# Patient Record
Sex: FEMALE | Race: WHITE | NOT HISPANIC OR LATINO | Employment: OTHER | URBAN - METROPOLITAN AREA
[De-identification: names, ages, dates, MRNs, and addresses within clinical notes are randomized per-mention and may not be internally consistent; named-entity substitution may affect disease eponyms.]

---

## 2023-03-27 LAB — POC HEMOGLOBIN A1C: 5.1 % (ref 4.2–6.5)

## 2023-04-05 PROBLEM — M47.812 CERVICAL ARTHRITIS: Status: ACTIVE | Noted: 2023-04-05

## 2023-04-05 PROBLEM — M54.12 CERVICAL RADICULOPATHY: Status: ACTIVE | Noted: 2023-04-05

## 2023-04-05 PROBLEM — Z98.84 GASTRIC BYPASS STATUS FOR OBESITY: Status: ACTIVE | Noted: 2023-04-05

## 2023-04-05 PROBLEM — M54.81 BILATERAL OCCIPITAL NEURALGIA: Status: ACTIVE | Noted: 2023-04-05

## 2023-04-05 PROBLEM — M54.9 BACK PAIN: Status: ACTIVE | Noted: 2023-04-05

## 2023-04-05 PROBLEM — M15.9 OSTEOARTHRITIS OF MULTIPLE JOINTS: Status: ACTIVE | Noted: 2023-04-05

## 2023-04-05 PROBLEM — M18.11 ARTHRITIS OF CARPOMETACARPAL (CMC) JOINT OF RIGHT THUMB: Status: ACTIVE | Noted: 2023-04-05

## 2023-04-05 PROBLEM — M17.9 KNEE OSTEOARTHRITIS: Status: ACTIVE | Noted: 2023-04-05

## 2023-04-05 PROBLEM — G44.86 CERVICOGENIC HEADACHE: Status: ACTIVE | Noted: 2023-04-05

## 2023-04-05 PROBLEM — F51.01 PRIMARY INSOMNIA: Status: ACTIVE | Noted: 2023-04-05

## 2023-04-05 PROBLEM — M79.18 CERVICAL MYOFASCIAL PAIN SYNDROME: Status: ACTIVE | Noted: 2023-04-05

## 2023-04-05 PROBLEM — R06.02 SOB (SHORTNESS OF BREATH) ON EXERTION: Status: ACTIVE | Noted: 2023-04-05

## 2023-04-05 PROBLEM — M25.531 ARTHRALGIA OF RIGHT WRIST: Status: ACTIVE | Noted: 2023-04-05

## 2023-04-05 PROBLEM — K13.79 MOUTH SORE: Status: ACTIVE | Noted: 2023-04-05

## 2023-04-05 PROBLEM — D69.6 THROMBOCYTOPENIA (CMS-HCC): Status: ACTIVE | Noted: 2023-04-05

## 2023-04-05 PROBLEM — C50.919 BREAST CANCER (MULTI): Status: ACTIVE | Noted: 2023-04-05

## 2023-04-05 PROBLEM — M1A.9XX0 CHRONIC GOUT: Status: ACTIVE | Noted: 2023-04-05

## 2023-04-05 PROBLEM — E66.01 MORBID OBESITY (MULTI): Status: ACTIVE | Noted: 2023-04-05

## 2023-04-05 PROBLEM — M15.0 PRIMARY OSTEOARTHRITIS INVOLVING MULTIPLE JOINTS: Status: ACTIVE | Noted: 2023-04-05

## 2023-04-05 PROBLEM — R51.9 HEADACHE: Status: ACTIVE | Noted: 2023-04-05

## 2023-04-05 PROBLEM — R29.6 RECURRENT FALLS: Status: ACTIVE | Noted: 2023-04-05

## 2023-04-05 PROBLEM — R41.3 MEMORY LOSS: Status: ACTIVE | Noted: 2023-04-05

## 2023-04-05 PROBLEM — E11.9 TYPE 2 DIABETES MELLITUS WITHOUT COMPLICATION, WITHOUT LONG-TERM CURRENT USE OF INSULIN (MULTI): Status: ACTIVE | Noted: 2023-04-05

## 2023-04-05 PROBLEM — M79.641 PAIN IN RIGHT HAND: Status: ACTIVE | Noted: 2023-04-05

## 2023-04-05 PROBLEM — K21.9 GASTROESOPHAGEAL REFLUX DISEASE WITHOUT ESOPHAGITIS: Status: ACTIVE | Noted: 2023-04-05

## 2023-04-05 PROBLEM — G62.9 NEUROPATHY: Status: ACTIVE | Noted: 2023-04-05

## 2023-04-05 PROBLEM — L30.9 DERMATITIS: Status: ACTIVE | Noted: 2023-04-05

## 2023-04-05 PROBLEM — E28.39 DECREASED ESTROGEN LEVEL: Status: ACTIVE | Noted: 2023-04-05

## 2023-04-05 PROBLEM — F32.0 CURRENT MILD EPISODE OF MAJOR DEPRESSIVE DISORDER (CMS-HCC): Status: ACTIVE | Noted: 2023-04-05

## 2023-04-05 RX ORDER — COLCHICINE 0.6 MG/1
0.6 TABLET ORAL EVERY OTHER DAY
COMMUNITY
End: 2024-05-17 | Stop reason: WASHOUT

## 2023-04-05 RX ORDER — MULTIVITAMIN
1 TABLET ORAL DAILY
COMMUNITY

## 2023-04-05 RX ORDER — ALLOPURINOL 100 MG/1
100 TABLET ORAL DAILY
COMMUNITY

## 2023-04-05 RX ORDER — DULOXETIN HYDROCHLORIDE 60 MG/1
60 CAPSULE, DELAYED RELEASE ORAL DAILY
COMMUNITY
End: 2024-02-13 | Stop reason: SDUPTHER

## 2023-04-05 RX ORDER — NARATRIPTAN 2.5 MG/1
2.5 TABLET ORAL ONCE AS NEEDED
COMMUNITY
End: 2023-11-17 | Stop reason: SDUPTHER

## 2023-04-05 RX ORDER — LAMOTRIGINE 150 MG/1
150 TABLET ORAL DAILY
COMMUNITY

## 2023-04-05 RX ORDER — BENZTROPINE MESYLATE 0.5 MG/1
0.5 TABLET ORAL 3 TIMES DAILY
COMMUNITY
End: 2024-02-13 | Stop reason: ALTCHOICE

## 2023-04-05 RX ORDER — PANTOPRAZOLE SODIUM 40 MG/1
40 TABLET, DELAYED RELEASE ORAL
COMMUNITY
End: 2024-04-03 | Stop reason: ALTCHOICE

## 2023-04-05 RX ORDER — ALLOPURINOL 300 MG/1
300 TABLET ORAL DAILY
COMMUNITY
End: 2024-02-13 | Stop reason: ALTCHOICE

## 2023-04-05 RX ORDER — LATANOPROST 0.05 MG/ML
SOLUTION/ DROPS OPHTHALMIC; TOPICAL DAILY
COMMUNITY

## 2023-04-05 RX ORDER — ARIPIPRAZOLE 2 MG/1
2 TABLET ORAL DAILY
COMMUNITY
End: 2023-08-11 | Stop reason: SINTOL

## 2023-04-05 RX ORDER — PREDNISONE 10 MG/1
30 TABLET ORAL DAILY
COMMUNITY
End: 2023-11-17 | Stop reason: ALTCHOICE

## 2023-04-05 RX ORDER — SEMAGLUTIDE 1.34 MG/ML
0.5 INJECTION, SOLUTION SUBCUTANEOUS
COMMUNITY
End: 2024-02-13 | Stop reason: SDUPTHER

## 2023-04-05 ASSESSMENT — ENCOUNTER SYMPTOMS
FATIGUE: 0
FREQUENCY: 0
EYE REDNESS: 0
EYE DISCHARGE: 0
POLYPHAGIA: 0
ABDOMINAL PAIN: 0
UNEXPECTED WEIGHT CHANGE: 0
HALLUCINATIONS: 0
POLYDIPSIA: 0
EYE PAIN: 0
WOUND: 0
ADENOPATHY: 0
DIZZINESS: 0
NECK PAIN: 0
SHORTNESS OF BREATH: 0
APPETITE CHANGE: 0
HEMATURIA: 0
PALPITATIONS: 0
BLOOD IN STOOL: 0
BRUISES/BLEEDS EASILY: 0
DYSURIA: 0
VOMITING: 0
FEVER: 0
BACK PAIN: 0
CHILLS: 0
CONFUSION: 0
SORE THROAT: 0
HEADACHES: 0
TROUBLE SWALLOWING: 0
COUGH: 0

## 2023-04-05 NOTE — PROGRESS NOTES
Subjective   Patient ID: Mariann Drew is a 67 y.o. female who presents for Medicare Annual Wellness Visit Initial (Medicare annual exam./Ent Dr will see today/Oncologist- Yes every year/Last last-/Fasting No/Flu Yes Sept 2022/Tdap-yes 5 years ago/Shingrix second on 01/2023/[id number] Yes last fall/Last naratriptan 2wk ago).    Pls ask all medicare questions    new pt-does pt see anyone but gabino ambriz bashour, [place], [facility]    Name of eye drmartha  Did pt see ent dr for mouth sores? Seeing him today  Did pt see hand ortho for rt hand pain? yes  Does pt see a breast health dr or oncologist still? Onc qyr  Put names of providers in care team-not last pcp    Need dario for general surgeon at next appt-silvano    last labs 1/2023  is pt fasting? no  Tdap 2020  flu shot unavailable  Shingrix last one 1/2023  [id number] fall 2022  last [id number]/2021  last mammo 6/1/22  bone density 9/2022  Last use of naratriptan 2wks ago    Poc microalb-normal    Next wk tues-sebaceous cyst rt abdomen-gnl surgeon silvano    Family history form      No care team member to display    HPI  Last labs-3/27/23 a1c 5.1, 1/11/23 cmp-non fasting sugar 160, iron nl, ferritin, nl, folate nl, b12 nl, cbc-rbc low/hgb low; 10/2022 tsh nl, vit d nl; 2/2022 lipid hdl 44  Due for labs- lipid    No results found for: CHOL  No results found for: TRIG  No results found for: HDL  No results found for: LDL  No results found for: TSH  No components found for: A1C  No components found for: POCA1C  No results found for: ALBUR  No components found for: POCALBUR      Other concerns: none    bps at home- none    ER/urgicare visits in the last year- Sunday cyst  Hospitalizations in the last year- none    FH ovarian, endometrial, cervical, uterine ca-mat aunt-uterine        last mammo- (40-75/90) 6/1/22 at [facility]  Self breast exam yes  FH br ca-none    last colonoscopy/cologuard/FIT (45-75) 11/4/21; due 11/2026   colon  ca-pgm      last bone density-(age 65-85) or if fx after age 50) 9/2022      Exercise- water aerobics 3 times a wk  Diet-3 meals a day   Body mass index is 33.64 kg/m².    last eye dr appt- glasses, appt today  No vision issues    last dental appt- 3wks ago; filling next wk    BMs-regular  Sleep-able to fall asleep and stay asleep; no snoring or apnea; medical marijuana 1 gummy at bedtime  no cp, swelling, sob, abd pain, n/v/d/c, blood in stool or black stools  STI testing including hiv (age 15-65) and hep c screening (18-79)-declines        Immunization History   Administered Date(s) Administered    Influenza, seasonal, injectable 09/16/2021    Pfizer Purple Cap SARS-CoV-2 02/09/2021, 03/10/2021, 09/22/2021    Pneumococcal Conjugate Pcv 20 09/01/2022    Pneumococcal Polysaccharide PPSV23 01/01/2004    SARS-CoV-2, Unspecified 04/06/2022    TD (adult), 2 Lf tetanus toxoid, preservative free, adsorbed 06/05/2014    Tdap 08/07/2020    Zoster, Unspecified 03/07/2016, 10/03/2022, 01/04/2023       fractures in lifetime-rt shoulder, rt pinky finger, feet bilat  FH hip/spine/wrist/humerus fx in mom, dad or sibs-dad-wrist    FH heart attack, heart surgery-dad-mi-ablations; mom-hole in heart  FH stroke-mgm, uncle    The ASCVD Risk score (Dinh GREEN, et al., 2019) failed to calculate for the following reasons:    Cannot find a previous HDL lab    Cannot find a previous total cholesterol lab  Coronary Artery Calcium score:  This test is recommended for men 45 or older and women 55 or older without a history of heart disease and have 1 risk factor (high LDL cholesterol, low HDL cholesterol, high blood pressure, smoker (current or past), type 2 diabetes, IBD, lupus, RA, ankylosing spondylitis, psoriasis or family history of  heart disease <55yrs in dad, brother or child or <65yrs in mom, sister, or child.)       Review of Systems   Constitutional:  Negative for appetite change, chills, fatigue, fever and unexpected weight change.    HENT:  Negative for congestion, ear pain, sore throat and trouble swallowing.    Eyes:  Negative for pain, discharge and redness.   Respiratory:  Negative for cough and shortness of breath.    Cardiovascular:  Negative for chest pain and palpitations.   Gastrointestinal:  Negative for abdominal pain, blood in stool and vomiting.   Endocrine: Negative for polydipsia, polyphagia and polyuria.   Genitourinary:  Negative for dysuria, frequency, hematuria and urgency.   Musculoskeletal:  Negative for back pain and neck pain.   Skin:  Negative for rash and wound.   Allergic/Immunologic: Negative for immunocompromised state.   Neurological:  Negative for dizziness, syncope and headaches.   Hematological:  Negative for adenopathy. Does not bruise/bleed easily.   Psychiatric/Behavioral:  Negative for confusion and hallucinations.        Objective   Visit Vitals  /72   Pulse 88   Temp 36.6 °C (97.9 °F) (Temporal)      BP Readings from Last 3 Encounters:   04/06/23 123/72   12/02/22 100/60   10/17/22 103/66     Wt Readings from Last 3 Encounters:   04/06/23 88.9 kg (196 lb)   12/02/22 86.2 kg (190 lb)   10/17/22 86.6 kg (191 lb)           Physical Exam  Constitutional:       General: She is not in acute distress.     Appearance: Normal appearance. She is not ill-appearing.   HENT:      Head: Normocephalic.      Right Ear: Tympanic membrane, ear canal and external ear normal.      Left Ear: Tympanic membrane, ear canal and external ear normal.      Nose: Nose normal.      Mouth/Throat:      Mouth: Mucous membranes are moist.      Pharynx: Oropharynx is clear.   Eyes:      Extraocular Movements: Extraocular movements intact.      Conjunctiva/sclera: Conjunctivae normal.      Pupils: Pupils are equal, round, and reactive to light.   Cardiovascular:      Rate and Rhythm: Normal rate and regular rhythm.      Heart sounds: Normal heart sounds. No murmur heard.  Pulmonary:      Effort: Pulmonary effort is normal. No  respiratory distress.      Breath sounds: Normal breath sounds. No wheezing, rhonchi or rales.   Abdominal:      General: Bowel sounds are normal.      Palpations: Abdomen is soft. There is no mass.      Tenderness: There is no abdominal tenderness.   Musculoskeletal:         General: No swelling or tenderness. Normal range of motion.      Cervical back: Normal range of motion and neck supple.      Right lower leg: No edema.      Left lower leg: No edema.   Skin:     General: Skin is warm.      Findings: No rash.   Neurological:      General: No focal deficit present.      Mental Status: She is alert and oriented to person, place, and time.      Cranial Nerves: No cranial nerve deficit.      Motor: No weakness.   Psychiatric:         Mood and Affect: Mood normal.         Behavior: Behavior normal.         Assessment/Plan   Diagnoses and all orders for this visit:  Routine adult health maintenance  Type 2 diabetes mellitus without complication, without long-term current use of insulin (CMS/[facility])  -     Lipid Panel; Future  -     POCT Albumin random urine manually resulted  BMI 33.0-33.9,adult  Encounter for screening mammogram for malignant neoplasm of breast  -     BI mammo right screening tomosynthesis; Future  FH: myocardial infarction  -     CT cardiac scoring wo IV contrast; Future

## 2023-04-06 ENCOUNTER — OFFICE VISIT (OUTPATIENT)
Dept: PRIMARY CARE | Facility: CLINIC | Age: 68
End: 2023-04-06
Payer: MEDICARE

## 2023-04-06 VITALS
DIASTOLIC BLOOD PRESSURE: 72 MMHG | BODY MASS INDEX: 33.46 KG/M2 | WEIGHT: 196 LBS | TEMPERATURE: 97.9 F | SYSTOLIC BLOOD PRESSURE: 123 MMHG | HEIGHT: 64 IN | OXYGEN SATURATION: 94 % | HEART RATE: 88 BPM

## 2023-04-06 DIAGNOSIS — E11.9 TYPE 2 DIABETES MELLITUS WITHOUT COMPLICATION, WITHOUT LONG-TERM CURRENT USE OF INSULIN (MULTI): ICD-10-CM

## 2023-04-06 DIAGNOSIS — Z82.49 FH: MYOCARDIAL INFARCTION: ICD-10-CM

## 2023-04-06 DIAGNOSIS — Z12.31 ENCOUNTER FOR SCREENING MAMMOGRAM FOR MALIGNANT NEOPLASM OF BREAST: ICD-10-CM

## 2023-04-06 DIAGNOSIS — K75.81 LIVER CIRRHOSIS SECONDARY TO NASH (NONALCOHOLIC STEATOHEPATITIS) (MULTI): ICD-10-CM

## 2023-04-06 DIAGNOSIS — C50.912 MALIGNANT NEOPLASM OF LEFT FEMALE BREAST, UNSPECIFIED ESTROGEN RECEPTOR STATUS, UNSPECIFIED SITE OF BREAST (MULTI): ICD-10-CM

## 2023-04-06 DIAGNOSIS — Z13.89 SCREENING FOR MULTIPLE CONDITIONS: ICD-10-CM

## 2023-04-06 DIAGNOSIS — D69.6 THROMBOCYTOPENIA (CMS-HCC): ICD-10-CM

## 2023-04-06 DIAGNOSIS — M06.9 RHEUMATOID ARTHRITIS, INVOLVING UNSPECIFIED SITE, UNSPECIFIED WHETHER RHEUMATOID FACTOR PRESENT (MULTI): ICD-10-CM

## 2023-04-06 DIAGNOSIS — Z00.00 ROUTINE ADULT HEALTH MAINTENANCE: Primary | ICD-10-CM

## 2023-04-06 DIAGNOSIS — K74.60 LIVER CIRRHOSIS SECONDARY TO NASH (NONALCOHOLIC STEATOHEPATITIS) (MULTI): ICD-10-CM

## 2023-04-06 DIAGNOSIS — F33.0 MILD EPISODE OF RECURRENT MAJOR DEPRESSIVE DISORDER (CMS-HCC): ICD-10-CM

## 2023-04-06 PROBLEM — E66.01 MORBID OBESITY (MULTI): Status: RESOLVED | Noted: 2023-04-05 | Resolved: 2023-04-06

## 2023-04-06 LAB
POC ALBUMIN /CREATININE RATIO MANUALLY ENTERED: <30 UG/MG CREAT
POC URINE ALBUMIN: 30 MG/L
POC URINE CREATININE: 200 MG/DL

## 2023-04-06 PROCEDURE — 3351F NEG SCRN DEP SYMP BY DEPTOOL: CPT | Performed by: NURSE PRACTITIONER

## 2023-04-06 PROCEDURE — G0444 DEPRESSION SCREEN ANNUAL: HCPCS | Performed by: NURSE PRACTITIONER

## 2023-04-06 PROCEDURE — 3011F LIPID PANEL DOC REV: CPT | Performed by: NURSE PRACTITIONER

## 2023-04-06 PROCEDURE — G0439 PPPS, SUBSEQ VISIT: HCPCS | Performed by: NURSE PRACTITIONER

## 2023-04-06 PROCEDURE — 1170F FXNL STATUS ASSESSED: CPT | Performed by: NURSE PRACTITIONER

## 2023-04-06 PROCEDURE — 82044 UR ALBUMIN SEMIQUANTITATIVE: CPT | Performed by: NURSE PRACTITIONER

## 2023-04-06 PROCEDURE — 3074F SYST BP LT 130 MM HG: CPT | Performed by: NURSE PRACTITIONER

## 2023-04-06 PROCEDURE — 3078F DIAST BP <80 MM HG: CPT | Performed by: NURSE PRACTITIONER

## 2023-04-06 PROCEDURE — 3008F BODY MASS INDEX DOCD: CPT | Performed by: NURSE PRACTITIONER

## 2023-04-06 PROCEDURE — 1160F RVW MEDS BY RX/DR IN RCRD: CPT | Performed by: NURSE PRACTITIONER

## 2023-04-06 PROCEDURE — 1159F MED LIST DOCD IN RCRD: CPT | Performed by: NURSE PRACTITIONER

## 2023-04-06 ASSESSMENT — ACTIVITIES OF DAILY LIVING (ADL)
MANAGING_FINANCES: INDEPENDENT
TAKING_MEDICATION: INDEPENDENT
DOING_HOUSEWORK: INDEPENDENT
BATHING: INDEPENDENT
DRESSING: INDEPENDENT
GROCERY_SHOPPING: INDEPENDENT

## 2023-04-06 ASSESSMENT — PATIENT HEALTH QUESTIONNAIRE - PHQ9
2. FEELING DOWN, DEPRESSED OR HOPELESS: NOT AT ALL
SUM OF ALL RESPONSES TO PHQ9 QUESTIONS 1 AND 2: 0
1. LITTLE INTEREST OR PLEASURE IN DOING THINGS: NOT AT ALL

## 2023-04-06 NOTE — PATIENT INSTRUCTIONS
Cholesterol lab when fasting    Urine dip today looking at protein was normal.    Mammogram due 6/2 or after.    Set up ct cardiac score appt        Handouts given to pt:  physical handout      Need records from: [facility] and priscilla    I recommend signing up for MyChart.    Labs:    You can use the lab in our building when fasting. The hrs are: Monday-Thursday, 7 a.m. - 6 p.m., Friday, 7 a.m. - 5 p.m., Saturday 8 a.m. - 12 noon.   No appt needed, BUT YOU DO NEED THE PAPER ORDER.    Fasting is no food, drink, gum or mints other than water for 12 hrs.   Results will be back in 2-3 business days for most labs. It is always recommended for any orders (labs, xrays, ultrasounds,MRI, ct scan, procedures etc) to check with your insurance provider for expected costs or expenses to you.       You will get your results via phone from my medical assistant if you do not have MyChart.  OR  You will get your results via Nove[employer]    If a result is urgent, I will call to speak to you.    Vaccines:  Up to date    General recommendations:  Exercise-cardio 4-5d/wk 30min each day  Diet-Breakfast-toast (my favorite Herlinda Walter Delighful Multigrain or Faustino's Killer Bread Good Seed thin-sliced)/bagel/English muffin-whole wheat flour as a 1st ingredient or cereal/oatmeal/granola bar-fiber 4g or more or protein like eggs or peanut butter; optional veggies  Lunch-protein, 1/2c carb or 2 slices bread, veg 1c  Dinner-protein, fist sized carb, veg 1c  Fruit 2 a day  Dairy 2 a day-milk, soy milk, almond milk, cheese, yogurt, cottage cheese  Snacks-Protein-hard boiled egg, nuts (walnuts/almonds/pecans/pistachios 1/4c), hummus, beef/deer jerky or meat sticks; vegetable, fruit, dairy-milk(1%, skim, almond, soy)/cheese (not a lot of cheddar)/yogurt (Greek is best-my favorite Dannon Fruit on the Bottom Greek)/cottage cheese 2%; triscuits/ popcorn/wheat thins have a lot of fiber; follow serving size on bag/box/container  increase water  Limit  alcohol to 1 drink per day for women and 2 drinks per day for men (1 drink=12oz beer or 5oz wine or 1 1/2oz liquor)  Calcium: 500mg 1 twice a day if age 50 and younger and 600mg 1 twice a day if over age 50 (calcium citrate can be taken without food)  Vitamin D: 800-5000 IU/day  Limit salt to <2300mg a day if age 50 and under and <1500mg a day if over age 50/have high bp or diabetes or kidney disease  Recommend folate for childbearing age women 0.4mg per day (can be found in a multivitamin)  Recommend 18mg/dL of iron a day if age 50 and under and 8mg/dL a day if over age 50; take on an empty stomach at bedtime  Use sunscreen   Wear seatbelt  Recommend safe sex practices: using condoms everytime you have sex, discuss with a new partner about their past partners/history of STDs/drug use, avoid drinking alcohol or using drugs as this increases the chance that you will participate in high-risk sex, for oral sex help protect your mouth by having your partner use a condom (male or female), women should not douche after sex, be aware of your partner's body and your body-look for signs of a sore, blister, rash, or discharge, and have regular exams and periodic tests for STDs.  No distracted driving  No driving when under influence of substances  Wear a seatbelt  Eye dr every 1-2yrs  Dentist every 6-12 mon  Tetanus shot every 10yrs  Recommend flu vaccine in the fall  Appt in 6mon for follow up on diabetes (dario for dr schaefer-gnl surgeon cyst on abdomen), family history form and 1 year for physical      I will communicate with you via Locappy regarding messages and results. If you need help with this, you can call the support line at [phone].    IT WAS A PLEASURE TO SEE YOU TODAY. THANK YOU FOR CHOOSING US FOR YOUR HEALTHCARE NEEDS.

## 2023-04-19 ENCOUNTER — OFFICE (OUTPATIENT)
Dept: URBAN - METROPOLITAN AREA CLINIC 26 | Facility: CLINIC | Age: 68
End: 2023-04-19

## 2023-04-19 VITALS
DIASTOLIC BLOOD PRESSURE: 82 MMHG | HEIGHT: 64 IN | TEMPERATURE: 97.5 F | WEIGHT: 195 LBS | SYSTOLIC BLOOD PRESSURE: 128 MMHG

## 2023-04-19 DIAGNOSIS — K75.81 NONALCOHOLIC STEATOHEPATITIS (NASH): ICD-10-CM

## 2023-04-19 DIAGNOSIS — K22.70 BARRETT'S ESOPHAGUS WITHOUT DYSPLASIA: ICD-10-CM

## 2023-04-19 PROCEDURE — 99214 OFFICE O/P EST MOD 30 MIN: CPT | Performed by: INTERNAL MEDICINE

## 2023-04-19 RX ORDER — PANTOPRAZOLE SODIUM 20 MG/1
TABLET, DELAYED RELEASE ORAL
Qty: 90 | Refills: 0 | Status: COMPLETED
End: 2024-07-25

## 2023-04-19 RX ORDER — PANTOPRAZOLE 40 MG/1
TABLET, DELAYED RELEASE ORAL
Status: COMPLETED
End: 2023-04-19

## 2023-05-01 ENCOUNTER — TELEPHONE (OUTPATIENT)
Dept: PRIMARY CARE | Facility: CLINIC | Age: 68
End: 2023-05-01
Payer: MEDICARE

## 2023-05-16 LAB
COBALAMIN (VITAMIN B12) (PG/ML) IN SER/PLAS: 636 PG/ML (ref 211–911)
THYROTROPIN (MIU/L) IN SER/PLAS BY DETECTION LIMIT <= 0.05 MIU/L: 1.82 MIU/L (ref 0.44–3.98)

## 2023-05-18 LAB
NON-UH HIE CALCULATED LDL CHOLESTEROL: 73 MG/DL (ref 60–130)
NON-UH HIE CHOLESTEROL: 142 MG/DL (ref 100–200)
NON-UH HIE HDL CHOLESTEROL: 54 MG/DL (ref 40–60)
NON-UH HIE TOTAL CHOL/HDL CHOL RATIO: 2.6
NON-UH HIE TRIGLYCERIDES: 77 MG/DL (ref 30–150)

## 2023-08-11 ENCOUNTER — OFFICE VISIT (OUTPATIENT)
Dept: PRIMARY CARE | Facility: CLINIC | Age: 68
End: 2023-08-11
Payer: MEDICARE

## 2023-08-11 VITALS
HEIGHT: 64 IN | SYSTOLIC BLOOD PRESSURE: 110 MMHG | WEIGHT: 187 LBS | DIASTOLIC BLOOD PRESSURE: 70 MMHG | BODY MASS INDEX: 31.92 KG/M2

## 2023-08-11 DIAGNOSIS — E11.9 TYPE 2 DIABETES MELLITUS WITHOUT COMPLICATION, WITHOUT LONG-TERM CURRENT USE OF INSULIN (MULTI): Primary | ICD-10-CM

## 2023-08-11 DIAGNOSIS — L30.4 INTERTRIGO: ICD-10-CM

## 2023-08-11 DIAGNOSIS — G24.01 TARDIVE DYSKINESIA: ICD-10-CM

## 2023-08-11 DIAGNOSIS — C50.912 MALIGNANT NEOPLASM OF LEFT FEMALE BREAST, UNSPECIFIED ESTROGEN RECEPTOR STATUS, UNSPECIFIED SITE OF BREAST (MULTI): ICD-10-CM

## 2023-08-11 DIAGNOSIS — Z12.31 BREAST CANCER SCREENING BY MAMMOGRAM: ICD-10-CM

## 2023-08-11 PROBLEM — G20.A1 PARKINSON'S DISEASE (MULTI): Status: RESOLVED | Noted: 2023-08-11 | Resolved: 2023-08-11

## 2023-08-11 PROBLEM — G20.A1 PARKINSON'S DISEASE (MULTI): Status: ACTIVE | Noted: 2023-08-11

## 2023-08-11 LAB — HBA1C MFR BLD: 4.6 % (ref 4.2–6.5)

## 2023-08-11 PROCEDURE — 1160F RVW MEDS BY RX/DR IN RCRD: CPT | Performed by: STUDENT IN AN ORGANIZED HEALTH CARE EDUCATION/TRAINING PROGRAM

## 2023-08-11 PROCEDURE — 3078F DIAST BP <80 MM HG: CPT | Performed by: STUDENT IN AN ORGANIZED HEALTH CARE EDUCATION/TRAINING PROGRAM

## 2023-08-11 PROCEDURE — 3008F BODY MASS INDEX DOCD: CPT | Performed by: STUDENT IN AN ORGANIZED HEALTH CARE EDUCATION/TRAINING PROGRAM

## 2023-08-11 PROCEDURE — 1036F TOBACCO NON-USER: CPT | Performed by: STUDENT IN AN ORGANIZED HEALTH CARE EDUCATION/TRAINING PROGRAM

## 2023-08-11 PROCEDURE — 83036 HEMOGLOBIN GLYCOSYLATED A1C: CPT | Mod: CLIA WAIVED TEST | Performed by: STUDENT IN AN ORGANIZED HEALTH CARE EDUCATION/TRAINING PROGRAM

## 2023-08-11 PROCEDURE — 3044F HG A1C LEVEL LT 7.0%: CPT | Performed by: STUDENT IN AN ORGANIZED HEALTH CARE EDUCATION/TRAINING PROGRAM

## 2023-08-11 PROCEDURE — 99214 OFFICE O/P EST MOD 30 MIN: CPT | Performed by: STUDENT IN AN ORGANIZED HEALTH CARE EDUCATION/TRAINING PROGRAM

## 2023-08-11 PROCEDURE — 1125F AMNT PAIN NOTED PAIN PRSNT: CPT | Performed by: STUDENT IN AN ORGANIZED HEALTH CARE EDUCATION/TRAINING PROGRAM

## 2023-08-11 PROCEDURE — 1159F MED LIST DOCD IN RCRD: CPT | Performed by: STUDENT IN AN ORGANIZED HEALTH CARE EDUCATION/TRAINING PROGRAM

## 2023-08-11 PROCEDURE — 3074F SYST BP LT 130 MM HG: CPT | Performed by: STUDENT IN AN ORGANIZED HEALTH CARE EDUCATION/TRAINING PROGRAM

## 2023-08-11 RX ORDER — NYSTATIN 100000 [USP'U]/G
POWDER TOPICAL 2 TIMES DAILY
Qty: 30 G | Refills: 2 | Status: SHIPPED | OUTPATIENT
Start: 2023-08-11 | End: 2024-04-16

## 2023-08-11 RX ORDER — FLUCONAZOLE 150 MG/1
150 TABLET ORAL ONCE
Qty: 1 TABLET | Refills: 0 | Status: SHIPPED | OUTPATIENT
Start: 2023-08-11 | End: 2023-08-11

## 2023-08-11 ASSESSMENT — PATIENT HEALTH QUESTIONNAIRE - PHQ9
2. FEELING DOWN, DEPRESSED OR HOPELESS: NOT AT ALL
SUM OF ALL RESPONSES TO PHQ9 QUESTIONS 1 AND 2: 1
1. LITTLE INTEREST OR PLEASURE IN DOING THINGS: SEVERAL DAYS
10. IF YOU CHECKED OFF ANY PROBLEMS, HOW DIFFICULT HAVE THESE PROBLEMS MADE IT FOR YOU TO DO YOUR WORK, TAKE CARE OF THINGS AT HOME, OR GET ALONG WITH OTHER PEOPLE: SOMEWHAT DIFFICULT

## 2023-08-11 ASSESSMENT — ENCOUNTER SYMPTOMS
CONSTITUTIONAL NEGATIVE: 1
BRUISES/BLEEDS EASILY: 1
SLEEP DISTURBANCE: 0
RESPIRATORY NEGATIVE: 1
GASTROINTESTINAL NEGATIVE: 1
ARTHRALGIAS: 1
CARDIOVASCULAR NEGATIVE: 1

## 2023-08-11 NOTE — PROGRESS NOTES
Subjective   Patient ID: Mariann Drew is a 67 y.o. female who presents for Establish Care (New patient est care - former Dr. Franklin patient/A1C).  Last A1c 5.1. Tolerating ozempic well. 0.5mg dose.     Broke her left shoulder in June tripping on front step. Doing PT. PT has been helping. Tylenol     Left breast mastectomy and chemo in 2000. Due for mammogram.     Low platelets for last year. Has liver cirrhosis. Sees a hematologist.     Taking Ingrezza for tardive dyskinesia. Follows with neurology.     Moods stable, no longer on abilify. Doing well on lamictal.     Normal bowels.     Lost weight with bariatric surgery and has maintained this well.     Has glaucoma, gets regular eye exams.     Says overall she has been doing well.               Review of Systems   Constitutional: Negative.    HENT: Negative.     Respiratory: Negative.     Cardiovascular: Negative.    Gastrointestinal: Negative.    Musculoskeletal:  Positive for arthralgias.   Hematological:  Bruises/bleeds easily.   Psychiatric/Behavioral:  Negative for sleep disturbance.    All other systems reviewed and are negative.      Objective   Physical Exam  Vitals reviewed.   Constitutional:       Appearance: Normal appearance.   HENT:      Head: Normocephalic.      Mouth/Throat:      Mouth: Mucous membranes are moist.   Eyes:      Pupils: Pupils are equal, round, and reactive to light.   Cardiovascular:      Rate and Rhythm: Normal rate and regular rhythm.   Pulmonary:      Effort: Pulmonary effort is normal. No respiratory distress.      Breath sounds: Normal breath sounds. No wheezing or rhonchi.   Musculoskeletal:         General: Normal range of motion.      Right lower leg: No edema.      Left lower leg: No edema.   Skin:     General: Skin is warm and dry.   Neurological:      Mental Status: She is alert.      Comments: tremor   Psychiatric:         Mood and Affect: Mood normal.         Behavior: Behavior normal.           Current Outpatient  Medications:     allopurinol (Zyloprim) 100 mg tablet, Take 1 tablet (100 mg) by mouth once daily. With 300mg tab to equal 400mg a day, Disp: , Rfl:     DULoxetine (Cymbalta) 60 mg DR capsule, Take 1 capsule (60 mg) by mouth once daily., Disp: , Rfl:     lamoTRIgine (LaMICtal) 25 mg tablet, Take 6 tablets (150 mg) by mouth once daily., Disp: , Rfl:     latanoprost (Xelpros) 0.005 % drops, emulsion, Administer into affected eye(s)., Disp: , Rfl:     naratriptan (Amerge) 2.5 mg tablet, Take 1 tablet (2.5 mg) by mouth 1 time if needed for migraine. May repeat once in 4hrs if headache recurs, Disp: , Rfl:     semaglutide (Ozempic) 0.25 mg or 0.5 mg(2 mg/1.5 mL) pen injector, Inject 0.5 mg under the skin 1 (one) time per week., Disp: , Rfl:     valbenazine tosylate (Ingrezza) 40 mg capsule, 1 capsule (40 mg)., Disp: , Rfl:     allopurinol (Zyloprim) 300 mg tablet, Take 1 tablet (300 mg) by mouth once daily. With 100mg tab to equal 400mg a day, Disp: , Rfl:     benztropine (Cogentin) 0.5 mg tablet, Take 1 tablet (0.5 mg) by mouth in the morning and 1 tablet (0.5 mg) in the evening and 1 tablet (0.5 mg) before bedtime., Disp: , Rfl:     colchicine 0.6 mg tablet, Take 1 tablet (0.6 mg) by mouth every other day., Disp: , Rfl:     ferrous sulfate (FEOSOL ORAL), Take 1 tablet by mouth once daily in the evening. 200 (65 Fe) MG, Disp: , Rfl:     fluconazole (Diflucan) 150 mg tablet, Take 1 tablet (150 mg) by mouth 1 time for 1 dose., Disp: 1 tablet, Rfl: 0    multivitamin tablet, Take 1 tablet by mouth once daily., Disp: , Rfl:     nystatin (Mycostatin) 100,000 unit/gram powder, Apply topically 2 times a day., Disp: 30 g, Rfl: 2    pantoprazole (ProtoNix) 40 mg EC tablet, Take 1 tablet (40 mg) by mouth once daily in the morning. Take before meals. Do not crush, chew, or split., Disp: , Rfl:     predniSONE (Deltasone) 10 mg tablet, Take 3 tablets (30 mg) by mouth once daily., Disp: , Rfl:     Assessment/Plan   Diagnoses and all  orders for this visit:  Type 2 diabetes mellitus without complication, without long-term current use of insulin (CMS/HCC)  -     POCT Glycosylated Hemoglobin (HGB A1C) docked device  Malignant neoplasm of left female breast, unspecified estrogen receptor status, unspecified site of breast (CMS/HCC)  -     BI mammo bilateral screening tomosynthesis; Future  Breast cancer screening by mammogram  -     BI mammo bilateral screening tomosynthesis; Future  Tardive dyskinesia  Comments:  taking ingrezza, following with neuro  [facility]  -     nystatin (Mycostatin) 100,000 unit/gram powder; Apply topically 2 times a day.  -     fluconazole (Diflucan) 150 mg tablet; Take 1 tablet (150 mg) by mouth 1 time for 1 dose.  Other orders  -     POCT GLYCOSYLATED HEMOGLOBIN (HGB A1C)    The patient received Provided instructions on dietary changes  Provided instructions on exercise because they have an above normal BMI.      Follow up in 3 months    Olivia Self, DO

## 2023-08-17 PROBLEM — G43.019 INTRACTABLE MIGRAINE WITHOUT AURA AND WITHOUT STATUS MIGRAINOSUS: Status: ACTIVE | Noted: 2018-02-08

## 2023-08-17 PROBLEM — K25.3 ACUTE ULCER OF STOMACH: Status: ACTIVE | Noted: 2023-08-17

## 2023-08-17 PROBLEM — M54.2 CHRONIC NECK PAIN: Status: ACTIVE | Noted: 2018-02-08

## 2023-08-17 PROBLEM — R26.89 BALANCE PROBLEMS: Status: ACTIVE | Noted: 2018-09-15

## 2023-08-17 PROBLEM — M47.22 CERVICAL SPONDYLOSIS WITH RADICULOPATHY: Status: ACTIVE | Noted: 2018-02-08

## 2023-08-17 PROBLEM — D68.9 BLOOD CLOTTING DISORDER (MULTI): Status: ACTIVE | Noted: 2023-08-17

## 2023-08-17 PROBLEM — M25.512 CHRONIC LEFT SHOULDER PAIN: Status: ACTIVE | Noted: 2018-01-03

## 2023-08-17 PROBLEM — N95.2 ATROPHIC VAGINITIS: Status: ACTIVE | Noted: 2023-08-17

## 2023-08-17 PROBLEM — K86.2 PANCREATIC CYST (HHS-HCC): Status: ACTIVE | Noted: 2023-08-17

## 2023-08-17 PROBLEM — G89.29 CHRONIC NECK PAIN: Status: ACTIVE | Noted: 2018-02-08

## 2023-08-17 PROBLEM — N81.6 RECTOCELE: Status: ACTIVE | Noted: 2023-08-17

## 2023-08-17 PROBLEM — H54.7 VISION LOSS: Status: ACTIVE | Noted: 2023-08-17

## 2023-08-17 PROBLEM — I10 ESSENTIAL HYPERTENSION: Status: ACTIVE | Noted: 2021-02-23

## 2023-08-17 PROBLEM — C80.1 CANCER (MULTI): Status: ACTIVE | Noted: 2023-08-17

## 2023-08-17 PROBLEM — K12.0 ORAL APHTHOUS ULCER: Status: ACTIVE | Noted: 2023-03-20

## 2023-08-17 PROBLEM — Z86.39 HX OF OBESITY: Status: ACTIVE | Noted: 2023-08-17

## 2023-08-17 PROBLEM — K92.2 GI BLEED: Status: ACTIVE | Noted: 2023-08-17

## 2023-08-17 PROBLEM — M75.112 INCOMPLETE TEAR OF LEFT ROTATOR CUFF: Status: ACTIVE | Noted: 2018-02-08

## 2023-08-17 PROBLEM — N81.10 FEMALE CYSTOCELE: Status: ACTIVE | Noted: 2023-08-17

## 2023-08-17 PROBLEM — D61.818 PANCYTOPENIA (MULTI): Status: ACTIVE | Noted: 2023-08-17

## 2023-08-17 PROBLEM — E66.01 OBESITY, CLASS III, BMI 40-49.9 (MORBID OBESITY) (MULTI): Status: ACTIVE | Noted: 2018-07-16

## 2023-08-17 PROBLEM — B19.20 HEPATITIS C: Status: ACTIVE | Noted: 2023-08-17

## 2023-08-17 PROBLEM — R26.9 GAIT ABNORMALITY: Status: ACTIVE | Noted: 2019-11-01

## 2023-08-17 PROBLEM — G47.33 OSA (OBSTRUCTIVE SLEEP APNEA): Status: ACTIVE | Noted: 2018-06-29

## 2023-08-17 PROBLEM — M79.18 MYOFASCIAL PAIN SYNDROME, CERVICAL: Status: ACTIVE | Noted: 2023-08-17

## 2023-08-17 PROBLEM — G25.81 RLS (RESTLESS LEGS SYNDROME): Status: ACTIVE | Noted: 2023-08-17

## 2023-08-17 PROBLEM — K76.0 FATTY LIVER: Status: ACTIVE | Noted: 2023-08-17

## 2023-08-17 PROBLEM — K12.1 STOMATITIS, VIRAL: Status: ACTIVE | Noted: 2023-08-17

## 2023-08-17 PROBLEM — D64.9 ANEMIA: Status: ACTIVE | Noted: 2023-08-17

## 2023-08-17 PROBLEM — Z96.659 HISTORY OF TOTAL KNEE REPLACEMENT: Status: ACTIVE | Noted: 2018-07-20

## 2023-08-17 PROBLEM — D72.819 LEUKOPENIA: Status: ACTIVE | Noted: 2023-07-12

## 2023-08-17 PROBLEM — K46.9 ENTEROCELE: Status: ACTIVE | Noted: 2023-08-17

## 2023-08-17 PROBLEM — M19.90 ARTHRITIS: Status: ACTIVE | Noted: 2023-08-17

## 2023-08-17 PROBLEM — B97.89 STOMATITIS, VIRAL: Status: ACTIVE | Noted: 2023-08-17

## 2023-08-17 PROBLEM — N81.10 VAGINAL WALL PROLAPSE: Status: ACTIVE | Noted: 2023-08-17

## 2023-08-17 PROBLEM — F32.5 DEPRESSION, MAJOR, IN REMISSION (CMS-HCC): Status: ACTIVE | Noted: 2023-08-17

## 2023-08-17 PROBLEM — G24.01 TARDIVE DYSKINESIA: Status: ACTIVE | Noted: 2023-08-17

## 2023-08-17 PROBLEM — Z86.69 HISTORY OF RETINAL TEAR: Status: ACTIVE | Noted: 2023-08-17

## 2023-08-17 PROBLEM — N39.0 RECURRENT UTI: Status: ACTIVE | Noted: 2023-08-17

## 2023-08-17 PROBLEM — H26.9 BILATERAL CATARACTS: Status: ACTIVE | Noted: 2023-08-17

## 2023-08-17 PROBLEM — M25.561 ACUTE PAIN OF RIGHT KNEE: Status: ACTIVE | Noted: 2018-06-01

## 2023-08-17 PROBLEM — H53.9 VISION CHANGES: Status: ACTIVE | Noted: 2023-08-17

## 2023-08-17 PROBLEM — G89.29 CHRONIC LEFT SHOULDER PAIN: Status: ACTIVE | Noted: 2018-01-03

## 2023-08-17 PROBLEM — R79.89 ABNORMAL LFTS (LIVER FUNCTION TESTS): Status: ACTIVE | Noted: 2023-08-17

## 2023-08-17 PROBLEM — F41.9 ANXIETY: Status: ACTIVE | Noted: 2023-08-17

## 2023-08-17 PROBLEM — K82.9 GALLBLADDER DISEASE: Status: ACTIVE | Noted: 2023-08-17

## 2023-08-17 PROBLEM — K63.5 COLON POLYPS: Status: ACTIVE | Noted: 2023-08-17

## 2023-08-17 PROBLEM — M75.102 ROTATOR CUFF SYNDROME OF LEFT SHOULDER: Status: ACTIVE | Noted: 2018-01-19

## 2023-08-17 RX ORDER — CYCLOBENZAPRINE HCL 10 MG
10 TABLET ORAL 3 TIMES DAILY PRN
COMMUNITY
End: 2024-02-13 | Stop reason: ALTCHOICE

## 2023-08-17 RX ORDER — PANTOPRAZOLE SODIUM 20 MG/1
20 TABLET, DELAYED RELEASE ORAL DAILY
COMMUNITY
End: 2024-03-22 | Stop reason: SDUPTHER

## 2023-08-17 RX ORDER — MOMETASONE FUROATE 1 MG/G
CREAM TOPICAL DAILY PRN
COMMUNITY
End: 2024-02-13 | Stop reason: SDUPTHER

## 2023-08-17 RX ORDER — DEXAMETHASONE 0.5 MG/5ML
ELIXIR ORAL
COMMUNITY

## 2023-08-17 RX ORDER — DULOXETIN HYDROCHLORIDE 60 MG/1
60 CAPSULE, DELAYED RELEASE ORAL 2 TIMES DAILY
COMMUNITY

## 2023-08-17 RX ORDER — LAMOTRIGINE 25 MG/1
TABLET ORAL
COMMUNITY
End: 2024-02-13 | Stop reason: ALTCHOICE

## 2023-08-22 VITALS
RESPIRATION RATE: 14 BRPM | RESPIRATION RATE: 17 BRPM | SYSTOLIC BLOOD PRESSURE: 148 MMHG | RESPIRATION RATE: 8 BRPM | DIASTOLIC BLOOD PRESSURE: 72 MMHG | RESPIRATION RATE: 13 BRPM | HEART RATE: 64 BPM | HEART RATE: 74 BPM | SYSTOLIC BLOOD PRESSURE: 144 MMHG | DIASTOLIC BLOOD PRESSURE: 70 MMHG | OXYGEN SATURATION: 96 % | RESPIRATION RATE: 13 BRPM | OXYGEN SATURATION: 98 % | SYSTOLIC BLOOD PRESSURE: 125 MMHG | SYSTOLIC BLOOD PRESSURE: 144 MMHG | HEART RATE: 93 BPM | RESPIRATION RATE: 16 BRPM | DIASTOLIC BLOOD PRESSURE: 61 MMHG | RESPIRATION RATE: 8 BRPM | DIASTOLIC BLOOD PRESSURE: 90 MMHG | SYSTOLIC BLOOD PRESSURE: 125 MMHG | OXYGEN SATURATION: 96 % | RESPIRATION RATE: 17 BRPM | RESPIRATION RATE: 16 BRPM | DIASTOLIC BLOOD PRESSURE: 71 MMHG | RESPIRATION RATE: 8 BRPM | RESPIRATION RATE: 12 BRPM | SYSTOLIC BLOOD PRESSURE: 122 MMHG | RESPIRATION RATE: 12 BRPM | WEIGHT: 185 LBS | SYSTOLIC BLOOD PRESSURE: 144 MMHG | OXYGEN SATURATION: 98 % | DIASTOLIC BLOOD PRESSURE: 90 MMHG | RESPIRATION RATE: 15 BRPM | DIASTOLIC BLOOD PRESSURE: 61 MMHG | HEART RATE: 67 BPM | HEART RATE: 64 BPM | DIASTOLIC BLOOD PRESSURE: 61 MMHG | RESPIRATION RATE: 4 BRPM | RESPIRATION RATE: 4 BRPM | WEIGHT: 185 LBS | DIASTOLIC BLOOD PRESSURE: 79 MMHG | HEART RATE: 74 BPM | SYSTOLIC BLOOD PRESSURE: 108 MMHG | RESPIRATION RATE: 12 BRPM | HEIGHT: 64 IN | HEART RATE: 67 BPM | OXYGEN SATURATION: 96 % | HEIGHT: 64 IN | SYSTOLIC BLOOD PRESSURE: 118 MMHG | RESPIRATION RATE: 14 BRPM | OXYGEN SATURATION: 97 % | SYSTOLIC BLOOD PRESSURE: 108 MMHG | SYSTOLIC BLOOD PRESSURE: 114 MMHG | RESPIRATION RATE: 15 BRPM | DIASTOLIC BLOOD PRESSURE: 71 MMHG | DIASTOLIC BLOOD PRESSURE: 92 MMHG | HEIGHT: 64 IN | RESPIRATION RATE: 16 BRPM | SYSTOLIC BLOOD PRESSURE: 148 MMHG | SYSTOLIC BLOOD PRESSURE: 118 MMHG | SYSTOLIC BLOOD PRESSURE: 148 MMHG | SYSTOLIC BLOOD PRESSURE: 125 MMHG | HEART RATE: 93 BPM | OXYGEN SATURATION: 94 % | HEART RATE: 68 BPM | SYSTOLIC BLOOD PRESSURE: 122 MMHG | RESPIRATION RATE: 4 BRPM | DIASTOLIC BLOOD PRESSURE: 70 MMHG | HEART RATE: 68 BPM | DIASTOLIC BLOOD PRESSURE: 92 MMHG | TEMPERATURE: 98 F | HEART RATE: 68 BPM | DIASTOLIC BLOOD PRESSURE: 79 MMHG | RESPIRATION RATE: 17 BRPM | HEART RATE: 64 BPM | SYSTOLIC BLOOD PRESSURE: 114 MMHG | HEART RATE: 74 BPM | DIASTOLIC BLOOD PRESSURE: 70 MMHG | SYSTOLIC BLOOD PRESSURE: 118 MMHG | HEART RATE: 93 BPM | OXYGEN SATURATION: 97 % | DIASTOLIC BLOOD PRESSURE: 72 MMHG | OXYGEN SATURATION: 97 % | OXYGEN SATURATION: 98 % | DIASTOLIC BLOOD PRESSURE: 90 MMHG | RESPIRATION RATE: 14 BRPM | RESPIRATION RATE: 15 BRPM | OXYGEN SATURATION: 94 % | WEIGHT: 185 LBS | OXYGEN SATURATION: 94 % | SYSTOLIC BLOOD PRESSURE: 114 MMHG | SYSTOLIC BLOOD PRESSURE: 122 MMHG | RESPIRATION RATE: 13 BRPM | DIASTOLIC BLOOD PRESSURE: 79 MMHG | TEMPERATURE: 98 F | DIASTOLIC BLOOD PRESSURE: 92 MMHG | SYSTOLIC BLOOD PRESSURE: 108 MMHG | DIASTOLIC BLOOD PRESSURE: 71 MMHG | TEMPERATURE: 98 F | DIASTOLIC BLOOD PRESSURE: 72 MMHG | HEART RATE: 67 BPM

## 2023-08-25 ENCOUNTER — AMBULATORY SURGICAL CENTER (OUTPATIENT)
Dept: URBAN - METROPOLITAN AREA SURGERY 12 | Facility: SURGERY | Age: 68
End: 2023-08-25
Payer: COMMERCIAL

## 2023-08-25 ENCOUNTER — AMBULATORY SURGICAL CENTER (OUTPATIENT)
Dept: URBAN - METROPOLITAN AREA SURGERY 12 | Facility: SURGERY | Age: 68
End: 2023-08-25

## 2023-08-25 ENCOUNTER — OFFICE (OUTPATIENT)
Dept: URBAN - METROPOLITAN AREA PATHOLOGY 2 | Facility: PATHOLOGY | Age: 68
End: 2023-08-25

## 2023-08-25 DIAGNOSIS — K44.9 DIAPHRAGMATIC HERNIA WITHOUT OBSTRUCTION OR GANGRENE: ICD-10-CM

## 2023-08-25 DIAGNOSIS — K75.81 NONALCOHOLIC STEATOHEPATITIS (NASH): ICD-10-CM

## 2023-08-25 DIAGNOSIS — K31.89 OTHER DISEASES OF STOMACH AND DUODENUM: ICD-10-CM

## 2023-08-25 DIAGNOSIS — K21.00 GASTRO-ESOPHAGEAL REFLUX DISEASE WITH ESOPHAGITIS, WITHOUT B: ICD-10-CM

## 2023-08-25 PROCEDURE — 88342 IMHCHEM/IMCYTCHM 1ST ANTB: CPT | Performed by: PATHOLOGY

## 2023-08-25 PROCEDURE — 88313 SPECIAL STAINS GROUP 2: CPT | Performed by: PATHOLOGY

## 2023-08-25 PROCEDURE — 43239 EGD BIOPSY SINGLE/MULTIPLE: CPT | Performed by: INTERNAL MEDICINE

## 2023-08-25 PROCEDURE — 88305 TISSUE EXAM BY PATHOLOGIST: CPT | Performed by: PATHOLOGY

## 2023-09-12 PROBLEM — G20.C PARKINSONISM (MULTI): Status: ACTIVE | Noted: 2023-09-12

## 2023-10-04 ENCOUNTER — OFFICE (OUTPATIENT)
Dept: URBAN - METROPOLITAN AREA CLINIC 26 | Facility: CLINIC | Age: 68
End: 2023-10-04
Payer: COMMERCIAL

## 2023-10-04 VITALS
HEIGHT: 64 IN | HEART RATE: 70 BPM | TEMPERATURE: 98 F | DIASTOLIC BLOOD PRESSURE: 72 MMHG | WEIGHT: 181 LBS | SYSTOLIC BLOOD PRESSURE: 114 MMHG

## 2023-10-04 DIAGNOSIS — K75.81 NONALCOHOLIC STEATOHEPATITIS (NASH): ICD-10-CM

## 2023-10-04 DIAGNOSIS — K21.9 GASTRO-ESOPHAGEAL REFLUX DISEASE WITHOUT ESOPHAGITIS: ICD-10-CM

## 2023-10-04 DIAGNOSIS — K22.70 BARRETT'S ESOPHAGUS WITHOUT DYSPLASIA: ICD-10-CM

## 2023-10-04 PROCEDURE — 99214 OFFICE O/P EST MOD 30 MIN: CPT | Performed by: INTERNAL MEDICINE

## 2023-10-04 RX ORDER — FAMOTIDINE 40 MG/1
40 TABLET, FILM COATED ORAL
Qty: 90 | Refills: 3 | Status: COMPLETED
Start: 2023-10-04 | End: 2023-11-15

## 2023-10-16 ENCOUNTER — APPOINTMENT (OUTPATIENT)
Dept: RHEUMATOLOGY | Facility: CLINIC | Age: 68
End: 2023-10-16
Payer: MEDICARE

## 2023-11-03 ENCOUNTER — OFFICE VISIT (OUTPATIENT)
Dept: NEUROLOGY | Facility: CLINIC | Age: 68
End: 2023-11-03
Payer: MEDICARE

## 2023-11-03 VITALS
WEIGHT: 175.2 LBS | HEIGHT: 64 IN | BODY MASS INDEX: 29.91 KG/M2 | DIASTOLIC BLOOD PRESSURE: 72 MMHG | HEART RATE: 90 BPM | SYSTOLIC BLOOD PRESSURE: 108 MMHG | RESPIRATION RATE: 20 BRPM | TEMPERATURE: 97 F

## 2023-11-03 DIAGNOSIS — R41.3 MEMORY IMPAIRMENT: ICD-10-CM

## 2023-11-03 DIAGNOSIS — R26.9 GAIT ABNORMALITY: Primary | ICD-10-CM

## 2023-11-03 DIAGNOSIS — G25.81 RLS (RESTLESS LEGS SYNDROME): ICD-10-CM

## 2023-11-03 DIAGNOSIS — G24.01 TARDIVE DYSKINESIA: ICD-10-CM

## 2023-11-03 PROCEDURE — 3044F HG A1C LEVEL LT 7.0%: CPT | Performed by: PSYCHIATRY & NEUROLOGY

## 2023-11-03 PROCEDURE — 1036F TOBACCO NON-USER: CPT | Performed by: PSYCHIATRY & NEUROLOGY

## 2023-11-03 PROCEDURE — 99214 OFFICE O/P EST MOD 30 MIN: CPT | Performed by: PSYCHIATRY & NEUROLOGY

## 2023-11-03 PROCEDURE — 1160F RVW MEDS BY RX/DR IN RCRD: CPT | Performed by: PSYCHIATRY & NEUROLOGY

## 2023-11-03 PROCEDURE — 1125F AMNT PAIN NOTED PAIN PRSNT: CPT | Performed by: PSYCHIATRY & NEUROLOGY

## 2023-11-03 PROCEDURE — 1159F MED LIST DOCD IN RCRD: CPT | Performed by: PSYCHIATRY & NEUROLOGY

## 2023-11-03 PROCEDURE — 3008F BODY MASS INDEX DOCD: CPT | Performed by: PSYCHIATRY & NEUROLOGY

## 2023-11-03 PROCEDURE — 3074F SYST BP LT 130 MM HG: CPT | Performed by: PSYCHIATRY & NEUROLOGY

## 2023-11-03 PROCEDURE — 3078F DIAST BP <80 MM HG: CPT | Performed by: PSYCHIATRY & NEUROLOGY

## 2023-11-03 ASSESSMENT — PATIENT HEALTH QUESTIONNAIRE - PHQ9
2. FEELING DOWN, DEPRESSED OR HOPELESS: NOT AT ALL
1. LITTLE INTEREST OR PLEASURE IN DOING THINGS: NOT AT ALL
SUM OF ALL RESPONSES TO PHQ9 QUESTIONS 1 AND 2: 0

## 2023-11-03 ASSESSMENT — LIFESTYLE VARIABLES: HOW OFTEN DO YOU HAVE A DRINK CONTAINING ALCOHOL: MONTHLY OR LESS

## 2023-11-03 ASSESSMENT — PAIN SCALES - GENERAL: PAINLEVEL: 2

## 2023-11-03 NOTE — PATIENT INSTRUCTIONS
"It was a pleasure seeing you today.     I want you to get neuropsychology testing.   Ingrezza can cause balance issues.  We can try you off Ingrezza 1-2 weeks off and let me know how your walking/balance and if the movements are worse (face/hands/legs etc).     Phone call/virtual visit in 3-4 weeks then a regular follow up in 4 months.     For any urgent issues or needing to speak to a medical assistant please call [phone], option 6 during our office hours Monday-Friday 8am-4pm, and leave a voicemail with your concern.  My office will try to reach back you as soon as possible within 24 (business) hours.  If you have an emergency please call 911 or visit a local urgent care or nearest emergency room.      Please understand that [employer] is a useful communication tool for simple \"normal\" results or a refill request but I would not recommend using this tool for emergent or urgent issues or for conversations with me.  I am happy to ask my staff to rearrange a follow up visit or a virtual visit sooner than requested if appropriate for your care.     "

## 2023-11-03 NOTE — PROGRESS NOTES
Subjective     Mariann Drew is a 67 y.o. year old female here for follow-up for tardive dyskinesia and memory issues.     HPI  Patient was advised to start aspirin 81 mg daily for stroke prevention.  I ordered MRI brain in July. MR Jody was reviewed, mild chronic small vessel disease seen in white matter but otherwise normal.    She is on Ingrezza 40 mg, AIMS score from 15-6.  She is no longer taking Abilify.  She feels her memory is impaired.  Word finding difficulties, has hard time with conversation.  Sleeping all the time.  She states she sleeps 7 hours in the daytime.  For 3 days she forgot all her medications, was off Ingrezza for 3 days and felt her movements were 100% gone.  Tongue is better on the medication.   ER July 7 for sudden left eye vision changes, two weeks prior had a fall. She had issues seeing out of her peripheral aspect of her L eye. Dilated ophthalmologic exam was done and concern was intracranial etiology and was sent to the ER. CT head/ CTA head and neck was done 7/7 and was normal .       She takes Lamictal for Bipolar disorder.   For RLS she was taking Ropinirole.     She follows with hematology/oncology for low platelets.    hx of DM - on ozempic.   Was on Abilify in 2022 when the movements began.      was on Trintellix in the past.     Review of Systems    Patient Active Problem List   Diagnosis    Bilateral occipital neuralgia    Breast cancer (CMS/HCC)    Cervical arthritis    Cervical radiculopathy    Chronic gout    Current mild episode of major depressive disorder (CMS/HCC)    Type 2 diabetes mellitus without complication, without long-term current use of insulin (CMS/HCC)    Gastroesophageal reflux disease without esophagitis    Primary insomnia    Primary osteoarthritis involving multiple joints    Memory loss    Cervical myofascial pain syndrome    Neuropathy    Thrombocytopenia (CMS/HCC)    Headache    Arthralgia of right wrist    Arthritis of carpometacarpal (CMC) joint of  right thumb    Back pain    Cervicogenic headache    Decreased estrogen level    Dermatitis    Gastric bypass status for obesity    Knee osteoarthritis    Mouth sore    Pain in right hand    Recurrent falls    Shortness of breath on exertion    Liver cirrhosis secondary to HAZEL (nonalcoholic steatohepatitis) (CMS/HCC)    Routine adult health maintenance    BMI 33.0-33.9,adult    Encounter for screening mammogram for malignant neoplasm of breast    FH: myocardial infarction    Screening for multiple conditions    Rheumatoid arthritis, involving unspecified site, unspecified whether rheumatoid factor present (CMS/HCC)    Abnormal LFTs (liver function tests)    Acute pain of right knee    Acute ulcer of stomach    Anemia    Leukopenia    Pancytopenia (CMS/HCC)    Anxiety    Balance problems    Bilateral cataracts    Blood clotting disorder (CMS/HCC)    Cancer (CMS/HCC)    Colon polyps    Cramp of limb    Depression, major, in remission (CMS/HCC)    Diabetes mellitus (CMS/HCC)    Dizziness    Enterocele    Essential hypertension    Fatty liver    Gait abnormality    Gallbladder disease    GI bleed    Hepatitis C    History of retinal tear    History of total knee replacement    Hx of obesity    Incomplete tear of left rotator cuff    Intractable migraine without aura and without status migrainosus    Leukoplakia of oral mucosa, including tongue    Lymph node enlargement    Mixed hearing loss    Oral aphthous ulcer    ANKUSH (obstructive sleep apnea)    Pancreatic cyst    Personal history of colonic polyps    Recurrent UTI    RLS (restless legs syndrome)    Rotator cuff syndrome of left shoulder    Sebaceous cyst    Skin fibrosis    Stomatitis, viral    Tardive dyskinesia    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, not stated as uncontrolled(250.41) (CMS/HCC)    Atrophic vaginitis    Female cystocele    Vaginal wall prolapse    Vision changes    Vision loss    Wound dehiscence    Obesity, Class  III, BMI 40-49.9 (morbid obesity) (CMS/HCC)    Cervical spondylosis with radiculopathy    Chronic left shoulder pain    Chronic neck pain    Myofascial pain syndrome, cervical    Arthritis    Allergic rhinitis due to allergen    Benign neoplasm of connective and other soft tissue of unspecified upper limb, including shoulder    Rectocele    Hyperlipidemia    Parkinsonism     Past Medical History:   Diagnosis Date    Cervicalgia 02/13/2017    Neck pain, chronic    Dysphagia, unspecified 02/23/2021    Dysphagia    Encounter for immunization 12/02/2022    Encounter for immunization    Epidermal cyst 02/04/2014    Epidermal inclusion cyst    Fatty (change of) liver, not elsewhere classified 02/01/2014    Fatty liver    Liver disease, unspecified     Liver disease, chronic    Localized swelling, mass and lump, unspecified 02/23/2021    Subcutaneous nodules    Long term (current) use of opiate analgesic 02/20/2018    Opiate analgesic use agreement exists    Long term (current) use of opiate analgesic 02/20/2018    Long term current use of opiate analgesic    Nonalcoholic steatohepatitis (HAZEL)     Steatohepatitis, nonalcoholic    Other instability, right knee 05/17/2018    Instability of right knee joint    Other specified abnormal findings of blood chemistry     Elevated LFTs    Other specified symptoms and signs involving the circulatory and respiratory systems 12/01/2015    Globus pharyngeus    Pain in right hand 03/27/2017    Pain of right hand    Pain in right knee 06/21/2018    Acute pain of right knee    Pain in throat 12/01/2015    Throat pain in adult    Pain in unspecified knee 06/28/2017    Knee pain    Pain in unspecified shoulder 03/23/2017    Shoulder pain    Personal history of diseases of the skin and subcutaneous tissue 06/20/2018    History of acne    Personal history of diseases of the skin and subcutaneous tissue 06/02/2014    History of dermatitis    Personal history of malignant neoplasm of breast      Personal history of malignant neoplasm of breast    Personal history of other diseases of the digestive system 10/13/2021    History of gastroesophageal reflux (GERD)    Personal history of other diseases of the musculoskeletal system and connective tissue     Personal history of juvenile rheumatoid arthritis    Personal history of other diseases of the nervous system and sense organs 10/13/2021    History of sleep apnea    Personal history of other diseases of the respiratory system 01/14/2014    Personal history of asthma    Personal history of other endocrine, nutritional and metabolic disease     History of hyperlipidemia    Personal history of other infectious and parasitic diseases 11/24/2014    History of herpes zoster    Personal history of other specified conditions     History of dysuria    Personal history of peptic ulcer disease 01/14/2014    History of peptic ulcer    Personal history of pneumonia (recurrent) 01/14/2014    History of pneumonia    Personal history of traumatic brain injury 02/23/2021    History of concussion    Polyp of stomach and duodenum 01/14/2014    Gastric polyps    Polyp of stomach and duodenum 01/14/2014    Polyp of duodenum    Repeated falls 11/09/2015    Frequent falls    Secondary and unspecified malignant neoplasm of lymph node, unspecified (CMS/HCC)     Metastasis to lymph nodes    Urinary tract infection, site not specified 08/01/2019    Acute UTI    Urinary tract infection, site not specified 04/19/2020    Acute UTI     Past Surgical History:   Procedure Laterality Date    CARPAL TUNNEL RELEASE  01/13/2014    Neuroplasty Decompression Median Nerve At Carpal Tunnel    CHOLECYSTECTOMY  01/13/2014    Cholecystectomy    COLONOSCOPY  01/13/2014    Colonoscopy (Fiberoptic)    CT HEAD ANGIO W AND WO IV CONTRAST  7/7/2023    CT HEAD ANGIO W AND WO IV CONTRAST 7/7/2023 [facility] CT    CT NECK ANGIO W AND WO IV CONTRAST  7/7/2023    CT NECK ANGIO W AND WO IV CONTRAST 7/7/2023 PAR CT     HYSTERECTOMY  10/13/2021    Hysterectomy    LIVER BIOPSY      OTHER SURGICAL HISTORY  2013    Breast Surgery Reconstruction    OTHER SURGICAL HISTORY  2013    Modified Radical Mastectomy Left Breast    OTHER SURGICAL HISTORY  2013    Laparosc Gastric Restrictive Proc By Adjustable Gastric Band    OTHER SURGICAL HISTORY  2014    Breast Surgery Modified Radical Mastectomy    OTHER SURGICAL HISTORY  10/13/2021    Esophageal Dilation    OTHER SURGICAL HISTORY  2014    Excision Of Lesion Face Benign 2.1 To 3cm    TOTAL KNEE ARTHROPLASTY  2014    Knee Replacement     Social History     Tobacco Use    Smoking status: Former     Types: Cigarettes     Quit date:      Years since quittin.8    Smokeless tobacco: Never    Tobacco comments:     Only smoked for 2mon; 2 cigs a day   Substance Use Topics    Alcohol use: Yes     Comment: once a month 1 glass of wine     family history includes Arthritis in her father and mother; Asthma in an other family member; Coronary artery disease in her father and mother; Dementia in her maternal grandfather and maternal grandmother; Diabetes in an other family member; Glaucoma in her father; Goiter in an other family member; Heart failure in her father; Hypertension in an other family member; Prostate cancer in her brother; Psoriasis in an other family member; Stroke in her maternal grandfather, maternal grandmother, and paternal grandmother; Ulcerative colitis in an other family member.    Current Outpatient Medications:     allopurinol (Zyloprim) 100 mg tablet, Take 1 tablet (100 mg) by mouth once daily. With 300mg tab to equal 400mg a day, Disp: , Rfl:     allopurinol (Zyloprim) 300 mg tablet, Take 1 tablet (300 mg) by mouth once daily. With 100mg tab to equal 400mg a day, Disp: , Rfl:     benztropine (Cogentin) 0.5 mg tablet, Take 1 tablet (0.5 mg) by mouth in the morning and 1 tablet (0.5 mg) in the evening and 1 tablet (0.5 mg) before  bedtime., Disp: , Rfl:     colchicine 0.6 mg tablet, Take 1 tablet (0.6 mg) by mouth every other day., Disp: , Rfl:     cyclobenzaprine (Flexeril) 10 mg tablet, Take 1 tablet (10 mg) by mouth 3 times a day as needed (pain)., Disp: , Rfl:     dexAMETHasone 0.5 mg/5 mL oral liquid, Use as directed, Disp: , Rfl:     DULoxetine (Cymbalta) 60 mg DR capsule, Take 1 capsule (60 mg) by mouth once daily., Disp: , Rfl:     DULoxetine (Cymbalta) 60 mg DR capsule, Take 1 capsule (60 mg) by mouth 2 times a day. Do not crush or chew., Disp: , Rfl:     ferrous sulfate (FEOSOL ORAL), Take 1 tablet by mouth once daily in the evening. 200 (65 Fe) MG, Disp: , Rfl:     lamoTRIgine (LaMICtal) 25 mg tablet, Take 6 tablets (150 mg) by mouth once daily., Disp: , Rfl:     lamoTRIgine (LaMICtal) 25 mg tablet, Take 1 table daily for the first 2 weeks, thereafter take 2 tablets daily, Disp: , Rfl:     latanoprost (Xelpros) 0.005 % drops, emulsion, Administer into affected eye(s)., Disp: , Rfl:     mometasone (Elocon) 0.1 % cream, , Disp: , Rfl:     multivitamin tablet, Take 1 tablet by mouth once daily., Disp: , Rfl:     naratriptan (Amerge) 2.5 mg tablet, Take 1 tablet (2.5 mg) by mouth 1 time if needed for migraine. May repeat once in 4hrs if headache recurs, Disp: , Rfl:     nystatin (Mycostatin) 100,000 unit/gram powder, Apply topically 2 times a day., Disp: 30 g, Rfl: 2    pantoprazole (ProtoNix) 20 mg EC tablet, Take 1 tablet (20 mg) by mouth once daily. Do not crush, chew, or split., Disp: , Rfl:     pantoprazole (ProtoNix) 40 mg EC tablet, Take 1 tablet (40 mg) by mouth once daily in the morning. Take before meals. Do not crush, chew, or split., Disp: , Rfl:     predniSONE (Deltasone) 10 mg tablet, Take 3 tablets (30 mg) by mouth once daily., Disp: , Rfl:     semaglutide (Ozempic) 0.25 mg or 0.5 mg(2 mg/1.5 mL) pen injector, Inject 0.5 mg under the skin 1 (one) time per week., Disp: , Rfl:     valbenazine tosylate (Ingrezza) 40 mg  capsule, 1 capsule (40 mg)., Disp: , Rfl:   Allergies   Allergen Reactions    Adhesive Tape-Silicones Unknown    Avelox [Moxifloxacin] Unknown    Bactrim [Sulfamethoxazole-Trimethoprim] Unknown    Cefepime Hives    Cephalexin Unknown    Codeine Unknown    Desyrel [Trazodone] Unknown    Erythromycin Unknown    Hydroxychloroquine Unknown     pruritis    Ibuprofen Unknown    Nsaids (Non-Steroidal Anti-Inflammatory Drug) Unknown    Other Unknown     Other: placquenil itching, Vomiting, hives  Other: desryl and sympathomimetic agents     Penicillins Unknown    Percocet [Oxycodone-Acetaminophen] Unknown    Percodan [Oxycodone-Aspirin] Unknown    Phenergan [Promethazine] Unknown    Topiramate Unknown    Toradol [Ketorolac] Unknown    Tramadol Unknown    Trintellix [Vortioxetine] Unknown    [id number] Antimigraine Agents Unknown     racing heart    Adhesive Rash    Macrolide Antibiotics Hives, Rash and Unknown     Replaced free text allergy     There were no vitals taken for this visit.  Neurological Exam/Physical Exam:    AIMS score is 2 ( even improved more than last visit)- scanned into chart   Constitutional: General appearance: no acute distress   Auscultation of Heart: Regular rate and rhythm, no murmurs, normal S1 and S2.   Carotid Arteries: Intact without any bruits   Peripheral Vascular Exam: Pulses +2 and equal in all extremities. No swelling, varicosities, edema or tenderness to palpations   Mental status: The patient was in no distress, alert, interactive and cooperative. Affect is appropriate.   Orientation: oriented to person,~oriented to place~and~oriented to time.   Memory: recent memory intact~and~remote memory intact.   Attention: normal attention span~and~normal concentrating ability.   Language: normal comprehension~and~no difficulty naming common objects.   Fund of knowledge: Patient displays adequate knowledge of current events,~adequate fund of knowledge regarding past history~and~adequate fund of  knowledge regarding vocabulary.   Eyes: The ophthalmoscopic examination was normal. The fundi are visualized with normal disc margins and without hemorrhages   Cranial nerve II: Visual fields full to confrontation.~LHH+ ( new)1 .   Cranial nerves III, IV, and VI: Pupils round, equally reactive to light; no ptosis. EOMs intact. No nystagmus.   Cranial Nerve V: Facial sensation intact bilaterally.   Cranial nerve VII: Normal and symmetric facial strength.   Cranial nerve VIII: Hearing is intact bilaterally to finger rub / whisper.   Cranial nerves IX and X: Palate elevates symmetrically.   Cranial nerve XI: Shoulder shrug and neck rotation strength are intact.   Cranial nerve XII: Tongue midline with normal strength.   Motor: Motor exam was normal.~Muscle bulk was normal in both upper and lower extremities.~Muscle tone was normal in both upper and lower extremities.~Muscle strength was 5/5 throughout. No dyskinesia movements. Mouth movements are gone.   Deep Tendon Reflexes: left biceps 2+ ,~right biceps 2+,~left triceps 2+,~right triceps 2+,~left brachioradialis 2+,~right brachioradialis 2+,~left patella 2+,~right patella 2+,~left ankle jerk 2+,~right ankle jerk 2+   Plantar Reflex: Toes downgoing to plantar stimulation on the left.~Toes downgoing to plantar stimulation on the right.   Sensory Exam: Normal to light touch.   Coordination: There is no limb dystaxia and rapid alternating movements are intact.   Gait:  cautious.  Unsteady.          Labs:  CBC:   Lab Results   Component Value Date    WBC 4.8 07/07/2023    HGB 11.3 (L) 07/07/2023    HCT 36.1 07/07/2023    PLT 94 (L) 07/07/2023     BMP:   Lab Results   Component Value Date     07/07/2023    K 4.3 07/07/2023     07/07/2023    CO2 26 07/07/2023    BUN 21 07/07/2023    CREATININE 1.30 (H) 07/07/2023    CALCIUM 8.7 07/07/2023     LFT:   Lab Results   Component Value Date    ALKPHOS 138 (H) 07/07/2023    BILITOT 0.7 07/07/2023    PROT 6.6 07/07/2023     ALBUMIN 3.5 07/07/2023    ALT 20 07/07/2023    AST 37 07/07/2023       Imaging:  Imaging Results: MR brain wo IV contrast 07/20/2023    Narrative  Interpreted By:  DARWIN RÍOS MD, PHD and YAMILET ROGERS MD  MRN: [id number]  Patient Name: JOSÉ LUIS DEVINE    STUDY:  MRI BRAIN WO;  7/20/2023 8:39 pm    INDICATION:  Vision loss, concern for stroke.    COMPARISON:  CT head and CTA head dated 07/07/2023    ACCESSION NUMBER(S):  [id number]    ORDERING CLINICIAN:  MARGARITA SHELTON    TECHNIQUE:  Axial T2, FLAIR, DWI, gradient echo T2 and axial, sagittal, and  coronal T1 weighted images of brain were acquired.  A  transplant/receive coil was utilized due to the patient's implanted  neurostimulator, limiting image quality.    FINDINGS:  CSF Spaces: The ventricles, sulci and basal cisterns are within  normal limits. No abnormal extra-axial collection.    Parenchyma: There is no restricted diffusion to suggest acute  infarction. Nonspecific T2 hyperintense signal in the white matter,  likely secondary to chronic small vessel ischemic disease. No  evidence of intracranial hemorrhage. There is no mass effect or  midline shift.    Paranasal Sinuses and Mastoids: Scattered paranasal sinus mucosal  thickening. The mastoid air cells are clear.    Bilateral lenses have been surgically replaced.    Impression  No acute intracranial pathology.      Assessment/Plan   Problem List Items Addressed This Visit             ICD-10-CM    Gait abnormality - Primary R26.9    RLS (restless legs syndrome) G25.81    Tardive dyskinesia G24.01    Obtain Neuropsych testing for memory.  Mixed movements, some of her movements are TD and and some functional neurologic symptoms on exam and with her history.  AIMS score was 15 , then 6 last visit on Ingrezza 40mg and now today is 2.  Dramatic improvement.   Ingrezza can cause balance issues.  We can try heroff Ingrezza 1-2 weeks off and observe how the walking/balance are off Ingrezza and if the  dyskinetic movements are worse (face/hands/legs etc).   Alternative if the movements are bad we can start Austedo.   No signs of parkinsonism.

## 2023-11-17 ENCOUNTER — OFFICE VISIT (OUTPATIENT)
Dept: PRIMARY CARE | Facility: CLINIC | Age: 68
End: 2023-11-17
Payer: MEDICARE

## 2023-11-17 VITALS
BODY MASS INDEX: 29.37 KG/M2 | DIASTOLIC BLOOD PRESSURE: 84 MMHG | HEART RATE: 85 BPM | OXYGEN SATURATION: 100 % | SYSTOLIC BLOOD PRESSURE: 126 MMHG | HEIGHT: 64 IN | WEIGHT: 172 LBS

## 2023-11-17 DIAGNOSIS — K76.6 PORTAL HYPERTENSION (MULTI): ICD-10-CM

## 2023-11-17 DIAGNOSIS — E10.29: Primary | ICD-10-CM

## 2023-11-17 DIAGNOSIS — G24.01 TARDIVE DYSKINESIA: ICD-10-CM

## 2023-11-17 DIAGNOSIS — G43.909 EPISODIC MIGRAINE: ICD-10-CM

## 2023-11-17 DIAGNOSIS — K12.0 CANKER SORES ORAL: ICD-10-CM

## 2023-11-17 LAB — HBA1C MFR BLD: 4.6 % (ref 4.2–6.5)

## 2023-11-17 PROCEDURE — 1125F AMNT PAIN NOTED PAIN PRSNT: CPT | Performed by: STUDENT IN AN ORGANIZED HEALTH CARE EDUCATION/TRAINING PROGRAM

## 2023-11-17 PROCEDURE — 99214 OFFICE O/P EST MOD 30 MIN: CPT | Performed by: STUDENT IN AN ORGANIZED HEALTH CARE EDUCATION/TRAINING PROGRAM

## 2023-11-17 PROCEDURE — 1160F RVW MEDS BY RX/DR IN RCRD: CPT | Performed by: STUDENT IN AN ORGANIZED HEALTH CARE EDUCATION/TRAINING PROGRAM

## 2023-11-17 PROCEDURE — 3044F HG A1C LEVEL LT 7.0%: CPT | Performed by: STUDENT IN AN ORGANIZED HEALTH CARE EDUCATION/TRAINING PROGRAM

## 2023-11-17 PROCEDURE — 3079F DIAST BP 80-89 MM HG: CPT | Performed by: STUDENT IN AN ORGANIZED HEALTH CARE EDUCATION/TRAINING PROGRAM

## 2023-11-17 PROCEDURE — 3008F BODY MASS INDEX DOCD: CPT | Performed by: STUDENT IN AN ORGANIZED HEALTH CARE EDUCATION/TRAINING PROGRAM

## 2023-11-17 PROCEDURE — 1159F MED LIST DOCD IN RCRD: CPT | Performed by: STUDENT IN AN ORGANIZED HEALTH CARE EDUCATION/TRAINING PROGRAM

## 2023-11-17 PROCEDURE — 83036 HEMOGLOBIN GLYCOSYLATED A1C: CPT | Mod: CLIA WAIVED TEST | Performed by: STUDENT IN AN ORGANIZED HEALTH CARE EDUCATION/TRAINING PROGRAM

## 2023-11-17 PROCEDURE — 3074F SYST BP LT 130 MM HG: CPT | Performed by: STUDENT IN AN ORGANIZED HEALTH CARE EDUCATION/TRAINING PROGRAM

## 2023-11-17 PROCEDURE — 1036F TOBACCO NON-USER: CPT | Performed by: STUDENT IN AN ORGANIZED HEALTH CARE EDUCATION/TRAINING PROGRAM

## 2023-11-17 RX ORDER — NARATRIPTAN 2.5 MG/1
2.5 TABLET ORAL ONCE AS NEEDED
Qty: 9 TABLET | Refills: 1 | Status: SHIPPED
Start: 2023-11-17 | End: 2024-05-13 | Stop reason: SDUPTHER

## 2023-11-17 RX ORDER — CHLORHEXIDINE GLUCONATE ORAL RINSE 1.2 MG/ML
15 SOLUTION DENTAL AS NEEDED
Qty: 120 ML | Refills: 0 | Status: SHIPPED | OUTPATIENT
Start: 2023-11-17 | End: 2023-11-26 | Stop reason: SDUPTHER

## 2023-11-17 ASSESSMENT — ENCOUNTER SYMPTOMS
RESPIRATORY NEGATIVE: 1
FEVER: 0
MUSCULOSKELETAL NEGATIVE: 1
CARDIOVASCULAR NEGATIVE: 1
GASTROINTESTINAL NEGATIVE: 1
NEUROLOGICAL NEGATIVE: 1
SLEEP DISTURBANCE: 0

## 2023-11-17 ASSESSMENT — PATIENT HEALTH QUESTIONNAIRE - PHQ9
1. LITTLE INTEREST OR PLEASURE IN DOING THINGS: NOT AT ALL
SUM OF ALL RESPONSES TO PHQ9 QUESTIONS 1 AND 2: 3
2. FEELING DOWN, DEPRESSED OR HOPELESS: NEARLY EVERY DAY

## 2023-11-17 NOTE — PROGRESS NOTES
Subjective   Patient ID: Mariann Drew is a 68 y.o. female who presents for Follow-up (6 month check up/A1c/Mouth sores since last thursday).  Chronic type I DM. Last A1c 4.6%. Blood sugars have been stable     Several canker sores in her mouth and over her tongue. Started 9 days ago. Very painful. Started developing more last weekend.     Went to urgent center. Finished valtrex course. Taking anbesol, benadryl, and mouthwash.     Says her depression is a bit worse. Thinks it may be time to change her duloxetine. Talks to a psychiatrist monthly for this. Will bring it up with him in 2 weeks.     Has tardive dyskinesia. Sees neurology. They are uptitrating her meds. Arms are better but tongue is worse.     Has been watching her portions and taking ozempic. Has been off ozempic for 1.5 years. Down close to 100 pounds.    Up to date on bloodwork.     Having neuropsych testing soon.     Has her mammogram scheduled for January.     Eye exam yesterday. Has glaucoma.         Review of Systems   Constitutional:  Negative for fever.   HENT:  Positive for mouth sores.    Respiratory: Negative.     Cardiovascular: Negative.    Gastrointestinal: Negative.    Musculoskeletal: Negative.    Neurological: Negative.    Psychiatric/Behavioral:  Negative for sleep disturbance.    All other systems reviewed and are negative.      Objective   Physical Exam  Vitals reviewed.   HENT:      Head: Normocephalic.      Mouth/Throat:      Mouth: Mucous membranes are moist.      Comments: Multiple ulcerated lesions and sores in mouth and on tongue  Eyes:      Pupils: Pupils are equal, round, and reactive to light.   Cardiovascular:      Rate and Rhythm: Normal rate and regular rhythm.   Pulmonary:      Effort: Pulmonary effort is normal.      Breath sounds: Normal breath sounds.   Musculoskeletal:         General: Normal range of motion.   Skin:     General: Skin is warm and dry.   Neurological:      General: No focal deficit present.       Mental Status: Mental status is at baseline.   Psychiatric:         Mood and Affect: Mood normal.         Behavior: Behavior normal.         Body mass index is 29.52 kg/m².      Current Outpatient Medications:     allopurinol (Zyloprim) 100 mg tablet, Take 1 tablet (100 mg) by mouth once daily. With 300mg tab to equal 400mg a day, Disp: , Rfl:     allopurinol (Zyloprim) 300 mg tablet, Take 1 tablet (300 mg) by mouth once daily. With 100mg tab to equal 400mg a day, Disp: , Rfl:     benztropine (Cogentin) 0.5 mg tablet, Take 1 tablet (0.5 mg) by mouth 3 times a day., Disp: , Rfl:     colchicine 0.6 mg tablet, Take 1 tablet (0.6 mg) by mouth every other day., Disp: , Rfl:     cyclobenzaprine (Flexeril) 10 mg tablet, Take 1 tablet (10 mg) by mouth 3 times a day as needed (pain)., Disp: , Rfl:     dexAMETHasone 0.5 mg/5 mL oral liquid, Use as directed, Disp: , Rfl:     DULoxetine (Cymbalta) 60 mg DR capsule, Take 1 capsule (60 mg) by mouth once daily., Disp: , Rfl:     DULoxetine (Cymbalta) 60 mg DR capsule, Take 1 capsule (60 mg) by mouth 2 times a day. Do not crush or chew., Disp: , Rfl:     ferrous sulfate (FEOSOL ORAL), Take 1 tablet by mouth once daily in the evening. 200 (65 Fe) MG, Disp: , Rfl:     lamoTRIgine (LaMICtal) 25 mg tablet, Take 6 tablets (150 mg) by mouth once daily., Disp: , Rfl:     lamoTRIgine (LaMICtal) 25 mg tablet, Take 1 table daily for the first 2 weeks, thereafter take 2 tablets daily, Disp: , Rfl:     latanoprost (Xelpros) 0.005 % drops, emulsion, Administer into affected eye(s) once daily., Disp: , Rfl:     mometasone (Elocon) 0.1 % cream, once daily as needed., Disp: , Rfl:     multivitamin tablet, Take 1 tablet by mouth once daily., Disp: , Rfl:     nystatin (Mycostatin) 100,000 unit/gram powder, Apply topically 2 times a day., Disp: 30 g, Rfl: 2    pantoprazole (ProtoNix) 20 mg EC tablet, Take 1 tablet (20 mg) by mouth once daily. Do not crush, chew, or split., Disp: , Rfl:      pantoprazole (ProtoNix) 40 mg EC tablet, Take 1 tablet (40 mg) by mouth once daily in the morning. Take before meals. Do not crush, chew, or split., Disp: , Rfl:     semaglutide (Ozempic) 0.25 mg or 0.5 mg(2 mg/1.5 mL) pen injector, Inject 0.5 mg under the skin 1 (one) time per week., Disp: , Rfl:     valbenazine tosylate (INGREZZA ORAL), Take 40 mg by mouth 3 times a day., Disp: , Rfl:     chlorhexidine (Peridex) 0.12 % solution, Use 15 mL in the mouth or throat if needed for wound care for up to 14 days., Disp: 120 mL, Rfl: 0    naratriptan (Amerge) 2.5 mg tablet, Take 1 tablet (2.5 mg) by mouth 1 time if needed for migraine. 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 May repeat once in 4hrs if headache recurs  Canker sores oral  Comments:  continue meds from urgent care  previously had side effects from liquid lidocaine  add chlorhexadine  Orders:  -     chlorhexidine (Peridex) 0.12 % solution; Use 15 mL in the mouth or throat if needed for wound care for up to 14 days.  Portal hypertension (CMS/[facility])  Comments:  asymptomatic  Tardive dyskinesia  Comments:  follows with neurology  Other orders  -     POCT GLYCOSYLATED HEMOGLOBIN (HGB A1C)    Follow up in 3 months    Olivia Self DO

## 2023-11-21 DIAGNOSIS — G24.01 TARDIVE DYSKINESIA: Primary | ICD-10-CM

## 2023-11-23 ENCOUNTER — PATIENT MESSAGE (OUTPATIENT)
Dept: PRIMARY CARE | Facility: CLINIC | Age: 68
End: 2023-11-23
Payer: MEDICARE

## 2023-11-23 DIAGNOSIS — K12.0 CANKER SORES ORAL: ICD-10-CM

## 2023-11-26 RX ORDER — CHLORHEXIDINE GLUCONATE ORAL RINSE 1.2 MG/ML
15 SOLUTION DENTAL AS NEEDED
Qty: 120 ML | Refills: 0 | Status: SHIPPED | OUTPATIENT
Start: 2023-11-26 | End: 2023-12-10

## 2023-12-08 ENCOUNTER — APPOINTMENT (OUTPATIENT)
Dept: NEUROLOGY | Facility: CLINIC | Age: 68
End: 2023-12-08
Payer: MEDICARE

## 2023-12-13 ENCOUNTER — APPOINTMENT (OUTPATIENT)
Dept: NEUROLOGY | Facility: CLINIC | Age: 68
End: 2023-12-13
Payer: MEDICARE

## 2023-12-14 ENCOUNTER — APPOINTMENT (OUTPATIENT)
Dept: PSYCHOLOGY | Facility: CLINIC | Age: 68
End: 2023-12-14
Payer: MEDICARE

## 2023-12-15 ENCOUNTER — TELEMEDICINE (OUTPATIENT)
Dept: NEUROLOGY | Facility: CLINIC | Age: 68
End: 2023-12-15
Payer: MEDICARE

## 2023-12-15 DIAGNOSIS — G24.01 TARDIVE DYSKINESIA: Primary | ICD-10-CM

## 2023-12-15 PROCEDURE — 99212 OFFICE O/P EST SF 10 MIN: CPT | Performed by: PSYCHIATRY & NEUROLOGY

## 2023-12-15 RX ORDER — TRAZODONE HYDROCHLORIDE 100 MG/1
100 TABLET ORAL NIGHTLY
COMMUNITY
End: 2024-05-17 | Stop reason: WASHOUT

## 2023-12-15 NOTE — PROGRESS NOTES
Subjective     Mariann Drew is a 68 y.o. year old female here for TD follow up.     HPI  Last visit was Nov 3 and was having balance issues on Ingrezza 40mg daily-  she came off for two weeks and her imbalance issues were the same off Ingrezza so she is now back on Ingrezza 40mg daily.  MRI brain July was normal.   Depression was worse - but denies SI/HI.  Started trazodone for sleep. Helps her fall asleep.   Review of Systems    Patient Active Problem List   Diagnosis    Bilateral occipital neuralgia    Breast cancer (CMS/HCC)    Cervical arthritis    Cervical radiculopathy    Chronic gout    Current mild episode of major depressive disorder (CMS/HCC)    Type 2 diabetes mellitus without complication, without long-term current use of insulin (CMS/HCC)    Gastroesophageal reflux disease without esophagitis    Primary insomnia    Primary osteoarthritis involving multiple joints    Memory loss    Cervical myofascial pain syndrome    Neuropathy    Thrombocytopenia (CMS/HCC)    Headache    Arthralgia of right wrist    Arthritis of carpometacarpal (CMC) joint of right thumb    Back pain    Cervicogenic headache    Decreased estrogen level    Dermatitis    Gastric bypass status for obesity    Knee osteoarthritis    Mouth sore    Pain in right hand    Recurrent falls    Shortness of breath on exertion    Liver cirrhosis secondary to HAZEL (nonalcoholic steatohepatitis) (CMS/HCC)    Routine adult health maintenance    BMI 33.0-33.9,adult    Encounter for screening mammogram for malignant neoplasm of breast    FH: myocardial infarction    Screening for multiple conditions    Rheumatoid arthritis, involving unspecified site, unspecified whether rheumatoid factor present (CMS/HCC)    Abnormal LFTs (liver function tests)    Acute pain of right knee    Acute ulcer of stomach    Anemia    Leukopenia    Pancytopenia (CMS/HCC)    Anxiety    Balance problems    Bilateral cataracts    Blood clotting disorder (CMS/HCC)    Cancer  (CMS/HCC)    Colon polyps    Cramp of limb    Depression, major, in remission (CMS/HCC)    Diabetes mellitus (CMS/HCC)    Dizziness    Enterocele    Essential hypertension    Fatty liver    Gait abnormality    Gallbladder disease    GI bleed    Hepatitis C    History of retinal tear    History of total knee replacement    Hx of obesity    Incomplete tear of left rotator cuff    Intractable migraine without aura and without status migrainosus    Leukoplakia of oral mucosa, including tongue    Lymph node enlargement    Mixed hearing loss    Oral aphthous ulcer    ANKUSH (obstructive sleep apnea)    Pancreatic cyst    Personal history of colonic polyps    Recurrent UTI    RLS (restless legs syndrome)    Rotator cuff syndrome of left shoulder    Sebaceous cyst    Skin fibrosis    Stomatitis, viral    Tardive dyskinesia    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, not stated as uncontrolled(250.41) (CMS/HCC)    Atrophic vaginitis    Female cystocele    Vaginal wall prolapse    Vision changes    Vision loss    Wound dehiscence    Obesity, Class III, BMI 40-49.9 (morbid obesity) (CMS/HCC)    Cervical spondylosis with radiculopathy    Chronic left shoulder pain    Chronic neck pain    Myofascial pain syndrome, cervical    Arthritis    Allergic rhinitis due to allergen    Benign neoplasm of connective and other soft tissue of unspecified upper limb, including shoulder    Rectocele    Hyperlipidemia    Parkinsonism    Portal hypertension (CMS/HCC)     Past Medical History:   Diagnosis Date    Cervicalgia 02/13/2017    Neck pain, chronic    Dysphagia, unspecified 02/23/2021    Dysphagia    Encounter for immunization 12/02/2022    Encounter for immunization    Epidermal cyst 02/04/2014    Epidermal inclusion cyst    Fatty (change of) liver, not elsewhere classified 02/01/2014    Fatty liver    Liver disease, unspecified     Liver disease, chronic    Localized swelling, mass and lump, unspecified  02/23/2021    Subcutaneous nodules    Long term (current) use of opiate analgesic 02/20/2018    Opiate analgesic use agreement exists    Long term (current) use of opiate analgesic 02/20/2018    Long term current use of opiate analgesic    Nonalcoholic steatohepatitis (HAZEL)     Steatohepatitis, nonalcoholic    Other instability, right knee 05/17/2018    Instability of right knee joint    Other specified abnormal findings of blood chemistry     Elevated LFTs    Other specified symptoms and signs involving the circulatory and respiratory systems 12/01/2015    Globus pharyngeus    Pain in right hand 03/27/2017    Pain of right hand    Pain in right knee 06/21/2018    Acute pain of right knee    Pain in throat 12/01/2015    Throat pain in adult    Pain in unspecified knee 06/28/2017    Knee pain    Pain in unspecified shoulder 03/23/2017    Shoulder pain    Personal history of diseases of the skin and subcutaneous tissue 06/20/2018    History of acne    Personal history of diseases of the skin and subcutaneous tissue 06/02/2014    History of dermatitis    Personal history of malignant neoplasm of breast     Personal history of malignant neoplasm of breast    Personal history of other diseases of the digestive system 10/13/2021    History of gastroesophageal reflux (GERD)    Personal history of other diseases of the musculoskeletal system and connective tissue     Personal history of juvenile rheumatoid arthritis    Personal history of other diseases of the nervous system and sense organs 10/13/2021    History of sleep apnea    Personal history of other diseases of the respiratory system 01/14/2014    Personal history of asthma    Personal history of other endocrine, nutritional and metabolic disease     History of hyperlipidemia    Personal history of other infectious and parasitic diseases 11/24/2014    History of herpes zoster    Personal history of other specified conditions     History of dysuria    Personal  history of peptic ulcer disease 2014    History of peptic ulcer    Personal history of pneumonia (recurrent) 2014    History of pneumonia    Personal history of traumatic brain injury 2021    History of concussion    Polyp of stomach and duodenum 2014    Gastric polyps    Polyp of stomach and duodenum 2014    Polyp of duodenum    Repeated falls 2015    Frequent falls    Secondary and unspecified malignant neoplasm of lymph node, unspecified (CMS/HCC)     Metastasis to lymph nodes    Urinary tract infection, site not specified 2019    Acute UTI    Urinary tract infection, site not specified 2020    Acute UTI     Past Surgical History:   Procedure Laterality Date    CARPAL TUNNEL RELEASE  2014    Neuroplasty Decompression Median Nerve At Carpal Tunnel    CHOLECYSTECTOMY  2014    Cholecystectomy    COLONOSCOPY  2014    Colonoscopy (Fiberoptic)    CT ANGIO NECK  2023    CT NECK ANGIO W AND WO IV CONTRAST 2023 PAR CT    CT HEAD ANGIO W AND WO IV CONTRAST  2023    CT HEAD ANGIO W AND WO IV CONTRAST 2023 PAR CT    HYSTERECTOMY  10/13/2021    Hysterectomy    LIVER BIOPSY      OTHER SURGICAL HISTORY  2013    Breast Surgery Reconstruction    OTHER SURGICAL HISTORY  2013    Modified Radical Mastectomy Left Breast    OTHER SURGICAL HISTORY  2013    Laparosc Gastric Restrictive Proc By Adjustable Gastric Band    OTHER SURGICAL HISTORY  2014    Breast Surgery Modified Radical Mastectomy    OTHER SURGICAL HISTORY  10/13/2021    Esophageal Dilation    OTHER SURGICAL HISTORY  2014    Excision Of Lesion Face Benign 2.1 To 3cm    TOTAL KNEE ARTHROPLASTY  2014    Knee Replacement     Social History     Tobacco Use    Smoking status: Former     Types: Cigarettes     Quit date: 1973     Years since quittin.9    Smokeless tobacco: Never    Tobacco comments:     Only smoked for 2mon; 2 cigs a day   Substance Use Topics     Alcohol use: Yes     Comment: once a month 1 glass of wine     family history includes Arthritis in her father and mother; Asthma in an other family member; Coronary artery disease in her father and mother; Dementia in her maternal grandfather and maternal grandmother; Diabetes in an other family member; Glaucoma in her father; Goiter in an other family member; Heart failure in her father; Hypertension in an other family member; Prostate cancer in her brother; Psoriasis in an other family member; Stroke in her maternal grandfather, maternal grandmother, and paternal grandmother; Ulcerative colitis in an other family member.    Current Outpatient Medications:     allopurinol (Zyloprim) 100 mg tablet, Take 1 tablet (100 mg) by mouth once daily. With 300mg tab to equal 400mg a day, Disp: , Rfl:     allopurinol (Zyloprim) 300 mg tablet, Take 1 tablet (300 mg) by mouth once daily. With 100mg tab to equal 400mg a day, Disp: , Rfl:     DULoxetine (Cymbalta) 60 mg DR capsule, Take 1 capsule (60 mg) by mouth 2 times a day. Do not crush or chew., Disp: , Rfl:     lamoTRIgine (LaMICtal) 150 mg tablet, Take 1 tablet (150 mg) by mouth once daily., Disp: , Rfl:     latanoprost (Xelpros) 0.005 % drops, emulsion, Administer into affected eye(s) once daily., Disp: , Rfl:     mometasone (Elocon) 0.1 % cream, once daily as needed., Disp: , Rfl:     multivitamin tablet, Take 1 tablet by mouth once daily., Disp: , Rfl:     naratriptan (Amerge) 2.5 mg tablet, Take 1 tablet (2.5 mg) by mouth 1 time if needed for migraine. May repeat once in 4hrs if headache recurs, Disp: 9 tablet, Rfl: 1    nystatin (Mycostatin) 100,000 unit/gram powder, Apply topically 2 times a day., Disp: 30 g, Rfl: 2    pantoprazole (ProtoNix) 20 mg EC tablet, Take 1 tablet (20 mg) by mouth once daily. Do not crush, chew, or split., Disp: , Rfl:     semaglutide (Ozempic) 0.25 mg or 0.5 mg(2 mg/1.5 mL) pen injector, Inject 0.5 mg under the skin 1 (one) time per  week., Disp: , Rfl:     traZODone (Desyrel) 50 mg tablet, Take 1 tablet (50 mg) by mouth once daily at bedtime., Disp: , Rfl:     valbenazine tosylate (Ingrezza) 40 mg capsule, Take 1 capsule (40 mg) by mouth once daily., Disp: 30 capsule, Rfl: 11    benztropine (Cogentin) 0.5 mg tablet, Take 1 tablet (0.5 mg) by mouth 3 times a day., Disp: , Rfl:     colchicine 0.6 mg tablet, Take 1 tablet (0.6 mg) by mouth every other day., Disp: , Rfl:     cyclobenzaprine (Flexeril) 10 mg tablet, Take 1 tablet (10 mg) by mouth 3 times a day as needed (pain)., Disp: , Rfl:     dexAMETHasone 0.5 mg/5 mL oral liquid, Use as directed, Disp: , Rfl:     DULoxetine (Cymbalta) 60 mg DR capsule, Take 1 capsule (60 mg) by mouth once daily., Disp: , Rfl:     ferrous sulfate (FEOSOL ORAL), Take 1 tablet by mouth once daily in the evening. 200 (65 Fe) MG, Disp: , Rfl:     lamoTRIgine (LaMICtal) 25 mg tablet, Take 1 table daily for the first 2 weeks, thereafter take 2 tablets daily, Disp: , Rfl:     pantoprazole (ProtoNix) 40 mg EC tablet, Take 1 tablet (40 mg) by mouth once daily in the morning. Take before meals. Do not crush, chew, or split., Disp: , Rfl:   Allergies   Allergen Reactions    Adhesive Tape-Silicones Unknown    Avelox [Moxifloxacin] Unknown    Bactrim [Sulfamethoxazole-Trimethoprim] Unknown    Cefepime Hives    Cephalexin Unknown    Codeine Unknown    Desyrel [Trazodone] Unknown    Erythromycin Unknown    Hydroxychloroquine Unknown     pruritis    Ibuprofen Unknown    Nsaids (Non-Steroidal Anti-Inflammatory Drug) Unknown    Other Unknown     Other: placquenil itching, Vomiting, hives  Other: desryl and sympathomimetic agents     Penicillins Unknown    Percocet [Oxycodone-Acetaminophen] Unknown    Percodan [Oxycodone-Aspirin] Unknown    Phenergan [Promethazine] Unknown    Topiramate Unknown    Toradol [Ketorolac] Unknown    Tramadol Unknown    Trintellix [Vortioxetine] Unknown    [id number] Antimigraine Agents Unknown      racing heart    Adhesive Rash    Macrolide Antibiotics Hives, Rash and Unknown     Replaced free text allergy       Neurological Exam/Physical Exam:  alert, oriented. face symmetric. speech is clear, fluent.  appropriate, conversational.   moves all extremities above gravity.   no tremor . Rarely has some tongue movement toward the left side.        Labs:  CBC:   Lab Results   Component Value Date    WBC 4.8 07/07/2023    HGB 11.3 (L) 07/07/2023    HCT 36.1 07/07/2023    PLT 94 (L) 07/07/2023     BMP:   Lab Results   Component Value Date     07/07/2023    K 4.3 07/07/2023     07/07/2023    CO2 26 07/07/2023    BUN 21 07/07/2023    CREATININE 1.30 (H) 07/07/2023    CALCIUM 8.7 07/07/2023     LFT:   Lab Results   Component Value Date    ALKPHOS 138 (H) 07/07/2023    BILITOT 0.7 07/07/2023    PROT 6.6 07/07/2023    ALBUMIN 3.5 07/07/2023    ALT 20 07/07/2023    AST 37 07/07/2023       Assessment/Plan   Problem List Items Addressed This Visit             ICD-10-CM    Tardive dyskinesia - Primary G24.01    Has mild TD still with tongue, legs but Ingrezza 40mg daily is helping her a lot.  Her chronic balance issues are not related to ingrezza. Swaying is bothering her, will try to increase Ingrezza 60mg daily.    Time spent 18 minutes

## 2023-12-15 NOTE — PATIENT INSTRUCTIONS
"It was a pleasure seeing you today.     Increase Ingrezza 60mg daily.    Please make a follow up appointment in Feb.     For any urgent issues or needing to speak to a medical assistant please call [phone], option 6 during our office hours Monday-Friday 8am-4pm, and leave a voicemail with your concern.  My office will try to reach back you as soon as possible within 24 (business) hours.  If you have an emergency please call 911 or visit a local urgent care or nearest emergency room.      Please understand that [employer]UonGo is a useful communication tool for simple \"normal\" results or a refill request but I would not recommend using this tool for emergent or urgent issues or for conversations with me.  I am happy to ask my staff to rearrange a follow up visit or a virtual visit sooner than requested if appropriate for your care.    "

## 2023-12-19 ENCOUNTER — TELEPHONE (OUTPATIENT)
Dept: NEUROLOGY | Facility: CLINIC | Age: 68
End: 2023-12-19
Payer: MEDICARE

## 2023-12-19 DIAGNOSIS — G24.01 TARDIVE DYSKINESIA: ICD-10-CM

## 2023-12-19 RX ORDER — VALBENAZINE 60 MG/1
60 CAPSULE ORAL DAILY
Qty: 90 CAPSULE | Refills: 3 | Status: SHIPPED | OUTPATIENT
Start: 2023-12-19 | End: 2024-04-03 | Stop reason: WASHOUT

## 2023-12-19 NOTE — TELEPHONE ENCOUNTER
[facility] -stated patient called them asking why 40mg Ingrezza was sent out she thought she was supposed to be getting 60mg - please advise

## 2023-12-22 ENCOUNTER — TELEPHONE (OUTPATIENT)
Dept: PRIMARY CARE | Facility: CLINIC | Age: 68
End: 2023-12-22
Payer: MEDICARE

## 2023-12-22 NOTE — TELEPHONE ENCOUNTER
Assignments/Recommendations: 1-2 follow-up sessions with SW for further education/evaluation. Complete entries in \"Why We Eat\", \"Reality Journal\" and \"Identifying and Handling Cravings\" to discuss next session. Attend [facility] support group 5-18-21. Follow-up with: referrals/present providers/all scheduled appointments at [facility]. Handouts provided  In office. Pt notified and scheduled.

## 2023-12-26 ENCOUNTER — OFFICE VISIT (OUTPATIENT)
Dept: RHEUMATOLOGY | Facility: CLINIC | Age: 68
End: 2023-12-26
Payer: MEDICARE

## 2023-12-26 VITALS
BODY MASS INDEX: 29.87 KG/M2 | HEART RATE: 105 BPM | SYSTOLIC BLOOD PRESSURE: 102 MMHG | WEIGHT: 174 LBS | DIASTOLIC BLOOD PRESSURE: 58 MMHG

## 2023-12-26 DIAGNOSIS — M10.9 GOUT, UNSPECIFIED CAUSE, UNSPECIFIED CHRONICITY, UNSPECIFIED SITE: ICD-10-CM

## 2023-12-26 PROCEDURE — 99214 OFFICE O/P EST MOD 30 MIN: CPT | Performed by: INTERNAL MEDICINE

## 2023-12-26 PROCEDURE — 1125F AMNT PAIN NOTED PAIN PRSNT: CPT | Performed by: INTERNAL MEDICINE

## 2023-12-26 PROCEDURE — 3044F HG A1C LEVEL LT 7.0%: CPT | Performed by: INTERNAL MEDICINE

## 2023-12-26 PROCEDURE — 3008F BODY MASS INDEX DOCD: CPT | Performed by: INTERNAL MEDICINE

## 2023-12-26 PROCEDURE — 1160F RVW MEDS BY RX/DR IN RCRD: CPT | Performed by: INTERNAL MEDICINE

## 2023-12-26 PROCEDURE — 1036F TOBACCO NON-USER: CPT | Performed by: INTERNAL MEDICINE

## 2023-12-26 PROCEDURE — 1159F MED LIST DOCD IN RCRD: CPT | Performed by: INTERNAL MEDICINE

## 2023-12-26 PROCEDURE — 3074F SYST BP LT 130 MM HG: CPT | Performed by: INTERNAL MEDICINE

## 2023-12-26 PROCEDURE — 3078F DIAST BP <80 MM HG: CPT | Performed by: INTERNAL MEDICINE

## 2023-12-26 PROCEDURE — 20600 DRAIN/INJ JOINT/BURSA W/O US: CPT | Performed by: INTERNAL MEDICINE

## 2023-12-26 PROCEDURE — 96372 THER/PROPH/DIAG INJ SC/IM: CPT | Performed by: INTERNAL MEDICINE

## 2023-12-26 RX ORDER — METHYLPREDNISOLONE ACETATE 80 MG/ML
80 INJECTION, SUSPENSION INTRA-ARTICULAR; INTRALESIONAL; INTRAMUSCULAR; SOFT TISSUE ONCE
Status: COMPLETED | OUTPATIENT
Start: 2023-12-26 | End: 2023-12-26

## 2023-12-26 RX ORDER — TRIAMCINOLONE ACETONIDE 40 MG/ML
20 INJECTION, SUSPENSION INTRA-ARTICULAR; INTRAMUSCULAR
Status: COMPLETED | OUTPATIENT
Start: 2023-12-26 | End: 2023-12-26

## 2023-12-26 RX ADMIN — TRIAMCINOLONE ACETONIDE 20 MG: 40 INJECTION, SUSPENSION INTRA-ARTICULAR; INTRAMUSCULAR at 11:10

## 2023-12-26 RX ADMIN — METHYLPREDNISOLONE ACETATE 80 MG: 80 INJECTION, SUSPENSION INTRA-ARTICULAR; INTRALESIONAL; INTRAMUSCULAR; SOFT TISSUE at 12:29

## 2023-12-26 NOTE — PROGRESS NOTES
Chief Complaint     Follow up of gout and CMC arthritis       Recall  64yo F with PMH chronic gout, and R TKA. Has family history of RA but RF and anti-CCP antibody negative.   Bialteral CMC injections in 2/2022. Received right CMC injection in 4/23       History of Present Illness  At today's visit, the patient reports that she started with right mid foot pain yesterday. Sh thinks she is having an attack of gout.   She started colchicine 0.6 mg/day yesterday, Has redness and swelling in the foot.     Also, has recurrence of pain in both CMC joints.     Review of Systems    Constitutional: C/O fatigue.   Skin: No rash  Head: No headache, oral ulcers or hair loss  Neck: No difficulty swallowing or choking  Eyes: No dry eyes  Mouth: Has dry mouth but no oral ulcers  Pulmonary: No wheezing, pleurisy or SOB  Cardiovascular: No chest pain or palpitations  Gastrointestinal: No abdominal pain, nausea or blood in stool  Endocrine: Raynaud's -  Musculoskeletal: As H/P        Active Problems  Problems    · Arthralgia of right wrist (719.43) (M25.531)   · Arthritis of carpometacarpal (CMC) joint of right thumb (716.94) (M18.11)   · Back pain (724.5) (M54.9)   · Bilateral occipital neuralgia (723.8) (M54.81)   · BMI 40.0-44.9, adult (V85.41) (Z68.41)   · Breast cancer (174.9) (C50.919)   · Cervical arthritis (721.0) (M47.812)   · Cervical radiculopathy (723.4) (M54.12)   · Cervicogenic headache (784.0) (G44.86)   · Chronic gout (274.02) (M1A.9XX0)   · Decreased estrogen level (256.39) (E28.39)   · Depression, major, in remission (296.25) (F32.5)   · Depression, major, in remission   · Dermatitis (692.9) (L30.9)   · Diabetes mellitus (250.00) (E11.9)   · Diabetes mellitus   · Encounter for mammogram to establish baseline mammogram (V76.12) (Z12.31)   · Gastric bypass status for obesity (V45.86) (Z98.84)   · GERD (gastroesophageal reflux disease) (530.81) (K21.9)   · Headache (784.0) (R51.9)   · Insomnia, idiopathic  (307.42) (F51.01)   · Knee osteoarthritis (715.36) (M17.9)   · Memory loss (780.93) (R41.3)   · Mouth sore (528.9) (K13.79)   · Myofascial pain syndrome, cervical (729.1) (M79.18)   · Neuropathy (355.9) (G62.9)   · Pain of right hand (729.5) (M79.641)   · Primary osteoarthritis involving multiple joints (715.98) (M15.9)   · Recurrent falls (V15.88) (R29.6)   · Shortness of breath on exertion (786.05) (R06.02)   · Stomatitis, viral (528.00,079.99) (K12.1,B97.89)   · Thrombocytopenia (287.5) (D69.6)   · Thrombocytopenia     Past Medical History  Problems    · History of Acute pain of right knee (719.46) (M25.561)   · Resolved Date: 21 Aug 2018   · History of Acute UTI (599.0) (N39.0)   · Resolved Date: 14 Aug 2019   · History of Acute UTI (599.0) (N39.0)   · Resolved Date: 01 Jun 2020   · History of Dysphagia (787.20) (R13.10)   · Resolved Date: 27 Jan 2016   · History of Elevated LFTs (790.6) (R79.89)   · Resolved Date: 01 Apr 2005   · History of Encounter for immunization (V03.89) (Z23)   · Resolved Date: 02 Dec 2022   · History of Epidermal inclusion cyst (706.2) (L72.0)   · History of Fatty liver (571.8) (K76.0)   · Was appreciated on MRI on 4/2005; with elevated LFT's.   HAZEL   · History of Frequent falls (V15.88) (R29.6)   · Resolved Date: 01 Aug 2016   · History of Gastric polyps (211.1) (K31.7)   · History of Globus pharyngeus (784.99) (R09.89)   · Resolved Date: 01 Aug 2016   · History of acne (V13.3) (Z87.2)   · Resolved Date: 19 Feb 2019   · History of concussion (V15.52) (Z87.820)   · Resolved Date: 20 Aug 2017   · History of dermatitis (V13.3) (Z87.2)   · Resolved Date: 01 Aug 2016   · History of dysuria (V13.00) (Z87.898)   · Resolved Date: 14 Aug 2019   · History of gastroesophageal reflux (GERD) (V12.79) (Z87.19)   · History of herpes zoster (V12.09) (Z86.19)   · Resolved Date: 27 Jan 2016   · History of hyperlipidemia (V12.29) (Z86.39)   · History of peptic ulcer (V12.71) (Z87.11)   · History of  pneumonia (V12.61) (Z87.01)   · History of sleep apnea (V13.89) (Z86.69)   · History of Instability of right knee joint (718.86) (M25.361)   · Resolved Date: 21 Aug 2018   · History of Knee pain (719.46) (M25.569)   · Resolved Date: 20 Aug 2017   · History of Liver disease, chronic (571.9) (K76.9)   · History of Long term current use of opiate analgesic (V58.69) (Z79.891)   · Resolved Date: 21 Feb 2018   · 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)   · History of Neck pain, chronic (723.1,338.29) (M54.2,G89.29)   · Resolved Date: 20 Aug 2017   · History of Opiate analgesic use agreement exists (V58.69) (Z79.891)   · Resolved Date: 21 Feb 2018   · History of Pain of right hand (729.5) (M79.641)   · Resolved Date: 20 Aug 2017   · Personal history of asthma (V12.69) (Z87.09)   · Personal history of juvenile rheumatoid arthritis (V13.4) (Z87.39)   · Personal history of malignant neoplasm of breast (V10.3) (Z85.3)   · History of Polyp of duodenum (537.89) (K31.7)   · History of Shoulder pain (719.41) (M25.519)   · Resolved Date: 20 Aug 2017   · History of Steatohepatitis, nonalcoholic (571.8) (K75.81)   · History of Subcutaneous nodules (782.2) (R22.9)   · Resolved Date: 07 Jan 2015   · History of Throat pain in adult (784.1) (R07.0)   · Resolved Date: 01 Aug 2016     Surgical History  Problems    · History of Breast Surgery Modified Radical Mastectomy   · History of Breast Surgery Reconstruction   · History of Cholecystectomy   · Resolved Date: 01 May 2000   · History of Colonoscopy (Fiberoptic)   · Resolved Date: 01 Mar 2004   · History of Esophageal Dilation   · History of Excision Of Lesion Face Benign 2.1 To 3cm   · History of Hysterectomy   · Resolved Date: 01 Nov 2003   · History of Knee Replacement   · History of Laparosc Gastric Restrictive Proc By Adjustable Gastric Band   · History of Modified Radical Mastectomy Left Breast   · History of Neuroplasty Decompression Median Nerve At Carpal Tunnel   ·  Resolved Date:    · Bilaterally in      Family History  Mother    · Family history of arthritis (V17.7) (Z82.61)   · Family history of coronary artery disease (V17.3) (Z82.49)  Father    · Family history of Congestive Heart Failure   · Family history of arthritis (V17.7) (Z82.61)   · Family history of coronary artery disease (V17.3) (Z82.49)   · Family history of glaucoma (V19.11) (Z83.511)  Brother    · Family history of Prostate Cancer (V16.42)  Family History    · Family history of Family Health Status Of Mother -    · Family history of asthma (V17.5) (Z82.5)   · Family history of colitis (V18.59) (Z83.79)   · Family history of diabetes mellitus (V18.0) (Z83.3)   · Family history of goiter (V18.19) (Z83.49)   · Family history of hypertension (V17.49) (Z82.49)   · Family history of psoriasis (V19.4) (Z84.0)     Social History  Problems    · Caffeine Use   · soda - 2 glasses a day   · Former smoker (V15.82) (Z87.891)   · quit in . Smoked for 2 months. Smoked 2 cigarettes a day   · Former tobacco use (V15.82) (Z87.891)   ·    · Occupation:   · finance/ collections   · Social alcohol use     Allergies  Medication    · Adhesive Tape TAPE   Recorded By: Fannie Pratt; 2020 10:55:05 AM   · Avelox TABS   Recorded By: Mackenzie Harvey; 2013 3:10:35 PM   · Bactrim   Recorded By: Michelle Siddiqui; 3/17/2015 1:20:15 PM   · Cephalexin CAPS   Recorded By: Mackenzie Harvey; 2013 3:10:35 PM   · Codeine Derivatives   Recorded By: Mackenzie Harvey; 2013 3:10:35 PM   · Desyrel   Recorded By: Patrizia Mora; 2014 1:53:47 PM   · Erythromycin Derivatives   Recorded By: Patrizia Mora; 2014 1:53:47 PM   · Motrin TABS   Recorded By: Patrizia Mora; 2014 1:53:47 PM   · NSAIDs   Recorded By: Fannie Pratt; 2020 10:55:05 AM   · Penicillins   Recorded By: Mackenzie Harvey; 2013 3:10:35 PM   · Percocet TABS   Recorded By: Fannie Pratt; 2020 10:55:05 AM    · Percodan   Recorded By: Fannie Pratt; 6/1/2020 10:55:05 AM   · Phenergan TABS   Recorded By: Patrizia Mora; 1/13/2014 1:53:47 PM   · topiramate   Recorded By: Olya Kenyon; 8/1/2019 9:57:20 AM   · Toradol   Recorded By: Patrizia Mora; 1/13/2014 1:53:47 PM   · Tramadol   Recorded By: Fannie Pratt; 8/22/2018 11:17:50 AM   · Trintellix TABS   Recorded By: Daisy Blakely; 2/20/2019 11:05:56 AM     Current Meds  Current Outpatient Medications   Medication Sig Dispense Refill    allopurinol (Zyloprim) 100 mg tablet Take 1 tablet (100 mg) by mouth once daily. With 300mg tab to equal 400mg a day      allopurinol (Zyloprim) 300 mg tablet Take 1 tablet (300 mg) by mouth once daily. With 100mg tab to equal 400mg a day      benztropine (Cogentin) 0.5 mg tablet Take 1 tablet (0.5 mg) by mouth 3 times a day.      colchicine 0.6 mg tablet Take 1 tablet (0.6 mg) by mouth every other day.      cyclobenzaprine (Flexeril) 10 mg tablet Take 1 tablet (10 mg) by mouth 3 times a day as needed (pain).      dexAMETHasone 0.5 mg/5 mL oral liquid Use as directed      DULoxetine (Cymbalta) 60 mg DR capsule Take 1 capsule (60 mg) by mouth once daily.      DULoxetine (Cymbalta) 60 mg DR capsule Take 1 capsule (60 mg) by mouth 2 times a day. Do not crush or chew.      ferrous sulfate (FEOSOL ORAL) Take 1 tablet by mouth once daily in the evening. 200 (65 Fe) MG      lamoTRIgine (LaMICtal) 150 mg tablet Take 1 tablet (150 mg) by mouth once daily.      lamoTRIgine (LaMICtal) 25 mg tablet Take 1 table daily for the first 2 weeks, thereafter take 2 tablets daily      latanoprost (Xelpros) 0.005 % drops, emulsion Administer into affected eye(s) once daily.      mometasone (Elocon) 0.1 % cream once daily as needed.      multivitamin tablet Take 1 tablet by mouth once daily.      naratriptan (Amerge) 2.5 mg tablet Take 1 tablet (2.5 mg) by mouth 1 time if needed for migraine. May repeat once in 4hrs if headache recurs 9 tablet 1     nystatin (Mycostatin) 100,000 unit/gram powder Apply topically 2 times a day. 30 g 2    pantoprazole (ProtoNix) 20 mg EC tablet Take 1 tablet (20 mg) by mouth once daily. Do not crush, chew, or split.      pantoprazole (ProtoNix) 40 mg EC tablet Take 1 tablet (40 mg) by mouth once daily in the morning. Take before meals. Do not crush, chew, or split.      semaglutide (Ozempic) 0.25 mg or 0.5 mg(2 mg/1.5 mL) pen injector Inject 0.5 mg under the skin 1 (one) time per week.      traZODone (Desyrel) 50 mg tablet Take 1 tablet (50 mg) by mouth once daily at bedtime.      valbenazine (Ingrezza) 60 mg capsule Take 60 mg by mouth once daily. 90 capsule 3     No current facility-administered medications for this visit.        Vitals  Blood pressure 102/58, pulse 105, weight 78.9 kg (174 lb).       Physical Exam  PE:  General - NAD, sitting up in chair, well-groomed, pleasant, AAOx3  Head: Normocephalic, atraumatic  Eyes - PERRLA, EOMI. No conjunctiva injection.   Mouth/ENT - Moist oral and nasal mucosa.   Cardiovascular - Normal S1, S2. Regular rate and rhythm. No murmurs or rubs.  Lungs - Symmetric chest expansion. Clear to auscultation bilaterally.   Skin - No rashes or ulcers. Skin warm and dry. No erythema on bilateral cheeks.  Extremities - No edema, cyanosis ,or clubbing  Neurological - Alert and oriented x 3, grossly intact. No focal deficit.  Musculoskeletal -  EXAMJOINTDETAILED,  Feet: Right mid foot tenderness    Assessment/Plan:  67 yo F with PMH chronic gout, with flare in right mid foot. Will use Depomedrol 80 mg IM.   CMC arthritis: Bilateral cortisone injection.    Reviewed and approved by ERIC MINOR on 12/26/23 at 11:09 AM.     Small Joint Injection/Arthrocentesis: bilateral thumb CMC on 12/26/2023 11:10 AM  Indications: pain  Details: 25 G needle, medial approach  Medications (Right): 20 mg triamcinolone acetonide 40 mg/mL  Procedure, treatment alternatives, risks and benefits explained, specific risks  discussed. Consent was given by the patient. Patient was prepped and draped in the usual sterile fashion.

## 2024-01-04 ENCOUNTER — APPOINTMENT (OUTPATIENT)
Dept: PRIMARY CARE | Facility: CLINIC | Age: 69
End: 2024-01-04
Payer: MEDICARE

## 2024-01-10 ENCOUNTER — APPOINTMENT (OUTPATIENT)
Dept: PSYCHOLOGY | Facility: CLINIC | Age: 69
End: 2024-01-10
Payer: MEDICARE

## 2024-02-12 ENCOUNTER — OFFICE VISIT (OUTPATIENT)
Dept: PSYCHOLOGY | Facility: CLINIC | Age: 69
End: 2024-02-12
Payer: MEDICARE

## 2024-02-12 DIAGNOSIS — F31.32 BIPOLAR AFFECTIVE DISORDER, CURRENTLY DEPRESSED, MODERATE (MULTI): ICD-10-CM

## 2024-02-12 DIAGNOSIS — R41.89 SUBJECTIVE MEMORY COMPLAINTS: Primary | ICD-10-CM

## 2024-02-12 PROCEDURE — 3008F BODY MASS INDEX DOCD: CPT

## 2024-02-12 PROCEDURE — 1160F RVW MEDS BY RX/DR IN RCRD: CPT

## 2024-02-12 PROCEDURE — 1036F TOBACCO NON-USER: CPT

## 2024-02-12 PROCEDURE — 99211NT NEUROPYSCH TESTING PENDING FINAL BILLING

## 2024-02-12 PROCEDURE — 1159F MED LIST DOCD IN RCRD: CPT

## 2024-02-12 PROCEDURE — 1125F AMNT PAIN NOTED PAIN PRSNT: CPT

## 2024-02-12 NOTE — PROGRESS NOTES
NEUROPSYCHOLOGICAL EXAMINATION REPORT    Name: Mariann Drew   MRN: [id number]   Age: 68 y.o.   Handedness: Right   Ethnicity: White  Education: 13    Referring Provider: Olivia Herrera MD   Date of Service: 2/12/2024     Reason For Referral  Ms. Mariann Drew is a 68 y.o. female who was referred for a neuropsychological evaluation due to complaints of memory loss. The purpose of the evaluation was reviewed. In the context of COVID-19, she was informed of the relative risk of virus exposure involved in visiting a medical facility and the measures being taken at [facility] to reduce the risk of spreading the virus. All questions were answered. The patient verbalized understanding and written informed consent was obtained.    Diagnosis  1. Subjective memory complaints    2. Bipolar affective disorder, currently depressed, moderate (CMS/HCC)      Summary and Impressions   Ms. Drew is a 68 y.o. female who was referred for a neuropsychological evaluation due to complaints of memory loss. Relevant medical history includes TD, low platelets, type I DM, episodic migraines, breast cancer, insomnia, sleep apnea, depression, and bipolar disorder. The patient demonstrated a relatively preserved cognitive profile (low average range or higher) on the majority of cognitive tasks assessing attention, working memory, visual perception/construction, language, memory, and executive functioning. There were isolated weaknesses on tasks assessing fine motor dexterity and novel problem solving, although there was no pattern with others tasks of motor speed and executive functioning being within normal limits. The patient endorsed mild depression and moderate anxiety on symptom screeners. Overall, there were very subtle cognitive inefficiencies that do not rise to a level of a neurocognitive disorder at this time. Rather, report of cognitive complaints is likely related to affective distress, poor sleep, possible medication  side effects (particularly medications with high anticholingeric properties), and possible vascular inefficiencies.     Recommendations   Ms. Drew should be assured the current cognitive findings were largely normal compared to same-aged peers. There were no concerns for an emerging neurodegenerative disorder.   The patient should continue to follow up with Dr. Herrera as clinically indicated for TD.    Ms. Drew is taking some medications that are known to have negative cognitive side effects in isolation and combination; thus review/discussion with a prescribing provider regarding this medication regimen is encouraged.  The patient reported being established with a psychiatric nurse for management of affective distress, and she reported having SI (no ideation/intent) several months ago that should continue to be monitored. She may be interested in reestablishing with a provider for psychotherapy. Evidence-based psychotherapies for bipolar disorder/depression include Cognitive Behavioral Therapy (CBT). She reported visual hallucinations of ghost that appear to be hypnagogic in nature, but these should continue to be monitored by her mental healthcare provider(s).  Community mental health providers can be identified on http://mha.[place].gov/.   It may be helpful to establish behavioral strategies to optimize sleep: (1) establishing a consistent sleep schedule, (2) avoiding daytime naps, (3) avoiding alcohol/caffeine/sugary foods before bedtime, (4) exercising during the day, (5) turning off electronics before bedtime, (6) creating a healthy sleep environment, (7) using the bedroom for sleep, (8) and using relaxation exercises before bedtime.   Physical, mental, and social stimulation are known to be protective factors for brain health. This includes engaging in regular exercise, staying socially active, and engaging in cognitively stimulating activities. She should also manage other co-morbid medical conditions  that may be impacting cognition as part of brain health (e.g., mood, sleep, and vascular risk factors).   Ms. Drew can undergo an updated neuropsychological evaluation if there are significant change in neurological health and/or notable/unexpected change in functional status. The current evaluation can serve as a baseline for any future re-evaluations.    To be billed after 2/19 feedback; 49025 = 1; 23354 = 1; 22251 = 1; 44299 = 1; 36903 = 3    History of Presenting Illness   [facility] indicate [facility] Drew is established with Dr. Olivia Herrera in the Department of Neurology due to tardive dyskinesia (TD) and complaints of memory loss. The patient is having mixed movements in the tongue and legs that Dr. Herrera believes are the function of both TD and functional neurological symptoms. She is prescribed Ingrezza which is helpful. There were no signs of parkinsonism on the neurological examination. The patient is also reportedly having memory and word finding problems. TSH and vitamin B12 (5/16/2023) were reportedly within reference range. Brain MRI (07/21/2023) was read to show nonspecific T2 hyperintense signals in the white matter. Neurocognitive testing was ordered to assist with differential diagnosis and treatment planning.     Cognitive Complaints and Functional Status   Ms. Drew reported cognitive complaints started gradually approximately two years ago with no known precipitating factor, and cognition is reported getting worse. She endorsed problems with memory that benefits from cues (e.g., repeating questions, forgetting conversations, getting lost in familiar locations, forgetting names, and forgetting what she read), language (e.g., word finding problems), and visual perception (e.g., bumping into walls). She denied problems with attention, speed of processing, and executive functioning. The patient denied behavioral changes (e.g., disinhibition, apathy, perseverative/stereotyped behavior,  empathy/sympathy, and dietary changes).    Ms. Drew denied cognitive problems related to managing activities of daily living (ADLs; bathing, grooming, and dressing). She denied problems managing instrumental activities of daily living (IADLs; managing medications, finances, medical appointments, meal preporation, and transportation). However, she described a recent incident where she got lost going to the grocery store in a familiar locations, and she will sometimes forget the name of familiar towns and streets. She denied having car accidents or near misses.      Medical History and Status  Developmental: There were no reported birth complications or problems meeting developmental milestones.  Current Medical: In addition to the above history, medical history includes low platelets (followed by hematology/oncology), type I DM, episodic migraines, breast cancer (2000; full mastectomy with chemotherapy), insomnia, and sleep apnea. There is no known history of traumatic brain injuries, cerebrovascular incidences, seizures, or other neurological disorders.  Family Medical History: The patient reported her maternal grandfather and maternal grandmother had dementia with unknown age of onset. There is also a stroke history for her maternal grandfather, maternal grandmother, and paternal grandmother.   Motor and Sensory Complaints: The patient reported having 2-3 falls per week due to problems with balance that is thought to be secondary to medication side effects (Ingrezza). She indicated having TD that started approximately one year ago which is followed by neurology. She indicated having glaucoma and history of cataracts (surgically removed). She denied problems with auditory, gustatory, and olfactory functions. She also reported having urinary accidents 2x per week for unknown reason, and she indicated having a spinal cord stimulator placed for the accidents.   Pain: The patient reported having pain secondary to  arthritis in the legs, but she denied current pain.  Sleep: She reportedly sleeps 4-5 hours nightly with problems initiating and maintaining sleep. She is prescribed trazodone secondary to insomnia, and she reported being CPAP complaint for ANKUSH. She denied REM sleep behavior.    Substance Use History: The patient denied current or past abuse of alcohol or illicit drugs. She has a history of using medical cannabis for sleep that she stopped taking 3 months ago due to limited finances. She is a lifetime nonsmoker, and she consumes 2 glasses of pepsi daily.     Medications  Current Outpatient Medications   Medication Instructions    allopurinol (ZYLOPRIM) 100 mg, oral, Daily, With 300mg tab to equal 400mg a day    allopurinol (ZYLOPRIM) 300 mg, oral, Daily, With 100mg tab to equal 400mg a day    benztropine (COGENTIN) 0.5 mg, oral, 3 times daily    colchicine 0.6 mg, oral, Every other day    cyclobenzaprine (FLEXERIL) 10 mg, oral, 3 times daily PRN    dexAMETHasone 0.5 mg/5 mL oral liquid Use as directed     DULoxetine (CYMBALTA) 60 mg, oral, Daily    DULoxetine (CYMBALTA) 60 mg, oral, 2 times daily, Do not crush or chew.    ferrous sulfate (FEOSOL ORAL) 1 tablet, oral, Every evening, 200 (65 Fe) MG    Ingrezza 60 mg, oral, Daily    lamoTRIgine (LaMICtal) 25 mg tablet Take 1 table daily for the first 2 weeks, thereafter take 2 tablets daily     lamoTRIgine (LAMICTAL) 150 mg, oral, Daily    latanoprost (Xelpros) 0.005 % drops, emulsion ophthalmic (eye), Daily    mometasone (Elocon) 0.1 % cream Daily PRN    multivitamin tablet 1 tablet, oral, Daily    naratriptan (AMERGE) 2.5 mg, oral, Once as needed, May repeat once in 4hrs if headache recurs    nystatin (Mycostatin) 100,000 unit/gram powder Topical, 2 times daily    Ozempic 0.5 mg, subcutaneous, Weekly    pantoprazole (PROTONIX) 40 mg, oral, Daily before breakfast, Do not crush, chew, or split.     pantoprazole (PROTONIX) 20 mg, oral, Daily, Do not crush, chew, or  "split.    traZODone (DESYREL) 50 mg, oral, Nightly      Psychiatric History and Status   Psychiatric history: Ms. Drew was diagnosed with Bipolar disorder and Depression approximately one year ago, and she is followed by a psychiatric nurse (Shubham Briones). She is prescribed Lamictal and Cymbalta for mood. She is not engaging in psychotherapy, but she has previously engaged in psychotherapy that she felt was the result of poor patient client fit.    Current Mood: Ms. Drew reported her mood was \"pretty good,\" but she reported stress related to her memory. She endorsed symptoms of depression, such as sadness, problems concentrating, decreased motivation, and decreased appetite (might be due to diet). She endorsed symptoms of anxiety, where she will often worry about her children. She reported having some visual hallucinations of ghosts that occur 1-2 time per month that appear to be hypnagogic in nature.  Suicidal/Homicidal Ideations: Ms. Drew denied current ideation, intent, or plan. She reported a history of suicidal ideations several months ago (denied plan or ideation). She promptly informed her mental health care provider who modified medications. She denied ever being hospitalized for her mood or having a history of previous attempts.     Psychosocial History   Academic: Ms. Drew competed 1 year of college, and she described herself as a B student.  She reported no history of attention difficulties, learning problems, special services, or repeated/skipped grades in school.  Employment: Ms. Drew reported working at [employer] before retiring 5 years ago. She is now volunteering at  operating a switchboard, where she will forget tasks or transfer people to the wrong number.  Social: Ms. Drew resides with her spouse of approximately 50 years, and she has two adult children. For pleasure, she enjoys playing cards and making cards for events.   Legal: Ms. Drew denied " current legal concerns.     MENTAL STATUS/BEHAVIORAL OBSERVATIONS  Orientation: She was oriented to person, place, time, and situation.   Motor: She  ambulated independently. TD was observed.   Affect-Mood: Mood appeared slightly depressed with a predominant flat affect, although she brightened at appropriate times.   Language: Expressive and receptive speech were adequate for interview and testing purposes.    Clinical Interview: She arrived on time and unaccompanied to the appointment. She was appropriately groomed and casually attired. Ms. Drew appeared to be a relatively forthcoming historian, and her report was consistent with the medical record.    Cognitive testing: Vision and hearing were adequate for testing purposes. The patient was attentive and cooperative throughout testing. She persisted calmly and no behavioral dysregulation was observed. Performance on stand alone and embedded measures of task engagement were well within normal limits. Thus, the results are considered an accurate representation of current cognitive status.     Procedures/Tests  In addition to the clinical interview, the following tests were administered by a trained psychometrist: stand alone and embedded measures of task engagement; Test of Premorbid Functioning (TOPF); Wechsler Adult Intelligence Scale, 4th Edition (WAIS-IV); The Repeatable Battery for the Assessment of Neuropsychological Status Update (RBANS); Trail Making Test; Stroop Color and Word Test (Stroop); Modified Wisconsin Card Sorting Test (M-WCST); Tyler Naming Test, 2nd Edition (BNT-II); Verbal Fluency (FAS and Animal Naming); Grooved Pegboard; Idemotor Praxis; Extended Complex Figure Test (ECFT), Copy Trial; Wechsler Memory Scale, 4th Edition (WMS-IV); Callahan Verbal Learning Test, Revised (HVLT-R); Brief Visuospatial Memory Test, Revised (BVMT-R); Clock; Geriatric Depression Scale (GDS); and General Anxiety Disorder-7 (SNOW-7). This neuropsychological evaluation  employed test score adjustment based on available and relevant demographic characteristics. The unified labeling system established in 2020 was used to characterize performance on testing.      Test Results  Premorbid Abilities: Premorbid functioning was estimated to fall in the average range based on her performance on measures resistant to neurocognitive degeneration (TOPF and WAIS-IV Matrix Reasoning), educational history, and occupational attainment.    Attention and Working Memory:  Verbal attention and working memory fell in the average range (WAIS-IV Digit Span).    Processing Speed: Psychomotor processing speed was in the high average range (WAIS-IV Coding). Speeded word reading and color naming (Stroop) fell in the low average range. Performance on a task of visual motor scanning and sequencing was in the average range (TMT-A; 0 errors).   Visual Perception and Construction: There was average range performance on a visual perception task (RBANS Line Orientation). The ability to copy a complex figure was in the average range (ECFT Figure Copy), where she demonstrated an organized approach to construction.     Language: Confrontation naming (BNT) was in the average range. Phonemic fluency was in the low average range (FAS), while semantic fluency was in the average range (Animals). There was no significant difference in performance between fluency tasks.   Psychomotion: Fine motor dexterity fell in the exceptionally low to below average range bilaterally, and there was no significant discrepancy in performance between trials (Grooved Pegboard).   Executive Functioning: Clock drawing was within normal limits (Clocks). There was  average range performance on a task of visual motor scanning and cognitive set shifting (TMT B; 0 errors) which was consistent with her TMT A trial performance. There was average range response inhibition when accounting for speeded color/word naming (Stroop).  On a task assessing  novel problem solving (M-WCST), she had exceptionally low performance with her completing only 1 out of 6 categories. She had an elevated number of both perseverative and non perseverative errors.   Memory: Semantically learning a clustered word list (HVLT-R) fell in the low average range (3, 6, and 8 words). There was average range retention (100%), although she had below average recognition memory (10 hits and 3 false positives) that was likely impacted by her second guessing herself. Contextual verbal memory of stories of stories fell in the low average to average range (WMS-IV Logical Memory) across all trials. Visual learning of an array of figures fell in the average range (BVMT-R). There was average retention of figures learned (>100%), and there was average recognition of target and non-target figures (6 hits and 0 false positive).   Mood: She endorsed mild symptoms of depression (GDS) and moderate symptoms of anxiety (SNOW-7).

## 2024-02-13 ENCOUNTER — APPOINTMENT (OUTPATIENT)
Dept: PRIMARY CARE | Facility: CLINIC | Age: 69
End: 2024-02-13
Payer: MEDICARE

## 2024-02-13 ENCOUNTER — OFFICE VISIT (OUTPATIENT)
Dept: PRIMARY CARE | Facility: CLINIC | Age: 69
End: 2024-02-13
Payer: MEDICARE

## 2024-02-13 VITALS
BODY MASS INDEX: 28.68 KG/M2 | WEIGHT: 168 LBS | SYSTOLIC BLOOD PRESSURE: 110 MMHG | DIASTOLIC BLOOD PRESSURE: 70 MMHG | HEIGHT: 64 IN | OXYGEN SATURATION: 99 % | HEART RATE: 96 BPM

## 2024-02-13 DIAGNOSIS — R21 RASH: ICD-10-CM

## 2024-02-13 DIAGNOSIS — E11.9 TYPE 2 DIABETES MELLITUS WITHOUT COMPLICATION, WITHOUT LONG-TERM CURRENT USE OF INSULIN (MULTI): ICD-10-CM

## 2024-02-13 DIAGNOSIS — E11.9 TYPE 2 DIABETES MELLITUS WITHOUT COMPLICATION, WITHOUT LONG-TERM CURRENT USE OF INSULIN (MULTI): Primary | ICD-10-CM

## 2024-02-13 DIAGNOSIS — R10.32 LEFT GROIN PAIN: ICD-10-CM

## 2024-02-13 DIAGNOSIS — R05.9 COUGH, UNSPECIFIED TYPE: ICD-10-CM

## 2024-02-13 PROCEDURE — 1125F AMNT PAIN NOTED PAIN PRSNT: CPT | Performed by: STUDENT IN AN ORGANIZED HEALTH CARE EDUCATION/TRAINING PROGRAM

## 2024-02-13 PROCEDURE — 1159F MED LIST DOCD IN RCRD: CPT | Performed by: STUDENT IN AN ORGANIZED HEALTH CARE EDUCATION/TRAINING PROGRAM

## 2024-02-13 PROCEDURE — 1160F RVW MEDS BY RX/DR IN RCRD: CPT | Performed by: STUDENT IN AN ORGANIZED HEALTH CARE EDUCATION/TRAINING PROGRAM

## 2024-02-13 PROCEDURE — 99213 OFFICE O/P EST LOW 20 MIN: CPT | Performed by: STUDENT IN AN ORGANIZED HEALTH CARE EDUCATION/TRAINING PROGRAM

## 2024-02-13 PROCEDURE — 3078F DIAST BP <80 MM HG: CPT | Performed by: STUDENT IN AN ORGANIZED HEALTH CARE EDUCATION/TRAINING PROGRAM

## 2024-02-13 PROCEDURE — 1036F TOBACCO NON-USER: CPT | Performed by: STUDENT IN AN ORGANIZED HEALTH CARE EDUCATION/TRAINING PROGRAM

## 2024-02-13 PROCEDURE — 3008F BODY MASS INDEX DOCD: CPT | Performed by: STUDENT IN AN ORGANIZED HEALTH CARE EDUCATION/TRAINING PROGRAM

## 2024-02-13 PROCEDURE — 3074F SYST BP LT 130 MM HG: CPT | Performed by: STUDENT IN AN ORGANIZED HEALTH CARE EDUCATION/TRAINING PROGRAM

## 2024-02-13 RX ORDER — MOMETASONE FUROATE 1 MG/G
CREAM TOPICAL DAILY PRN
Qty: 15 G | Refills: 1 | Status: SHIPPED | OUTPATIENT
Start: 2024-02-13 | End: 2024-03-13 | Stop reason: SDUPTHER

## 2024-02-13 RX ORDER — SEMAGLUTIDE 0.68 MG/ML
0.5 INJECTION, SOLUTION SUBCUTANEOUS
Qty: 3 ML | Refills: 0 | COMMUNITY
Start: 2024-02-13 | End: 2024-04-03 | Stop reason: WASHOUT

## 2024-02-13 RX ORDER — SEMAGLUTIDE 1.34 MG/ML
0.5 INJECTION, SOLUTION SUBCUTANEOUS
Qty: 1 EACH | Refills: 3 | Status: SHIPPED | OUTPATIENT
Start: 2024-02-13 | End: 2024-03-13 | Stop reason: SDUPTHER

## 2024-02-13 RX ORDER — FAMOTIDINE 40 MG/1
TABLET, FILM COATED ORAL
COMMUNITY
Start: 2023-10-04

## 2024-02-13 ASSESSMENT — PATIENT HEALTH QUESTIONNAIRE - PHQ9
SUM OF ALL RESPONSES TO PHQ9 QUESTIONS 1 AND 2: 0
1. LITTLE INTEREST OR PLEASURE IN DOING THINGS: NOT AT ALL
2. FEELING DOWN, DEPRESSED OR HOPELESS: NOT AT ALL

## 2024-02-13 NOTE — PROGRESS NOTES
Subjective   Patient ID: Mariann Drew is a 68 y.o. female who presents for Follow-up (3 month follow up - [facility]/Pain in left groin for a week - has a pulling and burning sensation when she stands up from sitting or laying down).  Left groin bothering her for one week. Pulling pain. No bumps, sores, or yeast infection. Only when she gets out of bed or a chair then goes away. No pain currently or with rotation.     Cough, during the day and in the night. Last 1 month.     Blood sugar well-controlled.  To send for A1c.  Doing well with weight management.  Blood pressure well-controlled.    No other concerns today.        Review of Systems   Constitutional:  Negative for activity change and fatigue.   HENT: Negative.     Respiratory:  Negative for chest tightness and shortness of breath.    Cardiovascular:  Negative for chest pain.   Gastrointestinal: Negative.    Musculoskeletal:  Positive for arthralgias and myalgias.   Neurological:  Negative for dizziness and headaches.   Psychiatric/Behavioral:  Negative for dysphoric mood and sleep disturbance.    All other systems reviewed and are negative.      Objective   Physical Exam  Vitals reviewed.   Constitutional:       General: She is not in acute distress.     Appearance: Normal appearance.   HENT:      Head: Normocephalic.      Mouth/Throat:      Mouth: Mucous membranes are moist.   Eyes:      Pupils: Pupils are equal, round, and reactive to light.   Cardiovascular:      Rate and Rhythm: Normal rate and regular rhythm.   Pulmonary:      Effort: Pulmonary effort is normal. No respiratory distress.   Abdominal:      Palpations: Abdomen is soft.      Tenderness: There is no abdominal tenderness.   Musculoskeletal:         General: Normal range of motion.      Comments: Normal internal and external rotation of left leg   Skin:     General: Skin is warm and dry.   Neurological:      General: No focal deficit present.      Mental Status:  She is alert. Mental status is at baseline.   Psychiatric:         Mood and Affect: Mood normal.         Behavior: Behavior normal.         Body mass index is 28.84 kg/m².      Current Outpatient Medications:     allopurinol (Zyloprim) 100 mg tablet, Take 1 tablet (100 mg) by mouth once daily. With 300mg tab to equal 400mg a day, Disp: , Rfl:     colchicine 0.6 mg tablet, Take 1 tablet (0.6 mg) by mouth every other day., Disp: , Rfl:     DULoxetine (Cymbalta) 60 mg DR capsule, Take 1 capsule (60 mg) by mouth 2 times a day. Do not crush or chew., Disp: , Rfl:     famotidine (Pepcid) 40 mg tablet, , Disp: , Rfl:     ferrous sulfate (FEOSOL ORAL), Take 1 tablet by mouth once daily in the evening. 200 (65 Fe) MG, Disp: , Rfl:     lamoTRIgine (LaMICtal) 150 mg tablet, Take 1 tablet (150 mg) by mouth once daily., Disp: , Rfl:     latanoprost (Xelpros) 0.005 % drops, emulsion, Administer into affected eye(s) once daily., Disp: , Rfl:     multivitamin tablet, Take 1 tablet by mouth once daily., Disp: , Rfl:     naratriptan (Amerge) 2.5 mg tablet, Take 1 tablet (2.5 mg) by mouth 1 time if needed for migraine. May repeat once in 4hrs if headache recurs, Disp: 9 tablet, Rfl: 1    pantoprazole (ProtoNix) 40 mg EC tablet, Take 1 tablet (40 mg) by mouth once daily in the morning. Take before meals. Do not crush, chew, or split., Disp: , Rfl:     traZODone (Desyrel) 50 mg tablet, Take 1 tablet (50 mg) by mouth once daily at bedtime., Disp: , Rfl:     valbenazine (Ingrezza) 60 mg capsule, Take 60 mg by mouth once daily., Disp: 90 capsule, Rfl: 3    dexAMETHasone 0.5 mg/5 mL oral liquid, Use as directed, Disp: , Rfl:     mometasone (Elocon) 0.1 % cream, Apply topically once daily as needed (Apply topically once daily as needed)., Disp: 15 g, Rfl: 1    nystatin (Mycostatin) 100,000 unit/gram powder, Apply topically 2 times a day. (Patient not taking: Reported on 2/12/2024), Disp: 30 g, Rfl: 2    pantoprazole (ProtoNix) 20 mg EC  tablet, Take 1 tablet (20 mg) by mouth once daily. Do not crush, chew, or split., Disp: , Rfl:     semaglutide (Ozempic) 0.25 mg or 0.5 mg (2 mg/3 mL) pen injector, Inject 0.5 mg under the skin every 7 days. Lot: [id number]; Exp: 08/31/25, Disp: 3 mL, Rfl: 0    semaglutide (Ozempic) 0.25 mg or 0.5 mg(2 mg/1.5 mL) pen injector, Inject 0.5 mg under the skin 1 (one) time per week., Disp: 1 each, Rfl: 3      Assessment/Plan   Diagnoses and all orders for this visit:  Type 2 diabetes mellitus without complication, without long-term current use of insulin (CMS/[facility])  -     semaglutide (Ozempic) 0.25 mg or 0.5 mg(2 mg/1.5 mL) pen injector; Inject 0.5 mg under the skin 1 (one) time per week.  -     Hemoglobin A1c; Future  Rash  -     mometasone (Elocon) 0.1 % cream; Apply topically once daily as needed (Apply topically once daily as needed).  Left groin pain  Comments:  suspect pulled muscle  recommend heat  normal log roll test  Cough, unspecified type  Comments:  recommend mucinex and antihistamine  follow up if not resolving       Follow up in 3 months    Olivia Self DO 02/15/24 1:31 PM

## 2024-02-15 ASSESSMENT — ENCOUNTER SYMPTOMS
ARTHRALGIAS: 1
GASTROINTESTINAL NEGATIVE: 1
DIZZINESS: 0
FATIGUE: 0
MYALGIAS: 1
SLEEP DISTURBANCE: 0
DYSPHORIC MOOD: 0
HEADACHES: 0
CHEST TIGHTNESS: 0
ACTIVITY CHANGE: 0
SHORTNESS OF BREATH: 0

## 2024-02-19 ENCOUNTER — OFFICE VISIT (OUTPATIENT)
Dept: PSYCHOLOGY | Facility: CLINIC | Age: 69
End: 2024-02-19
Payer: MEDICARE

## 2024-02-19 DIAGNOSIS — R41.89 SUBJECTIVE MEMORY COMPLAINTS: Primary | ICD-10-CM

## 2024-02-19 DIAGNOSIS — F31.32 BIPOLAR AFFECTIVE DISORDER, CURRENTLY DEPRESSED, MODERATE (MULTI): ICD-10-CM

## 2024-02-19 PROCEDURE — 3008F BODY MASS INDEX DOCD: CPT

## 2024-02-19 PROCEDURE — 1036F TOBACCO NON-USER: CPT

## 2024-02-19 PROCEDURE — 96133 NRPSYC TST EVAL PHYS/QHP EA: CPT | Mod: AH

## 2024-02-19 PROCEDURE — 96132 NRPSYC TST EVAL PHYS/QHP 1ST: CPT | Mod: AH

## 2024-02-19 PROCEDURE — 96139 PSYCL/NRPSYC TST TECH EA: CPT

## 2024-02-19 PROCEDURE — 96133 NRPSYC TST EVAL PHYS/QHP EA: CPT

## 2024-02-19 PROCEDURE — 1159F MED LIST DOCD IN RCRD: CPT

## 2024-02-19 PROCEDURE — 96132 NRPSYC TST EVAL PHYS/QHP 1ST: CPT

## 2024-02-19 PROCEDURE — 1160F RVW MEDS BY RX/DR IN RCRD: CPT

## 2024-02-19 PROCEDURE — 96138 PSYCL/NRPSYC TECH 1ST: CPT

## 2024-02-19 PROCEDURE — 96116 NUBHVL XM PHYS/QHP 1ST HR: CPT | Mod: AH

## 2024-02-19 PROCEDURE — 1125F AMNT PAIN NOTED PAIN PRSNT: CPT

## 2024-02-19 PROCEDURE — 96116 NUBHVL XM PHYS/QHP 1ST HR: CPT

## 2024-02-19 NOTE — PROGRESS NOTES
NEUROPSYCHOLOGICAL FEEDBACK NOTE  Name: Mariann Drew  : 1955  MRN: [id number]  Referring Provider: Olivia Herrera MD    Date of Service: 24    Reason for Referral: Ms. Mariann Drew is a 68 y.o. female who was referred for a neuropsychological evaluation due to complaints of memory loss. The patient returned today for feedback regarding cognitive testing that took place on 2024.    Diagnosis:   1. Subjective memory complaints    2. Bipolar affective disorder, currently depressed, moderate (CMS/HCC)       Session Detail:     Current findings were discussed in detail including generally normal cognitive findings, as well as mild depression and moderate anxiety. Recommendations were reviewed in full. The patient showed adequate understanding of all findings and recommendations. All questions were answered and the patient reported having access to the full report.     18718 = 0    2024: 79597 = 1; 35274 = 1; 94853 = 1; 10060 = 1; 69804 = 3

## 2024-02-20 ENCOUNTER — APPOINTMENT (OUTPATIENT)
Dept: PRIMARY CARE | Facility: CLINIC | Age: 69
End: 2024-02-20
Payer: MEDICARE

## 2024-02-20 ENCOUNTER — OFFICE VISIT (OUTPATIENT)
Dept: NEUROLOGY | Facility: CLINIC | Age: 69
End: 2024-02-20
Payer: MEDICARE

## 2024-02-20 VITALS
WEIGHT: 173.4 LBS | TEMPERATURE: 97.5 F | RESPIRATION RATE: 18 BRPM | HEIGHT: 64 IN | SYSTOLIC BLOOD PRESSURE: 125 MMHG | BODY MASS INDEX: 29.6 KG/M2 | HEART RATE: 97 BPM | DIASTOLIC BLOOD PRESSURE: 78 MMHG

## 2024-02-20 DIAGNOSIS — G24.01 TARDIVE DYSKINESIA: Primary | ICD-10-CM

## 2024-02-20 PROCEDURE — 3074F SYST BP LT 130 MM HG: CPT | Performed by: PSYCHIATRY & NEUROLOGY

## 2024-02-20 PROCEDURE — 1125F AMNT PAIN NOTED PAIN PRSNT: CPT | Performed by: PSYCHIATRY & NEUROLOGY

## 2024-02-20 PROCEDURE — 99214 OFFICE O/P EST MOD 30 MIN: CPT | Performed by: PSYCHIATRY & NEUROLOGY

## 2024-02-20 PROCEDURE — 1160F RVW MEDS BY RX/DR IN RCRD: CPT | Performed by: PSYCHIATRY & NEUROLOGY

## 2024-02-20 PROCEDURE — 1159F MED LIST DOCD IN RCRD: CPT | Performed by: PSYCHIATRY & NEUROLOGY

## 2024-02-20 PROCEDURE — 3008F BODY MASS INDEX DOCD: CPT | Performed by: PSYCHIATRY & NEUROLOGY

## 2024-02-20 PROCEDURE — 1036F TOBACCO NON-USER: CPT | Performed by: PSYCHIATRY & NEUROLOGY

## 2024-02-20 PROCEDURE — 3078F DIAST BP <80 MM HG: CPT | Performed by: PSYCHIATRY & NEUROLOGY

## 2024-02-20 NOTE — PROGRESS NOTES
Subjective     Mariann Drew is a 68 y.o. year old female here for TD/ tremor follow up.     HPI  Last visit was Nov 3 and was having balance issues on Ingrezza 40mg daily-  she came off for two weeks and her imbalance issues were the same off Ingrezza so she is now back on Ingrezza 40mg daily.  She feels the back of the tongue shakes like a tremor.  Her mouth and chin and hand shakes. She has clicking noise with the tremor as well. Jaw T=tremors seem new, since 1 month ago.   Dyskinesia is gone.   MRI brain July was normal.   Depression was worse - but denies SI/HI.  Started trazodone for sleep. Helps her fall asleep. On cymbalta.     Review of Systems    Patient Active Problem List   Diagnosis    Bilateral occipital neuralgia    Breast cancer (CMS/HCC)    Cervical arthritis    Cervical radiculopathy    Chronic gout    Current mild episode of major depressive disorder (CMS/HCC)    Type 2 diabetes mellitus without complication, without long-term current use of insulin (CMS/HCC)    Gastroesophageal reflux disease without esophagitis    Primary insomnia    Primary osteoarthritis involving multiple joints    Memory loss    Cervical myofascial pain syndrome    Neuropathy    Thrombocytopenia (CMS/HCC)    Headache    Arthralgia of right wrist    Arthritis of carpometacarpal (CMC) joint of right thumb    Back pain    Cervicogenic headache    Decreased estrogen level    Dermatitis    Gastric bypass status for obesity    Knee osteoarthritis    Mouth sore    Pain in right hand    Recurrent falls    Shortness of breath on exertion    Liver cirrhosis secondary to HAZEL (nonalcoholic steatohepatitis) (CMS/HCC)    Routine adult health maintenance    BMI 33.0-33.9,adult    Encounter for screening mammogram for malignant neoplasm of breast    FH: myocardial infarction    Screening for multiple conditions    Rheumatoid arthritis, involving unspecified site, unspecified whether rheumatoid factor present (CMS/HCC)    Abnormal LFTs  (liver function tests)    Acute pain of right knee    Acute ulcer of stomach    Anemia    Leukopenia    Pancytopenia (CMS/HCC)    Anxiety    Balance problems    Bilateral cataracts    Blood clotting disorder (CMS/HCC)    Cancer (CMS/HCC)    Colon polyps    Cramp of limb    Depression, major, in remission (CMS/HCC)    Diabetes mellitus (CMS/HCC)    Dizziness    Enterocele    Essential hypertension    Fatty liver    Gait abnormality    Gallbladder disease    GI bleed    Hepatitis C    History of retinal tear    History of total knee replacement    Hx of obesity    Incomplete tear of left rotator cuff    Intractable migraine without aura and without status migrainosus    Leukoplakia of oral mucosa, including tongue    Lymph node enlargement    Mixed hearing loss    Oral aphthous ulcer    ANKUSH (obstructive sleep apnea)    Pancreatic cyst    Personal history of colonic polyps    Recurrent UTI    RLS (restless legs syndrome)    Rotator cuff syndrome of left shoulder    Sebaceous cyst    Skin fibrosis    Stomatitis, viral    Tardive dyskinesia    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, not stated as uncontrolled(250.41) (CMS/HCC)    Atrophic vaginitis    Female cystocele    Vaginal wall prolapse    Vision changes    Vision loss    Wound dehiscence    Obesity, Class III, BMI 40-49.9 (morbid obesity) (CMS/HCC)    Cervical spondylosis with radiculopathy    Chronic left shoulder pain    Chronic neck pain    Myofascial pain syndrome, cervical    Arthritis    Allergic rhinitis due to allergen    Benign neoplasm of connective and other soft tissue of unspecified upper limb, including shoulder    Rectocele    Hyperlipidemia    Parkinsonism    Portal hypertension (CMS/HCC)     Past Medical History:   Diagnosis Date    Cervicalgia 02/13/2017    Neck pain, chronic    Dysphagia, unspecified 02/23/2021    Dysphagia    Encounter for immunization 12/02/2022    Encounter for immunization    Epidermal cyst  02/04/2014    Epidermal inclusion cyst    Fatty (change of) liver, not elsewhere classified 02/01/2014    Fatty liver    Liver disease, unspecified     Liver disease, chronic    Localized swelling, mass and lump, unspecified 02/23/2021    Subcutaneous nodules    Long term (current) use of opiate analgesic 02/20/2018    Opiate analgesic use agreement exists    Long term (current) use of opiate analgesic 02/20/2018    Long term current use of opiate analgesic    Nonalcoholic steatohepatitis (HAZEL)     Steatohepatitis, nonalcoholic    Other instability, right knee 05/17/2018    Instability of right knee joint    Other specified abnormal findings of blood chemistry     Elevated LFTs    Other specified symptoms and signs involving the circulatory and respiratory systems 12/01/2015    Globus pharyngeus    Pain in right hand 03/27/2017    Pain of right hand    Pain in right knee 06/21/2018    Acute pain of right knee    Pain in throat 12/01/2015    Throat pain in adult    Pain in unspecified knee 06/28/2017    Knee pain    Pain in unspecified shoulder 03/23/2017    Shoulder pain    Personal history of diseases of the skin and subcutaneous tissue 06/20/2018    History of acne    Personal history of diseases of the skin and subcutaneous tissue 06/02/2014    History of dermatitis    Personal history of malignant neoplasm of breast     Personal history of malignant neoplasm of breast    Personal history of other diseases of the digestive system 10/13/2021    History of gastroesophageal reflux (GERD)    Personal history of other diseases of the musculoskeletal system and connective tissue     Personal history of juvenile rheumatoid arthritis    Personal history of other diseases of the nervous system and sense organs 10/13/2021    History of sleep apnea    Personal history of other diseases of the respiratory system 01/14/2014    Personal history of asthma    Personal history of other endocrine, nutritional and metabolic  disease     History of hyperlipidemia    Personal history of other infectious and parasitic diseases 11/24/2014    History of herpes zoster    Personal history of other specified conditions     History of dysuria    Personal history of peptic ulcer disease 01/14/2014    History of peptic ulcer    Personal history of pneumonia (recurrent) 01/14/2014    History of pneumonia    Personal history of traumatic brain injury 02/23/2021    History of concussion    Polyp of stomach and duodenum 01/14/2014    Gastric polyps    Polyp of stomach and duodenum 01/14/2014    Polyp of duodenum    Repeated falls 11/09/2015    Frequent falls    Secondary and unspecified malignant neoplasm of lymph node, unspecified (CMS/HCC)     Metastasis to lymph nodes    Urinary tract infection, site not specified 08/01/2019    Acute UTI    Urinary tract infection, site not specified 04/19/2020    Acute UTI     Past Surgical History:   Procedure Laterality Date    CARPAL TUNNEL RELEASE  01/13/2014    Neuroplasty Decompression Median Nerve At Carpal Tunnel    CHOLECYSTECTOMY  01/13/2014    Cholecystectomy    COLONOSCOPY  01/13/2014    Colonoscopy (Fiberoptic)    CT ANGIO NECK  7/7/2023    CT NECK ANGIO W AND WO IV CONTRAST 7/7/2023 PAR CT    CT HEAD ANGIO W AND WO IV CONTRAST  7/7/2023    CT HEAD ANGIO W AND WO IV CONTRAST 7/7/2023 PAR CT    HYSTERECTOMY  10/13/2021    Hysterectomy    LIVER BIOPSY      OTHER SURGICAL HISTORY  12/17/2013    Breast Surgery Reconstruction    OTHER SURGICAL HISTORY  12/17/2013    Modified Radical Mastectomy Left Breast    OTHER SURGICAL HISTORY  12/17/2013    Laparosc Gastric Restrictive Proc By Adjustable Gastric Band    OTHER SURGICAL HISTORY  01/13/2014    Breast Surgery Modified Radical Mastectomy    OTHER SURGICAL HISTORY  10/13/2021    Esophageal Dilation    OTHER SURGICAL HISTORY  02/25/2014    Excision Of Lesion Face Benign 2.1 To 3cm    TOTAL KNEE ARTHROPLASTY  04/22/2014    Knee Replacement     Social History      Tobacco Use    Smoking status: Former     Types: Cigarettes     Quit date:      Years since quittin.1    Smokeless tobacco: Never    Tobacco comments:     Only smoked for 2mon; 2 cigs a day   Substance Use Topics    Alcohol use: Yes     Comment: once a month 1 glass of wine     family history includes Arthritis in her father and mother; Asthma in an other family member; Coronary artery disease in her father and mother; Dementia in her maternal grandfather and maternal grandmother; Diabetes in an other family member; Glaucoma in her father; Goiter in an other family member; Heart failure in her father; Hypertension in an other family member; Prostate cancer in her brother; Psoriasis in an other family member; Stroke in her maternal grandfather, maternal grandmother, and paternal grandmother; Ulcerative colitis in an other family member.    Current Outpatient Medications:     allopurinol (Zyloprim) 100 mg tablet, Take 1 tablet (100 mg) by mouth once daily. With 300mg tab to equal 400mg a day, Disp: , Rfl:     colchicine 0.6 mg tablet, Take 1 tablet (0.6 mg) by mouth every other day., Disp: , Rfl:     dexAMETHasone 0.5 mg/5 mL oral liquid, Use as directed, Disp: , Rfl:     DULoxetine (Cymbalta) 60 mg DR capsule, Take 1 capsule (60 mg) by mouth 2 times a day. Do not crush or chew., Disp: , Rfl:     famotidine (Pepcid) 40 mg tablet, , Disp: , Rfl:     ferrous sulfate (FEOSOL ORAL), Take 1 tablet by mouth once daily in the evening. 200 (65 Fe) MG, Disp: , Rfl:     lamoTRIgine (LaMICtal) 150 mg tablet, Take 1 tablet (150 mg) by mouth once daily., Disp: , Rfl:     latanoprost (Xelpros) 0.005 % drops, emulsion, Administer into affected eye(s) once daily., Disp: , Rfl:     mometasone (Elocon) 0.1 % cream, Apply topically once daily as needed (Apply topically once daily as needed)., Disp: 15 g, Rfl: 1    multivitamin tablet, Take 1 tablet by mouth once daily., Disp: , Rfl:     naratriptan (Amerge) 2.5 mg tablet,  Take 1 tablet (2.5 mg) by mouth 1 time if needed for migraine. May repeat once in 4hrs if headache recurs, Disp: 9 tablet, Rfl: 1    nystatin (Mycostatin) 100,000 unit/gram powder, Apply topically 2 times a day. (Patient not taking: Reported on 2/12/2024), Disp: 30 g, Rfl: 2    pantoprazole (ProtoNix) 20 mg EC tablet, Take 1 tablet (20 mg) by mouth once daily. Do not crush, chew, or split., Disp: , Rfl:     pantoprazole (ProtoNix) 40 mg EC tablet, Take 1 tablet (40 mg) by mouth once daily in the morning. Take before meals. Do not crush, chew, or split., Disp: , Rfl:     semaglutide (Ozempic) 0.25 mg or 0.5 mg (2 mg/3 mL) pen injector, Inject 0.5 mg under the skin every 7 days. Lot: [id number]; Exp: 08/31/25, Disp: 3 mL, Rfl: 0    semaglutide (Ozempic) 0.25 mg or 0.5 mg(2 mg/1.5 mL) pen injector, Inject 0.5 mg under the skin 1 (one) time per week., Disp: 1 each, Rfl: 3    traZODone (Desyrel) 50 mg tablet, Take 1 tablet (50 mg) by mouth once daily at bedtime., Disp: , Rfl:     valbenazine (Ingrezza) 60 mg capsule, Take 60 mg by mouth once daily., Disp: 90 capsule, Rfl: 3  Allergies   Allergen Reactions    Adhesive Tape-Silicones Unknown    Avelox [Moxifloxacin] Unknown    Bactrim [Sulfamethoxazole-Trimethoprim] Unknown    Cefepime Hives    Cephalexin Unknown    Codeine Unknown    Desyrel [Trazodone] Unknown    Erythromycin Unknown    Hydroxychloroquine Unknown     pruritis    Ibuprofen Unknown    Nsaids (Non-Steroidal Anti-Inflammatory Drug) Unknown    Other Unknown     Other: placquenil itching, Vomiting, hives  Other: desryl and sympathomimetic agents     Penicillins Unknown    Percocet [Oxycodone-Acetaminophen] Unknown    Percodan [Oxycodone-Aspirin] Unknown    Phenergan [Promethazine] Unknown    Topiramate Unknown    Toradol [Ketorolac] Unknown    Tramadol Unknown    Trintellix [Vortioxetine] Unknown    [id number] Antimigraine Agents Unknown     racing heart    Adhesive Rash    Macrolide Antibiotics Hives, Rash  and Unknown     Replaced free text allergy     Exam:  AIMS score 0   Constitutional: General appearance: no acute distress   Auscultation of Heart: Regular rate and rhythm, no murmurs, normal S1 and S2.   Carotid Arteries: Intact without any bruits   Peripheral Vascular Exam: Pulses +2 and equal in all extremities. No swelling, varicosities, edema or tenderness to palpations   Mental status: The patient was in no distress, alert, interactive and cooperative. Affect is appropriate.   Orientation: oriented to person,~oriented to place~and~oriented to time.   Memory: recent memory intact~and~remote memory intact.   Attention: normal attention span~and~normal concentrating ability.   Language: normal comprehension~and~no difficulty naming common objects.   Fund of knowledge: Patient displays adequate knowledge of current events,~adequate fund of knowledge regarding past history~and~adequate fund of knowledge regarding vocabulary.   Eyes: The ophthalmoscopic examination was normal. The fundi are visualized with normal disc margins and without hemorrhages   Cranial nerve II: Visual fields full to confrontation.~LHH+ ( new)1 .   Cranial nerves III, IV, and VI: Pupils round, equally reactive to light; no ptosis. EOMs intact. No nystagmus.   Cranial Nerve V: Facial sensation intact bilaterally.   Cranial nerve VII: Normal and symmetric facial strength.   Cranial nerve VIII: Hearing is intact bilaterally to finger rub / whisper.   Cranial nerves IX and X: Palate elevates symmetrically.   Cranial nerve XI: Shoulder shrug and neck rotation strength are intact.   Cranial nerve XII: Tongue midline with normal strength.   Motor: Motor exam was normal.~Muscle bulk was normal in both upper and lower extremities.~Muscle tone was normal in both upper and lower extremities.~Muscle strength was 5/5 throughout. No dyskinesia movements. Tremor in tongue and jaw present, at rest.  Deep Tendon Reflexes: left biceps 2+ ,~right biceps  2+,~left triceps 2+,~right triceps 2+,~left brachioradialis 2+,~right brachioradialis 2+,~left patella 2+,~right patella 2+,~left ankle jerk 2+,~right ankle jerk 2+   Plantar Reflex: Toes downgoing to plantar stimulation on the left.~Toes downgoing to plantar stimulation on the right.   Sensory Exam: Normal to light touch.   Coordination: There is no limb dystaxia and rapid alternating movements are intact.   Gait:  cautious.  Unsteady.            Labs:  CBC:   Lab Results   Component Value Date    WBC 4.8 07/07/2023    HGB 11.3 (L) 07/07/2023    HCT 36.1 07/07/2023    PLT 94 (L) 07/07/2023     BMP:   Lab Results   Component Value Date     07/07/2023    K 4.3 07/07/2023     07/07/2023    CO2 26 07/07/2023    BUN 21 07/07/2023    CREATININE 1.30 (H) 07/07/2023    CALCIUM 8.7 07/07/2023     LFT:   Lab Results   Component Value Date    ALKPHOS 138 (H) 07/07/2023    BILITOT 0.7 07/07/2023    PROT 6.6 07/07/2023    ALBUMIN 3.5 07/07/2023    ALT 20 07/07/2023    AST 37 07/07/2023       Assessment/Plan   Problem List Items Addressed This Visit    None    Two week trial OFF ingrezza. ? Ingrezza causing a tremor.     If dyskinesia returns you should restart ingrezza 40mg daily.  If tremor is still bothering you we may try add propranolol. (Next step)  Re examine her ? Parkinsonism. Consider trial sinemet if propranolol is not effective.

## 2024-02-20 NOTE — PROGRESS NOTES
NEUROPSYCHOLOGICAL DATA SUMMARY    Name: Mariann Drew   : 1955   MRN: [id number]     Date of Service: 2024     Age: 68 y.o.   Race: White  Education: 13  Preferred Hand: RH  Examiner: Autumn Hinds PsyD  Technician: Emilee Jones MA    Descriptors:     T-Score Standard Score Z-Score Scaled Score %ile Rank   Exceptionally High > 70 > 130 > 2.0 > 16 > 98   Above Average 64-69 120-129 1.4-1.9 15 91-97   High Average 57-63 110-119 0.7-1.3 12-14 75-90   Average 43-56  0.6 to -0.6 8-11 25-74   Low Average 37-42 80-89 -1.3 to -0.7 6-7 9-24   Below Average 30-36 70-79 -2.0 to -1.4 4-5 2-8   Exceptionally Low < 30 < 70 < -2.0 < 4 < 2       Premorbid   Raw Std Sc %ile   TOPF  30 90 25          WAIS IV  Raw Std Sc %ile   Work Mem    -   DS Total   23 95 37   DS Forward  8 90 25   DS Backward 6 90 25   DS Sequencing  9 105 63   Percept    -   Matrix  12 95 37   Pros Speed    -   Coding  66 110 75          RBANS:   Raw Std Sc %ile   Line Orientation  15 91.75 29          Stroop  Raw Std Sc %ile   Word   81 80.5 10   Color  62 88 21   Color-Word  35 101.5 54   Interference  0 100 50          TMT:   Raw Std Sc %ile   Part A: Errors = 0 31 101.5 54   Part B: Errors= 0 80 98.5 46          Grooved Pegs Raw Std Sc %ile   Dom: Drops=0 136 68.5 2   Non Dom: Drops=0 163 70 2          Naming  Raw Std Sc %ile   [place] Naming  54 95.5 38          COWAT  Raw Std Sc %ile   Phonemic  32 86.5 18   Semantic   17 94 34          WMS-IV        WMS Subtest Scores Raw Std Sc %ile   LM I   24 100 50   LM II   15 90 25   LM Rec  A= 7 B= 13 10-16   LM Contrast - 85 16          ECFT  Raw Std Sc %ile   Copy   29 90 25          HVLT-R: Form= 1 Raw Std Sc %ile   Trial 1   3 76 5   Trial 2   6 82 12   Trial 3   8 655 100   Total Recall  17 80.5 10   Delayed Recall 8 97 42   % Retained   100 109 73   Total Hits   10 - -   Total False Positives  3 - -   Discrimination Index  7 71.5 3          BVMT-R: Form= 1 Raw Std Sc %ile   Trial 1   4  92.5 31   Trial 2   8 103 58   Trial 3   9 103 58   Total Recall  21 100 50   Learning   5 110.5 76   Delayed Recall  10 113.5 82   % Retained   111 - >16   Total Hits   6 - >16   Total False Positives  0 - >16   Discrimination Index  6 - >16          Clock   WNL            M-WCST  Raw Std Sc %ile   # Cat  1 53.5 0   # Persev Errors 16 68.5 2   # Total Errors 38 56.5 0   % Persev Errors 42 86.5 18          Mood Measures  Raw T Score  %   GDS  16 - -   SNOW-7  11 - -              Test Normative References:  Test Manual  JOSE MARIA Arizmendi, JOSE MARIA Arizmendi, & Psychological [employer] Inc[employer] (2004). Revised comprehensive norms for an expanded [place] battery: Demographically adjusted neuropsychological norms for  and  adults, professional manual. [facility]: Psychological Assessment Resources  JOBY Lynn., MIESHA Reyna., MIESHA Block., JULIÁN Cameron, MELO Spann, & Gay, KARLA O. (1983). Development and validation of a geriatric depression screening scale: a preliminary report. Journal of Psychiatric Research, 17(1), 37-49.  BATOOL Smiley (1978). Stroop Color and Word Test: A manual for clinical and experimental uses. [place]: [employer].

## 2024-02-20 NOTE — PATIENT INSTRUCTIONS
"It was a pleasure seeing you today.     Two week trial OFF ingrezza.  If dyskinesia returns you should restart ingrezza 40mg daily.  If tremor is still bothering you we may try add propranolol. (Next step)    Please make a phone call visit in 2-3 weeks and regular visit in 3-4 months.      For any urgent issues or needing to speak to a medical assistant please call [phone], option 6 during our office hours Monday-Friday 8am-4pm, and leave a voicemail with your concern.  My office will try to reach back you as soon as possible within 24 (business) hours.  If you have an emergency please call 911 or visit a local urgent care or nearest emergency room.      Please understand that [employer] is a useful communication tool for simple \"normal\" results or a refill request but I would not recommend using this tool for emergent or urgent issues or for conversations with me.  I am happy to ask my staff to rearrange a follow up visit or a virtual visit sooner than requested if appropriate for your care.    "

## 2024-03-13 DIAGNOSIS — R21 RASH: ICD-10-CM

## 2024-03-13 DIAGNOSIS — E11.9 TYPE 2 DIABETES MELLITUS WITHOUT COMPLICATION, WITHOUT LONG-TERM CURRENT USE OF INSULIN (MULTI): ICD-10-CM

## 2024-03-13 RX ORDER — SEMAGLUTIDE 1.34 MG/ML
0.5 INJECTION, SOLUTION SUBCUTANEOUS
Qty: 1 EACH | Refills: 3 | Status: SHIPPED | OUTPATIENT
Start: 2024-03-13

## 2024-03-13 RX ORDER — MOMETASONE FUROATE 1 MG/G
CREAM TOPICAL DAILY PRN
Qty: 15 G | Refills: 1 | Status: SHIPPED | OUTPATIENT
Start: 2024-03-13

## 2024-03-14 ENCOUNTER — APPOINTMENT (OUTPATIENT)
Dept: DERMATOLOGY | Facility: CLINIC | Age: 69
End: 2024-03-14
Payer: MEDICARE

## 2024-03-18 ENCOUNTER — OFFICE (OUTPATIENT)
Dept: URBAN - METROPOLITAN AREA CLINIC 26 | Facility: CLINIC | Age: 69
End: 2024-03-18
Payer: COMMERCIAL

## 2024-03-18 VITALS
HEIGHT: 64 IN | SYSTOLIC BLOOD PRESSURE: 103 MMHG | HEART RATE: 91 BPM | WEIGHT: 160 LBS | DIASTOLIC BLOOD PRESSURE: 67 MMHG

## 2024-03-18 DIAGNOSIS — K75.81 NONALCOHOLIC STEATOHEPATITIS (NASH): ICD-10-CM

## 2024-03-18 DIAGNOSIS — J02.9 ACUTE PHARYNGITIS, UNSPECIFIED: ICD-10-CM

## 2024-03-18 DIAGNOSIS — R05.3 CHRONIC COUGH: ICD-10-CM

## 2024-03-18 DIAGNOSIS — R13.10 DYSPHAGIA, UNSPECIFIED: ICD-10-CM

## 2024-03-18 DIAGNOSIS — K86.2 CYST OF PANCREAS: ICD-10-CM

## 2024-03-18 PROCEDURE — 99214 OFFICE O/P EST MOD 30 MIN: CPT | Performed by: INTERNAL MEDICINE

## 2024-03-19 VITALS
SYSTOLIC BLOOD PRESSURE: 114 MMHG | DIASTOLIC BLOOD PRESSURE: 87 MMHG | DIASTOLIC BLOOD PRESSURE: 73 MMHG | OXYGEN SATURATION: 97 % | DIASTOLIC BLOOD PRESSURE: 73 MMHG | RESPIRATION RATE: 12 BRPM | DIASTOLIC BLOOD PRESSURE: 87 MMHG | OXYGEN SATURATION: 100 % | HEART RATE: 80 BPM | RESPIRATION RATE: 14 BRPM | WEIGHT: 160 LBS | HEART RATE: 84 BPM | HEART RATE: 75 BPM | HEIGHT: 64 IN | RESPIRATION RATE: 14 BRPM | RESPIRATION RATE: 17 BRPM | SYSTOLIC BLOOD PRESSURE: 114 MMHG | DIASTOLIC BLOOD PRESSURE: 79 MMHG | HEART RATE: 95 BPM | HEART RATE: 70 BPM | HEART RATE: 75 BPM | RESPIRATION RATE: 13 BRPM | SYSTOLIC BLOOD PRESSURE: 124 MMHG | HEART RATE: 78 BPM | HEART RATE: 84 BPM | SYSTOLIC BLOOD PRESSURE: 122 MMHG | HEART RATE: 80 BPM | HEART RATE: 82 BPM | HEIGHT: 64 IN | WEIGHT: 160 LBS | SYSTOLIC BLOOD PRESSURE: 132 MMHG | RESPIRATION RATE: 10 BRPM | DIASTOLIC BLOOD PRESSURE: 87 MMHG | HEIGHT: 64 IN | OXYGEN SATURATION: 98 % | HEART RATE: 82 BPM | HEART RATE: 82 BPM | DIASTOLIC BLOOD PRESSURE: 77 MMHG | SYSTOLIC BLOOD PRESSURE: 145 MMHG | RESPIRATION RATE: 13 BRPM | DIASTOLIC BLOOD PRESSURE: 72 MMHG | SYSTOLIC BLOOD PRESSURE: 132 MMHG | RESPIRATION RATE: 13 BRPM | RESPIRATION RATE: 17 BRPM | RESPIRATION RATE: 12 BRPM | TEMPERATURE: 97.7 F | HEART RATE: 95 BPM | SYSTOLIC BLOOD PRESSURE: 124 MMHG | HEART RATE: 79 BPM | DIASTOLIC BLOOD PRESSURE: 73 MMHG | DIASTOLIC BLOOD PRESSURE: 71 MMHG | DIASTOLIC BLOOD PRESSURE: 92 MMHG | HEART RATE: 70 BPM | SYSTOLIC BLOOD PRESSURE: 145 MMHG | DIASTOLIC BLOOD PRESSURE: 60 MMHG | RESPIRATION RATE: 10 BRPM | TEMPERATURE: 97.7 F | SYSTOLIC BLOOD PRESSURE: 124 MMHG | OXYGEN SATURATION: 99 % | TEMPERATURE: 97.7 F | OXYGEN SATURATION: 100 % | SYSTOLIC BLOOD PRESSURE: 122 MMHG | DIASTOLIC BLOOD PRESSURE: 72 MMHG | OXYGEN SATURATION: 97 % | WEIGHT: 160 LBS | SYSTOLIC BLOOD PRESSURE: 132 MMHG | HEART RATE: 78 BPM | DIASTOLIC BLOOD PRESSURE: 92 MMHG | DIASTOLIC BLOOD PRESSURE: 60 MMHG | OXYGEN SATURATION: 98 % | SYSTOLIC BLOOD PRESSURE: 120 MMHG | SYSTOLIC BLOOD PRESSURE: 122 MMHG | DIASTOLIC BLOOD PRESSURE: 77 MMHG | OXYGEN SATURATION: 98 % | RESPIRATION RATE: 14 BRPM | HEART RATE: 79 BPM | DIASTOLIC BLOOD PRESSURE: 72 MMHG | SYSTOLIC BLOOD PRESSURE: 114 MMHG | DIASTOLIC BLOOD PRESSURE: 85 MMHG | DIASTOLIC BLOOD PRESSURE: 71 MMHG | OXYGEN SATURATION: 99 % | RESPIRATION RATE: 10 BRPM | RESPIRATION RATE: 12 BRPM | DIASTOLIC BLOOD PRESSURE: 79 MMHG | HEART RATE: 84 BPM | HEART RATE: 79 BPM | HEART RATE: 80 BPM | OXYGEN SATURATION: 100 % | HEART RATE: 75 BPM | DIASTOLIC BLOOD PRESSURE: 92 MMHG | RESPIRATION RATE: 17 BRPM | DIASTOLIC BLOOD PRESSURE: 85 MMHG | HEART RATE: 78 BPM | SYSTOLIC BLOOD PRESSURE: 120 MMHG | HEART RATE: 70 BPM | DIASTOLIC BLOOD PRESSURE: 77 MMHG | OXYGEN SATURATION: 97 % | DIASTOLIC BLOOD PRESSURE: 60 MMHG | HEART RATE: 95 BPM | OXYGEN SATURATION: 99 % | DIASTOLIC BLOOD PRESSURE: 71 MMHG | SYSTOLIC BLOOD PRESSURE: 120 MMHG | SYSTOLIC BLOOD PRESSURE: 145 MMHG | DIASTOLIC BLOOD PRESSURE: 79 MMHG | DIASTOLIC BLOOD PRESSURE: 85 MMHG

## 2024-03-22 ENCOUNTER — TELEMEDICINE (OUTPATIENT)
Dept: NEUROLOGY | Facility: CLINIC | Age: 69
End: 2024-03-22
Payer: MEDICARE

## 2024-03-22 ENCOUNTER — SPECIALTY PHARMACY (OUTPATIENT)
Dept: PHARMACY | Facility: CLINIC | Age: 69
End: 2024-03-22

## 2024-03-22 DIAGNOSIS — G21.19 OTHER DRUG-INDUCED SECONDARY PARKINSONISM (MULTI): Primary | ICD-10-CM

## 2024-03-22 DIAGNOSIS — G24.01 TARDIVE DYSKINESIA: ICD-10-CM

## 2024-03-22 PROCEDURE — 1160F RVW MEDS BY RX/DR IN RCRD: CPT | Performed by: PSYCHIATRY & NEUROLOGY

## 2024-03-22 PROCEDURE — 1159F MED LIST DOCD IN RCRD: CPT | Performed by: PSYCHIATRY & NEUROLOGY

## 2024-03-22 PROCEDURE — 1036F TOBACCO NON-USER: CPT | Performed by: PSYCHIATRY & NEUROLOGY

## 2024-03-22 PROCEDURE — 99213 OFFICE O/P EST LOW 20 MIN: CPT | Performed by: PSYCHIATRY & NEUROLOGY

## 2024-03-22 PROCEDURE — 3008F BODY MASS INDEX DOCD: CPT | Performed by: PSYCHIATRY & NEUROLOGY

## 2024-03-22 RX ORDER — DEUTETRABENAZINE 6-12-24 MG
6 KIT ORAL DAILY
Qty: 42 EACH | Refills: 3 | Status: SHIPPED | OUTPATIENT
Start: 2024-03-22 | End: 2024-04-29 | Stop reason: WASHOUT

## 2024-03-22 NOTE — PATIENT INSTRUCTIONS
"It was a pleasure seeing you today.     Please make a follow up appointment in 2 months.     For any urgent issues or needing to speak to a medical assistant please call [phone], option 6 during our office hours Monday-Friday 8am-4pm, and leave a voicemail with your concern.  My office will try to reach back you as soon as possible within 24 (business) hours.  If you have an emergency please call 911 or visit a local urgent care or nearest emergency room.      Please understand that [employer] is a useful communication tool for simple \"normal\" results or a refill request but I would not recommend using this tool for emergent or urgent issues or for conversations with me.  I am happy to ask my staff to rearrange a follow up visit or a virtual visit sooner than requested if appropriate for your care.    "

## 2024-03-22 NOTE — PROGRESS NOTES
Subjective     Mariann Drew is a 68 y.o. year old female here for TD/ tremor follow up.     HPI  Ingrezza 40mg caused imbalance / leg shaking issues but helped the facial movement.   For one month she has been off this - her balance is better.  She feels her leg shaking is a lot better.  Tongue is moving a lot now, which is how this started.   MRI brain July was normal.   Depression is great, sleep is better.      Review of Systems    Patient Active Problem List   Diagnosis    Bilateral occipital neuralgia    Breast cancer (CMS/HCC)    Cervical arthritis    Cervical radiculopathy    Chronic gout    Current mild episode of major depressive disorder (CMS/HCC)    Type 2 diabetes mellitus without complication, without long-term current use of insulin (CMS/HCC)    Gastroesophageal reflux disease without esophagitis    Primary insomnia    Primary osteoarthritis involving multiple joints    Memory loss    Cervical myofascial pain syndrome    Neuropathy    Thrombocytopenia (CMS/HCC)    Headache    Arthralgia of right wrist    Arthritis of carpometacarpal (CMC) joint of right thumb    Back pain    Cervicogenic headache    Decreased estrogen level    Dermatitis    Gastric bypass status for obesity    Knee osteoarthritis    Mouth sore    Pain in right hand    Recurrent falls    Shortness of breath on exertion    Liver cirrhosis secondary to HAZEL (nonalcoholic steatohepatitis) (CMS/HCC)    Routine adult health maintenance    BMI 33.0-33.9,adult    Encounter for screening mammogram for malignant neoplasm of breast    FH: myocardial infarction    Screening for multiple conditions    Rheumatoid arthritis, involving unspecified site, unspecified whether rheumatoid factor present (CMS/HCC)    Abnormal LFTs (liver function tests)    Acute pain of right knee    Acute ulcer of stomach    Anemia    Leukopenia    Pancytopenia (CMS/HCC)    Anxiety    Balance problems    Bilateral cataracts    Blood clotting disorder (CMS/HCC)    Cancer  (CMS/HCC)    Colon polyps    Cramp of limb    Depression, major, in remission (CMS/HCC)    Diabetes mellitus (CMS/HCC)    Dizziness    Enterocele    Essential hypertension    Fatty liver    Gait abnormality    Gallbladder disease    GI bleed    Hepatitis C    History of retinal tear    History of total knee replacement    Hx of obesity    Incomplete tear of left rotator cuff    Intractable migraine without aura and without status migrainosus    Leukoplakia of oral mucosa, including tongue    Lymph node enlargement    Mixed hearing loss    Oral aphthous ulcer    ANKUSH (obstructive sleep apnea)    Pancreatic cyst    Personal history of colonic polyps    Recurrent UTI    RLS (restless legs syndrome)    Rotator cuff syndrome of left shoulder    Sebaceous cyst    Skin fibrosis    Stomatitis, viral    Tardive dyskinesia    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, not stated as uncontrolled(250.41) (CMS/HCC)    Atrophic vaginitis    Female cystocele    Vaginal wall prolapse    Vision changes    Vision loss    Wound dehiscence    Obesity, Class III, BMI 40-49.9 (morbid obesity) (CMS/HCC)    Cervical spondylosis with radiculopathy    Chronic left shoulder pain    Chronic neck pain    Myofascial pain syndrome, cervical    Arthritis    Allergic rhinitis due to allergen    Benign neoplasm of connective and other soft tissue of unspecified upper limb, including shoulder    Rectocele    Hyperlipidemia    Parkinsonism    Portal hypertension (CMS/HCC)     Past Medical History:   Diagnosis Date    Cervicalgia 02/13/2017    Neck pain, chronic    Dysphagia, unspecified 02/23/2021    Dysphagia    Encounter for immunization 12/02/2022    Encounter for immunization    Epidermal cyst 02/04/2014    Epidermal inclusion cyst    Fatty (change of) liver, not elsewhere classified 02/01/2014    Fatty liver    Liver disease, unspecified     Liver disease, chronic    Localized swelling, mass and lump, unspecified  02/23/2021    Subcutaneous nodules    Long term (current) use of opiate analgesic 02/20/2018    Opiate analgesic use agreement exists    Long term (current) use of opiate analgesic 02/20/2018    Long term current use of opiate analgesic    Nonalcoholic steatohepatitis (HAZEL)     Steatohepatitis, nonalcoholic    Other instability, right knee 05/17/2018    Instability of right knee joint    Other specified abnormal findings of blood chemistry     Elevated LFTs    Other specified symptoms and signs involving the circulatory and respiratory systems 12/01/2015    Globus pharyngeus    Pain in right hand 03/27/2017    Pain of right hand    Pain in right knee 06/21/2018    Acute pain of right knee    Pain in throat 12/01/2015    Throat pain in adult    Pain in unspecified knee 06/28/2017    Knee pain    Pain in unspecified shoulder 03/23/2017    Shoulder pain    Personal history of diseases of the skin and subcutaneous tissue 06/20/2018    History of acne    Personal history of diseases of the skin and subcutaneous tissue 06/02/2014    History of dermatitis    Personal history of malignant neoplasm of breast     Personal history of malignant neoplasm of breast    Personal history of other diseases of the digestive system 10/13/2021    History of gastroesophageal reflux (GERD)    Personal history of other diseases of the musculoskeletal system and connective tissue     Personal history of juvenile rheumatoid arthritis    Personal history of other diseases of the nervous system and sense organs 10/13/2021    History of sleep apnea    Personal history of other diseases of the respiratory system 01/14/2014    Personal history of asthma    Personal history of other endocrine, nutritional and metabolic disease     History of hyperlipidemia    Personal history of other infectious and parasitic diseases 11/24/2014    History of herpes zoster    Personal history of other specified conditions     History of dysuria    Personal  history of peptic ulcer disease 2014    History of peptic ulcer    Personal history of pneumonia (recurrent) 2014    History of pneumonia    Personal history of traumatic brain injury 2021    History of concussion    Polyp of stomach and duodenum 2014    Gastric polyps    Polyp of stomach and duodenum 2014    Polyp of duodenum    Repeated falls 2015    Frequent falls    Secondary and unspecified malignant neoplasm of lymph node, unspecified (CMS/HCC)     Metastasis to lymph nodes    Urinary tract infection, site not specified 2019    Acute UTI    Urinary tract infection, site not specified 2020    Acute UTI     Past Surgical History:   Procedure Laterality Date    CARPAL TUNNEL RELEASE  2014    Neuroplasty Decompression Median Nerve At Carpal Tunnel    CHOLECYSTECTOMY  2014    Cholecystectomy    COLONOSCOPY  2014    Colonoscopy (Fiberoptic)    CT ANGIO NECK  2023    CT NECK ANGIO W AND WO IV CONTRAST 2023 PAR CT    CT HEAD ANGIO W AND WO IV CONTRAST  2023    CT HEAD ANGIO W AND WO IV CONTRAST 2023 PAR CT    HYSTERECTOMY  10/13/2021    Hysterectomy    LIVER BIOPSY      OTHER SURGICAL HISTORY  2013    Breast Surgery Reconstruction    OTHER SURGICAL HISTORY  2013    Modified Radical Mastectomy Left Breast    OTHER SURGICAL HISTORY  2013    Laparosc Gastric Restrictive Proc By Adjustable Gastric Band    OTHER SURGICAL HISTORY  2014    Breast Surgery Modified Radical Mastectomy    OTHER SURGICAL HISTORY  10/13/2021    Esophageal Dilation    OTHER SURGICAL HISTORY  2014    Excision Of Lesion Face Benign 2.1 To 3cm    TOTAL KNEE ARTHROPLASTY  2014    Knee Replacement     Social History     Tobacco Use    Smoking status: Former     Types: Cigarettes     Quit date: 1973     Years since quittin.2    Smokeless tobacco: Never    Tobacco comments:     Only smoked for 2mon; 2 cigs a day   Substance Use Topics     Alcohol use: Yes     Comment: once a month 1 glass of wine     family history includes Arthritis in her father and mother; Asthma in an other family member; Coronary artery disease in her father and mother; Dementia in her maternal grandfather and maternal grandmother; Diabetes in an other family member; Glaucoma in her father; Goiter in an other family member; Heart failure in her father; Hypertension in an other family member; Prostate cancer in her brother; Psoriasis in an other family member; Stroke in her maternal grandfather, maternal grandmother, and paternal grandmother; Ulcerative colitis in an other family member.    Current Outpatient Medications:     allopurinol (Zyloprim) 100 mg tablet, Take 1 tablet (100 mg) by mouth once daily., Disp: , Rfl:     colchicine 0.6 mg tablet, Take 1 tablet (0.6 mg) by mouth every other day., Disp: , Rfl:     dexAMETHasone 0.5 mg/5 mL oral liquid, Use as directed, Disp: , Rfl:     DULoxetine (Cymbalta) 60 mg DR capsule, Take 1 capsule (60 mg) by mouth 2 times a day. Do not crush or chew., Disp: , Rfl:     famotidine (Pepcid) 40 mg tablet, , Disp: , Rfl:     ferrous sulfate (FEOSOL ORAL), Take 1 tablet by mouth once daily in the evening. 200 (65 Fe) MG, Disp: , Rfl:     lamoTRIgine (LaMICtal) 150 mg tablet, Take 1 tablet (150 mg) by mouth once daily., Disp: , Rfl:     latanoprost (Xelpros) 0.005 % drops, emulsion, Administer into affected eye(s) once daily., Disp: , Rfl:     mometasone (Elocon) 0.1 % cream, Apply topically once daily as needed (Apply topically once daily as needed)., Disp: 15 g, Rfl: 1    multivitamin tablet, Take 1 tablet by mouth once daily., Disp: , Rfl:     naratriptan (Amerge) 2.5 mg tablet, Take 1 tablet (2.5 mg) by mouth 1 time if needed for migraine. May repeat once in 4hrs if headache recurs, Disp: 9 tablet, Rfl: 1    nystatin (Mycostatin) 100,000 unit/gram powder, Apply topically 2 times a day., Disp: 30 g, Rfl: 2    pantoprazole (ProtoNix) 40  mg EC tablet, Take 1 tablet (40 mg) by mouth once daily in the morning. Take before meals. Do not crush, chew, or split., Disp: , Rfl:     semaglutide (Ozempic) 0.25 mg or 0.5 mg (2 mg/3 mL) pen injector, Inject 0.5 mg under the skin every 7 days. Lot: [id number]; Exp: 08/31/25, Disp: 3 mL, Rfl: 0    semaglutide (Ozempic) 0.25 mg or 0.5 mg(2 mg/1.5 mL) pen injector, Inject 0.5 mg under the skin 1 (one) time per week., Disp: 1 each, Rfl: 3    traZODone (Desyrel) 100 mg tablet, Take 1 tablet (100 mg) by mouth once daily at bedtime., Disp: , Rfl:     deutetrabenazine (Austedo XR Titration Kt,Wk1-4,) 6 mg (14)-12 mg (14)-24 mg (14) tablet, Ext Rel 24hr dose pack, Take 6 mg by mouth once daily. Week 1 12mg daily , week 2 18mg daily , week 3 24mg daily , week 4 and continue is 30mg daily, Disp: 30 each, Rfl: 3    valbenazine (Ingrezza) 60 mg capsule, Take 60 mg by mouth once daily. (Patient not taking: Reported on 3/22/2024), Disp: 90 capsule, Rfl: 3  Allergies   Allergen Reactions    Adhesive Tape-Silicones Unknown    Avelox [Moxifloxacin] Unknown    Bactrim [Sulfamethoxazole-Trimethoprim] Unknown    Cefepime Hives    Cephalexin Unknown    Codeine Unknown    Desyrel [Trazodone] Unknown    Erythromycin Unknown    Hydroxychloroquine Unknown     pruritis    Ibuprofen Unknown    Nsaids (Non-Steroidal Anti-Inflammatory Drug) Unknown    Other Unknown     Other: placquenil itching, Vomiting, hives  Other: desryl and sympathomimetic agents     Penicillins Unknown    Percocet [Oxycodone-Acetaminophen] Unknown    Percodan [Oxycodone-Aspirin] Unknown    Phenergan [Promethazine] Unknown    Topiramate Unknown    Toradol [Ketorolac] Unknown    Tramadol Unknown    Trintellix [Vortioxetine] Unknown    [id number] Antimigraine Agents Unknown     racing heart    Adhesive Rash    Macrolide Antibiotics Hives, Rash and Unknown     Replaced free text allergy     Exam:  AIMS score 3 ( facial movements)   alert, oriented. face symmetric. speech  is clear, fluent.  appropriate, conversational.   moves all extremities above gravity.   Facial movements when talking, tongue writhing and jaw dyskinesia. No hand tremors.  Facial movements worse when talking , better at rest.       Labs:  CBC:   Lab Results   Component Value Date    WBC 4.8 07/07/2023    HGB 11.3 (L) 07/07/2023    HCT 36.1 07/07/2023    PLT 94 (L) 07/07/2023     BMP:   Lab Results   Component Value Date     07/07/2023    K 4.3 07/07/2023     07/07/2023    CO2 26 07/07/2023    BUN 21 07/07/2023    CREATININE 1.30 (H) 07/07/2023    CALCIUM 8.7 07/07/2023     LFT:   Lab Results   Component Value Date    ALKPHOS 138 (H) 07/07/2023    BILITOT 0.7 07/07/2023    PROT 6.6 07/07/2023    ALBUMIN 3.5 07/07/2023    ALT 20 07/07/2023    AST 37 07/07/2023       Assessment/Plan   Problem List Items Addressed This Visit             ICD-10-CM    Tardive dyskinesia G24.01    Relevant Medications    deutetrabenazine (Austedo XR Titration Kt,Wk1-4,) 6 mg (14)-12 mg (14)-24 mg (14) tablet, Ext Rel 24hr dose pack    Parkinsonism - Primary G20.C     TD facial movements - start Austedo titration up to 30mg XR daily. She failed ingrezza with adverse effects.      Total time with patient 16 min

## 2024-03-26 ENCOUNTER — AMBULATORY SURGICAL CENTER (OUTPATIENT)
Dept: URBAN - METROPOLITAN AREA SURGERY 12 | Facility: SURGERY | Age: 69
End: 2024-03-26
Payer: COMMERCIAL

## 2024-03-26 ENCOUNTER — AMBULATORY SURGICAL CENTER (OUTPATIENT)
Dept: URBAN - METROPOLITAN AREA SURGERY 12 | Facility: SURGERY | Age: 69
End: 2024-03-26

## 2024-03-26 ENCOUNTER — SPECIALTY PHARMACY (OUTPATIENT)
Dept: PHARMACY | Facility: CLINIC | Age: 69
End: 2024-03-26

## 2024-03-26 ENCOUNTER — OFFICE (OUTPATIENT)
Dept: URBAN - METROPOLITAN AREA PATHOLOGY 2 | Facility: PATHOLOGY | Age: 69
End: 2024-03-26
Payer: COMMERCIAL

## 2024-03-26 DIAGNOSIS — K31.89 OTHER DISEASES OF STOMACH AND DUODENUM: ICD-10-CM

## 2024-03-26 DIAGNOSIS — R13.10 DYSPHAGIA, UNSPECIFIED: ICD-10-CM

## 2024-03-26 DIAGNOSIS — K22.2 ESOPHAGEAL OBSTRUCTION: ICD-10-CM

## 2024-03-26 DIAGNOSIS — K21.00 GASTRO-ESOPHAGEAL REFLUX DISEASE WITH ESOPHAGITIS, WITHOUT B: ICD-10-CM

## 2024-03-26 DIAGNOSIS — J02.9 ACUTE PHARYNGITIS, UNSPECIFIED: ICD-10-CM

## 2024-03-26 DIAGNOSIS — K22.89 OTHER SPECIFIED DISEASE OF ESOPHAGUS: ICD-10-CM

## 2024-03-26 DIAGNOSIS — R05.3 CHRONIC COUGH: ICD-10-CM

## 2024-03-26 PROBLEM — Z48.815 ENCOUNTER FOR SURGICAL AFTERCARE FOLLOWING SURGERY ON THE DI: Status: ACTIVE | Noted: 2024-03-26

## 2024-03-26 PROCEDURE — 43450 DILATE ESOPHAGUS 1/MULT PASS: CPT | Performed by: INTERNAL MEDICINE

## 2024-03-26 PROCEDURE — 88305 TISSUE EXAM BY PATHOLOGIST: CPT | Performed by: PATHOLOGY

## 2024-03-26 PROCEDURE — 88313 SPECIAL STAINS GROUP 2: CPT | Performed by: PATHOLOGY

## 2024-03-26 PROCEDURE — 43239 EGD BIOPSY SINGLE/MULTIPLE: CPT | Performed by: INTERNAL MEDICINE

## 2024-03-26 RX ORDER — SUCRALFATE 1 G/1
1 TABLET ORAL
Qty: 90 | Refills: 1 | Status: ACTIVE
Start: 2024-03-26

## 2024-03-29 PROCEDURE — RXMED WILLOW AMBULATORY MEDICATION CHARGE

## 2024-04-03 ENCOUNTER — OFFICE VISIT (OUTPATIENT)
Dept: PRIMARY CARE | Facility: CLINIC | Age: 69
End: 2024-04-03
Payer: MEDICARE

## 2024-04-03 VITALS
DIASTOLIC BLOOD PRESSURE: 70 MMHG | WEIGHT: 162 LBS | OXYGEN SATURATION: 98 % | HEIGHT: 64 IN | HEART RATE: 101 BPM | BODY MASS INDEX: 27.66 KG/M2 | SYSTOLIC BLOOD PRESSURE: 102 MMHG

## 2024-04-03 DIAGNOSIS — L30.4 INTERTRIGO: ICD-10-CM

## 2024-04-03 DIAGNOSIS — K12.0 APHTHOUS ULCER: Primary | ICD-10-CM

## 2024-04-03 DIAGNOSIS — M06.9 RHEUMATOID ARTHRITIS, INVOLVING UNSPECIFIED SITE, UNSPECIFIED WHETHER RHEUMATOID FACTOR PRESENT (MULTI): ICD-10-CM

## 2024-04-03 DIAGNOSIS — F31.89 OTHER BIPOLAR DISORDER (MULTI): ICD-10-CM

## 2024-04-03 DIAGNOSIS — K76.6 PORTAL HYPERTENSION (MULTI): ICD-10-CM

## 2024-04-03 DIAGNOSIS — E11.9 TYPE 2 DIABETES MELLITUS WITHOUT COMPLICATION, WITHOUT LONG-TERM CURRENT USE OF INSULIN (MULTI): ICD-10-CM

## 2024-04-03 PROBLEM — C77.9: Status: RESOLVED | Noted: 2024-04-03 | Resolved: 2024-04-03

## 2024-04-03 PROBLEM — D68.9 BLOOD CLOTTING DISORDER (MULTI): Status: RESOLVED | Noted: 2023-08-17 | Resolved: 2024-04-03

## 2024-04-03 PROBLEM — D61.818 PANCYTOPENIA (MULTI): Status: RESOLVED | Noted: 2023-08-17 | Resolved: 2024-04-03

## 2024-04-03 PROBLEM — E66.01 OBESITY, CLASS III, BMI 40-49.9 (MORBID OBESITY) (MULTI): Status: RESOLVED | Noted: 2018-07-16 | Resolved: 2024-04-03

## 2024-04-03 PROBLEM — C80.1 CANCER (MULTI): Status: RESOLVED | Noted: 2023-08-17 | Resolved: 2024-04-03

## 2024-04-03 PROBLEM — D69.6 THROMBOCYTOPENIA (CMS-HCC): Status: RESOLVED | Noted: 2023-04-05 | Resolved: 2024-04-03

## 2024-04-03 PROBLEM — C77.9: Status: ACTIVE | Noted: 2024-04-03

## 2024-04-03 PROCEDURE — 1036F TOBACCO NON-USER: CPT | Performed by: STUDENT IN AN ORGANIZED HEALTH CARE EDUCATION/TRAINING PROGRAM

## 2024-04-03 PROCEDURE — 3078F DIAST BP <80 MM HG: CPT | Performed by: STUDENT IN AN ORGANIZED HEALTH CARE EDUCATION/TRAINING PROGRAM

## 2024-04-03 PROCEDURE — 3008F BODY MASS INDEX DOCD: CPT | Performed by: STUDENT IN AN ORGANIZED HEALTH CARE EDUCATION/TRAINING PROGRAM

## 2024-04-03 PROCEDURE — 1159F MED LIST DOCD IN RCRD: CPT | Performed by: STUDENT IN AN ORGANIZED HEALTH CARE EDUCATION/TRAINING PROGRAM

## 2024-04-03 PROCEDURE — 3074F SYST BP LT 130 MM HG: CPT | Performed by: STUDENT IN AN ORGANIZED HEALTH CARE EDUCATION/TRAINING PROGRAM

## 2024-04-03 PROCEDURE — 1160F RVW MEDS BY RX/DR IN RCRD: CPT | Performed by: STUDENT IN AN ORGANIZED HEALTH CARE EDUCATION/TRAINING PROGRAM

## 2024-04-03 PROCEDURE — 99214 OFFICE O/P EST MOD 30 MIN: CPT | Performed by: STUDENT IN AN ORGANIZED HEALTH CARE EDUCATION/TRAINING PROGRAM

## 2024-04-03 RX ORDER — SUCRALFATE 1 G/1
TABLET ORAL
COMMUNITY
Start: 2024-03-26

## 2024-04-03 RX ORDER — LIDOCAINE HYDROCHLORIDE 20 MG/ML
SOLUTION OROPHARYNGEAL
COMMUNITY
Start: 2020-12-01

## 2024-04-03 RX ORDER — DEXAMETHASONE 0.5 MG/5ML
0.5 ELIXIR ORAL 4 TIMES DAILY
Qty: 237 ML | Refills: 1 | Status: SHIPPED | OUTPATIENT
Start: 2024-04-03 | End: 2024-04-13

## 2024-04-03 ASSESSMENT — ENCOUNTER SYMPTOMS
GASTROINTESTINAL NEGATIVE: 1
RESPIRATORY NEGATIVE: 1
CONSTITUTIONAL NEGATIVE: 1
PSYCHIATRIC NEGATIVE: 1
CARDIOVASCULAR NEGATIVE: 1
NEUROLOGICAL NEGATIVE: 1

## 2024-04-03 ASSESSMENT — PATIENT HEALTH QUESTIONNAIRE - PHQ9
2. FEELING DOWN, DEPRESSED OR HOPELESS: NOT AT ALL
SUM OF ALL RESPONSES TO PHQ9 QUESTIONS 1 AND 2: 0
1. LITTLE INTEREST OR PLEASURE IN DOING THINGS: NOT AT ALL
2. FEELING DOWN, DEPRESSED OR HOPELESS: NOT AT ALL
1. LITTLE INTEREST OR PLEASURE IN DOING THINGS: NOT AT ALL
SUM OF ALL RESPONSES TO PHQ9 QUESTIONS 1 AND 2: 0

## 2024-04-03 NOTE — PROGRESS NOTES
Subjective   Patient ID: Mariann Drew is a 68 y.o. female who presents for Mouth Lesions (Canker sores in mouth and going down throat that are painful /Uses oral mouthwash that helps for the bacteria and numbing and tylenol).  5 weeks ago canker sores started appearing again. Was on a cruise. Cannot think of any triggering events. Has been using lidocaine mouthwash which helps the pain for about 30 mins but then it recurs. They are located on her tongue.     The first time this happened was November 2023. Went to urgent care and was given valtrex, mouthwash, and Anbesol.     Has tardive dyskinesia, bites her tongue a lot. Is going to be starting a new medication with neurology soon.     Recurrent yeast infections in groin where skin folds overlap. Using mometasone cream and nystatin powder. They clear up and then recur. No genital or vaginal infections or sores.     Doing well working on weight loss.     No other concerns today.             Review of Systems   Constitutional: Negative.    HENT:  Positive for mouth sores.    Respiratory: Negative.     Cardiovascular: Negative.    Gastrointestinal: Negative.    Skin:  Positive for rash.   Neurological: Negative.    Psychiatric/Behavioral: Negative.     All other systems reviewed and are negative.      Objective   Physical Exam  Vitals reviewed.   Constitutional:       Appearance: Normal appearance.   HENT:      Head: Normocephalic.      Mouth/Throat:      Comments: Mulitple clean based ulcerations on tip and sides of tongue  Eyes:      Pupils: Pupils are equal, round, and reactive to light.   Pulmonary:      Effort: Pulmonary effort is normal. No respiratory distress.   Skin:     Comments: Right groin with erythema and scaling rash   Neurological:      General: No focal deficit present.      Mental Status: She is alert. Mental status is at baseline.   Psychiatric:         Mood and Affect: Mood normal.         Behavior: Behavior normal.         Body mass index is  27.81 kg/m².      Current Outpatient Medications:     allopurinol (Zyloprim) 100 mg tablet, Take 1 tablet (100 mg) by mouth once daily., Disp: , Rfl:     colchicine 0.6 mg tablet, Take 1 tablet (0.6 mg) by mouth every other day., Disp: , Rfl:     deutetrabenazine (Austedo XR Titration Kt,Wk1-4,) 6 mg (14)-12 mg (14)-24 mg (14) tablet, Ext Rel 24hr dose pack, Week 1: Take 12 mg by mouth once daily. Week 2: Take 18 mg by mouth once daily. Week 3: take 24 mg by mouth once daily. Week 4 and on: take 30 mg by mouth once daily, Disp: 42 each, Rfl: 3    dexAMETHasone 0.5 mg/5 mL oral liquid, Use as directed, Disp: , Rfl:     DULoxetine (Cymbalta) 60 mg DR capsule, Take 1 capsule (60 mg) by mouth 2 times a day. Do not crush or chew., Disp: , Rfl:     famotidine (Pepcid) 40 mg tablet, , Disp: , Rfl:     ferrous sulfate (FEOSOL ORAL), Take 1 tablet by mouth once daily in the evening. 200 (65 Fe) MG, Disp: , Rfl:     lamoTRIgine (LaMICtal) 150 mg tablet, Take 1 tablet (150 mg) by mouth once daily., Disp: , Rfl:     latanoprost (Xelpros) 0.005 % drops, emulsion, Administer into affected eye(s) once daily., Disp: , Rfl:     lidocaine (Lidocaine Viscous) 2 % solution, Take by mouth., Disp: , Rfl:     mometasone (Elocon) 0.1 % cream, Apply topically once daily as needed (Apply topically once daily as needed)., Disp: 15 g, Rfl: 1    multivitamin tablet, Take 1 tablet by mouth once daily., Disp: , Rfl:     naratriptan (Amerge) 2.5 mg tablet, Take 1 tablet (2.5 mg) by mouth 1 time if needed for migraine. May repeat once in 4hrs if headache recurs, Disp: 9 tablet, Rfl: 1    nystatin (Mycostatin) 100,000 unit/gram powder, Apply topically 2 times a day., Disp: 30 g, Rfl: 2    semaglutide (Ozempic) 0.25 mg or 0.5 mg(2 mg/1.5 mL) pen injector, Inject 0.5 mg under the skin 1 (one) time per week., Disp: 1 each, Rfl: 3    sucralfate (Carafate) 1 gram tablet, , Disp: , Rfl:     traZODone (Desyrel) 100 mg tablet, Take 1 tablet (100 mg) by  mouth once daily at bedtime., Disp: , Rfl:     dexAMETHasone 0.5 mg/5 mL oral liquid, Take 5 mL (0.5 mg) by mouth 4 times a day for 10 days., Disp: 237 mL, Rfl: 1      Assessment/Plan   Diagnoses and all orders for this visit:  Aphthous ulcer  -     dexAMETHasone 0.5 mg/5 mL oral liquid; Take 5 mL (0.5 mg) by mouth 4 times a day for 10 days.  Type 2 diabetes mellitus without complication, without long-term current use of insulin (CMS/[facility])  Intertrigo  Comments:  nystatin powder as needed    Follow up as needed       Olivia Self,  04/03/24 2:42 PM

## 2024-04-04 ENCOUNTER — PHARMACY VISIT (OUTPATIENT)
Dept: PHARMACY | Facility: CLINIC | Age: 69
End: 2024-04-04
Payer: COMMERCIAL

## 2024-04-05 ENCOUNTER — SPECIALTY PHARMACY (OUTPATIENT)
Dept: PHARMACY | Facility: CLINIC | Age: 69
End: 2024-04-05

## 2024-04-05 NOTE — PROGRESS NOTES
[facility] Clinical Note    Mariann Drew is a 68 y.o. female, who is on the specialty pharmacy service of: [facility].  Mariann Drew is taking: Austedo.     Mariann was contacted on 4/5/2024.    Refer to the encounter summary report for documentation details about patient counseling and education.      Impression/Plan  Is patient high risk? (potential patients:  pregnancy, geriatric, pediatric) Yes; geriatric    Is laboratory follow-up needed? No  Is a clinical intervention needed? No   Next assessment date? 7/3/24   Additional comments: N/A    Medication Adherence  The importance of adherence was discussed with the patient and they were advised to take the medication as prescribed by their provider. Mariann was encouraged to call her physician's office if they have a question regarding a missed dose.     QOL/Patient Satisfaction  Rate your quality of life on scale of 1-10: -- (N/A)  Rate your satisfaction with  Specialty Pharmacy on scale of 1-10: 10 - Completely satisfied      Patient advised to contact the pharmacy if there are any changes to her medication list, including prescriptions, OTC medications, herbal products, or supplements. Patient was advised of [facility]’s dispensing process, refill timeline, contact information ([phone]), and patient management follow up. Patient confirmed understanding of education conducted during assessment. All patient questions and concerns were addressed to the best of my ability. Patient was encouraged to contact the specialty pharmacy with any questions or concerns.    Confirmed follow-up outreaches are properly scheduled. Reviewed goals of therapy in the program targets.    MARILUZ HOLLINS, MauD

## 2024-04-16 DIAGNOSIS — L30.4 INTERTRIGO: ICD-10-CM

## 2024-04-16 RX ORDER — NYSTATIN 100000 [USP'U]/G
POWDER TOPICAL 2 TIMES DAILY
Qty: 30 G | Refills: 2 | Status: SHIPPED | OUTPATIENT
Start: 2024-04-16

## 2024-04-24 ENCOUNTER — SPECIALTY PHARMACY (OUTPATIENT)
Dept: PHARMACY | Facility: CLINIC | Age: 69
End: 2024-04-24

## 2024-04-26 ENCOUNTER — SPECIALTY PHARMACY (OUTPATIENT)
Dept: PHARMACY | Facility: CLINIC | Age: 69
End: 2024-04-26

## 2024-04-29 DIAGNOSIS — G24.01 TARDIVE DYSKINESIA: Primary | ICD-10-CM

## 2024-04-29 RX ORDER — DEUTETRABENAZINE 6 MG/1
6 TABLET, FILM COATED, EXTENDED RELEASE ORAL DAILY
Qty: 90 TABLET | Refills: 3 | Status: SHIPPED | OUTPATIENT
Start: 2024-04-29 | End: 2024-04-30 | Stop reason: SDUPTHER

## 2024-04-29 RX ORDER — DEUTETRABENAZINE 24 MG/1
24 TABLET, FILM COATED, EXTENDED RELEASE ORAL DAILY
Qty: 90 TABLET | Refills: 3 | Status: SHIPPED | OUTPATIENT
Start: 2024-04-29 | End: 2024-04-30 | Stop reason: SDUPTHER

## 2024-04-30 DIAGNOSIS — G24.01 TARDIVE DYSKINESIA: ICD-10-CM

## 2024-04-30 PROCEDURE — RXMED WILLOW AMBULATORY MEDICATION CHARGE

## 2024-04-30 RX ORDER — DEUTETRABENAZINE 6 MG/1
6 TABLET, FILM COATED, EXTENDED RELEASE ORAL DAILY
Qty: 90 TABLET | Refills: 3 | Status: SHIPPED | OUTPATIENT
Start: 2024-04-30

## 2024-04-30 RX ORDER — DEUTETRABENAZINE 24 MG/1
24 TABLET, FILM COATED, EXTENDED RELEASE ORAL DAILY
Qty: 90 TABLET | Refills: 3 | Status: SHIPPED | OUTPATIENT
Start: 2024-04-30

## 2024-05-01 ENCOUNTER — PHARMACY VISIT (OUTPATIENT)
Dept: PHARMACY | Facility: CLINIC | Age: 69
End: 2024-05-01
Payer: COMMERCIAL

## 2024-05-13 ENCOUNTER — OFFICE VISIT (OUTPATIENT)
Dept: PRIMARY CARE | Facility: CLINIC | Age: 69
End: 2024-05-13
Payer: MEDICARE

## 2024-05-13 VITALS
HEART RATE: 90 BPM | DIASTOLIC BLOOD PRESSURE: 70 MMHG | BODY MASS INDEX: 28.17 KG/M2 | OXYGEN SATURATION: 96 % | WEIGHT: 165 LBS | HEIGHT: 64 IN | SYSTOLIC BLOOD PRESSURE: 110 MMHG

## 2024-05-13 DIAGNOSIS — L30.4 INTERTRIGO: ICD-10-CM

## 2024-05-13 DIAGNOSIS — L98.7 EXCESS SKIN: ICD-10-CM

## 2024-05-13 DIAGNOSIS — Z00.00 MEDICARE ANNUAL WELLNESS VISIT, SUBSEQUENT: Primary | ICD-10-CM

## 2024-05-13 DIAGNOSIS — E11.9 TYPE 2 DIABETES MELLITUS WITHOUT COMPLICATION, WITHOUT LONG-TERM CURRENT USE OF INSULIN (MULTI): ICD-10-CM

## 2024-05-13 DIAGNOSIS — G43.909 EPISODIC MIGRAINE: ICD-10-CM

## 2024-05-13 DIAGNOSIS — C50.912 MALIGNANT NEOPLASM OF LEFT FEMALE BREAST, UNSPECIFIED ESTROGEN RECEPTOR STATUS, UNSPECIFIED SITE OF BREAST (MULTI): ICD-10-CM

## 2024-05-13 DIAGNOSIS — G20.C PARKINSONISM, UNSPECIFIED PARKINSONISM TYPE (MULTI): Chronic | ICD-10-CM

## 2024-05-13 LAB — HBA1C MFR BLD: 4.4 % (ref 4.2–6.5)

## 2024-05-13 PROCEDURE — 1160F RVW MEDS BY RX/DR IN RCRD: CPT | Performed by: STUDENT IN AN ORGANIZED HEALTH CARE EDUCATION/TRAINING PROGRAM

## 2024-05-13 PROCEDURE — 1124F ACP DISCUSS-NO DSCNMKR DOCD: CPT | Performed by: STUDENT IN AN ORGANIZED HEALTH CARE EDUCATION/TRAINING PROGRAM

## 2024-05-13 PROCEDURE — 1170F FXNL STATUS ASSESSED: CPT | Performed by: STUDENT IN AN ORGANIZED HEALTH CARE EDUCATION/TRAINING PROGRAM

## 2024-05-13 PROCEDURE — 1036F TOBACCO NON-USER: CPT | Performed by: STUDENT IN AN ORGANIZED HEALTH CARE EDUCATION/TRAINING PROGRAM

## 2024-05-13 PROCEDURE — 3074F SYST BP LT 130 MM HG: CPT | Performed by: STUDENT IN AN ORGANIZED HEALTH CARE EDUCATION/TRAINING PROGRAM

## 2024-05-13 PROCEDURE — G0439 PPPS, SUBSEQ VISIT: HCPCS | Performed by: STUDENT IN AN ORGANIZED HEALTH CARE EDUCATION/TRAINING PROGRAM

## 2024-05-13 PROCEDURE — 3078F DIAST BP <80 MM HG: CPT | Performed by: STUDENT IN AN ORGANIZED HEALTH CARE EDUCATION/TRAINING PROGRAM

## 2024-05-13 PROCEDURE — 99214 OFFICE O/P EST MOD 30 MIN: CPT | Performed by: STUDENT IN AN ORGANIZED HEALTH CARE EDUCATION/TRAINING PROGRAM

## 2024-05-13 PROCEDURE — 3044F HG A1C LEVEL LT 7.0%: CPT | Performed by: STUDENT IN AN ORGANIZED HEALTH CARE EDUCATION/TRAINING PROGRAM

## 2024-05-13 PROCEDURE — 1159F MED LIST DOCD IN RCRD: CPT | Performed by: STUDENT IN AN ORGANIZED HEALTH CARE EDUCATION/TRAINING PROGRAM

## 2024-05-13 PROCEDURE — 83036 HEMOGLOBIN GLYCOSYLATED A1C: CPT | Mod: CLIA WAIVED TEST | Performed by: STUDENT IN AN ORGANIZED HEALTH CARE EDUCATION/TRAINING PROGRAM

## 2024-05-13 RX ORDER — NARATRIPTAN 2.5 MG/1
2.5 TABLET ORAL ONCE AS NEEDED
Qty: 9 TABLET | Refills: 1 | Status: SHIPPED | OUTPATIENT
Start: 2024-05-13

## 2024-05-13 ASSESSMENT — ENCOUNTER SYMPTOMS
MUSCULOSKELETAL NEGATIVE: 1
CONSTITUTIONAL NEGATIVE: 1
CARDIOVASCULAR NEGATIVE: 1
PSYCHIATRIC NEGATIVE: 1
NEUROLOGICAL NEGATIVE: 1
RESPIRATORY NEGATIVE: 1
GASTROINTESTINAL NEGATIVE: 1

## 2024-05-13 ASSESSMENT — PATIENT HEALTH QUESTIONNAIRE - PHQ9
1. LITTLE INTEREST OR PLEASURE IN DOING THINGS: NOT AT ALL
SUM OF ALL RESPONSES TO PHQ9 QUESTIONS 1 AND 2: 0
2. FEELING DOWN, DEPRESSED OR HOPELESS: NOT AT ALL

## 2024-05-13 ASSESSMENT — ACTIVITIES OF DAILY LIVING (ADL)
TAKING_MEDICATION: INDEPENDENT
GROCERY_SHOPPING: INDEPENDENT
MANAGING_FINANCES: INDEPENDENT
BATHING: INDEPENDENT
DOING_HOUSEWORK: INDEPENDENT
DRESSING: INDEPENDENT

## 2024-05-13 NOTE — PROGRESS NOTES
Subjective   Patient ID: Mariann Drew is a 68 y.o. female who presents for Medicare Annual Wellness Visit Subsequent (Medicare Annual Wellness visit /A1C Check).  Last A1c 4.6%. Doing very well on ozempic. No side effects. Keeping her blood sugar levels consistent.     Interested in tummy tuck. Would like plastic surgery. Gets yeast infections in skin folds of her pannus. Using nystatin powder. Getting these about 1x per week.     Feeling well. Lost another 8 pounds.     Colonoscopy 2021. UTD on mammogram.     No other concerns today.         Review of Systems   Constitutional: Negative.    HENT: Negative.     Respiratory: Negative.     Cardiovascular: Negative.    Gastrointestinal: Negative.    Musculoskeletal: Negative.    Skin:  Positive for rash.   Neurological: Negative.    Psychiatric/Behavioral: Negative.     All other systems reviewed and are negative.      Objective   Physical Exam  Vitals reviewed.   Constitutional:       Appearance: Normal appearance.   HENT:      Head: Normocephalic.      Mouth/Throat:      Mouth: Mucous membranes are moist.   Eyes:      Pupils: Pupils are equal, round, and reactive to light.   Cardiovascular:      Rate and Rhythm: Normal rate and regular rhythm.   Abdominal:      Palpations: Abdomen is soft.      Tenderness: There is no abdominal tenderness.   Musculoskeletal:         General: Normal range of motion.   Neurological:      General: No focal deficit present.      Mental Status: She is alert. Mental status is at baseline.   Psychiatric:         Mood and Affect: Mood normal.         Behavior: Behavior normal.         Body mass index is 28.32 kg/m².      Current Outpatient Medications:     allopurinol (Zyloprim) 100 mg tablet, Take 1 tablet (100 mg) by mouth once daily., Disp: , Rfl:     colchicine 0.6 mg tablet, Take 1 tablet (0.6 mg) by mouth every other day., Disp: , Rfl:     deutetrabenazine (Austedo XR) 24 mg tablet extended release 24 hr, Take 1 tablet (24 mg) by  mouth once daily. Take 1-6mg tablet and 1-24mg tablet for a total dose of 30mg daily., Disp: 90 tablet, Rfl: 3    deutetrabenazine (Austedo XR) 6 mg tablet extended release 24 hr, Take 1 tablet (6 mg) by mouth once daily. Take 1-6mg tablet and 1-24mg tablet for a total dose of 30mg daily., Disp: 90 tablet, Rfl: 3    dexAMETHasone 0.5 mg/5 mL oral liquid, Use as directed, Disp: , Rfl:     DULoxetine (Cymbalta) 60 mg DR capsule, Take 1 capsule (60 mg) by mouth 2 times a day. Do not crush or chew., Disp: , Rfl:     famotidine (Pepcid) 40 mg tablet, , Disp: , Rfl:     ferrous sulfate (FEOSOL ORAL), Take 1 tablet by mouth once daily in the evening. 200 (65 Fe) MG, Disp: , Rfl:     lamoTRIgine (LaMICtal) 150 mg tablet, Take 1 tablet (150 mg) by mouth once daily., Disp: , Rfl:     latanoprost (Xelpros) 0.005 % drops, emulsion, Administer into affected eye(s) once daily., Disp: , Rfl:     lidocaine (Lidocaine Viscous) 2 % solution, Take by mouth., Disp: , Rfl:     mometasone (Elocon) 0.1 % cream, Apply topically once daily as needed (Apply topically once daily as needed)., Disp: 15 g, Rfl: 1    multivitamin tablet, Take 1 tablet by mouth once daily., Disp: , Rfl:     Nyamyc 100,000 unit/gram powder, APPLY TO AFFECTED AREA TWICE A DAY, Disp: 30 g, Rfl: 2    semaglutide (Ozempic) 0.25 mg or 0.5 mg(2 mg/1.5 mL) pen injector, Inject 0.5 mg under the skin 1 (one) time per week., Disp: 1 each, Rfl: 3    sucralfate (Carafate) 1 gram tablet, , Disp: , Rfl:     traZODone (Desyrel) 100 mg tablet, Take 1 tablet (100 mg) by mouth once daily at bedtime., Disp: , Rfl:     dexAMETHasone 0.5 mg/5 mL oral liquid, Take 5 mL (0.5 mg) by mouth 4 times a day for 10 days., Disp: 237 mL, Rfl: 1    naratriptan (Amerge) 2.5 mg tablet, Take 1 tablet (2.5 mg) by mouth 1 time if needed for migraine. May repeat once in 4hrs if headache recurs, Disp: 9 tablet, Rfl: 1      Assessment/Plan   Diagnoses and all orders for this visit:  Medicare annual  wellness visit, subsequent  Comments:  UTD on colon cancer screening  UTD on mammogram  Type 2 diabetes mellitus without complication, without long-term current use of insulin (Multi)  -     POCT Glycosylated Hemoglobin (HGB A1C) docked device  Episodic migraine  -     naratriptan (Amerge) 2.5 mg tablet; Take 1 tablet (2.5 mg) by mouth 1 time if needed for migraine. May repeat once in 4hrs if headache recurs  Parkinsonism, unspecified Parkinsonism type (Multi)  Malignant neoplasm of left female breast, unspecified estrogen receptor status, unspecified site of breast (Multi)  Excess skin  Comments:  significant weight loss  now getting yeast infections in her skin folds  Orders:  -     Referral to Plastic Surgery; Future  Intertrigo  -     Referral to Plastic Surgery; Future  Other orders  -     POCT GLYCOSYLATED HEMOGLOBIN (HGB A1C)       Follow up in 3 months    Olivia Self DO 05/13/24 5:03 PM

## 2024-05-16 ENCOUNTER — TELEPHONE (OUTPATIENT)
Dept: PRIMARY CARE | Facility: CLINIC | Age: 69
End: 2024-05-16

## 2024-05-16 ENCOUNTER — OFFICE VISIT (OUTPATIENT)
Dept: PLASTIC SURGERY | Facility: CLINIC | Age: 69
End: 2024-05-16
Payer: MEDICARE

## 2024-05-16 VITALS
HEART RATE: 106 BPM | SYSTOLIC BLOOD PRESSURE: 137 MMHG | HEIGHT: 64 IN | DIASTOLIC BLOOD PRESSURE: 86 MMHG | BODY MASS INDEX: 28.17 KG/M2 | WEIGHT: 165 LBS

## 2024-05-16 DIAGNOSIS — L30.4 INTERTRIGO: ICD-10-CM

## 2024-05-16 DIAGNOSIS — L98.7 EXCESS SKIN: ICD-10-CM

## 2024-05-16 PROCEDURE — 1159F MED LIST DOCD IN RCRD: CPT | Performed by: SURGERY

## 2024-05-16 PROCEDURE — 3044F HG A1C LEVEL LT 7.0%: CPT | Performed by: SURGERY

## 2024-05-16 PROCEDURE — 1160F RVW MEDS BY RX/DR IN RCRD: CPT | Performed by: SURGERY

## 2024-05-16 PROCEDURE — 3075F SYST BP GE 130 - 139MM HG: CPT | Performed by: SURGERY

## 2024-05-16 PROCEDURE — 3079F DIAST BP 80-89 MM HG: CPT | Performed by: SURGERY

## 2024-05-16 PROCEDURE — 99205 OFFICE O/P NEW HI 60 MIN: CPT | Performed by: SURGERY

## 2024-05-16 NOTE — TELEPHONE ENCOUNTER
Pt saw plastic surgeon today and will need general oral antibiotic before she has surgery. She wanted you to be in the loop on this.     Rasheed Vazquez MA

## 2024-05-16 NOTE — LETTER
May 16, 2024     Olivia Self DO  [address] [facility] [address]    Patient: Mariann Drew   YOB: 1955   Date of Visit: 5/16/2024       Dear Dr. Olivia Self DO:    Thank you for referring Mariann Drew to me for evaluation. Below are my notes for this consultation.  If you have questions, please do not hesitate to call me. I look forward to following your patient along with you.       Sincerely,     Anibal Tiwari MD      CC: No Recipients  ______________________________________________________________________________________                    Department of Plastic and Reconstructive Surgery            New Patient Visit    Date: 5/16/2024        Subjective  Mariann Drew is a 68 y.o. female who was referred by Olivia Self DO  for excess skin causing intertrigo Jenis rash and excoriation of the infraumbilical/pannus area.  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  Patient presents status post significant weight loss now having intertrigo and yeast in the Intertriginous skin folds.  Mariann has lost weight through diet and exercise, her maximum weight was 306 pounds  Goal is 165, currently, has been there for some months.  Her goal weight is 130.  Past medical history former type II diabetic, her most recent hemoglobin A1c was 4.4%, she states that with weight loss she no longer is diabetic.  She has a chronic history of low blood counts, she has tardive dyskinesia.  Past surgical history is notable for bladder stimulator to treat chronic continence, right knee replacement, hysterectomy, gallbladder removal, left mastectomy for cancer in 2000 tissue expander secondary surgery with implant, right matching breast reduction.        She complains today of intertriginous rash, excoriation, infections due to overhanging skin over her pubic and genitalia area.    All other systems have been reviewed with the patient and  have been found to be negative with exception to the chief complaint as mentioned in the history of present illness.    ROS: As noted in history of present illness  - CONSTITUTIONAL: Denies weight loss, fever and chills.  - HEENT: Denies changes in vision and hearing.  - RESPIRATORY: Denies SOB and cough, difficulty breathing  - CV: Denies palpitations and CP  - GI: Denies abdominal pain, nausea, vomiting and diarrhea.  - : Denies dysuria and urinary frequency.  - MSK: Denies myalgia and joint pain.  - SKIN: Denies rash and pruritus.  - NEUROLOGICAL: Denies headache and syncope.  - PSYCHIATRIC: Denies recent changes in mood. Denies anxiety and depression.    I reviewed with the patient the remainder of the his personal, medical, surgical and social history, found not to be pertinent to chief complaint. I specifically reviewed the family history with patient, found not to be pertinent to chief complaint.    Objective   Vitals:    05/16/24 0942   BP: 137/86   Pulse: 106       Gen: interactive and pleasant  Head: NCAT  Eyes: EOMI, PERRLA  Mouth: MMM  Throat: trachea midline  Cor: RRR  Pulm: nonlabored breathing  Abd: s/nt/nd  Neuro: AAOx3  Ext: extremities perfused    Body mass index is 28.32 kg/m².    Focused exam of the:   Abdomen shows a grade 3 abdominal panniculus, overhanging of skin genitalia  Significant skin laxity in horizontal and vertical dimension  2 rolls, 1 at the umbilical area 1 at the suprapubic area  Significant mons ptosis    Notable ptosis of the bilateral upper arms, significant excess skin and adiposity    Imaging: Patient had mammogram performed in 2/2024 showing no signs of malignancy.    Assessment/Plan      Mariann is a 68 y.o. female who presents for abdominal panniculus secondary to massive weight loss.    The patient has a hanging apron of skin and fat below the waist.  This extra skin is causing deep sores which have to be treated with oral medicine and affects the patient's ability to  complete activities of daily living such as grooming and dressing.  Abdominal panniculectomy would improve and restore these things.    Mariann has a chronic intertrigo and rashing that has been recalcitrant to medical and nonsurgical therapy for over 3 months including antifungals, steroid creams, and antibiotics.  Weight has been stable for the last 6 months.  She practices good hygiene.       Plan:   Recommend a [id number] abdominoplasty with addition of mons plasty  Furthermore she has significant ptosis and excess skin and adiposity of the bilateral arms  She also has ptosis and dissatisfaction with obstruction of the breast    I recommend first performing torso contouring and reconstruction, followed by breast revision, at that time she would like to consider addition of brachioplasty.    Although very has been prescribed topical creams, she has not been prescribed oral antibiotic for the intermittent rashing and sores which she experiences.    For medical management, we will perform follow-up in 30days      I spent 60 minutes with this patient. Greater than 50% of this time was spent in the counselling and/or coordination of care of this patient.  This note was created using voice recognition software and was not corrected for typographical or grammatical errors.    Signature: Anibal Tiwari MD   Date: 5/16/2024

## 2024-05-16 NOTE — PROGRESS NOTES
Department of Plastic and Reconstructive Surgery            New Patient Visit    Date: 5/16/2024        Subjective   Mariann Drew is a 68 y.o. female who was referred by Olivia Self DO  for excess skin causing intertrigo Jenis rash and excoriation of the infraumbilical/pannus area.  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  Patient presents status post significant weight loss now having intertrigo and yeast in the Intertriginous skin folds.  Mariann has lost weight through diet and exercise, her maximum weight was 306 pounds  Goal is 165, currently, has been there for some months.  Her goal weight is 130.  Past medical history former type II diabetic, her most recent hemoglobin A1c was 4.4%, she states that with weight loss she no longer is diabetic.  She has a chronic history of low blood counts, she has tardive dyskinesia.  Past surgical history is notable for bladder stimulator to treat chronic continence, right knee replacement, hysterectomy, gallbladder removal, left mastectomy for cancer in 2000 tissue expander secondary surgery with implant, right matching breast reduction.        She complains today of intertriginous rash, excoriation, infections due to overhanging skin over her pubic and genitalia area.    All other systems have been reviewed with the patient and have been found to be negative with exception to the chief complaint as mentioned in the history of present illness.    ROS: As noted in history of present illness  - CONSTITUTIONAL: Denies weight loss, fever and chills.  - HEENT: Denies changes in vision and hearing.  - RESPIRATORY: Denies SOB and cough, difficulty breathing  - CV: Denies palpitations and CP  - GI: Denies abdominal pain, nausea, vomiting and diarrhea.  - : Denies dysuria and urinary frequency.  - MSK: Denies myalgia and joint pain.  - SKIN: Denies rash and pruritus.  - NEUROLOGICAL:  Denies headache and syncope.  - PSYCHIATRIC: Denies recent changes in mood. Denies anxiety and depression.    I reviewed with the patient the remainder of the his personal, medical, surgical and social history, found not to be pertinent to chief complaint. I specifically reviewed the family history with patient, found not to be pertinent to chief complaint.    Objective    Vitals:    05/16/24 0942   BP: 137/86   Pulse: 106       Gen: interactive and pleasant  Head: NCAT  Eyes: EOMI, PERRLA  Mouth: MMM  Throat: trachea midline  Cor: RRR  Pulm: nonlabored breathing  Abd: s/nt/nd  Neuro: AAOx3  Ext: extremities perfused    Body mass index is 28.32 kg/m².    Focused exam of the:   Abdomen shows a grade 3 abdominal panniculus, overhanging of skin genitalia  Significant skin laxity in horizontal and vertical dimension  2 rolls, 1 at the umbilical area 1 at the suprapubic area  Significant mons ptosis    Notable ptosis of the bilateral upper arms, significant excess skin and adiposity    Imaging: Patient had mammogram performed in 2/2024 showing no signs of malignancy.    Assessment/Plan       Mariann is a 68 y.o. female who presents for abdominal panniculus secondary to massive weight loss.    The patient has a hanging apron of skin and fat below the waist.  This extra skin is causing deep sores which have to be treated with oral medicine and affects the patient's ability to complete activities of daily living such as grooming and dressing.  Abdominal panniculectomy would improve and restore these things.    Mariann has a chronic intertrigo and rashing that has been recalcitrant to medical and nonsurgical therapy for over 3 months including antifungals, steroid creams, and antibiotics.  Weight has been stable for the last 6 months.  She practices good hygiene.       Plan:   Recommend a [id number] abdominoplasty with addition of mons plasty  Furthermore she has significant ptosis and excess skin and adiposity of the bilateral  arms  She also has ptosis and dissatisfaction with obstruction of the breast    I recommend first performing torso contouring and reconstruction, followed by breast revision, at that time she would like to consider addition of brachioplasty.    Although very has been prescribed topical creams, she has not been prescribed oral antibiotic for the intermittent rashing and sores which she experiences.    For medical management, we will perform follow-up in 30days      I spent 60 minutes with this patient. Greater than 50% of this time was spent in the counselling and/or coordination of care of this patient.  This note was created using voice recognition software and was not corrected for typographical or grammatical errors.    Signature: Anibal Tiwari MD   Date: 5/16/2024

## 2024-05-17 DIAGNOSIS — B37.2 YEAST INFECTION OF THE SKIN: Primary | ICD-10-CM

## 2024-05-17 RX ORDER — FLUCONAZOLE 200 MG/1
200 TABLET ORAL DAILY
Qty: 3 TABLET | Refills: 0 | Status: SHIPPED | OUTPATIENT
Start: 2024-05-17 | End: 2024-05-20

## 2024-05-24 ENCOUNTER — SPECIALTY PHARMACY (OUTPATIENT)
Dept: PHARMACY | Facility: CLINIC | Age: 69
End: 2024-05-24

## 2024-05-24 PROCEDURE — RXMED WILLOW AMBULATORY MEDICATION CHARGE

## 2024-05-28 ENCOUNTER — SPECIALTY PHARMACY (OUTPATIENT)
Dept: PHARMACY | Facility: CLINIC | Age: 69
End: 2024-05-28

## 2024-06-01 ENCOUNTER — PHARMACY VISIT (OUTPATIENT)
Dept: PHARMACY | Facility: CLINIC | Age: 69
End: 2024-06-01
Payer: COMMERCIAL

## 2024-06-17 ENCOUNTER — PREP FOR PROCEDURE (OUTPATIENT)
Dept: INTERNAL MEDICINE | Facility: HOSPITAL | Age: 69
End: 2024-06-17
Payer: MEDICARE

## 2024-06-17 DIAGNOSIS — E88.1 LIPODYSTROPHY: Primary | ICD-10-CM

## 2024-06-17 DIAGNOSIS — L98.7 EXCESSIVE AND REDUNDANT SKIN AND SUBCUTANEOUS TISSUE: ICD-10-CM

## 2024-06-17 DIAGNOSIS — E65 LOCALIZED ADIPOSITY: ICD-10-CM

## 2024-06-24 ENCOUNTER — SPECIALTY PHARMACY (OUTPATIENT)
Dept: PHARMACY | Facility: CLINIC | Age: 69
End: 2024-06-24

## 2024-06-24 PROCEDURE — RXMED WILLOW AMBULATORY MEDICATION CHARGE

## 2024-06-25 ENCOUNTER — PHARMACY VISIT (OUTPATIENT)
Dept: PHARMACY | Facility: CLINIC | Age: 69
End: 2024-06-25
Payer: COMMERCIAL

## 2024-06-27 DIAGNOSIS — E11.9 TYPE 2 DIABETES MELLITUS WITHOUT COMPLICATION, WITHOUT LONG-TERM CURRENT USE OF INSULIN (MULTI): ICD-10-CM

## 2024-06-27 RX ORDER — SEMAGLUTIDE 0.68 MG/ML
INJECTION, SOLUTION SUBCUTANEOUS
Qty: 3 ML | Refills: 3 | Status: SHIPPED | OUTPATIENT
Start: 2024-06-27

## 2024-07-02 ENCOUNTER — APPOINTMENT (OUTPATIENT)
Dept: RHEUMATOLOGY | Facility: CLINIC | Age: 69
End: 2024-07-02
Payer: MEDICARE

## 2024-07-02 VITALS
BODY MASS INDEX: 29.19 KG/M2 | SYSTOLIC BLOOD PRESSURE: 135 MMHG | HEIGHT: 64 IN | DIASTOLIC BLOOD PRESSURE: 88 MMHG | HEART RATE: 90 BPM | WEIGHT: 171 LBS

## 2024-07-02 DIAGNOSIS — M18.0 PRIMARY OSTEOARTHRITIS OF BOTH FIRST CARPOMETACARPAL JOINTS: Primary | ICD-10-CM

## 2024-07-02 PROCEDURE — 99213 OFFICE O/P EST LOW 20 MIN: CPT | Performed by: INTERNAL MEDICINE

## 2024-07-02 PROCEDURE — 3079F DIAST BP 80-89 MM HG: CPT | Performed by: INTERNAL MEDICINE

## 2024-07-02 PROCEDURE — 1125F AMNT PAIN NOTED PAIN PRSNT: CPT | Performed by: INTERNAL MEDICINE

## 2024-07-02 PROCEDURE — 1036F TOBACCO NON-USER: CPT | Performed by: INTERNAL MEDICINE

## 2024-07-02 PROCEDURE — 3044F HG A1C LEVEL LT 7.0%: CPT | Performed by: INTERNAL MEDICINE

## 2024-07-02 PROCEDURE — 3075F SYST BP GE 130 - 139MM HG: CPT | Performed by: INTERNAL MEDICINE

## 2024-07-02 PROCEDURE — 20600 DRAIN/INJ JOINT/BURSA W/O US: CPT | Performed by: INTERNAL MEDICINE

## 2024-07-02 PROCEDURE — 1159F MED LIST DOCD IN RCRD: CPT | Performed by: INTERNAL MEDICINE

## 2024-07-02 RX ORDER — TRIAMCINOLONE ACETONIDE 40 MG/ML
40 INJECTION, SUSPENSION INTRA-ARTICULAR; INTRAMUSCULAR
Status: COMPLETED | OUTPATIENT
Start: 2024-07-02 | End: 2024-07-02

## 2024-07-02 RX ORDER — LIDOCAINE HYDROCHLORIDE 10 MG/ML
1 INJECTION INFILTRATION; PERINEURAL
Status: COMPLETED | OUTPATIENT
Start: 2024-07-02 | End: 2024-07-02

## 2024-07-02 ASSESSMENT — PAIN SCALES - GENERAL: PAINLEVEL: 8

## 2024-07-02 NOTE — PROGRESS NOTES
Recall  64yo F with PMH chronic gout, and R TKA. Has family history of RA but RF and anti-CCP antibody negative.   Bialteral CMC injections in 2/2022. Received right CMC injection in 4/23, 12/24    Chief Complaint : Follow up of gout and CMC arthritis       History of Present Illness : At today's visit, the patient reports recurrence of pain in both CMC joints. She received cortisone injection in 12/2024 and pain recurred about a month ago.   Has no other joint pain. Has no more gout attacks.       Review of Systems  Constitutional: C/O fatigue.   Skin: No rash  Head: No headache, oral ulcers or hair loss  Neck: No difficulty swallowing or choking  Eyes: No dry eyes  Mouth: Has dry mouth but no oral ulcers  Pulmonary: No wheezing, pleurisy or SOB  Cardiovascular: No chest pain or palpitations  Gastrointestinal: No abdominal pain, nausea or blood in stool  Endocrine: Raynaud's -  Musculoskeletal: As H/P        Active Problems  Problems    · Arthralgia of right wrist (719.43) (M25.531)   · Arthritis of carpometacarpal (CMC) joint of right thumb (716.94) (M18.11)   · Back pain (724.5) (M54.9)   · Bilateral occipital neuralgia (723.8) (M54.81)   · BMI 40.0-44.9, adult (V85.41) (Z68.41)   · Breast cancer (174.9) (C50.919)   · Cervical arthritis (721.0) (M47.812)   · Cervical radiculopathy (723.4) (M54.12)   · Cervicogenic headache (784.0) (G44.86)   · Chronic gout (274.02) (M1A.9XX0)   · Decreased estrogen level (256.39) (E28.39)   · Depression, major, in remission (296.25) (F32.5)   · Depression, major, in remission   · Dermatitis (692.9) (L30.9)   · Diabetes mellitus (250.00) (E11.9)   · Diabetes mellitus   · Encounter for mammogram to establish baseline mammogram (V76.12) (Z12.31)   · Gastric bypass status for obesity (V45.86) (Z98.84)   · GERD (gastroesophageal reflux disease) (530.81) (K21.9)   · Headache (784.0) (R51.9)   · Insomnia, idiopathic (307.42) (F51.01)   · Knee osteoarthritis (715.36) (M17.9)   · Memory  loss (780.93) (R41.3)   · Mouth sore (528.9) (K13.79)   · Myofascial pain syndrome, cervical (729.1) (M79.18)   · Neuropathy (355.9) (G62.9)   · Pain of right hand (729.5) (M79.641)   · Primary osteoarthritis involving multiple joints (715.98) (M15.9)   · Recurrent falls (V15.88) (R29.6)   · Shortness of breath on exertion (786.05) (R06.02)   · Stomatitis, viral (528.00,079.99) (K12.1,B97.89)   · Thrombocytopenia (287.5) (D69.6)   · Thrombocytopenia     Past Medical History  Problems    · History of Acute pain of right knee (719.46) (M25.561)   · Resolved Date: 21 Aug 2018   · History of Acute UTI (599.0) (N39.0)   · Resolved Date: 14 Aug 2019   · History of Acute UTI (599.0) (N39.0)   · Resolved Date: 01 Jun 2020   · History of Dysphagia (787.20) (R13.10)   · Resolved Date: 27 Jan 2016   · History of Elevated LFTs (790.6) (R79.89)   · Resolved Date: 01 Apr 2005   · History of Encounter for immunization (V03.89) (Z23)   · Resolved Date: 02 Dec 2022   · History of Epidermal inclusion cyst (706.2) (L72.0)   · History of Fatty liver (571.8) (K76.0)   · Was appreciated on MRI on 4/2005; with elevated LFT's.   HAZEL   · History of Frequent falls (V15.88) (R29.6)   · Resolved Date: 01 Aug 2016   · History of Gastric polyps (211.1) (K31.7)   · History of Globus pharyngeus (784.99) (R09.89)   · Resolved Date: 01 Aug 2016   · History of acne (V13.3) (Z87.2)   · Resolved Date: 19 Feb 2019   · History of concussion (V15.52) (Z87.820)   · Resolved Date: 20 Aug 2017   · History of dermatitis (V13.3) (Z87.2)   · Resolved Date: 01 Aug 2016   · History of dysuria (V13.00) (Z87.898)   · Resolved Date: 14 Aug 2019   · History of gastroesophageal reflux (GERD) (V12.79) (Z87.19)   · History of herpes zoster (V12.09) (Z86.19)   · Resolved Date: 27 Jan 2016   · History of hyperlipidemia (V12.29) (Z86.39)   · History of peptic ulcer (V12.71) (Z87.11)   · History of pneumonia (V12.61) (Z87.01)   · History of sleep apnea (V13.89) (Z86.69)    · History of Instability of right knee joint (718.86) (M25.361)   · Resolved Date: 21 Aug 2018   · History of Knee pain (719.46) (M25.569)   · Resolved Date: 20 Aug 2017   · History of Liver disease, chronic (571.9) (K76.9)   · History of Long term current use of opiate analgesic (V58.69) (Z79.891)   · Resolved Date: 21 Feb 2018   · 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)   · History of Neck pain, chronic (723.1,338.29) (M54.2,G89.29)   · Resolved Date: 20 Aug 2017   · History of Opiate analgesic use agreement exists (V58.69) (Z79.891)   · Resolved Date: 21 Feb 2018   · History of Pain of right hand (729.5) (M79.641)   · Resolved Date: 20 Aug 2017   · Personal history of asthma (V12.69) (Z87.09)   · Personal history of juvenile rheumatoid arthritis (V13.4) (Z87.39)   · Personal history of malignant neoplasm of breast (V10.3) (Z85.3)   · History of Polyp of duodenum (537.89) (K31.7)   · History of Shoulder pain (719.41) (M25.519)   · Resolved Date: 20 Aug 2017   · History of Steatohepatitis, nonalcoholic (571.8) (K75.81)   · History of Subcutaneous nodules (782.2) (R22.9)   · Resolved Date: 07 Jan 2015   · History of Throat pain in adult (784.1) (R07.0)   · Resolved Date: 01 Aug 2016     Surgical History  Problems    · History of Breast Surgery Modified Radical Mastectomy   · History of Breast Surgery Reconstruction   · History of Cholecystectomy   · Resolved Date: 01 May 2000   · History of Colonoscopy (Fiberoptic)   · Resolved Date: 01 Mar 2004   · History of Esophageal Dilation   · History of Excision Of Lesion Face Benign 2.1 To 3cm   · History of Hysterectomy   · Resolved Date: 01 Nov 2003   · History of Knee Replacement   · History of Laparosc Gastric Restrictive Proc By Adjustable Gastric Band   · History of Modified Radical Mastectomy Left Breast   · History of Neuroplasty Decompression Median Nerve At Carpal Tunnel   · Resolved Date: 2001   · Bilaterally in 2001     Family History  Mother    ·  Family history of arthritis (V17.7) (Z82.61)   · Family history of coronary artery disease (V17.3) (Z82.49)  Father    · Family history of Congestive Heart Failure   · Family history of arthritis (V17.7) (Z82.61)   · Family history of coronary artery disease (V17.3) (Z82.49)   · Family history of glaucoma (V19.11) (Z83.511)  Brother    · Family history of Prostate Cancer (V16.42)  Family History    · Family history of Family Health Status Of Mother -    · Family history of asthma (V17.5) (Z82.5)   · Family history of colitis (V18.59) (Z83.79)   · Family history of diabetes mellitus (V18.0) (Z83.3)   · Family history of goiter (V18.19) (Z83.49)   · Family history of hypertension (V17.49) (Z82.49)   · Family history of psoriasis (V19.4) (Z84.0)     Social History  Problems    · Caffeine Use   · soda - 2 glasses a day   · Former smoker (V15.82) (Z87.891)   · quit in . Smoked for 2 months. Smoked 2 cigarettes a day   · Former tobacco use (V15.82) (Z87.891)   ·    · Occupation:   · finance/ collections   · Social alcohol use     Allergies  Medication    · Adhesive Tape TAPE   Recorded By: Fannie Pratt; 2020 10:55:05 AM   · Avelox TABS   Recorded By: Mackenzie Harvey; 2013 3:10:35 PM   · Bactrim   Recorded By: Michelle Siddiqui; 3/17/2015 1:20:15 PM   · Cephalexin CAPS   Recorded By: Mackenzie Harvey; 2013 3:10:35 PM   · Codeine Derivatives   Recorded By: Mackenzie Harvey; 2013 3:10:35 PM   · Desyrel   Recorded By: Patrizia Mora; 2014 1:53:47 PM   · Erythromycin Derivatives   Recorded By: Patrizia Mora; 2014 1:53:47 PM   · Motrin TABS   Recorded By: Patrizia Mora; 2014 1:53:47 PM   · NSAIDs   Recorded By: Fannie Pratt; 2020 10:55:05 AM   · Penicillins   Recorded By: Mackenzie Harvey; 2013 3:10:35 PM   · Percocet TABS   Recorded By: Fannie Pratt; 2020 10:55:05 AM   · Percodan   Recorded By: Fannie Pratt; 2020 10:55:05 AM   ·  Phenergan TABS   Recorded By: Patrizia Mora; 1/13/2014 1:53:47 PM   · topiramate   Recorded By: Olya Kenyon; 8/1/2019 9:57:20 AM   · Toradol   Recorded By: Patrizia Mora; 1/13/2014 1:53:47 PM   · Tramadol   Recorded By: Fannie Pratt; 8/22/2018 11:17:50 AM   · Trintellix TABS   Recorded By: Daisy Blakely; 2/20/2019 11:05:56 AM     Current Meds  Current Outpatient Medications   Medication Sig Dispense Refill    allopurinol (Zyloprim) 100 mg tablet Take 1 tablet (100 mg) by mouth once daily.      deutetrabenazine (Austedo XR) 24 mg tablet extended release 24 hr Take 1 tablet (24 mg) by mouth once daily. Take 1-6mg tablet and 1-24mg tablet for a total dose of 30mg daily. 90 tablet 3    deutetrabenazine (Austedo XR) 6 mg tablet extended release 24 hr Take 1 tablet (6 mg) by mouth once daily. Take 1-6mg tablet and 1-24mg tablet for a total dose of 30mg daily. 90 tablet 3    dexAMETHasone 0.5 mg/5 mL oral liquid Use as directed      DULoxetine (Cymbalta) 60 mg DR capsule Take 1 capsule (60 mg) by mouth 2 times a day. Do not crush or chew.      famotidine (Pepcid) 40 mg tablet       ferrous sulfate (FEOSOL ORAL) Take 1 tablet by mouth once daily in the evening. 200 (65 Fe) MG      lamoTRIgine (LaMICtal) 150 mg tablet Take 1 tablet (150 mg) by mouth once daily.      latanoprost (Xelpros) 0.005 % drops, emulsion Administer into affected eye(s) once daily.      mometasone (Elocon) 0.1 % cream Apply topically once daily as needed (Apply topically once daily as needed). 15 g 1    multivitamin tablet Take 1 tablet by mouth once daily.      naratriptan (Amerge) 2.5 mg tablet Take 1 tablet (2.5 mg) by mouth 1 time if needed for migraine. May repeat once in 4hrs if headache recurs 9 tablet 1    Nyamyc 100,000 unit/gram powder APPLY TO AFFECTED AREA TWICE A DAY 30 g 2    semaglutide (Ozempic) 0.25 mg or 0.5 mg (2 mg/3 mL) pen injector INJECT 0.5 MG UNDER THE SKIN 1 (ONE) TIME PER WEEK. 3 mL 3    sucralfate  "(Carafate) 1 gram tablet       dexAMETHasone 0.5 mg/5 mL oral liquid Take 5 mL (0.5 mg) by mouth 4 times a day for 10 days. 237 mL 1    lidocaine (Lidocaine Viscous) 2 % solution Take by mouth.       No current facility-administered medications for this visit.        Vitals  Blood pressure 135/88, pulse 90, height 1.626 m (5' 4\"), weight 77.6 kg (171 lb).       Physical Exam  General - NAD, sitting up in chair, well-groomed, pleasant, AAOx3  Head: Normocephalic, atraumatic  Eyes - PERRLA, EOMI. No conjunctiva injection.   Mouth/ENT - Moist oral and nasal mucosa.   Cardiovascular - Normal S1, S2. Regular rate and rhythm. No murmurs or rubs.  Lungs - Symmetric chest expansion. Clear to auscultation bilaterally.   Skin - Bruise on her forehead  Extremities - No edema, cyanosis ,or clubbing  Musculoskeletal -  EXAMJOINTDETAILED,  Bilateral CMC tenderness    Assessment/Plan:  67 yo F with PMH chronic gout, and CMC arthritis: Bilateral cortisone injection.  Small Joint Injection/Arthrocentesis: bilateral thumb CMC on 7/2/2024 10:43 AM  Indications: pain  Details: 25 G needle, lateral approach  Medications (Right): 40 mg triamcinolone acetonide 40 mg/mL; 1 mL lidocaine 10 mg/mL (1 %)  Outcome: tolerated well, no immediate complications  Procedure, treatment alternatives, risks and benefits explained, specific risks discussed. Consent was given by the patient. Patient was prepped and draped in the usual sterile fashion.                                "

## 2024-07-11 ENCOUNTER — APPOINTMENT (OUTPATIENT)
Dept: PLASTIC SURGERY | Facility: CLINIC | Age: 69
End: 2024-07-11
Payer: MEDICARE

## 2024-07-11 ENCOUNTER — LAB (OUTPATIENT)
Dept: LAB | Facility: LAB | Age: 69
End: 2024-07-11
Payer: MEDICARE

## 2024-07-11 VITALS
WEIGHT: 171 LBS | HEIGHT: 64 IN | DIASTOLIC BLOOD PRESSURE: 84 MMHG | BODY MASS INDEX: 29.19 KG/M2 | SYSTOLIC BLOOD PRESSURE: 131 MMHG | HEART RATE: 82 BPM

## 2024-07-11 DIAGNOSIS — Z01.818 PREOP TESTING: ICD-10-CM

## 2024-07-11 DIAGNOSIS — E11.9 TYPE 2 DIABETES MELLITUS WITHOUT COMPLICATION, WITHOUT LONG-TERM CURRENT USE OF INSULIN (MULTI): ICD-10-CM

## 2024-07-11 DIAGNOSIS — E88.1 LIPODYSTROPHY: Primary | ICD-10-CM

## 2024-07-11 LAB
ANION GAP SERPL CALC-SCNC: 10 MMOL/L (ref 10–20)
BUN SERPL-MCNC: 16 MG/DL (ref 6–23)
CALCIUM SERPL-MCNC: 8.8 MG/DL (ref 8.6–10.3)
CHLORIDE SERPL-SCNC: 103 MMOL/L (ref 98–107)
CO2 SERPL-SCNC: 32 MMOL/L (ref 21–32)
CREAT SERPL-MCNC: 1.31 MG/DL (ref 0.5–1.05)
EGFRCR SERPLBLD CKD-EPI 2021: 44 ML/MIN/1.73M*2
ERYTHROCYTE [DISTWIDTH] IN BLOOD BY AUTOMATED COUNT: 13.1 % (ref 11.5–14.5)
EST. AVERAGE GLUCOSE BLD GHB EST-MCNC: 82 MG/DL
GLUCOSE SERPL-MCNC: 77 MG/DL (ref 74–99)
HBA1C MFR BLD: 4.5 %
HCT VFR BLD AUTO: 38.5 % (ref 36–46)
HGB BLD-MCNC: 12.2 G/DL (ref 12–16)
MCH RBC QN AUTO: 30.2 PG (ref 26–34)
MCHC RBC AUTO-ENTMCNC: 31.7 G/DL (ref 32–36)
MCV RBC AUTO: 95 FL (ref 80–100)
NRBC BLD-RTO: 0 /100 WBCS (ref 0–0)
PLATELET # BLD AUTO: 85 X10*3/UL (ref 150–450)
POTASSIUM SERPL-SCNC: 3.8 MMOL/L (ref 3.5–5.3)
RBC # BLD AUTO: 4.04 X10*6/UL (ref 4–5.2)
SODIUM SERPL-SCNC: 141 MMOL/L (ref 136–145)
WBC # BLD AUTO: 4.2 X10*3/UL (ref 4.4–11.3)

## 2024-07-11 PROCEDURE — 3079F DIAST BP 80-89 MM HG: CPT | Performed by: SURGERY

## 2024-07-11 PROCEDURE — 3075F SYST BP GE 130 - 139MM HG: CPT | Performed by: SURGERY

## 2024-07-11 PROCEDURE — 85027 COMPLETE CBC AUTOMATED: CPT

## 2024-07-11 PROCEDURE — 36415 COLL VENOUS BLD VENIPUNCTURE: CPT

## 2024-07-11 PROCEDURE — 83036 HEMOGLOBIN GLYCOSYLATED A1C: CPT

## 2024-07-11 PROCEDURE — 3044F HG A1C LEVEL LT 7.0%: CPT | Performed by: SURGERY

## 2024-07-11 PROCEDURE — 1159F MED LIST DOCD IN RCRD: CPT | Performed by: SURGERY

## 2024-07-11 PROCEDURE — 99213 OFFICE O/P EST LOW 20 MIN: CPT | Performed by: SURGERY

## 2024-07-11 PROCEDURE — 80048 BASIC METABOLIC PNL TOTAL CA: CPT

## 2024-07-11 RX ORDER — CHLORHEXIDINE GLUCONATE 40 MG/ML
SOLUTION TOPICAL ONCE
Qty: 236 ML | Refills: 0 | Status: SHIPPED | OUTPATIENT
Start: 2024-07-11 | End: 2024-07-11

## 2024-07-11 NOTE — H&P (VIEW-ONLY)
Department of Plastic and Reconstructive Surgery     [facility]    Date: 7/11/2024        Subjective   Mariann Drew is a 68 y.o. female who was previously referred by Olivia Self DO  for excess skin causing intertrigo Jenis rash and excoriation of the infraumbilical/pannus area.  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    Patient presented previously status post significant weight loss now having intertrigo and yeast in the Intertriginous skin folds.  Mariann has lost weight through diet and exercise, her maximum weight was 306 pounds  Goal is 165, currently, has been there for some months.  Her goal weight is 130.  Past medical history former type II diabetic, her most recent hemoglobin A1c was 4.4%, she states that with weight loss she no longer is diabetic.  She has a chronic history of low blood counts, she has tardive dyskinesia.  Past surgical history is notable for bladder stimulator to treat chronic continence, right knee replacement, hysterectomy, gallbladder removal, left mastectomy for cancer in 2000 tissue expander secondary surgery with implant, right matching breast reduction.      She complains today of intertriginous rash, excoriation, infections due to overhanging skin over her pubic and genitalia area.    She presents today ahead of planned [id number]-vrd abdominoplasty with cosmetic mons plasty.    I reviewed with the patient the remainder of the his personal, medical, surgical and social history, found not to be pertinent to chief complaint. I specifically reviewed the family history with patient, found not to be pertinent to chief complaint.    Objective    Vitals:    07/11/24 0825   BP: 131/84   Pulse: 82         Gen: interactive and pleasant  Head: NCAT  Eyes: EOMI, PERRLA  Mouth: MMM  Throat: trachea midline  Cor: RRR  Pulm: nonlabored breathing  Abd: s/nt/nd  Neuro: AAOx3  Ext: extremities perfused    Body  mass index is 29.35 kg/m².    Focused exam of the:   Abdomen shows a grade 3 abdominal panniculus, overhanging of skin genitalia  Significant skin laxity in horizontal and vertical dimension  2 rolls, 1 at the umbilical area 1 at the suprapubic area  Significant mons ptosis    Notable ptosis of the bilateral upper arms, significant excess skin and adiposity    Imaging: Patient had mammogram performed in 2/2024 showing no signs of malignancy.    Assessment/Plan       Mariann is a 68 y.o. female who presents for abdominal panniculus secondary to massive weight loss.    The patient has a hanging apron of skin and fat below the waist.  This extra skin is causing deep sores which have to be treated with oral medicine and affects the patient's ability to complete activities of daily living such as grooming and dressing.  Abdominal panniculectomy would improve and restore these things.    Mariann has a chronic intertrigo and rashing that has been recalcitrant to medical and nonsurgical therapy for over 3 months including antifungals, steroid creams, and antibiotics.  Weight has been stable for the last 6 months.  She practices good hygiene.       Plan:   zuoga-pj-iow abdominoplasty with addition of mons plasty    I discussed with the patient that this is reconstructive surgery with a cosmetic addition, and there are no guarantees of results.  Sometimes, patients are dissatisfied and sometimes patients require revision surgery.  If there is a need to return to surgery to correct any problems or complications, we indicated that in some instances, the return to the operating room may not covered by insurance.  I indicated that I do not charge the patient for return to the operating room, but there WILL BE charges for operating room/facility fees and anesthesia fees, over which we have no control.            I spent 60 minutes with this patient. Greater than 50% of this time was spent in the counselling and/or coordination of care  of this patient.  This note was created using voice recognition software and was not corrected for typographical or grammatical errors.    Signature: Anibal Tiwari MD   Date: 7/11/2024

## 2024-07-11 NOTE — PROGRESS NOTES
Department of Plastic and Reconstructive Surgery     [facility]    Date: 7/11/2024        Subjective   Mariann Drew is a 68 y.o. female who was previously referred by Olivia Self DO  for excess skin causing intertrigo Jenis rash and excoriation of the infraumbilical/pannus area.  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    Patient presented previously status post significant weight loss now having intertrigo and yeast in the Intertriginous skin folds.  Mariann has lost weight through diet and exercise, her maximum weight was 306 pounds  Goal is 165, currently, has been there for some months.  Her goal weight is 130.  Past medical history former type II diabetic, her most recent hemoglobin A1c was 4.4%, she states that with weight loss she no longer is diabetic.  She has a chronic history of low blood counts, she has tardive dyskinesia.  Past surgical history is notable for bladder stimulator to treat chronic continence, right knee replacement, hysterectomy, gallbladder removal, left mastectomy for cancer in 2000 tissue expander secondary surgery with implant, right matching breast reduction.      She complains today of intertriginous rash, excoriation, infections due to overhanging skin over her pubic and genitalia area.    She presents today ahead of planned [id number]-gj-pbn abdominoplasty with cosmetic mons plasty.    I reviewed with the patient the remainder of the his personal, medical, surgical and social history, found not to be pertinent to chief complaint. I specifically reviewed the family history with patient, found not to be pertinent to chief complaint.    Objective    Vitals:    07/11/24 0825   BP: 131/84   Pulse: 82         Gen: interactive and pleasant  Head: NCAT  Eyes: EOMI, PERRLA  Mouth: MMM  Throat: trachea midline  Cor: RRR  Pulm: nonlabored breathing  Abd: s/nt/nd  Neuro: AAOx3  Ext: extremities perfused    Body  mass index is 29.35 kg/m².    Focused exam of the:   Abdomen shows a grade 3 abdominal panniculus, overhanging of skin genitalia  Significant skin laxity in horizontal and vertical dimension  2 rolls, 1 at the umbilical area 1 at the suprapubic area  Significant mons ptosis    Notable ptosis of the bilateral upper arms, significant excess skin and adiposity    Imaging: Patient had mammogram performed in 2/2024 showing no signs of malignancy.    Assessment/Plan       Mariann is a 68 y.o. female who presents for abdominal panniculus secondary to massive weight loss.    The patient has a hanging apron of skin and fat below the waist.  This extra skin is causing deep sores which have to be treated with oral medicine and affects the patient's ability to complete activities of daily living such as grooming and dressing.  Abdominal panniculectomy would improve and restore these things.    Mariann has a chronic intertrigo and rashing that has been recalcitrant to medical and nonsurgical therapy for over 3 months including antifungals, steroid creams, and antibiotics.  Weight has been stable for the last 6 months.  She practices good hygiene.       Plan:   [id number]-qy-lny abdominoplasty with addition of mons plasty    I discussed with the patient that this is reconstructive surgery with a cosmetic addition, and there are no guarantees of results.  Sometimes, patients are dissatisfied and sometimes patients require revision surgery.  If there is a need to return to surgery to correct any problems or complications, we indicated that in some instances, the return to the operating room may not covered by insurance.  I indicated that I do not charge the patient for return to the operating room, but there WILL BE charges for operating room/facility fees and anesthesia fees, over which we have no control.            I spent 60 minutes with this patient. Greater than 50% of this time was spent in the counselling and/or coordination of care  of this patient.  This note was created using voice recognition software and was not corrected for typographical or grammatical errors.    Signature: Anibal Tiwari MD   Date: 7/11/2024

## 2024-07-12 ENCOUNTER — SPECIALTY PHARMACY (OUTPATIENT)
Dept: PHARMACY | Facility: CLINIC | Age: 69
End: 2024-07-12

## 2024-07-12 NOTE — PROGRESS NOTES
[facility] Clinical Note    Mariann Drew is a 68 y.o. female, who is on the specialty pharmacy service for management of:  Movement Disorders Non-Core.    Mariann Drew is taking: Austedo.      Mariann was contacted on 7/12/2024 at 1:26 PM for a virtual pharmacy visit with encounter number [id number] from:   [facility]  [address] [address]  Dept: [phone]  Dept Fax: [phone]    Mariann was offered a Telephone visit.  Mariann consented to a telephone visit, which was performed.    The most recent encounter visit with the referring prescriber Olivia Herrera on 3/22/24 was reviewed.  Pharmacy will continue to collaborate in the care of this patient with the referring prescriber Olivia Herrera.    General Assessment      Impression/Plan  IMPRESSION/PLAN:  Is patient high risk (potential patients:  pregnancy, geriatric, pediatric)?  no   Is laboratory follow-up needed? no  Is a clinical intervention needed? no  Next reassessment date? Approximately 3 months  Additional comments:     Refer to the encounter summary report for documentation details about patient counseling and education.      Medication Adherence    The importance of adherence was discussed with the patient and they were advised to take the medication as prescribed by their provider. Patient was encouraged to call their physician's office if they have a question regarding a missed dose.     QOL/Patient Satisfaction  Rate your quality of life on scale of 1-10: 9  Rate your satisfaction with  Specialty Pharmacy on scale of 1-10: 9      Patient was advised to contact the pharmacy if there are any changes to their medication list, including prescriptions, OTC medications, herbal products, or supplements. Patient was advised of [facility]'s dispensing process, refill timeline, contact information ([phone]), and patient management follow up.  Patient confirmed understanding of education conducted during assessment. All patient questions and concerns were addressed to the best of my ability. Patient was encouraged to contact the specialty pharmacy with any questions or concerns.    Confirmed follow-up outreaches are properly scheduled and reviewed goals of therapy with the patient.        Vladimir Wren, PharmD

## 2024-07-13 DIAGNOSIS — M1A.09X0 IDIOPATHIC CHRONIC GOUT, MULTIPLE SITES, WITHOUT TOPHUS (TOPHI): ICD-10-CM

## 2024-07-15 RX ORDER — ALLOPURINOL 300 MG/1
TABLET ORAL
Qty: 90 TABLET | Refills: 3 | Status: SHIPPED | OUTPATIENT
Start: 2024-07-15

## 2024-07-18 ENCOUNTER — SPECIALTY PHARMACY (OUTPATIENT)
Dept: PHARMACY | Facility: CLINIC | Age: 69
End: 2024-07-18

## 2024-07-18 PROCEDURE — RXMED WILLOW AMBULATORY MEDICATION CHARGE

## 2024-07-22 ENCOUNTER — PHARMACY VISIT (OUTPATIENT)
Dept: PHARMACY | Facility: CLINIC | Age: 69
End: 2024-07-22
Payer: COMMERCIAL

## 2024-07-25 ENCOUNTER — OFFICE (OUTPATIENT)
Dept: URBAN - METROPOLITAN AREA CLINIC 26 | Facility: CLINIC | Age: 69
End: 2024-07-25
Payer: COMMERCIAL

## 2024-07-25 VITALS
WEIGHT: 167 LBS | SYSTOLIC BLOOD PRESSURE: 128 MMHG | HEIGHT: 64 IN | HEART RATE: 89 BPM | DIASTOLIC BLOOD PRESSURE: 74 MMHG | TEMPERATURE: 97.9 F

## 2024-07-25 DIAGNOSIS — R13.10 DYSPHAGIA, UNSPECIFIED: ICD-10-CM

## 2024-07-25 DIAGNOSIS — K86.2 CYST OF PANCREAS: ICD-10-CM

## 2024-07-25 DIAGNOSIS — K21.9 GASTRO-ESOPHAGEAL REFLUX DISEASE WITHOUT ESOPHAGITIS: ICD-10-CM

## 2024-07-25 DIAGNOSIS — K22.70 BARRETT'S ESOPHAGUS WITHOUT DYSPLASIA: ICD-10-CM

## 2024-07-25 PROCEDURE — 99214 OFFICE O/P EST MOD 30 MIN: CPT | Performed by: NURSE PRACTITIONER

## 2024-07-25 RX ORDER — FAMOTIDINE 20 MG/1
20 TABLET, FILM COATED ORAL
Qty: 90 | Refills: 0 | Status: ACTIVE
Start: 2024-07-25

## 2024-07-29 ENCOUNTER — APPOINTMENT (OUTPATIENT)
Dept: PLASTIC SURGERY | Facility: CLINIC | Age: 69
End: 2024-07-29
Payer: MEDICARE

## 2024-07-30 ENCOUNTER — HOSPITAL ENCOUNTER (OUTPATIENT)
Facility: CLINIC | Age: 69
Setting detail: OUTPATIENT SURGERY
Discharge: HOME | End: 2024-07-30
Attending: SURGERY | Admitting: SURGERY
Payer: MEDICARE

## 2024-07-30 ENCOUNTER — ANESTHESIA (OUTPATIENT)
Dept: OPERATING ROOM | Facility: CLINIC | Age: 69
End: 2024-07-30
Payer: MEDICARE

## 2024-07-30 ENCOUNTER — ANESTHESIA EVENT (OUTPATIENT)
Dept: OPERATING ROOM | Facility: CLINIC | Age: 69
End: 2024-07-30
Payer: MEDICARE

## 2024-07-30 VITALS
HEART RATE: 76 BPM | RESPIRATION RATE: 16 BRPM | TEMPERATURE: 97.9 F | OXYGEN SATURATION: 96 % | SYSTOLIC BLOOD PRESSURE: 97 MMHG | DIASTOLIC BLOOD PRESSURE: 58 MMHG | HEIGHT: 64 IN | WEIGHT: 170.19 LBS | BODY MASS INDEX: 29.06 KG/M2

## 2024-07-30 DIAGNOSIS — E88.1 LIPODYSTROPHY: ICD-10-CM

## 2024-07-30 DIAGNOSIS — L98.7 EXCESSIVE AND REDUNDANT SKIN AND SUBCUTANEOUS TISSUE: ICD-10-CM

## 2024-07-30 DIAGNOSIS — E65 LOCALIZED ADIPOSITY: ICD-10-CM

## 2024-07-30 DIAGNOSIS — Z41.1 ENCOUNTER FOR COSMETIC SURGERY: Primary | ICD-10-CM

## 2024-07-30 DIAGNOSIS — G89.18 POST-OP PAIN: ICD-10-CM

## 2024-07-30 PROCEDURE — 3700000001 HC GENERAL ANESTHESIA TIME - INITIAL BASE CHARGE: Performed by: SURGERY

## 2024-07-30 PROCEDURE — P9045 ALBUMIN (HUMAN), 5%, 250 ML: HCPCS | Mod: JZ,JG | Performed by: ANESTHESIOLOGY

## 2024-07-30 PROCEDURE — 2500000005 HC RX 250 GENERAL PHARMACY W/O HCPCS: Performed by: ANESTHESIOLOGIST ASSISTANT

## 2024-07-30 PROCEDURE — 15839 EXC EXCESSIVE SKN OTHER AREA: CPT | Performed by: SURGERY

## 2024-07-30 PROCEDURE — 15830 EXC EXCESSIVE SKIN ABDOMEN: CPT | Performed by: PHYSICIAN ASSISTANT

## 2024-07-30 PROCEDURE — A15830 PR EXCISE EXCESS SKIN TISSUE,ABDOMEN: Performed by: ANESTHESIOLOGIST ASSISTANT

## 2024-07-30 PROCEDURE — 3600000003 HC OR TIME - INITIAL BASE CHARGE - PROCEDURE LEVEL THREE: Performed by: SURGERY

## 2024-07-30 PROCEDURE — 15830 EXC EXCESSIVE SKIN ABDOMEN: CPT | Performed by: SURGERY

## 2024-07-30 PROCEDURE — 15847 EXC SKIN ABD ADD-ON: CPT | Performed by: PHYSICIAN ASSISTANT

## 2024-07-30 PROCEDURE — C9290 INJ, BUPIVACAINE LIPOSOME: HCPCS | Performed by: SURGERY

## 2024-07-30 PROCEDURE — 3700000002 HC GENERAL ANESTHESIA TIME - EACH INCREMENTAL 1 MINUTE: Mod: CSM | Performed by: SURGERY

## 2024-07-30 PROCEDURE — 7100000001 HC RECOVERY ROOM TIME - INITIAL BASE CHARGE: Performed by: SURGERY

## 2024-07-30 PROCEDURE — A15830 PR EXCISE EXCESS SKIN TISSUE,ABDOMEN: Performed by: ANESTHESIOLOGY

## 2024-07-30 PROCEDURE — 3600000008 HC OR TIME - EACH INCREMENTAL 1 MINUTE - PROCEDURE LEVEL THREE: Mod: CSM | Performed by: SURGERY

## 2024-07-30 PROCEDURE — 7100000002 HC RECOVERY ROOM TIME - EACH INCREMENTAL 1 MINUTE: Performed by: SURGERY

## 2024-07-30 PROCEDURE — 2720000007 HC OR 272 NO HCPCS: Performed by: SURGERY

## 2024-07-30 PROCEDURE — 2500000004 HC RX 250 GENERAL PHARMACY W/ HCPCS (ALT 636 FOR OP/ED): Performed by: SURGERY

## 2024-07-30 PROCEDURE — 2500000001 HC RX 250 WO HCPCS SELF ADMINISTERED DRUGS (ALT 637 FOR MEDICARE OP): Performed by: ANESTHESIOLOGY

## 2024-07-30 PROCEDURE — 2500000004 HC RX 250 GENERAL PHARMACY W/ HCPCS (ALT 636 FOR OP/ED): Performed by: ANESTHESIOLOGIST ASSISTANT

## 2024-07-30 PROCEDURE — 2500000001 HC RX 250 WO HCPCS SELF ADMINISTERED DRUGS (ALT 637 FOR MEDICARE OP): Performed by: SURGERY

## 2024-07-30 PROCEDURE — 7100000009 HC PHASE TWO TIME - INITIAL BASE CHARGE: Performed by: SURGERY

## 2024-07-30 PROCEDURE — 2500000005 HC RX 250 GENERAL PHARMACY W/O HCPCS: Performed by: SURGERY

## 2024-07-30 PROCEDURE — 15847 EXC SKIN ABD ADD-ON: CPT | Performed by: SURGERY

## 2024-07-30 PROCEDURE — 7100000010 HC PHASE TWO TIME - EACH INCREMENTAL 1 MINUTE: Performed by: SURGERY

## 2024-07-30 PROCEDURE — 2500000004 HC RX 250 GENERAL PHARMACY W/ HCPCS (ALT 636 FOR OP/ED): Mod: JZ,JG | Performed by: ANESTHESIOLOGY

## 2024-07-30 RX ORDER — CYCLOBENZAPRINE HCL 5 MG
5 TABLET ORAL 3 TIMES DAILY PRN
Qty: 30 TABLET | Refills: 0 | Status: SHIPPED | OUTPATIENT
Start: 2024-07-30

## 2024-07-30 RX ORDER — BUPIVACAINE HYDROCHLORIDE 5 MG/ML
INJECTION, SOLUTION PERINEURAL AS NEEDED
Status: DISCONTINUED | OUTPATIENT
Start: 2024-07-30 | End: 2024-07-30 | Stop reason: HOSPADM

## 2024-07-30 RX ORDER — HYDROMORPHONE HYDROCHLORIDE 1 MG/ML
0.5 INJECTION, SOLUTION INTRAMUSCULAR; INTRAVENOUS; SUBCUTANEOUS EVERY 5 MIN PRN
Status: DISCONTINUED | OUTPATIENT
Start: 2024-07-30 | End: 2024-07-30 | Stop reason: HOSPADM

## 2024-07-30 RX ORDER — HYDROMORPHONE HYDROCHLORIDE 0.2 MG/ML
0.2 INJECTION INTRAMUSCULAR; INTRAVENOUS; SUBCUTANEOUS EVERY 5 MIN PRN
Status: DISCONTINUED | OUTPATIENT
Start: 2024-07-30 | End: 2024-07-30 | Stop reason: HOSPADM

## 2024-07-30 RX ORDER — OXYCODONE HYDROCHLORIDE 5 MG/1
10 TABLET ORAL EVERY 4 HOURS PRN
Status: DISCONTINUED | OUTPATIENT
Start: 2024-07-30 | End: 2024-07-30 | Stop reason: HOSPADM

## 2024-07-30 RX ORDER — FENTANYL CITRATE 50 UG/ML
INJECTION, SOLUTION INTRAMUSCULAR; INTRAVENOUS AS NEEDED
Status: DISCONTINUED | OUTPATIENT
Start: 2024-07-30 | End: 2024-07-30

## 2024-07-30 RX ORDER — ALBUMIN HUMAN 50 G/1000ML
12.5 SOLUTION INTRAVENOUS ONCE
Status: COMPLETED | OUTPATIENT
Start: 2024-07-30 | End: 2024-07-30

## 2024-07-30 RX ORDER — SODIUM CHLORIDE, SODIUM LACTATE, POTASSIUM CHLORIDE, CALCIUM CHLORIDE 600; 310; 30; 20 MG/100ML; MG/100ML; MG/100ML; MG/100ML
INJECTION, SOLUTION INTRAVENOUS CONTINUOUS PRN
Status: DISCONTINUED | OUTPATIENT
Start: 2024-07-30 | End: 2024-07-30

## 2024-07-30 RX ORDER — PHENYLEPHRINE HCL IN 0.9% NACL 0.4MG/10ML
SYRINGE (ML) INTRAVENOUS AS NEEDED
Status: DISCONTINUED | OUTPATIENT
Start: 2024-07-30 | End: 2024-07-30

## 2024-07-30 RX ORDER — TRANEXAMIC ACID 100 MG/ML
INJECTION, SOLUTION INTRAVENOUS AS NEEDED
Status: DISCONTINUED | OUTPATIENT
Start: 2024-07-30 | End: 2024-07-30

## 2024-07-30 RX ORDER — METHOCARBAMOL 100 MG/ML
INJECTION, SOLUTION INTRAMUSCULAR; INTRAVENOUS AS NEEDED
Status: DISCONTINUED | OUTPATIENT
Start: 2024-07-30 | End: 2024-07-30

## 2024-07-30 RX ORDER — SODIUM CHLORIDE 0.9 G/100ML
IRRIGANT IRRIGATION AS NEEDED
Status: DISCONTINUED | OUTPATIENT
Start: 2024-07-30 | End: 2024-07-30 | Stop reason: HOSPADM

## 2024-07-30 RX ORDER — PROPOFOL 10 MG/ML
INJECTION, EMULSION INTRAVENOUS AS NEEDED
Status: DISCONTINUED | OUTPATIENT
Start: 2024-07-30 | End: 2024-07-30

## 2024-07-30 RX ORDER — ONDANSETRON HYDROCHLORIDE 2 MG/ML
INJECTION, SOLUTION INTRAVENOUS AS NEEDED
Status: DISCONTINUED | OUTPATIENT
Start: 2024-07-30 | End: 2024-07-30

## 2024-07-30 RX ORDER — ONDANSETRON HYDROCHLORIDE 2 MG/ML
4 INJECTION, SOLUTION INTRAVENOUS ONCE AS NEEDED
Status: DISCONTINUED | OUTPATIENT
Start: 2024-07-30 | End: 2024-07-30 | Stop reason: HOSPADM

## 2024-07-30 RX ORDER — OXYCODONE HYDROCHLORIDE 5 MG/1
5 TABLET ORAL EVERY 6 HOURS PRN
Qty: 15 TABLET | Refills: 0 | Status: SHIPPED | OUTPATIENT
Start: 2024-07-30

## 2024-07-30 RX ORDER — LIDOCAINE IN NACL,ISO-OSMOT/PF 30 MG/3 ML
0.1 SYRINGE (ML) INJECTION ONCE
Status: DISCONTINUED | OUTPATIENT
Start: 2024-07-30 | End: 2024-07-30 | Stop reason: HOSPADM

## 2024-07-30 RX ORDER — OXYCODONE HYDROCHLORIDE 5 MG/1
5 TABLET ORAL ONCE
Status: COMPLETED | OUTPATIENT
Start: 2024-07-30 | End: 2024-07-30

## 2024-07-30 RX ORDER — MIDAZOLAM HYDROCHLORIDE 1 MG/ML
INJECTION, SOLUTION INTRAMUSCULAR; INTRAVENOUS AS NEEDED
Status: DISCONTINUED | OUTPATIENT
Start: 2024-07-30 | End: 2024-07-30

## 2024-07-30 RX ORDER — SODIUM CHLORIDE, SODIUM LACTATE, POTASSIUM CHLORIDE, CALCIUM CHLORIDE 600; 310; 30; 20 MG/100ML; MG/100ML; MG/100ML; MG/100ML
75 INJECTION, SOLUTION INTRAVENOUS CONTINUOUS
Status: DISCONTINUED | OUTPATIENT
Start: 2024-07-30 | End: 2024-07-30 | Stop reason: HOSPADM

## 2024-07-30 RX ORDER — LIDOCAINE HYDROCHLORIDE 10 MG/ML
INJECTION INFILTRATION; PERINEURAL AS NEEDED
Status: DISCONTINUED | OUTPATIENT
Start: 2024-07-30 | End: 2024-07-30

## 2024-07-30 RX ORDER — CLINDAMYCIN PHOSPHATE 150 MG/ML
INJECTION, SOLUTION INTRAVENOUS AS NEEDED
Status: DISCONTINUED | OUTPATIENT
Start: 2024-07-30 | End: 2024-07-30

## 2024-07-30 ASSESSMENT — PAIN - FUNCTIONAL ASSESSMENT
PAIN_FUNCTIONAL_ASSESSMENT: 0-10

## 2024-07-30 ASSESSMENT — COLUMBIA-SUICIDE SEVERITY RATING SCALE - C-SSRS
6. HAVE YOU EVER DONE ANYTHING, STARTED TO DO ANYTHING, OR PREPARED TO DO ANYTHING TO END YOUR LIFE?: NO
2. HAVE YOU ACTUALLY HAD ANY THOUGHTS OF KILLING YOURSELF?: NO
1. IN THE PAST MONTH, HAVE YOU WISHED YOU WERE DEAD OR WISHED YOU COULD GO TO SLEEP AND NOT WAKE UP?: NO

## 2024-07-30 ASSESSMENT — PAIN SCALES - GENERAL
PAINLEVEL_OUTOF10: 8
PAINLEVEL_OUTOF10: 8
PAINLEVEL_OUTOF10: 3
PAINLEVEL_OUTOF10: 0 - NO PAIN
PAINLEVEL_OUTOF10: 8
PAINLEVEL_OUTOF10: 0 - NO PAIN
PAINLEVEL_OUTOF10: 8
PAINLEVEL_OUTOF10: 6
PAINLEVEL_OUTOF10: 8
PAINLEVEL_OUTOF10: 8
PAINLEVEL_OUTOF10: 0 - NO PAIN
PAINLEVEL_OUTOF10: 8

## 2024-07-30 ASSESSMENT — PAIN DESCRIPTION - ORIENTATION: ORIENTATION: RIGHT;LEFT

## 2024-07-30 ASSESSMENT — PAIN DESCRIPTION - LOCATION: LOCATION: PELVIS

## 2024-07-30 NOTE — DISCHARGE INSTRUCTIONS
Plastic Surgery Abdominoplasty                Post Operative Instructions    Office Phone: [phone] or contact central scheduling  After-Hours Patient line: [phone]  (Use this number for medical questions/concerns only after 4pm or on the weekends)      Activity:   -you must maintain beach chair position. This includes sitting and sleeping with your back and knees elevated so that your trunk is flexed. When walking, you should walk with a slight forward lean so that your trunk is flexed.    -no lifting greater than 10lbs for 2 weeks after surgery  -ok for walking and ambulating    Wound/Dressing Care:   -You will have an abdominal binder, wear this daily. Ok to remove to shower and then replace.   -You will have surgical drains. Strip, empty, and record output 2-3 times daily. Bring record log of drain output with you to your follow up appointment in 1 week   -your incisions will have purple surgical glue, this is ok to get wet. You may shower beginning the day after surgery. We recommend warm/hot showers 1-2 times daily. Ok for soap and water.     Pain Control:   -you may use oxycodone every 4-6 hours as needed for pain. You may use the Cyclobenzaprine every 8 hours as needed for pain and muscle spasm.       Nitrobid ointment:      -Upon showering removed yellow gauze. Shower with warm/hot water and pat dry. Apply a layer of the nitrobid ointment to the belly button and surrounding skin and cover with gauze. Use gloves or Qtip when applying nitrobid ointment. Lightheadedness and headache may occur with use. If symptoms are not tolerable please contact Dr. Tiwari office at [phone]. If you require medical assistance after 4pm or on the weekends please call the after hour patient line at [phone]             Raman-Crawford Drain    Why is this procedure done?  In some cases, fluid gathers in the wound area after a  surgery. This fluid may raise the chance of infection. It also slows down the healing process. When this might happen, the doctor may put in a drain to bring the fluid outside the body.  A Raman-Crawford, or BÁRBARA drain, may be used to help get rid of the extra fluid from around the cut site. A BÁRBARA drain is made up of two parts. The first part is a thin rubber tube. The other part is a soft bulb with a removable stopper. The bulb acts as suction and a container to collect the fluid from the body.    What will the results be?  This drain gets rid of extra fluid from your cut site. This will help it to get better faster.  What happens before the procedure?  Talk to the doctor about all the drugs you are taking. Be sure to include all prescription and over-the-counter (OTC) drugs, and herbal supplements. Tell the doctor about any drug allergy. Bring a list of drugs you take with you.  Any bleeding problems. Be sure to tell your doctor if you are taking any drugs that may cause bleeding. Some of these are warfarin, rivaroxaban, apixaban, ticagrelor, clopidogrel, ketorolac, ibuprofen, naproxen, or aspirin. Certain vitamins and herbs, such as garlic and fish oil, may also add to the risk for bleeding. You may need to stop these drugs as well. Talk to your doctor about them.  Ask your doctor if you may eat or drink anything before the procedure.  You will not be allowed to drive right away after the procedure. Ask a family member or a friend to drive you home.  What happens during the procedure?  During surgery, your doctor will put one end of the rubber tube into the area where there is extra fluid. The tube will be held in place by a stitch in your skin.  The other end of the rubber tube is hooked to the bulb. The doctor will then remove the stopper. The doctor squeezes the bulb to get rid of the air. The stopper is put back on the bulb and this makes suction inside the drain. The excess fluid will be pulled out of your  body.  It may only take a few minutes to put in the drain.  You will not be awake if the drain is put in during surgery.  What happens after the procedure?  You may be sore where the drain was put in. Your doctor will give you drugs for the pain.  You may need to stay in the hospital until you are well enough to go home. Your doctor will watch to see how much fluid is in the bulb each day.  The nurses will empty the drain when it gets about half full or at certain times during the day and measure the amount of fluid emptied.  What care is needed at home?  Ask your doctor what you need to do when you go home. Make sure you ask questions if you do not understand what the doctor says. This way you will know what you need to do.  You will learn how to:  Wash your hands every time before and after you empty the drain or change your bandage.  Care for the skin around the site where the tube goes into your body.  Empty the drain. Remove the stopper at the end. Pour out the drainage. Your doctor may want you to measure how much drainage is in the bulb. Then, squeeze the bulb to get rid of air and put the stopper back in place.  Care for the tube if there is a clot in it. The doctor may tell you to squeeze the tube over the clot. You can also squeeze the tube from your skin to the bulb and then let it go. This should open the tube. Ask your doctor to show you how to do this before you go home.  Measure the amount of fluid in the drain ball after you empty it each day and write it down on a chart.  Look for signs of infection. Your doctor will want to know if you have a fever of 100.4°F (38°C) or higher, chills, if you get very red and sore around the drain, or if the fluid in the drain turns cloudy or smells. Your doctor will want to know if the skin around the drain has any fluid or pus coming out of it.  Sleep on the side opposite to the BÁRBARA drain. This prevents any kinking of the tubing or pulling out the suction  bulb.  Avoid bumping the drain.  Your doctor may want you to hold the drain up with a safety pin while you are moving around. Ask your doctor to show you how to do this.  Talk to your doctor about when it is safe to take a bath or shower. Ask your doctor about your activity level while you have your drain in place.  What follow-up care is needed?  Your doctor may ask you to make visits to the office to check on your progress. Be sure to keep these visits.  Your doctor will tell you when the drain may be removed.  What problems could happen?  Fluid leaking from the cut site  Bleeding  Infection  Clot in the tube  Tube and drain falls out  Cut area opens up  Drain stops working  When do I need to call the doctor?  Signs of infection at the drain site. These include swelling, redness, warmth around the wound, too much pain when touched, yellowish or greenish or bloody discharge, foul smell coming from the cut site.  Drain becomes loose, comes apart, abruptly stops draining, or falls out  Last Reviewed Date  2019-09-24  Consumer Information Use and Disclaimer  This generalized information is a limited summary of diagnosis, treatment, and/or medication information. It is not meant to be comprehensive and should be used as a tool to help the user understand and/or assess potential diagnostic and treatment options. It does NOT include all information about conditions, treatments, medications, side effects, or risks that may apply to a specific patient. It is not intended to be medical advice or a substitute for the medical advice, diagnosis, or treatment of a health care provider based on the health care provider's examination and assessment of a patient’s specific and unique circumstances. Patients must speak with a health care provider for complete information about their health, medical questions, and treatment options, including any risks or benefits regarding use of medications. This information does not endorse any  treatments or medications as safe, effective, or approved for treating a specific patient. [employer] and its affiliates disclaim any warranty or liability relating to this information or the use thereof. The use of this information is governed by the Terms of Use, available at https://www.[employer].com/en/know/clinical-effectiveness-terms  Copyright © 2023 [employer], [employer] and its affiliates and/or licensors. All rights reserved.

## 2024-07-30 NOTE — ANESTHESIA PROCEDURE NOTES
Airway  Date/Time: 7/30/2024 7:39 AM  Urgency: elective    Airway not difficult    Staffing  Performed: BARBRA   Authorized by: Fabio Mark MD    Performed by: MILVIA Walker  Patient location during procedure: OR    Indications and Patient Condition  Spontaneous ventilation: present  Sedation level: deep  Preoxygenated: yes  Mask difficulty assessment: 0 - not attempted  Planned trial extubation    Final Airway Details  Final airway type: supraglottic airway      Successful airway: Size 4     Number of attempts at approach: 1  Number of other approaches attempted: 0    Additional Comments  Lips/teeth in pre-anesthetic condition. IGEL LMA

## 2024-07-30 NOTE — OP NOTE
Plastic Surgery Operative Note       Date: 2024  OR Location: [facility] OR    Name: Mariann Drew, : 1955, Age: 68 y.o., MRN: [id number], Sex: female    Diagnosis  Pre-op Diagnosis      * Lipodystrophy [E88.1]     * Excessive and redundant skin and subcutaneous tissue [L98.7]     * Localized adiposity [E65] Post-op Diagnosis     * Lipodystrophy [E88.1]     * Excessive and redundant skin and subcutaneous tissue [L98.7]     * Localized adiposity [E65]     Procedures    1.  Ykibd-rs-wua abdominoplasty (02523, 29042)-due to significant scarring, and large panniculus and habitus increased procedural time and service was incurred, therefore a 22 modifier was used for 42866  2.  Cosmetic monsplasty (37428)  3.  Bilateral transverse abdominis plane nerve blocks for postoperative analgesia not intraoperative pain control    Surgeons      * Anibal Tiwari - Primary    Resident/Fellow/Other Assistant:  Surgeons and Role:     * Jaycee Delvalle PA-C - JULIANA First Assist  Given the inherent complexity of this surgery, a second surgeon or a surgically skilled physician assistant was necessary for the successful completion of this entire operative procedure. Jaycee Delvalle served as the surgical assistant and was present for the entire operative procedure and participated in all aspects of patient care. This included transporting the patient to the operating room and entering room with the patient, assisting with preoperative positioning, first assisting throughout the entire surgical procedure by functioning as a second assistant surgeon, assisting with surgical closure, and finally accompanying the patient to recovery.      Procedure Summary  Anesthesia: General  ASA: III  Anesthesia Staff: Anesthesiologist: Fabio Mark MD  C-AA: MILVIA Walker  Estimated Blood Loss: 100 cc   Intra-op Medications:   Administrations occurring from 0730 to 1145 on 24:   Medication Name Total Dose   sodium  chloride 0.9 % irrigation solution 250 mL   BUPivacaine HCl (Marcaine) 0.5 % (5 mg/mL) injection 20 mL   bupivacaine liposome (Exparel) 1.3 % (13.3 mg/mL) injection 20 mL              Anesthesia Record               Intraprocedure I/O Totals          Intake    Tranexamic Acid 0.00 mL    The total shown is the total volume documented since Anesthesia Start was filed.    Total Intake 0 mL       Output    Est. Blood Loss 150 mL    Total Output 150 mL       Net    Net Volume -150 mL          Specimen: No specimens collected     Staff:   Circulator: Myrna  Circulator: Gisel Knutson Person: Afshan         Drains and/or Catheters:   Closed/Suction Drain 1 Inferior;Medial;Right Abdomen 15 Fr. (Active)       Closed/Suction Drain 2 Medial;Left Abdomen 15 Fr. (Active)         Findings: Significant scarring in the abdominal wall which required increased procedural time and service  Specimen weight: = 5858 g  4 cm rectus diastases    Indications: Mariann Drew is an 68 y.o. female who is having surgery for Lipodystrophy [E88.1]  Excessive and redundant skin and subcutaneous tissue [L98.7]  Localized adiposity [E65].   Mariann Drew is a 68 y.o. female who was previously referred by Olivia Self DO  for excess skin causing intertrigo Jenis rash and excoriation of the infraumbilical/pannus area.  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     Patient presented previously status post significant weight loss now having intertrigo and yeast in the Intertriginous skin folds.  Mariann has lost weight through diet and exercise, her maximum weight was 306 pounds  Goal is 165, currently, has been there for some months.  Her goal weight is 130.  Past medical history former type II diabetic, her most recent hemoglobin A1c was 4.4%, she states that with weight loss she no longer is diabetic.  She has a chronic history of low blood counts, she has tardive dyskinesia.   Past surgical history is notable for bladder stimulator to treat chronic continence, right knee replacement, hysterectomy, gallbladder removal, left mastectomy for cancer in 2000 tissue expander secondary surgery with implant, right matching breast reduction.       She complains today of intertriginous rash, excoriation, infections due to overhanging skin over her pubic and genitalia area.     She presents today ahead of planned tzvfj-gh-ezn abdominoplasty with cosmetic mons plasty.      I discussed with the patient that this is reconstructive surgery with a cosmetic addition, and there are no guarantees of results.  Sometimes, patients are dissatisfied and sometimes patients require revision surgery.  If there is a need to return to surgery to correct any problems or complications, we indicated that in some instances, the return to the operating room may not covered by insurance.  I indicated that I do not charge the patient for return to the operating room, but there WILL BE charges for operating room/facility fees and anesthesia fees, over which we have no control.     INFORMED CONSENT  Patient was told the risks, benefits, INDICATIONS, contraindications, and alternatives to the procedure. Risks include but not limited to pain, infection, bleeding, hematoma, seroma, injury to neurovascular and tendinous structures, asymmetry of scar, cosmetic dissatisfaction, excess skin and subcutaneous tissue in the abdominal area, vertical displacement of vaginal introitus, and need for subsequent surgeries.      The patient demonstrated understanding of these risks and agreed to proceed with surgery.  Advance directives discussed.  Team approach explained.       The patient consented and wished to proceed with the procedure and for medical photography if needed.     PROCEDURAL PAUSE  Prior to the beginning of the procedure, the patient's correct identity, side, site, and procedure to be performed were verified.  The patient  was given intravenous antibiotics prior to skin incision.     PROCEDURAL NOTE  Mariann was brought to the operative theater at which point she was transferred to the operating room table.  All bony prominences were well padded, and sequential compression devices were placed to each lower extremity. Patient was then secured with safety straps.  The patient was then placed under IV sedation, and our anesthesia colleagues administered general endotracheal anesthesia.    In the preoperative anesthetic area ashley was marked with bimanual palpation, the inferior incision was marked 7 cm cephalad from the vaginal introitus with the skin on stretch, this was extended laterally to the axillary line and bimanual palpation estimation was used to determine the amount of vertical and horizontal skin excess to be excised.    We began by using a 10 blade scalpel to make the inferior incision transversely.  We dissected with unipolar cautery identify the superficial inferior epigastric vessels bilaterally and clipped these with automatic Leukoplast.  We then dissected down at the level of the ASIS to the abdominal wall we began to dissect from there laterally to the margin of the incision, and then medially, we encountered significant scarring as we dissected cephalad to the level of the umbilicus, this slowed down the procedure and required increased procedural time and service therefore 22 modifier was used.  We then dissected free the umbilicus and split the inferior panniculus to perform a circumferential dissection around the umbilicus.  We then continued our dissection cephalad to the level of the xiphoid.    We then performed multiple transverse abdominis plane blocks for postoperative analgesia not intraoperative pain control using Exparel solution.  We did this bilaterally.  Patient was then placed in reflex position the amount of tissue to be excised was again checked and rechecked and marks were made.  10 blade  scalpel was then used to make the inferior and vertical incisions.  And the skin was excised using unipolar cautery.  We remove the specimen en bloc, it weighed 5858 g.  We then performed plication of the rectus abdominis muscle, there was a 4 cm rectus diastases identified.  This was performed with buried figure-of-eight 0 PDS suture interrupted fashion.  Followed by running 2-0 STRATAFIX suture followed by another layer of running 2-0 STRATAFIX suture to plicate.  We then performed a quilting suture to close the vertical incision over the plication.  We then placed two 15 [place] BÁRBARA drains exiting at the lateral incision margins.  After this was completed we began closure of the horizontal incision with interrupted 0 PDS suture in a tripoint suture tacking the Omid's layer to the fascia we did this along the entirety of the incision line.  This was followed by running 2-0 STRATAFIX suture and camper's layer which was turned back and run in the deep dermis.  We then ran a 3-0 STRATAFIX suture in the deep dermis which was turned back around the subcuticular layer.  Cephalad of the umbilicus a 2 oh STRATAFIX was run and camper's fascia and turned back and run in the deep dermis.  This was followed by running 3-0 STRATAFIX in the subcuticular layer.  Inferior to the umbilicus 2-0 PDS interrupted was used in the camper's and Omid's fascia followed by 3-0 Monocryl deep dermal loosely spaced interrupted suture in the skin.    The umbilicus was excised with an 11 blade scalpel and sutured in place with a running 3-0 strata fix in the subdermal layer followed by running 5-0 simple suture in the skin.  Exparel was injected into all incision lines after this.  Exofin glue was then placed over all the incisions Nitro-Bid paste was placed in the umbilicus and at the T point.  Drains were secured with 0 silk suture and injected with 120 cc of Irrisept solution which was allowed to dwell while we placed the dressings.  Final  dressings consisted of Telfa over the incisions and glue, ABD, abdominal binder.     The patient tolerated the procedure well and was awakened from anesthesia without any difficulties, she was transferred to the PACU in stable condition, all needle counts were correct.     POST OP PLAN:  Mariann will remain an outpatient, she is instructed to remain in the beachchair and [place] position while sitting down and walking respectively.  She is instructed to shower daily, she should keep the binder on at all times except while showering.  Will instruct her to empty and record drain outputs and perform stripping of the drain twice daily.      Anibal Tiwari  Phone Number: [phone]

## 2024-07-30 NOTE — ANESTHESIA POSTPROCEDURE EVALUATION
Patient: Mariann Drew    Procedure Summary       Date: 07/30/24 Room / Location: [facility] OR 04 / [facility] [place] [facility]    Anesthesia Start: 0728 Anesthesia Stop: 1101    Procedures:       Abdominoplasty, mnvze-yn-clz abdominoplasty      Repair Abdominal Wall      Monsplasty Cosmetic Diagnosis:       Lipodystrophy      Excessive and redundant skin and subcutaneous tissue      Localized adiposity      Encounter for cosmetic surgery      (Lipodystrophy [E88.1])      (Excessive and redundant skin and subcutaneous tissue [L98.7])      (Localized adiposity [E65])    Surgeons: Anibal Tiwari MD Responsible Provider: Fabio Mark MD    Anesthesia Type: general ASA Status: 3            Anesthesia Type: general    Vitals Value Taken Time   BP 94/56 07/30/24 1128   Temp 36.5 °C (97.7 °F) 07/30/24 1101   Pulse 79 07/30/24 1128   Resp 16 07/30/24 1128   SpO2 100 % 07/30/24 1128       Anesthesia Post Evaluation    Patient location during evaluation: PACU  Patient participation: complete - patient participated  Level of consciousness: awake and alert  Pain management: satisfactory to patient  Airway patency: patent  Cardiovascular status: acceptable  Respiratory status: acceptable  Hydration status: acceptable  Postoperative Nausea and Vomiting: none        There were no known notable events for this encounter.

## 2024-07-30 NOTE — ANESTHESIA PREPROCEDURE EVALUATION
Patient: Mariann Drew    Procedure Information       Date/Time: 07/30/24 2330    Procedures:       Abdominoplasty, nwtjx-ug-mtq abdominoplasty - yreox-nu-cjp abdominoplasty, monsplasty      Repair Abdominal Wall      Monsplasty Cosmetic    [place] [facility] [place] [facility] [place] [facility] [place] [facility] [place]    Surgeons: Anibal Tiwari MD        Well compensated chronic disease. Had large weight loss (150 pounds) over past couple years here for abdominoplasty. Has tolerated ancef in past despite noted cephalosporin allergy.    Relevant Problems   Cardiac   (+) Essential hypertension   (+) Hyperlipidemia      Pulmonary   (+) ANKUSH (obstructive sleep apnea)   (+) Shortness of breath on exertion      Neuro   (+) Anxiety   (+) Bilateral occipital neuralgia   (+) Cervical radiculopathy   (+) Current mild episode of major depressive disorder (CMS-HCC)   (+) Depression, major, in remission (CMS-HCC)   (+) Other bipolar disorder (Multi)      GI   (+) Acute ulcer of stomach   (+) GI bleed   (+) Gastroesophageal reflux disease without esophagitis      /Renal   (+) Recurrent UTI      Liver   (+) Fatty liver   (+) Hepatitis C   (+) Liver cirrhosis secondary to HAZEL (nonalcoholic steatohepatitis) (Multi)      Endocrine   (+) Localized adiposity   (+) Type 2 diabetes mellitus without complication, without long-term current use of insulin (Multi) (Not on medications  )      Hematology   (+) Anemia      Musculoskeletal   (+) Cervical myofascial pain syndrome   (+) Cervical spondylosis with radiculopathy   (+) Chronic neck pain   (+) Knee osteoarthritis   (+) Myofascial pain syndrome, cervical   (+) Primary osteoarthritis involving multiple joints   (+) Rheumatoid arthritis, involving unspecified site, unspecified whether rheumatoid factor present (Multi)      HEENT   (+) Mixed hearing loss   (+) Vision loss      ID   (+) Hepatitis C   (+) Recurrent UTI      GYN   (+) Breast cancer (Multi)       Clinical information reviewed:   Tobacco   Allergies  Meds   Med Hx  Surg Hx  OB Status  Fam Hx  Soc   Hx        NPO Detail:  NPO/Void Status  Date of Last Liquid: 07/29/24  Time of Last Liquid: 2300  Date of Last Solid: 07/29/24  Time of Last Solid: 2300  Last Intake Type: Clear fluids; Food  Time of Last Void: 0600         Physical Exam    Airway  Mallampati: II  TM distance: >3 FB  Neck ROM: full     Cardiovascular   Rhythm: regular     Dental - normal exam     Pulmonary   Breath sounds clear to auscultation     Abdominal            Anesthesia Plan    History of general anesthesia?: yes  History of complications of general anesthesia?: no    ASA 3     general     Education provided regarding risk of obstructive sleep apnea.  intravenous induction   Anesthetic plan and risks discussed with patient.

## 2024-07-31 ASSESSMENT — PAIN SCALES - GENERAL: PAINLEVEL_OUTOF10: 9

## 2024-08-08 ENCOUNTER — APPOINTMENT (OUTPATIENT)
Dept: PLASTIC SURGERY | Facility: CLINIC | Age: 69
End: 2024-08-08
Payer: MEDICARE

## 2024-08-08 ENCOUNTER — HOSPITAL ENCOUNTER (OUTPATIENT)
Facility: HOSPITAL | Age: 69
Setting detail: SURGERY ADMIT
End: 2024-08-08
Attending: SURGERY | Admitting: SURGERY
Payer: MEDICARE

## 2024-08-08 ENCOUNTER — HOSPITAL ENCOUNTER (INPATIENT)
Facility: HOSPITAL | Age: 69
End: 2024-08-08
Attending: EMERGENCY MEDICINE | Admitting: STUDENT IN AN ORGANIZED HEALTH CARE EDUCATION/TRAINING PROGRAM
Payer: MEDICARE

## 2024-08-08 ENCOUNTER — APPOINTMENT (OUTPATIENT)
Dept: RADIOLOGY | Facility: HOSPITAL | Age: 69
DRG: 856 | End: 2024-08-08
Payer: MEDICARE

## 2024-08-08 ENCOUNTER — HOSPITAL ENCOUNTER (EMERGENCY)
Facility: HOSPITAL | Age: 69
Discharge: HOME | End: 2024-08-08
Payer: MEDICARE

## 2024-08-08 VITALS
HEIGHT: 64 IN | HEART RATE: 112 BPM | BODY MASS INDEX: 29.02 KG/M2 | DIASTOLIC BLOOD PRESSURE: 66 MMHG | WEIGHT: 170 LBS | SYSTOLIC BLOOD PRESSURE: 98 MMHG

## 2024-08-08 DIAGNOSIS — T81.49XA SURGICAL SITE INFECTION: ICD-10-CM

## 2024-08-08 DIAGNOSIS — T81.30XA DISRUPTION OF WOUND, UNSPECIFIED, INITIAL ENCOUNTER: ICD-10-CM

## 2024-08-08 DIAGNOSIS — L03.311 CELLULITIS, ABDOMINAL WALL: ICD-10-CM

## 2024-08-08 DIAGNOSIS — Z86.19 HISTORY OF SURGICAL SITE INFECTION: ICD-10-CM

## 2024-08-08 DIAGNOSIS — T81.89XA DRAINING POSTOPERATIVE WOUND, INITIAL ENCOUNTER: ICD-10-CM

## 2024-08-08 DIAGNOSIS — T81.49XA POSTOPERATIVE WOUND CELLULITIS: Primary | ICD-10-CM

## 2024-08-08 DIAGNOSIS — T81.31XS DISRUPTION OF EXTERNAL SURGICAL WOUND, SEQUELA: Primary | ICD-10-CM

## 2024-08-08 LAB
ALBUMIN SERPL BCP-MCNC: 2.7 G/DL (ref 3.4–5)
ALP SERPL-CCNC: 147 U/L (ref 33–136)
ALT SERPL W P-5'-P-CCNC: 22 U/L (ref 7–45)
ANION GAP SERPL CALC-SCNC: 13 MMOL/L (ref 10–20)
AST SERPL W P-5'-P-CCNC: 32 U/L (ref 9–39)
BASOPHILS # BLD AUTO: 0.06 X10*3/UL (ref 0–0.1)
BASOPHILS NFR BLD AUTO: 0.4 %
BILIRUB SERPL-MCNC: 0.6 MG/DL (ref 0–1.2)
BUN SERPL-MCNC: 32 MG/DL (ref 6–23)
CALCIUM SERPL-MCNC: 8.4 MG/DL (ref 8.6–10.3)
CHLORIDE SERPL-SCNC: 96 MMOL/L (ref 98–107)
CO2 SERPL-SCNC: 27 MMOL/L (ref 21–32)
CREAT SERPL-MCNC: 1.48 MG/DL (ref 0.5–1.05)
CRP SERPL-MCNC: 20.46 MG/DL
EGFRCR SERPLBLD CKD-EPI 2021: 38 ML/MIN/1.73M*2
EOSINOPHIL # BLD AUTO: 0.03 X10*3/UL (ref 0–0.7)
EOSINOPHIL NFR BLD AUTO: 0.2 %
ERYTHROCYTE [DISTWIDTH] IN BLOOD BY AUTOMATED COUNT: 13.1 % (ref 11.5–14.5)
ERYTHROCYTE [SEDIMENTATION RATE] IN BLOOD BY WESTERGREN METHOD: 86 MM/H (ref 0–30)
GLUCOSE SERPL-MCNC: 136 MG/DL (ref 74–99)
HCT VFR BLD AUTO: 28.9 % (ref 36–46)
HGB BLD-MCNC: 9.7 G/DL (ref 12–16)
IMM GRANULOCYTES # BLD AUTO: 0.29 X10*3/UL (ref 0–0.7)
IMM GRANULOCYTES NFR BLD AUTO: 1.8 % (ref 0–0.9)
LACTATE SERPL-SCNC: 2.4 MMOL/L (ref 0.4–2)
LACTATE SERPL-SCNC: 2.7 MMOL/L (ref 0.4–2)
LYMPHOCYTES # BLD AUTO: 0.71 X10*3/UL (ref 1.2–4.8)
LYMPHOCYTES NFR BLD AUTO: 4.5 %
MCH RBC QN AUTO: 30.8 PG (ref 26–34)
MCHC RBC AUTO-ENTMCNC: 33.6 G/DL (ref 32–36)
MCV RBC AUTO: 92 FL (ref 80–100)
MONOCYTES # BLD AUTO: 0.87 X10*3/UL (ref 0.1–1)
MONOCYTES NFR BLD AUTO: 5.5 %
NEUTROPHILS # BLD AUTO: 13.79 X10*3/UL (ref 1.2–7.7)
NEUTROPHILS NFR BLD AUTO: 87.6 %
NRBC BLD-RTO: 0 /100 WBCS (ref 0–0)
PLATELET # BLD AUTO: 152 X10*3/UL (ref 150–450)
POTASSIUM SERPL-SCNC: 3.5 MMOL/L (ref 3.5–5.3)
PROT SERPL-MCNC: 6.1 G/DL (ref 6.4–8.2)
RBC # BLD AUTO: 3.15 X10*6/UL (ref 4–5.2)
SODIUM SERPL-SCNC: 132 MMOL/L (ref 136–145)
WBC # BLD AUTO: 15.8 X10*3/UL (ref 4.4–11.3)

## 2024-08-08 PROCEDURE — 99223 1ST HOSP IP/OBS HIGH 75: CPT | Performed by: NURSE PRACTITIONER

## 2024-08-08 PROCEDURE — 99285 EMERGENCY DEPT VISIT HI MDM: CPT

## 2024-08-08 PROCEDURE — 86140 C-REACTIVE PROTEIN: CPT | Performed by: PHYSICIAN ASSISTANT

## 2024-08-08 PROCEDURE — 2550000001 HC RX 255 CONTRASTS: Performed by: EMERGENCY MEDICINE

## 2024-08-08 PROCEDURE — 3008F BODY MASS INDEX DOCD: CPT | Performed by: PHYSICIAN ASSISTANT

## 2024-08-08 PROCEDURE — 80053 COMPREHEN METABOLIC PANEL: CPT | Performed by: PHYSICIAN ASSISTANT

## 2024-08-08 PROCEDURE — 87040 BLOOD CULTURE FOR BACTERIA: CPT | Mod: PARLAB | Performed by: PHYSICIAN ASSISTANT

## 2024-08-08 PROCEDURE — 3078F DIAST BP <80 MM HG: CPT | Performed by: PHYSICIAN ASSISTANT

## 2024-08-08 PROCEDURE — 2500000001 HC RX 250 WO HCPCS SELF ADMINISTERED DRUGS (ALT 637 FOR MEDICARE OP): Performed by: NURSE PRACTITIONER

## 2024-08-08 PROCEDURE — 74177 CT ABD & PELVIS W/CONTRAST: CPT

## 2024-08-08 PROCEDURE — 2500000004 HC RX 250 GENERAL PHARMACY W/ HCPCS (ALT 636 FOR OP/ED): Performed by: NURSE PRACTITIONER

## 2024-08-08 PROCEDURE — 36415 COLL VENOUS BLD VENIPUNCTURE: CPT | Performed by: PHYSICIAN ASSISTANT

## 2024-08-08 PROCEDURE — 1100000001 HC PRIVATE ROOM DAILY

## 2024-08-08 PROCEDURE — 1159F MED LIST DOCD IN RCRD: CPT | Performed by: PHYSICIAN ASSISTANT

## 2024-08-08 PROCEDURE — 83605 ASSAY OF LACTIC ACID: CPT | Performed by: PHYSICIAN ASSISTANT

## 2024-08-08 PROCEDURE — 74177 CT ABD & PELVIS W/CONTRAST: CPT | Performed by: RADIOLOGY

## 2024-08-08 PROCEDURE — 85025 COMPLETE CBC W/AUTO DIFF WBC: CPT | Performed by: PHYSICIAN ASSISTANT

## 2024-08-08 PROCEDURE — 2500000004 HC RX 250 GENERAL PHARMACY W/ HCPCS (ALT 636 FOR OP/ED): Performed by: EMERGENCY MEDICINE

## 2024-08-08 PROCEDURE — 4500999001 HC ED NO CHARGE

## 2024-08-08 PROCEDURE — 3044F HG A1C LEVEL LT 7.0%: CPT | Performed by: PHYSICIAN ASSISTANT

## 2024-08-08 PROCEDURE — 85652 RBC SED RATE AUTOMATED: CPT | Performed by: PHYSICIAN ASSISTANT

## 2024-08-08 PROCEDURE — 99024 POSTOP FOLLOW-UP VISIT: CPT | Performed by: PHYSICIAN ASSISTANT

## 2024-08-08 PROCEDURE — 2500000004 HC RX 250 GENERAL PHARMACY W/ HCPCS (ALT 636 FOR OP/ED): Performed by: PHYSICIAN ASSISTANT

## 2024-08-08 PROCEDURE — 3074F SYST BP LT 130 MM HG: CPT | Performed by: PHYSICIAN ASSISTANT

## 2024-08-08 RX ORDER — TRAZODONE HYDROCHLORIDE 100 MG/1
150 TABLET ORAL NIGHTLY
Status: ON HOLD | COMMUNITY
Start: 2024-08-06

## 2024-08-08 RX ORDER — VANCOMYCIN HYDROCHLORIDE 1 G/200ML
1000 INJECTION, SOLUTION INTRAVENOUS ONCE
Status: COMPLETED | OUTPATIENT
Start: 2024-08-08 | End: 2024-08-08

## 2024-08-08 RX ORDER — ONDANSETRON HYDROCHLORIDE 2 MG/ML
4 INJECTION, SOLUTION INTRAVENOUS ONCE
Status: COMPLETED | OUTPATIENT
Start: 2024-08-08 | End: 2024-08-08

## 2024-08-08 RX ORDER — FAMOTIDINE 20 MG/1
20 TABLET, FILM COATED ORAL EVERY MORNING
Status: ON HOLD | COMMUNITY
Start: 2024-07-25

## 2024-08-08 RX ORDER — VANCOMYCIN HYDROCHLORIDE 1 G/20ML
INJECTION, POWDER, LYOPHILIZED, FOR SOLUTION INTRAVENOUS DAILY PRN
Status: DISCONTINUED | OUTPATIENT
Start: 2024-08-08 | End: 2024-08-08

## 2024-08-08 RX ORDER — VANCOMYCIN HYDROCHLORIDE 1 G/20ML
INJECTION, POWDER, LYOPHILIZED, FOR SOLUTION INTRAVENOUS DAILY PRN
Status: DISPENSED | OUTPATIENT
Start: 2024-08-08

## 2024-08-08 RX ORDER — LOTILANER OPHTHALMIC SOLUTION 2.5 MG/ML
1 SOLUTION/ DROPS OPHTHALMIC NIGHTLY
Status: ON HOLD | COMMUNITY
Start: 2024-06-12

## 2024-08-08 RX ORDER — DULOXETIN HYDROCHLORIDE 30 MG/1
60 CAPSULE, DELAYED RELEASE ORAL 2 TIMES DAILY
Status: DISPENSED | OUTPATIENT
Start: 2024-08-08

## 2024-08-08 RX ORDER — SUCRALFATE 1 G/1
1 TABLET ORAL NIGHTLY
Status: DISPENSED | OUTPATIENT
Start: 2024-08-08

## 2024-08-08 RX ORDER — ACETAMINOPHEN 650 MG/1
650 SUPPOSITORY RECTAL EVERY 4 HOURS PRN
Status: ACTIVE | OUTPATIENT
Start: 2024-08-08

## 2024-08-08 RX ORDER — LAMOTRIGINE 100 MG/1
200 TABLET ORAL EVERY MORNING
Status: DISPENSED | OUTPATIENT
Start: 2024-08-09

## 2024-08-08 RX ORDER — MEROPENEM 1 G/1
1 INJECTION, POWDER, FOR SOLUTION INTRAVENOUS ONCE
Status: COMPLETED | OUTPATIENT
Start: 2024-08-08 | End: 2024-08-08

## 2024-08-08 RX ORDER — MEROPENEM 500 MG/1
500 INJECTION, POWDER, FOR SOLUTION INTRAVENOUS EVERY 12 HOURS
Status: DISCONTINUED | OUTPATIENT
Start: 2024-08-09 | End: 2024-08-09

## 2024-08-08 RX ORDER — ACETAMINOPHEN 160 MG/5ML
650 SOLUTION ORAL EVERY 4 HOURS PRN
Status: ACTIVE | OUTPATIENT
Start: 2024-08-08

## 2024-08-08 RX ORDER — NYSTATIN 100000 [USP'U]/G
1 POWDER TOPICAL 2 TIMES DAILY
Status: DISPENSED | OUTPATIENT
Start: 2024-08-08

## 2024-08-08 RX ORDER — MORPHINE SULFATE 4 MG/ML
4 INJECTION, SOLUTION INTRAMUSCULAR; INTRAVENOUS ONCE
Status: COMPLETED | OUTPATIENT
Start: 2024-08-08 | End: 2024-08-08

## 2024-08-08 RX ORDER — POLYETHYLENE GLYCOL 3350 17 G/17G
17 POWDER, FOR SOLUTION ORAL DAILY
Status: DISPENSED | OUTPATIENT
Start: 2024-08-08

## 2024-08-08 RX ORDER — TRAZODONE HYDROCHLORIDE 50 MG/1
150 TABLET ORAL NIGHTLY
Status: DISPENSED | OUTPATIENT
Start: 2024-08-08

## 2024-08-08 RX ORDER — SODIUM CHLORIDE 9 MG/ML
100 INJECTION, SOLUTION INTRAVENOUS CONTINUOUS
Status: ACTIVE | OUTPATIENT
Start: 2024-08-08

## 2024-08-08 RX ORDER — BISACODYL 5 MG
5 TABLET, DELAYED RELEASE (ENTERIC COATED) ORAL ONCE
Status: ACTIVE | OUTPATIENT
Start: 2024-08-08

## 2024-08-08 RX ORDER — FERROUS SULFATE 325(65) MG
65 TABLET ORAL DAILY
Status: DISPENSED | OUTPATIENT
Start: 2024-08-08

## 2024-08-08 RX ORDER — LAMOTRIGINE 200 MG/1
1 TABLET ORAL EVERY MORNING
Status: ON HOLD | COMMUNITY
Start: 2024-07-27

## 2024-08-08 RX ORDER — ACETAMINOPHEN 325 MG/1
650 TABLET ORAL EVERY 4 HOURS PRN
Status: DISPENSED | OUTPATIENT
Start: 2024-08-08

## 2024-08-08 RX ORDER — HEPARIN SODIUM 5000 [USP'U]/ML
5000 INJECTION, SOLUTION INTRAVENOUS; SUBCUTANEOUS EVERY 8 HOURS
Status: DISPENSED | OUTPATIENT
Start: 2024-08-08

## 2024-08-08 RX ORDER — CYCLOBENZAPRINE HCL 10 MG
5 TABLET ORAL 3 TIMES DAILY PRN
Status: DISPENSED | OUTPATIENT
Start: 2024-08-08

## 2024-08-08 RX ORDER — L. ACIDOPHILUS/L.BULGARICUS 1MM CELL
1 TABLET ORAL 2 TIMES DAILY
Status: DISPENSED | OUTPATIENT
Start: 2024-08-08

## 2024-08-08 SDOH — SOCIAL STABILITY: SOCIAL INSECURITY: ARE THERE ANY APPARENT SIGNS OF INJURIES/BEHAVIORS THAT COULD BE RELATED TO ABUSE/NEGLECT?: NO

## 2024-08-08 SDOH — SOCIAL STABILITY: SOCIAL INSECURITY: ARE YOU OR HAVE YOU BEEN THREATENED OR ABUSED PHYSICALLY, EMOTIONALLY, OR SEXUALLY BY ANYONE?: NO

## 2024-08-08 SDOH — SOCIAL STABILITY: SOCIAL INSECURITY: DOES ANYONE TRY TO KEEP YOU FROM HAVING/CONTACTING OTHER FRIENDS OR DOING THINGS OUTSIDE YOUR HOME?: NO

## 2024-08-08 SDOH — SOCIAL STABILITY: SOCIAL INSECURITY: ABUSE: ADULT

## 2024-08-08 SDOH — SOCIAL STABILITY: SOCIAL INSECURITY: HAS ANYONE EVER THREATENED TO HURT YOUR FAMILY OR YOUR PETS?: NO

## 2024-08-08 SDOH — SOCIAL STABILITY: SOCIAL INSECURITY: HAVE YOU HAD ANY THOUGHTS OF HARMING ANYONE ELSE?: NO

## 2024-08-08 SDOH — SOCIAL STABILITY: SOCIAL INSECURITY: DO YOU FEEL ANYONE HAS EXPLOITED OR TAKEN ADVANTAGE OF YOU FINANCIALLY OR OF YOUR PERSONAL PROPERTY?: NO

## 2024-08-08 SDOH — SOCIAL STABILITY: SOCIAL INSECURITY: DO YOU FEEL UNSAFE GOING BACK TO THE PLACE WHERE YOU ARE LIVING?: NO

## 2024-08-08 SDOH — SOCIAL STABILITY: SOCIAL INSECURITY: WERE YOU ABLE TO COMPLETE ALL THE BEHAVIORAL HEALTH SCREENINGS?: YES

## 2024-08-08 SDOH — SOCIAL STABILITY: SOCIAL INSECURITY: HAVE YOU HAD THOUGHTS OF HARMING ANYONE ELSE?: NO

## 2024-08-08 ASSESSMENT — ACTIVITIES OF DAILY LIVING (ADL)
HEARING - LEFT EAR: FUNCTIONAL
JUDGMENT_ADEQUATE_SAFELY_COMPLETE_DAILY_ACTIVITIES: YES
TOILETING: INDEPENDENT
BATHING: INDEPENDENT
GROOMING: INDEPENDENT
FEEDING YOURSELF: INDEPENDENT
FEEDING YOURSELF: INDEPENDENT
ASSISTIVE_DEVICE: EYEGLASSES
WALKS IN HOME: INDEPENDENT
LACK_OF_TRANSPORTATION: NO
DRESSING YOURSELF: INDEPENDENT
ASSISTIVE_DEVICE: EYEGLASSES
BATHING: INDEPENDENT
HEARING - LEFT EAR: FUNCTIONAL
PATIENT'S MEMORY ADEQUATE TO SAFELY COMPLETE DAILY ACTIVITIES?: YES
GROOMING: INDEPENDENT
WALKS IN HOME: NEEDS ASSISTANCE
ADEQUATE_TO_COMPLETE_ADL: YES
DRESSING YOURSELF: INDEPENDENT
ADEQUATE_TO_COMPLETE_ADL: YES
HEARING - RIGHT EAR: FUNCTIONAL
JUDGMENT_ADEQUATE_SAFELY_COMPLETE_DAILY_ACTIVITIES: YES
PATIENT'S MEMORY ADEQUATE TO SAFELY COMPLETE DAILY ACTIVITIES?: YES
HEARING - RIGHT EAR: FUNCTIONAL

## 2024-08-08 ASSESSMENT — COGNITIVE AND FUNCTIONAL STATUS - GENERAL
STANDING UP FROM CHAIR USING ARMS: A LITTLE
MOVING FROM LYING ON BACK TO SITTING ON SIDE OF FLAT BED WITH BEDRAILS: A LITTLE
WALKING IN HOSPITAL ROOM: A LITTLE
PERSONAL GROOMING: A LITTLE
TURNING FROM BACK TO SIDE WHILE IN FLAT BAD: A LITTLE
TOILETING: A LITTLE
MOBILITY SCORE: 18
MOVING TO AND FROM BED TO CHAIR: A LITTLE
HELP NEEDED FOR BATHING: A LITTLE
EATING MEALS: A LITTLE
CLIMB 3 TO 5 STEPS WITH RAILING: A LITTLE
DAILY ACTIVITIY SCORE: 20
PATIENT BASELINE BEDBOUND: NO

## 2024-08-08 ASSESSMENT — LIFESTYLE VARIABLES
HOW OFTEN DO YOU HAVE 6 OR MORE DRINKS ON ONE OCCASION: NEVER
PRESCIPTION_ABUSE_PAST_12_MONTHS: NO
HOW OFTEN DO YOU HAVE A DRINK CONTAINING ALCOHOL: NEVER
SUBSTANCE_ABUSE_PAST_12_MONTHS: NO
AUDIT-C TOTAL SCORE: 0
SKIP TO QUESTIONS 9-10: 1
HOW MANY STANDARD DRINKS CONTAINING ALCOHOL DO YOU HAVE ON A TYPICAL DAY: PATIENT DOES NOT DRINK
AUDIT-C TOTAL SCORE: 0

## 2024-08-08 ASSESSMENT — PAIN SCALES - GENERAL
PAINLEVEL_OUTOF10: 7
PAINLEVEL_OUTOF10: 0 - NO PAIN
PAINLEVEL_OUTOF10: 2

## 2024-08-08 ASSESSMENT — COLUMBIA-SUICIDE SEVERITY RATING SCALE - C-SSRS
6. HAVE YOU EVER DONE ANYTHING, STARTED TO DO ANYTHING, OR PREPARED TO DO ANYTHING TO END YOUR LIFE?: NO
1. IN THE PAST MONTH, HAVE YOU WISHED YOU WERE DEAD OR WISHED YOU COULD GO TO SLEEP AND NOT WAKE UP?: NO
2. HAVE YOU ACTUALLY HAD ANY THOUGHTS OF KILLING YOURSELF?: NO

## 2024-08-08 ASSESSMENT — PATIENT HEALTH QUESTIONNAIRE - PHQ9
1. LITTLE INTEREST OR PLEASURE IN DOING THINGS: SEVERAL DAYS
2. FEELING DOWN, DEPRESSED OR HOPELESS: SEVERAL DAYS
SUM OF ALL RESPONSES TO PHQ9 QUESTIONS 1 & 2: 2

## 2024-08-08 ASSESSMENT — PAIN DESCRIPTION - LOCATION: LOCATION: ABDOMEN

## 2024-08-08 NOTE — PROGRESS NOTES
Pharmacy Medication History Review    Mariann Drew is a 68 y.o. female admitted for No Principal Problem: There is no principal problem currently on the Problem List. Please update the Problem List and refresh.. Pharmacy reviewed the patient's ijnnp-cj-nmzxclthf medications and allergies for accuracy.    The list below reflectives the updated PTA list. Please review each medication in order reconciliation for additional clarification and justification.  Prior to Admission medications    Medication Sig Start Date End Date Taking? Authorizing Provider   cyclobenzaprine (Flexeril) 5 mg tablet Take 1 tablet (5 mg) by mouth 3 times a day as needed for muscle spasms. 7/30/24  Yes Jaycee Fox PA-C   deutetrabenazine (Austedo XR) 24 mg tablet extended release 24 hr Take 1 tablet (24 mg) by mouth once daily. Take 1-6mg tablet and 1-24mg tablet for a total dose of 30mg daily. 4/30/24  Yes Olivia Herrera MD   deutetrabenazine (Austedo XR) 6 mg tablet extended release 24 hr Take 1 tablet (6 mg) by mouth once daily. Take 1-6mg tablet and 1-24mg tablet for a total dose of 30mg daily. 4/30/24  Yes Olivia Herrera MD   DULoxetine (Cymbalta) 60 mg DR capsule Take 1 capsule (60 mg) by mouth 2 times a day. Do not crush or chew.   Yes Historical Provider, MD   famotidine (Pepcid) 20 mg tablet Take 1 tablet (20 mg) by mouth once daily in the morning. 7/25/24  Yes Historical Provider, MD   ferrous sulfate (FEOSOL ORAL) Take 1 tablet by mouth once daily in the evening. 200 (65 Fe) MG   Yes Historical Provider, MD   lamoTRIgine (LaMICtal) 200 mg tablet Take 1 tablet (200 mg) by mouth once daily in the morning. 7/27/24  Yes Historical Provider, MD   multivitamin tablet Take 1 tablet by mouth once daily.   Yes Historical Provider, MD   oxyCODONE (Roxicodone) 5 mg immediate release tablet Take 1 tablet (5 mg) by mouth every 6 hours if needed for severe pain (7 - 10). 7/30/24  Yes Jaycee Fox PA-C   sucralfate (Carafate) 1  gram tablet Take 1 tablet (1 g) by mouth once daily at bedtime. 3/26/24  Yes Historical Provider, MD   traZODone (Desyrel) 100 mg tablet Take 1.5 tablets (150 mg) by mouth once daily at bedtime. 8/6/24  Yes Historical Provider, MD   Xdemvy 0.25 % drops Administer 1 drop into both eyes once daily at bedtime. 6/12/24  Yes Historical Provider, MD   allopurinol (Zyloprim) 100 mg tablet Take 1 tablet (100 mg) by mouth once daily.   no Historical Provider, MD   allopurinol (Zyloprim) 300 mg tablet TAKE ONE TABLET ( 300 MG ) IN ADDITION TO ONE TABLET ( 100 MG ) DAILY ( TOTAL  MG DAILY )  Patient not taking: Reported on 8/8/2024 7/15/24  no Olivia Self DO   dexAMETHasone 0.5 mg/5 mL oral liquid Use as directed   no Historical Provider, MD   dexAMETHasone 0.5 mg/5 mL oral liquid Take 5 mL (0.5 mg) by mouth 4 times a day for 10 days. 4/3/24 4/13/24 no Olivia Self DO   famotidine (Pepcid) 40 mg tablet  10/4/23  no Historical Provider, MD   latanoprost (Xelpros) 0.005 % drops, emulsion Administer into affected eye(s) once daily.   no Historical Provider, MD   lidocaine (Lidocaine Viscous) 2 % solution Take by mouth. 12/1/20  no Historical Provider, MD   mometasone (Elocon) 0.1 % cream Apply topically once daily as needed (Apply topically once daily as needed). 3/13/24  no Olivai Self DO   naratriptan (Amerge) 2.5 mg tablet Take 1 tablet (2.5 mg) by mouth 1 time if needed for migraine. May repeat once in 4hrs if headache recurs 5/13/24  yes Olivia Self DO   Nyamyc 100,000 unit/gram powder APPLY TO AFFECTED AREA TWICE A DAY  Patient taking differently: Apply 1 Application topically 2 times a day. to affected area 4/16/24  yes Olivia Self DO   semaglutide (Ozempic) 0.25 mg or 0.5 mg (2 mg/3 mL) pen injector INJECT 0.5 MG UNDER THE SKIN 1 (ONE) TIME PER WEEK.  Patient taking differently: Inject 0.5 mg under the skin 1 (one) time per week. Every Tuesdays 6/27/24  Hold for surgery Olivia Self DO    lamoTRIgine (LaMICtal) 150 mg tablet Take 1 tablet (150 mg) by mouth once daily.  8/8/24 Dose change Historical Provider, MD        The list below reflectives the updated allergy list. Please review each documented allergy for additional clarification and justification.  Allergies  Reviewed by Alexsander Pagan, EMT on 8/8/2024        Severity Reactions Comments    Penicillins High Anaphylaxis, Shortness of breath, Swelling Hives, and causes her throat to close.    Cefepime Medium Hives     Adhesive Tape-silicones Not Specified Unknown     Avelox [moxifloxacin] Not Specified Unknown     Bactrim [sulfamethoxazole-trimethoprim] Not Specified Unknown     Cephalexin Not Specified Unknown     Codeine Not Specified Unknown     Desyrel [trazodone] Not Specified Unknown     Erythromycin Not Specified Unknown     Hydroxychloroquine Not Specified Unknown pruritis    Ibuprofen Not Specified Unknown     Nsaids (non-steroidal Anti-inflammatory Drug) Not Specified Unknown     Other Not Specified Unknown Other: placquenil itching, Vomiting, hives Other: desryl and sympathomimetic agents     Percocet [oxycodone-acetaminophen] Not Specified Unknown     Percodan [oxycodone-aspirin] Not Specified Unknown     Phenergan [promethazine] Not Specified Unknown     Toradol [ketorolac] Not Specified Unknown     Tramadol Not Specified Unknown     Trintellix [vortioxetine] Not Specified Unknown     Fvvixkgn-9-hd8 Antimigraine Agents Not Specified Unknown racing heart    Adhesive Low Rash     Macrolide Antibiotics Low Hives, Rash             Below are additional concerns with the patient's PTA list.      Nena Mims

## 2024-08-08 NOTE — Clinical Note
The site was marked. Prepped: right chest and right neck. Prepped with: ChloraPrep. The patient was draped.

## 2024-08-08 NOTE — CONSULTS
Vancomycin Dosing by Pharmacy- EMERGENCY DEPARTMENT    Mariann Drew is a 68 y.o. year old female who Pharmacy has been consulted to give a ONE TIME ONLY vancomycin dose in the Emergency Department for cellulitis, skin and soft tissue.     Visit Vitals  /68   Pulse (!) 111   Temp 36.8 °C (98.2 °F)   Resp 18      Lab Results   Component Value Date    CREATININE 1.31 (H) 07/11/2024    CREATININE 1.30 (H) 07/07/2023    CREATININE 1.12 (H) 10/17/2022    CREATININE 1.46 (H) 10/13/2021    CREATININE 1.34 (H) 07/15/2021      Wt Readings from Last 1 Encounters:   08/08/24 69.4 kg (153 lb)        Assessment/Plan     Patient will be given a one time dose of 1000 mg  Pharmacy will sign off at this time. If vancomycin is to be continued, please re-consult Pharmacy.       Robbi Rosas, PharmD

## 2024-08-08 NOTE — PROGRESS NOTES
Department of Plastic and Reconstructive Surgery            Post Operative Visit    Date: 08/08/24  Date of Surgery: 7/30/24    Subjective   Mariann Drew is a 68 y.o. female who presents for POV. They are s/p rnlee-ut-ord abdominoplasty on 7/30/24 with Dr. Tiwari.       She presents for 1 week POV. She endorses redness around the surgical incisions with drainage. She endorses there is now odor from the drainage. Surgical drains with less than 30cc output. She endorses that she is allergic the adhesive and likely reacting to the surgical glue. She denied fevers and chills.       Objective   Vital Signs:   Vitals:    08/08/24 1007   BP: 98/66   Pulse: (!) 112       Gen: interactive and pleasant  Head: NCAT  Eyes: EOMI, PERRLA  Mouth: MMM  Throat: trachea midline  Cor: RRR  Pulm: nonlabored breathing  Abd: s/nt/nd  Neuro: AAOx3  Ext: extremities perfused    Focused exam of the: abdomen    Bcfhl-tk-npr abdominoplasty surgical incisions with surrounding erythema. There is mild dehiscence at the t-point. There is purulent drainage in the BÁRBARA drains x2. Q-tip was probed into suture like where purulent odorous drainage was expressed.      Assessment/Plan     Mariann Drew is a 68 y.o. female who presents for POV. They are s/p aovtr-iu-eyn abdominoplasty on 7/30/24 with Dr. Tiwari.       She presents for 1 week POV. She was seen by myself and Dr Tiwari today. There is concerns for infection of the surgical incisions with likely deeper abscess formation. Surgical incision was probed with Q-tip and purulent drainage was expressed from the incision line . BÁRBARA drains x2 with purulent drainage in the drain. There is odor coming from the wound. She was instructed to present to the ED today for medical admission to the hospital for IV antibiotic therapy. Dr Tiwari discussed with her possible need for OR incision and drainage of abscess with washout and wound vac placement.     I spent 30 minutes with this  patient. Greater than 50% of this time was spent in the counselling and/or coordination of care of this patient.  This note was created using voice recognition software and was not corrected for typographical or grammatical errors.    Signature: Jaycee Delvalle PA-C

## 2024-08-08 NOTE — ED PROVIDER NOTES
Limitations to History: None   External Records Reviewed  Independent Historians: none  Social determinants affecting care: None    HPI  Mariann Drew is a 68 y.o. female who presents emergency department for assessment of postop wound infection.  She reports on 7/30/2024 she had an abdominoplasty at [facility].  She reports that she went for follow-up today and was told that she needs to be admitted for IV antibiotics and surgery tomorrow.  She reports that her surgeon goes to [facility] as well as [facility].  She reports that [place] is closer to her house so she came here.  She reports that she has been having chills and just feels very fatigued.  She denies any fevers.  Denies any nausea or vomiting.  She reports that the surgical wound has been draining.    [facility]  Past Medical History:   Diagnosis Date    Anxiety     Arthritis     Cervicalgia 02/13/2017    Neck pain, chronic    Depression     Dysphagia, unspecified 02/23/2021    Dysphagia    Encounter for immunization 12/02/2022    Encounter for immunization    Epidermal cyst 02/04/2014    Epidermal inclusion cyst    Fatty (change of) liver, not elsewhere classified 02/01/2014    Fatty liver    GERD (gastroesophageal reflux disease)     Glaucoma     Liver disease, unspecified     Liver disease, chronic    Localized swelling, mass and lump, unspecified 02/23/2021    Subcutaneous nodules    Long term (current) use of opiate analgesic 02/20/2018    Opiate analgesic use agreement exists    Long term (current) use of opiate analgesic 02/20/2018    Long term current use of opiate analgesic    Nonalcoholic steatohepatitis (HAZEL)     Steatohepatitis, nonalcoholic    Other instability, right knee 05/17/2018    Instability of right knee joint    Other specified abnormal findings of blood chemistry     Elevated LFTs    Other specified symptoms and signs involving the circulatory and respiratory systems 12/01/2015    Globus pharyngeus    Pain in right hand 03/27/2017     Pain of right hand    Pain in right knee 06/21/2018    Acute pain of right knee    Pain in throat 12/01/2015    Throat pain in adult    Pain in unspecified knee 06/28/2017    Knee pain    Pain in unspecified shoulder 03/23/2017    Shoulder pain    Personal history of diseases of the skin and subcutaneous tissue 06/20/2018    History of acne    Personal history of diseases of the skin and subcutaneous tissue 06/02/2014    History of dermatitis    Personal history of malignant neoplasm of breast     Personal history of malignant neoplasm of breast    Personal history of other diseases of the digestive system 10/13/2021    History of gastroesophageal reflux (GERD)    Personal history of other diseases of the musculoskeletal system and connective tissue     Personal history of juvenile rheumatoid arthritis    Personal history of other diseases of the nervous system and sense organs 10/13/2021    History of sleep apnea    Personal history of other diseases of the respiratory system 01/14/2014    Personal history of asthma    Personal history of other endocrine, nutritional and metabolic disease     History of hyperlipidemia    Personal history of other infectious and parasitic diseases 11/24/2014    History of herpes zoster    Personal history of other specified conditions     History of dysuria    Personal history of peptic ulcer disease 01/14/2014    History of peptic ulcer    Personal history of pneumonia (recurrent) 01/14/2014    History of pneumonia    Personal history of traumatic brain injury 02/23/2021    History of concussion    Polyp of stomach and duodenum 01/14/2014    Gastric polyps    Polyp of stomach and duodenum 01/14/2014    Polyp of duodenum    Repeated falls 11/09/2015    Frequent falls    Secondary and unspecified malignant neoplasm of lymph node, unspecified (Multi)     Metastasis to lymph nodes    Sleep apnea     Tardive dyskinesia     Type 2 diabetes mellitus (Multi)     Urinary tract infection,  site not specified 08/01/2019    Acute UTI    Urinary tract infection, site not specified 04/19/2020    Acute UTI    reviewed by myself.    Meds  Current Outpatient Medications   Medication Instructions    allopurinol (Zyloprim) 300 mg tablet TAKE ONE TABLET ( 300 MG ) IN ADDITION TO ONE TABLET ( 100 MG ) DAILY ( TOTAL  MG DAILY )    allopurinol (ZYLOPRIM) 100 mg, oral, Daily    cyclobenzaprine (FLEXERIL) 5 mg, oral, 3 times daily PRN    deutetrabenazine (Austedo XR) 24 mg tablet extended release 24 hr Take 1 tablet (24 mg) by mouth once daily. Take 1-6mg tablet and 1-24mg tablet for a total dose of 30mg daily.    deutetrabenazine (Austedo XR) 6 mg tablet extended release 24 hr Take 1 tablet (6 mg) by mouth once daily. Take 1-6mg tablet and 1-24mg tablet for a total dose of 30mg daily.    dexAMETHasone 0.5 mg/5 mL oral liquid Use as directed     dexAMETHasone 0.5 mg, oral, 4 times daily    DULoxetine (CYMBALTA) 60 mg, oral, 2 times daily, Do not crush or chew.    famotidine (Pepcid) 40 mg tablet     famotidine (PEPCID) 20 mg, oral, Every morning    ferrous sulfate (FEOSOL ORAL) 1 tablet, oral, Every evening, 200 (65 Fe) MG    lamoTRIgine (LaMICtal) 200 mg tablet 1 tablet, oral, Every morning    latanoprost (Xelpros) 0.005 % drops, emulsion ophthalmic (eye), Daily    lidocaine (Lidocaine Viscous) 2 % solution oral    mometasone (Elocon) 0.1 % cream Topical, Daily PRN    multivitamin tablet 1 tablet, oral, Daily    naratriptan (AMERGE) 2.5 mg, oral, Once as needed, May repeat once in 4hrs if headache recurs    Nyamyc 100,000 unit/gram powder Topical, 2 times daily, to affected area    oxyCODONE (ROXICODONE) 5 mg, oral, Every 6 hours PRN    semaglutide (Ozempic) 0.25 mg or 0.5 mg (2 mg/3 mL) pen injector INJECT 0.5 MG UNDER THE SKIN 1 (ONE) TIME PER WEEK.    sucralfate (Carafate) 1 gram tablet Take 1 tablet (1 g) by mouth once daily at bedtime.    traZODone (DESYREL) 150 mg, oral, Nightly    Xdemvy 0.25 % drops 1  "drop, Both Eyes, Nightly       Allergies  Allergies   Allergen Reactions    Penicillins Anaphylaxis, Shortness of breath and Swelling     Hives, and causes her throat to close.    Cefepime Hives    Adhesive Tape-Silicones Unknown    Avelox [Moxifloxacin] Unknown    Bactrim [Sulfamethoxazole-Trimethoprim] Unknown    Cephalexin Unknown    Codeine Unknown    Desyrel [Trazodone] Unknown    Erythromycin Unknown    Hydroxychloroquine Unknown     pruritis    Ibuprofen Unknown    Nsaids (Non-Steroidal Anti-Inflammatory Drug) Unknown    Other Unknown     Other: placquenil itching, Vomiting, hives  Other: desryl and sympathomimetic agents     Percocet [Oxycodone-Acetaminophen] Unknown    Percodan [Oxycodone-Aspirin] Unknown    Phenergan [Promethazine] Unknown    Toradol [Ketorolac] Unknown    Tramadol Unknown    Trintellix [Vortioxetine] Unknown    [id number] Antimigraine Agents Unknown     racing heart    Adhesive Rash    Macrolide Antibiotics Hives and Rash    reviewed by myself.    SHx  Social History     Tobacco Use    Smoking status: Former     Current packs/day: 0.00     Types: Cigarettes     Quit date:      Years since quittin.6    Smokeless tobacco: Never    Tobacco comments:     Only smoked for 2mon; 2 cigs a day   Vaping Use    Vaping status: Never Used   Substance Use Topics    Alcohol use: Not Currently     Comment: once a month 1 glass of wine    Drug use: Yes     Types: Marijuana     Comment: 1 gummie bear per night    reviewed by myself.      ------------------------------------------------------------------------------------------------------------------------------------------    /68   Pulse (!) 111   Temp 36.8 °C (98.2 °F)   Resp 18   Ht 1.626 m (5' 4\")   Wt 69.4 kg (153 lb)   SpO2 98%   BMI 26.26 kg/m²     Physical Exam  Vitals and nursing note reviewed.   Constitutional:       General: She is not in acute distress.     Appearance: Normal appearance. She is normal weight. She is " not toxic-appearing.   HENT:      Head: Normocephalic.      Nose: Nose normal.      Mouth/Throat:      Mouth: Mucous membranes are moist.      Pharynx: Oropharynx is clear.   Eyes:      Extraocular Movements: Extraocular movements intact.      Conjunctiva/sclera: Conjunctivae normal.   Cardiovascular:      Rate and Rhythm: Regular rhythm. Tachycardia present.   Pulmonary:      Effort: Pulmonary effort is normal.      Breath sounds: Normal breath sounds.   Abdominal:      General: Abdomen is flat.      Palpations: Abdomen is soft.      Tenderness: There is abdominal tenderness.      Comments: Surgical wound with erythema and purulent malodorous drainage.  No palpable abscess however the skin is indurated.  No crepitus.   Musculoskeletal:         General: Normal range of motion.      Cervical back: Neck supple.   Skin:     General: Skin is warm and dry.   Neurological:      Mental Status: She is alert and oriented to person, place, and time.   Psychiatric:         Attention and Perception: Attention normal.         Mood and Affect: Mood normal.          ------------------------------------------------------------------------------------------------------------------------------------------  Labs  Labs Reviewed   CBC WITH AUTO DIFFERENTIAL - Abnormal       Result Value    WBC 15.8 (*)     nRBC 0.0      RBC 3.15 (*)     Hemoglobin 9.7 (*)     Hematocrit 28.9 (*)     MCV 92      MCH 30.8      MCHC 33.6      RDW 13.1      Platelets 152      Neutrophils % 87.6      Immature Granulocytes %, Automated 1.8 (*)     Lymphocytes % 4.5      Monocytes % 5.5      Eosinophils % 0.2      Basophils % 0.4      Neutrophils Absolute 13.79 (*)     Immature Granulocytes Absolute, Automated 0.29      Lymphocytes Absolute 0.71 (*)     Monocytes Absolute 0.87      Eosinophils Absolute 0.03      Basophils Absolute 0.06     COMPREHENSIVE METABOLIC PANEL - Abnormal    Glucose 136 (*)     Sodium 132 (*)     Potassium 3.5      Chloride 96 (*)      Bicarbonate 27      Anion Gap 13      Urea Nitrogen 32 (*)     Creatinine 1.48 (*)     eGFR 38 (*)     Calcium 8.4 (*)     Albumin 2.7 (*)     Alkaline Phosphatase 147 (*)     Total Protein 6.1 (*)     AST 32      Bilirubin, Total 0.6      ALT 22     LACTATE - Abnormal    Lactate 2.7 (*)     Narrative:     Venipuncture immediately after or during the administration of Metamizole may lead to falsely low results. Testing should be performed immediately  prior to Metamizole dosing.   SEDIMENTATION RATE, AUTOMATED - Abnormal    Sedimentation Rate 86 (*)    C-REACTIVE PROTEIN - Abnormal    C-Reactive Protein 20.46 (*)    BLOOD CULTURE   BLOOD CULTURE   LACTATE        Imaging  CT abdomen pelvis w IV contrast    (Results Pending)        ED Course  Diagnoses as of 08/08/24 1647   Postoperative wound cellulitis        Medical Decision Making: She did not appear ill or toxic.  Vital signs reviewed.  She is tachycardic.  Otherwise hemodynamically stable.  I did touch base with her surgeon, Dr. Tiwari who is okay with her being admitted to [place] for IV antibiotics.  If her wounds do not improve, he will plan to operate on Monday.    The patient has multiple allergies to antibiotics it was decided to cover with IV vancomycin and IV meropenem.    Differential diagnoses considered: Cellulitis, intra-abdominal abscess, postop wound dehiscence, others    Medications given:  IV vancomycin, IV meropenem, IV morphine, IV Zofran    I reviewed the labs from today.  Leukocytosis at 15.8.  Hemoglobin 9.7 hematocrit of 28.9.  Platelets are normal.  BUN 32 with a creatinine of 1.48.  CRP elevated at 20.46.  Sed rate elevated at 86.  Lactic elevated at 2.7.  Patient ordered a liter of IV fluids.  CT pending.  Case discussed with ED attending, Dr. Zuniga who will follow the CT reading.  I consulted her PCP.  I spoke with Dr. Venegas who will admit.    Diagnosis: Postop wound cellulitis   Plan: admit     Jorge Moody PA-C  08/08/24 [id number]

## 2024-08-08 NOTE — H&P
History Of Present Illness  Mariann Drew is a 68 y.o. female with a PMH of Hyperlipidemia former type II diabetic, resolved with weight loss status post gastric bypass, anemia, GERD, ANKUSH, breast cancer with left mastectomy, gout, and tardive dyskinesia who presented today with infected surgical site.  Patient reports on July 30 she underwent an abdominoplasty and since then, she thought her pain was disproportionate to the procedure, rating it a 5/10.  She continued that she developed subjective fevers, and purulent drainage from her BÁRBARA drains two to three days after her surgery.  She notes she followed up today and was instructed that her incision was infected and to come to the emergency room for evaluation.She notes she followed up today she adds that she has not had a bowel movement in about a week and her stomach feels distended.    In the ED lab work was performed and revealed glucose 136, sodium 132, chloride 96, bun 32, creatinine 1.48 (16 and 1.31 on July 11, 2024), alk phos 147 (previously 138), lactate 2.7, white blood cell count 15.8, anemia with H&H 9.7 and 28.9 (12.2 and 38.5 on July 11, 2024), immature granulocytes 1.8, neutrophils 13.79, lymphocytes 0.71, blood and wound cultures were sent. CT abdomen/pelvis results below.  ER JULIANA spoke with surgeon who instructed her to admit the patient over the weekend with IV antibiotics with tentative plan for surgery on Monday morning if needed.  She arrived with a heart rate of 111, vitals were otherwise stable.  She was given IV fluids, Merrem, morphine, Zofran, and Vanco and a repeat lactate is pending. She was admitted for further evaluation and treatment under the care of Dr. Venegas.    CTAP:    1. scattered subcutaneous air/gas in the deep subcutaneous tissues  along the abdomen. Surgical drains are in good position anterior to  the abdominal musculature. Findings are limited to the deep  subcutaneous tissues. No free intraperitoneal air or fluid is  seen.  No abscess or collections are noted in the region of the  abdominoplasty and drains. There are multiple subcutaneous gas/air  pockets however no fluid collections or abscess is seen.      2. Hepatic cirrhosis. No focal hepatic lesions.      3. Diverticulosis of the colon without evidence for acute  diverticulitis.    Past medical history: As above    Past surgical history: Gastric bypass, abdominoplasty, bladder stimulator, right knee replacement, hysterectomy, cholecystectomy, mastectomy, tissue expander surgery, breast reduction, esophageal dilation, carpal tunnel repair    Family history: Coronary artery disease, CHF, prostate cancer    Social history: Former smoker, denies alcohol or illicit drug use    A 10 point review of systems has been performed and is negative besides what is stated in HPI.    Allergies  Penicillins, Cefepime, Adhesive tape-silicones, Avelox [moxifloxacin], Bactrim [sulfamethoxazole-trimethoprim], Cephalexin, Codeine, Desyrel [trazodone], Erythromycin, Hydroxychloroquine, Ibuprofen, Nsaids (non-steroidal anti-inflammatory drug), Other, Percocet [oxycodone-acetaminophen], Percodan [oxycodone-aspirin], Phenergan [promethazine], Toradol [ketorolac], Tramadol, Trintellix [vortioxetine], [id number] antimigraine agents, Adhesive, and Macrolide antibiotics    Review of Systems     Physical Exam  Constitutional:       Appearance: Normal appearance.   HENT:      Head: Normocephalic and atraumatic.      Nose: Nose normal.      Mouth/Throat:      Mouth: Mucous membranes are moist.   Eyes:      Conjunctiva/sclera: Conjunctivae normal.   Cardiovascular:      Rate and Rhythm: Normal rate and regular rhythm.      Heart sounds: Normal heart sounds.   Pulmonary:      Effort: Pulmonary effort is normal.      Breath sounds: Normal breath sounds.   Abdominal:      General: Abdomen is flat. Bowel sounds are normal. There is distension.      Tenderness: There is no abdominal tenderness.  "  Musculoskeletal:         General: Normal range of motion.      Cervical back: Neck supple.   Skin:     Comments: Abdomen with binder in place, transverse incision with  erythematous dehiscence in the middle, packed with purulent soaked Kerlix. The rest of the incision is well approximated.  BL flank BÁRBARA drain sites with edema and erythema draining moderate amount of purulent drainage   Neurological:      Mental Status: She is alert. Mental status is at baseline.   Psychiatric:         Mood and Affect: Mood normal.        Last Recorded Vitals  Blood pressure 116/68, pulse 91, temperature 36.8 °C (98.2 °F), resp. rate 16, height 1.626 m (5' 4\"), weight 69.4 kg (153 lb), SpO2 100%.    Relevant Results    No current facility-administered medications on file prior to encounter.     Current Outpatient Medications on File Prior to Encounter   Medication Sig Dispense Refill   • cyclobenzaprine (Flexeril) 5 mg tablet Take 1 tablet (5 mg) by mouth 3 times a day as needed for muscle spasms. 30 tablet 0   • deutetrabenazine (Austedo XR) 24 mg tablet extended release 24 hr Take 1 tablet (24 mg) by mouth once daily. Take 1-6mg tablet and 1-24mg tablet for a total dose of 30mg daily. 90 tablet 3   • deutetrabenazine (Austedo XR) 6 mg tablet extended release 24 hr Take 1 tablet (6 mg) by mouth once daily. Take 1-6mg tablet and 1-24mg tablet for a total dose of 30mg daily. 90 tablet 3   • DULoxetine (Cymbalta) 60 mg DR capsule Take 1 capsule (60 mg) by mouth 2 times a day. Do not crush or chew.     • famotidine (Pepcid) 20 mg tablet Take 1 tablet (20 mg) by mouth once daily in the morning.     • ferrous sulfate (FEOSOL ORAL) Take 1 tablet by mouth once daily in the evening. 200 (65 Fe) MG     • lamoTRIgine (LaMICtal) 200 mg tablet Take 1 tablet (200 mg) by mouth once daily in the morning.     • multivitamin tablet Take 1 tablet by mouth once daily.     • oxyCODONE (Roxicodone) 5 mg immediate release tablet Take 1 tablet (5 mg) by " mouth every 6 hours if needed for severe pain (7 - 10). 15 tablet 0   • sucralfate (Carafate) 1 gram tablet Take 1 tablet (1 g) by mouth once daily at bedtime.     • traZODone (Desyrel) 100 mg tablet Take 1.5 tablets (150 mg) by mouth once daily at bedtime.     • Xdemvy 0.25 % drops Administer 1 drop into both eyes once daily at bedtime.     • allopurinol (Zyloprim) 100 mg tablet Take 1 tablet (100 mg) by mouth once daily.     • allopurinol (Zyloprim) 300 mg tablet TAKE ONE TABLET ( 300 MG ) IN ADDITION TO ONE TABLET ( 100 MG ) DAILY ( TOTAL  MG DAILY ) (Patient not taking: Reported on 8/8/2024) 90 tablet 3   • dexAMETHasone 0.5 mg/5 mL oral liquid Use as directed     • dexAMETHasone 0.5 mg/5 mL oral liquid Take 5 mL (0.5 mg) by mouth 4 times a day for 10 days. 237 mL 1   • famotidine (Pepcid) 40 mg tablet      • latanoprost (Xelpros) 0.005 % drops, emulsion Administer into affected eye(s) once daily.     • lidocaine (Lidocaine Viscous) 2 % solution Take by mouth.     • mometasone (Elocon) 0.1 % cream Apply topically once daily as needed (Apply topically once daily as needed). 15 g 1   • naratriptan (Amerge) 2.5 mg tablet Take 1 tablet (2.5 mg) by mouth 1 time if needed for migraine. May repeat once in 4hrs if headache recurs 9 tablet 1   • Nyamyc 100,000 unit/gram powder APPLY TO AFFECTED AREA TWICE A DAY (Patient taking differently: Apply 1 Application topically 2 times a day. to affected area) 30 g 2   • semaglutide (Ozempic) 0.25 mg or 0.5 mg (2 mg/3 mL) pen injector INJECT 0.5 MG UNDER THE SKIN 1 (ONE) TIME PER WEEK. (Patient taking differently: Inject 0.5 mg under the skin 1 (one) time per week. Every Tuesdays) 3 mL 3   • [DISCONTINUED] lamoTRIgine (LaMICtal) 150 mg tablet Take 1 tablet (150 mg) by mouth once daily.          Results for orders placed or performed during the hospital encounter of 08/08/24 (from the past 24 hour(s))   CBC and Auto Differential   Result Value Ref Range    WBC 15.8 (H) 4.4 -  11.3 x10*3/uL    nRBC 0.0 0.0 - 0.0 /100 WBCs    RBC 3.15 (L) 4.00 - 5.20 x10*6/uL    Hemoglobin 9.7 (L) 12.0 - 16.0 g/dL    Hematocrit 28.9 (L) 36.0 - 46.0 %    MCV 92 80 - 100 fL    MCH 30.8 26.0 - 34.0 pg    MCHC 33.6 32.0 - 36.0 g/dL    RDW 13.1 11.5 - 14.5 %    Platelets 152 150 - 450 x10*3/uL    Neutrophils % 87.6 40.0 - 80.0 %    Immature Granulocytes %, Automated 1.8 (H) 0.0 - 0.9 %    Lymphocytes % 4.5 13.0 - 44.0 %    Monocytes % 5.5 2.0 - 10.0 %    Eosinophils % 0.2 0.0 - 6.0 %    Basophils % 0.4 0.0 - 2.0 %    Neutrophils Absolute 13.79 (H) 1.20 - 7.70 x10*3/uL    Immature Granulocytes Absolute, Automated 0.29 0.00 - 0.70 x10*3/uL    Lymphocytes Absolute 0.71 (L) 1.20 - 4.80 x10*3/uL    Monocytes Absolute 0.87 0.10 - 1.00 x10*3/uL    Eosinophils Absolute 0.03 0.00 - 0.70 x10*3/uL    Basophils Absolute 0.06 0.00 - 0.10 x10*3/uL   Comprehensive metabolic panel   Result Value Ref Range    Glucose 136 (H) 74 - 99 mg/dL    Sodium 132 (L) 136 - 145 mmol/L    Potassium 3.5 3.5 - 5.3 mmol/L    Chloride 96 (L) 98 - 107 mmol/L    Bicarbonate 27 21 - 32 mmol/L    Anion Gap 13 10 - 20 mmol/L    Urea Nitrogen 32 (H) 6 - 23 mg/dL    Creatinine 1.48 (H) 0.50 - 1.05 mg/dL    eGFR 38 (L) >60 mL/min/1.73m*2    Calcium 8.4 (L) 8.6 - 10.3 mg/dL    Albumin 2.7 (L) 3.4 - 5.0 g/dL    Alkaline Phosphatase 147 (H) 33 - 136 U/L    Total Protein 6.1 (L) 6.4 - 8.2 g/dL    AST 32 9 - 39 U/L    Bilirubin, Total 0.6 0.0 - 1.2 mg/dL    ALT 22 7 - 45 U/L   Lactate   Result Value Ref Range    Lactate 2.7 (H) 0.4 - 2.0 mmol/L   Sedimentation rate, automated   Result Value Ref Range    Sedimentation Rate 86 (H) 0 - 30 mm/h   C-reactive protein   Result Value Ref Range    C-Reactive Protein 20.46 (H) <1.00 mg/dL        CT abdomen pelvis w IV contrast    Result Date: 8/8/2024  Interpreted By:  Thomas Zacarias, STUDY: CT ABDOMEN PELVIS W IV CONTRAST; 8/8/2024 4:43 pm   INDICATION: Signs/Symptoms:Recent abdominoplasty, purulent drainage,  wound infection;   COMPARISON: None   ACCESSION NUMBER(S): [id number]   ORDERING CLINICIAN: YAW BALDWIN   TECHNIQUE: Contiguous axial images of the abdomen/pelvis were performed with IV contrast. 75 ml of Omnipaque 350 was utilized. Coronal and sagittal reformatted images were also obtained. All CT examinations are performed with 1 or more of the following dose reduction techniques: Automated exposure control, adjustment of mA and/or kv according to patient's size, or use of iterative reconstruction techniques.     FINDINGS: The liver is nodular in contour consistent with cirrhosis. No focal hepatic lesions. The common bile duct, pancreas, and adrenal glands are unremarkable. 2 cm hypodense lesion in the spleen most likely a small cyst. Spleen is otherwise normal in size and unremarkable. Prior cholecystectomy with surgical clips in the gallbladder fossa.   The kidneys enhance symmetrically. No urolithiasis is seen. No hydroureteronephrosis is seen. There are a few hypodense lesions in the kidneys bilaterally which are too small to characterize on the right. The largest in the left kidney is compatible with a cyst in the lower pole measuring 3.7 x 3.6 cm.   The visualized aorta is unremarkable.   The small bowel is not dilated. The appendix is not identified however no evidence for acute inflammatory change. Mild diverticulosis of the colon. No evidence for acute diverticulitis.   The bladder is minimally distended and otherwise unremarkable.   The visualized osseous structures are intact.   Limited images of the lower thorax show a partially visualized left mastectomy and breast implant.   There is scattered subcutaneous air/gas in the deep subcutaneous tissues along the abdomen. Surgical drains are noted in place anterior to the abdominal musculature. Findings are limited to the deep subcutaneous tissues. No free intraperitoneal air or fluid is seen. No abscess or collections are noted in the region of the  abdominoplasty and drains. There are multiple gas pockets however no fluid collections or abscess is seen.       1. scattered subcutaneous air/gas in the deep subcutaneous tissues along the abdomen. Surgical drains are in good position anterior to the abdominal musculature. Findings are limited to the deep subcutaneous tissues. No free intraperitoneal air or fluid is seen. No abscess or collections are noted in the region of the abdominoplasty and drains. There are multiple subcutaneous gas/air pockets however no fluid collections or abscess is seen.   2. Hepatic cirrhosis. No focal hepatic lesions.   3. Diverticulosis of the colon without evidence for acute diverticulitis.   Signed by: Thomas Zacarias 8/8/2024 5:03 PM Dictation workstation:   [id number]      Assessment/Plan   Principal Problem:    Postoperative wound cellulitis  Wound dehiscence  RUSSELL  Hyponatremia  Hypochloremia    Admit to surgical floor  Appreciate plastics recs  Continue merrem and vanco  CBC, CMP in am  Wound care, see orders  Drain care  Wound and blood cultures pending  Normal saline at 100 cc an hour    Chronic conditions: Gout, tardive dyskinesia, GERD, anemia, status post gastric bypass, hyperlipidemia    Continue home medications as listed above     DVT prophylaxis    SCDs  Heparin    I spent 60 minutes in the professional and overall care of this patient.      Patrizia Hudson, APRN-CNP

## 2024-08-09 LAB
ALBUMIN SERPL BCP-MCNC: 2.5 G/DL (ref 3.4–5)
ALP SERPL-CCNC: 136 U/L (ref 33–136)
ALT SERPL W P-5'-P-CCNC: 18 U/L (ref 7–45)
ANION GAP SERPL CALC-SCNC: 12 MMOL/L (ref 10–20)
AST SERPL W P-5'-P-CCNC: 25 U/L (ref 9–39)
BILIRUB SERPL-MCNC: 0.7 MG/DL (ref 0–1.2)
BUN SERPL-MCNC: 28 MG/DL (ref 6–23)
CALCIUM SERPL-MCNC: 7.8 MG/DL (ref 8.6–10.3)
CHLORIDE SERPL-SCNC: 100 MMOL/L (ref 98–107)
CO2 SERPL-SCNC: 26 MMOL/L (ref 21–32)
CREAT SERPL-MCNC: 1.33 MG/DL (ref 0.5–1.05)
EGFRCR SERPLBLD CKD-EPI 2021: 43 ML/MIN/1.73M*2
ERYTHROCYTE [DISTWIDTH] IN BLOOD BY AUTOMATED COUNT: 13 % (ref 11.5–14.5)
GLUCOSE SERPL-MCNC: 136 MG/DL (ref 74–99)
HCT VFR BLD AUTO: 25.8 % (ref 36–46)
HGB BLD-MCNC: 8.6 G/DL (ref 12–16)
MCH RBC QN AUTO: 30.6 PG (ref 26–34)
MCHC RBC AUTO-ENTMCNC: 33.3 G/DL (ref 32–36)
MCV RBC AUTO: 92 FL (ref 80–100)
NRBC BLD-RTO: 0 /100 WBCS (ref 0–0)
PLATELET # BLD AUTO: 128 X10*3/UL (ref 150–450)
POTASSIUM SERPL-SCNC: 3.6 MMOL/L (ref 3.5–5.3)
PROT SERPL-MCNC: 5.1 G/DL (ref 6.4–8.2)
RBC # BLD AUTO: 2.81 X10*6/UL (ref 4–5.2)
SODIUM SERPL-SCNC: 134 MMOL/L (ref 136–145)
VANCOMYCIN SERPL-MCNC: 7.1 UG/ML (ref 5–20)
WBC # BLD AUTO: 9.5 X10*3/UL (ref 4.4–11.3)

## 2024-08-09 PROCEDURE — 2500000001 HC RX 250 WO HCPCS SELF ADMINISTERED DRUGS (ALT 637 FOR MEDICARE OP): Performed by: NURSE PRACTITIONER

## 2024-08-09 PROCEDURE — 84075 ASSAY ALKALINE PHOSPHATASE: CPT | Performed by: NURSE PRACTITIONER

## 2024-08-09 PROCEDURE — 99223 1ST HOSP IP/OBS HIGH 75: CPT | Performed by: STUDENT IN AN ORGANIZED HEALTH CARE EDUCATION/TRAINING PROGRAM

## 2024-08-09 PROCEDURE — 36415 COLL VENOUS BLD VENIPUNCTURE: CPT | Performed by: NURSE PRACTITIONER

## 2024-08-09 PROCEDURE — 1100000001 HC PRIVATE ROOM DAILY

## 2024-08-09 PROCEDURE — 80202 ASSAY OF VANCOMYCIN: CPT | Performed by: NURSE PRACTITIONER

## 2024-08-09 PROCEDURE — 2500000004 HC RX 250 GENERAL PHARMACY W/ HCPCS (ALT 636 FOR OP/ED): Performed by: NURSE PRACTITIONER

## 2024-08-09 PROCEDURE — 85027 COMPLETE CBC AUTOMATED: CPT | Performed by: NURSE PRACTITIONER

## 2024-08-09 PROCEDURE — 2500000001 HC RX 250 WO HCPCS SELF ADMINISTERED DRUGS (ALT 637 FOR MEDICARE OP): Performed by: STUDENT IN AN ORGANIZED HEALTH CARE EDUCATION/TRAINING PROGRAM

## 2024-08-09 RX ORDER — MEROPENEM 1 G/1
1 INJECTION, POWDER, FOR SOLUTION INTRAVENOUS EVERY 12 HOURS
Status: DISPENSED | OUTPATIENT
Start: 2024-08-10

## 2024-08-09 RX ORDER — VANCOMYCIN HYDROCHLORIDE 1 G/200ML
1000 INJECTION, SOLUTION INTRAVENOUS ONCE
Status: COMPLETED | OUTPATIENT
Start: 2024-08-09 | End: 2024-08-09

## 2024-08-09 RX ORDER — HYDROCODONE BITARTRATE AND ACETAMINOPHEN 5; 325 MG/1; MG/1
1 TABLET ORAL EVERY 6 HOURS PRN
Status: DISPENSED | OUTPATIENT
Start: 2024-08-09

## 2024-08-09 ASSESSMENT — PAIN DESCRIPTION - ORIENTATION: ORIENTATION: RIGHT;LEFT;MID;LOWER

## 2024-08-09 ASSESSMENT — COGNITIVE AND FUNCTIONAL STATUS - GENERAL
TURNING FROM BACK TO SIDE WHILE IN FLAT BAD: A LITTLE
MOVING TO AND FROM BED TO CHAIR: A LITTLE
STANDING UP FROM CHAIR USING ARMS: A LITTLE
MOBILITY SCORE: 18
STANDING UP FROM CHAIR USING ARMS: A LITTLE
DAILY ACTIVITIY SCORE: 20
HELP NEEDED FOR BATHING: A LITTLE
PERSONAL GROOMING: A LITTLE
MOBILITY SCORE: 18
WALKING IN HOSPITAL ROOM: A LITTLE
EATING MEALS: A LITTLE
MOVING FROM LYING ON BACK TO SITTING ON SIDE OF FLAT BED WITH BEDRAILS: A LITTLE
CLIMB 3 TO 5 STEPS WITH RAILING: A LITTLE
DAILY ACTIVITIY SCORE: 20
EATING MEALS: A LITTLE
CLIMB 3 TO 5 STEPS WITH RAILING: A LITTLE
PERSONAL GROOMING: A LITTLE
TOILETING: A LITTLE
TOILETING: A LITTLE
HELP NEEDED FOR BATHING: A LITTLE
MOVING FROM LYING ON BACK TO SITTING ON SIDE OF FLAT BED WITH BEDRAILS: A LITTLE
WALKING IN HOSPITAL ROOM: A LITTLE
TURNING FROM BACK TO SIDE WHILE IN FLAT BAD: A LITTLE
MOVING TO AND FROM BED TO CHAIR: A LITTLE

## 2024-08-09 ASSESSMENT — PAIN SCALES - GENERAL
PAINLEVEL_OUTOF10: 10 - WORST POSSIBLE PAIN
PAINLEVEL_OUTOF10: 8
PAINLEVEL_OUTOF10: 7
PAINLEVEL_OUTOF10: 3

## 2024-08-09 ASSESSMENT — PAIN SCALES - WONG BAKER: WONGBAKER_NUMERICALRESPONSE: HURTS EVEN MORE

## 2024-08-09 ASSESSMENT — PAIN DESCRIPTION - LOCATION
LOCATION: ABDOMEN
LOCATION: ABDOMEN

## 2024-08-09 ASSESSMENT — PAIN - FUNCTIONAL ASSESSMENT: PAIN_FUNCTIONAL_ASSESSMENT: 0-10

## 2024-08-09 NOTE — CARE PLAN
Problem: Pain - Adult  Goal: Verbalizes/displays adequate comfort level or baseline comfort level  Outcome: Progressing     Problem: Safety - Adult  Goal: Free from fall injury  Outcome: Progressing     Problem: Discharge Planning  Goal: Discharge to home or other facility with appropriate resources  Outcome: Progressing     Problem: Chronic Conditions and Co-morbidities  Goal: Patient's chronic conditions and co-morbidity symptoms are monitored and maintained or improved  Outcome: Progressing     Problem: Skin  Goal: Decreased wound size/increased tissue granulation at next dressing change  Outcome: Progressing  Goal: Participates in plan/prevention/treatment measures  Outcome: Progressing  Goal: Prevent/manage excess moisture  Outcome: Progressing  Goal: Prevent/minimize sheer/friction injuries  Outcome: Progressing  Goal: Promote/optimize nutrition  Outcome: Progressing  Goal: Promote skin healing  Outcome: Progressing     Problem: Fall/Injury  Goal: Not fall by end of shift  Outcome: Progressing  Goal: Be free from injury by end of the shift  Outcome: Progressing  Goal: Verbalize understanding of personal risk factors for fall in the hospital  Outcome: Progressing  Goal: Verbalize understanding of risk factor reduction measures to prevent injury from fall in the home  Outcome: Progressing  Goal: Use assistive devices by end of the shift  Outcome: Progressing  Goal: Pace activities to prevent fatigue by end of the shift  Outcome: Progressing     Problem: Diabetes  Goal: Achieve decreasing blood glucose levels by end of shift  Outcome: Progressing  Goal: Increase stability of blood glucose readings by end of shift  Outcome: Progressing  Goal: Decrease in ketones present in urine by end of shift  Outcome: Progressing  Goal: Maintain electrolyte levels within acceptable range throughout shift  Outcome: Progressing  Goal: Maintain glucose levels >70mg/dl to <250mg/dl throughout shift  Outcome: Progressing  Goal: No  changes in neurological exam by end of shift  Outcome: Progressing  Goal: Learn about and adhere to nutrition recommendations by end of shift  Outcome: Progressing  Goal: Vital signs within normal range for age by end of shift  Outcome: Progressing  Goal: Increase self care and/or family involovement by end of shift  Outcome: Progressing  Goal: Receive DSME education by end of shift  Outcome: Progressing     Problem: Pain  Goal: Takes deep breaths with improved pain control throughout the shift  Outcome: Progressing  Goal: Turns in bed with improved pain control throughout the shift  Outcome: Progressing  Goal: Walks with improved pain control throughout the shift  Outcome: Progressing  Goal: Performs ADL's with improved pain control throughout shift  Outcome: Progressing  Goal: Participates in PT with improved pain control throughout the shift  Outcome: Progressing  Goal: Free from opioid side effects throughout the shift  Outcome: Progressing  Goal: Free from acute confusion related to pain meds throughout the shift  Outcome: Progressing

## 2024-08-09 NOTE — PROGRESS NOTES
Vancomycin Dosing by Pharmacy- FOLLOW UP    Mariann Drew is a 68 y.o. year old female who Pharmacy has been consulted for vancomycin dosing for cellulitis, skin and soft tissue. Based on the patient's indication and renal status this patient is being dosed based on a goal trough/random level of 10-15.     Renal function is currently stable. RUSSELL    Current vancomycin dose: 1,000 mg given x1 dose on 24 @ 1924.    Most recent random level: 7.1 mcg/mL on 24 @ 1825.    Visit Vitals  /78 (BP Location: Right arm, Patient Position: Lying)   Pulse 98   Temp 36.3 °C (97.3 °F) (Temporal)   Resp 18        Lab Results   Component Value Date    CREATININE 1.33 (H) 2024    CREATININE 1.48 (H) 2024    CREATININE 1.31 (H) 2024    CREATININE 1.30 (H) 2023    CREATININE 1.12 (H) 10/17/2022    CREATININE 1.46 (H) 10/13/2021    CREATININE 1.34 (H) 07/15/2021        Patient weight is as follows:   Vitals:    24 1204   Weight: 72.2 kg (159 lb 2.8 oz)     Temp (24hrs), Av.7 °C (98 °F), Min:36.2 °C (97.2 °F), Max:37.5 °C (99.5 °F)      Assessment/Plan    Below goal random trough level. Orders placed for vancomycin 1 gm iv x1 dose to be given on 24 @ 2100.  The next level will be obtained on 8/10/24 at 2000. May be obtained sooner if clinically indicated. Pharmacist will determine further dosing based on that level.  Will continue to monitor renal function daily while on vancomycin and order serum creatinine at least every 48 hours if not already ordered.  Follow for continued vancomycin needs, clinical response, and signs/symptoms of toxicity.       Michael Varma, PharmD

## 2024-08-09 NOTE — CONSULTS
"Nutrition Initial Assessment:   Nutrition Assessment    Reason for Assessment: Admission nursing screening (MST=4 for weight loss)    Patient is a 68 y.o. female presenting with post op wound cellulitis    Pmhx: HLD, GERD, ANKUSH, DM-resolved after gastric bypass, breast cancer s/p L mastectomy, gout, hepatic cirrhosis, diverticulosis    Nutrition History:  Energy Intake: Fair 50-75 %  Food and Nutrient History: Pt laying in bed at time of visit today.  POD#10 [id number] abdominoplasty, now with post op wound infection. Pt reports bariatric surgery a couple years ago at [facility] and has intentionally lost weight. Pt reports she weighed 303lbs at her highest.  PTA pt was doing fine with her PO intakes, always focusing on protein.  Pt declined Ensure HP, but was agreeable to Roderick twice daily to support wound healing.  Vitamin/Herbal Supplement Use: FeSO4, MVI  Food Allergies/Intolerances:  None  GI Symptoms: None  Oral Problems: None       Anthropometrics:  Height: 162.6 cm (5' 4.02\")   Weight: 72.2 kg (159 lb 2.8 oz)   BMI (Calculated): 27.31  IBW/kg (Dietitian Calculated): 54.5 kg  Percent of IBW: 132 %       Weight History:     Weight Change %:  Weight History / % Weight Change: 7/30 77.2kg (down 6% in 10 days--likely from surgery, fluid shifts). 7/2 77.6kg, 5/16 74.8kg, 4/3 73.5kg, 2/20 78.7kg, 12/26 78.9kg, 11/17 78kg, 8/11 84.8kg    Nutrition Focused Physical Exam Findings:    Subcutaneous Fat Loss:   Orbital Fat Pads: Well nourished (slightly bulging fat pads)  Buccal Fat Pads: Well nourished (full, rounded cheeks)  Triceps: Well nourished (ample fat tissue)  Ribs: Defer  Muscle Wasting:  Temporalis: Well nourished (well-defined muscle)  Pectoralis (Clavicular Region): Well nourished (clavicle not visible)  Deltoid/Trapezius: Well nourished (rounded appearance at arm, shoulder, neck)  Interosseous: Defer  Trapezius/Infraspinatus/Supraspinatus (Scapular Region): Defer  Quadriceps: Defer  Gastrocnemius: " Defer  Edema:     Physical Findings:  Skin: Positive (abdomen wound/incisions x2)    Nutrition Significant Labs:  CBC Trend:   Results from last 7 days   Lab Units 08/09/24  0637 08/08/24  1509   WBC AUTO x10*3/uL 9.5 15.8*   RBC AUTO x10*6/uL 2.81* 3.15*   HEMOGLOBIN g/dL 8.6* 9.7*   HEMATOCRIT % 25.8* 28.9*   MCV fL 92 92   PLATELETS AUTO x10*3/uL 128* 152    , BMP Trend:   Results from last 7 days   Lab Units 08/09/24  0637 08/08/24  1509   GLUCOSE mg/dL 136* 136*   CALCIUM mg/dL 7.8* 8.4*   SODIUM mmol/L 134* 132*   POTASSIUM mmol/L 3.6 3.5   CO2 mmol/L 26 27   CHLORIDE mmol/L 100 96*   BUN mg/dL 28* 32*   CREATININE mg/dL 1.33* 1.48*    , A1C:  Lab Results   Component Value Date    HGBA1C 4.5 07/11/2024   , BG POCT trend:        Nutrition Specific Medications:  Reviewed     I/O:   Last BM Date: 08/07/24; Stool Appearance: Unable to assess (08/09/24 0900)    Dietary Orders (From admission, onward)       Start     Ordered    08/09/24 1054  Oral nutritional supplements  Until discontinued        Comments: orange   Question Answer Comment   Deliver with Dinner    Deliver with Lunch    Select supplement: Roderick        08/09/24 1053    08/08/24 2210  May Participate in Room Service  Once        Question:  .  Answer:  Yes    08/08/24 2211    08/08/24 1752  Adult diet Cardiac; 70 gm fat; 2 - 3 grams Sodium  Diet effective now        Question Answer Comment   Diet type Cardiac    Fat restriction: 70 gm fat    Sodium restriction: 2 - 3 grams Sodium        08/08/24 1751                     Estimated Needs:      Method for Estimating Needs: 1470-1635kcals (27-30kcals/kg iBW)     Method for Estimating Needs: 65-82g (1.2-1.5g/kg IBW)     Method for Estimating Needs: 1 mL/kcal or as per MD        Nutrition Diagnosis   Malnutrition Diagnosis  Patient has Malnutrition Diagnosis: No    Nutrition Diagnosis  Patient has Nutrition Diagnosis: Yes  Diagnosis Status (1): New  Nutrition Diagnosis 1: Increased nutrient needs  Related to  (1): physiological causes increasing nutrient needs  As Evidenced by (1): wound healing needs       Nutrition Interventions/Recommendations         Nutrition Prescription:  Individualized Nutrition Prescription Provided for : Will change diet to Regular and provide Roderick BID        Nutrition Interventions:   Interventions: Meals and snacks, Medical food supplement  Goal: consume >50->75% of meals  Medical Food Supplement: Commercial beverage  Goal: consume 100% Roderick BID (for an additional 90 kcals, 2.5 gm protein, supplement glutamine and arginine)    Collaboration and Referral of Nutrition Care:  (spoke with patient)    Nutrition Education:   Pt declined need for education       Nutrition Monitoring and Evaluation   Food/Nutrient Related History Monitoring  Monitoring and Evaluation Plan: Energy intake, Fluid intake, Amount of food  Energy Intake: Estimated energy intake  Criteria: Meal/ONS intake to meet >75% of estimated needs  Fluid Intake: Estimated fluid intake  Criteria: fluid intake to meet >75% of estimated need  Amount of Food: Estimated amout of food, Medical food intake  Criteria: Pt to consume >75% of meals/ONS    Body Composition/Growth/Weight History  Monitoring and Evaluation Plan: Weight  Weight: Measured weight  Criteria: reweigh at least every 5 days    Biochemical Data, Medical Tests and Procedures  Monitoring and Evaluation Plan: Electrolyte/renal panel  Criteria: labs WNL    Nutrition Focused Physical Findings  Monitoring and Evaluation Plan: Skin  Criteria: promote healing through adequate nutrition         Time Spent (min): 45 minutes

## 2024-08-09 NOTE — H&P
History Of Present Illness  Mariann Drew is a 68 y.o. female with a PMH of Hyperlipidemia former type II diabetic, resolved with weight loss status post gastric bypass, anemia, GERD, ANKUSH, breast cancer with left mastectomy, gout, and tardive dyskinesia who presented today with infected surgical site.  Patient reports on July 30 she underwent an abdominoplasty and since then, she thought her pain was disproportionate to the procedure, rating it a 5/10.  She continued that she developed subjective fevers, and purulent drainage from her BÁRBARA drains two to three days after her surgery.  She notes she followed up today and was instructed that her incision was infected and to come to the emergency room for evaluation.She notes she followed up today she adds that she has not had a bowel movement in about a week and her stomach feels distended.    In the ED lab work was performed and revealed glucose 136, sodium 132, chloride 96, bun 32, creatinine 1.48 (16 and 1.31 on July 11, 2024), alk phos 147 (previously 138), lactate 2.7, white blood cell count 15.8, anemia with H&H 9.7 and 28.9 (12.2 and 38.5 on July 11, 2024), immature granulocytes 1.8, neutrophils 13.79, lymphocytes 0.71, blood and wound cultures were sent. CT abdomen/pelvis results below.  ER JULIANA spoke with surgeon who instructed her to admit the patient over the weekend with IV antibiotics with tentative plan for surgery on Monday morning if needed.  She arrived with a heart rate of 111, vitals were otherwise stable.  She was given IV fluids, Merrem, morphine, Zofran, and Vanco and a repeat lactate is pending. She was admitted for further evaluation and treatment under the care of Dr. Venegas.    CTAP:    1. scattered subcutaneous air/gas in the deep subcutaneous tissues  along the abdomen. Surgical drains are in good position anterior to  the abdominal musculature. Findings are limited to the deep  subcutaneous tissues. No free intraperitoneal air or fluid is  "seen.  No abscess or collections are noted in the region of the  abdominoplasty and drains. There are multiple subcutaneous gas/air  pockets however no fluid collections or abscess is seen.      2. Hepatic cirrhosis. No focal hepatic lesions.      3. Diverticulosis of the colon without evidence for acute  diverticulitis.    Past medical history: As above    Past surgical history: Gastric bypass, abdominoplasty, bladder stimulator, right knee replacement, hysterectomy, cholecystectomy, mastectomy, tissue expander surgery, breast reduction, esophageal dilation, carpal tunnel repair    Family history: Coronary artery disease, CHF, prostate cancer    Social history: Former smoker, denies alcohol or illicit drug use    A 10 point review of systems has been performed and is negative besides what is stated in HPI.    Allergies  Penicillins, Cefepime, Adhesive tape-silicones, Avelox [moxifloxacin], Bactrim [sulfamethoxazole-trimethoprim], Cephalexin, Codeine, Desyrel [trazodone], Erythromycin, Hydroxychloroquine, Ibuprofen, Nsaids (non-steroidal anti-inflammatory drug), Other, Percocet [oxycodone-acetaminophen], Percodan [oxycodone-aspirin], Phenergan [promethazine], Toradol [ketorolac], Tramadol, Trintellix [vortioxetine], [id number] antimigraine agents, Adhesive, and Macrolide antibiotics    Review of Systems     Physical Exam     Last Recorded Vitals  Blood pressure 103/58, pulse 91, temperature 36.2 °C (97.2 °F), resp. rate 18, height 1.626 m (5' 4\"), weight 72.2 kg (159 lb 2.8 oz), SpO2 97%.    Relevant Results    No current facility-administered medications on file prior to encounter.     Current Outpatient Medications on File Prior to Encounter   Medication Sig Dispense Refill    cyclobenzaprine (Flexeril) 5 mg tablet Take 1 tablet (5 mg) by mouth 3 times a day as needed for muscle spasms. 30 tablet 0    deutetrabenazine (Austedo XR) 24 mg tablet extended release 24 hr Take 1 tablet (24 mg) by mouth once daily. " Take 1-6mg tablet and 1-24mg tablet for a total dose of 30mg daily. 90 tablet 3    deutetrabenazine (Austedo XR) 6 mg tablet extended release 24 hr Take 1 tablet (6 mg) by mouth once daily. Take 1-6mg tablet and 1-24mg tablet for a total dose of 30mg daily. 90 tablet 3    DULoxetine (Cymbalta) 60 mg DR capsule Take 1 capsule (60 mg) by mouth 2 times a day. Do not crush or chew.      famotidine (Pepcid) 20 mg tablet Take 1 tablet (20 mg) by mouth once daily in the morning.      ferrous sulfate (FEOSOL ORAL) Take 1 tablet by mouth once daily in the evening. 200 (65 Fe) MG      lamoTRIgine (LaMICtal) 200 mg tablet Take 1 tablet (200 mg) by mouth once daily in the morning.      multivitamin tablet Take 1 tablet by mouth once daily.      oxyCODONE (Roxicodone) 5 mg immediate release tablet Take 1 tablet (5 mg) by mouth every 6 hours if needed for severe pain (7 - 10). 15 tablet 0    sucralfate (Carafate) 1 gram tablet Take 1 tablet (1 g) by mouth once daily at bedtime.      traZODone (Desyrel) 100 mg tablet Take 1.5 tablets (150 mg) by mouth once daily at bedtime.      Xdemvy 0.25 % drops Administer 1 drop into both eyes once daily at bedtime.      allopurinol (Zyloprim) 100 mg tablet Take 1 tablet (100 mg) by mouth once daily.      allopurinol (Zyloprim) 300 mg tablet TAKE ONE TABLET ( 300 MG ) IN ADDITION TO ONE TABLET ( 100 MG ) DAILY ( TOTAL  MG DAILY ) (Patient not taking: Reported on 8/8/2024) 90 tablet 3    dexAMETHasone 0.5 mg/5 mL oral liquid Use as directed      dexAMETHasone 0.5 mg/5 mL oral liquid Take 5 mL (0.5 mg) by mouth 4 times a day for 10 days. 237 mL 1    famotidine (Pepcid) 40 mg tablet       latanoprost (Xelpros) 0.005 % drops, emulsion Administer into affected eye(s) once daily.      lidocaine (Lidocaine Viscous) 2 % solution Take by mouth.      mometasone (Elocon) 0.1 % cream Apply topically once daily as needed (Apply topically once daily as needed). 15 g 1    naratriptan (Amerge) 2.5 mg  tablet Take 1 tablet (2.5 mg) by mouth 1 time if needed for migraine. May repeat once in 4hrs if headache recurs 9 tablet 1    Nyamyc 100,000 unit/gram powder APPLY TO AFFECTED AREA TWICE A DAY (Patient taking differently: Apply 1 Application topically 2 times a day. to affected area) 30 g 2    semaglutide (Ozempic) 0.25 mg or 0.5 mg (2 mg/3 mL) pen injector INJECT 0.5 MG UNDER THE SKIN 1 (ONE) TIME PER WEEK. (Patient taking differently: Inject 0.5 mg under the skin 1 (one) time per week. Every Tuesdays) 3 mL 3    [DISCONTINUED] lamoTRIgine (LaMICtal) 150 mg tablet Take 1 tablet (150 mg) by mouth once daily.          Results for orders placed or performed during the hospital encounter of 08/08/24 (from the past 24 hour(s))   CBC and Auto Differential   Result Value Ref Range    WBC 15.8 (H) 4.4 - 11.3 x10*3/uL    nRBC 0.0 0.0 - 0.0 /100 WBCs    RBC 3.15 (L) 4.00 - 5.20 x10*6/uL    Hemoglobin 9.7 (L) 12.0 - 16.0 g/dL    Hematocrit 28.9 (L) 36.0 - 46.0 %    MCV 92 80 - 100 fL    MCH 30.8 26.0 - 34.0 pg    MCHC 33.6 32.0 - 36.0 g/dL    RDW 13.1 11.5 - 14.5 %    Platelets 152 150 - 450 x10*3/uL    Neutrophils % 87.6 40.0 - 80.0 %    Immature Granulocytes %, Automated 1.8 (H) 0.0 - 0.9 %    Lymphocytes % 4.5 13.0 - 44.0 %    Monocytes % 5.5 2.0 - 10.0 %    Eosinophils % 0.2 0.0 - 6.0 %    Basophils % 0.4 0.0 - 2.0 %    Neutrophils Absolute 13.79 (H) 1.20 - 7.70 x10*3/uL    Immature Granulocytes Absolute, Automated 0.29 0.00 - 0.70 x10*3/uL    Lymphocytes Absolute 0.71 (L) 1.20 - 4.80 x10*3/uL    Monocytes Absolute 0.87 0.10 - 1.00 x10*3/uL    Eosinophils Absolute 0.03 0.00 - 0.70 x10*3/uL    Basophils Absolute 0.06 0.00 - 0.10 x10*3/uL   Comprehensive metabolic panel   Result Value Ref Range    Glucose 136 (H) 74 - 99 mg/dL    Sodium 132 (L) 136 - 145 mmol/L    Potassium 3.5 3.5 - 5.3 mmol/L    Chloride 96 (L) 98 - 107 mmol/L    Bicarbonate 27 21 - 32 mmol/L    Anion Gap 13 10 - 20 mmol/L    Urea Nitrogen 32 (H) 6 - 23  mg/dL    Creatinine 1.48 (H) 0.50 - 1.05 mg/dL    eGFR 38 (L) >60 mL/min/1.73m*2    Calcium 8.4 (L) 8.6 - 10.3 mg/dL    Albumin 2.7 (L) 3.4 - 5.0 g/dL    Alkaline Phosphatase 147 (H) 33 - 136 U/L    Total Protein 6.1 (L) 6.4 - 8.2 g/dL    AST 32 9 - 39 U/L    Bilirubin, Total 0.6 0.0 - 1.2 mg/dL    ALT 22 7 - 45 U/L   Lactate   Result Value Ref Range    Lactate 2.7 (H) 0.4 - 2.0 mmol/L   Sedimentation rate, automated   Result Value Ref Range    Sedimentation Rate 86 (H) 0 - 30 mm/h   C-reactive protein   Result Value Ref Range    C-Reactive Protein 20.46 (H) <1.00 mg/dL   Blood Culture    Specimen: Peripheral Venipuncture; Blood culture   Result Value Ref Range    Blood Culture Loaded on Instrument - Culture in progress    Blood Culture    Specimen: Peripheral Venipuncture; Blood culture   Result Value Ref Range    Blood Culture Loaded on Instrument - Culture in progress    Lactate   Result Value Ref Range    Lactate 2.4 (H) 0.4 - 2.0 mmol/L   CBC   Result Value Ref Range    WBC 9.5 4.4 - 11.3 x10*3/uL    nRBC 0.0 0.0 - 0.0 /100 WBCs    RBC 2.81 (L) 4.00 - 5.20 x10*6/uL    Hemoglobin 8.6 (L) 12.0 - 16.0 g/dL    Hematocrit 25.8 (L) 36.0 - 46.0 %    MCV 92 80 - 100 fL    MCH 30.6 26.0 - 34.0 pg    MCHC 33.3 32.0 - 36.0 g/dL    RDW 13.0 11.5 - 14.5 %    Platelets 128 (L) 150 - 450 x10*3/uL   Comprehensive metabolic panel   Result Value Ref Range    Glucose 136 (H) 74 - 99 mg/dL    Sodium 134 (L) 136 - 145 mmol/L    Potassium 3.6 3.5 - 5.3 mmol/L    Chloride 100 98 - 107 mmol/L    Bicarbonate 26 21 - 32 mmol/L    Anion Gap 12 10 - 20 mmol/L    Urea Nitrogen 28 (H) 6 - 23 mg/dL    Creatinine 1.33 (H) 0.50 - 1.05 mg/dL    eGFR 43 (L) >60 mL/min/1.73m*2    Calcium 7.8 (L) 8.6 - 10.3 mg/dL    Albumin 2.5 (L) 3.4 - 5.0 g/dL    Alkaline Phosphatase 136 33 - 136 U/L    Total Protein 5.1 (L) 6.4 - 8.2 g/dL    AST 25 9 - 39 U/L    Bilirubin, Total 0.7 0.0 - 1.2 mg/dL    ALT 18 7 - 45 U/L        CT abdomen pelvis w IV  contrast    Result Date: 8/8/2024  Interpreted By:  Thomas Zacarias, STUDY: CT ABDOMEN PELVIS W IV CONTRAST; 8/8/2024 4:43 pm   INDICATION: Signs/Symptoms:Recent abdominoplasty, purulent drainage, wound infection;   COMPARISON: None   ACCESSION NUMBER(S): [id number]   ORDERING CLINICIAN: YAW BALDWIN   TECHNIQUE: Contiguous axial images of the abdomen/pelvis were performed with IV contrast. 75 ml of Omnipaque 350 was utilized. Coronal and sagittal reformatted images were also obtained. All CT examinations are performed with 1 or more of the following dose reduction techniques: Automated exposure control, adjustment of mA and/or kv according to patient's size, or use of iterative reconstruction techniques.     FINDINGS: The liver is nodular in contour consistent with cirrhosis. No focal hepatic lesions. The common bile duct, pancreas, and adrenal glands are unremarkable. 2 cm hypodense lesion in the spleen most likely a small cyst. Spleen is otherwise normal in size and unremarkable. Prior cholecystectomy with surgical clips in the gallbladder fossa.   The kidneys enhance symmetrically. No urolithiasis is seen. No hydroureteronephrosis is seen. There are a few hypodense lesions in the kidneys bilaterally which are too small to characterize on the right. The largest in the left kidney is compatible with a cyst in the lower pole measuring 3.7 x 3.6 cm.   The visualized aorta is unremarkable.   The small bowel is not dilated. The appendix is not identified however no evidence for acute inflammatory change. Mild diverticulosis of the colon. No evidence for acute diverticulitis.   The bladder is minimally distended and otherwise unremarkable.   The visualized osseous structures are intact.   Limited images of the lower thorax show a partially visualized left mastectomy and breast implant.   There is scattered subcutaneous air/gas in the deep subcutaneous tissues along the abdomen. Surgical drains are noted in place  anterior to the abdominal musculature. Findings are limited to the deep subcutaneous tissues. No free intraperitoneal air or fluid is seen. No abscess or collections are noted in the region of the abdominoplasty and drains. There are multiple gas pockets however no fluid collections or abscess is seen.       1. scattered subcutaneous air/gas in the deep subcutaneous tissues along the abdomen. Surgical drains are in good position anterior to the abdominal musculature. Findings are limited to the deep subcutaneous tissues. No free intraperitoneal air or fluid is seen. No abscess or collections are noted in the region of the abdominoplasty and drains. There are multiple subcutaneous gas/air pockets however no fluid collections or abscess is seen.   2. Hepatic cirrhosis. No focal hepatic lesions.   3. Diverticulosis of the colon without evidence for acute diverticulitis.   Signed by: Thomas Zacarias 8/8/2024 5:03 PM Dictation workstation:   [id number]      Assessment/Plan   Principal Problem:    Postoperative wound cellulitis  Wound dehiscence  RUSSELL  Hyponatremia  Hypochloremia    Admit to surgical floor  Appreciate plastics recs  Continue merrem and vanco  CBC, CMP in am  Wound care, see orders  Drain care  Wound and blood cultures pending  Normal saline at 100 cc an hour    Chronic conditions: Gout, tardive dyskinesia, GERD, anemia, status post gastric bypass, hyperlipidemia    Continue home medications as listed above     DVT prophylaxis    SCDs  Heparin    8/9: some drainage noted, contineu abx, monitor labs, plastics consulted    I spent 60 minutes in the professional and overall care of this patient.      Jarocho Venegas, DO

## 2024-08-09 NOTE — CARE PLAN
The patient's goals for the shift include  comfort and safety    The clinical goals for the shift include Remain hemodynamically stable    Over the shift, the patient is making progress toward the following goals.

## 2024-08-09 NOTE — PROGRESS NOTES
Medication Adjustment    The following medication(s) was/were adjusted for Mariann Drew per protocol/policy due to altered renal function (improved).    Medication(s) adjusted:     Meropenem 500 mg iv q12h increased to meropenem 1 gm iv q12h    Estimated Creatinine Clearance: 38.9 mL/min (A) (by C-G formula based on SCr of 1.33 mg/dL (H)).      Michael Varma, PharmD

## 2024-08-09 NOTE — CONSULTS
Vancomycin Dosing by Pharmacy- INITIAL    Mariann Drew is a 68 y.o. year old female who Pharmacy has been consulted for vancomycin dosing for cellulitis, skin and soft tissue. Based on the patient's indication and renal status this patient will be dosed based on a goal trough/random level of 15-20.     Renal function is currently declining.    Visit Vitals  /66 (BP Location: Right arm)   Pulse 88   Temp 36.8 °C (98.2 °F)   Resp 18        Lab Results   Component Value Date    CREATININE 1.48 (H) 2024    CREATININE 1.31 (H) 2024    CREATININE 1.30 (H) 2023    CREATININE 1.12 (H) 10/17/2022    CREATININE 1.46 (H) 10/13/2021    CREATININE 1.34 (H) 07/15/2021        Patient weight is as follows:   Vitals:    24 1314   Weight: 69.4 kg (153 lb)       Cultures:  No results found for the encounter in last 14 days.        No intake/output data recorded.  I/O during current shift:  I/O this shift:  In: 1100 [IV Piggyback:1100]  Out: -     Temp (24hrs), Av.8 °C (98.2 °F), Min:36.8 °C (98.2 °F), Max:36.8 °C (98.2 °F)         Assessment/Plan     Patient has already been given a loading dose of 1000 mg.  Will initiate vancomycin maintenance, a one time dose of 1000 mg.    Follow-up level will be ordered on  at 1800 unless clinically indicated sooner.  Will continue to monitor renal function daily while on vancomycin and order serum creatinine at least every 48 hours if not already ordered.  Follow for continued vancomycin needs, clinical response, and signs/symptoms of toxicity.       Nehal Velez [facility]

## 2024-08-10 LAB
BACTERIA SPEC CULT: ABNORMAL
BACTERIA SPEC CULT: ABNORMAL
GRAM STN SPEC: ABNORMAL
GRAM STN SPEC: ABNORMAL
HOLD SPECIMEN: NORMAL
HOLD SPECIMEN: NORMAL
VANCOMYCIN TROUGH SERPL-MCNC: 9.3 UG/ML (ref 5–20)

## 2024-08-10 PROCEDURE — 36415 COLL VENOUS BLD VENIPUNCTURE: CPT | Performed by: STUDENT IN AN ORGANIZED HEALTH CARE EDUCATION/TRAINING PROGRAM

## 2024-08-10 PROCEDURE — 2500000001 HC RX 250 WO HCPCS SELF ADMINISTERED DRUGS (ALT 637 FOR MEDICARE OP): Performed by: STUDENT IN AN ORGANIZED HEALTH CARE EDUCATION/TRAINING PROGRAM

## 2024-08-10 PROCEDURE — 2500000004 HC RX 250 GENERAL PHARMACY W/ HCPCS (ALT 636 FOR OP/ED): Performed by: NURSE PRACTITIONER

## 2024-08-10 PROCEDURE — 1100000001 HC PRIVATE ROOM DAILY

## 2024-08-10 PROCEDURE — 99222 1ST HOSP IP/OBS MODERATE 55: CPT | Performed by: INTERNAL MEDICINE

## 2024-08-10 PROCEDURE — 80202 ASSAY OF VANCOMYCIN: CPT | Performed by: NURSE PRACTITIONER

## 2024-08-10 PROCEDURE — 2500000001 HC RX 250 WO HCPCS SELF ADMINISTERED DRUGS (ALT 637 FOR MEDICARE OP): Performed by: NURSE PRACTITIONER

## 2024-08-10 PROCEDURE — 2500000004 HC RX 250 GENERAL PHARMACY W/ HCPCS (ALT 636 FOR OP/ED): Performed by: STUDENT IN AN ORGANIZED HEALTH CARE EDUCATION/TRAINING PROGRAM

## 2024-08-10 RX ORDER — VANCOMYCIN HYDROCHLORIDE 1 G/200ML
1000 INJECTION, SOLUTION INTRAVENOUS ONCE
Status: COMPLETED | OUTPATIENT
Start: 2024-08-10 | End: 2024-08-10

## 2024-08-10 ASSESSMENT — PAIN SCALES - GENERAL
PAINLEVEL_OUTOF10: 4
PAINLEVEL_OUTOF10: 6
PAINLEVEL_OUTOF10: 0 - NO PAIN
PAINLEVEL_OUTOF10: 7
PAINLEVEL_OUTOF10: 9

## 2024-08-10 ASSESSMENT — PAIN SCALES - PAIN ASSESSMENT IN ADVANCED DEMENTIA (PAINAD): TOTALSCORE: MEDICATION (SEE MAR)

## 2024-08-10 ASSESSMENT — PAIN DESCRIPTION - DESCRIPTORS
DESCRIPTORS: ACHING

## 2024-08-10 ASSESSMENT — PAIN - FUNCTIONAL ASSESSMENT
PAIN_FUNCTIONAL_ASSESSMENT: 0-10

## 2024-08-10 ASSESSMENT — PAIN DESCRIPTION - ORIENTATION
ORIENTATION: OTHER (COMMENT)
ORIENTATION: OTHER (COMMENT)

## 2024-08-11 VITALS
OXYGEN SATURATION: 96 % | DIASTOLIC BLOOD PRESSURE: 70 MMHG | SYSTOLIC BLOOD PRESSURE: 115 MMHG | BODY MASS INDEX: 27.17 KG/M2 | RESPIRATION RATE: 18 BRPM | HEIGHT: 64 IN | TEMPERATURE: 99 F | WEIGHT: 159.17 LBS | HEART RATE: 99 BPM

## 2024-08-11 LAB
ANION GAP SERPL CALC-SCNC: 9 MMOL/L (ref 10–20)
BACTERIA BLD CULT: NORMAL
BACTERIA BLD CULT: NORMAL
BUN SERPL-MCNC: 20 MG/DL (ref 6–23)
CALCIUM SERPL-MCNC: 7.8 MG/DL (ref 8.6–10.3)
CHLORIDE SERPL-SCNC: 109 MMOL/L (ref 98–107)
CO2 SERPL-SCNC: 26 MMOL/L (ref 21–32)
CREAT SERPL-MCNC: 1.08 MG/DL (ref 0.5–1.05)
EGFRCR SERPLBLD CKD-EPI 2021: 56 ML/MIN/1.73M*2
ERYTHROCYTE [DISTWIDTH] IN BLOOD BY AUTOMATED COUNT: 13.2 % (ref 11.5–14.5)
GLUCOSE SERPL-MCNC: 102 MG/DL (ref 74–99)
HCT VFR BLD AUTO: 25.3 % (ref 36–46)
HGB BLD-MCNC: 8.3 G/DL (ref 12–16)
HOLD SPECIMEN: NORMAL
MCH RBC QN AUTO: 30.3 PG (ref 26–34)
MCHC RBC AUTO-ENTMCNC: 32.8 G/DL (ref 32–36)
MCV RBC AUTO: 92 FL (ref 80–100)
NRBC BLD-RTO: 0 /100 WBCS (ref 0–0)
PLATELET # BLD AUTO: 120 X10*3/UL (ref 150–450)
POTASSIUM SERPL-SCNC: 4 MMOL/L (ref 3.5–5.3)
RBC # BLD AUTO: 2.74 X10*6/UL (ref 4–5.2)
SODIUM SERPL-SCNC: 140 MMOL/L (ref 136–145)
VANCOMYCIN TROUGH SERPL-MCNC: 11.3 UG/ML (ref 5–20)
WBC # BLD AUTO: 5.9 X10*3/UL (ref 4.4–11.3)

## 2024-08-11 PROCEDURE — 99232 SBSQ HOSP IP/OBS MODERATE 35: CPT | Performed by: INTERNAL MEDICINE

## 2024-08-11 PROCEDURE — 2500000001 HC RX 250 WO HCPCS SELF ADMINISTERED DRUGS (ALT 637 FOR MEDICARE OP): Performed by: STUDENT IN AN ORGANIZED HEALTH CARE EDUCATION/TRAINING PROGRAM

## 2024-08-11 PROCEDURE — 80048 BASIC METABOLIC PNL TOTAL CA: CPT | Performed by: INTERNAL MEDICINE

## 2024-08-11 PROCEDURE — 1100000001 HC PRIVATE ROOM DAILY

## 2024-08-11 PROCEDURE — 36415 COLL VENOUS BLD VENIPUNCTURE: CPT | Performed by: INTERNAL MEDICINE

## 2024-08-11 PROCEDURE — 2500000001 HC RX 250 WO HCPCS SELF ADMINISTERED DRUGS (ALT 637 FOR MEDICARE OP): Performed by: NURSE PRACTITIONER

## 2024-08-11 PROCEDURE — 85027 COMPLETE CBC AUTOMATED: CPT | Performed by: INTERNAL MEDICINE

## 2024-08-11 PROCEDURE — 80202 ASSAY OF VANCOMYCIN: CPT | Performed by: STUDENT IN AN ORGANIZED HEALTH CARE EDUCATION/TRAINING PROGRAM

## 2024-08-11 PROCEDURE — 2500000004 HC RX 250 GENERAL PHARMACY W/ HCPCS (ALT 636 FOR OP/ED): Performed by: NURSE PRACTITIONER

## 2024-08-11 RX ORDER — VANCOMYCIN HYDROCHLORIDE 1 G/200ML
1000 INJECTION, SOLUTION INTRAVENOUS ONCE
Status: COMPLETED | OUTPATIENT
Start: 2024-08-11 | End: 2024-08-11

## 2024-08-11 ASSESSMENT — PAIN SCALES - GENERAL
PAINLEVEL_OUTOF10: 8
PAINLEVEL_OUTOF10: 0 - NO PAIN
PAINLEVEL_OUTOF10: 0 - NO PAIN

## 2024-08-11 ASSESSMENT — PAIN - FUNCTIONAL ASSESSMENT
PAIN_FUNCTIONAL_ASSESSMENT: 0-10
PAIN_FUNCTIONAL_ASSESSMENT: 0-10

## 2024-08-11 NOTE — PROGRESS NOTES
Vancomycin Dosing by Pharmacy- FOLLOW UP    Mariann Drew is a 68 y.o. year old female who Pharmacy has been consulted for vancomycin dosing for cellulitis, skin and soft tissue. Based on the patient's indication and renal status this patient is being dosed based on a goal trough/random level of 10-15.     Renal function is currently stable.    Current vancomycin dose: 1000 mg given every 24 hours    Most recent trough level: 9.3 mcg/mL    Visit Vitals  /65   Pulse 103   Temp 36.9 °C (98.4 °F)   Resp 18        Lab Results   Component Value Date    CREATININE 1.33 (H) 2024    CREATININE 1.48 (H) 2024    CREATININE 1.31 (H) 2024    CREATININE 1.30 (H) 2023    CREATININE 1.12 (H) 10/17/2022    CREATININE 1.46 (H) 10/13/2021    CREATININE 1.34 (H) 07/15/2021        Patient weight is as follows:   Vitals:    24 1204   Weight: 72.2 kg (159 lb 2.8 oz)       Cultures:  No results found for the encounter in last 14 days.       I/O last 3 completed shifts:  In: 4986.7 (69.1 mL/kg) [P.O.:250; I.V.:4336.7 (60.1 mL/kg); IV Piggyback:400]  Out: 30 (0.4 mL/kg) [Drains:30]  Weight: 72.2 kg   I/O during current shift:  No intake/output data recorded.    Temp (24hrs), Av.4 °C (97.5 °F), Min:36 °C (96.8 °F), Max:36.9 °C (98.4 °F)      Assessment/Plan    Within goal AUC range. Continue current vancomycin regimen.  The next level will be obtained on 24 at 2000. May be obtained sooner if clinically indicated.   Will continue to monitor renal function daily while on vancomycin and order serum creatinine at least every 48 hours if not already ordered.  Follow for continued vancomycin needs, clinical response, and signs/symptoms of toxicity.       Mat Gonzales, PharmD

## 2024-08-11 NOTE — CARE PLAN
The patient's goals for the shift include      The clinical goals for the shift include   Problem: Pain - Adult  Goal: Verbalizes/displays adequate comfort level or baseline comfort level  Outcome: Progressing     Problem: Safety - Adult  Goal: Free from fall injury  Outcome: Progressing     Problem: Discharge Planning  Goal: Discharge to home or other facility with appropriate resources  Outcome: Progressing     Problem: Chronic Conditions and Co-morbidities  Goal: Patient's chronic conditions and co-morbidity symptoms are monitored and maintained or improved  Outcome: Progressing     Problem: Skin  Goal: Decreased wound size/increased tissue granulation at next dressing change  Outcome: Progressing  Goal: Participates in plan/prevention/treatment measures  Outcome: Progressing  Goal: Prevent/manage excess moisture  Outcome: Progressing  Goal: Prevent/minimize sheer/friction injuries  Outcome: Progressing  Goal: Promote/optimize nutrition  Outcome: Progressing  Goal: Promote skin healing  Outcome: Progressing     Problem: Fall/Injury  Goal: Not fall by end of shift  Outcome: Progressing  Goal: Be free from injury by end of the shift  Outcome: Progressing  Goal: Verbalize understanding of personal risk factors for fall in the hospital  Outcome: Progressing  Goal: Verbalize understanding of risk factor reduction measures to prevent injury from fall in the home  Outcome: Progressing  Goal: Use assistive devices by end of the shift  Outcome: Progressing  Goal: Pace activities to prevent fatigue by end of the shift  Outcome: Progressing     Problem: Diabetes  Goal: Achieve decreasing blood glucose levels by end of shift  Outcome: Progressing  Goal: Increase stability of blood glucose readings by end of shift  Outcome: Progressing  Goal: Decrease in ketones present in urine by end of shift  Outcome: Progressing  Goal: Maintain electrolyte levels within acceptable range throughout shift  Outcome: Progressing  Goal:  Maintain glucose levels >70mg/dl to <250mg/dl throughout shift  Outcome: Progressing  Goal: No changes in neurological exam by end of shift  Outcome: Progressing  Goal: Learn about and adhere to nutrition recommendations by end of shift  Outcome: Progressing  Goal: Vital signs within normal range for age by end of shift  Outcome: Progressing  Goal: Increase self care and/or family involovement by end of shift  Outcome: Progressing  Goal: Receive DSME education by end of shift  Outcome: Progressing     Problem: Pain  Goal: Takes deep breaths with improved pain control throughout the shift  Outcome: Progressing  Goal: Turns in bed with improved pain control throughout the shift  Outcome: Progressing  Goal: Walks with improved pain control throughout the shift  Outcome: Progressing  Goal: Performs ADL's with improved pain control throughout shift  Outcome: Progressing  Goal: Participates in PT with improved pain control throughout the shift  Outcome: Progressing  Goal: Free from opioid side effects throughout the shift  Outcome: Progressing  Goal: Free from acute confusion related to pain meds throughout the shift  Outcome: Progressing

## 2024-08-11 NOTE — PROGRESS NOTES
"Mariann rDew is a 68 y.o. female on day 2 of admission presenting with Postoperative wound cellulitis.    Subjective   No events overnight.  Patient seen and examined at bedside with  present.  She complains of drainage from abdominal wound.  No fevers or chills.       Objective     Physical Exam  Constitutional:       Appearance: Normal appearance. Tardive dyskinesia   HENT:      Head: Normocephalic and atraumatic.      Nose: Nose normal.      Mouth/Throat:      Mouth: Mucous membranes are moist.   Eyes:      Conjunctiva/sclera: Conjunctivae normal.   Cardiovascular:      Rate and Rhythm: Normal rate and regular rhythm.      Heart sounds: Normal heart sounds.   Pulmonary:      Effort: Pulmonary effort is normal.      Breath sounds: Normal breath sounds.   Abdominal:      General: Abdomen is flat. Bowel sounds are normal. There is distension.      Tenderness: There is no abdominal tenderness.   Musculoskeletal:         General: Normal range of motion.      Cervical back: Neck supple.   Skin:     Comments: Abdomen with binder in place, transverse incision with  erythematous dehiscence in the middle, packed with purulent soaked Kerlix. The rest of the incision is well approximated.  BL flank BÁRBARA drain sites with edema and erythema draining moderate amount of purulent drainage   Neurological:      Mental Status: She is alert. Mental status is at baseline.   Psychiatric:         Mood and Affect: Mood normal.     Last Recorded Vitals  Blood pressure 124/65, pulse 103, temperature 36.9 °C (98.4 °F), temperature source Temporal, resp. rate 16, height 1.626 m (5' 4.02\"), weight 72.2 kg (159 lb 2.8 oz), SpO2 97%.  Intake/Output last 3 Shifts:  I/O last 3 completed shifts:  In: 4986.7 (69.1 mL/kg) [P.O.:250; I.V.:4336.7 (60.1 mL/kg); IV Piggyback:400]  Out: 30 (0.4 mL/kg) [Drains:30]  Weight: 72.2 kg     Relevant Results                             Assessment/Plan   Principal Problem:    Postoperative wound " cellulitis    Postoperative wound cellulitis  Wound dehiscence  RUSSELL  Hyponatremia  Hypochloremia     Admit to surgical floor  Appreciate plastics recs  Continue merrem and vanco  CBC, CMP in am  Wound care, see orders  Drain care  Wound and blood cultures pending  Normal saline at 100 cc an hour     Chronic conditions: Gout, tardive dyskinesia, GERD, anemia, status post gastric bypass, hyperlipidemia     Continue home medications as listed above     DVT prophylaxis     SCDs  Heparin     8/9: some drainage noted, contineu abx, monitor labs, plastics consulted     8/10: Wound culture growing group B strep.  Await final cultures.  Continue empiric antibiotics.  Await thoracic surgery input.         I spent 55 minutes in the professional and overall care of this patient.      Vladimir Long, DO

## 2024-08-12 ENCOUNTER — ANESTHESIA (OUTPATIENT)
Dept: OPERATING ROOM | Facility: HOSPITAL | Age: 69
End: 2024-08-12
Payer: MEDICARE

## 2024-08-12 ENCOUNTER — PREP FOR PROCEDURE (OUTPATIENT)
Dept: OCCUPATIONAL MEDICINE | Facility: HOSPITAL | Age: 69
End: 2024-08-12
Payer: MEDICARE

## 2024-08-12 ENCOUNTER — ANESTHESIA EVENT (OUTPATIENT)
Dept: OPERATING ROOM | Facility: HOSPITAL | Age: 69
End: 2024-08-12
Payer: MEDICARE

## 2024-08-12 DIAGNOSIS — L03.311 CELLULITIS, ABDOMINAL WALL: Primary | ICD-10-CM

## 2024-08-12 DIAGNOSIS — T81.30XA DISRUPTION OF WOUND, UNSPECIFIED, INITIAL ENCOUNTER: ICD-10-CM

## 2024-08-12 LAB
ANION GAP SERPL CALC-SCNC: 10 MMOL/L (ref 10–20)
BACTERIA BLD CULT: NORMAL
BACTERIA BLD CULT: NORMAL
BUN SERPL-MCNC: 16 MG/DL (ref 6–23)
CALCIUM SERPL-MCNC: 7.8 MG/DL (ref 8.6–10.3)
CHLORIDE SERPL-SCNC: 111 MMOL/L (ref 98–107)
CO2 SERPL-SCNC: 23 MMOL/L (ref 21–32)
CREAT SERPL-MCNC: 0.99 MG/DL (ref 0.5–1.05)
EGFRCR SERPLBLD CKD-EPI 2021: 62 ML/MIN/1.73M*2
ERYTHROCYTE [DISTWIDTH] IN BLOOD BY AUTOMATED COUNT: 13.3 % (ref 11.5–14.5)
GLUCOSE SERPL-MCNC: 111 MG/DL (ref 74–99)
HCT VFR BLD AUTO: 25.1 % (ref 36–46)
HGB BLD-MCNC: 8.3 G/DL (ref 12–16)
MCH RBC QN AUTO: 30.7 PG (ref 26–34)
MCHC RBC AUTO-ENTMCNC: 33.1 G/DL (ref 32–36)
MCV RBC AUTO: 93 FL (ref 80–100)
NRBC BLD-RTO: 0 /100 WBCS (ref 0–0)
PLATELET # BLD AUTO: 140 X10*3/UL (ref 150–450)
POTASSIUM SERPL-SCNC: 4 MMOL/L (ref 3.5–5.3)
RBC # BLD AUTO: 2.7 X10*6/UL (ref 4–5.2)
SODIUM SERPL-SCNC: 140 MMOL/L (ref 136–145)
VANCOMYCIN SERPL-MCNC: 12.6 UG/ML (ref 5–20)
WBC # BLD AUTO: 7.3 X10*3/UL (ref 4.4–11.3)

## 2024-08-12 PROCEDURE — 2500000001 HC RX 250 WO HCPCS SELF ADMINISTERED DRUGS (ALT 637 FOR MEDICARE OP): Performed by: SURGERY

## 2024-08-12 PROCEDURE — 2500000004 HC RX 250 GENERAL PHARMACY W/ HCPCS (ALT 636 FOR OP/ED): Performed by: NURSE PRACTITIONER

## 2024-08-12 PROCEDURE — 80202 ASSAY OF VANCOMYCIN: CPT | Performed by: NURSE PRACTITIONER

## 2024-08-12 PROCEDURE — 2500000001 HC RX 250 WO HCPCS SELF ADMINISTERED DRUGS (ALT 637 FOR MEDICARE OP)

## 2024-08-12 PROCEDURE — 99232 SBSQ HOSP IP/OBS MODERATE 35: CPT | Performed by: STUDENT IN AN ORGANIZED HEALTH CARE EDUCATION/TRAINING PROGRAM

## 2024-08-12 PROCEDURE — 97606 NEG PRS WND THER DME>50 SQCM: CPT | Performed by: SURGERY

## 2024-08-12 PROCEDURE — 2500000004 HC RX 250 GENERAL PHARMACY W/ HCPCS (ALT 636 FOR OP/ED)

## 2024-08-12 PROCEDURE — 7100000001 HC RECOVERY ROOM TIME - INITIAL BASE CHARGE: Performed by: SURGERY

## 2024-08-12 PROCEDURE — 36415 COLL VENOUS BLD VENIPUNCTURE: CPT | Performed by: NURSE PRACTITIONER

## 2024-08-12 PROCEDURE — A11043 PR DEBRIDEMENT, SKIN, SUB-Q TISSUE,MUSCLE,=<20 SQ CM: Performed by: ANESTHESIOLOGY

## 2024-08-12 PROCEDURE — 3600000008 HC OR TIME - EACH INCREMENTAL 1 MINUTE - PROCEDURE LEVEL THREE: Performed by: SURGERY

## 2024-08-12 PROCEDURE — 11046 DBRDMT MUSC&/FSCA EA ADDL: CPT | Performed by: SURGERY

## 2024-08-12 PROCEDURE — 0KBK0ZZ EXCISION OF RIGHT ABDOMEN MUSCLE, OPEN APPROACH: ICD-10-PCS | Performed by: SURGERY

## 2024-08-12 PROCEDURE — 36415 COLL VENOUS BLD VENIPUNCTURE: CPT | Performed by: INTERNAL MEDICINE

## 2024-08-12 PROCEDURE — 1100000001 HC PRIVATE ROOM DAILY

## 2024-08-12 PROCEDURE — A11043 PR DEBRIDEMENT, SKIN, SUB-Q TISSUE,MUSCLE,=<20 SQ CM: Performed by: NURSE ANESTHETIST, CERTIFIED REGISTERED

## 2024-08-12 PROCEDURE — 85027 COMPLETE CBC AUTOMATED: CPT | Performed by: INTERNAL MEDICINE

## 2024-08-12 PROCEDURE — 2500000001 HC RX 250 WO HCPCS SELF ADMINISTERED DRUGS (ALT 637 FOR MEDICARE OP): Performed by: NURSE PRACTITIONER

## 2024-08-12 PROCEDURE — 2500000005 HC RX 250 GENERAL PHARMACY W/O HCPCS: Performed by: NURSE ANESTHETIST, CERTIFIED REGISTERED

## 2024-08-12 PROCEDURE — 7100000002 HC RECOVERY ROOM TIME - EACH INCREMENTAL 1 MINUTE: Performed by: SURGERY

## 2024-08-12 PROCEDURE — 87205 SMEAR GRAM STAIN: CPT | Mod: PARLAB | Performed by: SURGERY

## 2024-08-12 PROCEDURE — 3600000003 HC OR TIME - INITIAL BASE CHARGE - PROCEDURE LEVEL THREE: Performed by: SURGERY

## 2024-08-12 PROCEDURE — 3700000002 HC GENERAL ANESTHESIA TIME - EACH INCREMENTAL 1 MINUTE: Performed by: SURGERY

## 2024-08-12 PROCEDURE — 0KBL0ZZ EXCISION OF LEFT ABDOMEN MUSCLE, OPEN APPROACH: ICD-10-PCS | Performed by: SURGERY

## 2024-08-12 PROCEDURE — 99140 ANES COMP EMERGENCY COND: CPT | Performed by: ANESTHESIOLOGY

## 2024-08-12 PROCEDURE — 80048 BASIC METABOLIC PNL TOTAL CA: CPT | Performed by: INTERNAL MEDICINE

## 2024-08-12 PROCEDURE — 2500000004 HC RX 250 GENERAL PHARMACY W/ HCPCS (ALT 636 FOR OP/ED): Performed by: ANESTHESIOLOGY

## 2024-08-12 PROCEDURE — 2500000005 HC RX 250 GENERAL PHARMACY W/O HCPCS: Performed by: SURGERY

## 2024-08-12 PROCEDURE — 2500000004 HC RX 250 GENERAL PHARMACY W/ HCPCS (ALT 636 FOR OP/ED): Performed by: NURSE ANESTHETIST, CERTIFIED REGISTERED

## 2024-08-12 PROCEDURE — 3700000001 HC GENERAL ANESTHESIA TIME - INITIAL BASE CHARGE: Performed by: SURGERY

## 2024-08-12 PROCEDURE — 2720000007 HC OR 272 NO HCPCS: Performed by: SURGERY

## 2024-08-12 PROCEDURE — 11043 DBRDMT MUSC&/FSCA 1ST 20/<: CPT | Performed by: SURGERY

## 2024-08-12 RX ORDER — CLINDAMYCIN PHOSPHATE 900 MG/50ML
900 INJECTION, SOLUTION INTRAVENOUS ONCE
Status: DISCONTINUED | OUTPATIENT
Start: 2024-08-12 | End: 2024-08-12 | Stop reason: HOSPADM

## 2024-08-12 RX ORDER — LIDOCAINE HYDROCHLORIDE 20 MG/ML
INJECTION, SOLUTION INFILTRATION; PERINEURAL AS NEEDED
Status: DISCONTINUED | OUTPATIENT
Start: 2024-08-12 | End: 2024-08-12

## 2024-08-12 RX ORDER — ONDANSETRON HYDROCHLORIDE 2 MG/ML
INJECTION, SOLUTION INTRAVENOUS AS NEEDED
Status: DISCONTINUED | OUTPATIENT
Start: 2024-08-12 | End: 2024-08-12

## 2024-08-12 RX ORDER — ALBUTEROL SULFATE 0.83 MG/ML
2.5 SOLUTION RESPIRATORY (INHALATION) ONCE AS NEEDED
Status: DISCONTINUED | OUTPATIENT
Start: 2024-08-12 | End: 2024-08-12 | Stop reason: HOSPADM

## 2024-08-12 RX ORDER — MEPERIDINE HYDROCHLORIDE 25 MG/ML
12.5 INJECTION INTRAMUSCULAR; INTRAVENOUS; SUBCUTANEOUS EVERY 10 MIN PRN
Status: DISCONTINUED | OUTPATIENT
Start: 2024-08-12 | End: 2024-08-12 | Stop reason: HOSPADM

## 2024-08-12 RX ORDER — FENTANYL CITRATE 50 UG/ML
25 INJECTION, SOLUTION INTRAMUSCULAR; INTRAVENOUS EVERY 5 MIN PRN
Status: DISCONTINUED | OUTPATIENT
Start: 2024-08-12 | End: 2024-08-12 | Stop reason: HOSPADM

## 2024-08-12 RX ORDER — PHENYLEPHRINE HCL IN 0.9% NACL 1 MG/10 ML
SYRINGE (ML) INTRAVENOUS AS NEEDED
Status: DISCONTINUED | OUTPATIENT
Start: 2024-08-12 | End: 2024-08-12

## 2024-08-12 RX ORDER — SODIUM CHLORIDE, SODIUM LACTATE, POTASSIUM CHLORIDE, CALCIUM CHLORIDE 600; 310; 30; 20 MG/100ML; MG/100ML; MG/100ML; MG/100ML
100 INJECTION, SOLUTION INTRAVENOUS CONTINUOUS
Status: DISCONTINUED | OUTPATIENT
Start: 2024-08-12 | End: 2024-08-12 | Stop reason: HOSPADM

## 2024-08-12 RX ORDER — FENTANYL CITRATE 50 UG/ML
INJECTION, SOLUTION INTRAMUSCULAR; INTRAVENOUS AS NEEDED
Status: DISCONTINUED | OUTPATIENT
Start: 2024-08-12 | End: 2024-08-12

## 2024-08-12 RX ORDER — CLINDAMYCIN PHOSPHATE 150 MG/ML
INJECTION, SOLUTION INTRAVENOUS AS NEEDED
Status: DISCONTINUED | OUTPATIENT
Start: 2024-08-12 | End: 2024-08-12

## 2024-08-12 RX ORDER — DEXAMETHASONE SODIUM PHOSPHATE 10 MG/ML
6 INJECTION INTRAMUSCULAR; INTRAVENOUS ONCE
Status: DISCONTINUED | OUTPATIENT
Start: 2024-08-12 | End: 2024-08-12 | Stop reason: HOSPADM

## 2024-08-12 RX ORDER — PROPOFOL 10 MG/ML
INJECTION, EMULSION INTRAVENOUS AS NEEDED
Status: DISCONTINUED | OUTPATIENT
Start: 2024-08-12 | End: 2024-08-12

## 2024-08-12 RX ORDER — ONDANSETRON HYDROCHLORIDE 2 MG/ML
4 INJECTION, SOLUTION INTRAVENOUS ONCE AS NEEDED
Status: COMPLETED | OUTPATIENT
Start: 2024-08-12 | End: 2024-08-12

## 2024-08-12 RX ORDER — SODIUM CHLORIDE 0.9 G/100ML
IRRIGANT IRRIGATION AS NEEDED
Status: DISCONTINUED | OUTPATIENT
Start: 2024-08-12 | End: 2024-08-12 | Stop reason: HOSPADM

## 2024-08-12 RX ORDER — MIDAZOLAM HYDROCHLORIDE 1 MG/ML
INJECTION, SOLUTION INTRAMUSCULAR; INTRAVENOUS AS NEEDED
Status: DISCONTINUED | OUTPATIENT
Start: 2024-08-12 | End: 2024-08-12

## 2024-08-12 RX ORDER — MEPERIDINE HYDROCHLORIDE 25 MG/ML
INJECTION INTRAMUSCULAR; INTRAVENOUS; SUBCUTANEOUS
Status: COMPLETED
Start: 2024-08-12 | End: 2024-08-12

## 2024-08-12 RX ORDER — VANCOMYCIN HYDROCHLORIDE 1 G/200ML
1000 INJECTION, SOLUTION INTRAVENOUS EVERY 24 HOURS
Status: DISCONTINUED | OUTPATIENT
Start: 2024-08-12 | End: 2024-08-14

## 2024-08-12 SDOH — HEALTH STABILITY: MENTAL HEALTH: CURRENT SMOKER: 0

## 2024-08-12 ASSESSMENT — PAIN DESCRIPTION - LOCATION: LOCATION: ABDOMEN

## 2024-08-12 ASSESSMENT — COGNITIVE AND FUNCTIONAL STATUS - GENERAL
MOVING TO AND FROM BED TO CHAIR: A LITTLE
WALKING IN HOSPITAL ROOM: A LITTLE
TURNING FROM BACK TO SIDE WHILE IN FLAT BAD: A LITTLE
DAILY ACTIVITIY SCORE: 22
CLIMB 3 TO 5 STEPS WITH RAILING: A LITTLE
MOVING FROM LYING ON BACK TO SITTING ON SIDE OF FLAT BED WITH BEDRAILS: A LITTLE
STANDING UP FROM CHAIR USING ARMS: A LITTLE
TOILETING: A LITTLE
MOBILITY SCORE: 18
HELP NEEDED FOR BATHING: A LITTLE

## 2024-08-12 ASSESSMENT — PAIN SCALES - GENERAL
PAINLEVEL_OUTOF10: 5 - MODERATE PAIN
PAINLEVEL_OUTOF10: 2
PAINLEVEL_OUTOF10: 4
PAIN_LEVEL: 0
PAINLEVEL_OUTOF10: 2
PAINLEVEL_OUTOF10: 8
PAINLEVEL_OUTOF10: 2
PAINLEVEL_OUTOF10: 5 - MODERATE PAIN
PAINLEVEL_OUTOF10: 2
PAINLEVEL_OUTOF10: 4

## 2024-08-12 ASSESSMENT — PAIN - FUNCTIONAL ASSESSMENT
PAIN_FUNCTIONAL_ASSESSMENT: 0-10

## 2024-08-12 ASSESSMENT — PAIN SCALES - PAIN ASSESSMENT IN ADVANCED DEMENTIA (PAINAD): TOTALSCORE: REPOSITIONED

## 2024-08-12 ASSESSMENT — PAIN DESCRIPTION - DESCRIPTORS
DESCRIPTORS: BURNING
DESCRIPTORS: ACHING

## 2024-08-12 NOTE — CARE PLAN
The patient's goals for the shift include  safety and comfort    The clinical goals for the shift include Pt will remain safe and comfortable during the shift

## 2024-08-12 NOTE — PROGRESS NOTES
"Mariann Drew is a 68 y.o. female on day 4 of admission presenting with Postoperative wound cellulitis.    Subjective   Doing well, completing breakfast as I entered.  No acute complaints       Objective     Physical Exam  GEN: interactive and pleasant  HEAD: NCAT  Eyes: EOMI, PERRLA  Mouth: MMM  Throat: trachea midline  Cor: RRR  Pulm: nonlabored breathing  Neuro: AAOx3  Lymph: non-edematous, no LN-opathy  EXT: extremities perfused  Pscyh: affect, mood appropriate    Focused exam of abdomen shows T point opening with surrounding fibrinous slough, there is hyperemia T point.  There is notable nonviable tissue at the edges of the wound    Last Recorded Vitals  Blood pressure 123/67, pulse 97, temperature 36.6 °C (97.9 °F), temperature source Temporal, resp. rate 16, height 1.626 m (5' 4.02\"), weight 72.2 kg (159 lb 2.8 oz), SpO2 97%.  Intake/Output last 3 Shifts:  I/O last 3 completed shifts:  In: 1186.7 (16.4 mL/kg) [I.V.:786.7 (10.9 mL/kg); IV Piggyback:400]  Out: 70 (1 mL/kg) [Drains:70]  Weight: 72.2 kg     Relevant Results  Culture showed positive for group B strep           Assessment/Plan   Principal Problem:    Postoperative wound cellulitis    Plan for washout today anticipate postoperative VAC  Would recommend infectious disease consult         I spent 30 minutes in the professional and overall care of this patient.      Anibal Tiwari MD      "

## 2024-08-12 NOTE — ANESTHESIA PREPROCEDURE EVALUATION
Patient: Mariann Drew    Procedure Information       Date/Time: 08/12/24 1600    Procedure: DEBRIDEMENT OF MUSCLE/FASCIA OF TORSO    Location: [facility] / [facility]    Surgeons: Anibal Tiwari MD            Relevant Problems   Cardiac   (+) Essential hypertension   (+) Hyperlipidemia      Pulmonary   (+) ANKUSH (obstructive sleep apnea)   (+) Shortness of breath on exertion      Neuro   (+) Anxiety   (+) Bilateral occipital neuralgia   (+) Cervical radiculopathy   (+) Current mild episode of major depressive disorder (CMS-HCC)   (+) Depression, major, in remission (CMS-HCC)   (+) Other bipolar disorder (Multi)      GI   (+) Acute ulcer of stomach   (+) GI bleed   (+) Gastroesophageal reflux disease without esophagitis      /Renal   (+) Recurrent UTI      Liver   (+) Fatty liver   (+) Hepatitis C   (+) Liver cirrhosis secondary to HAZEL (nonalcoholic steatohepatitis) (Multi)      Endocrine   (+) Localized adiposity   (+) Type 2 diabetes mellitus without complication, without long-term current use of insulin (Multi)      Hematology   (+) Anemia      Musculoskeletal   (+) Cervical myofascial pain syndrome   (+) Cervical spondylosis with radiculopathy   (+) Chronic neck pain   (+) Knee osteoarthritis   (+) Myofascial pain syndrome, cervical   (+) Primary osteoarthritis involving multiple joints   (+) Rheumatoid arthritis, involving unspecified site, unspecified whether rheumatoid factor present (Multi)      HEENT   (+) Mixed hearing loss   (+) Vision loss      ID   (+) Hepatitis C   (+) Recurrent UTI      GYN   (+) Breast cancer (Multi)       Clinical information reviewed:    Allergies  Meds               NPO Detail:  NPO/Void Status  Carbohydrate Drink Given Prior to Surgery? : N  Date of Last Liquid: 08/12/24  Time of Last Liquid: 1320  Date of Last Solid: 08/12/24  Time of Last Solid: 0800  Last Intake Type: Light meal         Physical Exam    Airway  Mallampati: II  TM distance: >3 FB  Neck ROM: full      Cardiovascular - normal exam     Dental - normal exam     Pulmonary - normal exam     Abdominal - normal exam           Anesthesia Plan    History of general anesthesia?: yes  History of complications of general anesthesia?: no    ASA 3 - emergent     general     The patient is not a current smoker.    intravenous induction   Postoperative administration of opioids is intended.  Trial extubation is planned.  Anesthetic plan and risks discussed with patient.  Use of blood products discussed with patient who consented to blood products.    Plan discussed with CRNA and CAA.

## 2024-08-12 NOTE — ANESTHESIA PROCEDURE NOTES
Airway  Date/Time: 8/12/2024 4:19 PM  Urgency: elective    Airway not difficult    Staffing  Performed: CRNA   Authorized by: Zayra Shepherd MD    Performed by: ARISTIDES Hahn-DEEJAY  Patient location during procedure: OR    Indications and Patient Condition  Indications for airway management: anesthesia  Spontaneous Ventilation: absent  Sedation level: deep  Preoxygenated: yes  Patient position: sniffing  MILS maintained throughout  Mask difficulty assessment: 1 - vent by mask  Planned trial extubation    Final Airway Details  Final airway type: supraglottic airway      Successful airway: Supraglottic airway: IGel.  Size 4     Number of attempts at approach: 1  Ventilation between attempts: none  Number of other approaches attempted: 0

## 2024-08-12 NOTE — PROGRESS NOTES
"Mariann Drew is a 68 y.o. female on day 4 of admission presenting with Postoperative wound cellulitis.    Subjective   No events overnight.  Patient seen and examined at bedside with  present.  No fevers or chills.       Objective     Physical Exam  Constitutional:       Appearance: Normal appearance. Tardive dyskinesia   HENT:      Head: Normocephalic and atraumatic.      Nose: Nose normal.      Mouth/Throat:      Mouth: Mucous membranes are moist.   Eyes:      Conjunctiva/sclera: Conjunctivae normal.   Cardiovascular:      Rate and Rhythm: Normal rate and regular rhythm.      Heart sounds: Normal heart sounds.   Pulmonary:      Effort: Pulmonary effort is normal.      Breath sounds: Normal breath sounds.   Abdominal:      General: Abdomen is flat. Bowel sounds are normal. There is distension.      Tenderness: There is no abdominal tenderness.   Musculoskeletal:         General: Normal range of motion.      Cervical back: Neck supple.   Skin:     Comments: Abdomen with binder in place, transverse incision with  erythematous dehiscence in the middle, packed with purulent soaked Kerlix. The rest of the incision is well approximated.  BL flank BÁRBARA drain sites with edema and erythema draining moderate amount of purulent drainage   Neurological:      Mental Status: She is alert. Mental status is at baseline.   Psychiatric:         Mood and Affect: Mood normal.     Last Recorded Vitals  Blood pressure 123/67, pulse 97, temperature 36.6 °C (97.9 °F), temperature source Temporal, resp. rate 16, height 1.626 m (5' 4.02\"), weight 72.2 kg (159 lb 2.8 oz), SpO2 97%.  Intake/Output last 3 Shifts:  I/O last 3 completed shifts:  In: 2258.3 (31.3 mL/kg) [I.V.:2058.3 (28.5 mL/kg); IV Piggyback:200]  Out: 60 (0.8 mL/kg) [Drains:60]  Weight: 72.2 kg     Relevant Results                             Assessment/Plan   Principal Problem:    Postoperative wound cellulitis    Postoperative wound cellulitis  Wound " dehiscence  RUSSELL  Hyponatremia  Hypochloremia     Admit to surgical floor  Appreciate plastics recs  Continue merrem and vanco  CBC, CMP in am  Wound care, see orders  Drain care  Wound and blood cultures pending  Normal saline at 100 cc an hour     Chronic conditions: Gout, tardive dyskinesia, GERD, anemia, status post gastric bypass, hyperlipidemia     Continue home medications as listed above     DVT prophylaxis     SCDs  Heparin     8/9: some drainage noted, contineu abx, monitor labs, plastics consulted     8/10: Wound culture growing group B strep.  Await final cultures.  Continue empiric antibiotics.  Await thoracic surgery input.    8/11: Wound culture final: Growing group B strep.  Continue empiric antibiotics.  Await surgery input.  Creatinine improved from 1.3 down to 1.0.         I spent 55 minutes in the professional and overall care of this patient.      Vladimir Long, DO

## 2024-08-12 NOTE — OP NOTE
Plastic Surgery Operative Note       Date: 2024 - 2024  OR Location: [facility]    Name: Mariann Drew, : 1955, Age: 68 y.o., MRN: [id number], Sex: female    Diagnosis  Pre-op Diagnosis      * Cellulitis, abdominal wall [L03.311]     * Disruption of wound, unspecified, initial encounter [T81.30XA] Post-op Diagnosis     * Cellulitis, abdominal wall [L03.311]     * Disruption of wound, unspecified, initial encounter [T81.30XA]     Procedures  1.  Excisional debridement of skin, subcutaneous tissue, fascia 75 x 20 cm    Surgeons      * Anibal Tiwari - Primary    Resident/Fellow/Other Assistant:  Surgeons and Role:  * No surgeons found with a matching role *    Procedure Summary  Anesthesia: General  ASA: III  Anesthesia Staff: Anesthesiologist: Zayra Shepherd MD  CRNA: ARISTIDES Hahn-CRNA  C-AA: MILVIA Oneill  Estimated Blood Loss: 200 mL  Intra-op Medications:   Administrations occurring from 1600 to 1718 on 24:   Medication Name Total Dose   sodium chloride 0.9 % irrigation solution 4,000 mL   meropenem (Merrem) 1 g in sodium chloride 0.9%  mL Cannot be calculated              Anesthesia Record               Intraprocedure I/O Totals       None           Specimen:   ID Type Source Tests Collected by Time   A : ABDOMINAL WOUND CULTURE Swab ABSCESS TISSUE/WOUND CULTURE/SMEAR Anibal Tiwari MD 2024 1636        Staff:   Circulator: Viktoria Knutson Person: Alma  Circulator: Margoth  Circulator: Gris         Drains and/or Catheters:   Closed/Suction Drain 1 Inferior;Medial;Right Abdomen 15 Fr. (Active)   Site Description Healing 24 1509   Dressing Status Clean;Dry 24 1509   Drainage Appearance Serosanguineous 24 1509   Status Clamped 24 1509   Output (mL) 10 mL 24 0900           Findings: Nonviable tissue abdomen, pockets of purulence    Indications: Mariann Drew is an 68 y.o. female who is having surgery for Cellulitis, abdominal wall  [L03.311]  Disruption of wound, unspecified, initial encounter [T81.30XA].  Patient is status post [id number] panniculectomy/abdominoplasty with removal approximately 13 pounds of skin and subcutaneous tissue.  She developed a postoperative infection which was drained at the clinic, she was sent for IV antibiotics and has failed to improve.  She presents today for planned debridement and irrigating wound VAC.      INFORMED CONSENT  Patient was told the risks, benefits, INDICATIONS, contraindications, and alternatives to the procedure. Risks include but not limited to pain, infection, bleeding, hematoma, seroma, injury to neurovascular and tendinous structures, need for prolonged wound care and prolonged VAC therapy, and need for subsequent surgeries.      The patient demonstrated understanding of these risks and agreed to proceed with surgery.  Advance directives discussed.  Team approach explained.       The patient consented and wished to proceed with the procedure and for medical photography if needed.     PROCEDURAL PAUSE  Prior to the beginning of the procedure, the patient's correct identity, side, site, and procedure to be performed were verified.  The patient was given intravenous antibiotics prior to skin incision.     PROCEDURAL NOTE  Mariann was brought to the operative theater at which point she was transferred to the operating room table.  All bony prominences were well padded, and sequential compression devices were placed to each lower extremity. Patient was then secured with safety straps.  The patient was then placed under IV sedation, and our anesthesia colleagues administered general endotracheal anesthesia.    We prepped and draped the abdomen in standard sterile fashion.  We used a 10 blade scalpel to perform reopening of the incision, we immediately noted pockets of pus laterally which were sampled for culture.  We then performed excisional debridement of skin, subcutaneous tissue and fascia  starting with a 10 blade scalpel for a total of 78 x 30 cm.  We then use Misonix ultrasonic debridement throughout the entire to the open wound.  We then irrigated with pulse lavage and 3 L of normal saline.  We then placed an irrigating wound VAC and set this with quarter percent Dakin's, instilled volume 50 mL, time 3 minutes for instill and then suction 125 mmHg for 2 hours.     The patient tolerated the procedure well and was awakened from anesthesia without any difficulties, she was transferred to the PACU in stable condition, all needle counts were correct.     POST OP PLAN:  Mariann will remain inpatient, we will plan to continue irrigating wound VAC for at least 1 week, she may require return to operating room for VAC change versus VAC change on the floor and transition to home health care.         Anibal Tiwari  Phone Number: [phone]

## 2024-08-12 NOTE — PROGRESS NOTES
"Mariann Drew is a 68 y.o. female on day 4 of admission presenting with Postoperative wound cellulitis.    Subjective   No events overnight. Doing well, plans for I&D later today       Objective     Physical Exam  Constitutional:       Appearance: Normal appearance. Tardive dyskinesia   HENT:      Head: Normocephalic and atraumatic.      Nose: Nose normal.      Mouth/Throat:      Mouth: Mucous membranes are moist.   Eyes:      Conjunctiva/sclera: Conjunctivae normal.   Cardiovascular:      Rate and Rhythm: Normal rate and regular rhythm.      Heart sounds: Normal heart sounds.   Pulmonary:      Effort: Pulmonary effort is normal.      Breath sounds: Normal breath sounds.   Abdominal:      General: Abdomen is flat. Bowel sounds are normal. There is distension.      Tenderness: There is no abdominal tenderness.   Musculoskeletal:         General: Normal range of motion.      Cervical back: Neck supple.   Skin:     Comments: Abdomen with binder in place, transverse incision with  erythematous dehiscence in the middle, packed with purulent soaked Kerlix. The rest of the incision is well approximated.  BL flank BÁRBARA drain sites with edema and erythema draining moderate amount of purulent drainage   Neurological:      Mental Status: She is alert. Mental status is at baseline.   Psychiatric:         Mood and Affect: Mood normal.     Last Recorded Vitals  Blood pressure 115/74, pulse 102, temperature 36.4 °C (97.5 °F), temperature source Temporal, resp. rate 18, height 1.626 m (5' 4.02\"), weight 72.2 kg (159 lb 2.8 oz), SpO2 97%.  Intake/Output last 3 Shifts:  I/O last 3 completed shifts:  In: 1186.7 (16.4 mL/kg) [I.V.:786.7 (10.9 mL/kg); IV Piggyback:400]  Out: 70 (1 mL/kg) [Drains:70]  Weight: 72.2 kg     Relevant Results                             Assessment/Plan   Principal Problem:    Postoperative wound cellulitis  Active Problems:    Cellulitis, abdominal wall    Disruption of wound, unspecified, initial " encounter    Postoperative wound cellulitis  Wound dehiscence  RUSSELL  Hyponatremia  Hypochloremia     Admit to surgical floor  Appreciate plastics recs  Continue merrem and vanco  CBC, CMP in am  Wound care, see orders  Drain care  Wound and blood cultures pending  Normal saline at 100 cc an hour     Chronic conditions: Gout, tardive dyskinesia, GERD, anemia, status post gastric bypass, hyperlipidemia     Continue home medications as listed above     DVT prophylaxis     SCDs  Heparin     8/9: some drainage noted, contineu abx, monitor labs, plastics consulted     8/10: Wound culture growing group B strep.  Await final cultures.  Continue empiric antibiotics.  Await thoracic surgery input.    8/11: Wound culture final: Growing group B strep.  Continue empiric antibiotics.  Await surgery input.  Creatinine improved from 1.3 down to 1.0.    8/12: I&D later today doing well. Spoke with plastics       I spent 55 minutes in the professional and overall care of this patient.      Jarocho Venegas, DO

## 2024-08-12 NOTE — PROGRESS NOTES
Vancomycin Dosing by Pharmacy- FOLLOW UP    Mariann Drew is a 68 y.o. year old female who Pharmacy has been consulted for vancomycin dosing for cellulitis, skin and soft tissue. Based on the patient's indication and renal status this patient is being dosed based on a goal trough/random level of 10-15.     Renal function is currently stable.    Current vancomycin dose: 1000 mg x 1 dose given on 8/10/24 at 2205.    Most recent trough level: 11.3 mcg/mL on 24 at .    Visit Vitals  /70   Pulse 99   Temp 37.2 °C (99 °F)   Resp 18        Lab Results   Component Value Date    CREATININE 1.08 (H) 2024    CREATININE 1.33 (H) 2024    CREATININE 1.48 (H) 2024    CREATININE 1.31 (H) 2024        Patient weight is as follows:   Vitals:    24 1204   Weight: 72.2 kg (159 lb 2.8 oz)       Cultures:  No results found for the encounter in last 14 days.       I/O last 3 completed shifts:  In: 2258.3 (31.3 mL/kg) [I.V.:2058.3 (28.5 mL/kg); IV Piggyback:200]  Out: 60 (0.8 mL/kg) [Drains:60]  Weight: 72.2 kg   I/O during current shift:  I/O this shift:  In: -   Out: 10 [Drains:10]    Temp (24hrs), Av.8 °C (98.3 °F), Min:36.1 °C (97 °F), Max:37.2 °C (99 °F)      Assessment/Plan    Within goal random/trough level. Continue current vancomycin regimen.  The next level will be obtained on 24 at 2100. May be obtained sooner if clinically indicated.   Will continue to monitor renal function daily while on vancomycin and order serum creatinine at least every 48 hours if not already ordered.  Follow for continued vancomycin needs, clinical response, and signs/symptoms of toxicity.       Mireya Ingram, PharmD

## 2024-08-12 NOTE — DOCUMENTATION CLARIFICATION NOTE
"    PATIENT:               JOSÉ LUIS DEVINE  ACCT #:                  [id number]  MRN:                       [id number]  :                       1955  ADMIT DATE:       2024 1:57 PM  DISCH DATE:  RESPONDING PROVIDER #:        [id number]          PROVIDER RESPONSE TEXT:    Sepsis with renal organ dysfunction of RUSSELL    CDI QUERY TEXT:    Clarification    Instruction:  Based on your assessment of the patient and the clinical information, please provide the requested documentation by clicking on the appropriate radio button and enter any additional information if prompted.    Question: Is there a diagnosis indicative of a patient meeting SIRS criteria and with organ dysfunction in the setting of post op wound infection    When answering this query, please exercise your independent professional judgment. The fact that a question is being asked, does not imply that any particular answer is desired or expected.    The patient's clinical indicators include:  Clinical Information:  Per ED Provider: \"68-year-old who presents with a postop wound infection.\"    Clinical Indicators: Per H&P :  \"Postoperative wound cellulitis Wound dehiscence RUSSELL\"    VS first day of admit: -112 , Temp 36.8-37.5, RR 16-18, //72 O2 % RA    Labs on admission: :  WBC 15.8  Lactate 2.7-2.4  CRP 20.46    BUN/Cr (range -)  32/1.48, 28/1.33, 20/1.08, 16/0.99    Wound culture : (2+) Few Streptococcus agalactiae (Group B Streptococcus) Abnormal    Medication per MAR: Vancomycin, Merrem, IVFs Normal Saline, IVF bolus LR  x 1liter    Per Plastic surgery progress note : \"Plan for washout today anticipate postoperative VAC  Would recommend infectious disease consult\"    Treatment: Labs, IV antibiotics, monitoring, ID consult    Risk Factors: Infection, RUSSELL, wound dehiscence, positive cultures  Options provided:  -- Sepsis with renal organ dysfunction of RUSSELL  -- Sepsis with multi-system organ dysfunction of RUSSELL and " lactic acidosis  -- Patient treated for postop wound infection without Sepsis  -- Other - I will add my own diagnosis  -- Refer to Clinical Documentation Reviewer    Query created by: Candis Duran on 8/12/2024 10:05 AM      Electronically signed by:  EDMOND DYE DO 8/12/2024 10:25 AM

## 2024-08-12 NOTE — CARE PLAN
The patient's goals for the shift include      The clinical goals for the shift include   Problem: Pain - Adult  Goal: Verbalizes/displays adequate comfort level or baseline comfort level  8/12/2024 0026 by Violeta Chavez RN  Outcome: Progressing  8/12/2024 0026 by Violeta Chavez RN  Outcome: Progressing     Problem: Safety - Adult  Goal: Free from fall injury  8/12/2024 0026 by Violeta Chavez RN  Outcome: Progressing  8/12/2024 0026 by Violeta Chavez RN  Outcome: Progressing     Problem: Discharge Planning  Goal: Discharge to home or other facility with appropriate resources  8/12/2024 0026 by Violeta Chavez RN  Outcome: Progressing  8/12/2024 0026 by Violeta Chavez RN  Outcome: Progressing     Problem: Chronic Conditions and Co-morbidities  Goal: Patient's chronic conditions and co-morbidity symptoms are monitored and maintained or improved  8/12/2024 0026 by Violeta Chavez RN  Outcome: Progressing  8/12/2024 0026 by Violeta Chavez RN  Outcome: Progressing     Problem: Skin  Goal: Decreased wound size/increased tissue granulation at next dressing change  8/12/2024 0026 by Violeta Chavez RN  Outcome: Progressing  8/12/2024 0026 by Violeta Chavez RN  Outcome: Progressing  Goal: Participates in plan/prevention/treatment measures  8/12/2024 0026 by Violeta Chavez RN  Outcome: Progressing  8/12/2024 0026 by Violeta Chavez RN  Outcome: Progressing  Goal: Prevent/manage excess moisture  8/12/2024 0026 by Violeta Chavez RN  Outcome: Progressing  8/12/2024 0026 by Violeta Chavez RN  Outcome: Progressing  Goal: Prevent/minimize sheer/friction injuries  8/12/2024 0026 by Violeta Chavez RN  Outcome: Progressing  8/12/2024 0026 by Violeta Chavez RN  Outcome: Progressing  Goal: Promote/optimize nutrition  8/12/2024 0026 by Violeta Chavez RN  Outcome: Progressing  8/12/2024 0026 by Violeta Chavez RN  Outcome: Progressing  Goal: Promote skin healing  8/12/2024 0026 by Violeta Chavez RN  Outcome:  Progressing  8/12/2024 0026 by Violeta Chavez RN  Outcome: Progressing     Problem: Fall/Injury  Goal: Not fall by end of shift  8/12/2024 0026 by Violeta Chavez RN  Outcome: Progressing  8/12/2024 0026 by Violeta Chavez RN  Outcome: Progressing  Goal: Be free from injury by end of the shift  8/12/2024 0026 by Violeta Chavez RN  Outcome: Progressing  8/12/2024 0026 by Violeta Chavez RN  Outcome: Progressing  Goal: Verbalize understanding of personal risk factors for fall in the hospital  8/12/2024 0026 by Violeta Chavez RN  Outcome: Progressing  8/12/2024 0026 by Violeta Chavez RN  Outcome: Progressing  Goal: Verbalize understanding of risk factor reduction measures to prevent injury from fall in the home  8/12/2024 0026 by Violeta Chavez RN  Outcome: Progressing  8/12/2024 0026 by Violeta Chavez RN  Outcome: Progressing  Goal: Use assistive devices by end of the shift  8/12/2024 0026 by Violeta Chavez RN  Outcome: Progressing  8/12/2024 0026 by Violeta Chavez RN  Outcome: Progressing  Goal: Pace activities to prevent fatigue by end of the shift  8/12/2024 0026 by Violeta Chavez RN  Outcome: Progressing  8/12/2024 0026 by Violeta Chavez RN  Outcome: Progressing     Problem: Diabetes  Goal: Achieve decreasing blood glucose levels by end of shift  8/12/2024 0026 by Violeta Chavez RN  Outcome: Progressing  8/12/2024 0026 by Violeta Chavez RN  Outcome: Progressing  Goal: Increase stability of blood glucose readings by end of shift  8/12/2024 0026 by Violeta Chavez RN  Outcome: Progressing  8/12/2024 0026 by Violeta Chavez RN  Outcome: Progressing  Goal: Decrease in ketones present in urine by end of shift  8/12/2024 0026 by Violeta Chavez RN  Outcome: Progressing  8/12/2024 0026 by Violeta Chavez RN  Outcome: Progressing  Goal: Maintain electrolyte levels within acceptable range throughout shift  8/12/2024 0026 by Violeta Chavez RN  Outcome: Progressing  8/12/2024 0026 by Violeta Chavez RN  Outcome:  Progressing  Goal: Maintain glucose levels >70mg/dl to <250mg/dl throughout shift  8/12/2024 0026 by Violeta Chavez RN  Outcome: Progressing  8/12/2024 0026 by Violeta Chavez RN  Outcome: Progressing  Goal: No changes in neurological exam by end of shift  8/12/2024 0026 by Violeta Chavez RN  Outcome: Progressing  8/12/2024 0026 by Violeta Chavez RN  Outcome: Progressing  Goal: Learn about and adhere to nutrition recommendations by end of shift  8/12/2024 0026 by Violeta Chavez RN  Outcome: Progressing  8/12/2024 0026 by Violeta Chavez RN  Outcome: Progressing  Goal: Vital signs within normal range for age by end of shift  8/12/2024 0026 by Violeta Chavez RN  Outcome: Progressing  8/12/2024 0026 by Violeta Chavez RN  Outcome: Progressing  Goal: Increase self care and/or family involovement by end of shift  8/12/2024 0026 by Violeta Chavez RN  Outcome: Progressing  8/12/2024 0026 by Violeta Chavez RN  Outcome: Progressing  Goal: Receive DSME education by end of shift  8/12/2024 0026 by Violeta Chavez RN  Outcome: Progressing  8/12/2024 0026 by Violeta Chavez RN  Outcome: Progressing     Problem: Pain  Goal: Takes deep breaths with improved pain control throughout the shift  8/12/2024 0026 by Violeta Chavez RN  Outcome: Progressing  8/12/2024 0026 by Violeta Chavez RN  Outcome: Progressing  Goal: Turns in bed with improved pain control throughout the shift  8/12/2024 0026 by Violeta Chavez RN  Outcome: Progressing  8/12/2024 0026 by Violeta Chavez RN  Outcome: Progressing  Goal: Walks with improved pain control throughout the shift  8/12/2024 0026 by Violeta Chavez RN  Outcome: Progressing  8/12/2024 0026 by Violeta Chavez RN  Outcome: Progressing  Goal: Performs ADL's with improved pain control throughout shift  8/12/2024 0026 by Violeta Chavez RN  Outcome: Progressing  8/12/2024 0026 by Mercy Kwamboka, RN  Outcome: Progressing  Goal: Participates in PT with improved pain control throughout the  shift  8/12/2024 0026 by Violeta Chavez RN  Outcome: Progressing  8/12/2024 0026 by Violeta Chavez RN  Outcome: Progressing  Goal: Free from opioid side effects throughout the shift  8/12/2024 0026 by Violeta Chavez RN  Outcome: Progressing  8/12/2024 0026 by Violeta Chavez RN  Outcome: Progressing  Goal: Free from acute confusion related to pain meds throughout the shift  8/12/2024 0026 by Violeta Chavez RN  Outcome: Progressing  8/12/2024 0026 by Violeta Chavez RN  Outcome: Progressing

## 2024-08-12 NOTE — ANESTHESIA POSTPROCEDURE EVALUATION
Patient: Mariann Drew    Procedure Summary       Date: 08/12/24 Room / Location: [facility] 05 / [facility]    Anesthesia Start: 1612 Anesthesia Stop: 1750    Procedure: DEBRIDEMENT OF MUSCLE/FASCIA OF TORSO/WOUND VAC APPLICATION Diagnosis:       Cellulitis, abdominal wall      Disruption of wound, unspecified, initial encounter      (Cellulitis, abdominal wall [L03.311])      (Disruption of wound, unspecified, initial encounter [T81.30XA])    Surgeons: Anibal Tiwari MD Responsible Provider: Zayra Shepherd MD    Anesthesia Type: general ASA Status: 3 - Emergent            Anesthesia Type: general    Vitals Value Taken Time   /67 08/12/24 1750   Temp 36.5 08/12/24 1750   Pulse 104 08/12/24 1750   Resp 18 08/12/24 1750   SpO2 100 08/12/24 1750       Anesthesia Post Evaluation    Patient location during evaluation: PACU  Patient participation: complete - patient participated  Level of consciousness: awake and alert  Pain score: 0  Pain management: adequate  Airway patency: patent  Cardiovascular status: acceptable  Respiratory status: acceptable  Hydration status: acceptable  Postoperative Nausea and Vomiting: none        No notable events documented.

## 2024-08-13 ENCOUNTER — APPOINTMENT (OUTPATIENT)
Dept: PRIMARY CARE | Facility: CLINIC | Age: 69
End: 2024-08-13
Payer: MEDICARE

## 2024-08-13 ENCOUNTER — APPOINTMENT (OUTPATIENT)
Dept: RADIOLOGY | Facility: HOSPITAL | Age: 69
DRG: 856 | End: 2024-08-13
Payer: MEDICARE

## 2024-08-13 LAB
ANION GAP SERPL CALC-SCNC: 17 MMOL/L (ref 10–20)
BUN SERPL-MCNC: 18 MG/DL (ref 6–23)
CALCIUM SERPL-MCNC: 7.5 MG/DL (ref 8.6–10.3)
CHLORIDE SERPL-SCNC: 113 MMOL/L (ref 98–107)
CO2 SERPL-SCNC: 15 MMOL/L (ref 21–32)
CREAT SERPL-MCNC: 1.29 MG/DL (ref 0.5–1.05)
EGFRCR SERPLBLD CKD-EPI 2021: 45 ML/MIN/1.73M*2
ERYTHROCYTE [DISTWIDTH] IN BLOOD BY AUTOMATED COUNT: 13.9 % (ref 11.5–14.5)
GLUCOSE SERPL-MCNC: 100 MG/DL (ref 74–99)
HCT VFR BLD AUTO: 28 % (ref 36–46)
HGB BLD-MCNC: 8.4 G/DL (ref 12–16)
LACTATE SERPL-SCNC: 2.2 MMOL/L (ref 0.4–2)
MCH RBC QN AUTO: 30.7 PG (ref 26–34)
MCHC RBC AUTO-ENTMCNC: 30 G/DL (ref 32–36)
MCV RBC AUTO: 102 FL (ref 80–100)
NRBC BLD-RTO: 0.2 /100 WBCS (ref 0–0)
PLATELET # BLD AUTO: 267 X10*3/UL (ref 150–450)
POTASSIUM SERPL-SCNC: 4.5 MMOL/L (ref 3.5–5.3)
RBC # BLD AUTO: 2.74 X10*6/UL (ref 4–5.2)
SODIUM SERPL-SCNC: 140 MMOL/L (ref 136–145)
WBC # BLD AUTO: 18 X10*3/UL (ref 4.4–11.3)

## 2024-08-13 PROCEDURE — 82374 ASSAY BLOOD CARBON DIOXIDE: CPT | Performed by: INTERNAL MEDICINE

## 2024-08-13 PROCEDURE — 36415 COLL VENOUS BLD VENIPUNCTURE: CPT | Performed by: INTERNAL MEDICINE

## 2024-08-13 PROCEDURE — 2500000001 HC RX 250 WO HCPCS SELF ADMINISTERED DRUGS (ALT 637 FOR MEDICARE OP)

## 2024-08-13 PROCEDURE — 2500000004 HC RX 250 GENERAL PHARMACY W/ HCPCS (ALT 636 FOR OP/ED): Performed by: STUDENT IN AN ORGANIZED HEALTH CARE EDUCATION/TRAINING PROGRAM

## 2024-08-13 PROCEDURE — 99232 SBSQ HOSP IP/OBS MODERATE 35: CPT | Performed by: STUDENT IN AN ORGANIZED HEALTH CARE EDUCATION/TRAINING PROGRAM

## 2024-08-13 PROCEDURE — 2500000005 HC RX 250 GENERAL PHARMACY W/O HCPCS

## 2024-08-13 PROCEDURE — 83605 ASSAY OF LACTIC ACID: CPT | Performed by: STUDENT IN AN ORGANIZED HEALTH CARE EDUCATION/TRAINING PROGRAM

## 2024-08-13 PROCEDURE — C1751 CATH, INF, PER/CENT/MIDLINE: HCPCS

## 2024-08-13 PROCEDURE — 36410 VNPNXR 3YR/> PHY/QHP DX/THER: CPT

## 2024-08-13 PROCEDURE — 85027 COMPLETE CBC AUTOMATED: CPT | Performed by: INTERNAL MEDICINE

## 2024-08-13 PROCEDURE — 94660 CPAP INITIATION&MGMT: CPT

## 2024-08-13 PROCEDURE — 1100000001 HC PRIVATE ROOM DAILY

## 2024-08-13 PROCEDURE — 2500000004 HC RX 250 GENERAL PHARMACY W/ HCPCS (ALT 636 FOR OP/ED)

## 2024-08-13 PROCEDURE — 2780000003 HC OR 278 NO HCPCS

## 2024-08-13 RX ORDER — LIDOCAINE HYDROCHLORIDE 10 MG/ML
5 INJECTION INFILTRATION; PERINEURAL ONCE
Status: DISCONTINUED | OUTPATIENT
Start: 2024-08-13 | End: 2024-08-23 | Stop reason: HOSPADM

## 2024-08-13 RX ORDER — LIDOCAINE HYDROCHLORIDE 10 MG/ML
5 INJECTION INFILTRATION; PERINEURAL ONCE
Status: COMPLETED | OUTPATIENT
Start: 2024-08-13 | End: 2024-08-13

## 2024-08-13 ASSESSMENT — COGNITIVE AND FUNCTIONAL STATUS - GENERAL
TOILETING: A LITTLE
DRESSING REGULAR LOWER BODY CLOTHING: A LITTLE
DRESSING REGULAR UPPER BODY CLOTHING: A LITTLE
MOBILITY SCORE: 18
MOVING TO AND FROM BED TO CHAIR: A LITTLE
HELP NEEDED FOR BATHING: A LITTLE
MOVING FROM LYING ON BACK TO SITTING ON SIDE OF FLAT BED WITH BEDRAILS: A LITTLE
WALKING IN HOSPITAL ROOM: A LITTLE
DAILY ACTIVITIY SCORE: 20
STANDING UP FROM CHAIR USING ARMS: A LITTLE
TURNING FROM BACK TO SIDE WHILE IN FLAT BAD: A LITTLE
CLIMB 3 TO 5 STEPS WITH RAILING: A LITTLE

## 2024-08-13 ASSESSMENT — PAIN SCALES - GENERAL
PAINLEVEL_OUTOF10: 5 - MODERATE PAIN
PAINLEVEL_OUTOF10: 8
PAINLEVEL_OUTOF10: 7
PAINLEVEL_OUTOF10: 0 - NO PAIN
PAINLEVEL_OUTOF10: 2

## 2024-08-13 ASSESSMENT — PAIN - FUNCTIONAL ASSESSMENT
PAIN_FUNCTIONAL_ASSESSMENT: 0-10

## 2024-08-13 ASSESSMENT — PAIN DESCRIPTION - LOCATION: LOCATION: ABDOMEN

## 2024-08-13 NOTE — PROGRESS NOTES
Vancomycin Dosing by Pharmacy- FOLLOW UP    Mariann Drew is a 68 y.o. year old female who Pharmacy has been consulted for vancomycin dosing for cellulitis, skin and soft tissue. Based on the patient's indication and renal status this patient is being dosed based on a goal trough/random level of 10-15.     Renal function is currently improving.  Will switch to AUC dosing.    Current vancomycin dose: 1000 mg given every once .  Most recent trough level: 12.6 mcg/mL    Visit Vitals  /74 (BP Location: Left arm, Patient Position: Lying)   Pulse 102   Temp 36.4 °C (97.5 °F) (Temporal)   Resp 20        Lab Results   Component Value Date    CREATININE 0.99 2024    CREATININE 1.08 (H) 2024    CREATININE 1.33 (H) 2024    CREATININE 1.48 (H) 2024        Patient weight is as follows:   Vitals:    24 1204   Weight: 72.2 kg (159 lb 2.8 oz)       Cultures:  No results found for the encounter in last 14 days.       I/O last 3 completed shifts:  In: 200 (2.8 mL/kg) [IV Piggyback:200]  Out: 50 (0.7 mL/kg) [Drains:50]  Weight: 72.2 kg   I/O during current shift:  I/O this shift:  In: 200 [I.V.:200]  Out: 475 [Drains:475]    Temp (24hrs), Av.4 °C (97.6 °F), Min:36.2 °C (97.2 °F), Max:36.6 °C (97.9 °F)      Assessment/Plan    Within goal random/trough level.  Order place for 1000mg q 24 hrs.    This dosing regimen is predicted by [employer] to result in the following pharmacokinetic parameters:  Regimen: 1000 mg IV every 24 hours.  Start time: 22:11 on 2024  Exposure target: AUC24 (range)400-600 mg/L.hr   AUC24,ss: 410 mg/L.hr  Probability of AUC24 > 400: 60 %  Ctrough,ss: 12.1 mg/L  Probability of Ctrough,ss > 20: 0 %  Probability of nephrotoxicity (Lodise SHRUTHI ): 7 %      The next level will be obtained on  at 0500. May be obtained sooner if clinically indicated.   Will continue to monitor renal function daily while on vancomycin and order serum creatinine at least every 48  hours if not already ordered.  Follow for continued vancomycin needs, clinical response, and signs/symptoms of toxicity.       Dominique Mishra, [facility]            3

## 2024-08-13 NOTE — PROGRESS NOTES
"Mariann Drew is a 68 y.o. female on day 5 of admission presenting with Postoperative wound cellulitis.    Subjective   No events overnight. Doing well, I&D yesterday, doing well, woudn vac in palce       Objective     Physical Exam  Constitutional:       Appearance: Normal appearance. Tardive dyskinesia   HENT:      Head: Normocephalic and atraumatic.      Nose: Nose normal.      Mouth/Throat:      Mouth: Mucous membranes are moist.   Eyes:      Conjunctiva/sclera: Conjunctivae normal.   Cardiovascular:      Rate and Rhythm: Normal rate and regular rhythm.      Heart sounds: Normal heart sounds.   Pulmonary:      Effort: Pulmonary effort is normal.      Breath sounds: Normal breath sounds.   Abdominal:      General: Abdomen is flat. Bowel sounds are normal. There is distension.      Tenderness: There is no abdominal tenderness.   Musculoskeletal:         General: Normal range of motion.      Cervical back: Neck supple.   Skin:     Comments: Abdomen with binder in place, transverse incision with  erythematous dehiscence in the middle, packed with purulent soaked Kerlix. The rest of the incision is well approximated.  BL flank BÁRBARA drain sites with edema and erythema draining moderate amount of purulent drainage   Neurological:      Mental Status: She is alert. Mental status is at baseline.   Psychiatric:         Mood and Affect: Mood normal.     Last Recorded Vitals  Blood pressure 100/58, pulse 109, temperature 37.2 °C (99 °F), temperature source Temporal, resp. rate 18, height 1.626 m (5' 4.02\"), weight 72.2 kg (159 lb 2.8 oz), SpO2 95%.  Intake/Output last 3 Shifts:  I/O last 3 completed shifts:  In: 3285 (45.5 mL/kg) [I.V.:2085 (28.9 mL/kg); IV Piggyback:1200]  Out: 495 (6.9 mL/kg) [Drains:495]  Weight: 72.2 kg     Relevant Results                             Assessment/Plan   Principal Problem:    Postoperative wound cellulitis  Active Problems:    Cellulitis, abdominal wall    Disruption of wound, unspecified, " initial encounter    Postoperative wound cellulitis  Wound dehiscence  RUSSELL  Hyponatremia  Hypochloremia     Admit to surgical floor  Appreciate plastics recs  Continue merrem and vanco  CBC, CMP in am  Wound care, see orders  Drain care  Wound and blood cultures pending  Normal saline at 100 cc an hour     Chronic conditions: Gout, tardive dyskinesia, GERD, anemia, status post gastric bypass, hyperlipidemia     Continue home medications as listed above     DVT prophylaxis     SCDs  Heparin     8/9: some drainage noted, contineu abx, monitor labs, plastics consulted     8/10: Wound culture growing group B strep.  Await final cultures.  Continue empiric antibiotics.  Await thoracic surgery input.    8/11: Wound culture final: Growing group B strep.  Continue empiric antibiotics.  Await surgery input.  Creatinine improved from 1.3 down to 1.0.    8/12: I&D later today doing well. Spoke with plastics    8/13: doing well, wound vac in place consult ID to help manage abx and wound care       I spent 55 minutes in the professional and overall care of this patient.      Jarocho Venegas, DO

## 2024-08-13 NOTE — CARE PLAN
The patient's goals for the shift include      The clinical goals for the shift include pain msnsgement and comfort    Problem: Pain - Adult  Goal: Verbalizes/displays adequate comfort level or baseline comfort level  Outcome: Progressing     Problem: Safety - Adult  Goal: Free from fall injury  Outcome: Progressing     Problem: Discharge Planning  Goal: Discharge to home or other facility with appropriate resources  Outcome: Progressing     Problem: Chronic Conditions and Co-morbidities  Goal: Patient's chronic conditions and co-morbidity symptoms are monitored and maintained or improved  Outcome: Progressing     Problem: Skin  Goal: Decreased wound size/increased tissue granulation at next dressing change  Outcome: Progressing  Flowsheets (Taken 8/12/2024 2307)  Decreased wound size/increased tissue granulation at next dressing change:   Promote sleep for wound healing   Utilize specialty bed per algorithm  Goal: Participates in plan/prevention/treatment measures  Outcome: Progressing  Flowsheets (Taken 8/12/2024 2307)  Participates in plan/prevention/treatment measures: Elevate heels  Goal: Prevent/manage excess moisture  Outcome: Progressing  Flowsheets (Taken 8/12/2024 2307)  Prevent/manage excess moisture:   Follow provider orders for dressing changes   Moisturize dry skin   Monitor for/manage infection if present  Goal: Prevent/minimize sheer/friction injuries  Outcome: Progressing  Flowsheets (Taken 8/12/2024 2307)  Prevent/minimize sheer/friction injuries:   HOB 30 degrees or less   Use pull sheet  Goal: Promote/optimize nutrition  Outcome: Progressing  Flowsheets (Taken 8/12/2024 2307)  Promote/optimize nutrition:   Consume > 50% meals/supplements   Monitor/record intake including meals   Offer water/supplements/favorite foods  Goal: Promote skin healing  Outcome: Progressing  Flowsheets (Taken 8/12/2024 2307)  Promote skin healing: Assess skin/pad under line(s)/device(s)     Problem: Fall/Injury  Goal:  Not fall by end of shift  Outcome: Progressing     Problem: Fall/Injury  Goal: Not fall by end of shift  Outcome: Progressing  Goal: Be free from injury by end of the shift  Outcome: Progressing  Goal: Verbalize understanding of personal risk factors for fall in the hospital  Outcome: Progressing  Goal: Verbalize understanding of risk factor reduction measures to prevent injury from fall in the home  Outcome: Progressing  Goal: Use assistive devices by end of the shift  Outcome: Progressing  Goal: Pace activities to prevent fatigue by end of the shift  Outcome: Progressing     Problem: Diabetes  Goal: Achieve decreasing blood glucose levels by end of shift  Outcome: Progressing  Goal: Increase stability of blood glucose readings by end of shift  Outcome: Progressing  Goal: Decrease in ketones present in urine by end of shift  Outcome: Progressing  Goal: Maintain electrolyte levels within acceptable range throughout shift  Outcome: Progressing  Goal: Maintain glucose levels >70mg/dl to <250mg/dl throughout shift  Outcome: Progressing  Goal: No changes in neurological exam by end of shift  Outcome: Progressing  Goal: Learn about and adhere to nutrition recommendations by end of shift  Outcome: Progressing  Goal: Vital signs within normal range for age by end of shift  Outcome: Progressing  Goal: Increase self care and/or family involovement by end of shift  Outcome: Progressing  Goal: Receive DSME education by end of shift  Outcome: Progressing     Problem: Pain  Goal: Takes deep breaths with improved pain control throughout the shift  Outcome: Progressing  Goal: Turns in bed with improved pain control throughout the shift  Outcome: Progressing  Goal: Walks with improved pain control throughout the shift  Outcome: Progressing  Goal: Performs ADL's with improved pain control throughout shift  Outcome: Progressing  Goal: Participates in PT with improved pain control throughout the shift  Outcome: Progressing  Goal: Free  from opioid side effects throughout the shift  Outcome: Progressing  Goal: Free from acute confusion related to pain meds throughout the shift  Outcome: Progressing

## 2024-08-13 NOTE — CONSULTS
Consults    Reason For Consult  Wound VAC, postop wound/infection    History Of Present Illness  Mariann Drew is a 68 y.o. female with a history of hyperlipidemia, type 2 diabetes which has resolved after weight loss, gastric bypass, anemia, GERD, ANKUSH, breast cancer with left mastectomy, gout who presented to [facility] for an infected surgical site.  Patient underwent an abdominoplasty on July 30, 2024, her pain has been increasing since the procedure and she has developed fever/purulent drainage.  At follow-up appointment her incision looked very infected so she was advised to come to the emergency department.  Patient underwent debridement of infected tissue on 8/12.  Since her procedure she has developed a leukocytosis of 18.     Past Medical History  As mentioned above    Surgical History  She has a past surgical history that includes Other surgical history (12/17/2013); Other surgical history (12/17/2013); Other surgical history (12/17/2013); Total knee arthroplasty (04/22/2014); Other surgical history (01/13/2014); Carpal tunnel release (01/13/2014); Colonoscopy (01/13/2014); Cholecystectomy (01/13/2014); Other surgical history (10/13/2021); Other surgical history; Hysterectomy (10/13/2021); Liver biopsy; CT angio head w and wo IV contrast (07/07/2023); CT angio neck (07/07/2023); and Cataract extraction.     Social History  She reports that she quit smoking about 51 years ago. Her smoking use included cigarettes. She has never used smokeless tobacco. She reports that she does not currently use alcohol. She reports current drug use. Drug: Marijuana.    Family History  Family History   Problem Relation Name Age of Onset    Arthritis Mother      Coronary artery disease Mother      Coronary artery disease Father      Arthritis Father      Heart failure Father      Glaucoma Father      Prostate cancer Brother      Stroke Maternal Grandmother      Dementia Maternal Grandmother      Stroke Maternal  Grandfather      Dementia Maternal Grandfather      Stroke Paternal Grandmother      Hypertension Other      Diabetes Other      Asthma Other      Ulcerative colitis Other      Goiter Other      Psoriasis Other          Allergies  Penicillins, Cefepime, Adhesive tape-silicones, Avelox [moxifloxacin], Bactrim [sulfamethoxazole-trimethoprim], Cephalexin, Codeine, Desyrel [trazodone], Erythromycin, Hydroxychloroquine, Ibuprofen, Nsaids (non-steroidal anti-inflammatory drug), Other, Percocet [oxycodone-acetaminophen], Percodan [oxycodone-aspirin], Phenergan [promethazine], Toradol [ketorolac], Tramadol, Trintellix [vortioxetine], [id number] antimigraine agents, Adhesive, and Macrolide antibiotics    Review of Systems  A review of systems was performed and was negative unless mentioned above     Physical Exam  Constitutional: NAD  ENMT: mucous membranes moist, anicteric sclera  Head/Neck: normocephalic, atraumatic; supple, trachea midline  Respiratory/Thorax: patent airways, CTAB; no wheezes, rales, or rhonchi  Cardiovascular: RRR, no murmur  Gastrointestinal: Abdominal binder in place, after removing the abdominal binder a transverse incision with a wound VAC was noted, there was some mild erythema noted around some of her incision sites  Extremities: palpable peripheral pulses, no edema or cyanosis  Neurological: AO x3, no focal deficits  Psychological: appropriate mood and behavior  Skin: warm and dry     Last Recorded Vitals  /64 (BP Location: Right arm, Patient Position: Lying)   Pulse 106   Temp 37.3 °C (99.1 °F) (Temporal)   Resp 18   Wt 72.2 kg (159 lb 2.8 oz)   SpO2 95%     Relevant Results       Assessment/Plan   Mariann Drew is a 68 y.o. female with a history of hyperlipidemia, type 2 diabetes which has resolved after weight loss, gastric bypass, anemia, GERD, ANKUSH, breast cancer with left mastectomy, gout who presented to [facility] for an infected surgical site.  Patient  underwent an abdominoplasty on July 30, 2024, her pain has been increasing since the procedure and she has developed fever/purulent drainage.     #Post abdominoplasty wound infection  #Wound dehiscence  #Leukocytosis  -Patient's WBC count was 7.3 yesterday, 18 today, we will continue to monitor  -Tissue culture on 8/8 grew group B strep  -Blood cultures obtained on 8/8 showed no growth to date at 4 days  -Tissue culture was obtained during debridement on 8/12, results pending  -Patient is currently on day 6 of vancomycin and meropenem, continue current antibiotics    Other issues:  RUSSELL  Hyponatremia  Hypochloremia  Hyperlipidemia  GERD  ANKUSH    I reviewed and interpreted all lab test imaging studies and documentations from other healthcare providers. I am monitoring for antibiotic side effects and toxicity      This is a preliminary note written by the resident. Please wait for attending addendum for finalization of note and recommendations.     Antonio Edwards DO, PGY-2  Internal Medicine

## 2024-08-14 ENCOUNTER — APPOINTMENT (OUTPATIENT)
Dept: RADIOLOGY | Facility: HOSPITAL | Age: 69
DRG: 856 | End: 2024-08-14
Payer: MEDICARE

## 2024-08-14 LAB
ANION GAP SERPL CALC-SCNC: 11 MMOL/L (ref 10–20)
BUN SERPL-MCNC: 20 MG/DL (ref 6–23)
CALCIUM SERPL-MCNC: 8 MG/DL (ref 8.6–10.3)
CHLORIDE SERPL-SCNC: 109 MMOL/L (ref 98–107)
CO2 SERPL-SCNC: 20 MMOL/L (ref 21–32)
CREAT SERPL-MCNC: 1.51 MG/DL (ref 0.5–1.05)
EGFRCR SERPLBLD CKD-EPI 2021: 37 ML/MIN/1.73M*2
ERYTHROCYTE [DISTWIDTH] IN BLOOD BY AUTOMATED COUNT: 13.9 % (ref 11.5–14.5)
GLUCOSE SERPL-MCNC: 124 MG/DL (ref 74–99)
HCT VFR BLD AUTO: 24.1 % (ref 36–46)
HGB BLD-MCNC: 7.8 G/DL (ref 12–16)
HOLD SPECIMEN: NORMAL
HOLD SPECIMEN: NORMAL
MCH RBC QN AUTO: 30.8 PG (ref 26–34)
MCHC RBC AUTO-ENTMCNC: 32.4 G/DL (ref 32–36)
MCV RBC AUTO: 95 FL (ref 80–100)
NRBC BLD-RTO: 0 /100 WBCS (ref 0–0)
PLATELET # BLD AUTO: 191 X10*3/UL (ref 150–450)
POTASSIUM SERPL-SCNC: 4.2 MMOL/L (ref 3.5–5.3)
RBC # BLD AUTO: 2.53 X10*6/UL (ref 4–5.2)
SODIUM SERPL-SCNC: 136 MMOL/L (ref 136–145)
WBC # BLD AUTO: 15.6 X10*3/UL (ref 4.4–11.3)

## 2024-08-14 PROCEDURE — 80048 BASIC METABOLIC PNL TOTAL CA: CPT | Performed by: STUDENT IN AN ORGANIZED HEALTH CARE EDUCATION/TRAINING PROGRAM

## 2024-08-14 PROCEDURE — 2780000003 HC OR 278 NO HCPCS

## 2024-08-14 PROCEDURE — 1100000001 HC PRIVATE ROOM DAILY

## 2024-08-14 PROCEDURE — 85027 COMPLETE CBC AUTOMATED: CPT

## 2024-08-14 PROCEDURE — 2500000004 HC RX 250 GENERAL PHARMACY W/ HCPCS (ALT 636 FOR OP/ED): Performed by: NURSE PRACTITIONER

## 2024-08-14 PROCEDURE — 94660 CPAP INITIATION&MGMT: CPT

## 2024-08-14 PROCEDURE — 36410 VNPNXR 3YR/> PHY/QHP DX/THER: CPT

## 2024-08-14 PROCEDURE — 2500000001 HC RX 250 WO HCPCS SELF ADMINISTERED DRUGS (ALT 637 FOR MEDICARE OP)

## 2024-08-14 PROCEDURE — C1751 CATH, INF, PER/CENT/MIDLINE: HCPCS

## 2024-08-14 PROCEDURE — 99232 SBSQ HOSP IP/OBS MODERATE 35: CPT | Performed by: STUDENT IN AN ORGANIZED HEALTH CARE EDUCATION/TRAINING PROGRAM

## 2024-08-14 PROCEDURE — 2500000004 HC RX 250 GENERAL PHARMACY W/ HCPCS (ALT 636 FOR OP/ED)

## 2024-08-14 RX ORDER — LIDOCAINE HYDROCHLORIDE 10 MG/ML
5 INJECTION INFILTRATION; PERINEURAL ONCE
Status: DISCONTINUED | OUTPATIENT
Start: 2024-08-14 | End: 2024-08-23 | Stop reason: HOSPADM

## 2024-08-14 ASSESSMENT — PAIN SCALES - GENERAL
PAINLEVEL_OUTOF10: 0 - NO PAIN
PAINLEVEL_OUTOF10: 8
PAINLEVEL_OUTOF10: 0 - NO PAIN
PAINLEVEL_OUTOF10: 0 - NO PAIN

## 2024-08-14 ASSESSMENT — PAIN - FUNCTIONAL ASSESSMENT
PAIN_FUNCTIONAL_ASSESSMENT: 0-10

## 2024-08-14 NOTE — PROGRESS NOTES
"Nutrition Follow Up Assessment:     Nutrition History:  Energy Intake: Poor < 50 %  Food and Nutrient History: Pt was sleeping at time of attempted visit today. Pt is POD#2 debridement of muscle/fascia of torso with wound vac application.  Only two meal intakes recorded on flow sheet (25%, 33%)  Will continue with 60gm carb controlled diet with Roderick twice daily.  Pt refused other ONS.  Last BM 8/11; Na+ 140  Vitamin/Herbal Supplement Use: FeSO4, MVI  Food Allergies/Intolerances:  None  GI Symptoms: None  Oral Problems: None       Anthropometrics:  Height: 162.6 cm (5' 4.02\")   Weight: 72.2 kg (159 lb 2.8 oz)   BMI (Calculated): 27.31  IBW/kg (Dietitian Calculated): 54.5 kg  Percent of IBW: 132 %       Weight History:     Weight Change %:   No new weight to assess     Nutrition Focused Physical Exam Findings:  defer: completed 8/9/24  Subcutaneous Fat Loss:      Muscle Wasting:     Edema:  Edema Location: NP BUE edema  Physical Findings:  Skin: Positive (wound vac to abdomen)    Nutrition Significant Labs:  BMP Trend:   Results from last 7 days   Lab Units 08/13/24  0632 08/12/24  0618 08/11/24  0621 08/09/24  0637   GLUCOSE mg/dL 100* 111* 102* 136*   CALCIUM mg/dL 7.5* 7.8* 7.8* 7.8*   SODIUM mmol/L 140 140 140 134*   POTASSIUM mmol/L 4.5 4.0 4.0 3.6   CO2 mmol/L 15* 23 26 26   CHLORIDE mmol/L 113* 111* 109* 100   BUN mg/dL 18 16 20 28*   CREATININE mg/dL 1.29* 0.99 1.08* 1.33*    , BG POCT trend:        Nutrition Specific Medications:  Reviewed     I/O:   Last BM Date: 08/11/24; Stool Appearance: Unable to assess (08/09/24 2200)    Dietary Orders (From admission, onward)       Start     Ordered    08/14/24 0949  Adult diet Regular  Diet effective now        Question:  Diet type  Answer:  Regular    08/14/24 0949    08/09/24 1054  Oral nutritional supplements  Until discontinued        Comments: orange   Question Answer Comment   Deliver with Dinner    Deliver with Lunch    Select supplement: Roderick        08/09/24 " 1053    08/08/24 2210  May Participate in Room Service  Once        Question:  .  Answer:  Yes    08/08/24 2211                     Estimated Needs:      Method for Estimating Needs: 1470-1635kcals (27-30kcals/kg iBW)     Method for Estimating Needs: 65-82g (1.2-1.5g/kg IBW)     Method for Estimating Needs: 1 mL/kcal or as per MD        Nutrition Diagnosis   Malnutrition Diagnosis  Patient has Malnutrition Diagnosis: No    Nutrition Diagnosis  Patient has Nutrition Diagnosis: Yes  Diagnosis Status (1): Ongoing  Nutrition Diagnosis 1: Increased nutrient needs  Related to (1): physiological causes increasing nutrient needs  As Evidenced by (1): wound healing needs  Additional Nutrition Diagnosis: Diagnosis 2  Diagnosis Status (2): New  Nutrition Diagnosis 2: Inadequate oral intake  Related to (2): acute illness, recent surgery  As Evidenced by (2): 25-33% of meals       Nutrition Interventions/Recommendations         Nutrition Prescription:  Individualized Nutrition Prescription Provided for : Will change diet to Regular and provide Roderick BID        Nutrition Interventions:   Interventions: Meals and snacks, Medical food supplement  Meals and Snacks: General healthful diet  Goal: consume >50->75% of meals--not met  Medical Food Supplement: Commercial beverage  Goal: consume 100% Roderick BID (for an additional 90 kcals, 2.5 gm protein, supplement glutamine and arginine)--met/ongoing         Nutrition Education:   N/A       Nutrition Monitoring and Evaluation   Food/Nutrient Related History Monitoring  Monitoring and Evaluation Plan: Energy intake, Fluid intake, Amount of food  Energy Intake: Estimated energy intake  Criteria: Meal/ONS intake to meet >75% of estimated needs  Fluid Intake: Estimated fluid intake  Criteria: fluid intake to meet >75% of estimated need  Amount of Food: Estimated amout of food, Medical food intake  Criteria: Pt to consume >75% of meals/ONS    Body Composition/Growth/Weight History  Monitoring  and Evaluation Plan: Weight  Weight: Measured weight  Criteria: reweigh at least every 5 days    Biochemical Data, Medical Tests and Procedures  Monitoring and Evaluation Plan: Electrolyte/renal panel  Criteria: labs WNL    Nutrition Focused Physical Findings  Monitoring and Evaluation Plan: Skin  Criteria: promote healing through adequate nutrition         Time Spent (min): 30 minutes

## 2024-08-14 NOTE — PROGRESS NOTES
Mariann Drew is a 68 y.o. female on day 6 of admission presenting with Postoperative wound cellulitis.      Subjective   Patient seen and examined at bedside this morning, no acute overnight events reported    Objective   Physical Exam  Constitutional: NAD  ENMT: mucous membranes moist, anicteric sclera  Head/Neck: normocephalic, atraumatic; supple, trachea midline  Respiratory/Thorax: patent airways, CTAB; no wheezes, rales, or rhonchi  Cardiovascular: RRR, no murmur  Gastrointestinal: Abdominal binder in place, after removing the abdominal binder a transverse incision with a wound VAC was noted, there was some mild erythema noted around some of her incision sites  Extremities: palpable peripheral pulses, no edema or cyanosis  Neurological: AO x3, no focal deficits  Psychological: appropriate mood and behavior  Skin: warm and dry    Assessment/Plan   Mariann Drew is a 68 y.o. female with a history of hyperlipidemia, type 2 diabetes which has resolved after weight loss, gastric bypass, anemia, GERD, ANKUSH, breast cancer with left mastectomy, gout who presented to [facility] for an infected surgical site.  Patient underwent an abdominoplasty on July 30, 2024, her pain has been increasing since the procedure and she has developed fever/purulent drainage.      #Post abdominoplasty wound infection  #Wound dehiscence  #Leukocytosis  -WBC downtrending, 15.6 today, will monitor CBC  -Tissue culture on 8/8 grew group B strep  -Blood cultures obtained on 8/8 showed no growth to date at 4 days  -Tissue culture was obtained during debridement on 8/12, does not show any organisms  -Patient is currently on day 6 of vancomycin and meropenem, we will discontinue these antibiotics and switch her to ertapenem for total of 10 to 14 days.   -Patient's midline not working, we will order new midline     Other issues:  RUSSELL  Hyponatremia  Hypochloremia  Hyperlipidemia  GERD  ANKUSH     I reviewed and interpreted all lab test  imaging studies and documentations from other healthcare providers. I am monitoring for antibiotic side effects and toxicity      This is a preliminary note written by the resident. Please wait for attending addendum for finalization of note and recommendations.     Antonio Edwards DO, PGY-2  Internal Medicine

## 2024-08-14 NOTE — PROGRESS NOTES
"Mariann Drew is a 68 y.o. female on day 6 of admission presenting with Postoperative wound cellulitis.    Subjective   No events overnight. Doing well, midline placed, some weeping fluis held, improving    Objective     Physical Exam  Constitutional:       Appearance: Normal appearance. Tardive dyskinesia   HENT:      Head: Normocephalic and atraumatic.      Nose: Nose normal.      Mouth/Throat:      Mouth: Mucous membranes are moist.   Eyes:      Conjunctiva/sclera: Conjunctivae normal.   Cardiovascular:      Rate and Rhythm: Normal rate and regular rhythm.      Heart sounds: Normal heart sounds.   Pulmonary:      Effort: Pulmonary effort is normal.      Breath sounds: Normal breath sounds.   Abdominal:      General: Abdomen is flat. Bowel sounds are normal. There is distension.      Tenderness: There is no abdominal tenderness.   Musculoskeletal:         General: Normal range of motion.      Cervical back: Neck supple.   Skin:     Comments: Abdomen with binder in place, transverse incision with  erythematous dehiscence in the middle, packed with purulent soaked Kerlix. The rest of the incision is well approximated.  BL flank BÁRBARA drain sites with edema and erythema draining moderate amount of purulent drainage   Neurological:      Mental Status: She is alert. Mental status is at baseline.   Psychiatric:         Mood and Affect: Mood normal.     Last Recorded Vitals  Blood pressure 133/86, pulse 98, temperature 36.1 °C (97 °F), resp. rate 18, height 1.626 m (5' 4.02\"), weight 72.2 kg (159 lb 2.8 oz), SpO2 96%.  Intake/Output last 3 Shifts:  I/O last 3 completed shifts:  In: 3585 (49.7 mL/kg) [I.V.:2085 (28.9 mL/kg); IV Piggyback:1500]  Out: 1004 (13.9 mL/kg) [Urine:4 (0 mL/kg/hr); Drains:1000]  Weight: 72.2 kg     Relevant Results                             Assessment/Plan   Principal Problem:    Postoperative wound cellulitis  Active Problems:    Cellulitis, abdominal wall    Disruption of wound, unspecified, " T/c to patient to update that he has been scheduled a BREANNE with Dr. Barrow on 6/27/22 @ 11:20 AM. No answer and LVM. initial encounter    Postoperative wound cellulitis  Wound dehiscence  RUSSELL  Hyponatremia  Hypochloremia     Admit to surgical floor  Appreciate plastics recs  Continue merrem and vanco  CBC, CMP in am  Wound care, see orders  Drain care  Wound and blood cultures pending  Normal saline at 100 cc an hour     Chronic conditions: Gout, tardive dyskinesia, GERD, anemia, status post gastric bypass, hyperlipidemia     Continue home medications as listed above     DVT prophylaxis     SCDs  Heparin     8/9: some drainage noted, contineu abx, monitor labs, plastics consulted     8/10: Wound culture growing group B strep.  Await final cultures.  Continue empiric antibiotics.  Await thoracic surgery input.    8/11: Wound culture final: Growing group B strep.  Continue empiric antibiotics.  Await surgery input.  Creatinine improved from 1.3 down to 1.0.    8/12: I&D later today doing well. Spoke with plastics    8/13: doing well, wound vac in place consult ID to help manage abx and wound care    8/14: poor vasculature, midline in placed, will stop fluid and give abx only to see if its improves       I spent 55 minutes in the professional and overall care of this patient.      Jarocho Venegas, DO

## 2024-08-14 NOTE — PROGRESS NOTES
Vancomycin Dosing by Pharmacy- Cessation of Therapy    Consult to pharmacy for vancomycin dosing has been discontinued by the prescriber, pharmacy will sign off at this time.    Please call pharmacy if there are further questions or re-enter a consult if vancomycin is resumed.     Afshan Pruitt, PharmD

## 2024-08-14 NOTE — PROGRESS NOTES
Vancomycin Dosing by Pharmacy- FOLLOW UP    Mariann Drew is a 68 y.o. year old female who Pharmacy has been consulted for vancomycin dosing for cellulitis, skin and soft tissue. Based on the patient's indication and renal status this patient is being dosed based on a goal AUC of 400-600.     Renal function is currently fluctuating.    Current vancomycin dose: 1000 mg given every 24 hours    Visit Vitals  /86   Pulse 98   Temp 36.1 °C (97 °F)   Resp 18        Lab Results   Component Value Date    CREATININE 1.29 (H) 08/13/2024    CREATININE 0.99 08/12/2024    CREATININE 1.08 (H) 08/11/2024    CREATININE 1.33 (H) 08/09/2024        No results found for the encounter in last 14 days.      I/O last 3 completed shifts:  In: 3585 (49.7 mL/kg) [I.V.:2085 (28.9 mL/kg); IV Piggyback:1500]  Out: 1004 (13.9 mL/kg) [Urine:4 (0 mL/kg/hr); Drains:1000]  Weight: 72.2 kg       Assessment/Plan    Day #7 of vancomycin therapy.   Vancomycin dose was held last night due to loss of IV access and has been re-timed to give this morning at 10:00.   Re-ordered vancomycin level originally ordered for this morning to be drawn on 8/16 with AM labs.   Will continue to monitor renal function daily while on vancomycin and order serum creatinine at least every 48 hours if not already ordered.  Will follow for continued vancomycin needs, clinical response, and signs/symptoms of toxicity.     Please call with any questions.  Afshan Pruitt, PharmD, BCCCP  [phone]

## 2024-08-14 NOTE — CARE PLAN
The patient's goals for the shift include  safety    The clinical goals for the shift include Remain HD stable and comfortable     yes

## 2024-08-14 NOTE — CARE PLAN
Problem: Pain - Adult  Goal: Verbalizes/displays adequate comfort level or baseline comfort level  Outcome: Progressing     Problem: Safety - Adult  Goal: Free from fall injury  Outcome: Progressing     Problem: Discharge Planning  Goal: Discharge to home or other facility with appropriate resources  Outcome: Progressing     Problem: Chronic Conditions and Co-morbidities  Goal: Patient's chronic conditions and co-morbidity symptoms are monitored and maintained or improved  Outcome: Progressing     Problem: Skin  Goal: Decreased wound size/increased tissue granulation at next dressing change  Outcome: Progressing  Goal: Participates in plan/prevention/treatment measures  Outcome: Progressing  Goal: Prevent/manage excess moisture  Outcome: Progressing  Goal: Prevent/minimize sheer/friction injuries  Outcome: Progressing  Goal: Promote/optimize nutrition  Outcome: Progressing  Goal: Promote skin healing  Outcome: Progressing     Problem: Fall/Injury  Goal: Not fall by end of shift  Outcome: Progressing  Goal: Be free from injury by end of the shift  Outcome: Progressing  Goal: Verbalize understanding of personal risk factors for fall in the hospital  Outcome: Progressing  Goal: Verbalize understanding of risk factor reduction measures to prevent injury from fall in the home  Outcome: Progressing  Goal: Use assistive devices by end of the shift  Outcome: Progressing  Goal: Pace activities to prevent fatigue by end of the shift  Outcome: Progressing     Problem: Diabetes  Goal: Achieve decreasing blood glucose levels by end of shift  Outcome: Progressing  Goal: Increase stability of blood glucose readings by end of shift  Outcome: Progressing  Goal: Decrease in ketones present in urine by end of shift  Outcome: Progressing  Goal: Maintain electrolyte levels within acceptable range throughout shift  Outcome: Progressing  Goal: Maintain glucose levels >70mg/dl to <250mg/dl throughout shift  Outcome: Progressing  Goal: No  changes in neurological exam by end of shift  Outcome: Progressing  Goal: Learn about and adhere to nutrition recommendations by end of shift  Outcome: Progressing  Goal: Vital signs within normal range for age by end of shift  Outcome: Progressing  Goal: Increase self care and/or family involovement by end of shift  Outcome: Progressing  Goal: Receive DSME education by end of shift  Outcome: Progressing     Problem: Pain  Goal: Takes deep breaths with improved pain control throughout the shift  Outcome: Progressing  Goal: Turns in bed with improved pain control throughout the shift  Outcome: Progressing  Goal: Walks with improved pain control throughout the shift  Outcome: Progressing  Goal: Performs ADL's with improved pain control throughout shift  Outcome: Progressing  Goal: Participates in PT with improved pain control throughout the shift  Outcome: Progressing  Goal: Free from opioid side effects throughout the shift  Outcome: Progressing  Goal: Free from acute confusion related to pain meds throughout the shift  Outcome: Progressing   The patient's goals for the shift include      The clinical goals for the shift include Remain HD stable and comfortable

## 2024-08-14 NOTE — CARE PLAN
Problem: Pain - Adult  Goal: Verbalizes/displays adequate comfort level or baseline comfort level  8/14/2024 1123 by Isi Wood RN  Outcome: Progressing  8/14/2024 1123 by Isi Wood RN  Outcome: Progressing     Problem: Safety - Adult  Goal: Free from fall injury  8/14/2024 1123 by Isi Wood RN  Outcome: Progressing  8/14/2024 1123 by Isi Wood RN  Outcome: Progressing     Problem: Discharge Planning  Goal: Discharge to home or other facility with appropriate resources  8/14/2024 1123 by Isi Wood RN  Outcome: Progressing  8/14/2024 1123 by Isi Wood RN  Outcome: Progressing     Problem: Chronic Conditions and Co-morbidities  Goal: Patient's chronic conditions and co-morbidity symptoms are monitored and maintained or improved  8/14/2024 1123 by Isi Wood RN  Outcome: Progressing  8/14/2024 1123 by Isi Wood RN  Outcome: Progressing     Problem: Skin  Goal: Decreased wound size/increased tissue granulation at next dressing change  8/14/2024 1123 by Isi Wood RN  Outcome: Progressing  Flowsheets (Taken 8/14/2024 1123)  Decreased wound size/increased tissue granulation at next dressing change: Promote sleep for wound healing  8/14/2024 1123 by Isi Wood RN  Outcome: Progressing  Flowsheets (Taken 8/14/2024 1123)  Decreased wound size/increased tissue granulation at next dressing change: Promote sleep for wound healing  Goal: Participates in plan/prevention/treatment measures  8/14/2024 1123 by Isi Wood RN  Outcome: Progressing  Flowsheets (Taken 8/14/2024 1123)  Participates in plan/prevention/treatment measures: Elevate heels  8/14/2024 1123 by Isi Wood RN  Outcome: Progressing  Flowsheets (Taken 8/14/2024 1123)  Participates in plan/prevention/treatment measures: Elevate heels  Goal: Prevent/manage excess moisture  8/14/2024 1123 by Isi Wood RN  Outcome: Progressing  Flowsheets (Taken 8/14/2024  1123)  Prevent/manage excess moisture: Follow provider orders for dressing changes  8/14/2024 1123 by Isi Wood RN  Outcome: Progressing  Flowsheets (Taken 8/14/2024 1123)  Prevent/manage excess moisture: Follow provider orders for dressing changes  Goal: Prevent/minimize sheer/friction injuries  8/14/2024 1123 by Isi Wood RN  Outcome: Progressing  Flowsheets (Taken 8/14/2024 1123)  Prevent/minimize sheer/friction injuries: HOB 30 degrees or less  8/14/2024 1123 by Isi Wood RN  Outcome: Progressing  Flowsheets (Taken 8/14/2024 1123)  Prevent/minimize sheer/friction injuries: HOB 30 degrees or less  Goal: Promote/optimize nutrition  8/14/2024 1123 by Isi Wood RN  Outcome: Progressing  Flowsheets (Taken 8/14/2024 1123)  Promote/optimize nutrition: Monitor/record intake including meals  8/14/2024 1123 by Isi Wood RN  Outcome: Progressing  Flowsheets (Taken 8/14/2024 1123)  Promote/optimize nutrition: Monitor/record intake including meals  Goal: Promote skin healing  8/14/2024 1123 by Isi Wood RN  Outcome: Progressing  Flowsheets (Taken 8/14/2024 1123)  Promote skin healing: Assess skin/pad under line(s)/device(s)  8/14/2024 1123 by Isi Wood RN  Outcome: Progressing  Flowsheets (Taken 8/14/2024 1123)  Promote skin healing: Assess skin/pad under line(s)/device(s)     Problem: Fall/Injury  Goal: Not fall by end of shift  8/14/2024 1123 by Isi Wood RN  Outcome: Progressing  8/14/2024 1123 by Isi Wood RN  Outcome: Progressing  Goal: Be free from injury by end of the shift  8/14/2024 1123 by Isi Wood RN  Outcome: Progressing  8/14/2024 1123 by Isi Wood RN  Outcome: Progressing  Goal: Verbalize understanding of personal risk factors for fall in the hospital  8/14/2024 1123 by Isi Wood RN  Outcome: Progressing  8/14/2024 1123 by Isi Wood, RN  Outcome: Progressing  Goal: Verbalize understanding of risk factor  reduction measures to prevent injury from fall in the home  8/14/2024 1123 by Isi Wood RN  Outcome: Progressing  8/14/2024 1123 by Isi Wood RN  Outcome: Progressing  Goal: Use assistive devices by end of the shift  8/14/2024 1123 by Isi Wood RN  Outcome: Progressing  8/14/2024 1123 by Isi Wood RN  Outcome: Progressing  Goal: Pace activities to prevent fatigue by end of the shift  8/14/2024 1123 by Isi Wood RN  Outcome: Progressing  8/14/2024 1123 by Isi Wood RN  Outcome: Progressing     Problem: Diabetes  Goal: Achieve decreasing blood glucose levels by end of shift  8/14/2024 1123 by Isi Wood RN  Outcome: Progressing  8/14/2024 1123 by Isi Wood RN  Outcome: Progressing  Goal: Increase stability of blood glucose readings by end of shift  8/14/2024 1123 by Isi Wood RN  Outcome: Progressing  8/14/2024 1123 by Isi Wood RN  Outcome: Progressing  Goal: Decrease in ketones present in urine by end of shift  8/14/2024 1123 by Isi Wood RN  Outcome: Progressing  8/14/2024 1123 by Isi Wood RN  Outcome: Progressing  Goal: Maintain electrolyte levels within acceptable range throughout shift  8/14/2024 1123 by Isi Wood RN  Outcome: Progressing  8/14/2024 1123 by Isi Wood RN  Outcome: Progressing  Goal: Maintain glucose levels >70mg/dl to <250mg/dl throughout shift  8/14/2024 1123 by Isi Wood RN  Outcome: Progressing  8/14/2024 1123 by Isi Wood RN  Outcome: Progressing  Goal: No changes in neurological exam by end of shift  8/14/2024 1123 by Isi Wood RN  Outcome: Progressing  8/14/2024 1123 by Isi Wood RN  Outcome: Progressing  Goal: Learn about and adhere to nutrition recommendations by end of shift  8/14/2024 1123 by Isi Wood RN  Outcome: Progressing  8/14/2024 1123 by Isi Wood, RN  Outcome: Progressing  Goal: Vital signs within normal range for age by  end of shift  8/14/2024 1123 by Iis Wood RN  Outcome: Progressing  8/14/2024 1123 by Isi Wood RN  Outcome: Progressing  Goal: Increase self care and/or family involovement by end of shift  8/14/2024 1123 by Isi Wood RN  Outcome: Progressing  8/14/2024 1123 by Isi Wood RN  Outcome: Progressing  Goal: Receive DSME education by end of shift  8/14/2024 1123 by Isi Wood RN  Outcome: Progressing  8/14/2024 1123 by Isi Wood RN  Outcome: Progressing     Problem: Pain  Goal: Takes deep breaths with improved pain control throughout the shift  8/14/2024 1123 by Isi Wood RN  Outcome: Progressing  8/14/2024 1123 by Isi Wood RN  Outcome: Progressing  Goal: Turns in bed with improved pain control throughout the shift  8/14/2024 1123 by Isi Wood RN  Outcome: Progressing  8/14/2024 1123 by Isi Wood RN  Outcome: Progressing  Goal: Walks with improved pain control throughout the shift  8/14/2024 1123 by Isi Wood RN  Outcome: Progressing  8/14/2024 1123 by Isi Wood RN  Outcome: Progressing  Goal: Performs ADL's with improved pain control throughout shift  8/14/2024 1123 by Isi Wood RN  Outcome: Progressing  8/14/2024 1123 by Isi Wood RN  Outcome: Progressing  Goal: Participates in PT with improved pain control throughout the shift  8/14/2024 1123 by Isi Wood RN  Outcome: Progressing  8/14/2024 1123 by Isi Wood RN  Outcome: Progressing  Goal: Free from opioid side effects throughout the shift  8/14/2024 1123 by Isi Wood RN  Outcome: Progressing  8/14/2024 1123 by Isi Wood RN  Outcome: Progressing  Goal: Free from acute confusion related to pain meds throughout the shift  8/14/2024 1123 by Isi Wood, RN  Outcome: Progressing  8/14/2024 1123 by Isi Wood, RN  Outcome: Progressing   The patient's goals for the shift include      The clinical goals for the shift  include Remain HD stable and comfortable

## 2024-08-15 ENCOUNTER — PREP FOR PROCEDURE (OUTPATIENT)
Dept: OCCUPATIONAL MEDICINE | Facility: HOSPITAL | Age: 69
End: 2024-08-15

## 2024-08-15 DIAGNOSIS — T81.49XA SURGICAL SITE INFECTION: Primary | ICD-10-CM

## 2024-08-15 LAB
ANION GAP SERPL CALC-SCNC: 11 MMOL/L (ref 10–20)
BACTERIA SPEC CULT: NORMAL
BUN SERPL-MCNC: 21 MG/DL (ref 6–23)
CALCIUM SERPL-MCNC: 7.7 MG/DL (ref 8.6–10.3)
CHLORIDE SERPL-SCNC: 112 MMOL/L (ref 98–107)
CO2 SERPL-SCNC: 17 MMOL/L (ref 21–32)
CREAT SERPL-MCNC: 1.44 MG/DL (ref 0.5–1.05)
EGFRCR SERPLBLD CKD-EPI 2021: 39 ML/MIN/1.73M*2
ERYTHROCYTE [DISTWIDTH] IN BLOOD BY AUTOMATED COUNT: 14.5 % (ref 11.5–14.5)
GLUCOSE SERPL-MCNC: 107 MG/DL (ref 74–99)
GRAM STN SPEC: NORMAL
GRAM STN SPEC: NORMAL
HCT VFR BLD AUTO: 26 % (ref 36–46)
HGB BLD-MCNC: 7.7 G/DL (ref 12–16)
MCH RBC QN AUTO: 30.8 PG (ref 26–34)
MCHC RBC AUTO-ENTMCNC: 29.6 G/DL (ref 32–36)
MCV RBC AUTO: 104 FL (ref 80–100)
NRBC BLD-RTO: 0 /100 WBCS (ref 0–0)
PLATELET # BLD AUTO: 150 X10*3/UL (ref 150–450)
POTASSIUM SERPL-SCNC: 4.5 MMOL/L (ref 3.5–5.3)
RBC # BLD AUTO: 2.5 X10*6/UL (ref 4–5.2)
SODIUM SERPL-SCNC: 135 MMOL/L (ref 136–145)
WBC # BLD AUTO: 13.2 X10*3/UL (ref 4.4–11.3)

## 2024-08-15 PROCEDURE — 2500000001 HC RX 250 WO HCPCS SELF ADMINISTERED DRUGS (ALT 637 FOR MEDICARE OP): Performed by: STUDENT IN AN ORGANIZED HEALTH CARE EDUCATION/TRAINING PROGRAM

## 2024-08-15 PROCEDURE — 2500000001 HC RX 250 WO HCPCS SELF ADMINISTERED DRUGS (ALT 637 FOR MEDICARE OP)

## 2024-08-15 PROCEDURE — 85027 COMPLETE CBC AUTOMATED: CPT | Performed by: STUDENT IN AN ORGANIZED HEALTH CARE EDUCATION/TRAINING PROGRAM

## 2024-08-15 PROCEDURE — 94660 CPAP INITIATION&MGMT: CPT

## 2024-08-15 PROCEDURE — 99232 SBSQ HOSP IP/OBS MODERATE 35: CPT | Performed by: STUDENT IN AN ORGANIZED HEALTH CARE EDUCATION/TRAINING PROGRAM

## 2024-08-15 PROCEDURE — 2500000004 HC RX 250 GENERAL PHARMACY W/ HCPCS (ALT 636 FOR OP/ED)

## 2024-08-15 PROCEDURE — 02HV33Z INSERTION OF INFUSION DEVICE INTO SUPERIOR VENA CAVA, PERCUTANEOUS APPROACH: ICD-10-PCS | Performed by: RADIOLOGY

## 2024-08-15 PROCEDURE — 1100000001 HC PRIVATE ROOM DAILY

## 2024-08-15 PROCEDURE — 80048 BASIC METABOLIC PNL TOTAL CA: CPT | Performed by: STUDENT IN AN ORGANIZED HEALTH CARE EDUCATION/TRAINING PROGRAM

## 2024-08-15 RX ORDER — HYDROXYZINE HYDROCHLORIDE 25 MG/1
25 TABLET, FILM COATED ORAL EVERY 8 HOURS PRN
Status: DISCONTINUED | OUTPATIENT
Start: 2024-08-15 | End: 2024-08-23 | Stop reason: HOSPADM

## 2024-08-15 RX ORDER — SODIUM CHLORIDE, SODIUM LACTATE, POTASSIUM CHLORIDE, CALCIUM CHLORIDE 600; 310; 30; 20 MG/100ML; MG/100ML; MG/100ML; MG/100ML
20 INJECTION, SOLUTION INTRAVENOUS CONTINUOUS
Status: CANCELLED | OUTPATIENT
Start: 2024-08-21

## 2024-08-15 ASSESSMENT — COGNITIVE AND FUNCTIONAL STATUS - GENERAL
HELP NEEDED FOR BATHING: A LITTLE
DAILY ACTIVITIY SCORE: 20
DRESSING REGULAR LOWER BODY CLOTHING: A LITTLE
DRESSING REGULAR UPPER BODY CLOTHING: A LITTLE
STANDING UP FROM CHAIR USING ARMS: A LITTLE
MOBILITY SCORE: 18
MOVING FROM LYING ON BACK TO SITTING ON SIDE OF FLAT BED WITH BEDRAILS: A LITTLE
TOILETING: A LITTLE
TURNING FROM BACK TO SIDE WHILE IN FLAT BAD: A LITTLE
MOVING TO AND FROM BED TO CHAIR: A LITTLE
CLIMB 3 TO 5 STEPS WITH RAILING: A LITTLE
WALKING IN HOSPITAL ROOM: A LITTLE

## 2024-08-15 ASSESSMENT — PAIN SCALES - GENERAL
PAINLEVEL_OUTOF10: 0 - NO PAIN
PAINLEVEL_OUTOF10: 0 - NO PAIN

## 2024-08-15 NOTE — PROGRESS NOTES
"Mariann Drew is a 68 y.o. female on day 7 of admission presenting with Postoperative wound cellulitis.    Subjective   Midline replaced, failed again will need tunneled picc    Objective     Physical Exam  Constitutional:       Appearance: Normal appearance. Tardive dyskinesia   HENT:      Head: Normocephalic and atraumatic.      Nose: Nose normal.      Mouth/Throat:      Mouth: Mucous membranes are moist.   Eyes:      Conjunctiva/sclera: Conjunctivae normal.   Cardiovascular:      Rate and Rhythm: Normal rate and regular rhythm.      Heart sounds: Normal heart sounds.   Pulmonary:      Effort: Pulmonary effort is normal.      Breath sounds: Normal breath sounds.   Abdominal:      General: Abdomen is flat. Bowel sounds are normal. There is distension.      Tenderness: There is no abdominal tenderness.   Musculoskeletal:         General: Normal range of motion.      Cervical back: Neck supple.   Skin:     Comments: Abdomen with binder in place, transverse incision with  erythematous dehiscence in the middle, packed with purulent soaked Kerlix. The rest of the incision is well approximated.  BL flank BÁRBARA drain sites with edema and erythema draining moderate amount of purulent drainage   Neurological:      Mental Status: She is alert. Mental status is at baseline.   Psychiatric:         Mood and Affect: Mood normal.     Last Recorded Vitals  Blood pressure 131/73, pulse 99, temperature 36.4 °C (97.5 °F), resp. rate 18, height 1.626 m (5' 4.02\"), weight 72.2 kg (159 lb 2.8 oz), SpO2 95%.  Intake/Output last 3 Shifts:  I/O last 3 completed shifts:  In: - (0 mL/kg)   Out: 929 (12.9 mL/kg) [Urine:4 (0 mL/kg/hr); Drains:925]  Weight: 72.2 kg     Relevant Results                             Assessment/Plan   Principal Problem:    Postoperative wound cellulitis  Active Problems:    Cellulitis, abdominal wall    Disruption of wound, unspecified, initial encounter    Postoperative wound cellulitis  Wound " dehiscence  RUSSELL  Hyponatremia  Hypochloremia     Admit to surgical floor  Appreciate plastics recs  Continue merrem and vanco  CBC, CMP in am  Wound care, see orders  Drain care  Wound and blood cultures pending  Normal saline at 100 cc an hour     Chronic conditions: Gout, tardive dyskinesia, GERD, anemia, status post gastric bypass, hyperlipidemia     Continue home medications as listed above     DVT prophylaxis     SCDs  Heparin     8/9: some drainage noted, contineu abx, monitor labs, plastics consulted     8/10: Wound culture growing group B strep.  Await final cultures.  Continue empiric antibiotics.  Await thoracic surgery input.    8/11: Wound culture final: Growing group B strep.  Continue empiric antibiotics.  Await surgery input.  Creatinine improved from 1.3 down to 1.0.    8/12: I&D later today doing well. Spoke with plastics    8/13: doing well, wound vac in place consult ID to help manage abx and wound care    8/14: poor vasculature, midline in placed, will stop fluid and give abx only to see if its improves    8/15: faileld midlien, will need 14 days invanz, will order tunnelled picck ecf placement       I spent 55 minutes in the professional and overall care of this patient.      Jarocho Venegas, DO

## 2024-08-15 NOTE — CARE PLAN
Problem: Pain - Adult  Goal: Verbalizes/displays adequate comfort level or baseline comfort level  Outcome: Progressing     Problem: Safety - Adult  Goal: Free from fall injury  Outcome: Progressing     Problem: Discharge Planning  Goal: Discharge to home or other facility with appropriate resources  Outcome: Progressing     Problem: Chronic Conditions and Co-morbidities  Goal: Patient's chronic conditions and co-morbidity symptoms are monitored and maintained or improved  Outcome: Progressing     Problem: Skin  Goal: Decreased wound size/increased tissue granulation at next dressing change  Outcome: Progressing  Goal: Participates in plan/prevention/treatment measures  Outcome: Progressing  Goal: Prevent/manage excess moisture  Outcome: Progressing  Goal: Prevent/minimize sheer/friction injuries  Outcome: Progressing  Goal: Promote/optimize nutrition  Outcome: Progressing  Goal: Promote skin healing  Outcome: Progressing     Problem: Fall/Injury  Goal: Not fall by end of shift  Outcome: Progressing  Goal: Be free from injury by end of the shift  Outcome: Progressing  Goal: Verbalize understanding of personal risk factors for fall in the hospital  Outcome: Progressing  Goal: Verbalize understanding of risk factor reduction measures to prevent injury from fall in the home  Outcome: Progressing  Goal: Use assistive devices by end of the shift  Outcome: Progressing  Goal: Pace activities to prevent fatigue by end of the shift  Outcome: Progressing     Problem: Diabetes  Goal: Achieve decreasing blood glucose levels by end of shift  Outcome: Progressing  Goal: Increase stability of blood glucose readings by end of shift  Outcome: Progressing  Goal: Decrease in ketones present in urine by end of shift  Outcome: Progressing  Goal: Maintain electrolyte levels within acceptable range throughout shift  Outcome: Progressing  Goal: Maintain glucose levels >70mg/dl to <250mg/dl throughout shift  Outcome: Progressing  Goal: No  changes in neurological exam by end of shift  Outcome: Progressing  Goal: Learn about and adhere to nutrition recommendations by end of shift  Outcome: Progressing  Goal: Vital signs within normal range for age by end of shift  Outcome: Progressing  Goal: Increase self care and/or family involovement by end of shift  Outcome: Progressing  Goal: Receive DSME education by end of shift  Outcome: Progressing     Problem: Pain  Goal: Takes deep breaths with improved pain control throughout the shift  Outcome: Progressing  Goal: Turns in bed with improved pain control throughout the shift  Outcome: Progressing  Goal: Walks with improved pain control throughout the shift  Outcome: Progressing  Goal: Performs ADL's with improved pain control throughout shift  Outcome: Progressing  Goal: Participates in PT with improved pain control throughout the shift  Outcome: Progressing  Goal: Free from opioid side effects throughout the shift  Outcome: Progressing  Goal: Free from acute confusion related to pain meds throughout the shift  Outcome: Progressing   The patient's goals for the shift include      The clinical goals for the shift include Remain HD stable

## 2024-08-15 NOTE — PROGRESS NOTES
"Mariann Drew is a 68 y.o. female on day 7 of admission presenting with Postoperative wound cellulitis.    Subjective   Doing fair.  Patient notices fluid on the Right arm.         Objective     Physical Exam    Focused exam:  RUE with significant pitting edema, there is serous oozing from the skin, apparently eminating from the dorsal palm.  Notable ecchymosis of the RUE at PICC placement site with significant edema.    Last Recorded Vitals  Blood pressure 124/77, pulse 96, temperature 37 °C (98.6 °F), resp. rate 18, height 1.626 m (5' 4.02\"), weight 72.2 kg (159 lb 2.8 oz), SpO2 97%.  Intake/Output last 3 Shifts:  I/O last 3 completed shifts:  In: - (0 mL/kg)   Out: 929 (12.9 mL/kg) [Urine:4 (0 mL/kg/hr); Drains:925]  Weight: 72.2 kg     Relevant Results                              Assessment/Plan   POD 3 s/p I&D of abdominal incision for cellulitis with placement of irrigating wound VAC.  Assessment & Plan  Postoperative wound cellulitis    Cellulitis, abdominal wall    Disruption of wound, unspecified, initial encounter      Will continue irrigating wound vac -- plan for at least 7 days of irrigating VAC,   Recommend Changing the Dakines solution to Normal Saline only   Would consider conservative diuresis    Will consider RTOR next Wednesday for repeat debridement, possible closure.           I spent 20 minutes in the professional and overall care of this patient.      Anibal Tiwari MD      "

## 2024-08-15 NOTE — NURSING NOTE
Prior to start of shift, patient wound vac was not functional. Around 2200, writer was able to restore proper suction an function to wound vac and approx 150 mL of dark red fluids were expelled from wound.

## 2024-08-15 NOTE — CARE PLAN
The patient's goals for the shift include  safety and comfort    The clinical goals for the shift include Remain HDS

## 2024-08-16 ENCOUNTER — APPOINTMENT (OUTPATIENT)
Dept: RADIOLOGY | Facility: HOSPITAL | Age: 69
DRG: 856 | End: 2024-08-16
Payer: MEDICARE

## 2024-08-16 PROBLEM — T81.49XA SURGICAL SITE INFECTION: Status: ACTIVE | Noted: 2024-08-15

## 2024-08-16 LAB
ANION GAP SERPL CALC-SCNC: 10 MMOL/L (ref 10–20)
BUN SERPL-MCNC: 21 MG/DL (ref 6–23)
CALCIUM SERPL-MCNC: 8.1 MG/DL (ref 8.6–10.3)
CHLORIDE SERPL-SCNC: 109 MMOL/L (ref 98–107)
CO2 SERPL-SCNC: 22 MMOL/L (ref 21–32)
CREAT SERPL-MCNC: 1.38 MG/DL (ref 0.5–1.05)
EGFRCR SERPLBLD CKD-EPI 2021: 42 ML/MIN/1.73M*2
ERYTHROCYTE [DISTWIDTH] IN BLOOD BY AUTOMATED COUNT: 14.7 % (ref 11.5–14.5)
GLUCOSE SERPL-MCNC: 102 MG/DL (ref 74–99)
HCT VFR BLD AUTO: 23.5 % (ref 36–46)
HGB BLD-MCNC: 7.3 G/DL (ref 12–16)
HOLD SPECIMEN: NORMAL
MCH RBC QN AUTO: 30.2 PG (ref 26–34)
MCHC RBC AUTO-ENTMCNC: 31.1 G/DL (ref 32–36)
MCV RBC AUTO: 97 FL (ref 80–100)
NRBC BLD-RTO: 0 /100 WBCS (ref 0–0)
PLATELET # BLD AUTO: 175 X10*3/UL (ref 150–450)
POTASSIUM SERPL-SCNC: 4.4 MMOL/L (ref 3.5–5.3)
RBC # BLD AUTO: 2.42 X10*6/UL (ref 4–5.2)
SODIUM SERPL-SCNC: 137 MMOL/L (ref 136–145)
WBC # BLD AUTO: 11.7 X10*3/UL (ref 4.4–11.3)

## 2024-08-16 PROCEDURE — 94660 CPAP INITIATION&MGMT: CPT

## 2024-08-16 PROCEDURE — 36415 COLL VENOUS BLD VENIPUNCTURE: CPT | Performed by: STUDENT IN AN ORGANIZED HEALTH CARE EDUCATION/TRAINING PROGRAM

## 2024-08-16 PROCEDURE — 85027 COMPLETE CBC AUTOMATED: CPT | Performed by: STUDENT IN AN ORGANIZED HEALTH CARE EDUCATION/TRAINING PROGRAM

## 2024-08-16 PROCEDURE — 2780000003 HC OR 278 NO HCPCS

## 2024-08-16 PROCEDURE — 36010 PLACE CATHETER IN VEIN: CPT

## 2024-08-16 PROCEDURE — 2500000001 HC RX 250 WO HCPCS SELF ADMINISTERED DRUGS (ALT 637 FOR MEDICARE OP)

## 2024-08-16 PROCEDURE — 2500000004 HC RX 250 GENERAL PHARMACY W/ HCPCS (ALT 636 FOR OP/ED)

## 2024-08-16 PROCEDURE — 77001 FLUOROGUIDE FOR VEIN DEVICE: CPT

## 2024-08-16 PROCEDURE — 76937 US GUIDE VASCULAR ACCESS: CPT

## 2024-08-16 PROCEDURE — 1100000001 HC PRIVATE ROOM DAILY

## 2024-08-16 PROCEDURE — 80048 BASIC METABOLIC PNL TOTAL CA: CPT | Performed by: STUDENT IN AN ORGANIZED HEALTH CARE EDUCATION/TRAINING PROGRAM

## 2024-08-16 PROCEDURE — 97161 PT EVAL LOW COMPLEX 20 MIN: CPT | Mod: GP

## 2024-08-16 PROCEDURE — 99232 SBSQ HOSP IP/OBS MODERATE 35: CPT | Performed by: STUDENT IN AN ORGANIZED HEALTH CARE EDUCATION/TRAINING PROGRAM

## 2024-08-16 PROCEDURE — 2500000005 HC RX 250 GENERAL PHARMACY W/O HCPCS: Performed by: RADIOLOGY

## 2024-08-16 PROCEDURE — 2500000004 HC RX 250 GENERAL PHARMACY W/ HCPCS (ALT 636 FOR OP/ED): Performed by: NURSE PRACTITIONER

## 2024-08-16 PROCEDURE — 36558 INSERT TUNNELED CV CATH: CPT

## 2024-08-16 PROCEDURE — 97165 OT EVAL LOW COMPLEX 30 MIN: CPT | Mod: GO

## 2024-08-16 PROCEDURE — C1751 CATH, INF, PER/CENT/MIDLINE: HCPCS

## 2024-08-16 RX ORDER — ERTAPENEM 1 G/1
1 INJECTION, POWDER, LYOPHILIZED, FOR SOLUTION INTRAMUSCULAR; INTRAVENOUS DAILY
Qty: 13 G | Refills: 0 | Status: ON HOLD | OUTPATIENT
Start: 2024-08-16 | End: 2024-08-29

## 2024-08-16 RX ORDER — LIDOCAINE HYDROCHLORIDE AND EPINEPHRINE 10; 10 MG/ML; UG/ML
INJECTION, SOLUTION INFILTRATION; PERINEURAL
Status: COMPLETED | OUTPATIENT
Start: 2024-08-16 | End: 2024-08-16

## 2024-08-16 RX ORDER — SODIUM CHLORIDE 0.9 G/100ML
1000 IRRIGANT IRRIGATION EVERY 8 HOURS
Status: DISCONTINUED | OUTPATIENT
Start: 2024-08-16 | End: 2024-08-21

## 2024-08-16 ASSESSMENT — COGNITIVE AND FUNCTIONAL STATUS - GENERAL
TOILETING: A LITTLE
MOBILITY SCORE: 15
CLIMB 3 TO 5 STEPS WITH RAILING: TOTAL
TURNING FROM BACK TO SIDE WHILE IN FLAT BAD: A LOT
MOVING TO AND FROM BED TO CHAIR: A LITTLE
DRESSING REGULAR LOWER BODY CLOTHING: A LOT
HELP NEEDED FOR BATHING: A LOT
TURNING FROM BACK TO SIDE WHILE IN FLAT BAD: A LOT
WALKING IN HOSPITAL ROOM: A LITTLE
CLIMB 3 TO 5 STEPS WITH RAILING: TOTAL
DRESSING REGULAR UPPER BODY CLOTHING: A LITTLE
STANDING UP FROM CHAIR USING ARMS: A LITTLE
MOVING FROM LYING ON BACK TO SITTING ON SIDE OF FLAT BED WITH BEDRAILS: A LITTLE
HELP NEEDED FOR BATHING: A LITTLE
STANDING UP FROM CHAIR USING ARMS: A LITTLE
WALKING IN HOSPITAL ROOM: A LITTLE
TOILETING: A LITTLE
WALKING IN HOSPITAL ROOM: A LITTLE
MOBILITY SCORE: 18
MOVING TO AND FROM BED TO CHAIR: A LITTLE
TURNING FROM BACK TO SIDE WHILE IN FLAT BAD: A LITTLE
STANDING UP FROM CHAIR USING ARMS: A LITTLE
CLIMB 3 TO 5 STEPS WITH RAILING: A LITTLE
DAILY ACTIVITIY SCORE: 18
MOVING FROM LYING ON BACK TO SITTING ON SIDE OF FLAT BED WITH BEDRAILS: A LITTLE
DAILY ACTIVITIY SCORE: 18
HELP NEEDED FOR BATHING: A LOT
DRESSING REGULAR UPPER BODY CLOTHING: A LITTLE
DRESSING REGULAR LOWER BODY CLOTHING: A LOT
TOILETING: A LITTLE
MOBILITY SCORE: 15
DAILY ACTIVITIY SCORE: 20
DRESSING REGULAR LOWER BODY CLOTHING: A LITTLE
DRESSING REGULAR UPPER BODY CLOTHING: A LITTLE
MOVING TO AND FROM BED TO CHAIR: A LITTLE
MOVING FROM LYING ON BACK TO SITTING ON SIDE OF FLAT BED WITH BEDRAILS: A LITTLE

## 2024-08-16 ASSESSMENT — ACTIVITIES OF DAILY LIVING (ADL): BATHING_ASSISTANCE: MAXIMAL

## 2024-08-16 ASSESSMENT — PAIN - FUNCTIONAL ASSESSMENT
PAIN_FUNCTIONAL_ASSESSMENT: 0-10
PAIN_FUNCTIONAL_ASSESSMENT: 0-10

## 2024-08-16 ASSESSMENT — PAIN SCALES - GENERAL
PAINLEVEL_OUTOF10: 0 - NO PAIN
PAINLEVEL_OUTOF10: 5 - MODERATE PAIN
PAINLEVEL_OUTOF10: 0 - NO PAIN
PAINLEVEL_OUTOF10: 0 - NO PAIN
PAINLEVEL_OUTOF10: 7
PAINLEVEL_OUTOF10: 7
PAINLEVEL_OUTOF10: 0 - NO PAIN
PAINLEVEL_OUTOF10: 4
PAINLEVEL_OUTOF10: 4

## 2024-08-16 ASSESSMENT — PAIN DESCRIPTION - ORIENTATION
ORIENTATION: RIGHT;LEFT;LOWER
ORIENTATION: RIGHT;LEFT;LOWER

## 2024-08-16 ASSESSMENT — PAIN DESCRIPTION - LOCATION
LOCATION: ABDOMEN
LOCATION: ABDOMEN

## 2024-08-16 ASSESSMENT — PAIN SCALES - PAIN ASSESSMENT IN ADVANCED DEMENTIA (PAINAD): TOTALSCORE: MEDICATION (SEE MAR)

## 2024-08-16 NOTE — PROCEDURES
Interventional Radiology Brief Postprocedure Note    Attending: Rocio Blackman MD    Assistant:   Staff Role   Lynn Sam MT Radiology Technologist   Mary Ambrocio RN Other - Providing Care   Mat Adan MT Radiology Technologist   Rocio Blackman MD Radiologist       Diagnosis:   1. Cellulitis, abdominal wall  Tissue/Wound Culture/Smear    Tissue/Wound Culture/Smear      2. Postoperative wound cellulitis        3. Disruption of wound, unspecified, initial encounter  Tissue/Wound Culture/Smear    Tissue/Wound Culture/Smear          Description of procedure: IR CVC tunneled    Timeout:  Yes    Procedure Area: Procedure Area     Anesthesia:   Conscious Sedation    Complications: None    Estimated Blood Loss: none    Medications (Filter: Administrations occurring from 1017 to 1017 on 08/16/24) As of 08/16/24 1017      None          No specimens collected      See detailed result report with images in PACS.    The patient tolerated the procedure well without incident or complication and is in stable condition.

## 2024-08-16 NOTE — PROGRESS NOTES
"Mariann Drew is a 68 y.o. female on day 8 of admission presenting with Postoperative wound cellulitis.    Subjective   Midline replaced, failed again will need tunneled picc today, pt/ot ordered    Objective     Physical Exam  Constitutional:       Appearance: Normal appearance. Tardive dyskinesia   HENT:      Head: Normocephalic and atraumatic.      Nose: Nose normal.      Mouth/Throat:      Mouth: Mucous membranes are moist.   Eyes:      Conjunctiva/sclera: Conjunctivae normal.   Cardiovascular:      Rate and Rhythm: Normal rate and regular rhythm.      Heart sounds: Normal heart sounds.   Pulmonary:      Effort: Pulmonary effort is normal.      Breath sounds: Normal breath sounds.   Abdominal:      General: Abdomen is flat. Bowel sounds are normal. There is distension.      Tenderness: There is no abdominal tenderness.   Musculoskeletal:         General: Normal range of motion.      Cervical back: Neck supple.   Skin:     Comments: Abdomen with binder in place, transverse incision with  erythematous dehiscence in the middle, packed with purulent soaked Kerlix. The rest of the incision is well approximated.  BL flank BÁRBARA drain sites with edema and erythema draining moderate amount of purulent drainage   Neurological:      Mental Status: She is alert. Mental status is at baseline.   Psychiatric:         Mood and Affect: Mood normal.     Last Recorded Vitals  Blood pressure 123/59, pulse 93, temperature 35.9 °C (96.6 °F), temperature source Temporal, resp. rate 16, height 1.626 m (5' 4.02\"), weight 72.2 kg (159 lb 2.8 oz), SpO2 97%.  Intake/Output last 3 Shifts:  I/O last 3 completed shifts:  In: - (0 mL/kg)   Out: 975 (13.5 mL/kg) [Drains:975]  Weight: 72.2 kg     Relevant Results                             Assessment/Plan   Principal Problem:    Postoperative wound cellulitis  Active Problems:    Cellulitis, abdominal wall    Disruption of wound, unspecified, initial encounter    Surgical site " infection    Postoperative wound cellulitis  Wound dehiscence  RUSSELL  Hyponatremia  Hypochloremia     Admit to surgical floor  Appreciate plastics recs  Continue merrem and vanco  CBC, CMP in am  Wound care, see orders  Drain care  Wound and blood cultures pending  Normal saline at 100 cc an hour     Chronic conditions: Gout, tardive dyskinesia, GERD, anemia, status post gastric bypass, hyperlipidemia     Continue home medications as listed above     DVT prophylaxis     SCDs  Heparin     8/9: some drainage noted, contineu abx, monitor labs, plastics consulted     8/10: Wound culture growing group B strep.  Await final cultures.  Continue empiric antibiotics.  Await thoracic surgery input.    8/11: Wound culture final: Growing group B strep.  Continue empiric antibiotics.  Await surgery input.  Creatinine improved from 1.3 down to 1.0.    8/12: I&D later today doing well. Spoke with plastics    8/13: doing well, wound vac in place consult ID to help manage abx and wound care    8/14: poor vasculature, midline in placed, will stop fluid and give abx only to see if its improves    8/15: faileld midlien, will need 14 days invanz, will order tunnelled picck ecf placement    8/16: 1/13 cheryl mohan, PT/OT wound care, will need ecf       I spent 55 minutes in the professional and overall care of this patient.      Jarocho Venegas, DO

## 2024-08-16 NOTE — PROGRESS NOTES
Occupational Therapy    Evaluation    Patient Name: Mariann Drew  MRN: [id number]  Today's Date: 8/16/2024  Time Calculation  Start Time: 1505  Stop Time: 1529  Time Calculation (min): 24 min  719/719-A    Assessment  IP OT Assessment  OT Assessment: impaired lb adls and functional mobility for adl performance. pt would benefit from skilled ot for lb dressing with adaptive equiptment.  Prognosis: Excellent  Evaluation/Treatment Tolerance: Patient tolerated treatment well  End of Session Communication: Bedside nurse  End of Session Patient Position: Bed, 2 rail up, Alarm on    Plan:  Treatment Interventions: Patient/family training, Endurance training, UE strengthening/ROM, Functional transfer training, ADL retraining, Compensatory technique education  OT Frequency: 3 times per week  OT Discharge Recommendations: High intensity level of continued care  OT - OK to Discharge: Yes    Subjective     Current Problem:  1. Cellulitis, abdominal wall  Tissue/Wound Culture/Smear    Tissue/Wound Culture/Smear      2. Postoperative wound cellulitis        3. Disruption of wound, unspecified, initial encounter  Tissue/Wound Culture/Smear    Tissue/Wound Culture/Smear      4. Surgical site infection  ertapenem (INVanz) 1 gram injection    Basic Metabolic Panel    CBC      5. History of surgical site infection  CBC          General:  General  Reason for Referral: OT to eval and treat for adl and safety assessment  Referred By: Dr. Jarocho Venegas  Past Medical History Relevant to Rehab: Pt with post-op wound cellulitis of abdominal wall. 8/12 s/p excisional debidement of skin and wound vac placed.  Caregiver Feedback:  present during eval .  Co-Treatment: PT  Co-Treatment Reason: pt safety and line management  Prior to Session Communication: Bedside nurse  Patient Position Received: Bed, 2 rail up  General Comment: pt recently repostioned and wound vac adjustement due to leaking per nursing. pt agreeable and pleasant willing to  work with therapy    Precautions:  Precautions Comment: abdominal wound vac, clavical port placement    Pain:   Pt reports 4/10 for abdominal pain. No numbness or tingling in hands or feet.     Objective     Cognition:  Overall Cognitive Status: Within Functional Limits  Orientation Level: Oriented X4  Processing Speed:  (somewhat delayed and confused with details- sleepy)     Home Living:  Type of Home:  (pt lives with spouse in 2 story home with bed and full bath on 2nd floor.  Pt has chair lift to 2 floor.  2 steps with handrail into the home.)  Home Adaptive Equipment:  (pt wones wheeled walker and str cane.)  Bathroom Shower/Tub:  (pt has walk in shower with bench and 2 grab bars. higher toilet seat which is positoned near sink level for assist.)     Prior Function:  Level of [place]: Independent with ADLs and functional transfers, Independent with homemaking with ambulation  Prior Function Comments: spouse does iadls including driving and laundry in basement    IADL History:    drives     ADL:  Eating Assistance: Independent  Grooming Assistance: Stand by  Bathing Assistance: Maximal  UE Dressing Assistance: Minimal  LE Dressing Assistance: Maximal  Toileting Assistance with Device: Minimal  Functional Assistance: Moderate  Functional Deficit:  (pt required use of dme of wheeled walker and assist to mobilize in room x 20 feet with initially complaint of dizziness up to edge of bed.)    Activity Tolerance:  Endurance: Endurance does not limit participation in activity    Bed Mobility/Transfers:   Bed Mobility  Bed Mobility: Yes  Bed Mobility 1  Bed Mobility 1:  (supine to sit with mod x 1)  Transfers  Transfer:  (sit to stand from bed surface with min assist)    Ambulation/Gait Training:  Functional Mobility  Functional Mobility Performed:  (cga x2  with wheeled walker for short distance in room., 2 person used for iv line and wound vac.)    Sitting Balance:  Static Sitting Balance  Static  Sitting-Level of Assistance:  (good sit balance at edge of bed.)    Standing Balance:       Vision: Vision - Basic Assessment  Current Vision: No visual deficits   and Vision - Complex Assessment  Ocular Range of Motion: Within Functional Limits    Sensation:  Light Touch: No apparent deficits    Strength:  Strength Comments: bue wfl for 4/5    Perception:   Pt wears glasses    Coordination:  Movements are Fluid and Coordinated: Yes  Finger to Nose: Intact     Hand Function:  Hand Function  Gross Grasp: Functional  Coordination: Functional    Extremities: RUE   RUE : Within Functional Limits and LUE   LUE: Within Functional Limits    Outcome Measures: [facility] Daily Activity  Putting on and taking off regular lower body clothing: A lot  Bathing (including washing, rinsing, drying): A lot  Putting on and taking off regular upper body clothing: A little  Toileting, which includes using toilet, bedpan or urinal: A little  Taking care of personal grooming such as brushing teeth: None  Eating Meals: None  Daily Activity - Total Score: 18     EDUCATION:     Education Documentation  Handouts, taught by Anita Silverman OT at 8/16/2024  3:53 PM.  Learner: Patient, Family  Readiness: Acceptance  Method: Explanation  Response: Verbalizes Understanding  Comment: log roll technique    Body Mechanics, taught by Anita Silverman OT at 8/16/2024  3:53 PM.  Learner: Patient, Family  Readiness: Acceptance  Method: Explanation  Response: Verbalizes Understanding  Comment: log roll technique    Precautions, taught by Anita Silverman OT at 8/16/2024  3:53 PM.  Learner: Patient, Family  Readiness: Acceptance  Method: Explanation  Response: Verbalizes Understanding  Comment: log roll technique    Home Exercise Program, taught by Anita Silverman OT at 8/16/2024  3:53 PM.  Learner: Patient, Family  Readiness: Acceptance  Method: Explanation  Response: Verbalizes Understanding  Comment: log roll technique    ADL Training, taught by  Anita Silverman, OT at 8/16/2024  3:53 PM.  Learner: Patient, Family  Readiness: Acceptance  Method: Explanation  Response: Verbalizes Understanding  Comment: log roll technique    Education Comments  No comments found.        Goals:   Encounter Problems       Encounter Problems (Active)       Bathing       STG - Patient will bathe body with cga after set up for sponge bathing.  (Progressing)       Start:  08/16/24    Expected End:  08/30/24               Dressing Upper Extremities       STG - Patient will dress upper body independently after set up  (Progressing)       Start:  08/16/24    Expected End:  08/30/24               Dressings Lower Extremities       STG - Patient will complete lower body dressing with use of adaptive equipmtent as needed with sba.  (Progressing)       Start:  08/16/24    Expected End:  08/30/24               Functional Mobility       sTG - Patient will ambulate household distance for adl retrieval with lrad with supervision.  (Progressing)       Start:  08/16/24    Expected End:  08/30/24               Grooming       STG - Patient will gather items for grooming standing sink level with supervision (Progressing)       Start:  08/16/24    Expected End:  08/30/24               OT Transfers       STG - Patient will perform toilet transfer with sba. (Progressing)       Start:  08/16/24    Expected End:  08/30/24               Toileting       STG - Patient will complete toileting tasks with sba  (Progressing)       Start:  08/16/24    Expected End:  08/30/24

## 2024-08-16 NOTE — CARE PLAN
The patient's goals for the shift include      The clinical goals for the shift include pt will remain safe and comftorable throughout the remainder of the shift      Problem: Pain - Adult  Goal: Verbalizes/displays adequate comfort level or baseline comfort level  Outcome: Progressing     Problem: Safety - Adult  Goal: Free from fall injury  Outcome: Progressing     Problem: Discharge Planning  Goal: Discharge to home or other facility with appropriate resources  Outcome: Progressing     Problem: Chronic Conditions and Co-morbidities  Goal: Patient's chronic conditions and co-morbidity symptoms are monitored and maintained or improved  Outcome: Progressing     Problem: Skin  Goal: Decreased wound size/increased tissue granulation at next dressing change  Outcome: Progressing  Flowsheets (Taken 8/16/2024 1125)  Decreased wound size/increased tissue granulation at next dressing change: Promote sleep for wound healing  Goal: Participates in plan/prevention/treatment measures  Outcome: Progressing  Flowsheets (Taken 8/16/2024 1125)  Participates in plan/prevention/treatment measures: Elevate heels  Goal: Prevent/manage excess moisture  Outcome: Progressing  Flowsheets (Taken 8/16/2024 1125)  Prevent/manage excess moisture:   Moisturize dry skin   Follow provider orders for dressing changes  Goal: Prevent/minimize sheer/friction injuries  Outcome: Progressing  Flowsheets (Taken 8/16/2024 1125)  Prevent/minimize sheer/friction injuries: Increase activity/out of bed for meals  Goal: Promote/optimize nutrition  Outcome: Progressing  Flowsheets (Taken 8/16/2024 1125)  Promote/optimize nutrition: Monitor/record intake including meals  Goal: Promote skin healing  Outcome: Progressing  Flowsheets (Taken 8/16/2024 1125)  Promote skin healing: Assess skin/pad under line(s)/device(s)     Problem: Fall/Injury  Goal: Not fall by end of shift  Outcome: Progressing  Goal: Be free from injury by end of the shift  Outcome:  Progressing  Goal: Verbalize understanding of personal risk factors for fall in the hospital  Outcome: Progressing  Goal: Verbalize understanding of risk factor reduction measures to prevent injury from fall in the home  Outcome: Progressing  Goal: Use assistive devices by end of the shift  Outcome: Progressing  Goal: Pace activities to prevent fatigue by end of the shift  Outcome: Progressing     Problem: Diabetes  Goal: Achieve decreasing blood glucose levels by end of shift  Outcome: Progressing  Goal: Increase stability of blood glucose readings by end of shift  Outcome: Progressing  Goal: Decrease in ketones present in urine by end of shift  Outcome: Progressing  Goal: Maintain electrolyte levels within acceptable range throughout shift  Outcome: Progressing  Goal: Maintain glucose levels >70mg/dl to <250mg/dl throughout shift  Outcome: Progressing  Goal: No changes in neurological exam by end of shift  Outcome: Progressing  Goal: Learn about and adhere to nutrition recommendations by end of shift  Outcome: Progressing  Goal: Vital signs within normal range for age by end of shift  Outcome: Progressing  Goal: Increase self care and/or family involovement by end of shift  Outcome: Progressing  Goal: Receive DSME education by end of shift  Outcome: Progressing     Problem: Pain  Goal: Takes deep breaths with improved pain control throughout the shift  Outcome: Progressing  Goal: Turns in bed with improved pain control throughout the shift  Outcome: Progressing  Goal: Walks with improved pain control throughout the shift  Outcome: Progressing  Goal: Performs ADL's with improved pain control throughout shift  Outcome: Progressing  Goal: Participates in PT with improved pain control throughout the shift  Outcome: Progressing  Goal: Free from opioid side effects throughout the shift  Outcome: Progressing  Goal: Free from acute confusion related to pain meds throughout the shift  Outcome: Progressing

## 2024-08-16 NOTE — PROGRESS NOTES
Infectious disease progress note  Subjective   Abdominoplasty with wound infection (group B strep) he    Antibiotics  Day 7 of antibiotics out of 14 days  Invanz day 1    Objective   Range of Vitals (last 24 hours)  Heart Rate:  []   Temp:  [35.9 °C (96.6 °F)-37 °C (98.6 °F)]   Resp:  [15-23]   BP: ()/(52-79)   SpO2:  [97 %-99 %]   Daily Weight  08/09/24 : 72.2 kg (159 lb 2.8 oz)    Body mass index is 27.31 kg/m².      Physical Exam  Patient resting comfortably in bed  by bedside.  There is some confusion about whether the patient will be staying till Wednesday or going to the facility  HEENT PERRLA  Chest entry bilaterally  CVS S1-S2 regular  Abdomen soft VAC in place      Relevant Results  Labs  Lab Results   Component Value Date    WBC 11.7 (H) 08/16/2024    HGB 7.3 (L) 08/16/2024    HCT 23.5 (L) 08/16/2024    MCV 97 08/16/2024     08/16/2024     Lab Results   Component Value Date    GLUCOSE 102 (H) 08/16/2024    CALCIUM 8.1 (L) 08/16/2024     08/16/2024    K 4.4 08/16/2024    CO2 22 08/16/2024     (H) 08/16/2024    BUN 21 08/16/2024    CREATININE 1.38 (H) 08/16/2024   ESR: --  Lab Results   Component Value Date    SEDRATE 86 (H) 08/08/2024     Lab Results   Component Value Date    CRP 20.46 (H) 08/08/2024     Lab Results   Component Value Date    ALT 18 08/09/2024    AST 25 08/09/2024    ALKPHOS 136 08/09/2024    BILITOT 0.7 08/09/2024       Microbiology  8-8-2024 wound culture group B strep  8-8-2024 blood culture no growth at 4 days    Imaging  8-8-2024 CT abdomen pelvis scattered subcutaneous air/gas in the deep subcutaneous tissue along the abdomen.  Surgical drains were in good.  Position  Assessment/Plan   1.  Continue Invanz for 13 more days  2.  I will place a prescription on the chart just in case patient goes to the [facility] and does not stay through Wednesday    Other issues  RUSSELL  Hyperlipidemia  GERD  I reviewed and interpreted all lab test imaging studies and  documentations from other healthcare providers  I am monitoring for antibiotic side effects and toxicity     Antonia Ying MD

## 2024-08-16 NOTE — PROGRESS NOTES
Physical Therapy    Physical Therapy Evaluation    Patient Name: Mariann Drew  MRN: [id number]  Today's Date: 8/16/2024   Time Calculation  Start Time: 1506  Stop Time: 1529  Time Calculation (min): 23 min  719/719-A    Assessment/Plan   PT Assessment  PT Assessment Results: Decreased strength, Decreased endurance, Decreased mobility, Impaired balance, Decreased safety awareness, Pain  Rehab Prognosis: Good  Evaluation/Treatment Tolerance: Patient limited by fatigue, Patient limited by pain  Strengths: Ability to acquire knowledge, Capable of completing ADLs semi/independent, Housing layout, Support of Caregivers  End of Session Communication: Bedside nurse  Assessment Comment: Pt admitted 8/8 with post op vomiting s/p fluer-de-lis abdominoplasty on 7/30 and wound dehiscence. Pt is now s/p excisional debridement of skin and wound vac placed on 8/12. Pt with R port placed on 8/16 due to issues with PICC line. Pt presents with decreased strength and endurance and is unsafe to return home independently. Recommend High intensity therapy to return to [facility] and maximize recovery.  End of Session Patient Position: Bed, 2 rail up  IP OR SWING BED PT PLAN  Inpatient or Swing Bed: Inpatient  PT Plan  Treatment/Interventions: Bed mobility, Transfer training, Gait training, Balance training, Strengthening, Endurance training, Therapeutic exercise, Therapeutic activity, Home exercise program, Positioning  PT Plan: Ongoing PT  PT Frequency: 4 times per week  PT Discharge Recommendations: High intensity level of continued care  PT - OK to Discharge: Yes (once medically cleared)    Subjective     Current Problem:  1. Cellulitis, abdominal wall  Tissue/Wound Culture/Smear    Tissue/Wound Culture/Smear      2. Postoperative wound cellulitis        3. Disruption of wound, unspecified, initial encounter  Tissue/Wound Culture/Smear    Tissue/Wound Culture/Smear      4. Surgical site infection  ertapenem (INVanz) 1 gram injection    Basic  Metabolic Panel    CBC      5. History of surgical site infection  CBC        Patient Active Problem List   Diagnosis    Bilateral occipital neuralgia    Breast cancer (Multi)    Cervical arthritis    Cervical radiculopathy    Chronic gout    Current mild episode of major depressive disorder (CMS-HCC)    Type 2 diabetes mellitus without complication, without long-term current use of insulin (Multi)    Gastroesophageal reflux disease without esophagitis    Primary insomnia    Primary osteoarthritis involving multiple joints    Memory loss    Cervical myofascial pain syndrome    Neuropathy    Headache    Arthralgia of right wrist    Arthritis of carpometacarpal (CMC) joint of right thumb    Back pain    Cervicogenic headache    Decreased estrogen level    Dermatitis    Gastric bypass status for obesity    Knee osteoarthritis    Mouth sore    Pain in right hand    Recurrent falls    Shortness of breath on exertion    Liver cirrhosis secondary to HAZEL (nonalcoholic steatohepatitis) (Multi)    Routine adult health maintenance    BMI 33.0-33.9,adult    Encounter for screening mammogram for malignant neoplasm of breast    FH: myocardial infarction    Screening for multiple conditions    Rheumatoid arthritis, involving unspecified site, unspecified whether rheumatoid factor present (Multi)    Abnormal LFTs (liver function tests)    Acute pain of right knee    Acute ulcer of stomach    Anemia    Leukopenia    Anxiety    Balance problems    Bilateral cataracts    Colon polyps    Cramp of limb    Depression, major, in remission (CMS-HCC)    Diabetes mellitus (Multi)    Dizziness    Enterocele    Essential hypertension    Fatty liver    Gait abnormality    Gallbladder disease    GI bleed    Hepatitis C    History of retinal tear    History of total knee replacement    Hx of obesity    Incomplete tear of left rotator cuff    Intractable migraine without aura and without status migrainosus    Leukoplakia of oral mucosa, including  tongue    Lymph node enlargement    Mixed hearing loss    Oral aphthous ulcer    ANKUSH (obstructive sleep apnea)    Pancreatic cyst (HHS-HCC)    Personal history of colonic polyps    Recurrent UTI    RLS (restless legs syndrome)    Rotator cuff syndrome of left shoulder    Sebaceous cyst    Skin fibrosis    Stomatitis, viral    Tardive dyskinesia    Atrophic vaginitis    Female cystocele    Vaginal wall prolapse    Vision changes    Vision loss    Wound dehiscence    Cervical spondylosis with radiculopathy    Chronic left shoulder pain    Chronic neck pain    Myofascial pain syndrome, cervical    Arthritis    Allergic rhinitis due to allergen    Benign neoplasm of connective and other soft tissue of unspecified upper limb, including shoulder    Rectocele    Hyperlipidemia    Parkinsonism (Multi)    Portal hypertension (Multi)    Other bipolar disorder (Multi)    Lipodystrophy    Excessive and redundant skin and subcutaneous tissue    Localized adiposity    Draining postoperative wound    Postoperative wound cellulitis    Cellulitis, abdominal wall    Disruption of wound, unspecified, initial encounter    Surgical site infection       General Visit Information:  General  Reason for Referral: PT eval and treat; post op  Referred By: Jarocho Venegas DO  Past Medical History Relevant to Rehab: anemia, GERD, HLD, DM, weight loss s/p gastric bypass, ANKUSH, breast cancer s/p L mastectomy  Family/Caregiver Present: Yes ()  Co-Treatment: OT  Co-Treatment Reason: to maximize patient safety and participation  Prior to Session Communication: Bedside nurse  Patient Position Received: Bed, 2 rail up  General Comment: Pt pleasant and motivated for therapy    Home Living:  Home Living  Home Living Comments: Pt lives at home with her  in a two level home with 2 FRANSICO and a chair lift to second level. Pt has a walk in shower and a tub shower with a shower chair. Owns cane and FWW and was using no device at baseline. Drives and is  retired.    Prior Level of Function:  Prior Function Per Pt/Caregiver Report  Level of [place]: Independent with ADLs and functional transfers, Independent with homemaking with ambulation    Precautions:  Precautions  Medical Precautions: Fall precautions  Precautions Comment: abdominal incision with wound vac     Objective     Pain:  Pain Assessment  Pain Assessment: 0-10  0-10 (Numeric) Pain Score: 4  Pain Type: Acute pain  Pain Location: Abdomen    Cognition:  Cognition  Overall Cognitive Status: Within Functional Limits  Orientation Level: Oriented X4    General Assessments:  General Observation  General Observation: wound vac intact; LUE swollen with seepage from forearm   Activity Tolerance  Endurance: Decreased tolerance for upright activites  Sensation  Light Touch: No apparent deficits  Strength  Strength Comments: BLE WFL at least 3/5     Functional Assessments:     Bed Mobility  Bed Mobility:  (completed supine to sit with ModA x1 and to return to supine with education on logroll technique and use of bedrails for assist. Reports dizziness with change in position that improved with static sitting.)  Transfers  Transfer:  (completed sit <> stand from EOB to FWW with Fortino and cues for correct hand placement.)  Ambulation/Gait Training  Ambulation/Gait Training Performed:  (Pt completed ~20'x1 within room with use of FWW and CGA for safety. pt is limited by fatigue.)        Extremity/Trunk Assessments:        RLE   RLE : Within Functional Limits  LLE   LLE : Within Functional Limits    Outcome Measures:     [facility] Basic Mobility  Turning from your back to your side while in a flat bed without using bedrails: A little  Moving from lying on your back to sitting on the side of a flat bed without using bedrails: A lot  Moving to and from bed to chair (including a wheelchair): A little  Standing up from a chair using your arms (e.g. wheelchair or bedside chair): A little  To walk in hospital room: A  little  Climbing 3-5 steps with railing: Total  Basic Mobility - Total Score: 15     Goals:  Encounter Problems       Encounter Problems (Active)       PT Problem       PT Goal 1 STG - Pt will transition supine <> sitting with supervision  (Progressing)       Start:  08/16/24    Expected End:  08/30/24            PT Goal 2 STG - Pt will transfer STS with LRAD and supervision  (Progressing)       Start:  08/16/24    Expected End:  08/30/24            PT Goal 3 STG - Pt will amb 100' using LRAD with supervision  (Progressing)       Start:  08/16/24    Expected End:  08/30/24            PT Goal 4 STG -  Pt will navigate 2 stairs using 1 HR with SBA  (Progressing)       Start:  08/16/24    Expected End:  08/30/24               Pain - Adult            Education Documentation  Precautions, taught by Jackie Dixon, PT at 8/16/2024  3:51 PM.  Learner: Family, Patient  Readiness: Acceptance  Method: Explanation  Response: Verbalizes Understanding, Needs Reinforcement    Mobility Training, taught by Jackie Dixon PT at 8/16/2024  3:51 PM.  Learner: Family, Patient  Readiness: Acceptance  Method: Explanation  Response: Verbalizes Understanding, Needs Reinforcement    Education Comments  No comments found.

## 2024-08-17 LAB
ANION GAP SERPL CALC-SCNC: 12 MMOL/L (ref 10–20)
BUN SERPL-MCNC: 20 MG/DL (ref 6–23)
CALCIUM SERPL-MCNC: 7.9 MG/DL (ref 8.6–10.3)
CHLORIDE SERPL-SCNC: 110 MMOL/L (ref 98–107)
CO2 SERPL-SCNC: 20 MMOL/L (ref 21–32)
CREAT SERPL-MCNC: 1.28 MG/DL (ref 0.5–1.05)
EGFRCR SERPLBLD CKD-EPI 2021: 45 ML/MIN/1.73M*2
ERYTHROCYTE [DISTWIDTH] IN BLOOD BY AUTOMATED COUNT: 15.3 % (ref 11.5–14.5)
GLUCOSE SERPL-MCNC: 124 MG/DL (ref 74–99)
HCT VFR BLD AUTO: 24.4 % (ref 36–46)
HGB BLD-MCNC: 7.9 G/DL (ref 12–16)
MCH RBC QN AUTO: 30.7 PG (ref 26–34)
MCHC RBC AUTO-ENTMCNC: 32.4 G/DL (ref 32–36)
MCV RBC AUTO: 95 FL (ref 80–100)
NRBC BLD-RTO: 0 /100 WBCS (ref 0–0)
PLATELET # BLD AUTO: 255 X10*3/UL (ref 150–450)
POTASSIUM SERPL-SCNC: 4.2 MMOL/L (ref 3.5–5.3)
RBC # BLD AUTO: 2.57 X10*6/UL (ref 4–5.2)
SODIUM SERPL-SCNC: 138 MMOL/L (ref 136–145)
WBC # BLD AUTO: 12.5 X10*3/UL (ref 4.4–11.3)

## 2024-08-17 PROCEDURE — 2500000001 HC RX 250 WO HCPCS SELF ADMINISTERED DRUGS (ALT 637 FOR MEDICARE OP)

## 2024-08-17 PROCEDURE — 80051 ELECTROLYTE PANEL: CPT | Performed by: STUDENT IN AN ORGANIZED HEALTH CARE EDUCATION/TRAINING PROGRAM

## 2024-08-17 PROCEDURE — 2500000005 HC RX 250 GENERAL PHARMACY W/O HCPCS: Performed by: NURSE PRACTITIONER

## 2024-08-17 PROCEDURE — 2500000004 HC RX 250 GENERAL PHARMACY W/ HCPCS (ALT 636 FOR OP/ED)

## 2024-08-17 PROCEDURE — 99232 SBSQ HOSP IP/OBS MODERATE 35: CPT | Performed by: STUDENT IN AN ORGANIZED HEALTH CARE EDUCATION/TRAINING PROGRAM

## 2024-08-17 PROCEDURE — 2500000004 HC RX 250 GENERAL PHARMACY W/ HCPCS (ALT 636 FOR OP/ED): Performed by: NURSE PRACTITIONER

## 2024-08-17 PROCEDURE — 85027 COMPLETE CBC AUTOMATED: CPT | Performed by: STUDENT IN AN ORGANIZED HEALTH CARE EDUCATION/TRAINING PROGRAM

## 2024-08-17 PROCEDURE — 1100000001 HC PRIVATE ROOM DAILY

## 2024-08-17 ASSESSMENT — COGNITIVE AND FUNCTIONAL STATUS - GENERAL
TOILETING: A LITTLE
WALKING IN HOSPITAL ROOM: A LITTLE
DRESSING REGULAR LOWER BODY CLOTHING: A LOT
DAILY ACTIVITIY SCORE: 18
DRESSING REGULAR UPPER BODY CLOTHING: A LITTLE
CLIMB 3 TO 5 STEPS WITH RAILING: TOTAL
MOVING FROM LYING ON BACK TO SITTING ON SIDE OF FLAT BED WITH BEDRAILS: A LITTLE
MOBILITY SCORE: 15
TOILETING: A LITTLE
STANDING UP FROM CHAIR USING ARMS: A LITTLE
MOVING TO AND FROM BED TO CHAIR: A LITTLE
DRESSING REGULAR UPPER BODY CLOTHING: A LITTLE
HELP NEEDED FOR BATHING: A LOT
DRESSING REGULAR LOWER BODY CLOTHING: A LOT
MOBILITY SCORE: 16
WALKING IN HOSPITAL ROOM: A LITTLE
STANDING UP FROM CHAIR USING ARMS: A LITTLE
TURNING FROM BACK TO SIDE WHILE IN FLAT BAD: A LITTLE
MOVING FROM LYING ON BACK TO SITTING ON SIDE OF FLAT BED WITH BEDRAILS: A LITTLE
CLIMB 3 TO 5 STEPS WITH RAILING: TOTAL
HELP NEEDED FOR BATHING: A LOT
MOVING TO AND FROM BED TO CHAIR: A LITTLE
TURNING FROM BACK TO SIDE WHILE IN FLAT BAD: A LOT

## 2024-08-17 ASSESSMENT — PAIN SCALES - WONG BAKER: WONGBAKER_NUMERICALRESPONSE: NO HURT

## 2024-08-17 ASSESSMENT — PAIN DESCRIPTION - LOCATION: LOCATION: ABDOMEN

## 2024-08-17 ASSESSMENT — PAIN SCALES - GENERAL
PAINLEVEL_OUTOF10: 0 - NO PAIN
PAINLEVEL_OUTOF10: 7

## 2024-08-17 ASSESSMENT — PAIN - FUNCTIONAL ASSESSMENT: PAIN_FUNCTIONAL_ASSESSMENT: 0-10

## 2024-08-17 NOTE — CARE PLAN
The patient's goals for the shift include      The clinical goals for the shift include safety and comfort    Problem: Pain - Adult  Goal: Verbalizes/displays adequate comfort level or baseline comfort level  Outcome: Progressing     Problem: Safety - Adult  Goal: Free from fall injury  Outcome: Progressing     Problem: Discharge Planning  Goal: Discharge to home or other facility with appropriate resources  Outcome: Progressing     Problem: Chronic Conditions and Co-morbidities  Goal: Patient's chronic conditions and co-morbidity symptoms are monitored and maintained or improved  Outcome: Progressing     Problem: Skin  Goal: Decreased wound size/increased tissue granulation at next dressing change  Outcome: Progressing  Goal: Participates in plan/prevention/treatment measures  Outcome: Progressing  Goal: Prevent/manage excess moisture  Outcome: Progressing  Goal: Prevent/minimize sheer/friction injuries  Outcome: Progressing  Goal: Promote/optimize nutrition  Outcome: Progressing  Goal: Promote skin healing  Outcome: Progressing

## 2024-08-17 NOTE — CARE PLAN
Problem: Pain - Adult  Goal: Verbalizes/displays adequate comfort level or baseline comfort level  Outcome: Progressing     Problem: Skin  Goal: Promote skin healing  Outcome: Progressing     Problem: Fall/Injury  Goal: Be free from injury by end of the shift  Outcome: Progressing   The patient's goals for the shift include      The clinical goals for the shift include pt will remain safe and comftorable throughout the remainder of the shift

## 2024-08-17 NOTE — PROGRESS NOTES
"Mariann Drew is a 68 y.o. female on day 9 of admission presenting with Postoperative wound cellulitis.    Subjective   Doing well no issues    Objective     Physical Exam  Constitutional:       Appearance: Normal appearance. Tardive dyskinesia   HENT:      Head: Normocephalic and atraumatic.      Nose: Nose normal.      Mouth/Throat:      Mouth: Mucous membranes are moist.   Eyes:      Conjunctiva/sclera: Conjunctivae normal.   Cardiovascular:      Rate and Rhythm: Normal rate and regular rhythm.      Heart sounds: Normal heart sounds.   Pulmonary:      Effort: Pulmonary effort is normal.      Breath sounds: Normal breath sounds.   Abdominal:      General: Abdomen is flat. Bowel sounds are normal. There is distension.      Tenderness: There is no abdominal tenderness.   Musculoskeletal:         General: Normal range of motion.      Cervical back: Neck supple.   Skin:     Comments: Abdomen with binder in place, transverse incision with  erythematous dehiscence in the middle, packed with purulent soaked Kerlix. The rest of the incision is well approximated.  BL flank BÁRBARA drain sites with edema and erythema draining moderate amount of purulent drainage   Neurological:      Mental Status: She is alert. Mental status is at baseline.   Psychiatric:         Mood and Affect: Mood normal.     Last Recorded Vitals  Blood pressure 126/73, pulse 95, temperature (!) 30.4 °C (86.7 °F), resp. rate 16, height 1.626 m (5' 4.02\"), weight 72.2 kg (159 lb 2.8 oz), SpO2 96%.  Intake/Output last 3 Shifts:  I/O last 3 completed shifts:  In: 1764 (24.4 mL/kg) [P.O.:714; I.V.:1000 (13.9 mL/kg); IV Piggyback:50]  Out: 675 (9.3 mL/kg) [Urine:200 (0.1 mL/kg/hr); Drains:475]  Weight: 72.2 kg     Relevant Results                             Assessment/Plan   Principal Problem:    Postoperative wound cellulitis  Active Problems:    Cellulitis, abdominal wall    Disruption of wound, unspecified, initial encounter    Surgical site " infection    Postoperative wound cellulitis  Wound dehiscence  RUSSELL  Hyponatremia  Hypochloremia     Admit to surgical floor  Appreciate plastics recs  Continue merrem and vanco  CBC, CMP in am  Wound care, see orders  Drain care  Wound and blood cultures pending  Normal saline at 100 cc an hour     Chronic conditions: Gout, tardive dyskinesia, GERD, anemia, status post gastric bypass, hyperlipidemia     Continue home medications as listed above     DVT prophylaxis     SCDs  Heparin     8/9: some drainage noted, contineu abx, monitor labs, plastics consulted     8/10: Wound culture growing group B strep.  Await final cultures.  Continue empiric antibiotics.  Await thoracic surgery input.    8/11: Wound culture final: Growing group B strep.  Continue empiric antibiotics.  Await surgery input.  Creatinine improved from 1.3 down to 1.0.    8/12: I&D later today doing well. Spoke with plastics    8/13: doing well, wound vac in place consult ID to help manage abx and wound care    8/14: poor vasculature, midline in placed, will stop fluid and give abx only to see if its improves    8/15: faileld midlien, will need 14 days invanz, will order tunnelled picck ecf placement    8/16: 1/13 fro [facility], PT/OT wound care, will need ecf    8/17: day 2 invanz, need 14 day todal       I spent 55 minutes in the professional and overall care of this patient.      Jarocho Venegas, DO

## 2024-08-17 NOTE — PROGRESS NOTES
"I am I am not of Mariann Drew is a 68 y.o. female on day 9 of admission presenting with Postoperative wound cellulitis.    Subjective   Doing better than yesterday.  Edema significantly improved from previous.  Status post PICC placement.    In better spirits         Objective     Physical Exam    Focused exam:  Abdomen VAC is in place.  Has been changed to normal saline    Last Recorded Vitals  Blood pressure 126/73, pulse 99, temperature 36.4 °C (97.5 °F), temperature source Temporal, resp. rate 18, height 1.626 m (5' 4.02\"), weight 72.2 kg (159 lb 2.8 oz), SpO2 97%.  Intake/Output last 3 Shifts:  I/O last 3 completed shifts:  In: 1764 (24.4 mL/kg) [P.O.:714; I.V.:1000 (13.9 mL/kg); IV Piggyback:50]  Out: 675 (9.3 mL/kg) [Urine:200 (0.1 mL/kg/hr); Drains:475]  Weight: 72.2 kg     Relevant Results                              Assessment/Plan   POD 3 s/p I&D of abdominal incision for cellulitis with placement of irrigating wound VAC.  Assessment & Plan  Postoperative wound cellulitis    Cellulitis, abdominal wall    Disruption of wound, unspecified, initial encounter    Surgical site infection      Will continue irrigating wound vac --     Plan for RTOR next Wednesday for repeat debridement, possible closure.           I spent 20 minutes in the professional and overall care of this patient.      Anibal Tiwari MD      "

## 2024-08-18 VITALS
OXYGEN SATURATION: 96 % | RESPIRATION RATE: 18 BRPM | WEIGHT: 159.17 LBS | DIASTOLIC BLOOD PRESSURE: 64 MMHG | BODY MASS INDEX: 27.17 KG/M2 | SYSTOLIC BLOOD PRESSURE: 122 MMHG | TEMPERATURE: 96.6 F | HEART RATE: 98 BPM | HEIGHT: 64 IN

## 2024-08-18 PROCEDURE — 2500000005 HC RX 250 GENERAL PHARMACY W/O HCPCS: Performed by: NURSE PRACTITIONER

## 2024-08-18 PROCEDURE — 2500000001 HC RX 250 WO HCPCS SELF ADMINISTERED DRUGS (ALT 637 FOR MEDICARE OP)

## 2024-08-18 PROCEDURE — 2500000004 HC RX 250 GENERAL PHARMACY W/ HCPCS (ALT 636 FOR OP/ED): Performed by: NURSE PRACTITIONER

## 2024-08-18 PROCEDURE — 2500000004 HC RX 250 GENERAL PHARMACY W/ HCPCS (ALT 636 FOR OP/ED)

## 2024-08-18 PROCEDURE — 99232 SBSQ HOSP IP/OBS MODERATE 35: CPT | Performed by: INTERNAL MEDICINE

## 2024-08-18 PROCEDURE — 1100000001 HC PRIVATE ROOM DAILY

## 2024-08-18 ASSESSMENT — COGNITIVE AND FUNCTIONAL STATUS - GENERAL
DRESSING REGULAR LOWER BODY CLOTHING: A LOT
CLIMB 3 TO 5 STEPS WITH RAILING: A LOT
MOBILITY SCORE: 17
DRESSING REGULAR UPPER BODY CLOTHING: A LITTLE
HELP NEEDED FOR BATHING: A LOT
STANDING UP FROM CHAIR USING ARMS: A LITTLE
TURNING FROM BACK TO SIDE WHILE IN FLAT BAD: A LITTLE
DAILY ACTIVITIY SCORE: 18
DAILY ACTIVITIY SCORE: 18
TOILETING: A LITTLE
MOBILITY SCORE: 17
CLIMB 3 TO 5 STEPS WITH RAILING: A LOT
WALKING IN HOSPITAL ROOM: A LITTLE
MOVING TO AND FROM BED TO CHAIR: A LITTLE
DRESSING REGULAR UPPER BODY CLOTHING: A LITTLE
MOVING FROM LYING ON BACK TO SITTING ON SIDE OF FLAT BED WITH BEDRAILS: A LITTLE
WALKING IN HOSPITAL ROOM: A LITTLE
TURNING FROM BACK TO SIDE WHILE IN FLAT BAD: A LITTLE
MOVING FROM LYING ON BACK TO SITTING ON SIDE OF FLAT BED WITH BEDRAILS: A LITTLE
HELP NEEDED FOR BATHING: A LOT
MOVING TO AND FROM BED TO CHAIR: A LITTLE
STANDING UP FROM CHAIR USING ARMS: A LITTLE
DRESSING REGULAR LOWER BODY CLOTHING: A LOT
TOILETING: A LITTLE

## 2024-08-18 ASSESSMENT — PAIN SCALES - GENERAL
PAINLEVEL_OUTOF10: 0 - NO PAIN
PAINLEVEL_OUTOF10: 2
PAINLEVEL_OUTOF10: 6
PAINLEVEL_OUTOF10: 7
PAINLEVEL_OUTOF10: 3
PAINLEVEL_OUTOF10: 0 - NO PAIN
PAINLEVEL_OUTOF10: 2

## 2024-08-18 ASSESSMENT — PAIN - FUNCTIONAL ASSESSMENT
PAIN_FUNCTIONAL_ASSESSMENT: 0-10

## 2024-08-18 ASSESSMENT — PAIN DESCRIPTION - LOCATION: LOCATION: ABDOMEN

## 2024-08-18 NOTE — CARE PLAN
Problem: Pain - Adult  Goal: Verbalizes/displays adequate comfort level or baseline comfort level  Outcome: Progressing     Problem: Safety - Adult  Goal: Free from fall injury  Outcome: Progressing   The patient's goals for the shift include      The clinical goals for the shift include safety and comfort

## 2024-08-19 PROCEDURE — 99232 SBSQ HOSP IP/OBS MODERATE 35: CPT | Performed by: STUDENT IN AN ORGANIZED HEALTH CARE EDUCATION/TRAINING PROGRAM

## 2024-08-19 PROCEDURE — 2500000004 HC RX 250 GENERAL PHARMACY W/ HCPCS (ALT 636 FOR OP/ED): Performed by: NURSE PRACTITIONER

## 2024-08-19 PROCEDURE — 2500000001 HC RX 250 WO HCPCS SELF ADMINISTERED DRUGS (ALT 637 FOR MEDICARE OP)

## 2024-08-19 PROCEDURE — 97530 THERAPEUTIC ACTIVITIES: CPT | Mod: GO

## 2024-08-19 PROCEDURE — 2500000004 HC RX 250 GENERAL PHARMACY W/ HCPCS (ALT 636 FOR OP/ED)

## 2024-08-19 PROCEDURE — 1100000001 HC PRIVATE ROOM DAILY

## 2024-08-19 PROCEDURE — 97116 GAIT TRAINING THERAPY: CPT | Mod: CQ,GP

## 2024-08-19 PROCEDURE — 97535 SELF CARE MNGMENT TRAINING: CPT | Mod: CQ,GP

## 2024-08-19 ASSESSMENT — COGNITIVE AND FUNCTIONAL STATUS - GENERAL
STANDING UP FROM CHAIR USING ARMS: A LITTLE
TURNING FROM BACK TO SIDE WHILE IN FLAT BAD: A LITTLE
MOVING FROM LYING ON BACK TO SITTING ON SIDE OF FLAT BED WITH BEDRAILS: A LITTLE
TOILETING: A LITTLE
PERSONAL GROOMING: A LITTLE
DRESSING REGULAR UPPER BODY CLOTHING: A LITTLE
DRESSING REGULAR LOWER BODY CLOTHING: A LOT
MOBILITY SCORE: 17
WALKING IN HOSPITAL ROOM: A LITTLE
CLIMB 3 TO 5 STEPS WITH RAILING: A LOT
HELP NEEDED FOR BATHING: A LOT
MOVING TO AND FROM BED TO CHAIR: A LITTLE
DAILY ACTIVITIY SCORE: 17

## 2024-08-19 ASSESSMENT — PAIN SCALES - GENERAL
PAINLEVEL_OUTOF10: 10 - WORST POSSIBLE PAIN
PAINLEVEL_OUTOF10: 5 - MODERATE PAIN
PAINLEVEL_OUTOF10: 4

## 2024-08-19 ASSESSMENT — PAIN - FUNCTIONAL ASSESSMENT: PAIN_FUNCTIONAL_ASSESSMENT: 0-10

## 2024-08-19 ASSESSMENT — PAIN SCALES - PAIN ASSESSMENT IN ADVANCED DEMENTIA (PAINAD): TOTALSCORE: MEDICATION (SEE MAR)

## 2024-08-19 NOTE — PROGRESS NOTES
Infectious disease progress note  Subjective   Abdominoplasty with wound infection (group B strep) he    Antibiotics  Invanz day 4/14    Objective   Range of Vitals (last 24 hours)  Heart Rate:  []   Temp:  [35.9 °C (96.6 °F)-36.4 °C (97.5 °F)]   Resp:  [18]   BP: (122-138)/()   SpO2:  [96 %-98 %]   Daily Weight  08/09/24 : 72.2 kg (159 lb 2.8 oz)    Body mass index is 27.31 kg/m².      Physical Exam  NAD  Regular rate and rhythm  CTAB  Wound VAC noted over patient's abdomen, draining appropriately  No edema or cyanosis is noted over bilateral lower extremities  Appropriate blood      Relevant Results  Labs  Lab Results   Component Value Date    WBC 12.5 (H) 08/17/2024    HGB 7.9 (L) 08/17/2024    HCT 24.4 (L) 08/17/2024    MCV 95 08/17/2024     08/17/2024     Lab Results   Component Value Date    GLUCOSE 124 (H) 08/17/2024    CALCIUM 7.9 (L) 08/17/2024     08/17/2024    K 4.2 08/17/2024    CO2 20 (L) 08/17/2024     (H) 08/17/2024    BUN 20 08/17/2024    CREATININE 1.28 (H) 08/17/2024   ESR: --  Lab Results   Component Value Date    SEDRATE 86 (H) 08/08/2024     Lab Results   Component Value Date    CRP 20.46 (H) 08/08/2024     Lab Results   Component Value Date    ALT 18 08/09/2024    AST 25 08/09/2024    ALKPHOS 136 08/09/2024    BILITOT 0.7 08/09/2024       Microbiology  8-8-2024 wound culture group B strep  8-8-2024 blood culture no growth at 4 days    Imaging  8-8-2024 CT abdomen pelvis scattered subcutaneous air/gas in the deep subcutaneous tissue along the abdomen.  Surgical drains were in good.  Position  Assessment/Plan   1.  Invanz day 4 out of 14, surgery planning for repeat debridement on Wednesday    Other issues  RUSSELL  Hyperlipidemia  GERD  I reviewed and interpreted all lab test imaging studies and documentations from other healthcare providers  I am monitoring for antibiotic side effects and toxicity     This is a preliminary note written by the resident. Please wait for  attending addendum for finalization of note and recommendations.    Antonio Edwards DO, PGY-2  Internal Medicine

## 2024-08-19 NOTE — PROGRESS NOTES
"Mariann Drew is a 68 y.o. female on day 11 of admission presenting with Postoperative wound cellulitis.    Subjective   No events overnight. Plans for closure on Wednesday continue abx      Objective     Physical Exam  Constitutional:       Appearance: Normal appearance. Tardive dyskinesia   HENT:      Head: Normocephalic and atraumatic.      Nose: Nose normal.      Mouth/Throat:      Mouth: Mucous membranes are moist.   Eyes:      Conjunctiva/sclera: Conjunctivae normal.   Cardiovascular:      Rate and Rhythm: Normal rate and regular rhythm.      Heart sounds: Normal heart sounds.   Pulmonary:      Effort: Pulmonary effort is normal.      Breath sounds: Normal breath sounds.   Abdominal:      General: Abdomen is flat. Bowel sounds are normal. There is distension.      Tenderness: There is no abdominal tenderness.   Musculoskeletal:         General: Normal range of motion.      Cervical back: Neck supple.   Skin:     Comments: Abdomen with binder in place, transverse incision with  erythematous dehiscence in the middle, packed with purulent soaked Kerlix. The rest of the incision is well approximated.  BL flank BÁRBARA drain sites with edema and erythema draining moderate amount of purulent drainage   Neurological:      Mental Status: She is alert. Mental status is at baseline.   Psychiatric:         Mood and Affect: Mood normal.     Last Recorded Vitals  Blood pressure (!) 138/116, pulse 104, temperature 36.3 °C (97.3 °F), temperature source Temporal, resp. rate 18, height 1.626 m (5' 4.02\"), weight 72.2 kg (159 lb 2.8 oz), SpO2 98%.  Intake/Output last 3 Shifts:  I/O last 3 completed shifts:  In: 2516.7 (34.9 mL/kg) [I.V.:2516.7 (34.9 mL/kg)]  Out: 1350 (18.7 mL/kg) [Drains:1350]  Weight: 72.2 kg     Relevant Results                             Assessment/Plan   Principal Problem:    Postoperative wound cellulitis  Active Problems:    Cellulitis, abdominal wall    Disruption of wound, unspecified, initial encounter   "  Surgical site infection    Postoperative wound cellulitis  Wound dehiscence  RUSSELL  Hyponatremia  Hypochloremia     Admit to surgical floor  Appreciate plastics recs  Continue merrem and vanco  CBC, CMP in am  Wound care, see orders  Drain care  Wound and blood cultures pending  Normal saline at 100 cc an hour     Chronic conditions: Gout, tardive dyskinesia, GERD, anemia, status post gastric bypass, hyperlipidemia     Continue home medications as listed above     DVT prophylaxis     SCDs  Heparin     8/9: some drainage noted, contineu abx, monitor labs, plastics consulted     8/10: Wound culture growing group B strep.  Await final cultures.  Continue empiric antibiotics.  Await thoracic surgery input.    8/11: Wound culture final: Growing group B strep.  Continue empiric antibiotics.  Await surgery input.  Creatinine improved from 1.3 down to 1.0.    8/12: I&D later today doing well. Spoke with plastics    8/13: doing well, wound vac in place consult ID to help manage abx and wound care    8/14: poor vasculature, midline in placed, will stop fluid and give abx only to see if its improves    8/15: faileld midlien, will need 14 days invanz, will order tunnelled picck ecf placement    8/16: 1/13 fro invanz, PT/OT wound care, will need ecf    8/17: day 2 invanz, need 14 day todal    8/18: Continue current antibiotics.  Anticipate return to the OR on Wednesday for additional debridement and closure with plastic surgery.    8/19: dc fludis, closure on Wednesday continue abx       I spent 55 minutes in the professional and overall care of this patient.      Jarocho Venegas,

## 2024-08-19 NOTE — PROGRESS NOTES
Occupational Therapy    OT Treatment    Patient Name: Mariann Drew  MRN: [id number]  Today's Date: 8/19/2024  Time Calculation  Start Time: 1103  Stop Time: 1130  Time Calculation (min): 27 min        Plan:  Treatment Interventions: Patient/family training, Endurance training, UE strengthening/ROM, Functional transfer training, ADL retraining, Compensatory technique education  OT Frequency: 3 times per week  OT Discharge Recommendations: High intensity level of continued care  OT - OK to Discharge: Yes  Treatment Interventions: Patient/family training, Endurance training, UE strengthening/ROM, Functional transfer training, ADL retraining, Compensatory technique education    Subjective   Previous Visit Info:     General:  General  Prior to Session Communication: Bedside nurse  Patient Position Received: Up in chair  General Comment: pt agreeable to therapy and family present at start of treatment session.  Precautions:  Precautions Comment: wound vac and iv.  Vital Signs:     Pain:  Pain Assessment  Pain Assessment: 0-10  0-10 (Numeric) Pain Score: 5 - Moderate pain (abdominal pain)    Objective    Cognition:  Cognition  Processing Speed:  (slower for cognitive task table top activity.  delayed processing speed)  Coordination:     Activities of Daily Living:      Functional Standing Tolerance:  Activity: pt tolerated 3 trials of standing up to table surface for cognitive table task to address, attention to task, detail, and command following,  pt reproted noticing feeling foggy brained and required mod cues to catch errors in task.  Bed Mobility/Transfers:      Transfers  Transfer:  (sit to stand up to walker with cga)    Functional Mobility:     Sitting Balance:  Dynamic Sitting Balance  Dynamic Sitting-Level of Assistance:  (lt dyn standing tolerance ranged from 1.5 minutes to 3.5)    Outcome Measures:[facility] Daily Activity  Putting on and taking off regular lower body clothing: A lot  Bathing (including washing,  rinsing, drying): A lot  Putting on and taking off regular upper body clothing: A little  Toileting, which includes using toilet, bedpan or urinal: A little  Taking care of personal grooming such as brushing teeth: A little  Eating Meals: None  Daily Activity - Total Score: 17        Education Documentation  Handouts, taught by Anita Silverman OT at 8/19/2024 12:29 PM.  Learner: Patient  Readiness: Acceptance  Method: Explanation  Response: Verbalizes Understanding, Needs Reinforcement    Body Mechanics, taught by Anita Silverman OT at 8/19/2024 12:29 PM.  Learner: Patient  Readiness: Acceptance  Method: Explanation  Response: Verbalizes Understanding, Needs Reinforcement    Precautions, taught by Anita Silverman OT at 8/19/2024 12:29 PM.  Learner: Patient  Readiness: Acceptance  Method: Explanation  Response: Verbalizes Understanding, Needs Reinforcement    Home Exercise Program, taught by Anita Silverman OT at 8/19/2024 12:29 PM.  Learner: Patient  Readiness: Acceptance  Method: Explanation  Response: Verbalizes Understanding, Needs Reinforcement    ADL Training, taught by Anita Silverman OT at 8/19/2024 12:29 PM.  Learner: Patient  Readiness: Acceptance  Method: Explanation  Response: Verbalizes Understanding, Needs Reinforcement    Education Comments  No comments found.        OP EDUCATION:       Goals:  Encounter Problems       Encounter Problems (Active)       Bathing       STG - Patient will bathe body with cga after set up for sponge bathing.  (Progressing)       Start:  08/16/24    Expected End:  08/30/24               Dressing Upper Extremities       STG - Patient will dress upper body independently after set up  (Progressing)       Start:  08/16/24    Expected End:  08/30/24               Dressings Lower Extremities       STG - Patient will complete lower body dressing with use of adaptive equipmtent as needed with sba.  (Progressing)       Start:  08/16/24    Expected End:  08/30/24                Functional Mobility       sTG - Patient will ambulate household distance for adl retrieval with lrad with supervision.  (Progressing)       Start:  08/16/24    Expected End:  08/30/24               Grooming       STG - Patient will gather items for grooming standing sink level with supervision (Progressing)       Start:  08/16/24    Expected End:  08/30/24               OT Transfers       STG - Patient will perform toilet transfer with sba. (Progressing)       Start:  08/16/24    Expected End:  08/30/24               Toileting       STG - Patient will complete toileting tasks with sba  (Progressing)       Start:  08/16/24    Expected End:  08/30/24

## 2024-08-19 NOTE — CARE PLAN
Problem: Pain - Adult  Goal: Verbalizes/displays adequate comfort level or baseline comfort level  Outcome: Progressing     Problem: Safety - Adult  Goal: Free from fall injury  Outcome: Progressing   The patient's goals for the shift include      The clinical goals for the shift include Pt will remain safe and comfortable during the shift

## 2024-08-19 NOTE — PROGRESS NOTES
"Nutrition Follow Up Assessment:     Nutrition History:  Energy Intake: Poor < 50 %, Fair 50-75 %  Food and Nutrient History: Pt sitting in chair in room.  Pt reports doing okay with meals, not a big eater anyway.  Does not like the orange Roderick, but willing to try fruit punch Roderick. Pt to return to OR 8/21 for additional debridement of abdomen wound and closure.  Will continue with Regular diet as ordered.  Last Bm 8/18  Vitamin/Herbal Supplement Use: FeSO4, MVI  Food Allergies/Intolerances:  None  GI Symptoms: None  Oral Problems: None       Anthropometrics:  Height: 162.6 cm (5' 4.02\")   Weight: 72.2 kg (159 lb 2.8 oz)   BMI (Calculated): 27.31  IBW/kg (Dietitian Calculated): 54.5 kg  Percent of IBW: 132 %       Weight History:     Weight Change %:   No new weight to assess     Nutrition Focused Physical Exam Findings:  defer: completed on 8/9/24  Subcutaneous Fat Loss:      Muscle Wasting:     Edema:  Edema: +3 moderate  Edema Location: 3+ BLE; NP BUE  Physical Findings:  Skin: Positive (abdomen with wound vac)    Nutrition Significant Labs:  BMP Trend:   Results from last 7 days   Lab Units 08/17/24  0944 08/16/24  0710 08/15/24  0657 08/14/24  1130   GLUCOSE mg/dL 124* 102* 107* 124*   CALCIUM mg/dL 7.9* 8.1* 7.7* 8.0*   SODIUM mmol/L 138 137 135* 136   POTASSIUM mmol/L 4.2 4.4 4.5 4.2   CO2 mmol/L 20* 22 17* 20*   CHLORIDE mmol/L 110* 109* 112* 109*   BUN mg/dL 20 21 21 20   CREATININE mg/dL 1.28* 1.38* 1.44* 1.51*    , BG POCT trend:        Nutrition Specific Medications:  Reviewed     I/O:   Last BM Date: 08/18/24; Stool Appearance: Unable to assess (08/16/24 0900)    Dietary Orders (From admission, onward)       Start     Ordered    08/15/24 1852  Adult diet Regular  Diet effective now        Question:  Diet type  Answer:  Regular    08/15/24 1852    08/09/24 1054  Oral nutritional supplements  Until discontinued        Comments: orange   Question Answer Comment   Deliver with Dinner    Deliver with Lunch  "   Select supplement: Roderick        08/09/24 1053    08/08/24 2210  May Participate in Room Service  Once        Question:  .  Answer:  Yes    08/08/24 2211                     Estimated Needs:      Method for Estimating Needs: 1470-1635kcals (27-30kcals/kg iBW)     Method for Estimating Needs: 65-82g (1.2-1.5g/kg IBW)     Method for Estimating Needs: 1 mL/kcal or as per MD        Nutrition Diagnosis   Malnutrition Diagnosis  Patient has Malnutrition Diagnosis: No    Nutrition Diagnosis  Patient has Nutrition Diagnosis: Yes  Diagnosis Status (1): Ongoing  Nutrition Diagnosis 1: Increased nutrient needs  Related to (1): physiological causes increasing nutrient needs  As Evidenced by (1): wound healing needs  Additional Nutrition Diagnosis: Diagnosis 2  Diagnosis Status (2): Ongoing  Nutrition Diagnosis 2: Inadequate oral intake  Related to (2): acute illness, recent surgery  As Evidenced by (2): 25-33% of meals  Additional Assessment Information (2): PO intakes are variable, however not a big eater since bariatric surgery       Nutrition Interventions/Recommendations         Nutrition Prescription:  Individualized Nutrition Prescription Provided for : Regular diet as ordered with Roderick twice daily        Nutrition Interventions:   Interventions: Meals and snacks, Medical food supplement  Meals and Snacks: General healthful diet  Goal: consume >50->75% of meals--partially met/ongoing  Medical Food Supplement: Commercial beverage  Goal: consume >75% of Roderick BID (for an additional 90 kcals, 2.5 gm protein, supplement glutamine and arginine)    Collaboration and Referral of Nutrition Care:  (spoke with pt)    Nutrition Education:   N/A       Nutrition Monitoring and Evaluation   Food/Nutrient Related History Monitoring  Monitoring and Evaluation Plan: Energy intake, Fluid intake, Amount of food  Energy Intake: Estimated energy intake  Criteria: Meal/ONS intake to meet >75% of estimated needs  Fluid Intake: Estimated fluid  intake  Criteria: fluid intake to meet >75% of estimated need  Amount of Food: Estimated amout of food, Medical food intake  Criteria: Pt to consume >75% of meals/ONS    Body Composition/Growth/Weight History  Weight: Measured weight  Criteria: reweigh at least every 5 days    Biochemical Data, Medical Tests and Procedures  Monitoring and Evaluation Plan: Electrolyte/renal panel  Criteria: labs WNL    Nutrition Focused Physical Findings  Monitoring and Evaluation Plan: Skin  Criteria: promote healing through adequate nutrition         Time Spent (min): 30 minutes

## 2024-08-19 NOTE — PROGRESS NOTES
Physical Therapy    Physical Therapy Treatment    Patient Name: Mariann Drew  MRN: [id number]  Today's Date: 8/19/2024  Time Calculation  Start Time: 1001  Stop Time: 1031  Time Calculation (min): 30 min    Assessment/Plan         PT Plan  Treatment/Interventions: Bed mobility, Transfer training, Gait training, Balance training, Strengthening, Endurance training, Therapeutic exercise, Therapeutic activity, Home exercise program, Positioning  PT Plan: Ongoing PT  PT Frequency: 4 times per week  PT Discharge Recommendations: High intensity level of continued care  PT - OK to Discharge: Yes (once medically cleared)      General Visit Information:   PT  Visit  PT Received On: 08/19/24       Subjective             Objective                             Treatments:  Therapeutic Exercise  Therapeutic Exercise Performed:  (ble arom exs x 15 reps)    Bed Mobility  Bed Mobility:  (supine to sit min a x 1)    Ambulation/Gait Training  Ambulation/Gait Training Performed:  (ambulated 20 feet using fixed wheeled walker cga   wound vac/IV)  Transfers  Transfer:  (sit to stand cga)    Outcome Measures:  [facility] Basic Mobility  Turning from your back to your side while in a flat bed without using bedrails: A little  Moving from lying on your back to sitting on the side of a flat bed without using bedrails: A little  Moving to and from bed to chair (including a wheelchair): A little  Standing up from a chair using your arms (e.g. wheelchair or bedside chair): A little  To walk in hospital room: A little  Climbing 3-5 steps with railing: A lot  Basic Mobility - Total Score: 17    Education Documentation  Mobility Training, taught by Grace Diaz, PTA at 8/19/2024 12:11 PM.  Learner: Patient  Readiness: Acceptance  Method: Demonstration  Response: Demonstrated Understanding    Education Comments  No comments found.               Encounter Problems       Encounter Problems (Active)       PT Problem       PT Goal 1 STG - Pt will  transition supine <> sitting with supervision  (Progressing)       Start:  08/16/24    Expected End:  08/30/24            PT Goal 2 STG - Pt will transfer STS with LRAD and supervision  (Progressing)       Start:  08/16/24    Expected End:  08/30/24            PT Goal 3 STG - Pt will amb 100' using LRAD with supervision  (Progressing)       Start:  08/16/24    Expected End:  08/30/24            PT Goal 4 STG -  Pt will navigate 2 stairs using 1 HR with SBA  (Progressing)       Start:  08/16/24    Expected End:  08/30/24               Pain - Adult

## 2024-08-19 NOTE — PROGRESS NOTES
"Mariann Drew is a 68 y.o. female on day 10 of admission presenting with Postoperative wound cellulitis.    Subjective   No events overnight.  Patient seen and examined at bedside with  present.  She has no complaints.  No questions from either.      Objective     Physical Exam  Constitutional:       Appearance: Normal appearance. Tardive dyskinesia   HENT:      Head: Normocephalic and atraumatic.      Nose: Nose normal.      Mouth/Throat:      Mouth: Mucous membranes are moist.   Eyes:      Conjunctiva/sclera: Conjunctivae normal.   Cardiovascular:      Rate and Rhythm: Normal rate and regular rhythm.      Heart sounds: Normal heart sounds.   Pulmonary:      Effort: Pulmonary effort is normal.      Breath sounds: Normal breath sounds.   Abdominal:      General: Abdomen is flat. Bowel sounds are normal. There is distension.      Tenderness: There is no abdominal tenderness.   Musculoskeletal:         General: Normal range of motion.      Cervical back: Neck supple.   Skin:     Comments: Abdomen with binder in place, transverse incision with  erythematous dehiscence in the middle, packed with purulent soaked Kerlix. The rest of the incision is well approximated.  BL flank BÁRBARA drain sites with edema and erythema draining moderate amount of purulent drainage   Neurological:      Mental Status: She is alert. Mental status is at baseline.   Psychiatric:         Mood and Affect: Mood normal.     Last Recorded Vitals  Blood pressure 122/64, pulse 98, temperature 35.9 °C (96.6 °F), temperature source Temporal, resp. rate 18, height 1.626 m (5' 4.02\"), weight 72.2 kg (159 lb 2.8 oz), SpO2 96%.  Intake/Output last 3 Shifts:  I/O last 3 completed shifts:  In: 2516.7 (34.9 mL/kg) [I.V.:2516.7 (34.9 mL/kg)]  Out: 900 (12.5 mL/kg) [Drains:900]  Weight: 72.2 kg     Relevant Results                             Assessment/Plan   Principal Problem:    Postoperative wound cellulitis  Active Problems:    Cellulitis, abdominal " wall    Disruption of wound, unspecified, initial encounter    Surgical site infection    Postoperative wound cellulitis  Wound dehiscence  RUSSELL  Hyponatremia  Hypochloremia     Admit to surgical floor  Appreciate plastics recs  Continue merrem and vanco  CBC, CMP in am  Wound care, see orders  Drain care  Wound and blood cultures pending  Normal saline at 100 cc an hour     Chronic conditions: Gout, tardive dyskinesia, GERD, anemia, status post gastric bypass, hyperlipidemia     Continue home medications as listed above     DVT prophylaxis     SCDs  Heparin     8/9: some drainage noted, contineu abx, monitor labs, plastics consulted     8/10: Wound culture growing group B strep.  Await final cultures.  Continue empiric antibiotics.  Await thoracic surgery input.    8/11: Wound culture final: Growing group B strep.  Continue empiric antibiotics.  Await surgery input.  Creatinine improved from 1.3 down to 1.0.    8/12: I&D later today doing well. Spoke with plastics    8/13: doing well, wound vac in place consult ID to help manage abx and wound care    8/14: poor vasculature, midline in placed, will stop fluid and give abx only to see if its improves    8/15: faileld midlien, will need 14 days invanz, will order tunnelled picck ecf placement    8/16: 1/13 fro invanz, PT/OT wound care, will need ecf    8/17: day 2 invanz, need 14 day todal    8/18: Continue current antibiotics.  Anticipate return to the OR on Wednesday for additional debridement and closure with plastic surgery.         I spent 55 minutes in the professional and overall care of this patient.      Vladimir Long,

## 2024-08-20 PROCEDURE — 97116 GAIT TRAINING THERAPY: CPT | Mod: GP,CQ

## 2024-08-20 PROCEDURE — 2500000001 HC RX 250 WO HCPCS SELF ADMINISTERED DRUGS (ALT 637 FOR MEDICARE OP)

## 2024-08-20 PROCEDURE — 2500000004 HC RX 250 GENERAL PHARMACY W/ HCPCS (ALT 636 FOR OP/ED): Performed by: NURSE PRACTITIONER

## 2024-08-20 PROCEDURE — 2500000001 HC RX 250 WO HCPCS SELF ADMINISTERED DRUGS (ALT 637 FOR MEDICARE OP): Performed by: STUDENT IN AN ORGANIZED HEALTH CARE EDUCATION/TRAINING PROGRAM

## 2024-08-20 PROCEDURE — 1100000001 HC PRIVATE ROOM DAILY

## 2024-08-20 PROCEDURE — 99232 SBSQ HOSP IP/OBS MODERATE 35: CPT | Performed by: STUDENT IN AN ORGANIZED HEALTH CARE EDUCATION/TRAINING PROGRAM

## 2024-08-20 PROCEDURE — 2500000004 HC RX 250 GENERAL PHARMACY W/ HCPCS (ALT 636 FOR OP/ED)

## 2024-08-20 ASSESSMENT — COGNITIVE AND FUNCTIONAL STATUS - GENERAL
CLIMB 3 TO 5 STEPS WITH RAILING: A LOT
DAILY ACTIVITIY SCORE: 19
MOVING TO AND FROM BED TO CHAIR: A LITTLE
DAILY ACTIVITIY SCORE: 19
WALKING IN HOSPITAL ROOM: A LITTLE
STANDING UP FROM CHAIR USING ARMS: A LITTLE
MOVING FROM LYING ON BACK TO SITTING ON SIDE OF FLAT BED WITH BEDRAILS: A LITTLE
STANDING UP FROM CHAIR USING ARMS: A LITTLE
PERSONAL GROOMING: A LITTLE
MOVING TO AND FROM BED TO CHAIR: A LITTLE
MOBILITY SCORE: 16
MOVING TO AND FROM BED TO CHAIR: A LITTLE
PERSONAL GROOMING: A LITTLE
TURNING FROM BACK TO SIDE WHILE IN FLAT BAD: A LITTLE
DRESSING REGULAR LOWER BODY CLOTHING: A LITTLE
HELP NEEDED FOR BATHING: A LITTLE
MOBILITY SCORE: 17
DRESSING REGULAR UPPER BODY CLOTHING: A LITTLE
HELP NEEDED FOR BATHING: A LITTLE
MOBILITY SCORE: 17
CLIMB 3 TO 5 STEPS WITH RAILING: A LOT
TURNING FROM BACK TO SIDE WHILE IN FLAT BAD: A LITTLE
TOILETING: A LITTLE
DRESSING REGULAR LOWER BODY CLOTHING: A LITTLE
MOVING FROM LYING ON BACK TO SITTING ON SIDE OF FLAT BED WITH BEDRAILS: A LITTLE
WALKING IN HOSPITAL ROOM: A LOT
STANDING UP FROM CHAIR USING ARMS: A LITTLE
TURNING FROM BACK TO SIDE WHILE IN FLAT BAD: A LITTLE
MOVING FROM LYING ON BACK TO SITTING ON SIDE OF FLAT BED WITH BEDRAILS: A LITTLE
CLIMB 3 TO 5 STEPS WITH RAILING: A LOT
TOILETING: A LITTLE
WALKING IN HOSPITAL ROOM: A LITTLE
DRESSING REGULAR UPPER BODY CLOTHING: A LITTLE

## 2024-08-20 ASSESSMENT — PAIN - FUNCTIONAL ASSESSMENT
PAIN_FUNCTIONAL_ASSESSMENT: 0-10

## 2024-08-20 ASSESSMENT — PAIN DESCRIPTION - ORIENTATION: ORIENTATION: RIGHT;LEFT

## 2024-08-20 ASSESSMENT — PAIN SCALES - GENERAL
PAINLEVEL_OUTOF10: 0 - NO PAIN
PAINLEVEL_OUTOF10: 7
PAINLEVEL_OUTOF10: 4
PAINLEVEL_OUTOF10: 4
PAINLEVEL_OUTOF10: 0 - NO PAIN
PAINLEVEL_OUTOF10: 3
PAINLEVEL_OUTOF10: 6

## 2024-08-20 ASSESSMENT — PAIN DESCRIPTION - LOCATION: LOCATION: ABDOMEN

## 2024-08-20 NOTE — PROGRESS NOTES
Physical Therapy    Physical Therapy Treatment    Patient Name: Mariann Drew  MRN: [id number]  Today's Date: 8/20/2024  Time Calculation  Start Time: 0845  Stop Time: 0900  Time Calculation (min): 15 min  719-A    Assessment/Plan   PT Assessment  End of Session Patient Position: Up in chair, Alarm on     PT Plan  Treatment/Interventions: Bed mobility, Transfer training, Gait training, Balance training, Strengthening, Endurance training, Therapeutic exercise, Therapeutic activity, Home exercise program, Positioning  PT Plan: Ongoing PT  PT Frequency: 4 times per week  PT Discharge Recommendations: High intensity level of continued care  PT - OK to Discharge: Yes (once medically cleared)      General Visit Information:   PT  Visit  PT Received On: 08/20/24  General  Prior to Session Communication: Bedside nurse  Patient Position Received: Bed, 3 rail up, Alarm off, not on at start of session  General Comment:  (pt pleasant and agreeable to participate in therapy session)    Subjective   Precautions:  Precautions  Medical Precautions: Fall precautions  Precautions Comment:  (wound vac abdomen)       Objective   Pain:  Pain Assessment  Pain Assessment: 0-10  0-10 (Numeric) Pain Score: 6  Pain Location: Abdomen     Treatments:  Therapeutic Exercise  Therapeutic Exercise Performed:  (reviewed seated BLE ther ex including AP, LAQ, marching, and hip AB/AD)    Bed Mobility  Bed Mobility:  (sup > sit with min A x 1 though somewhat effortful with increased time needed to complete)    Ambulation/Gait Training  Ambulation/Gait Training Performed:  (pt able to perform brief static stance followed by ambulating >/=30 ft with FWW and CGA.  limited by pain.)    Transfers  Transfer:  (sit <> stand with FWW and CGA.  cuing for safe hand placement and sequencing.)    Outcome Measures:  [facility] Basic Mobility  Turning from your back to your side while in a flat bed without using bedrails: A little  Moving from lying on your back to  sitting on the side of a flat bed without using bedrails: A little  Moving to and from bed to chair (including a wheelchair): A little  Standing up from a chair using your arms (e.g. wheelchair or bedside chair): A little  To walk in hospital room: A little  Climbing 3-5 steps with railing: A lot  Basic Mobility - Total Score: 17    Education Documentation  Home Exercise Program, taught by Sara Vilchis PTA at 8/20/2024  3:34 PM.  Learner: Patient  Readiness: Acceptance  Method: Explanation  Response: Verbalizes Understanding    Mobility Training, taught by Sara Vilchis PTA at 8/20/2024  3:34 PM.  Learner: Patient  Readiness: Acceptance  Method: Explanation  Response: Verbalizes Understanding    Education Comments  No comments found.        EDUCATION:       Encounter Problems       Encounter Problems (Active)       PT Problem       PT Goal 1 STG - Pt will transition supine <> sitting with supervision  (Progressing)       Start:  08/16/24    Expected End:  08/30/24            PT Goal 2 STG - Pt will transfer STS with LRAD and supervision  (Progressing)       Start:  08/16/24    Expected End:  08/30/24            PT Goal 3 STG - Pt will amb 100' using LRAD with supervision  (Progressing)       Start:  08/16/24    Expected End:  08/30/24            PT Goal 4 STG -  Pt will navigate 2 stairs using 1 HR with SBA  (Progressing)       Start:  08/16/24    Expected End:  08/30/24

## 2024-08-20 NOTE — PROGRESS NOTES
"Mariann Drew is a 68 y.o. female on day 12 of admission presenting with Postoperative wound cellulitis.    Subjective   No events overnight. Plans for closure on Wednesday continue abx      Objective     Physical Exam  Constitutional:       Appearance: Normal appearance. Tardive dyskinesia   HENT:      Head: Normocephalic and atraumatic.      Nose: Nose normal.      Mouth/Throat:      Mouth: Mucous membranes are moist.   Eyes:      Conjunctiva/sclera: Conjunctivae normal.   Cardiovascular:      Rate and Rhythm: Normal rate and regular rhythm.      Heart sounds: Normal heart sounds.   Pulmonary:      Effort: Pulmonary effort is normal.      Breath sounds: Normal breath sounds.   Abdominal:      General: Abdomen is flat. Bowel sounds are normal. There is distension.      Tenderness: There is no abdominal tenderness.   Musculoskeletal:         General: Normal range of motion.      Cervical back: Neck supple.   Skin:     Comments: Abdomen with binder in place, transverse incision with  erythematous dehiscence in the middle, packed with purulent soaked Kerlix. The rest of the incision is well approximated.  BL flank BÁRBARA drain sites with edema and erythema draining moderate amount of purulent drainage   Neurological:      Mental Status: She is alert. Mental status is at baseline.   Psychiatric:         Mood and Affect: Mood normal.     Last Recorded Vitals  Blood pressure 122/74, pulse 105, temperature 35.9 °C (96.6 °F), temperature source Temporal, resp. rate 18, height 1.626 m (5' 4.02\"), weight 72.2 kg (159 lb 2.8 oz), SpO2 100%.  Intake/Output last 3 Shifts:  I/O last 3 completed shifts:  In: - (0 mL/kg)   Out: 950 (13.2 mL/kg) [Drains:950]  Weight: 72.2 kg     Relevant Results                             Assessment/Plan   Principal Problem:    Postoperative wound cellulitis  Active Problems:    Cellulitis, abdominal wall    Disruption of wound, unspecified, initial encounter    Surgical site " infection    Postoperative wound cellulitis  Wound dehiscence  RUSSELL  Hyponatremia  Hypochloremia     Admit to surgical floor  Appreciate plastics recs  Continue merrem and vanco  CBC, CMP in am  Wound care, see orders  Drain care  Wound and blood cultures pending  Normal saline at 100 cc an hour     Chronic conditions: Gout, tardive dyskinesia, GERD, anemia, status post gastric bypass, hyperlipidemia     Continue home medications as listed above     DVT prophylaxis     SCDs  Heparin     8/9: some drainage noted, contineu abx, monitor labs, plastics consulted     8/10: Wound culture growing group B strep.  Await final cultures.  Continue empiric antibiotics.  Await thoracic surgery input.    8/11: Wound culture final: Growing group B strep.  Continue empiric antibiotics.  Await surgery input.  Creatinine improved from 1.3 down to 1.0.    8/12: I&D later today doing well. Spoke with plastics    8/13: doing well, wound vac in place consult ID to help manage abx and wound care    8/14: poor vasculature, midline in placed, will stop fluid and give abx only to see if its improves    8/15: faileld midlien, will need 14 days invanz, will order tunnelled picck ecf placement    8/16: 1/13 fro invanz, PT/OT wound care, will need ecf    8/17: day 2 invanz, need 14 day todal    8/18: Continue current antibiotics.  Anticipate return to the OR on Wednesday for additional debridement and closure with plastic surgery.    8/19: ed miller, closure on Wednesday continue abx    8.20: no issues NPO after midnight for closure       I spent 55 minutes in the professional and overall care of this patient.      Jarocho Venegas,

## 2024-08-20 NOTE — CARE PLAN
Problem: Pain - Adult  Goal: Verbalizes/displays adequate comfort level or baseline comfort level  Outcome: Progressing     Problem: Safety - Adult  Goal: Free from fall injury  Outcome: Progressing     Problem: Discharge Planning  Goal: Discharge to home or other facility with appropriate resources  Outcome: Progressing     Problem: Chronic Conditions and Co-morbidities  Goal: Patient's chronic conditions and co-morbidity symptoms are monitored and maintained or improved  Outcome: Progressing     Problem: Skin  Goal: Decreased wound size/increased tissue granulation at next dressing change  Outcome: Progressing  Goal: Participates in plan/prevention/treatment measures  Outcome: Progressing  Goal: Prevent/manage excess moisture  Outcome: Progressing  Goal: Prevent/minimize sheer/friction injuries  Outcome: Progressing  Goal: Promote/optimize nutrition  Outcome: Progressing  Goal: Promote skin healing  Outcome: Progressing     Problem: Fall/Injury  Goal: Not fall by end of shift  Outcome: Progressing  Goal: Be free from injury by end of the shift  Outcome: Progressing  Goal: Verbalize understanding of personal risk factors for fall in the hospital  Outcome: Progressing  Goal: Verbalize understanding of risk factor reduction measures to prevent injury from fall in the home  Outcome: Progressing  Goal: Use assistive devices by end of the shift  Outcome: Progressing  Goal: Pace activities to prevent fatigue by end of the shift  Outcome: Progressing     Problem: Diabetes  Goal: Achieve decreasing blood glucose levels by end of shift  Outcome: Progressing  Goal: Increase stability of blood glucose readings by end of shift  Outcome: Progressing  Goal: Decrease in ketones present in urine by end of shift  Outcome: Progressing  Goal: Maintain electrolyte levels within acceptable range throughout shift  Outcome: Progressing  Goal: Maintain glucose levels >70mg/dl to <250mg/dl throughout shift  Outcome: Progressing  Goal: No  changes in neurological exam by end of shift  Outcome: Progressing  Goal: Learn about and adhere to nutrition recommendations by end of shift  Outcome: Progressing  Goal: Vital signs within normal range for age by end of shift  Outcome: Progressing  Goal: Increase self care and/or family involovement by end of shift  Outcome: Progressing  Goal: Receive DSME education by end of shift  Outcome: Progressing     Problem: Pain  Goal: Takes deep breaths with improved pain control throughout the shift  Outcome: Progressing  Goal: Turns in bed with improved pain control throughout the shift  Outcome: Progressing  Goal: Walks with improved pain control throughout the shift  Outcome: Progressing  Goal: Performs ADL's with improved pain control throughout shift  Outcome: Progressing  Goal: Participates in PT with improved pain control throughout the shift  Outcome: Progressing  Goal: Free from opioid side effects throughout the shift  Outcome: Progressing  Goal: Free from acute confusion related to pain meds throughout the shift  Outcome: Progressing     Problem: Bathing  Goal: STG - Patient will bathe body with cga after set up for sponge bathing.   Outcome: Progressing     Problem: Dressings Lower Extremities  Goal: STG - Patient will complete lower body dressing with use of adaptive equipmtent as needed with sba.   Outcome: Progressing     Problem: Dressing Upper Extremities  Goal: STG - Patient will dress upper body independently after set up   Outcome: Progressing     Problem: Grooming  Goal: STG - Patient will gather items for grooming standing sink level with supervision  Outcome: Progressing     Problem: Toileting  Goal: STG - Patient will complete toileting tasks with sba   Outcome: Progressing

## 2024-08-21 ENCOUNTER — HOSPITAL ENCOUNTER (OUTPATIENT)
Facility: HOSPITAL | Age: 69
Setting detail: OUTPATIENT SURGERY
End: 2024-08-21
Attending: SURGERY | Admitting: SURGERY
Payer: MEDICARE

## 2024-08-21 ENCOUNTER — ANESTHESIA (OUTPATIENT)
Dept: OPERATING ROOM | Facility: HOSPITAL | Age: 69
End: 2024-08-21
Payer: MEDICARE

## 2024-08-21 ENCOUNTER — ANESTHESIA EVENT (OUTPATIENT)
Dept: OPERATING ROOM | Facility: HOSPITAL | Age: 69
End: 2024-08-21
Payer: MEDICARE

## 2024-08-21 ENCOUNTER — PREP FOR PROCEDURE (OUTPATIENT)
Dept: OCCUPATIONAL MEDICINE | Facility: HOSPITAL | Age: 69
End: 2024-08-21

## 2024-08-21 ENCOUNTER — SPECIALTY PHARMACY (OUTPATIENT)
Dept: PHARMACY | Facility: CLINIC | Age: 69
End: 2024-08-21

## 2024-08-21 DIAGNOSIS — T81.30XA DISRUPTION OF WOUND, UNSPECIFIED, INITIAL ENCOUNTER: ICD-10-CM

## 2024-08-21 DIAGNOSIS — T81.31XS DISRUPTION OF EXTERNAL SURGICAL WOUND, SEQUELA: Primary | ICD-10-CM

## 2024-08-21 PROBLEM — T81.31XA DISRUPTION OF EXTERNAL SURGICAL WOUND: Status: ACTIVE | Noted: 2024-08-21

## 2024-08-21 LAB
ABO GROUP (TYPE) IN BLOOD: NORMAL
ABO GROUP (TYPE) IN BLOOD: NORMAL
ANTIBODY SCREEN: NORMAL
RH FACTOR (ANTIGEN D): NORMAL
RH FACTOR (ANTIGEN D): NORMAL

## 2024-08-21 PROCEDURE — 2720000007 HC OR 272 NO HCPCS: Performed by: SURGERY

## 2024-08-21 PROCEDURE — 2500000004 HC RX 250 GENERAL PHARMACY W/ HCPCS (ALT 636 FOR OP/ED): Performed by: STUDENT IN AN ORGANIZED HEALTH CARE EDUCATION/TRAINING PROGRAM

## 2024-08-21 PROCEDURE — 3600000007 HC OR TIME - EACH INCREMENTAL 1 MINUTE - PROCEDURE LEVEL TWO: Performed by: SURGERY

## 2024-08-21 PROCEDURE — 2500000001 HC RX 250 WO HCPCS SELF ADMINISTERED DRUGS (ALT 637 FOR MEDICARE OP): Performed by: STUDENT IN AN ORGANIZED HEALTH CARE EDUCATION/TRAINING PROGRAM

## 2024-08-21 PROCEDURE — 7100000002 HC RECOVERY ROOM TIME - EACH INCREMENTAL 1 MINUTE: Performed by: SURGERY

## 2024-08-21 PROCEDURE — 1100000001 HC PRIVATE ROOM DAILY

## 2024-08-21 PROCEDURE — 86901 BLOOD TYPING SEROLOGIC RH(D): CPT | Performed by: SURGERY

## 2024-08-21 PROCEDURE — 3600000002 HC OR TIME - INITIAL BASE CHARGE - PROCEDURE LEVEL TWO: Performed by: SURGERY

## 2024-08-21 PROCEDURE — 99232 SBSQ HOSP IP/OBS MODERATE 35: CPT | Performed by: STUDENT IN AN ORGANIZED HEALTH CARE EDUCATION/TRAINING PROGRAM

## 2024-08-21 PROCEDURE — 3700000001 HC GENERAL ANESTHESIA TIME - INITIAL BASE CHARGE: Performed by: SURGERY

## 2024-08-21 PROCEDURE — 97606 NEG PRS WND THER DME>50 SQCM: CPT | Performed by: SURGERY

## 2024-08-21 PROCEDURE — 7100000001 HC RECOVERY ROOM TIME - INITIAL BASE CHARGE: Performed by: SURGERY

## 2024-08-21 PROCEDURE — 2500000004 HC RX 250 GENERAL PHARMACY W/ HCPCS (ALT 636 FOR OP/ED): Performed by: NURSE PRACTITIONER

## 2024-08-21 PROCEDURE — 2500000004 HC RX 250 GENERAL PHARMACY W/ HCPCS (ALT 636 FOR OP/ED): Performed by: ANESTHESIOLOGY

## 2024-08-21 PROCEDURE — 11046 DBRDMT MUSC&/FSCA EA ADDL: CPT | Performed by: SURGERY

## 2024-08-21 PROCEDURE — 11043 DBRDMT MUSC&/FSCA 1ST 20/<: CPT | Performed by: SURGERY

## 2024-08-21 PROCEDURE — 2500000004 HC RX 250 GENERAL PHARMACY W/ HCPCS (ALT 636 FOR OP/ED): Performed by: SURGERY

## 2024-08-21 PROCEDURE — A6213 FOAM DRG >16<=48 SQ IN W/BDR: HCPCS | Performed by: SURGERY

## 2024-08-21 PROCEDURE — 2500000004 HC RX 250 GENERAL PHARMACY W/ HCPCS (ALT 636 FOR OP/ED)

## 2024-08-21 PROCEDURE — 2500000005 HC RX 250 GENERAL PHARMACY W/O HCPCS: Performed by: SURGERY

## 2024-08-21 PROCEDURE — RXMED WILLOW AMBULATORY MEDICATION CHARGE

## 2024-08-21 PROCEDURE — 0KBK0ZZ EXCISION OF RIGHT ABDOMEN MUSCLE, OPEN APPROACH: ICD-10-PCS | Performed by: SURGERY

## 2024-08-21 PROCEDURE — 3700000002 HC GENERAL ANESTHESIA TIME - EACH INCREMENTAL 1 MINUTE: Performed by: SURGERY

## 2024-08-21 PROCEDURE — 87205 SMEAR GRAM STAIN: CPT | Mod: PARLAB | Performed by: SURGERY

## 2024-08-21 PROCEDURE — 0KBL0ZZ EXCISION OF LEFT ABDOMEN MUSCLE, OPEN APPROACH: ICD-10-PCS | Performed by: SURGERY

## 2024-08-21 RX ORDER — SODIUM CHLORIDE, SODIUM LACTATE, POTASSIUM CHLORIDE, CALCIUM CHLORIDE 600; 310; 30; 20 MG/100ML; MG/100ML; MG/100ML; MG/100ML
20 INJECTION, SOLUTION INTRAVENOUS CONTINUOUS
Status: DISCONTINUED | OUTPATIENT
Start: 2024-08-21 | End: 2024-08-23 | Stop reason: HOSPADM

## 2024-08-21 RX ORDER — SODIUM CHLORIDE, SODIUM LACTATE, POTASSIUM CHLORIDE, CALCIUM CHLORIDE 600; 310; 30; 20 MG/100ML; MG/100ML; MG/100ML; MG/100ML
20 INJECTION, SOLUTION INTRAVENOUS CONTINUOUS
Status: CANCELLED | OUTPATIENT
Start: 2024-08-21

## 2024-08-21 RX ORDER — VANCOMYCIN HYDROCHLORIDE 1 G/200ML
INJECTION, SOLUTION INTRAVENOUS
Status: DISPENSED
Start: 2024-08-21 | End: 2024-08-21

## 2024-08-21 RX ORDER — FENTANYL CITRATE 50 UG/ML
INJECTION, SOLUTION INTRAMUSCULAR; INTRAVENOUS AS NEEDED
Status: DISCONTINUED | OUTPATIENT
Start: 2024-08-21 | End: 2024-08-21

## 2024-08-21 RX ORDER — ONDANSETRON HYDROCHLORIDE 2 MG/ML
INJECTION, SOLUTION INTRAVENOUS AS NEEDED
Status: DISCONTINUED | OUTPATIENT
Start: 2024-08-21 | End: 2024-08-21

## 2024-08-21 RX ORDER — HYDROMORPHONE HYDROCHLORIDE 1 MG/ML
1 INJECTION, SOLUTION INTRAMUSCULAR; INTRAVENOUS; SUBCUTANEOUS EVERY 5 MIN PRN
Status: DISCONTINUED | OUTPATIENT
Start: 2024-08-21 | End: 2024-08-21 | Stop reason: HOSPADM

## 2024-08-21 RX ORDER — NORETHINDRONE AND ETHINYL ESTRADIOL 0.5-0.035
KIT ORAL CONTINUOUS PRN
Status: DISCONTINUED | OUTPATIENT
Start: 2024-08-21 | End: 2024-08-21

## 2024-08-21 RX ORDER — MIDAZOLAM HYDROCHLORIDE 1 MG/ML
1 INJECTION, SOLUTION INTRAMUSCULAR; INTRAVENOUS ONCE AS NEEDED
Status: DISCONTINUED | OUTPATIENT
Start: 2024-08-21 | End: 2024-08-21 | Stop reason: HOSPADM

## 2024-08-21 RX ORDER — PROPOFOL 10 MG/ML
INJECTION, EMULSION INTRAVENOUS AS NEEDED
Status: DISCONTINUED | OUTPATIENT
Start: 2024-08-21 | End: 2024-08-21

## 2024-08-21 RX ORDER — SODIUM CHLORIDE 0.9 G/100ML
IRRIGANT IRRIGATION AS NEEDED
Status: DISCONTINUED | OUTPATIENT
Start: 2024-08-21 | End: 2024-08-21 | Stop reason: HOSPADM

## 2024-08-21 RX ORDER — MEPERIDINE HYDROCHLORIDE 25 MG/ML
12.5 INJECTION INTRAMUSCULAR; INTRAVENOUS; SUBCUTANEOUS EVERY 10 MIN PRN
Status: DISCONTINUED | OUTPATIENT
Start: 2024-08-21 | End: 2024-08-21 | Stop reason: HOSPADM

## 2024-08-21 RX ORDER — HYDRALAZINE HYDROCHLORIDE 20 MG/ML
5 INJECTION INTRAMUSCULAR; INTRAVENOUS EVERY 30 MIN PRN
Status: DISCONTINUED | OUTPATIENT
Start: 2024-08-21 | End: 2024-08-21 | Stop reason: HOSPADM

## 2024-08-21 RX ORDER — SODIUM CHLORIDE, SODIUM LACTATE, POTASSIUM CHLORIDE, CALCIUM CHLORIDE 600; 310; 30; 20 MG/100ML; MG/100ML; MG/100ML; MG/100ML
100 INJECTION, SOLUTION INTRAVENOUS CONTINUOUS
Status: DISCONTINUED | OUTPATIENT
Start: 2024-08-21 | End: 2024-08-21 | Stop reason: HOSPADM

## 2024-08-21 RX ORDER — MIDAZOLAM HYDROCHLORIDE 1 MG/ML
INJECTION, SOLUTION INTRAMUSCULAR; INTRAVENOUS AS NEEDED
Status: DISCONTINUED | OUTPATIENT
Start: 2024-08-21 | End: 2024-08-21

## 2024-08-21 RX ORDER — LABETALOL HYDROCHLORIDE 5 MG/ML
5 INJECTION, SOLUTION INTRAVENOUS ONCE AS NEEDED
Status: DISCONTINUED | OUTPATIENT
Start: 2024-08-21 | End: 2024-08-21 | Stop reason: HOSPADM

## 2024-08-21 RX ORDER — DIPHENHYDRAMINE HYDROCHLORIDE 50 MG/ML
12.5 INJECTION INTRAMUSCULAR; INTRAVENOUS ONCE AS NEEDED
Status: DISCONTINUED | OUTPATIENT
Start: 2024-08-21 | End: 2024-08-21 | Stop reason: HOSPADM

## 2024-08-21 RX ORDER — PHENYLEPHRINE HCL IN 0.9% NACL 1 MG/10 ML
SYRINGE (ML) INTRAVENOUS AS NEEDED
Status: DISCONTINUED | OUTPATIENT
Start: 2024-08-21 | End: 2024-08-21

## 2024-08-21 RX ORDER — ACETAMINOPHEN 325 MG/1
650 TABLET ORAL EVERY 4 HOURS PRN
Status: DISCONTINUED | OUTPATIENT
Start: 2024-08-21 | End: 2024-08-21 | Stop reason: HOSPADM

## 2024-08-21 RX ORDER — ONDANSETRON HYDROCHLORIDE 2 MG/ML
4 INJECTION, SOLUTION INTRAVENOUS ONCE AS NEEDED
Status: DISCONTINUED | OUTPATIENT
Start: 2024-08-21 | End: 2024-08-21 | Stop reason: HOSPADM

## 2024-08-21 SDOH — HEALTH STABILITY: MENTAL HEALTH: CURRENT SMOKER: 0

## 2024-08-21 ASSESSMENT — COGNITIVE AND FUNCTIONAL STATUS - GENERAL
WALKING IN HOSPITAL ROOM: A LITTLE
STANDING UP FROM CHAIR USING ARMS: A LITTLE
TOILETING: A LITTLE
TURNING FROM BACK TO SIDE WHILE IN FLAT BAD: A LITTLE
CLIMB 3 TO 5 STEPS WITH RAILING: A LOT
DRESSING REGULAR LOWER BODY CLOTHING: A LITTLE
PERSONAL GROOMING: A LITTLE
DAILY ACTIVITIY SCORE: 19
DRESSING REGULAR UPPER BODY CLOTHING: A LITTLE
MOBILITY SCORE: 17
MOVING FROM LYING ON BACK TO SITTING ON SIDE OF FLAT BED WITH BEDRAILS: A LITTLE
MOVING TO AND FROM BED TO CHAIR: A LITTLE
HELP NEEDED FOR BATHING: A LITTLE

## 2024-08-21 ASSESSMENT — PAIN SCALES - GENERAL
PAINLEVEL_OUTOF10: 3
PAIN_LEVEL: 1
PAINLEVEL_OUTOF10: 7
PAINLEVEL_OUTOF10: 1
PAINLEVEL_OUTOF10: 5 - MODERATE PAIN
PAINLEVEL_OUTOF10: 0 - NO PAIN
PAINLEVEL_OUTOF10: 5 - MODERATE PAIN
PAINLEVEL_OUTOF10: 0 - NO PAIN
PAINLEVEL_OUTOF10: 5 - MODERATE PAIN
PAINLEVEL_OUTOF10: 0 - NO PAIN
PAINLEVEL_OUTOF10: 0 - NO PAIN

## 2024-08-21 ASSESSMENT — PAIN - FUNCTIONAL ASSESSMENT
PAIN_FUNCTIONAL_ASSESSMENT: 0-10

## 2024-08-21 ASSESSMENT — PAIN DESCRIPTION - DESCRIPTORS: DESCRIPTORS: BURNING;DISCOMFORT

## 2024-08-21 ASSESSMENT — PAIN DESCRIPTION - ORIENTATION
ORIENTATION: MID;LOWER
ORIENTATION: MID

## 2024-08-21 ASSESSMENT — PAIN DESCRIPTION - LOCATION
LOCATION: ABDOMEN
LOCATION: ABDOMEN

## 2024-08-21 NOTE — ANESTHESIA PREPROCEDURE EVALUATION
Patient: Mariann Drew    Procedure Information       Date/Time: 08/21/24 8630    Procedures:       ABDOMEN DEBRIDEMENT WITH      WOUND VAC APPLICATION - Application Vacuum-Assisted Drainage Device Lower Extremity    Location: [facility] / [facility]    Surgeons: Anibal Tiwari MD            Relevant Problems   Anesthesia (within normal limits)      Cardiac   (+) Essential hypertension   (+) Hyperlipidemia      Pulmonary   (+) ANKUSH (obstructive sleep apnea)   (+) Shortness of breath on exertion      Neuro   (+) Anxiety   (+) Bilateral occipital neuralgia   (+) Cervical radiculopathy   (+) Current mild episode of major depressive disorder (CMS-HCC)   (+) Depression, major, in remission (CMS-HCC)   (+) Other bipolar disorder (Multi)      GI   (+) Acute ulcer of stomach   (+) GI bleed   (+) Gastroesophageal reflux disease without esophagitis      /Renal   (+) Recurrent UTI      Liver   (+) Fatty liver   (+) Hepatitis C   (+) Liver cirrhosis secondary to HAZEL (nonalcoholic steatohepatitis) (Multi)      Endocrine   (+) Localized adiposity   (+) Type 2 diabetes mellitus without complication, without long-term current use of insulin (Multi)      Hematology   (+) Anemia      Musculoskeletal   (+) Cervical myofascial pain syndrome   (+) Cervical spondylosis with radiculopathy   (+) Chronic neck pain   (+) Knee osteoarthritis   (+) Myofascial pain syndrome, cervical   (+) Primary osteoarthritis involving multiple joints   (+) Rheumatoid arthritis, involving unspecified site, unspecified whether rheumatoid factor present (Multi)      HEENT   (+) Mixed hearing loss   (+) Vision loss      ID   (+) Hepatitis C   (+) Recurrent UTI   (+) Surgical site infection      GYN   (+) Breast cancer (Multi)       Clinical information reviewed:   Tobacco  Allergies  Meds  Problems  Med Hx  Surg Hx   Fam Hx  Soc   Hx        NPO Detail:  NPO/Void Status  Date of Last Liquid: 08/20/24  Time of Last Liquid: 2200  Date of Last Solid:  08/20/24  Time of Last Solid: 1900         Physical Exam    Airway  Mallampati: II  TM distance: >3 FB  Neck ROM: full     Cardiovascular - normal exam  Rhythm: regular  Rate: normal     Dental - normal exam     Pulmonary - normal exam     Abdominal            Anesthesia Plan    History of general anesthesia?: yes  History of complications of general anesthesia?: no    ASA 3     general     The patient is not a current smoker.  Education provided regarding risk of obstructive sleep apnea.  intravenous induction   Postoperative administration of opioids is intended.  Trial extubation is planned.  Anesthetic plan and risks discussed with patient.  Use of blood products discussed with patient who consented to blood products.    Plan discussed with CRNA.

## 2024-08-21 NOTE — CARE PLAN
The patient's goals for the shift include      The clinical goals for the shift include Pt. will achieve pain control with PRN pain meds    Problem: Pain - Adult  Goal: Verbalizes/displays adequate comfort level or baseline comfort level  Outcome: Progressing     Problem: Safety - Adult  Goal: Free from fall injury  Outcome: Progressing     Problem: Discharge Planning  Goal: Discharge to home or other facility with appropriate resources  Outcome: Progressing     Problem: Chronic Conditions and Co-morbidities  Goal: Patient's chronic conditions and co-morbidity symptoms are monitored and maintained or improved  Outcome: Progressing     Problem: Skin  Goal: Decreased wound size/increased tissue granulation at next dressing change  Outcome: Progressing  Goal: Participates in plan/prevention/treatment measures  Outcome: Progressing  Goal: Prevent/manage excess moisture  Outcome: Progressing  Goal: Prevent/minimize sheer/friction injuries  Outcome: Progressing  Goal: Promote/optimize nutrition  Outcome: Progressing  Goal: Promote skin healing  Outcome: Progressing     Problem: Fall/Injury  Goal: Not fall by end of shift  Outcome: Progressing  Goal: Be free from injury by end of the shift  Outcome: Progressing  Goal: Verbalize understanding of personal risk factors for fall in the hospital  Outcome: Progressing  Goal: Verbalize understanding of risk factor reduction measures to prevent injury from fall in the home  Outcome: Progressing  Goal: Use assistive devices by end of the shift  Outcome: Progressing  Goal: Pace activities to prevent fatigue by end of the shift  Outcome: Progressing     Problem: Diabetes  Goal: Achieve decreasing blood glucose levels by end of shift  Outcome: Progressing  Goal: Increase stability of blood glucose readings by end of shift  Outcome: Progressing  Goal: Decrease in ketones present in urine by end of shift  Outcome: Progressing  Goal: Maintain electrolyte levels within acceptable range  throughout shift  Outcome: Progressing  Goal: Maintain glucose levels >70mg/dl to <250mg/dl throughout shift  Outcome: Progressing  Goal: No changes in neurological exam by end of shift  Outcome: Progressing  Goal: Learn about and adhere to nutrition recommendations by end of shift  Outcome: Progressing  Goal: Vital signs within normal range for age by end of shift  Outcome: Progressing  Goal: Increase self care and/or family involovement by end of shift  Outcome: Progressing  Goal: Receive DSME education by end of shift  Outcome: Progressing     Problem: Pain  Goal: Takes deep breaths with improved pain control throughout the shift  Outcome: Progressing  Goal: Turns in bed with improved pain control throughout the shift  Outcome: Progressing  Goal: Walks with improved pain control throughout the shift  Outcome: Progressing  Goal: Performs ADL's with improved pain control throughout shift  Outcome: Progressing  Goal: Participates in PT with improved pain control throughout the shift  Outcome: Progressing  Goal: Free from opioid side effects throughout the shift  Outcome: Progressing  Goal: Free from acute confusion related to pain meds throughout the shift  Outcome: Progressing     Problem: Bathing  Goal: STG - Patient will bathe body with cga after set up for sponge bathing.   Outcome: Progressing     Problem: Dressings Lower Extremities  Goal: STG - Patient will complete lower body dressing with use of adaptive equipmtent as needed with sba.   Outcome: Progressing     Problem: Dressing Upper Extremities  Goal: STG - Patient will dress upper body independently after set up   Outcome: Progressing     Problem: Grooming  Goal: STG - Patient will gather items for grooming standing sink level with supervision  Outcome: Progressing     Problem: Toileting  Goal: STG - Patient will complete toileting tasks with sba   Outcome: Progressing

## 2024-08-21 NOTE — PROGRESS NOTES
Infectious disease progress note  Subjective   Abdominoplasty with wound infection (group B strep) he    Antibiotics  Invanz day 6/14    Objective   Range of Vitals (last 24 hours)  Heart Rate:  []   Temp:  [35 °C (95 °F)-36.6 °C (97.9 °F)]   Resp:  [16-18]   BP: (109-133)/(54-89)   SpO2:  [94 %-100 %]   Daily Weight  08/09/24 : 72.2 kg (159 lb 2.8 oz)    Body mass index is 27.31 kg/m².      Physical Exam  NAD  Regular rate and rhythm  CTAB  Wound VAC noted over patient's abdomen, draining appropriately  No edema or cyanosis is noted over bilateral lower extremities  Appropriate mood/behavior      Relevant Results  Labs  Lab Results   Component Value Date    WBC 12.5 (H) 08/17/2024    HGB 7.9 (L) 08/17/2024    HCT 24.4 (L) 08/17/2024    MCV 95 08/17/2024     08/17/2024     Lab Results   Component Value Date    GLUCOSE 124 (H) 08/17/2024    CALCIUM 7.9 (L) 08/17/2024     08/17/2024    K 4.2 08/17/2024    CO2 20 (L) 08/17/2024     (H) 08/17/2024    BUN 20 08/17/2024    CREATININE 1.28 (H) 08/17/2024   ESR: --  Lab Results   Component Value Date    SEDRATE 86 (H) 08/08/2024     Lab Results   Component Value Date    CRP 20.46 (H) 08/08/2024     Lab Results   Component Value Date    ALT 18 08/09/2024    AST 25 08/09/2024    ALKPHOS 136 08/09/2024    BILITOT 0.7 08/09/2024       Microbiology  8-8-2024 wound culture group B strep  8-8-2024 blood culture no growth at 4 days    Imaging  8-8-2024 CT abdomen pelvis scattered subcutaneous air/gas in the deep subcutaneous tissue along the abdomen.  Surgical drains were in good.  Position  Assessment/Plan   1.  Invanz day 6 out of 14, patient underwent debridement today, tolerated the procedure well, resting comfortably now  2.  Patient will likely need 2-4 more weeks of Invanz  3.  Follow-up with her surgeon    Other issues  RUSSELL  Hyperlipidemia  GERD  I reviewed and interpreted all lab test imaging studies and documentations from other healthcare  providers  I am monitoring for antibiotic side effects and toxicity     This is a preliminary note written by the resident. Please wait for attending addendum for finalization of note and recommendations.    Antonio Edwards DO, PGY-2  Internal Medicine

## 2024-08-21 NOTE — ANESTHESIA POSTPROCEDURE EVALUATION
Patient: Mariann Drew    Procedure Summary       Date: 08/21/24 Room / Location: [facility]    Anesthesia Start: 0728 Anesthesia Stop:     Procedures:       ABDOMEN DEBRIDEMENT WITH (Abdomen)      WOUND VAC APPLICATION (Abdomen) Diagnosis:       Surgical site infection      (Surgical site infection [T81.49XA])    Surgeons: Anibal Tiwari MD Responsible Provider: Marty George MD    Anesthesia Type: general ASA Status: 3            Anesthesia Type: general    Vitals Value Taken Time   /59 08/21/24 0853   Temp 36.5 08/21/24 0855   Pulse 101 08/21/24 0853   Resp 16 08/21/24 0855   SpO2 100 % 08/21/24 0853   Vitals shown include unfiled device data.    Anesthesia Post Evaluation    Patient location during evaluation: PACU  Patient participation: complete - patient participated  Level of consciousness: awake  Pain score: 1  Pain management: adequate  Airway patency: patent  Cardiovascular status: acceptable, blood pressure returned to baseline and hemodynamically stable  Respiratory status: acceptable and face mask  Hydration status: acceptable  Postoperative Nausea and Vomiting: none        There were no known notable events for this encounter.

## 2024-08-21 NOTE — CARE PLAN
Problem: Pain - Adult  Goal: Verbalizes/displays adequate comfort level or baseline comfort level  Outcome: Progressing     Problem: Safety - Adult  Goal: Free from fall injury  Outcome: Progressing       Problem: Discharge Planning  Goal: Discharge to home or other facility with appropriate resources  Outcome: Progressing     Problem: Chronic Conditions and Co-morbidities  Goal: Patient's chronic conditions and co-morbidity symptoms are monitored and maintained or improved  Outcome: Progressing     Problem: Fall/Injury  Goal: Not fall by end of shift  Outcome: Progressing  Goal: Be free from injury by end of the shift  Outcome: Progressing  Goal: Verbalize understanding of personal risk factors for fall in the hospital  Outcome: Progressing  Goal: Verbalize understanding of risk factor reduction measures to prevent injury from fall in the home  Outcome: Progressing  Goal: Use assistive devices by end of the shift  Outcome: Progressing  Goal: Pace activities to prevent fatigue by end of the shift  Outcome: Progressing     Problem: Diabetes  Goal: Achieve decreasing blood glucose levels by end of shift  Outcome: Progressing  Goal: Increase stability of blood glucose readings by end of shift  Outcome: Progressing  Goal: Decrease in ketones present in urine by end of shift  Outcome: Progressing  Goal: Maintain electrolyte levels within acceptable range throughout shift  Outcome: Progressing  Goal: Maintain glucose levels >70mg/dl to <250mg/dl throughout shift  Outcome: Progressing  Goal: No changes in neurological exam by end of shift  Outcome: Progressing  Goal: Learn about and adhere to nutrition recommendations by end of shift  Outcome: Progressing  Goal: Vital signs within normal range for age by end of shift  Outcome: Progressing  Goal: Increase self care and/or family involovement by end of shift  Outcome: Progressing  Goal: Receive DSME education by end of shift  Outcome: Progressing     Problem: Pain  Goal:  Takes deep breaths with improved pain control throughout the shift  Outcome: Progressing  Goal: Turns in bed with improved pain control throughout the shift  Outcome: Progressing  Goal: Walks with improved pain control throughout the shift  Outcome: Progressing  Goal: Performs ADL's with improved pain control throughout shift  Outcome: Progressing  Goal: Participates in PT with improved pain control throughout the shift  Outcome: Progressing  Goal: Free from opioid side effects throughout the shift  Outcome: Progressing  Goal: Free from acute confusion related to pain meds throughout the shift  Outcome: Progressing     Problem: Bathing  Goal: STG - Patient will bathe body with cga after set up for sponge bathing.   Outcome: Progressing     Problem: Dressings Lower Extremities  Goal: STG - Patient will complete lower body dressing with use of adaptive equipmtent as needed with sba.   Outcome: Progressing     Problem: Dressing Upper Extremities  Goal: STG - Patient will dress upper body independently after set up   Outcome: Progressing     Problem: Grooming  Goal: STG - Patient will gather items for grooming standing sink level with supervision  Outcome: Progressing     Problem: Toileting  Goal: STG - Patient will complete toileting tasks with sba   Outcome: Progressing       The clinical goals for the shift include maintain comfort & safety throughout shift, pain management following abdominal debridement

## 2024-08-21 NOTE — PROGRESS NOTES
"Mariann Drew is a 68 y.o. female on day 13 of admission presenting with Postoperative wound cellulitis.    Subjective   No events overnight. Plans for closuretoday      Objective     Physical Exam  Constitutional:       Appearance: Normal appearance. Tardive dyskinesia   HENT:      Head: Normocephalic and atraumatic.      Nose: Nose normal.      Mouth/Throat:      Mouth: Mucous membranes are moist.   Eyes:      Conjunctiva/sclera: Conjunctivae normal.   Cardiovascular:      Rate and Rhythm: Normal rate and regular rhythm.      Heart sounds: Normal heart sounds.   Pulmonary:      Effort: Pulmonary effort is normal.      Breath sounds: Normal breath sounds.   Abdominal:      General: Abdomen is flat. Bowel sounds are normal. There is distension.      Tenderness: There is no abdominal tenderness.   Musculoskeletal:         General: Normal range of motion.      Cervical back: Neck supple.   Skin:     Comments: Abdomen with binder in place, transverse incision with  erythematous dehiscence in the middle, packed with purulent soaked Kerlix. The rest of the incision is well approximated.  BL flank BÁRBARA drain sites with edema and erythema draining moderate amount of purulent drainage   Neurological:      Mental Status: She is alert. Mental status is at baseline.   Psychiatric:         Mood and Affect: Mood normal.     Last Recorded Vitals  Blood pressure 133/74, pulse 93, temperature 36 °C (96.8 °F), resp. rate 18, height 1.626 m (5' 4.02\"), weight 72.2 kg (159 lb 2.8 oz), SpO2 99%.  Intake/Output last 3 Shifts:  I/O last 3 completed shifts:  In: - (0 mL/kg)   Out: 500 (6.9 mL/kg) [Drains:500]  Weight: 72.2 kg     Relevant Results                             Assessment/Plan   Principal Problem:    Postoperative wound cellulitis  Active Problems:    Cellulitis, abdominal wall    Disruption of wound, unspecified, initial encounter    Surgical site infection    Postoperative wound cellulitis  Wound " dehiscence  RUSSELL  Hyponatremia  Hypochloremia     Admit to surgical floor  Appreciate plastics recs  Continue merrem and vanco  CBC, CMP in am  Wound care, see orders  Drain care  Wound and blood cultures pending  Normal saline at 100 cc an hour     Chronic conditions: Gout, tardive dyskinesia, GERD, anemia, status post gastric bypass, hyperlipidemia     Continue home medications as listed above     DVT prophylaxis     SCDs  Heparin     8/9: some drainage noted, contineu abx, monitor labs, plastics consulted     8/10: Wound culture growing group B strep.  Await final cultures.  Continue empiric antibiotics.  Await thoracic surgery input.    8/11: Wound culture final: Growing group B strep.  Continue empiric antibiotics.  Await surgery input.  Creatinine improved from 1.3 down to 1.0.    8/12: I&D later today doing well. Spoke with plastics    8/13: doing well, wound vac in place consult ID to help manage abx and wound care    8/14: poor vasculature, midline in placed, will stop fluid and give abx only to see if its improves    8/15: faileld midlien, will need 14 days invanz, will order tunnelled picck ecf placement    8/16: 1/13 fro invanz, PT/OT wound care, will need ecf    8/17: day 2 invanz, need 14 day todal    8/18: Continue current antibiotics.  Anticipate return to the OR on Wednesday for additional debridement and closure with plastic surgery.    8/19: ed miller, closure on Wednesday continue abx    8.20: no issues NPO after midnight for closure    8/21: to OR today for closure       I spent 55 minutes in the professional and overall care of this patient.      Jarocho Venegas, DO

## 2024-08-21 NOTE — BRIEF OP NOTE
Date: 2024  OR Location: [facility]    Name: Mariann Drew, : 1955, Age: 68 y.o., MRN: [id number], Sex: female    Diagnosis  Pre-op Diagnosis      * Surgical site infection [T81.49XA] Post-op Diagnosis     * Surgical site infection [T81.49XA]     Procedures  ABDOMEN DEBRIDEMENT WITH  40940 - UT DEBRIDEMENT MUSCLE &/FASCIA 1ST 20 SQ CM/<    WOUND VAC APPLICATION  53230 - UT NEGATIVE PRESSURE WOUND THERAPY DME >50 SQ CM    UT NEGATIVE PRESSURE WOUND THERAPY DME >50 SQ CM [33484]  Surgeons      * Anibal Tiwari - Primary    Resident/Fellow/Other Assistant:  Surgeons and Role:  * No surgeons found with a matching role *    Procedure Summary  Anesthesia: General  ASA: III  Anesthesia Staff: Anesthesiologist: Marty George MD  CRNA: ARISTIDES Kumar-CRNA  SRNA: Shruthi Murphy RN  Estimated Blood Loss: 50 mL  Intra-op Medications: * Intraprocedure medication information is unavailable because the case start and end events have not been set *           Anesthesia Record               Intraprocedure I/O Totals          Intake    lactated Ringer's infusion 600.00 mL    vancomycin (Vancocin) 1,000 mg in dextrose 5% 250 mL .00 mL    Total Intake 850 mL       Output    Est. Blood Loss 50 mL    Total Output 50 mL       Net    Net Volume 800 mL          Specimen:   ID Type Source Tests Collected by Time   A : ABDOMINAL WOUND FOR C & S Swab ABSCESS TISSUE/WOUND CULTURE/SMEAR Anibal Tiwari MD 2024 0810        Staff:   Circulator: Meli Knutson Person: Lillian          Findings: Red/pink healthy tissue    Complications:  None; patient tolerated the procedure well.     Disposition: PACU - hemodynamically stable.  Condition: stable  Specimens Collected:   ID Type Source Tests Collected by Time   A : ABDOMINAL WOUND FOR C & S Swab ABSCESS TISSUE/WOUND CULTURE/SMEAR Anibal Tiwari MD 2024 0810     Attending Attestation: A qualified resident physician was not available.    Anibal Tiwari  Phone Number:  [phone]

## 2024-08-21 NOTE — OP NOTE
Plastic Surgery Operative Note       Date: 2024 - 2024  OR Location: [facility]    Name: Mariann Drew, : 1955, Age: 68 y.o., MRN: [id number], Sex: female    Diagnosis  Pre-op Diagnosis      * Surgical site infection [T81.49XA] Post-op Diagnosis     * Surgical site infection [T81.49XA]     Procedures    1.  Excisional debridement of abdomen 67 x 40 cm = 2680 cm²  2.  Partial complex closure of abdomen, right side laterally 16 cm, left side laterally 16 cm, total 32 cm  3.  Placement of negative pressure wound therapy, greater than 50 cm²  Surgeons      * Anibal Tiwari - Primary    Resident/Fellow/Other Assistant:  Surgeons and Role:  * No surgeons found with a matching role *    Procedure Summary  Anesthesia: General  ASA: III  Anesthesia Staff: Anesthesiologist: Marty George MD  CRNA: ARISTIDES Kumar-CRNA  SRNA: Shruthi Murphy RN  Estimated Blood Loss: 50mL  Intra-op Medications: * Intraprocedure medication information is unavailable because the case start and end events have not been set *           Anesthesia Record               Intraprocedure I/O Totals          Intake    vancomycin (Vancocin) 1,000 mg in dextrose 5% 250 mL .00 mL    Total Intake 250 mL       Output    Est. Blood Loss 50 mL    Total Output 50 mL       Net    Net Volume 200 mL          Specimen:   ID Type Source Tests Collected by Time   A : ABDOMINAL WOUND FOR C & S Swab ABSCESS TISSUE/WOUND CULTURE/SMEAR Anibal Tiwari MD 2024 0810        Staff:   Circulator: Meli Knutson Person: Lillian        Findings: Significantly improved appearance of wound bed and granulation tissue.    Indications: Mariann Drew is an 68 y.o. female who is having surgery for Surgical site infection [T81.49XA].     Patient is status post [id number] panniculectomy/abdominoplasty on 2024 with removal approximately 5858 g of skin and subcutaneous tissue.  She developed a postoperative infection which was drained at the clinic, she was  sent for IV antibiotics and has failed to improve.  She presents today for planned debridement and irrigating wound VAC.  She was taken back to the operative theater on August 12, 2024 for excisional debridement and VAC placement with irrigating wound VAC.  She has been maintained on the floor with irrigating wound VAC, Dakin solution for 5 days which was transitioned to normal saline.  She has a PICC line for group B strep and has been maintained on ertapenem.  She presents today for planned return to the OR for continued debridement and exploration, and possible attempted partial closure.      INFORMED CONSENT  Patient was told the risks, benefits, INDICATIONS, contraindications, and alternatives to the procedure. Risks include but not limited to pain, infection, bleeding, hematoma, seroma, injury to neurovascular and tendinous structures, continued wound VAC therapy, need for multiple procedures, and need for subsequent surgeries.      The patient demonstrated understanding of these risks and agreed to proceed with surgery.  Advance directives discussed.  Team approach explained.       The patient consented and wished to proceed with the procedure and for medical photography if needed.     PROCEDURAL PAUSE  Prior to the beginning of the procedure, the patient's correct identity, side, site, and procedure to be performed were verified.  The patient was given intravenous antibiotics prior to skin incision.     PROCEDURAL NOTE  Mariann was brought to the operative theater at which point she was transferred to the operating room table.  All bony prominences were well padded, and sequential compression devices were placed to each lower extremity. Patient was then secured with safety straps.  The patient was then placed under IV sedation, and our anesthesia colleagues administered general endotracheal anesthesia.    We remove the irrigating wound VAC inspected the wound and noted there to be exceptional improvement in the  quality of the tissue, there was no sign of emelia purulence there was some devitalized tissue throughout, we used Misonix ultrasonic excisional debridement to perform debridement of the entire exposed abdomen, we dissected cephalad in the epigastrium, and the previous areas of dissection.  With 15 blade scalpel, curettage and Misonix ultrasonic debrider we performed excisional debridement of skin, subcutaneous muscle and fascia total of 67 x 40 cm four 2680 cm².    We then performed unipolar cautery dissection cephalad in order to advance the upper abdominal tissue for complex closure of the lateralmost margins of the incisions.  We were able to advance 16 cm and performed complex closure in 3 layers with 0 PDS suture up to the deep dermis followed by a 2-0 Chromic Gut suture in the skin.    We then placed a VAC dressing, and placed a VAC in the undermining lateral columns, the total amount of undermining laterally was 16 cm, the total size of the wound VAC medially was 37 x 14 x 1 cm.    The patient tolerated the procedure well and was awakened from anesthesia without any difficulties, she was transferred to the PACU in stable condition, all needle counts were correct.     POST OP PLAN:  Mariann will remain inpatient per her primary team.  We will plan to transition her to home VAC therapy, she will require return to operating room in approximately 2 to 4 weeks for repeat attempted attempted closure.      Anibal Tiwari  Phone Number: [phone]

## 2024-08-21 NOTE — ANESTHESIA PROCEDURE NOTES
Airway  Date/Time: 8/21/2024 7:35 AM  Urgency: elective    Airway not difficult    Staffing  Performed: [facility]   Authorized by: Marty George MD    Performed by: Shruthi Murphy RN  Patient location during procedure: OR    Indications and Patient Condition  Indications for airway management: anesthesia and airway protection  Spontaneous ventilation: present  Sedation level: deep  Preoxygenated: yes  MILS maintained throughout  Mask difficulty assessment: 0 - not attempted  Planned trial extubation    Final Airway Details  Final airway type: supraglottic airway      Successful airway: Supraglottic airway: iGel.  Size 4     Number of attempts at approach: 1  Ventilation between attempts: none  Number of other approaches attempted: 0

## 2024-08-21 NOTE — CARE PLAN
Problem: Pain - Adult  Goal: Verbalizes/displays adequate comfort level or baseline comfort level  Outcome: Progressing     Problem: Safety - Adult  Goal: Free from fall injury  Outcome: Progressing     Problem: Discharge Planning  Goal: Discharge to home or other facility with appropriate resources  Outcome: Progressing     Problem: Chronic Conditions and Co-morbidities  Goal: Patient's chronic conditions and co-morbidity symptoms are monitored and maintained or improved  Outcome: Progressing     Problem: Skin  Goal: Decreased wound size/increased tissue granulation at next dressing change  Outcome: Progressing  Goal: Participates in plan/prevention/treatment measures  Outcome: Progressing  Goal: Prevent/manage excess moisture  Outcome: Progressing  Goal: Prevent/minimize sheer/friction injuries  Outcome: Progressing  Goal: Promote/optimize nutrition  Outcome: Progressing  Goal: Promote skin healing  Outcome: Progressing     Problem: Fall/Injury  Goal: Not fall by end of shift  Outcome: Progressing  Goal: Be free from injury by end of the shift  Outcome: Progressing  Goal: Verbalize understanding of personal risk factors for fall in the hospital  Outcome: Progressing  Goal: Verbalize understanding of risk factor reduction measures to prevent injury from fall in the home  Outcome: Progressing  Goal: Use assistive devices by end of the shift  Outcome: Progressing  Goal: Pace activities to prevent fatigue by end of the shift  Outcome: Progressing     Problem: Diabetes  Goal: Achieve decreasing blood glucose levels by end of shift  Outcome: Progressing  Goal: Increase stability of blood glucose readings by end of shift  Outcome: Progressing  Goal: Decrease in ketones present in urine by end of shift  Outcome: Progressing  Goal: Maintain electrolyte levels within acceptable range throughout shift  Outcome: Progressing  Goal: Maintain glucose levels >70mg/dl to <250mg/dl throughout shift  Outcome: Progressing  Goal: No  changes in neurological exam by end of shift  Outcome: Progressing  Goal: Learn about and adhere to nutrition recommendations by end of shift  Outcome: Progressing  Goal: Vital signs within normal range for age by end of shift  Outcome: Progressing  Goal: Increase self care and/or family involovement by end of shift  Outcome: Progressing  Goal: Receive DSME education by end of shift  Outcome: Progressing     Problem: Pain  Goal: Takes deep breaths with improved pain control throughout the shift  Outcome: Progressing  Goal: Turns in bed with improved pain control throughout the shift  Outcome: Progressing  Goal: Walks with improved pain control throughout the shift  Outcome: Progressing  Goal: Performs ADL's with improved pain control throughout shift  Outcome: Progressing  Goal: Participates in PT with improved pain control throughout the shift  Outcome: Progressing  Goal: Free from opioid side effects throughout the shift  Outcome: Progressing  Goal: Free from acute confusion related to pain meds throughout the shift  Outcome: Progressing     Problem: Bathing  Goal: STG - Patient will bathe body with cga after set up for sponge bathing.   Outcome: Progressing     Problem: Dressings Lower Extremities  Goal: STG - Patient will complete lower body dressing with use of adaptive equipmtent as needed with sba.   Outcome: Progressing     Problem: Dressing Upper Extremities  Goal: STG - Patient will dress upper body independently after set up   Outcome: Progressing     Problem: Grooming  Goal: STG - Patient will gather items for grooming standing sink level with supervision  Outcome: Progressing     Problem: Toileting  Goal: STG - Patient will complete toileting tasks with sba   Outcome: Progressing   The patient's goals for the shift include      The clinical goals for the shift include maintain comfort & safety throughout shift

## 2024-08-22 ENCOUNTER — HOME HEALTH ADMISSION (OUTPATIENT)
Dept: HOME HEALTH SERVICES | Facility: HOME HEALTH | Age: 69
End: 2024-08-22
Payer: MEDICARE

## 2024-08-22 LAB
ANION GAP SERPL CALC-SCNC: 11 MMOL/L (ref 10–20)
BUN SERPL-MCNC: 21 MG/DL (ref 6–23)
CALCIUM SERPL-MCNC: 8.2 MG/DL (ref 8.6–10.3)
CHLORIDE SERPL-SCNC: 109 MMOL/L (ref 98–107)
CO2 SERPL-SCNC: 20 MMOL/L (ref 21–32)
CREAT SERPL-MCNC: 1.65 MG/DL (ref 0.5–1.05)
EGFRCR SERPLBLD CKD-EPI 2021: 34 ML/MIN/1.73M*2
ERYTHROCYTE [DISTWIDTH] IN BLOOD BY AUTOMATED COUNT: 15.2 % (ref 11.5–14.5)
GLUCOSE SERPL-MCNC: 124 MG/DL (ref 74–99)
HCT VFR BLD AUTO: 23.9 % (ref 36–46)
HGB BLD-MCNC: 8 G/DL (ref 12–16)
MCH RBC QN AUTO: 31.5 PG (ref 26–34)
MCHC RBC AUTO-ENTMCNC: 33.5 G/DL (ref 32–36)
MCV RBC AUTO: 94 FL (ref 80–100)
NRBC BLD-RTO: 0 /100 WBCS (ref 0–0)
PLATELET # BLD AUTO: 370 X10*3/UL (ref 150–450)
POTASSIUM SERPL-SCNC: 4.7 MMOL/L (ref 3.5–5.3)
RBC # BLD AUTO: 2.54 X10*6/UL (ref 4–5.2)
SODIUM SERPL-SCNC: 135 MMOL/L (ref 136–145)
WBC # BLD AUTO: 14.9 X10*3/UL (ref 4.4–11.3)

## 2024-08-22 PROCEDURE — 2500000001 HC RX 250 WO HCPCS SELF ADMINISTERED DRUGS (ALT 637 FOR MEDICARE OP): Performed by: STUDENT IN AN ORGANIZED HEALTH CARE EDUCATION/TRAINING PROGRAM

## 2024-08-22 PROCEDURE — 2500000004 HC RX 250 GENERAL PHARMACY W/ HCPCS (ALT 636 FOR OP/ED): Performed by: STUDENT IN AN ORGANIZED HEALTH CARE EDUCATION/TRAINING PROGRAM

## 2024-08-22 PROCEDURE — 80048 BASIC METABOLIC PNL TOTAL CA: CPT | Performed by: STUDENT IN AN ORGANIZED HEALTH CARE EDUCATION/TRAINING PROGRAM

## 2024-08-22 PROCEDURE — 85027 COMPLETE CBC AUTOMATED: CPT | Performed by: STUDENT IN AN ORGANIZED HEALTH CARE EDUCATION/TRAINING PROGRAM

## 2024-08-22 PROCEDURE — 1100000001 HC PRIVATE ROOM DAILY

## 2024-08-22 PROCEDURE — 97535 SELF CARE MNGMENT TRAINING: CPT | Mod: CQ,GP

## 2024-08-22 PROCEDURE — 97535 SELF CARE MNGMENT TRAINING: CPT | Mod: CO,GO

## 2024-08-22 PROCEDURE — 99232 SBSQ HOSP IP/OBS MODERATE 35: CPT | Performed by: STUDENT IN AN ORGANIZED HEALTH CARE EDUCATION/TRAINING PROGRAM

## 2024-08-22 PROCEDURE — 97116 GAIT TRAINING THERAPY: CPT | Mod: CQ,GP

## 2024-08-22 RX ORDER — HYDROCODONE BITARTRATE AND ACETAMINOPHEN 5; 325 MG/1; MG/1
1 TABLET ORAL EVERY 6 HOURS PRN
Qty: 20 TABLET | Refills: 0 | Status: ON HOLD | OUTPATIENT
Start: 2024-08-22

## 2024-08-22 RX ORDER — ERTAPENEM 1 G/1
1 INJECTION, POWDER, LYOPHILIZED, FOR SOLUTION INTRAMUSCULAR; INTRAVENOUS DAILY
Qty: 28 G | Refills: 0 | Status: ON HOLD | OUTPATIENT
Start: 2024-08-22 | End: 2024-09-19

## 2024-08-22 ASSESSMENT — PAIN DESCRIPTION - LOCATION
LOCATION: ABDOMEN

## 2024-08-22 ASSESSMENT — COGNITIVE AND FUNCTIONAL STATUS - GENERAL
CLIMB 3 TO 5 STEPS WITH RAILING: TOTAL
MOVING FROM LYING ON BACK TO SITTING ON SIDE OF FLAT BED WITH BEDRAILS: A LOT
TOILETING: A LOT
MOVING TO AND FROM BED TO CHAIR: A LITTLE
MOBILITY SCORE: 14
DRESSING REGULAR LOWER BODY CLOTHING: A LOT
TURNING FROM BACK TO SIDE WHILE IN FLAT BAD: A LOT
DRESSING REGULAR UPPER BODY CLOTHING: A LITTLE
DAILY ACTIVITIY SCORE: 16
DRESSING REGULAR UPPER BODY CLOTHING: A LOT
HELP NEEDED FOR BATHING: A LOT
TOILETING: A LOT
HELP NEEDED FOR BATHING: A LOT
DAILY ACTIVITIY SCORE: 17
TURNING FROM BACK TO SIDE WHILE IN FLAT BAD: A LOT
STANDING UP FROM CHAIR USING ARMS: A LITTLE
MOVING TO AND FROM BED TO CHAIR: A LITTLE
CLIMB 3 TO 5 STEPS WITH RAILING: TOTAL
WALKING IN HOSPITAL ROOM: A LITTLE
MOBILITY SCORE: 14
MOVING FROM LYING ON BACK TO SITTING ON SIDE OF FLAT BED WITH BEDRAILS: A LOT
STANDING UP FROM CHAIR USING ARMS: A LITTLE
WALKING IN HOSPITAL ROOM: A LITTLE
DRESSING REGULAR LOWER BODY CLOTHING: A LOT

## 2024-08-22 ASSESSMENT — PAIN - FUNCTIONAL ASSESSMENT
PAIN_FUNCTIONAL_ASSESSMENT: 0-10
PAIN_FUNCTIONAL_ASSESSMENT: 0-10

## 2024-08-22 ASSESSMENT — PAIN SCALES - GENERAL
PAINLEVEL_OUTOF10: 7
PAINLEVEL_OUTOF10: 4
PAINLEVEL_OUTOF10: 7
PAINLEVEL_OUTOF10: 8
PAINLEVEL_OUTOF10: 4

## 2024-08-22 ASSESSMENT — PAIN DESCRIPTION - ORIENTATION
ORIENTATION: MID;LOWER
ORIENTATION: MID;LOWER

## 2024-08-22 ASSESSMENT — ACTIVITIES OF DAILY LIVING (ADL): HOME_MANAGEMENT_TIME_ENTRY: 15

## 2024-08-22 NOTE — PROGRESS NOTES
Physical Therapy    Physical Therapy Treatment    Patient Name: Mariann Drew  MRN: [id number]  Today's Date: 8/22/2024  Time Calculation  Start Time: 1301  Stop Time: 1331  Time Calculation (min): 30 min    Assessment/Plan         PT Plan  Treatment/Interventions: Bed mobility, Transfer training, Gait training, Balance training, Strengthening, Endurance training, Therapeutic exercise, Therapeutic activity, Home exercise program, Positioning  PT Plan: Ongoing PT  PT Frequency: 4 times per week  PT Discharge Recommendations: High intensity level of continued care  PT - OK to Discharge: Yes (once medically cleared)      General Visit Information:   PT  Visit  PT Received On: 08/22/24       Subjective               Objective   Pain:8/10 abdominal                    Activity Tolerance:     Treatments:  Therapeutic Exercise  Therapeutic Exercise Performed:  (ble ap's, qs,gs x 10 reps)    Bed Mobility  Bed Mobility:  (supine to sit mod a x 2)    Ambulation/Gait Training  Ambulation/Gait Training Performed:  (ambulated 20 feet using fixed wheeled walker wound vac and iv pole cga   patient was positioned up in chair. call bell within reach   alarm attached)  Transfers  Transfer:  (sit to stand cga)         Outcome Measures:  [facility] Basic Mobility  Turning from your back to your side while in a flat bed without using bedrails: A lot  Moving from lying on your back to sitting on the side of a flat bed without using bedrails: A lot  Moving to and from bed to chair (including a wheelchair): A little  Standing up from a chair using your arms (e.g. wheelchair or bedside chair): A little  To walk in hospital room: A little  Climbing 3-5 steps with railing: Total  Basic Mobility - Total Score: 14    Education Documentation  Handouts, taught by Grace Diaz PTA at 8/22/2024  1:54 PM.  Learner: Patient  Readiness: Acceptance  Method: Demonstration  Response: Demonstrated Understanding    Body Mechanics, taught by Grace Diaz  PTA at 8/22/2024  1:54 PM.  Learner: Patient  Readiness: Acceptance  Method: Demonstration  Response: Demonstrated Understanding    Precautions, taught by Grace Diaz PTA at 8/22/2024  1:54 PM.  Learner: Patient  Readiness: Acceptance  Method: Demonstration  Response: Demonstrated Understanding    Home Exercise Program, taught by Grace Diaz PTA at 8/22/2024  1:54 PM.  Learner: Patient  Readiness: Acceptance  Method: Demonstration  Response: Demonstrated Understanding    ADL Training, taught by Grace Diaz PTA at 8/22/2024  1:54 PM.  Learner: Patient  Readiness: Acceptance  Method: Demonstration  Response: Demonstrated Understanding    Handouts, taught by Grace Diaz PTA at 8/22/2024  1:54 PM.  Learner: Patient  Readiness: Acceptance  Method: Demonstration  Response: Demonstrated Understanding    Precautions, taught by Grace Diaz PTA at 8/22/2024  1:54 PM.  Learner: Patient  Readiness: Acceptance  Method: Demonstration  Response: Demonstrated Understanding    Body Mechanics, taught by Grace Diaz PTA at 8/22/2024  1:54 PM.  Learner: Patient  Readiness: Acceptance  Method: Demonstration  Response: Demonstrated Understanding    Home Exercise Program, taught by Grace Diaz PTA at 8/22/2024  1:54 PM.  Learner: Patient  Readiness: Acceptance  Method: Demonstration  Response: Demonstrated Understanding    Mobility Training, taught by Grace Diaz PTA at 8/22/2024  1:54 PM.  Learner: Patient  Readiness: Acceptance  Method: Demonstration  Response: Demonstrated Understanding    Education Comments  No comments found.               Encounter Problems       Encounter Problems (Active)       PT Problem       PT Goal 1 STG - Pt will transition supine <> sitting with supervision  (Progressing)       Start:  08/16/24    Expected End:  08/30/24            PT Goal 2 STG - Pt will transfer STS with LRAD and supervision  (Progressing)       Start:  08/16/24    Expected End:  08/30/24             PT Goal 3 STG - Pt will amb 100' using LRAD with supervision  (Progressing)       Start:  08/16/24    Expected End:  08/30/24            PT Goal 4 STG -  Pt will navigate 2 stairs using 1 HR with SBA  (Progressing)       Start:  08/16/24    Expected End:  08/30/24               Pain - Adult

## 2024-08-22 NOTE — PROGRESS NOTES
"Nutrition Follow Up Assessment:     Nutrition History:  Energy Intake: Fair 50-75 %, Good > 75 %  Food and Nutrient History: Pt was working with therapy at time of attempted visit today, getting out of bed. POD#1 debridement of abdominal wound with wound vac application. Limited meal intakes recorded on flow sheet (8/21-75% x1, 100% x1);  Roderick twice daily. Last BM 8/18  Vitamin/Herbal Supplement Use: FeSO4, MVI  Food Allergies/Intolerances:  None  GI Symptoms: None  Oral Problems: None       Anthropometrics:  Height: 162.6 cm (5' 4.02\")   Weight: 72.2 kg (159 lb 2.8 oz)   BMI (Calculated): 27.31  IBW/kg (Dietitian Calculated): 54.5 kg  Percent of IBW: 132 %       Weight History:     Weight Change %:   No new weight to assess     Nutrition Focused Physical Exam Findings:  defer: completed 8/9/24  Subcutaneous Fat Loss:      Muscle Wasting:     Edema:  Edema: +2 mild  Edema Location: BLE  Physical Findings:  Skin: Positive (abdomen)    Nutrition Significant Labs:  BMP Trend:   Results from last 7 days   Lab Units 08/22/24  0945 08/17/24  0944 08/16/24  0710   GLUCOSE mg/dL 124* 124* 102*   CALCIUM mg/dL 8.2* 7.9* 8.1*   SODIUM mmol/L 135* 138 137   POTASSIUM mmol/L 4.7 4.2 4.4   CO2 mmol/L 20* 20* 22   CHLORIDE mmol/L 109* 110* 109*   BUN mg/dL 21 20 21   CREATININE mg/dL 1.65* 1.28* 1.38*        Nutrition Specific Medications:  Reviewed     I/O:   Last BM Date: 08/18/24; Stool Appearance: Unable to assess (08/16/24 0900)    Dietary Orders (From admission, onward)       Start     Ordered    08/21/24 1129  Adult diet Regular  Diet effective now        Question:  Diet type  Answer:  Regular    08/21/24 1128    08/09/24 1054  Oral nutritional supplements  Until discontinued        Comments: orange   Question Answer Comment   Deliver with Dinner    Deliver with Lunch    Select supplement: Roderick        08/09/24 1053    08/08/24 2210  May Participate in Room Service  Once        Question:  .  Answer:  Yes    08/08/24 2211 "                     Estimated Needs:      Method for Estimating Needs: 1470-1635kcals (27-30kcals/kg iBW)     Method for Estimating Needs: 65-82g (1.2-1.5g/kg IBW)     Method for Estimating Needs: 1 mL/kcal or as per MD        Nutrition Diagnosis   Malnutrition Diagnosis  Patient has Malnutrition Diagnosis: No    Nutrition Diagnosis  Patient has Nutrition Diagnosis: Yes  Diagnosis Status (1): Ongoing  Nutrition Diagnosis 1: Increased nutrient needs  Related to (1): physiological causes increasing nutrient needs  As Evidenced by (1): wound healing needs  Additional Nutrition Diagnosis: Diagnosis 2  Diagnosis Status (2): Ongoing  Nutrition Diagnosis 2: Inadequate oral intake  Related to (2): acute illness, recent surgery  As Evidenced by (2): 25-33% of meals  Additional Assessment Information (2): PO intakes are variable, however not a big eater since bariatric surgery       Nutrition Interventions/Recommendations         Nutrition Prescription:  Individualized Nutrition Prescription Provided for : Regular diet as ordered with Roderick twice daily        Nutrition Interventions:   Interventions: Meals and snacks, Medical food supplement  Meals and Snacks: General healthful diet  Goal: consume >50->75% of meals--partially met/ongoing  Medical Food Supplement: Commercial beverage  Goal: consume >75% of Roderick BID (for an additional 90 kcals, 2.5 gm protein, supplement glutamine and arginine)--partially met/ongoing         Nutrition Education:   N/A       Nutrition Monitoring and Evaluation   Food/Nutrient Related History Monitoring  Monitoring and Evaluation Plan: Energy intake, Fluid intake, Amount of food  Energy Intake: Estimated energy intake  Criteria: Meal/ONS intake to meet >75% of estimated needs  Fluid Intake: Estimated fluid intake  Criteria: fluid intake to meet >75% of estimated need  Amount of Food: Estimated amout of food, Medical food intake  Criteria: Pt to consume >75% of meals/ONS    Body  Composition/Growth/Weight History  Weight: Measured weight  Criteria: reweigh at least every 5 days    Biochemical Data, Medical Tests and Procedures  Monitoring and Evaluation Plan: Electrolyte/renal panel  Criteria: labs WNL    Nutrition Focused Physical Findings  Monitoring and Evaluation Plan: Skin  Criteria: promote healing through adequate nutrition         Time Spent (min): 30 minutes

## 2024-08-22 NOTE — PROGRESS NOTES
Occupational Therapy    OT Treatment    Patient Name: Mariann Drew  MRN: [id number]  Today's Date: 8/22/2024  Time Calculation  Start Time: 1300  Stop Time: 1323  Time Calculation (min): 23 min        Assessment:  End of Session Patient Position: Up in chair, Alarm on     Plan:  Treatment Interventions: Patient/family training, Endurance training, UE strengthening/ROM, Functional transfer training, ADL retraining, Compensatory technique education  OT Frequency: 3 times per week  OT Discharge Recommendations: High intensity level of continued care  OT - OK to Discharge: Yes  Treatment Interventions: Patient/family training, Endurance training, UE strengthening/ROM, Functional transfer training, ADL retraining, Compensatory technique education    Subjective   Previous Visit Info:  OT Last Visit  OT Received On: 08/22/24  General:  General  Co-Treatment: PT  Co-Treatment Reason: to maximize patient safety and participation  Prior to Session Communication: Bedside nurse  Patient Position Received: Bed, 3 rail up, Alarm off, not on at start of session  General Comment: pleasant and cooperative, limited by pain  Precautions:  Medical Precautions: Fall precautions  Precautions Comment: ab wound vac and iv.       Pain:  Pain Assessment  Pain Assessment:  (severe pain in abdominal incision)    Objective         Activities of Daily Living: LE Dressing  LE Dressing:  (educated pt on AE on LB dressing d/t ab incision and pain. pt declined attempting this session)  Functional Standing Tolerance:  Time: 2:30 standing at FWW  Bed Mobility/Transfers: Bed Mobility  Bed Mobility: Yes  Bed Mobility 1  Bed Mobility 1: Supine to sitting  Level of Assistance 1: Maximum assistance (x2)    Transfers  Transfer: Yes  Transfer 1  Technique 1: Sit to stand, Stand to sit  Transfer Device 1: Walker  Transfer Level of Assistance 1: Contact guard  Trials/Comments 1: pt completed STS from EOB at slow pace d/t pain      Functional  Mobility:  Functional Mobility  Functional Mobility Performed: Yes  Functional Mobility 1  Device 1: Rolling walker  Assistance 1: Contact guard  Comments 1: pt ambulated short distance in room and over to chair with arms. assist provided for IV and wound vac      Outcome Measures:[facility] Daily Activity  Putting on and taking off regular lower body clothing: A lot  Bathing (including washing, rinsing, drying): A lot  Putting on and taking off regular upper body clothing: A little  Toileting, which includes using toilet, bedpan or urinal: A lot  Taking care of personal grooming such as brushing teeth: None  Eating Meals: None  Daily Activity - Total Score: 17        Education Documentation  Body Mechanics, taught by FREIDA Duffy at 8/22/2024  2:29 PM.  Learner: Patient  Readiness: Acceptance  Method: Explanation  Response: Verbalizes Understanding    Precautions, taught by FREIDA Duffy at 8/22/2024  2:29 PM.  Learner: Patient  Readiness: Acceptance  Method: Explanation  Response: Verbalizes Understanding    ADL Training, taught by FREIDA Duffy at 8/22/2024  2:29 PM.  Learner: Patient  Readiness: Acceptance  Method: Explanation  Response: Verbalizes Understanding    Education Comments  No comments found.             Goals:  Encounter Problems       Encounter Problems (Active)       Bathing       STG - Patient will bathe body with cga after set up for sponge bathing.  (Progressing)       Start:  08/16/24    Expected End:  08/27/24               Dressing Upper Extremities       STG - Patient will dress upper body independently after set up  (Progressing)       Start:  08/16/24    Expected End:  08/27/24               Dressings Lower Extremities       STG - Patient will complete lower body dressing with use of adaptive equipmtent as needed with sba.  (Progressing)       Start:  08/16/24    Expected End:  08/27/24               Functional Mobility       sTG - Patient will ambulate household  distance for adl retrieval with lrad with supervision.  (Progressing)       Start:  08/16/24    Expected End:  08/30/24               Grooming       STG - Patient will gather items for grooming standing sink level with supervision (Progressing)       Start:  08/16/24    Expected End:  08/27/24               OT Transfers       STG - Patient will perform toilet transfer with sba. (Progressing)       Start:  08/16/24    Expected End:  08/30/24               Toileting       STG - Patient will complete toileting tasks with sba  (Progressing)       Start:  08/16/24    Expected End:  08/27/24

## 2024-08-22 NOTE — CARE PLAN
The patient's goals for the shift include      The clinical goals for the shift include pt will remain safe and comftorable throughout the remainder of the shift      Problem: Pain - Adult  Goal: Verbalizes/displays adequate comfort level or baseline comfort level  Outcome: Progressing     Problem: Safety - Adult  Goal: Free from fall injury  Outcome: Progressing     Problem: Discharge Planning  Goal: Discharge to home or other facility with appropriate resources  Outcome: Progressing     Problem: Chronic Conditions and Co-morbidities  Goal: Patient's chronic conditions and co-morbidity symptoms are monitored and maintained or improved  Outcome: Progressing     Problem: Skin  Goal: Decreased wound size/increased tissue granulation at next dressing change  Outcome: Progressing  Goal: Participates in plan/prevention/treatment measures  Outcome: Progressing  Goal: Prevent/manage excess moisture  Outcome: Progressing  Goal: Prevent/minimize sheer/friction injuries  Outcome: Progressing  Goal: Promote/optimize nutrition  Outcome: Progressing  Goal: Promote skin healing  Outcome: Progressing     Problem: Fall/Injury  Goal: Not fall by end of shift  Outcome: Progressing  Goal: Be free from injury by end of the shift  Outcome: Progressing  Goal: Verbalize understanding of personal risk factors for fall in the hospital  Outcome: Progressing  Goal: Verbalize understanding of risk factor reduction measures to prevent injury from fall in the home  Outcome: Progressing  Goal: Use assistive devices by end of the shift  Outcome: Progressing  Goal: Pace activities to prevent fatigue by end of the shift  Outcome: Progressing     Problem: Diabetes  Goal: Achieve decreasing blood glucose levels by end of shift  Outcome: Progressing  Goal: Increase stability of blood glucose readings by end of shift  Outcome: Progressing  Goal: Decrease in ketones present in urine by end of shift  Outcome: Progressing  Goal: Maintain electrolyte levels  within acceptable range throughout shift  Outcome: Progressing  Goal: Maintain glucose levels >70mg/dl to <250mg/dl throughout shift  Outcome: Progressing  Goal: No changes in neurological exam by end of shift  Outcome: Progressing  Goal: Learn about and adhere to nutrition recommendations by end of shift  Outcome: Progressing  Goal: Vital signs within normal range for age by end of shift  Outcome: Progressing  Goal: Increase self care and/or family involovement by end of shift  Outcome: Progressing  Goal: Receive DSME education by end of shift  Outcome: Progressing     Problem: Pain  Goal: Takes deep breaths with improved pain control throughout the shift  Outcome: Progressing  Goal: Turns in bed with improved pain control throughout the shift  Outcome: Progressing  Goal: Walks with improved pain control throughout the shift  Outcome: Progressing  Goal: Performs ADL's with improved pain control throughout shift  Outcome: Progressing  Goal: Participates in PT with improved pain control throughout the shift  Outcome: Progressing  Goal: Free from opioid side effects throughout the shift  Outcome: Progressing  Goal: Free from acute confusion related to pain meds throughout the shift  Outcome: Progressing     Problem: Bathing  Goal: STG - Patient will bathe body with cga after set up for sponge bathing.   Outcome: Progressing     Problem: Dressings Lower Extremities  Goal: STG - Patient will complete lower body dressing with use of adaptive equipmtent as needed with sba.   Outcome: Progressing     Problem: Dressing Upper Extremities  Goal: STG - Patient will dress upper body independently after set up   Outcome: Progressing     Problem: Grooming  Goal: STG - Patient will gather items for grooming standing sink level with supervision  Outcome: Progressing     Problem: Toileting  Goal: STG - Patient will complete toileting tasks with sba   Outcome: Progressing

## 2024-08-23 ENCOUNTER — HOSPITAL ENCOUNTER (INPATIENT)
Facility: HOSPITAL | Age: 69
End: 2024-08-23
Attending: PHYSICAL MEDICINE & REHABILITATION | Admitting: PHYSICAL MEDICINE & REHABILITATION
Payer: MEDICARE

## 2024-08-23 VITALS
HEIGHT: 64 IN | HEART RATE: 104 BPM | WEIGHT: 159.17 LBS | OXYGEN SATURATION: 94 % | BODY MASS INDEX: 27.17 KG/M2 | DIASTOLIC BLOOD PRESSURE: 59 MMHG | RESPIRATION RATE: 18 BRPM | SYSTOLIC BLOOD PRESSURE: 114 MMHG | TEMPERATURE: 98.1 F

## 2024-08-23 DIAGNOSIS — L30.4 INTERTRIGO: ICD-10-CM

## 2024-08-23 DIAGNOSIS — M79.89 PAIN AND SWELLING OF LOWER EXTREMITY: ICD-10-CM

## 2024-08-23 DIAGNOSIS — T81.49XA SURGICAL SITE INFECTION: ICD-10-CM

## 2024-08-23 DIAGNOSIS — M79.606 PAIN AND SWELLING OF LOWER EXTREMITY: ICD-10-CM

## 2024-08-23 DIAGNOSIS — T81.31XD DISRUPTION OF EXTERNAL SURGICAL WOUND, SUBSEQUENT ENCOUNTER: ICD-10-CM

## 2024-08-23 DIAGNOSIS — K12.0 APHTHOUS ULCER: ICD-10-CM

## 2024-08-23 DIAGNOSIS — E11.9 TYPE 2 DIABETES MELLITUS WITHOUT COMPLICATION, WITHOUT LONG-TERM CURRENT USE OF INSULIN (MULTI): ICD-10-CM

## 2024-08-23 DIAGNOSIS — G89.18 POST-OP PAIN: ICD-10-CM

## 2024-08-23 DIAGNOSIS — L03.311 CELLULITIS, ABDOMINAL WALL: ICD-10-CM

## 2024-08-23 DIAGNOSIS — T81.30XA DISRUPTION OF WOUND, UNSPECIFIED, INITIAL ENCOUNTER: ICD-10-CM

## 2024-08-23 DIAGNOSIS — R60.9: Primary | ICD-10-CM

## 2024-08-23 DIAGNOSIS — T81.89XA DRAINING POSTOPERATIVE WOUND, INITIAL ENCOUNTER: ICD-10-CM

## 2024-08-23 PROBLEM — R53.81 DEBILITY: Status: ACTIVE | Noted: 2024-08-23

## 2024-08-23 LAB
ERYTHROCYTE [DISTWIDTH] IN BLOOD BY AUTOMATED COUNT: 15.1 % (ref 11.5–14.5)
HCT VFR BLD AUTO: 26.4 % (ref 36–46)
HGB BLD-MCNC: 8.4 G/DL (ref 12–16)
MCH RBC QN AUTO: 30.5 PG (ref 26–34)
MCHC RBC AUTO-ENTMCNC: 31.8 G/DL (ref 32–36)
MCV RBC AUTO: 96 FL (ref 80–100)
NRBC BLD-RTO: 0 /100 WBCS (ref 0–0)
PLATELET # BLD AUTO: 222 X10*3/UL (ref 150–450)
RBC # BLD AUTO: 2.75 X10*6/UL (ref 4–5.2)
WBC # BLD AUTO: 14.1 X10*3/UL (ref 4.4–11.3)

## 2024-08-23 PROCEDURE — 97162 PT EVAL MOD COMPLEX 30 MIN: CPT | Mod: GP

## 2024-08-23 PROCEDURE — 2500000001 HC RX 250 WO HCPCS SELF ADMINISTERED DRUGS (ALT 637 FOR MEDICARE OP): Performed by: STUDENT IN AN ORGANIZED HEALTH CARE EDUCATION/TRAINING PROGRAM

## 2024-08-23 PROCEDURE — 1180000001 HC REHAB PRIVATE ROOM DAILY

## 2024-08-23 PROCEDURE — 2500000001 HC RX 250 WO HCPCS SELF ADMINISTERED DRUGS (ALT 637 FOR MEDICARE OP): Performed by: PHYSICAL MEDICINE & REHABILITATION

## 2024-08-23 PROCEDURE — 2500000004 HC RX 250 GENERAL PHARMACY W/ HCPCS (ALT 636 FOR OP/ED): Performed by: STUDENT IN AN ORGANIZED HEALTH CARE EDUCATION/TRAINING PROGRAM

## 2024-08-23 PROCEDURE — 2500000004 HC RX 250 GENERAL PHARMACY W/ HCPCS (ALT 636 FOR OP/ED): Performed by: PHYSICAL MEDICINE & REHABILITATION

## 2024-08-23 PROCEDURE — 85027 COMPLETE CBC AUTOMATED: CPT

## 2024-08-23 PROCEDURE — 99238 HOSP IP/OBS DSCHRG MGMT 30/<: CPT | Performed by: STUDENT IN AN ORGANIZED HEALTH CARE EDUCATION/TRAINING PROGRAM

## 2024-08-23 PROCEDURE — 97166 OT EVAL MOD COMPLEX 45 MIN: CPT | Mod: GO

## 2024-08-23 RX ORDER — FERROUS SULFATE 325(65) MG
65 TABLET ORAL 2 TIMES DAILY
Status: DISCONTINUED | OUTPATIENT
Start: 2024-08-23 | End: 2024-09-06 | Stop reason: HOSPADM

## 2024-08-23 RX ORDER — TALC
3 POWDER (GRAM) TOPICAL NIGHTLY PRN
Status: DISCONTINUED | OUTPATIENT
Start: 2024-08-23 | End: 2024-09-06 | Stop reason: HOSPADM

## 2024-08-23 RX ORDER — LIDOCAINE HYDROCHLORIDE 20 MG/ML
1.25 SOLUTION OROPHARYNGEAL AS NEEDED
Status: DISCONTINUED | OUTPATIENT
Start: 2024-08-23 | End: 2024-09-06 | Stop reason: HOSPADM

## 2024-08-23 RX ORDER — CYCLOBENZAPRINE HCL 10 MG
5 TABLET ORAL 3 TIMES DAILY PRN
Status: DISCONTINUED | OUTPATIENT
Start: 2024-08-23 | End: 2024-09-06 | Stop reason: HOSPADM

## 2024-08-23 RX ORDER — TRAZODONE HYDROCHLORIDE 50 MG/1
150 TABLET ORAL NIGHTLY
Status: DISCONTINUED | OUTPATIENT
Start: 2024-08-23 | End: 2024-09-06 | Stop reason: HOSPADM

## 2024-08-23 RX ORDER — ALLOPURINOL 100 MG/1
100 TABLET ORAL DAILY
Status: DISCONTINUED | OUTPATIENT
Start: 2024-08-23 | End: 2024-08-23 | Stop reason: SDUPTHER

## 2024-08-23 RX ORDER — LAMOTRIGINE 100 MG/1
200 TABLET ORAL EVERY MORNING
Status: DISCONTINUED | OUTPATIENT
Start: 2024-08-24 | End: 2024-09-06 | Stop reason: HOSPADM

## 2024-08-23 RX ORDER — FERROUS SULFATE 325(65) MG
65 TABLET ORAL 2 TIMES DAILY
Status: DISCONTINUED | OUTPATIENT
Start: 2024-08-23 | End: 2024-08-24

## 2024-08-23 RX ORDER — ENOXAPARIN SODIUM 100 MG/ML
40 INJECTION SUBCUTANEOUS EVERY 24 HOURS
Status: DISCONTINUED | OUTPATIENT
Start: 2024-08-23 | End: 2024-09-06 | Stop reason: HOSPADM

## 2024-08-23 RX ORDER — LATANOPROST 50 UG/ML
1 SOLUTION/ DROPS OPHTHALMIC NIGHTLY
Status: DISCONTINUED | OUTPATIENT
Start: 2024-08-23 | End: 2024-09-06 | Stop reason: HOSPADM

## 2024-08-23 RX ORDER — ALLOPURINOL 100 MG/1
400 TABLET ORAL DAILY
Status: DISCONTINUED | OUTPATIENT
Start: 2024-08-23 | End: 2024-09-03

## 2024-08-23 RX ORDER — DULOXETIN HYDROCHLORIDE 30 MG/1
60 CAPSULE, DELAYED RELEASE ORAL 2 TIMES DAILY
Status: DISCONTINUED | OUTPATIENT
Start: 2024-08-23 | End: 2024-09-06 | Stop reason: HOSPADM

## 2024-08-23 RX ORDER — ALUMINUM HYDROXIDE, MAGNESIUM HYDROXIDE, AND SIMETHICONE 2400; 240; 2400 MG/30ML; MG/30ML; MG/30ML
30 SUSPENSION ORAL EVERY 6 HOURS PRN
Status: DISCONTINUED | OUTPATIENT
Start: 2024-08-23 | End: 2024-09-06 | Stop reason: HOSPADM

## 2024-08-23 RX ORDER — SUCRALFATE 1 G/1
1 TABLET ORAL NIGHTLY
Status: DISCONTINUED | OUTPATIENT
Start: 2024-08-23 | End: 2024-09-06 | Stop reason: HOSPADM

## 2024-08-23 RX ORDER — ACETAMINOPHEN 160 MG/5ML
650 SOLUTION ORAL EVERY 4 HOURS PRN
Status: DISCONTINUED | OUTPATIENT
Start: 2024-08-23 | End: 2024-08-29

## 2024-08-23 RX ORDER — BISACODYL 5 MG
10 TABLET, DELAYED RELEASE (ENTERIC COATED) ORAL DAILY PRN
Status: DISCONTINUED | OUTPATIENT
Start: 2024-08-23 | End: 2024-09-06 | Stop reason: HOSPADM

## 2024-08-23 RX ORDER — NYSTATIN 100000 [USP'U]/G
POWDER TOPICAL 2 TIMES DAILY
Status: DISCONTINUED | OUTPATIENT
Start: 2024-08-23 | End: 2024-09-01

## 2024-08-23 RX ORDER — SENNOSIDES 8.6 MG/1
1 TABLET ORAL NIGHTLY
Status: DISCONTINUED | OUTPATIENT
Start: 2024-08-23 | End: 2024-09-06 | Stop reason: HOSPADM

## 2024-08-23 RX ORDER — ERTAPENEM 1 G/1
1 INJECTION, POWDER, LYOPHILIZED, FOR SOLUTION INTRAMUSCULAR; INTRAVENOUS DAILY
Status: DISCONTINUED | OUTPATIENT
Start: 2024-08-23 | End: 2024-08-28

## 2024-08-23 RX ORDER — FAMOTIDINE 20 MG/1
20 TABLET, FILM COATED ORAL EVERY MORNING
Status: DISCONTINUED | OUTPATIENT
Start: 2024-08-24 | End: 2024-09-06 | Stop reason: HOSPADM

## 2024-08-23 RX ORDER — ERTAPENEM 1 G/1
1 INJECTION, POWDER, LYOPHILIZED, FOR SOLUTION INTRAMUSCULAR; INTRAVENOUS DAILY
Status: DISCONTINUED | OUTPATIENT
Start: 2024-08-23 | End: 2024-08-23 | Stop reason: SDUPTHER

## 2024-08-23 RX ORDER — NARATRIPTAN 2.5 MG/1
2.5 TABLET ORAL ONCE AS NEEDED
Status: DISCONTINUED | OUTPATIENT
Start: 2024-08-23 | End: 2024-09-06 | Stop reason: HOSPADM

## 2024-08-23 RX ORDER — BISACODYL 10 MG/1
10 SUPPOSITORY RECTAL DAILY PRN
Status: DISCONTINUED | OUTPATIENT
Start: 2024-08-23 | End: 2024-09-06 | Stop reason: HOSPADM

## 2024-08-23 RX ORDER — DEXAMETHASONE 1 MG/1
0.5 TABLET ORAL 4 TIMES DAILY
Status: DISCONTINUED | OUTPATIENT
Start: 2024-08-23 | End: 2024-09-06 | Stop reason: HOSPADM

## 2024-08-23 RX ORDER — HYDROCODONE BITARTRATE AND ACETAMINOPHEN 5; 325 MG/1; MG/1
1 TABLET ORAL EVERY 6 HOURS PRN
Status: DISCONTINUED | OUTPATIENT
Start: 2024-08-23 | End: 2024-08-25

## 2024-08-23 RX ORDER — ACETAMINOPHEN 325 MG/1
650 TABLET ORAL EVERY 6 HOURS PRN
Status: DISCONTINUED | OUTPATIENT
Start: 2024-08-23 | End: 2024-08-29

## 2024-08-23 RX ADMIN — DEXAMETHASONE 0.5 MG: 1 TABLET ORAL at 22:49

## 2024-08-23 RX ADMIN — SENNOSIDES 8.6 MG: 8.6 TABLET, FILM COATED ORAL at 22:49

## 2024-08-23 RX ADMIN — ENOXAPARIN SODIUM 40 MG: 100 INJECTION SUBCUTANEOUS at 18:04

## 2024-08-23 RX ADMIN — SUCRALFATE 1 G: 1 TABLET ORAL at 22:49

## 2024-08-23 RX ADMIN — DULOXETINE HYDROCHLORIDE 60 MG: 30 CAPSULE, DELAYED RELEASE ORAL at 22:49

## 2024-08-23 RX ADMIN — LOTILANER OPHTHALMIC SOLUTION 1 DROP: 2.5 SOLUTION/ DROPS OPHTHALMIC at 21:00

## 2024-08-23 RX ADMIN — NYSTATIN: 100000 POWDER TOPICAL at 22:50

## 2024-08-23 RX ADMIN — LATANOPROST 1 DROP: 50 SOLUTION OPHTHALMIC at 21:00

## 2024-08-23 RX ADMIN — TRAZODONE HYDROCHLORIDE 150 MG: 50 TABLET ORAL at 22:47

## 2024-08-23 RX ADMIN — ACETAMINOPHEN 650 MG: 325 TABLET ORAL at 22:47

## 2024-08-23 RX ADMIN — DEXAMETHASONE 0.5 MG: 1 TABLET ORAL at 18:03

## 2024-08-23 SDOH — SOCIAL STABILITY: SOCIAL INSECURITY: HAS ANYONE EVER THREATENED TO HURT YOUR FAMILY OR YOUR PETS?: NO

## 2024-08-23 SDOH — SOCIAL STABILITY: SOCIAL INSECURITY: ABUSE: ADULT

## 2024-08-23 SDOH — SOCIAL STABILITY: SOCIAL INSECURITY: DOES ANYONE TRY TO KEEP YOU FROM HAVING/CONTACTING OTHER FRIENDS OR DOING THINGS OUTSIDE YOUR HOME?: NO

## 2024-08-23 SDOH — SOCIAL STABILITY: SOCIAL INSECURITY: DO YOU FEEL UNSAFE GOING BACK TO THE PLACE WHERE YOU ARE LIVING?: NO

## 2024-08-23 SDOH — ECONOMIC STABILITY: HOUSING INSECURITY: AT ANY TIME IN THE PAST 12 MONTHS, WERE YOU HOMELESS OR LIVING IN A SHELTER (INCLUDING NOW)?: NO

## 2024-08-23 SDOH — SOCIAL STABILITY: SOCIAL INSECURITY: ARE YOU OR HAVE YOU BEEN THREATENED OR ABUSED PHYSICALLY, EMOTIONALLY, OR SEXUALLY BY ANYONE?: NO

## 2024-08-23 SDOH — SOCIAL STABILITY: SOCIAL INSECURITY: DO YOU FEEL ANYONE HAS EXPLOITED OR TAKEN ADVANTAGE OF YOU FINANCIALLY OR OF YOUR PERSONAL PROPERTY?: NO

## 2024-08-23 SDOH — SOCIAL STABILITY: SOCIAL INSECURITY: ARE THERE ANY APPARENT SIGNS OF INJURIES/BEHAVIORS THAT COULD BE RELATED TO ABUSE/NEGLECT?: NO

## 2024-08-23 SDOH — ECONOMIC STABILITY: INCOME INSECURITY: IN THE LAST 12 MONTHS, WAS THERE A TIME WHEN YOU WERE NOT ABLE TO PAY THE MORTGAGE OR RENT ON TIME?: YES

## 2024-08-23 SDOH — ECONOMIC STABILITY: TRANSPORTATION INSECURITY
IN THE PAST 12 MONTHS, HAS LACK OF TRANSPORTATION KEPT YOU FROM MEETINGS, WORK, OR FROM GETTING THINGS NEEDED FOR DAILY LIVING?: NO

## 2024-08-23 SDOH — ECONOMIC STABILITY: TRANSPORTATION INSECURITY
IN THE PAST 12 MONTHS, HAS THE LACK OF TRANSPORTATION KEPT YOU FROM MEDICAL APPOINTMENTS OR FROM GETTING MEDICATIONS?: NO

## 2024-08-23 SDOH — ECONOMIC STABILITY: INCOME INSECURITY: HOW HARD IS IT FOR YOU TO PAY FOR THE VERY BASICS LIKE FOOD, HOUSING, MEDICAL CARE, AND HEATING?: NOT HARD AT ALL

## 2024-08-23 SDOH — SOCIAL STABILITY: SOCIAL INSECURITY: WERE YOU ABLE TO COMPLETE ALL THE BEHAVIORAL HEALTH SCREENINGS?: YES

## 2024-08-23 SDOH — ECONOMIC STABILITY: HOUSING INSECURITY: IN THE PAST 12 MONTHS, HOW MANY TIMES HAVE YOU MOVED WHERE YOU WERE LIVING?: 1

## 2024-08-23 SDOH — SOCIAL STABILITY: SOCIAL INSECURITY: HAVE YOU HAD THOUGHTS OF HARMING ANYONE ELSE?: NO

## 2024-08-23 ASSESSMENT — PAIN - FUNCTIONAL ASSESSMENT
PAIN_FUNCTIONAL_ASSESSMENT: 0-10

## 2024-08-23 ASSESSMENT — PAIN SCALES - GENERAL
PAINLEVEL_OUTOF10: 3
PAINLEVEL_OUTOF10: 3
PAINLEVEL_OUTOF10: 5 - MODERATE PAIN
PAINLEVEL_OUTOF10: 2
PAINLEVEL_OUTOF10: 3
PAINLEVEL_OUTOF10: 7

## 2024-08-23 ASSESSMENT — COLUMBIA-SUICIDE SEVERITY RATING SCALE - C-SSRS
2. HAVE YOU ACTUALLY HAD ANY THOUGHTS OF KILLING YOURSELF?: NO
1. IN THE PAST MONTH, HAVE YOU WISHED YOU WERE DEAD OR WISHED YOU COULD GO TO SLEEP AND NOT WAKE UP?: NO
6. HAVE YOU EVER DONE ANYTHING, STARTED TO DO ANYTHING, OR PREPARED TO DO ANYTHING TO END YOUR LIFE?: NO

## 2024-08-23 ASSESSMENT — LIFESTYLE VARIABLES
HOW OFTEN DO YOU HAVE A DRINK CONTAINING ALCOHOL: NEVER
AUDIT-C TOTAL SCORE: 0
SKIP TO QUESTIONS 9-10: 1
AUDIT-C TOTAL SCORE: 0
HOW MANY STANDARD DRINKS CONTAINING ALCOHOL DO YOU HAVE ON A TYPICAL DAY: PATIENT DOES NOT DRINK
HOW OFTEN DO YOU HAVE 6 OR MORE DRINKS ON ONE OCCASION: NEVER

## 2024-08-23 ASSESSMENT — BRIEF INTERVIEW FOR MENTAL STATUS (BIMS)
BIMS SUMMARY SCORE: 2
ASKED TO RECALL BED: NO, COULD NOT RECALL
INITIAL REPETITION OF BED BLUE SOCK - FIRST ATTEMPT: 0
WHAT YEAR IS IT: MISSED BY MORE THAN 5 YEARS
ASKED TO RECALL SOCK: YES, AFTER CUEING ("SOMETHING TO WEAR")
WHAT MONTH IS IT: MISSED BY 6 DAYS TO 1 MONTH
WHAT DAY OF THE WEEK IS IT: INCORRECT
ASKED TO RECALL BLUE: NO, COULD NOT RECALL

## 2024-08-23 ASSESSMENT — PAIN DESCRIPTION - LOCATION
LOCATION: ABDOMEN
LOCATION: ABDOMEN

## 2024-08-23 ASSESSMENT — PATIENT HEALTH QUESTIONNAIRE - PHQ9
1. LITTLE INTEREST OR PLEASURE IN DOING THINGS: MORE THAN HALF THE DAYS
SUM OF ALL RESPONSES TO PHQ9 QUESTIONS 1 & 2: 2
2. FEELING DOWN, DEPRESSED OR HOPELESS: NOT AT ALL

## 2024-08-23 ASSESSMENT — ACTIVITIES OF DAILY LIVING (ADL)
BATHING_ASSISTANCE: TOTAL
ASSISTIVE_DEVICE: EYEGLASSES

## 2024-08-23 ASSESSMENT — PAIN DESCRIPTION - ORIENTATION: ORIENTATION: MID;LOWER

## 2024-08-23 NOTE — PREADMISSION SCREENING NOTE
...............Physical Medicine and Rehabilitation  Preadmission Screening    Rehab Physician's Review and Admission Determination:  Mariann Drew is a 68 y.o. female who needs acute inpatient rehabilitation in order to achieve the functional goals outlined below. She requires close rehabilitation physician monitoring and management due to her complex medical conditions and co-morbidities with the primary indication of:  Abdominoplasty with wound infection (group B strep) he   Comorbid conditions that will impact course of rehabilitation: yperlipidemia former type II diabetic, resolved with weight loss status post gastric bypass, anemia, GERD, ANKUSH, breast cancer with left mastectomy, gout, and tardive dyskinesia . She requires 24-hour rehabilitation nursing to manage bowel and bladder function, skin care, surgical incision, wound, nutrition and fluid intake, pulmonary hygiene, pain control, safety, medication management, and patient/family goals. In addition, rehabilitation nursing will reiterate and reinforce therapy skills and equipment use, including ADLs, as well as provide education to the patient and family. Mariann Drew is willing to participate in and is able to tolerate the proposed plan of care.    History Of Present Illness  Mariann Drew is a 68 y.o. female admitted to [facility] ON 8/8/24 with a PMH of Hyperlipidemia former type II diabetic, resolved with weight loss status post gastric bypass, anemia, GERD, ANKUSH, breast cancer with left mastectomy, gout, and tardive dyskinesia who presented today with infected surgical site.  Patient reports on July 30 she underwent an abdominoplasty and since then, she thought her pain was disproportionate to the procedure, rating it a 5/10.  She continued that she developed subjective fevers, and purulent drainage from her BÁRBARA drains two to three days after her surgery.  She notes she followed up today and was instructed that her incision was infected and to come to  the emergency room for evaluation.She notes she followed up today she adds that she has not had a bowel movement in about a week and her stomach feels distended.        Chronic conditions: Gout, tardive dyskinesia, GERD, anemia, status post gastric bypass, hyperlipidemia     Past surgical history: Gastric bypass, abdominoplasty, bladder stimulator, right knee replacement, hysterectomy, cholecystectomy, mastectomy, tissue expander surgery, breast reduction, esophageal dilation, carpal tunnel repair     Family history: Coronary artery disease, CHF, prostate cancer       Social history: Former smoker, denies alcohol or illicit drug use    Allergies  Penicillins, Cefepime, Adhesive tape-silicones, Avelox [moxifloxacin], Bactrim [sulfamethoxazole-trimethoprim], Cephalexin, Codeine, Desyrel [trazodone], Erythromycin, Hydroxychloroquine, Ibuprofen, Nsaids (non-steroidal anti-inflammatory drug), Other, Percocet [oxycodone-acetaminophen], Percodan [oxycodone-aspirin], Phenergan [promethazine], Toradol [ketorolac], Tramadol, Trintellix [vortioxetine], [id number] antimigraine agents, Adhesive, and Macrolide antibiotics     Principal Problem:   Postoperative wound cellulitis  Wound dehiscence  RUSSELL  Hyponatremia  Hypochloremia     Microbiology  8-8-2024 wound culture group B strep  8-8-2024 blood culture no growth at 4 days   Abdominoplasty with wound infection (group B strep)         7/30/2024  7:58 AM Abdominoplasty, [id number] abdominoplasty   Repair Abdominal Wall   Monsplasty Cosmetic    JORGE Grajeda MD         8/12/2024  4:32 PM DEBRIDEMENT OF MUSCLE/FASCIA OF TORSO/WOUND VAC APPLICATION    Anibal Tiwari MD             8/21/2024  7:52 AM ABDOMEN DEBRIDEMENT WITH   WOUND VAC APPLICATION    Anibal Tiwari MD                Patient will likely need 2-4 more weeks of Invanz  IR CVC tunneled cath    Other issues  RUSSELL  Hyperlipidemia  GERD      Negative pressure wound therapy setting 125  "mmHg , dressing type black foam  Wound VAC noted over patient's abdomen, draining appropriately       Vital signs today  Temp 98.1, pulse 104. Bp 114/59, resp 18 pulse ox 94% room air     DVT prophylaxis     CBC 8/23/24          Component  Ref Range & Units 04:15 1 d ago 6 d ago 7 d ago 8 d ago 9 d ago 10 d ago   WBC  4.4 - 11.3 x10*3/uL 14.1 High  14.9 High  12.5 High  11.7 High  13.2 High  15.6 High  18.0 High    nRBC  0.0 - 0.0 /100 WBCs 0.0 0.0 0.0 0.0 0.0 0.0 0.2 High    RBC  4.00 - 5.20 x10*6/uL 2.75 Low  2.54 Low  2.57 Low  2.42 Low  2.50 Low  2.53 Low  2.74 Low    Hemoglobin  12.0 - 16.0 g/dL 8.4 Low  8.0 Low  7.9 Low  7.3 Low  7.7 Low  7.8 Low  8.4 Low    Hematocrit  36.0 - 46.0 % 26.4 Low  23.9 Low  24.4 Low  23.5 Low  26.0 Low  24.1 Low  28.0 Low    MCV  80 - 100 fL 96 94 95 97 104 High  95 102 High  CM   MCH  26.0 - 34.0 pg 30.5 31.5 30.7 30.2 30.8 30.8 30.7   MCHC  32.0 - 36.0 g/dL 31.8 Low  33.5 32.4 31.1 Low  29.6 Low  32.4 30.0 Low    RDW  11.5 - 14.5 % 15.1 High  15.2 High  15.3 High  14.7 High  14.5 13.9 13.9   Platelets  150 - 450 x10*3/uL 222 370 255 175 150           ADULT REGULAR DIET    FULL CODE      Current IP Meds      Frequency    acetaminophen (Tylenol) oral liquid 650 mg  (Acetaminophen (Tylenol) for mild pain)        \"Or\" Linked Group Details    Every 4 hours PRN    acetaminophen (Tylenol) suppository 650 mg  (Acetaminophen (Tylenol) for mild pain)        \"Or\" Linked Group Details    Every 4 hours PRN    acetaminophen (Tylenol) tablet 650 mg  (Acetaminophen (Tylenol) for mild pain)        \"Or\" Linked Group Details    Every 4 hours PRN    bisacodyl (Dulcolax) EC tablet 5 mg         Once    cyclobenzaprine (Flexeril) tablet 5 mg         3 times daily PRN    deutetrabenazine tablet extended release 24 hr 24 mg         Daily    deutetrabenazine tablet extended release 24 hr 6 mg         Daily    DULoxetine (Cymbalta) DR capsule 60 mg         2 times daily    ertapenem (INVanz) 1 g in sodium " chloride 0.9 % 50 mL IV         Every 24 hours    ferrous sulfate (325 mg ferrous sulfate) tablet 1 tablet         Daily    heparin (porcine) injection 5,000 Units  (DVT High Risk)         Every 8 hours    HYDROcodone-acetaminophen (Norco) 5-325 mg per tablet 1 tablet         Every 6 hours PRN    hydrOXYzine HCL (Atarax) tablet 25 mg         Every 8 hours PRN    lactated Ringer's infusion  (Lactated ringers)         Continuous    lactated Ringer's infusion  (IV Fluid)         Continuous    lactobacillus acidophilus tablet 1 tablet         2 times daily    lamoTRIgine (LaMICtal) tablet 200 mg         Every morning    lidocaine (Xylocaine) 10 mg/mL (1 %) injection 5 mL         Once    lidocaine (Xylocaine) 10 mg/mL (1 %) injection 5 mL         Once    nystatin (Mycostatin) 100,000 unit/gram powder 1 Application         2 times daily    oxygen (O2) therapy  (Non-Invasive Ventilation (CPAP/BIPAP) and Oxygen)         Continuous PRN - O2/gases    polyethylene glycol (Glycolax, Miralax) packet 17 g  (Constipation Prevention)         Daily    sucralfate (Carafate) tablet 1 g         Nightly    traZODone (Desyrel) tablet 150 mg         Nightly           Prior Functional Status:   Pt lives at home with her  in a two level home with 2 FRANSICO and a chair lift to second level. Pt has a walk in shower and a tub shower with a shower chair. Owns cane and FWW and was using no device at baseline. Drives and is retired.     Prior Level of Function:  Prior Function Per Pt/Caregiver Report  Independent with ADLs and functional transfers, Independent with homemaking with ambulation     Precautions:  Precautions  Medical Precautions: Fall precautions  Precautions Comment: abdominal incision with wound vac    Self-Care: INDEPENDENT  Ambulation: INDEPENDENT  Stairs: INDEPENDENT  Cognition: ALERT AND ORIENTED X3  Wheelchair: NA  Devices: SEE ABOVE    Current Functional Status:  Eating: SET UP  Oral hygiene: MIN A  Toileting: MOD  A  Bathing: MOD A  Upper body dressing: MIN A  Lower body dressing: MOD A  Bed Mobility: MOD A X2  Transfers: MIN A  Bladder and Bowel: CONTINENT  Cognition: ALERT AND ORIENTED X3    Rehabilitation Goals and Plan:  Expected level of improvement: CGA  Likelihood of reaching these goals: very good.  Therapy treatments needed to achieve these goals: physical therapy, occupational therapy, , nursing, and aide.  Barriers to achieving these goals: Pain   Expected length of stay: 5 day(s)    When medically stable, anticipated discharge disposition: home with home health care  The potential to achieve that is very good.

## 2024-08-23 NOTE — CARE PLAN
The patient's goals for the shift include  No dizziness     The clinical goals for the shift include Pt will be HD stable    Problem: Skin  Goal: Decreased wound size/increased tissue granulation at next dressing change  Outcome: Progressing  Flowsheets (Taken 8/23/2024 1824)  Decreased wound size/increased tissue granulation at next dressing change:   Promote sleep for wound healing   Protective dressings over bony prominences  Goal: Participates in plan/prevention/treatment measures  Outcome: Progressing  Flowsheets (Taken 8/23/2024 1824)  Participates in plan/prevention/treatment measures:   Elevate heels   Increase activity/out of bed for meals  Goal: Prevent/manage excess moisture  Outcome: Progressing  Flowsheets (Taken 8/23/2024 1824)  Prevent/manage excess moisture:   Cleanse incontinence/protect with barrier cream   Moisturize dry skin   Follow provider orders for dressing changes   Monitor for/manage infection if present  Goal: Prevent/minimize sheer/friction injuries  Outcome: Progressing  Flowsheets (Taken 8/23/2024 1824)  Prevent/minimize sheer/friction injuries:   HOB 30 degrees or less   Increase activity/out of bed for meals   Turn/reposition every 2 hours/use positioning/transfer devices   Use pull sheet  Goal: Promote/optimize nutrition  Outcome: Progressing  Flowsheets (Taken 8/23/2024 1824)  Promote/optimize nutrition:   Offer water/supplements/favorite foods   Consume > 50% meals/supplements   Monitor/record intake including meals  Goal: Promote skin healing  Outcome: Progressing  Flowsheets (Taken 8/23/2024 1824)  Promote skin healing: Turn/reposition every 2 hours/use positioning/transfer devices     Problem: Fall/Injury  Goal: Not fall by end of shift  Outcome: Progressing  Goal: Be free from injury by end of the shift  Outcome: Progressing  Goal: Verbalize understanding of personal risk factors for fall in the hospital  Outcome: Progressing  Goal: Verbalize understanding of risk factor  reduction measures to prevent injury from fall in the home  Outcome: Progressing  Goal: Use assistive devices by end of the shift  Outcome: Progressing  Goal: Pace activities to prevent fatigue by end of the shift  Outcome: Progressing     Problem: Pain  Goal: Takes deep breaths with improved pain control throughout the shift  Outcome: Progressing  Goal: Turns in bed with improved pain control throughout the shift  Outcome: Progressing  Goal: Walks with improved pain control throughout the shift  Outcome: Progressing  Goal: Performs ADL's with improved pain control throughout shift  Outcome: Progressing  Goal: Participates in PT with improved pain control throughout the shift  Outcome: Progressing  Goal: Free from opioid side effects throughout the shift  Outcome: Progressing  Goal: Free from acute confusion related to pain meds throughout the shift  Outcome: Progressing     Problem: Wound Care  Goal: Area will decrease in size .2x.2 cm per week  Outcome: Progressing

## 2024-08-23 NOTE — PROGRESS NOTES
Physical Therapy    Physical Therapy Evaluation    Patient Name: Mariann Drew  MRN: [id number]  Today's Date: 8/23/2024   Time Calculation  Start Time: 1300  Stop Time: 1345  Time Calculation (min): 45 min    Assessment/Plan   PT Assessment  PT Assessment Results: Decreased strength, Decreased range of motion, Decreased endurance, Impaired balance, Decreased mobility, Impaired judgement, Decreased safety awareness, Impaired vision, Pain  Rehab Prognosis: Good  Barriers to Discharge: pain, dizziness with mobility  Evaluation/Treatment Tolerance: Patient limited by fatigue, Patient limited by pain (limited by dizziness)  Medical Staff Made Aware: Yes  Strengths: Support of Caregivers, Premorbid level of function  End of Session Communication: Bedside nurse  End of Session Patient Position: Bed, 2 rail up, Alarm on  IP OR SWING BED PT PLAN  Inpatient or Swing Bed: Inpatient  PT Plan  Treatment/Interventions: Bed mobility, Transfer training, Gait training, Stair training, Balance training, Strengthening, Endurance training, Range of motion, Therapeutic exercise, Therapeutic activity, Home exercise program, Positioning, Postural re-education  PT Plan: Ongoing PT  PT Frequency: 5 times per week (6 times PRN)  PT Discharge Recommendations:  (Anticipate pt to require intermittent min A at walker level at time of discharge.)  Equipment Recommended upon Discharge: Wheeled walker  PT Recommended Transfer Status: Assist x2 (at time of initial evaluation)      Subjective   General Visit Information:  General  Reason for Referral: Pt is a 68 y.o. female admitted to [facility] on 8/8/24 with infected surgical site. 7/30 she underwent an abdominoplasty. 8/21: ABDOMEN DEBRIDEMENT WITH  WOUND VAC APPLICATION  Referred By: Johanne  Past Medical History Relevant to Rehab: Hyperlipidemia, former type II diabetic, resolved with weight loss status post gastric bypass, anemia, GERD, ANKUSH, breast cancer with left mastectomy, gout, and tardive  dyskinesia  Prior to Session Communication: Bedside nurse  Patient Position Received:  (RN notified of pt's confusion, with therapy through majority of eval)  General Comment: patient pleasant and cooperative; however, dizzy upon sitting EOB, RN present, taking vitals, returning patient to bed shortly after as patient unable to tolerate further activity  Home Living:  Home Living  Type of Home: House  Lives With: Spouse  Home Adaptive Equipment:  (rollator, stair lift to second floor)  Home Layout: Two level  Home Access: Stairs to enter without rails  Entrance Stairs-Rails: None  Entrance Stairs-Number of Steps: 2  Prior Level of Function:  Prior Function Per Pt/Caregiver Report  Level of Kings:  (Pt reports independence prior to original surgery 7/30.  Post surgery 7/30 pt required assist lifting BLE up into bed, assist with transfers and ambulation at walker level.  Pt reports  assisted with this at home prior to admission on 8/8.)  Receives Help From: Family  Precautions:  Precautions  Medical Precautions: Fall precautions (alarms, wound vac, abdominal precautions, abdominal binder, port/IV)  Precautions Comment:  (No BP LUE)  Vital Signs:       Objective   Pain:  Pain Assessment  Pain Assessment: 0-10  0-10 (Numeric) Pain Score: 5 - Moderate pain  Pain Location: Abdomen  Pain Interventions: Repositioned, Distraction (RN aware)  Cognition:  Cognition  Overall Cognitive Status: Impaired  Orientation Level: Disoriented to place, Disoriented to person, Disoriented to situation (Pt reports date as 3/1975)    General Assessments:  Activity Tolerance  Endurance: Decreased tolerance for upright activites    Static Sitting Balance  Static Sitting-Balance Support: Feet supported, Bilateral upper extremity supported  Static Sitting-Level of Assistance: Contact guard (slight R lateral lean with increased time sitting EOB, pt reporting dizziness)    Static Standing Balance  Static Standing-Balance Support:  Bilateral upper extremity supported  Static Standing-Level of Assistance:  (mod/max A x 2, heavy R lateral lean, pt reporting dizziness, unable to correct lateral lean)  Functional Assessments:  Bed Mobility  Bed Mobility:  (Pt requires max A x 2 to roll R/L, yelling out in pain.  Pt performs supine/sit with max A x 2.  Pt performs sit/supine dep x3 secondary to increased dizziness and pain sitting EOB)    Transfers  Transfer:  (Pt performs sit/stand transfer with mod/max A x 2, max cues for hand placement, pt reaching for lower bars on walker in standing, confused.)    Ambulation/Gait Training  Ambulation/Gait Training Performed:  (unable to safely ambulate at this time secondary to confusion, dizziness and reports of double vision.  RN aware and vitals taken.)    Stairs  Stairs:  (unable to safely attempt at this time)     Object From Floor  Devices:  (unable to safely attempt at this time)  Extremity/Trunk Assessments:  RUE   RUE : Within Functional Limits  LUE   LUE: Within Functional Limits  RLE   RLE :  (unable to accurately assess at this time, did not MMT secondary to increased abdominal pain and dizziness with sitting EOB.  Increased swelling BLE limiting ankle ROM)  LLE   LLE :  (unable to accurately assess at this time, did not MMT secondary to increased abdominal pain and dizziness with sitting EOB. Increased swelling BLE limiting ankle ROM)      Encounter Problems       Encounter Problems (Active)       PT Problem       LTG - Pt will perform bed mobility with mod A x 1  (Progressing)       Start:  08/23/24    Expected End:  09/06/24            LTG - Pt will perform transfers with min A x 1.  (Progressing)       Start:  08/23/24    Expected End:  09/06/24            LTG - Pt will amb 150 feet with FWW and CGA.   (Progressing)       Start:  08/23/24    Expected End:  09/06/24            LTG - Pt will up/down 2 stairs with 1 HR and straight cane with min A x 1.  (Progressing)       Start:  08/23/24     Expected End:  09/06/24            STG - Pt will perform bed mobility with mod assist x 2 (Progressing)       Start:  08/23/24    Expected End:  08/30/24            STG - Pt will perform transfers with mod assist x 1 (Progressing)       Start:  08/23/24    Expected End:  08/30/24            STG - Pt will amb 50 feet with FWW and mod/min assist x 1 (Progressing)       Start:  08/23/24    Expected End:  08/30/24            STG - Pt will up/down curb step with FWW and mod A x 2.  (Progressing)       Start:  08/23/24    Expected End:  08/30/24                   Education Documentation  Precautions, taught by Key Siddiqui PT at 8/23/2024  2:21 PM.  Learner: Patient  Readiness: Acceptance  Method: Explanation, Demonstration  Response: Verbalizes Understanding, Needs Reinforcement    Body Mechanics, taught by Key Siddiqui PT at 8/23/2024  2:21 PM.  Learner: Patient  Readiness: Acceptance  Method: Explanation, Demonstration  Response: Verbalizes Understanding, Needs Reinforcement    Mobility Training, taught by Key Siddiqui PT at 8/23/2024  2:21 PM.  Learner: Patient  Readiness: Acceptance  Method: Explanation, Demonstration  Response: Verbalizes Understanding, Needs Reinforcement    Education Comments  Pt educated on rehab processes, treatment interventions, and goals.

## 2024-08-23 NOTE — PROGRESS NOTES
Occupational Therapy    Evaluation    Patient Name: Mariann Drew  MRN: [id number]  Today's Date: 8/23/2024  Time Calculation  Start Time: 1300  Stop Time: 1345  Time Calculation (min): 45 min        Assessment:  End of Session Communication: Bedside nurse  End of Session Patient Position: Bed, 2 rail up, Alarm on  OT Assessment Results: Decreased ADL status, Decreased upper extremity strength, Decreased safe judgment during ADL, Decreased cognition, Decreased endurance, Decreased fine motor control, Decreased functional mobility, Decreased gross motor control  Plan:  Treatment Interventions: ADL retraining, Functional transfer training, UE strengthening/ROM, Endurance training, Equipment evaluation/education, Patient/family training, Compensatory technique education, Fine motor coordination activities  OT Frequency: 5 times per week (6 days/PRN)  Treatment Interventions: ADL retraining, Functional transfer training, UE strengthening/ROM, Endurance training, Equipment evaluation/education, Patient/family training, Compensatory technique education, Fine motor coordination activities    Subjective   Current Problem:  No diagnosis found.  General:  General  Reason for Referral: OT eval and tx; ADLs. 68 y.o. female admitted to [facility] ON 8/8/24 with a PMH of Hyperlipidemia former type II diabetic, resolved with weight loss status post gastric bypass, anemia, GERD, AKNUSH, breast cancer with left mastectomy, gout, and tardive dyskinesia who presented today with infected surgical site.  Patient reports on July 30 she underwent an abdominoplasty and since then, she thought her pain was disproportionate to the procedure, rating it a 5/10.  She continued that she developed subjective fevers, and purulent drainage from her BÁRBARA drains two to three days after her surgery.  She notes she followed up today and was instructed that her incision was infected and to come to the emergency room for evaluation.She notes she followed up today  she adds that she has not had a bowel movement in about a week and her stomach feels distended. 8/21: ABDOMEN DEBRIDEMENT WITH  WOUND VAC APPLICATION  Referred By: Johanne  Past Medical History Relevant to Rehab: Chronic conditions: Gout, tardive dyskinesia, GERD, anemia, status post gastric bypass, hyperlipidemia     Past surgical history: Gastric bypass, abdominoplasty, bladder stimulator, right knee replacement, hysterectomy, cholecystectomy, mastectomy, tissue expander surgery, breast reduction, esophageal dilation, carpal tunnel repair  Prior to Session Communication: Bedside nurse  Patient Position Received:  (patient lying in bed at start and end of eval, HOB elevated to comfort, call light in reach, bed alarm engaged, all needs met. wound vac and Port in place throughout)  General Comment: patient pleasant and cooperative; however, dizzy upon sitting EOB, RN present, taking vitals, returning patient to bed shortly after as patient unable to tolerate further activity  Precautions:  Medical Precautions: Fall precautions (alarms, wound vac, abdominal precautions, port to chest)  Vital Signs:  Patient Position:  (see nursing note for vitals during eval)  Pain:  Pain Assessment  Pain Assessment:  (reports 4-5/10 pain in abdomen)    Objective   Cognition:  Overall Cognitive Status: Impaired (patient initially reporting it was July of 1974 upon eval; after cueing, able to report 2024)           Home Living:  Type of Home:  (per patient report, lives with spouse in a 2 story home, 2 FRANSICO. has chair lift for 2nd floor. bedroom/bathroom on 2nd floor)  Bathroom Shower/Tub:  (walk in shower, shower chair, grab bar, handheld shower)  Bathroom Toilet:  (raised toilet , grab bar)  Prior Function:  Level of [place]:  (patient reports recently needing assist from spouse for transfers and mobility. use of rollator PLOF. also owns WW. independent in ADLs, share IADLs. sleeps in standard bed)  Prior Function Comments:  patient presenting as a questionable historian throughout eval       ADL:  Eating Assistance: Stand by  Grooming Assistance: Moderate  Bathing Assistance: Total  UE Dressing Assistance: Maximal  LE Dressing Assistance: Total  Toileting Assistance with Device: Total  Activity Tolerance:  Endurance: Decreased tolerance for upright activites  Bed Mobility/Transfers: Bed Mobility  Bed Mobility:  (completed supine to sit with MAX A x 2 and sit to supiine with dependent x 3.)    Transfers  Transfer:  (completed STS with MOD-MAX A x 2 with WW; static standing with MOD-AX A x 2 with WW, Patient with right lateral lean upon standing, requires max cues to correct with poor carryover overall.) RN present and aware.       Functional Mobility:  Functional Mobility  Functional Mobility Performed:  (unable to attempt this date due to patient dizziness, fatigue, confusion)     Sensation:  Light Touch: No apparent deficits  Strength:  Strength Comments: BUE ROM/MMT WFL for patient age          Education Documentation  Body Mechanics, taught by Carina Todd OT at 8/23/2024  2:18 PM.  Learner: Patient  Readiness: Acceptance  Method: Explanation  Response: Verbalizes Understanding, Needs Reinforcement    Precautions, taught by Carina Todd OT at 8/23/2024  2:18 PM.  Learner: Patient  Readiness: Acceptance  Method: Explanation  Response: Verbalizes Understanding, Needs Reinforcement    ADL Training, taught by Carina Todd OT at 8/23/2024  2:18 PM.  Learner: Patient  Readiness: Acceptance  Method: Explanation  Response: Verbalizes Understanding, Needs Reinforcement    Education Comments  No comments found.        OP EDUCATION:  Education  Education Comment: patient reports no concerns with sex/intimacy in regards to diagnosis    Goals:  Encounter Problems       Encounter Problems (Active)       OT Problem       STG- patient will complete grooming with MIN A with use of ae/ad/dme prn (Progressing)       Start:  08/23/24     Expected End:  08/30/24            STG- patient will complete bathing with MAX A with use of ae/ad/dme prn (Progressing)       Start:  08/23/24    Expected End:  08/30/24            STG- patient will complete UB dressing with MOD A with use of ae/ad/dme prn (Progressing)       Start:  08/23/24    Expected End:  08/30/24            STG- patient will complete LB dressing with MAX A with use of ae/ad/dme prn (Progressing)       Start:  08/23/24    Expected End:  08/30/24            STG- patient will complete toileting with MAX A with use of ae/ad/dme prn  (Progressing)       Start:  08/23/24    Expected End:  08/30/24            STG- patient will complete toilet/commode transfers with MAX A with use of ae/ad/dme prn (Progressing)       Start:  08/23/24    Expected End:  08/30/24            STG- patient will complete tub/shower transfers with MAX A with use of ae/ad/dme prn (Progressing)       Start:  08/23/24    Expected End:  08/30/24            STG- patient will complete simple mobility with MOD A with use of ae/ad/dme prn (Progressing)       Start:  08/23/24    Expected End:  08/30/24            LTG- patient will complete grooming with CGA with use of ae/ad/dme prn (Progressing)       Start:  08/23/24    Expected End:  09/06/24            LTG- patient will complete bathing with MOD A with use of ae/ad/dme prn (Progressing)       Start:  08/23/24    Expected End:  09/06/24            LTG- patient will complete UB dressing with Min A  with use of ae/ad/dme prn (Progressing)       Start:  08/23/24    Expected End:  09/06/24            LTG- patient will complete LB dressing with MOD A with use of ae/ad/dme prn (Progressing)       Start:  08/23/24    Expected End:  09/06/24            LTG- patient will complete toileting with MOD A with use of ae/ad/dme prn  (Progressing)       Start:  08/23/24    Expected End:  09/06/24            LTG- patient will complete toilet/commode transfers with Mod A with use of ae/ad/dme prn  (Progressing)       Start:  08/23/24    Expected End:  09/06/24            LTG- patient will complete tub/shower transfers with MOD A with use of ae/ad/dme prn (Progressing)       Start:  08/23/24    Expected End:  09/06/24            LTG- patient will complete simple mobility with MIN A with use of ae/ad/dme prn (Progressing)       Start:  08/23/24    Expected End:  09/06/24

## 2024-08-23 NOTE — PROGRESS NOTES
8/23/24:  Spoke with patient and her  bedside, introduced self and explained role.  Patient lives with her  in a 2 level home with a chair lift and walk in shower.  Prior to admission the patient was independent and driving.   is home during the day and able to assist at discharge.  He is aware and agreeable to administering IV antibiotics at home.  Discussed ELOS depends on progress and goals, will continue to update regarding DC planning. Scheduled therapy observation with  for Tuesday 8/27 at 8:30am.  -Hung Corrales RN    8/29/24:  Spoke with patient's  and discussed DC Wednesday 9/4.  Scheduled therapy teaching for Monday 9/2 at 8:30am.   has concerns about patient's confusion -Dr. Whiting aware. Neurology consulted to assess.  -Hung Corrales RN    9/3/24:  Spoke with patient and  bedside, discussed DC Friday 9/6, they are agreeable.  is agreeable to administering PICC line antibiotics at home. Patient has an appointment tomorrow with plastic surgery to assess wound.  -Hung Corrales RN    9/5/24:  Patient ready for discharge home tomorrow.  Choices offered, [facility] preferred. -Hung Corrales RN    9/6/24:  Patient discharged home with [facility]. Home infusion referral processed for SOC 9/7.  -Hung Corrales RN    9/9/24:  Follow up phone call attempted L/M. Stephanie Corrales RN

## 2024-08-23 NOTE — PROGRESS NOTES
"Mariann Drew is a 68 y.o. female on day 15 of admission presenting with Postoperative wound cellulitis.    Subjective   No events overnight. Plans for closuretoday, dc planning      Objective     Physical Exam  Constitutional:       Appearance: Normal appearance. Tardive dyskinesia   HENT:      Head: Normocephalic and atraumatic.      Nose: Nose normal.      Mouth/Throat:      Mouth: Mucous membranes are moist.   Eyes:      Conjunctiva/sclera: Conjunctivae normal.   Cardiovascular:      Rate and Rhythm: Normal rate and regular rhythm.      Heart sounds: Normal heart sounds.   Pulmonary:      Effort: Pulmonary effort is normal.      Breath sounds: Normal breath sounds.   Abdominal:      General: Abdomen is flat. Bowel sounds are normal. There is distension.      Tenderness: There is no abdominal tenderness.   Musculoskeletal:         General: Normal range of motion.      Cervical back: Neck supple.   Skin:     Comments: Abdomen with binder in place, transverse incision with  erythematous dehiscence in the middle, packed with purulent soaked Kerlix. The rest of the incision is well approximated.  BL flank BÁRBARA drain sites with edema and erythema draining moderate amount of purulent drainage   Neurological:      Mental Status: She is alert. Mental status is at baseline.   Psychiatric:         Mood and Affect: Mood normal.     Last Recorded Vitals  Blood pressure 114/59, pulse 104, temperature 36.7 °C (98.1 °F), temperature source Temporal, resp. rate 18, height 1.626 m (5' 4.02\"), weight 72.2 kg (159 lb 2.8 oz), SpO2 94%.  Intake/Output last 3 Shifts:  I/O last 3 completed shifts:  In: 1079.7 (15 mL/kg) [P.O.:460; I.V.:569.7 (7.9 mL/kg); IV Piggyback:50]  Out: 1950 (27 mL/kg) [Urine:1000 (0.4 mL/kg/hr); Drains:950]  Weight: 72.2 kg     Relevant Results                             Assessment/Plan   Principal Problem:    Postoperative wound cellulitis  Active Problems:    Cellulitis, abdominal wall    Disruption of " wound, unspecified, initial encounter    Surgical site infection    Postoperative wound cellulitis  Wound dehiscence  RUSSELL  Hyponatremia  Hypochloremia     Admit to surgical floor  Appreciate plastics recs  Continue merrem and vanco  CBC, CMP in am  Wound care, see orders  Drain care  Wound and blood cultures pending  Normal saline at 100 cc an hour     Chronic conditions: Gout, tardive dyskinesia, GERD, anemia, status post gastric bypass, hyperlipidemia     Continue home medications as listed above     DVT prophylaxis     SCDs  Heparin     8/9: some drainage noted, contineu abx, monitor labs, plastics consulted     8/10: Wound culture growing group B strep.  Await final cultures.  Continue empiric antibiotics.  Await thoracic surgery input.    8/11: Wound culture final: Growing group B strep.  Continue empiric antibiotics.  Await surgery input.  Creatinine improved from 1.3 down to 1.0.    8/12: I&D later today doing well. Spoke with plastics    8/13: doing well, wound vac in place consult ID to help manage abx and wound care    8/14: poor vasculature, midline in placed, will stop fluid and give abx only to see if its improves    8/15: faileld midlien, will need 14 days invanz, will order tunnelled picck ecf placement    8/16: 1/13 fro invanz, PT/OT wound care, will need ecf    8/17: day 2 invanz, need 14 day todal    8/18: Continue current antibiotics.  Anticipate return to the OR on Wednesday for additional debridement and closure with plastic surgery.    8/19: dc fludis, closure on Wednesday continue abx    8.20: no issues NPO after midnight for closure    8/21: to OR today for closure     8/22: dc planning      I spent 55 minutes in the professional and overall care of this patient.      Jarocho Venegas, DO

## 2024-08-23 NOTE — PROGRESS NOTES
Occupational Therapy    Evaluation    Patient Name: Mariann Drew  MRN: [id number]  Today's Date: 8/23/2024  Time Calculation  Start Time: 1300  Stop Time: 1345  Time Calculation (min): 45 min        Assessment:  End of Session Communication: Bedside nurse  End of Session Patient Position: Bed, 2 rail up, Alarm on  OT Assessment Results: Decreased ADL status, Decreased upper extremity strength, Decreased safe judgment during ADL, Decreased cognition, Decreased endurance, Decreased fine motor control, Decreased functional mobility, Decreased gross motor control  Plan:  Treatment Interventions: ADL retraining, Functional transfer training, UE strengthening/ROM, Endurance training, Equipment evaluation/education, Patient/family training, Compensatory technique education, Fine motor coordination activities  OT Frequency: 5 times per week (6 days/PRN)  Treatment Interventions: ADL retraining, Functional transfer training, UE strengthening/ROM, Endurance training, Equipment evaluation/education, Patient/family training, Compensatory technique education, Fine motor coordination activities    Subjective   Current Problem:  No diagnosis found.  General:  General  Reason for Referral: OT eval and tx; ADLs. 68 y.o. female admitted [facility] ON 8/8/24 with a PMH of Hyperlipidemia former type II diabetic, resolved with weight loss status post gastric bypass, anemia, GERD, ANUKSH, breast cancer with left mastectomy, gout, and tardive dyskinesia who presented today with infected surgical site.  Patient reports on July 30 she underwent an abdominoplasty and since then, she thought her pain was disproportionate to the procedure, rating it a 5/10.  She continued that she developed subjective fevers, and purulent drainage from her BÁRBARA drains two to three days after her surgery.  She notes she followed up today and was instructed that her incision was infected and to come to the emergency room for evaluation.She notes she followed up today  she adds that she has not had a bowel movement in about a week and her stomach feels distended. 8/21: ABDOMEN DEBRIDEMENT WITH  WOUND VAC APPLICATION  Referred By: Johanne  Past Medical History Relevant to Rehab: Chronic conditions: Gout, tardive dyskinesia, GERD, anemia, status post gastric bypass, hyperlipidemia     Past surgical history: Gastric bypass, abdominoplasty, bladder stimulator, right knee replacement, hysterectomy, cholecystectomy, mastectomy, tissue expander surgery, breast reduction, esophageal dilation, carpal tunnel repair  Prior to Session Communication: Bedside nurse  Patient Position Received:  (patient lying in bed at start and end of eval, HOB elevated to comfort, call light in reach, bed alarm engaged, all needs met. wound vac and Port in place throughout)  General Comment: patient pleasant and cooperative; however, dizzy upon sitting EOB, RN present, taking vitals, returning patient to bed shortly after as patient unable to tolerate further activity. RN present for majority of eval this date.   Precautions:  Medical Precautions: Fall precautions (alarms, wound vac, abdominal precautions, port to chest, abdominal binder  Vital Signs:  Patient Position:  (see nursing note for vitals during eval)  Pain:  Pain Assessment  Pain Assessment:  (reports 4-5/10 pain in abdomen)    Objective   Cognition:  Overall Cognitive Status: Impaired (patient initially reporting it was July of 1974 upon eval; after cueing, able to report 2024)           Home Living:  Type of Home:  (per patient report, lives with spouse in a 2 story home, 2 FRANSICO. has chair lift for 2nd floor. bedroom/bathroom on 2nd floor)  Bathroom Shower/Tub:  (walk in shower, shower chair, grab bar, handheld shower)  Bathroom Toilet:  (raised toilet , grab bar)  Prior Function:  Level of [place]:  (patient reports recently needing assist from spouse for transfers and mobility. use of rollator PLOF. also owns WW. independent in ADLs, share  IADLs. sleeps in standard bed)  Prior Function Comments: patient presenting as a questionable historian throughout eval       ADL:  Eating Assistance: Stand by  Grooming Assistance: Moderate  Bathing Assistance: Total  UE Dressing Assistance: Maximal  LE Dressing Assistance: Total  Toileting Assistance with Device: Total  Activity Tolerance:  Endurance: Decreased tolerance for upright activites  Bed Mobility/Transfers: Bed Mobility  Bed Mobility:  (completed supine to sit with MAX A x 2 and sit to supiine with dependent x 3.)    Transfers  Transfer:  (completed STS with MOD-MAX A x 2 with WW; static standing with MOD-AX A x 2 with WW, Patient with right lateral lean upon standing, requires max cues to correct with poor carryover overall.) RN present and aware.       Functional Mobility:  Functional Mobility  Functional Mobility Performed:  (unable to attempt this date due to patient dizziness, fatigue, confusion)     Vision:Vision - Basic Assessment  Current Vision:  (patient reporting double vision upon eval, RN aware)  Sensation:  Light Touch: No apparent deficits  Strength:  Strength Comments: BUE ROM/MMT WFL for patient age      Education Documentation  Body Mechanics, taught by Carina Todd OT at 8/23/2024  2:18 PM.  Learner: Patient  Readiness: Acceptance  Method: Explanation  Response: Verbalizes Understanding, Needs Reinforcement    Precautions, taught by Carina Todd OT at 8/23/2024  2:18 PM.  Learner: Patient  Readiness: Acceptance  Method: Explanation  Response: Verbalizes Understanding, Needs Reinforcement    ADL Training, taught by Carina Todd OT at 8/23/2024  2:18 PM.  Learner: Patient  Readiness: Acceptance  Method: Explanation  Response: Verbalizes Understanding, Needs Reinforcement    Education Comments  No comments found.        OP EDUCATION:  Education  Education Comment: patient reports no concerns with sex/intimacy in regards to diagnosis    Goals:  Encounter Problems       Encounter  Problems (Active)       OT Problem       STG- patient will complete grooming with MIN A with use of ae/ad/dme prn (Progressing)       Start:  08/23/24    Expected End:  08/30/24            STG- patient will complete bathing with MAX A with use of ae/ad/dme prn (Progressing)       Start:  08/23/24    Expected End:  08/30/24            STG- patient will complete UB dressing with MOD A with use of ae/ad/dme prn (Progressing)       Start:  08/23/24    Expected End:  08/30/24            STG- patient will complete LB dressing with MAX A with use of ae/ad/dme prn (Progressing)       Start:  08/23/24    Expected End:  08/30/24            STG- patient will complete toileting with MAX A with use of ae/ad/dme prn  (Progressing)       Start:  08/23/24    Expected End:  08/30/24            STG- patient will complete toilet/commode transfers with MAX A with use of ae/ad/dme prn (Progressing)       Start:  08/23/24    Expected End:  08/30/24            STG- patient will complete tub/shower transfers with MAX A with use of ae/ad/dme prn (Progressing)       Start:  08/23/24    Expected End:  08/30/24            STG- patient will complete simple mobility with MOD A with use of ae/ad/dme prn (Progressing)       Start:  08/23/24    Expected End:  08/30/24            LTG- patient will complete grooming with CGA with use of ae/ad/dme prn (Progressing)       Start:  08/23/24    Expected End:  09/06/24            LTG- patient will complete bathing with MOD A with use of ae/ad/dme prn (Progressing)       Start:  08/23/24    Expected End:  09/06/24            LTG- patient will complete UB dressing with Min A  with use of ae/ad/dme prn (Progressing)       Start:  08/23/24    Expected End:  09/06/24            LTG- patient will complete LB dressing with MOD A with use of ae/ad/dme prn (Progressing)       Start:  08/23/24    Expected End:  09/06/24            LTG- patient will complete toileting with MOD A with use of ae/ad/dme prn   (Progressing)       Start:  08/23/24    Expected End:  09/06/24            LTG- patient will complete toilet/commode transfers with Mod A with use of ae/ad/dme prn (Progressing)       Start:  08/23/24    Expected End:  09/06/24            LTG- patient will complete tub/shower transfers with MOD A with use of ae/ad/dme prn (Progressing)       Start:  08/23/24    Expected End:  09/06/24            LTG- patient will complete simple mobility with MIN A with use of ae/ad/dme prn (Progressing)       Start:  08/23/24    Expected End:  09/06/24

## 2024-08-23 NOTE — PROGRESS NOTES
Spoke with patient and  at bedside. Patient decided to go to [facility] for a week. Notified [facility] who has accepted patient. Notified [facility] so they can cancel the referral. Nurse aware.

## 2024-08-24 VITALS
SYSTOLIC BLOOD PRESSURE: 100 MMHG | HEART RATE: 102 BPM | OXYGEN SATURATION: 100 % | DIASTOLIC BLOOD PRESSURE: 57 MMHG | TEMPERATURE: 98.1 F

## 2024-08-24 LAB
ANION GAP SERPL CALC-SCNC: 12 MMOL/L (ref 10–20)
BACTERIA SPEC CULT: NORMAL
BUN SERPL-MCNC: 23 MG/DL (ref 6–23)
CALCIUM SERPL-MCNC: 8.4 MG/DL (ref 8.6–10.3)
CHLORIDE SERPL-SCNC: 110 MMOL/L (ref 98–107)
CO2 SERPL-SCNC: 19 MMOL/L (ref 21–32)
CREAT SERPL-MCNC: 1.52 MG/DL (ref 0.5–1.05)
CRP SERPL-MCNC: 1.59 MG/DL
EGFRCR SERPLBLD CKD-EPI 2021: 37 ML/MIN/1.73M*2
ERYTHROCYTE [DISTWIDTH] IN BLOOD BY AUTOMATED COUNT: 14.9 % (ref 11.5–14.5)
ERYTHROCYTE [SEDIMENTATION RATE] IN BLOOD BY WESTERGREN METHOD: 16 MM/H (ref 0–30)
GLUCOSE SERPL-MCNC: 107 MG/DL (ref 74–99)
GRAM STN SPEC: NORMAL
GRAM STN SPEC: NORMAL
HCT VFR BLD AUTO: 23 % (ref 36–46)
HGB BLD-MCNC: 7.4 G/DL (ref 12–16)
MCH RBC QN AUTO: 30.5 PG (ref 26–34)
MCHC RBC AUTO-ENTMCNC: 32.2 G/DL (ref 32–36)
MCV RBC AUTO: 95 FL (ref 80–100)
NRBC BLD-RTO: 0 /100 WBCS (ref 0–0)
PLATELET # BLD AUTO: 108 X10*3/UL (ref 150–450)
POTASSIUM SERPL-SCNC: 4.6 MMOL/L (ref 3.5–5.3)
RBC # BLD AUTO: 2.43 X10*6/UL (ref 4–5.2)
SODIUM SERPL-SCNC: 136 MMOL/L (ref 136–145)
WBC # BLD AUTO: 7.1 X10*3/UL (ref 4.4–11.3)

## 2024-08-24 PROCEDURE — 97110 THERAPEUTIC EXERCISES: CPT | Mod: CO,GO

## 2024-08-24 PROCEDURE — 97110 THERAPEUTIC EXERCISES: CPT | Mod: GP

## 2024-08-24 PROCEDURE — 85652 RBC SED RATE AUTOMATED: CPT | Performed by: PHYSICAL MEDICINE & REHABILITATION

## 2024-08-24 PROCEDURE — 2500000001 HC RX 250 WO HCPCS SELF ADMINISTERED DRUGS (ALT 637 FOR MEDICARE OP): Performed by: PHYSICAL MEDICINE & REHABILITATION

## 2024-08-24 PROCEDURE — 97530 THERAPEUTIC ACTIVITIES: CPT | Mod: GP

## 2024-08-24 PROCEDURE — 97535 SELF CARE MNGMENT TRAINING: CPT | Mod: CO,GO

## 2024-08-24 PROCEDURE — 86140 C-REACTIVE PROTEIN: CPT | Performed by: PHYSICAL MEDICINE & REHABILITATION

## 2024-08-24 PROCEDURE — 85027 COMPLETE CBC AUTOMATED: CPT | Performed by: PHYSICAL MEDICINE & REHABILITATION

## 2024-08-24 PROCEDURE — 80048 BASIC METABOLIC PNL TOTAL CA: CPT | Performed by: PHYSICAL MEDICINE & REHABILITATION

## 2024-08-24 PROCEDURE — 1180000001 HC REHAB PRIVATE ROOM DAILY

## 2024-08-24 PROCEDURE — 2500000004 HC RX 250 GENERAL PHARMACY W/ HCPCS (ALT 636 FOR OP/ED): Performed by: PHYSICAL MEDICINE & REHABILITATION

## 2024-08-24 PROCEDURE — 97116 GAIT TRAINING THERAPY: CPT | Mod: GP

## 2024-08-24 RX ADMIN — NYSTATIN: 100000 POWDER TOPICAL at 21:08

## 2024-08-24 RX ADMIN — LAMOTRIGINE 200 MG: 100 TABLET ORAL at 08:54

## 2024-08-24 RX ADMIN — FERROUS SULFATE TAB 325 MG (65 MG ELEMENTAL FE) 325 MG: 325 (65 FE) TAB at 08:56

## 2024-08-24 RX ADMIN — LATANOPROST 1 DROP: 50 SOLUTION OPHTHALMIC at 21:06

## 2024-08-24 RX ADMIN — FERROUS SULFATE TAB 325 MG (65 MG ELEMENTAL FE) 325 MG: 325 (65 FE) TAB at 21:03

## 2024-08-24 RX ADMIN — DULOXETINE HYDROCHLORIDE 60 MG: 30 CAPSULE, DELAYED RELEASE ORAL at 08:55

## 2024-08-24 RX ADMIN — SENNOSIDES 8.6 MG: 8.6 TABLET, FILM COATED ORAL at 21:02

## 2024-08-24 RX ADMIN — DEXAMETHASONE 0.5 MG: 1 TABLET ORAL at 07:19

## 2024-08-24 RX ADMIN — DULOXETINE HYDROCHLORIDE 60 MG: 30 CAPSULE, DELAYED RELEASE ORAL at 21:02

## 2024-08-24 RX ADMIN — ENOXAPARIN SODIUM 40 MG: 100 INJECTION SUBCUTANEOUS at 18:30

## 2024-08-24 RX ADMIN — DEXAMETHASONE 0.5 MG: 1 TABLET ORAL at 18:31

## 2024-08-24 RX ADMIN — DEXAMETHASONE 0.5 MG: 1 TABLET ORAL at 21:03

## 2024-08-24 RX ADMIN — LOTILANER OPHTHALMIC SOLUTION 1 DROP: 2.5 SOLUTION/ DROPS OPHTHALMIC at 21:07

## 2024-08-24 RX ADMIN — ALLOPURINOL 400 MG: 100 TABLET ORAL at 08:55

## 2024-08-24 RX ADMIN — ERTAPENEM SODIUM 1 G: 1 INJECTION, POWDER, LYOPHILIZED, FOR SOLUTION INTRAMUSCULAR; INTRAVENOUS at 09:02

## 2024-08-24 RX ADMIN — CYCLOBENZAPRINE 5 MG: 10 TABLET, FILM COATED ORAL at 09:02

## 2024-08-24 RX ADMIN — DEXAMETHASONE 0.5 MG: 1 TABLET ORAL at 12:20

## 2024-08-24 RX ADMIN — SUCRALFATE 1 G: 1 TABLET ORAL at 21:03

## 2024-08-24 RX ADMIN — ACETAMINOPHEN 650 MG: 325 TABLET ORAL at 18:30

## 2024-08-24 RX ADMIN — ACETAMINOPHEN 650 MG: 325 TABLET ORAL at 09:02

## 2024-08-24 RX ADMIN — TRAZODONE HYDROCHLORIDE 150 MG: 50 TABLET ORAL at 21:01

## 2024-08-24 RX ADMIN — FAMOTIDINE 20 MG: 20 TABLET, FILM COATED ORAL at 08:55

## 2024-08-24 ASSESSMENT — PAIN - FUNCTIONAL ASSESSMENT
PAIN_FUNCTIONAL_ASSESSMENT: 0-10

## 2024-08-24 ASSESSMENT — ACTIVITIES OF DAILY LIVING (ADL)
HOME_MANAGEMENT_TIME_ENTRY: 15
HOME_MANAGEMENT_TIME_ENTRY: 45
BATHING_LEVEL_OF_ASSISTANCE: MAXIMUM ASSISTANCE

## 2024-08-24 ASSESSMENT — PAIN SCALES - GENERAL
PAINLEVEL_OUTOF10: 6
PAINLEVEL_OUTOF10: 5 - MODERATE PAIN
PAINLEVEL_OUTOF10: 8
PAINLEVEL_OUTOF10: 9
PAINLEVEL_OUTOF10: 8
PAINLEVEL_OUTOF10: 4
PAINLEVEL_OUTOF10: 5 - MODERATE PAIN

## 2024-08-24 NOTE — CARE PLAN
The patient's goals for the shift include  Decrease Bilateral lower extremity swelling     The clinical goals for the shift include No confusion today    Problem: Skin  Goal: Decreased wound size/increased tissue granulation at next dressing change  Outcome: Progressing  Goal: Participates in plan/prevention/treatment measures  Outcome: Progressing  Goal: Prevent/manage excess moisture  Outcome: Progressing  Goal: Prevent/minimize sheer/friction injuries  Outcome: Progressing  Goal: Promote/optimize nutrition  Outcome: Progressing  Goal: Promote skin healing  Outcome: Progressing     Problem: Fall/Injury  Goal: Not fall by end of shift  Outcome: Progressing  Goal: Be free from injury by end of the shift  Outcome: Progressing  Goal: Verbalize understanding of personal risk factors for fall in the hospital  Outcome: Progressing  Goal: Verbalize understanding of risk factor reduction measures to prevent injury from fall in the home  Outcome: Progressing  Goal: Use assistive devices by end of the shift  Outcome: Progressing  Goal: Pace activities to prevent fatigue by end of the shift  Outcome: Progressing     Problem: Pain  Goal: Takes deep breaths with improved pain control throughout the shift  Outcome: Progressing  Goal: Turns in bed with improved pain control throughout the shift  Outcome: Progressing  Goal: Walks with improved pain control throughout the shift  Outcome: Progressing  Goal: Performs ADL's with improved pain control throughout shift  Outcome: Progressing  Goal: Participates in PT with improved pain control throughout the shift  Outcome: Progressing  Goal: Free from opioid side effects throughout the shift  Outcome: Progressing  Goal: Free from acute confusion related to pain meds throughout the shift  Outcome: Progressing     Problem: Wound Care  Goal: Area will decrease in size .2x.2 cm per week  Outcome: Progressing

## 2024-08-24 NOTE — CARE PLAN
The patient's goals for the shift include      The clinical goals for the shift include Pt will be HD stable    Problem: Skin  Goal: Decreased wound size/increased tissue granulation at next dressing change  Outcome: Progressing  Flowsheets (Taken 8/24/2024 0353)  Decreased wound size/increased tissue granulation at next dressing change:   Promote sleep for wound healing   Protective dressings over bony prominences  Goal: Participates in plan/prevention/treatment measures  Outcome: Progressing  Flowsheets (Taken 8/23/2024 1824 by Mary Laboy RN)  Participates in plan/prevention/treatment measures:   Elevate heels   Increase activity/out of bed for meals  Goal: Prevent/manage excess moisture  Outcome: Progressing  Flowsheets (Taken 8/24/2024 0353)  Prevent/manage excess moisture:   Cleanse incontinence/protect with barrier cream   Moisturize dry skin   Use wicking fabric (obtain order)   Follow provider orders for dressing changes   Monitor for/manage infection if present  Goal: Prevent/minimize sheer/friction injuries  Outcome: Progressing  Flowsheets (Taken 8/24/2024 0353)  Prevent/minimize sheer/friction injuries:   Increase activity/out of bed for meals   Use pull sheet   HOB 30 degrees or less   Turn/reposition every 2 hours/use positioning/transfer devices  Goal: Promote/optimize nutrition  Outcome: Progressing  Flowsheets (Taken 8/24/2024 0353)  Promote/optimize nutrition:   Assist with feeding   Monitor/record intake including meals   Consume > 50% meals/supplements   Offer water/supplements/favorite foods   Reassess MST if dietician not consulted  Goal: Promote skin healing  Outcome: Progressing  Flowsheets (Taken 8/24/2024 0353)  Promote skin healing:   Assess skin/pad under line(s)/device(s)   Protective dressings over bony prominences   Turn/reposition every 2 hours/use positioning/transfer devices     Problem: Fall/Injury  Goal: Not fall by end of shift  Outcome: Progressing  Goal: Be free from  injury by end of the shift  Outcome: Progressing  Goal: Verbalize understanding of personal risk factors for fall in the hospital  Outcome: Progressing  Goal: Verbalize understanding of risk factor reduction measures to prevent injury from fall in the home  Outcome: Progressing  Goal: Use assistive devices by end of the shift  Outcome: Progressing  Goal: Pace activities to prevent fatigue by end of the shift  Outcome: Progressing     Problem: Pain  Goal: Takes deep breaths with improved pain control throughout the shift  Outcome: Progressing  Goal: Turns in bed with improved pain control throughout the shift  Outcome: Progressing  Goal: Walks with improved pain control throughout the shift  Outcome: Progressing  Goal: Performs ADL's with improved pain control throughout shift  Outcome: Progressing  Goal: Participates in PT with improved pain control throughout the shift  Outcome: Progressing  Goal: Free from opioid side effects throughout the shift  Outcome: Progressing  Goal: Free from acute confusion related to pain meds throughout the shift  Outcome: Progressing     Problem: Wound Care  Goal: Area will decrease in size .2x.2 cm per week  Outcome: Progressing

## 2024-08-24 NOTE — INDIVIDUALIZED OVERALL PLAN OF CARE NOTE
Individualized Plan of Care:     Primary rehabilitation diagnosis: Wound infection from abdominoplasty     [facility] code: 16     Estimated length of stay:  5-7 days     Medical functional prognosis is good to be discharged home at an overall contact-guard to min assist with her        Patient/family anticipated outcome:  Home as independent as possible     Functional outcome/goal:  Bed mobility: Min assist  Transfer sit to stand : Min assist  Ambulation: 150 feet with contact-guard assist  Upper extremity dressing: Contact-guard assist  Lower extremity dressing: Contact-guard assist  Stairs: 5 stairs with min assist   communicate needs and wants as independent as able.       Anticipated interventions:  Therapeutic exercises  Gait Training  Transfer training  Exercises to improve balance and mobility  ADL retraining for grooming, bathing, ADL activities  Exercises to improve strength and endurance            Required therapy:  Physical therapy 90 minutes 5 days/week.  Occupational therapy 90 minutes 5 days/week.       Patient has good potential to be discharged home at a contact-guard to min assist level overall.  She is having lower extremity swelling and pain and will obtain bilateral duplex scans.  She is in compression stockings.  She will be seen by a physician to manage her comorbidities.    Individualized patient cannot plan of care    1.  Abdominoplasty with wound infection group B strep  Continue IV Invanz  Begin physical therapy for bed mobility, transfers, endurance activities, gait training with an assistive device  Begin occupational therapy for functional mobility, upper limb strengthening, coordination, balance and endurance activities as well as adaptive aids     2.  Gout  Zyloprim 400 mg daily  Monitor for gout symptoms     3.  Tardive dyskinesia  Austedo 30 mg daily  Patient's  will bring in the medication     4.  DVT prophylaxis  Lovenox 40 mg daily     5.  GERD and difficulty  swallowing  Pepcid 20 mg daily  Carafate 1 g nightly     6.  Pain  Cymbalta 60 mg twice a day  Lamictal 200 mg daily  Decadron 0.5 mg 4 times a day     7.  Sleep   150 mg nightly     8.  Diabetes mellitus type 2  Semaglutide 0.5 mg weekly     9. MELBAI  She has not had a bowel movement for 2 days will begin on a bowel program  DVT prophylaxis she is on Lovenox 40 mg daily  Will monitor her white count for adequate response to antibiotics

## 2024-08-24 NOTE — PROGRESS NOTES
Physical Therapy    Physical Therapy Treatment    Patient Name: Mariann Drew  MRN: [id number]  Today's Date: 8/24/2024  Time Calculation  Start Time: 1045  Stop Time: 1130  Time Calculation (min): 45 min    Assessment/Plan   PT Assessment  End of Session Patient Position: Up in chair, Alarm on     PT Plan  Treatment/Interventions: Bed mobility, Transfer training, Gait training, Stair training, Balance training, Strengthening, Endurance training, Range of motion, Therapeutic exercise, Therapeutic activity, Home exercise program, Positioning, Postural re-education  PT Plan: Ongoing PT  PT Frequency: 5 times per week (6 times PRN)  PT Discharge Recommendations:  (Anticipate pt to require intermittent min A at walker level at time of discharge.)  Equipment Recommended upon Discharge: Wheeled walker  PT Recommended Transfer Status: Assist x2 (at time of initial evaluation)      General Visit Information:      General  Patient Position Received: Up in chair, Alarm on  General Comment:  (Pt fatigued but pleasant and cooperative with therapy. Denied feeling dizzy throughout session.)    Subjective   Precautions:  Precautions  Medical Precautions: Fall precautions  Post-Surgical Precautions: Abdominal surgery precautions  Precautions Comment: Woundvac and port/IV         Objective   Pain:  Pain Assessment  Pain Assessment: 0-10  0-10 (Numeric) Pain Score: 5 - Moderate pain (pt reports sx pain at incisional sites near hips)       Treatments:  Therapeutic Exercise  Therapeutic Exercise Performed:  (Seated BLE therex x10-15 reps each consisting of AP, LAQ, Marches, hip adduction ball squeeze and GS.)    Ambulation/Gait Training  Ambulation/Gait Training Performed:  (W/ Mod A x 1 using FWW and close W/C follow, pt amb 24 ft x 2 and 28 ft x 1 demonsrating slow, shuffling gait w/ minimal foot pickup and cues for proper walker mgmt. Pt gets fatigued and needs seated rest breaks after each trial but denied feeling  dizzy.)  Transfers  Transfer:  (Max A x1 for sit<>stand transfers from W/C to FWW with frequent verbal/tactile cues for proper hand placement during transitions.)    EDUCATION: Provided cues for proper hand placement during sit<>stand transfers to promote safe transfers.       Encounter Problems       Encounter Problems (Active)       PT Problem       LTG - Pt will perform bed mobility with mod A x 1  (Progressing)       Start:  08/23/24    Expected End:  09/06/24            LTG - Pt will perform transfers with min A x 1.  (Progressing)       Start:  08/23/24    Expected End:  09/06/24            LTG - Pt will amb 150 feet with FWW and CGA.   (Progressing)       Start:  08/23/24    Expected End:  09/06/24            LTG - Pt will up/down 2 stairs with 1 HR and straight cane with min A x 1.  (Progressing)       Start:  08/23/24    Expected End:  09/06/24            STG - Pt will perform bed mobility with mod assist x 2 (Progressing)       Start:  08/23/24    Expected End:  08/30/24            STG - Pt will perform transfers with mod assist x 1 (Progressing)       Start:  08/23/24    Expected End:  08/30/24            STG - Pt will amb 50 feet with FWW and mod/min assist x 1 (Progressing)       Start:  08/23/24    Expected End:  08/30/24            STG - Pt will up/down curb step with FWW and mod A x 2.  (Progressing)       Start:  08/23/24    Expected End:  08/30/24

## 2024-08-24 NOTE — PROGRESS NOTES
Occupational Therapy    OT Treatment    Patient Name: Mariann Drew  MRN: [id number]  Today's Date: 8/24/2024  Time Calculation  Start Time: 1000  Stop Time: 1045  Time Calculation (min): 45 min        Assessment:  End of Session Communication: Bedside nurse  End of Session Patient Position: Up in chair, Alarm on     Plan:  Treatment Interventions: ADL retraining, Functional transfer training, UE strengthening/ROM, Endurance training, Equipment evaluation/education, Patient/family training, Compensatory technique education, Fine motor coordination activities  OT Frequency: 5 times per week (6 days/PRN)  Treatment Interventions: ADL retraining, Functional transfer training, UE strengthening/ROM, Endurance training, Equipment evaluation/education, Patient/family training, Compensatory technique education, Fine motor coordination activities    Subjective        General:  General  Prior to Session Communication: Bedside nurse  Patient Position Received: Up in chair, Alarm on  General Comment: increased fatigue noted but in agreement to complete therapy session  Precautions:  Medical Precautions: Fall precautions  Post-Surgical Precautions: Abdominal surgery precautions  Precautions Comment: ab wound vac and port/ iv.    Pain:  Pain Assessment  Pain Assessment:  (5/10, decrease in pain, pt stated decreased as one sat up in w/c)    Objective    Cognition:  Cognition  Orientation Level: Disoriented to time (pt able to state month was August. initially stating year was 1924 and then 1824. once pt stated knew not correct answer but unable to state 2024)    Activities of Daily Living: LE Dressing  LE Dressing: Yes  LE Dressing Adaptive Equipment: Reacher, Sock aide  Adult Briefs Level of Assistance: Maximum assistance  LE Dressing Where Assessed: Wheelchair  LE Dressing Comments: pt educated on use of reacher and hard sock aid to doff/don socks. pt with increased swelling noted in LE's. c/o compression stockings too tight.  Dependent to doff compression stockings and don tubigrip with reports of increased comfort. after education of AE pt able to doff socks at Min A level and don socks at Mod A. pt with increased difficulty pulling sock aid up to don sock.      Therapy/Activity: Therapeutic Exercise  Therapeutic Exercise Performed: Yes  Therapeutic Exercise Activity 1: Pt completed #1 dumbell 10 reps x 4 exercises x 3 sets for chest press, elbow flexion/extension, and supination/pronation. pt required max rest breaks d/t fatigue and min to mod vc to stay awake and stay on task. pt completed exercises to improve self care tasks.        OP EDUCATION:  Education  Individual(s) Educated: Patient  Education Provided:  (HEP)  Patient Response to Education: Patient/Caregiver Performed Return Demonstration of Exercises/Activities  Education Comment: pt would benefit from continued education d/t fatigue    Goals:  Encounter Problems       Encounter Problems (Active)       OT Problem       STG- patient will complete grooming with MIN A with use of ae/ad/dme prn (Progressing)       Start:  08/23/24    Expected End:  08/30/24            STG- patient will complete bathing with MAX A with use of ae/ad/dme prn (Progressing)       Start:  08/23/24    Expected End:  08/30/24            STG- patient will complete UB dressing with MOD A with use of ae/ad/dme prn (Progressing)       Start:  08/23/24    Expected End:  08/30/24            STG- patient will complete LB dressing with MAX A with use of ae/ad/dme prn (Progressing)       Start:  08/23/24    Expected End:  08/30/24            STG- patient will complete toileting with MAX A with use of ae/ad/dme prn  (Progressing)       Start:  08/23/24    Expected End:  08/30/24            STG- patient will complete toilet/commode transfers with MAX A with use of ae/ad/dme prn (Progressing)       Start:  08/23/24    Expected End:  08/30/24            STG- patient will complete tub/shower transfers with MAX A with  use of ae/ad/dme prn (Progressing)       Start:  08/23/24    Expected End:  08/30/24            STG- patient will complete simple mobility with MOD A with use of ae/ad/dme prn (Progressing)       Start:  08/23/24    Expected End:  08/30/24            LTG- patient will complete grooming with CGA with use of ae/ad/dme prn (Progressing)       Start:  08/23/24    Expected End:  09/06/24            LTG- patient will complete bathing with MOD A with use of ae/ad/dme prn (Progressing)       Start:  08/23/24    Expected End:  09/06/24            LTG- patient will complete UB dressing with Min A  with use of ae/ad/dme prn (Progressing)       Start:  08/23/24    Expected End:  09/06/24            LTG- patient will complete LB dressing with MOD A with use of ae/ad/dme prn (Progressing)       Start:  08/23/24    Expected End:  09/06/24            LTG- patient will complete toileting with MOD A with use of ae/ad/dme prn  (Progressing)       Start:  08/23/24    Expected End:  09/06/24            LTG- patient will complete toilet/commode transfers with Mod A with use of ae/ad/dme prn (Progressing)       Start:  08/23/24    Expected End:  09/06/24            LTG- patient will complete tub/shower transfers with MOD A with use of ae/ad/dme prn (Progressing)       Start:  08/23/24    Expected End:  09/06/24            LTG- patient will complete simple mobility with MIN A with use of ae/ad/dme prn (Progressing)       Start:  08/23/24    Expected End:  09/06/24

## 2024-08-24 NOTE — H&P
Admission diagnosis:    History Of Present Illness  Mariann Drew is a 68 y.o. female presenting with a wound infection following an abdominoplasty for gastric bypass surgery.  She does have a history of anemia, GERD, obstructive sleep apnea, breast cancer with left mastectomy, gout, and tardive dyskinesia.  Her gastric bypass surgery was on July 30 and she develops fevers and purulent drainage from her BÁRBARA drains 2 to 3 days after surgery.  She was admitted to [facility] and is now receiving IV antibiotics.  She did have debridement but the wound was unable to be closed and she is on a wound VAC.  Her current functional status is as follows    Eating: SET UP  Oral hygiene: MIN A  Toileting: MOD A  Bathing: MOD A  Upper body dressing: MIN A  Lower body dressing: MOD A  Bed Mobility: MOD A X2  Transfers: MIN A  Bladder and Bowel: CONTINENT  Cognition: ALERT AND ORIENTED X3     She has good potential for strengthening and to reach a contact-guard assist level to be discharged with her      Past Medical History  She has a past medical history of Anxiety, Arthritis, Cervicalgia (02/13/2017), Depression, Dysphagia, unspecified (02/23/2021), Encounter for immunization (12/02/2022), Epidermal cyst (02/04/2014), Fatty (change of) liver, not elsewhere classified (02/01/2014), GERD (gastroesophageal reflux disease), Glaucoma, Liver disease, unspecified, Localized swelling, mass and lump, unspecified (02/23/2021), Long term (current) use of opiate analgesic (02/20/2018), Long term (current) use of opiate analgesic (02/20/2018), Nonalcoholic steatohepatitis (HAZEL), Other instability, right knee (05/17/2018), Other specified abnormal findings of blood chemistry, Other specified symptoms and signs involving the circulatory and respiratory systems (12/01/2015), Pain in right hand (03/27/2017), Pain in right knee (06/21/2018), Pain in throat (12/01/2015), Pain in unspecified knee (06/28/2017), Pain in unspecified  shoulder (03/23/2017), Personal history of diseases of the skin and subcutaneous tissue (06/20/2018), Personal history of diseases of the skin and subcutaneous tissue (06/02/2014), Personal history of malignant neoplasm of breast, Personal history of other diseases of the digestive system (10/13/2021), Personal history of other diseases of the musculoskeletal system and connective tissue, Personal history of other diseases of the nervous system and sense organs (10/13/2021), Personal history of other diseases of the respiratory system (01/14/2014), Personal history of other endocrine, nutritional and metabolic disease, Personal history of other infectious and parasitic diseases (11/24/2014), Personal history of other specified conditions, Personal history of peptic ulcer disease (01/14/2014), Personal history of pneumonia (recurrent) (01/14/2014), Personal history of traumatic brain injury (02/23/2021), Polyp of stomach and duodenum (01/14/2014), Polyp of stomach and duodenum (01/14/2014), Repeated falls (11/09/2015), Secondary and unspecified malignant neoplasm of lymph node, unspecified (Multi), Sleep apnea, Tardive dyskinesia, Type 2 diabetes mellitus (Multi), Urinary tract infection, site not specified (08/01/2019), and Urinary tract infection, site not specified (04/19/2020).    She has no past medical history of Scoliosis.    Surgical History  She has a past surgical history that includes Other surgical history (12/17/2013); Other surgical history (12/17/2013); Other surgical history (12/17/2013); Total knee arthroplasty (04/22/2014); Other surgical history (01/13/2014); Carpal tunnel release (01/13/2014); Colonoscopy (01/13/2014); Cholecystectomy (01/13/2014); Other surgical history (10/13/2021); Other surgical history; Hysterectomy (10/13/2021); Liver biopsy; CT angio head w and wo IV contrast (07/07/2023); CT angio neck (07/07/2023); and Cataract extraction.     Social History  She reports that she quit  smoking about 51 years ago. Her smoking use included cigarettes. She has never used smokeless tobacco. She reports that she does not currently use alcohol. She reports current drug use. Drug: Marijuana.     Allergies  Penicillins, Cefepime, Adhesive tape-silicones, Avelox [moxifloxacin], Bactrim [sulfamethoxazole-trimethoprim], Cephalexin, Codeine, Desyrel [trazodone], Erythromycin, Hydroxychloroquine, Ibuprofen, Nsaids (non-steroidal anti-inflammatory drug), Other, Percocet [oxycodone-acetaminophen], Percodan [oxycodone-aspirin], Phenergan [promethazine], Toradol [ketorolac], Tramadol, Trintellix [vortioxetine], [id number] antimigraine agents, Adhesive, and Macrolide antibiotics    Family history her father  14 years ago her cardiovascular disease.  Her mother is living    Medications  Current Facility-Administered Medications   Medication Dose Route Frequency Provider Last Rate Last Admin    acetaminophen (Tylenol) oral liquid 650 mg  650 mg oral q4h PRN Afshan Faustin MD        Or    acetaminophen (Tylenol) tablet 650 mg  650 mg oral q6h PRN Afshan Faustin MD   650 mg at 24 0902    allopurinol (Zyloprim) tablet 400 mg  400 mg oral Daily Afshan Faustin MD   400 mg at 24 0855    alum-mag hydroxide-simeth (Maalox MAX) 400-400-40 mg/5 mL suspension 30 mL  30 mL oral q6h PRN Afshan Faustin MD        bisacodyl (Dulcolax) EC tablet 10 mg  10 mg oral Daily PRN Afshan Faustin MD        bisacodyl (Dulcolax) suppository 10 mg  10 mg rectal Daily PRN Afshan Faustin MD        cyclobenzaprine (Flexeril) tablet 5 mg  5 mg oral TID PRN Afshan Faustin MD   5 mg at 24 0902    deutetrabenazine tablet extended release 24 hr 24 mg  24 mg oral Daily Afshan Faustin MD        deutetrabenazine tablet extended release 24 hr 6 mg  6 mg oral Daily Afshan Faustin MD         dexAMETHasone (Decadron) tablet 0.5 mg  0.5 mg oral 4x daily Afshan Faustin MD   0.5 mg at 08/24/24 0719    DULoxetine (Cymbalta) DR capsule 60 mg  60 mg oral BID Afshan Faustin MD   60 mg at 08/24/24 0855    enoxaparin (Lovenox) syringe 40 mg  40 mg subcutaneous q24h Afshan Faustin MD   40 mg at 08/23/24 1804    ertapenem (INVanz) in sodium chloride 0.9 % 50 mL IV 1 g  1 g intravenous Daily Afshan Faustin MD   Stopped at 08/24/24 0932    famotidine (Pepcid) tablet 20 mg  20 mg oral q AM Afshanjake Faustin MD   20 mg at 08/24/24 0855    ferrous sulfate (325 mg ferrous sulfate) tablet 325 mg  65 mg of iron oral BID Afshan Faustin MD   325 mg at 08/24/24 0856    HYDROcodone-acetaminophen (Norco) 5-325 mg per tablet 1 tablet  1 tablet oral q6h PRN Afshan Faustin MD        lamoTRIgine (LaMICtal) tablet 200 mg  200 mg oral q AM Afshanjake Faustin MD   200 mg at 08/24/24 0854    latanoprost (Xalatan) 0.005 % ophthalmic solution 1 drop  1 drop Both Eyes Nightly Afshan Faustin MD   1 drop at 08/23/24 2100    lidocaine (Xylocaine) 2 % mouth solution 1.25 mL  1.25 mL oral PRN Afshan Faustin MD        lotilaner 0.25 % drops 1 drop  1 drop Both Eyes Nightly Afshan Faustin MD   1 drop at 08/23/24 2100    melatonin tablet 3 mg  3 mg oral Nightly PRN Afshan Faustin MD        naratriptan (Amerge) tablet 2.5 mg  2.5 mg oral Once PRN Afshanjake Faustin MD        nystatin (Mycostatin) 100,000 unit/gram powder   Topical BID Afshan Faustin MD   Given at 08/23/24 4710    [START ON 8/25/2024] semaglutide pen injector 0.5 mg  0.5 mg subcutaneous Every Sunday Afshan Faustin MD        sennosides (Senokot) tablet 8.6 mg  1 tablet oral Nightly Afshan Faustin MD   8.6 mg at 08/23/24 9583    sucralfate  "(Carafate) tablet 1 g  1 g oral Nightly Afshan GAIL Faustin MD   1 g at 08/23/24 2249    traZODone (Desyrel) tablet 150 mg  150 mg oral Nightly Afshan GAIL Faustin MD   150 mg at 08/23/24 2247        Labs  Admission on 08/23/2024   Component Date Value Ref Range Status    WBC 08/24/2024 7.1  4.4 - 11.3 x10*3/uL Final    nRBC 08/24/2024 0.0  0.0 - 0.0 /100 WBCs Final    RBC 08/24/2024 2.43 (L)  4.00 - 5.20 x10*6/uL Final    Hemoglobin 08/24/2024 7.4 (L)  12.0 - 16.0 g/dL Final    Hematocrit 08/24/2024 23.0 (L)  36.0 - 46.0 % Final    MCV 08/24/2024 95  80 - 100 fL Final    MCH 08/24/2024 30.5  26.0 - 34.0 pg Final    MCHC 08/24/2024 32.2  32.0 - 36.0 g/dL Final    RDW 08/24/2024 14.9 (H)  11.5 - 14.5 % Final    Platelets 08/24/2024 108 (L)  150 - 450 x10*3/uL Final    Glucose 08/24/2024 107 (H)  74 - 99 mg/dL Final    Sodium 08/24/2024 136  136 - 145 mmol/L Final    Potassium 08/24/2024 4.6  3.5 - 5.3 mmol/L Final    Chloride 08/24/2024 110 (H)  98 - 107 mmol/L Final    Bicarbonate 08/24/2024 19 (L)  21 - 32 mmol/L Final    Anion Gap 08/24/2024 12  10 - 20 mmol/L Final    Urea Nitrogen 08/24/2024 23  6 - 23 mg/dL Final    Creatinine 08/24/2024 1.52 (H)  0.50 - 1.05 mg/dL Final    eGFR 08/24/2024 37 (L)  >60 mL/min/1.73m*2 Final    Calculations of estimated GFR are performed using the 2021 CKD-EPI Study Refit equation without the race variable for the IDMS-Traceable creatinine methods.  https://jasn.asnjournals.org/content/early/2021/09/22/ASN.9256801280    Calcium 08/24/2024 8.4 (L)  8.6 - 10.3 mg/dL Final   No results displayed because visit has over 200 results.           Last Recorded Vitals  Blood pressure 117/84, pulse 99, temperature 36.3 °C (97.3 °F), temperature source Temporal, resp. rate 18, height 1.626 m (5' 4\"), weight 72.2 kg (159 lb 2.8 oz), SpO2 99%.      Review of Systems   Constitutional:  Negative for chills, fatigue and fever.   HENT:  Negative for congestion, ear discharge, " ear pain and hearing loss.    Eyes:  Negative for pain and discharge.   Respiratory:  Negative for chest tightness, shortness of breath and wheezing.    Cardiovascular:  Negative for chest pain, palpitations and leg swelling.   Gastrointestinal: She states her abdomen does feel firm but has felt this way since admission  Endocrine: Negative for polydipsia and polyphagia.   Genitourinary:  Negative for difficulty urinating, dysuria, frequency and hematuria.   Musculoskeletal:  Negative for back pain, myalgias and neck stiffness.   Skin:  Negative for color change, rash and wound.   Neurological: She does feel groggy, and states she feels her short-term memory is impaired slightly  Hematological:  Negative for adenopathy. Does not bruise/bleed easily.   Psychiatric/Behavioral:  Negative for confusion and hallucinations. The patient is not nervous/anxious.        Physical Exam  Constitutional:       General: she is not in acute distress.     Appearance: Normal appearance.   HENT:      Head: Normocephalic.      Nose: Nose normal.     Eyes:      Extraocular Movements: Extraocular movements intact.      Conjunctiva/sclera: Conjunctivae normal.   Cardiovascular:      Rate and Rhythm: Normal rate and regular rhythm.      Pulses: Normal pulses.      Heart sounds: No murmur heard.     No gallop.   Pulmonary:      Breath sounds: Normal breath sounds. No wheezing, rhonchi or rales.   Abdominal:   Her abdomen is firm, bowel sounds are present, wound VAC in place  Musculoskeletal:         General: No swelling, tenderness or deformity.      Cervical back: No rigidity or tenderness.   She does have 2+ lower extremity edema  Homans' sign is negative bilaterally  Skin:     General: Skin is warm and dry.      Findings: No rash.   Neurological:      General: No focal deficit present.      Mental Status: she is alert and oriented to person, place, and time.      Cranial Nerves: No cranial nerve deficit.      Motor: No weakness.    Psychiatric:         Mood and Affect: Mood normal.      Assessment/Plan   Assessment & Plan  Debility    Anxiety    Balance problems    Diabetes mellitus (Multi)    Hx of obesity    Myofascial pain syndrome, cervical    Hyperlipidemia    Disruption of external surgical wound      1.  Abdominoplasty with wound infection group B strep  Continue IV Invanz  Begin physical therapy for bed mobility, transfers, endurance activities, gait training with an assistive device  Begin occupational therapy for functional mobility, upper limb strengthening, coordination, balance and endurance activities as well as adaptive aids    2.  Gout  Zyloprim 400 mg daily  Monitor for gout symptoms    3.  Tardive dyskinesia  Austedo 30 mg daily  Patient's  will bring in the medication    4.  DVT prophylaxis  Lovenox 40 mg daily    5.  GERD and difficulty swallowing  Pepcid 20 mg daily  Carafate 1 g nightly    6.  Pain  Cymbalta 60 mg twice a day  Lamictal 200 mg daily  Decadron 0.5 mg 4 times a day    7.  Sleep   150 mg nightly    8.  Diabetes mellitus type 2  Semaglutide 0.5 mg weekly    9. FENGI  She has not had a bowel movement for 2 days will begin on a bowel program  DVT prophylaxis she is on Lovenox 40 mg daily  Will monitor her white count for adequate response to antibiotics    She has good potential to be discharged home in 5 to 10 days at a contact-guard assist with her .                 Physician Physical Assessment/ Post admission Evaluation (CARLOS EDUARDO)     I have reviewed the preadmission rehabilitation screening. There have been no relevant changes since the preadmission screening;    Rehab Plan of Care   The Interdisciplinary Team will work collaboratively to address the patient problems and goals to be managed by the Individualized Interdisciplinary Plan of Care. See the IPOC note for a complete review of the plan of care.    Medical Necessity:  Mariann Drew requires, and is capable of participating in, an  intensive and coordinated interdisciplinary acute inpatient rehabilitation program. She requires close rehabilitation physician monitoring and management to monitor her complex medical conditions and coordinate rehabilitation care. The patient's rehabilitation goals and medical complexity cannot adequately be managed in a less intensive setting. Potential risks for clinical complications include: Constipation, fall risk,.    Medical Prognosis:  Excellent for continued progress and participation with therapy.    Time spent with record review, examining patient, and documentation, is 76 minutes    Verónica Zhao, DO    She is having bilateral lower extremity pain in the calfs and swelling and will check a duplex scan bilaterally

## 2024-08-24 NOTE — PROGRESS NOTES
Physical Therapy    Physical Therapy Treatment    Patient Name: Mariann Drew  MRN: [id number]  Today's Date: 8/24/2024  Time Calculation  Start Time: 1345  Stop Time: 1430  Time Calculation (min): 45 min    Assessment/Plan   PT Assessment  End of Session Patient Position: Bed, 2 rail up, Alarm on     PT Plan  Treatment/Interventions: Bed mobility, Transfer training, Gait training, Stair training, Balance training, Strengthening, Endurance training, Range of motion, Therapeutic exercise, Therapeutic activity, Home exercise program, Positioning, Postural re-education  PT Plan: Ongoing PT  PT Frequency: 5 times per week (6 times PRN)  PT Discharge Recommendations:  (Anticipate pt to require intermittent min A at walker level at time of discharge.)  Equipment Recommended upon Discharge: Wheeled walker  PT Recommended Transfer Status: Assist x2 (at time of initial evaluation)      General Visit Information:      General  Patient Position Received: Up in chair, Alarm on  General Comment: Pt with increased fatigue and lethargy but agreeable to therapy.    Subjective   Precautions:  Precautions  Medical Precautions: Fall precautions  Post-Surgical Precautions: Abdominal surgery precautions  Precautions Comment: Woundvac and port/IV         Objective   Pain:  Pain Assessment  Pain Assessment: 0-10  0-10 (Numeric) Pain Score: 9 (sx pain at incisional site near hips; RN notified but pt not due for pain meds yet)       Treatments:  Therapeutic Exercise  Therapeutic Exercise Performed:  (Seated BLE therex x10-15 reps each for 2 sets consisting of AP, LAQ, Marches, hip adduction ball squeeze and GS.)    Bed Mobility  Bed Mobility:  (For sit>supine transfer, pt needed Mod/Max A x 2 for trunk and BLE management into flat bed. Prior to transfer pt sat at EOB with SBA/CGA for gown change.)    Ambulation/Gait Training  Ambulation/Gait Training Performed:  (W/ Mod A x 1 using FWW and close W/C follow, pt amb 28 ft x 2  demonsrating  slow, shuffling gait w/ minimal foot pickup and cues for proper walker mgmt and guidance around turns. Pt declined additional trials d/t fatigue; denied feeling dizzy.)  Ambulation/Gait Training 1  Comments/Distance (ft) 1: Mod A x 1 with FWW to take a few steps to pivot from W/C to bed and then sidestep toward HOB with shuffling gait and cues for walker management and guidance to align with sitting surface.  Transfers  Transfer:  (Max A x1 for sit<>stand transfers at W/C and EOB to FWW with retro lean noted into standing. Frequent verbal/tactile cues needed for proper hand placement during transitions.)    EDUCATION: Edu pt on proper walker management for safe ambulation.       Encounter Problems       Encounter Problems (Active)       PT Problem       LTG - Pt will perform bed mobility with mod A x 1  (Progressing)       Start:  08/23/24    Expected End:  09/06/24            LTG - Pt will perform transfers with min A x 1.  (Progressing)       Start:  08/23/24    Expected End:  09/06/24            LTG - Pt will amb 150 feet with FWW and CGA.   (Progressing)       Start:  08/23/24    Expected End:  09/06/24            LTG - Pt will up/down 2 stairs with 1 HR and straight cane with min A x 1.  (Progressing)       Start:  08/23/24    Expected End:  09/06/24            STG - Pt will perform bed mobility with mod assist x 2 (Progressing)       Start:  08/23/24    Expected End:  08/30/24            STG - Pt will perform transfers with mod assist x 1 (Progressing)       Start:  08/23/24    Expected End:  08/30/24            STG - Pt will amb 50 feet with FWW and mod/min assist x 1 (Progressing)       Start:  08/23/24    Expected End:  08/30/24            STG - Pt will up/down curb step with FWW and mod A x 2.  (Progressing)       Start:  08/23/24    Expected End:  08/30/24

## 2024-08-24 NOTE — PROGRESS NOTES
Occupational Therapy    OT Treatment    Patient Name: Mariann Drew  MRN: [id number]  Today's Date: 8/24/2024  Time Calculation  Start Time: 0830  Stop Time: 0915  Time Calculation (min): 45 min        Assessment:  End of Session Communication: Bedside nurse  End of Session Patient Position: Up in chair, Alarm on     Plan:  Treatment Interventions: ADL retraining, Functional transfer training, UE strengthening/ROM, Endurance training, Equipment evaluation/education, Patient/family training, Compensatory technique education, Fine motor coordination activities  OT Frequency: 5 times per week (6 days/PRN)  Treatment Interventions: ADL retraining, Functional transfer training, UE strengthening/ROM, Endurance training, Equipment evaluation/education, Patient/family training, Compensatory technique education, Fine motor coordination activities    Subjective   Previous Visit Info:     General:  General  Prior to Session Communication: Bedside nurse  Patient Position Received: Bed, 2 rail up, Alarm on  General Comment: pleasant and cooperative  Precautions:  Medical Precautions: Fall precautions  Post-Surgical Precautions: Abdominal surgery precautions  Precautions Comment: ab wound vac and port/ iv.       Pain:  Pain Assessment  Pain Assessment:  (7-8/10 in abdominal area. nursing aware but trialing without pain medication d/t pt having confusion/dizziness. pt declined ice pack at end of session)    Objective         Activities of Daily Living: Grooming  Grooming Level of Assistance: Setup  Grooming Where Assessed: Sitting sinkside  Grooming Comments: pt able to wash face, brush teeth, and brush hair seated in w/c at sink    UE Bathing  UE Bathing Level of Assistance: Contact guard  UE Bathing Where Assessed: Edge of bed    LE Bathing  LE Bathing Level of Assistance: Maximum assistance (to complete ammon care, declined further bathing tasks)    UE Dressing  UE Dressing Level of Assistance: Minimum assistance  UE Dressing  Where Assessed: Edge of bed  UE Dressing Comments: doff gown and don pullover dress. pt refused to don abdominal binder, nursing notified    LE Dressing  LE Dressing: Yes  Adult Briefs Level of Assistance: Maximum assistance  LE Dressing Where Assessed: Edge of bed  LE Dressing Comments: pt required max assist d/t donning protective undergarments with fasteners. pt able to stand at FWW at CGA during donning.  Functional Standing Tolerance:  Time: 2:00 standing at FWW  Bed Mobility/Transfers: Bed Mobility  Bed Mobility: Yes  Bed Mobility 1  Bed Mobility 1: Supine to sitting  Level of Assistance 1: Moderate assistance (x2)    Transfers  Transfer: Yes  Transfer 1  Technique 1: Sit to stand, Stand to sit  Transfer Device 1: Walker  Transfer Level of Assistance 1: Minimum assistance  Trials/Comments 1: pt completed STS from EOB x 2 trials      Functional Mobility:  Functional Mobility  Functional Mobility Performed: Yes  Functional Mobility 1  Device 1: Rolling walker  Assistance 1: Minimum assistance  Comments 1: pt ambulated short distance from EOB over to w/c with increased time to complete and moving at slow pace        OP EDUCATION:  Education  Individual(s) Educated: Patient  Education Provided:  (fall precautions and abdominal precautions as applied to self care tasks)  Patient Response to Education: Patient/Caregiver Verbalized Understanding of Information    Goals:  Encounter Problems       Encounter Problems (Active)       OT Problem       STG- patient will complete grooming with MIN A with use of ae/ad/dme prn (Progressing)       Start:  08/23/24    Expected End:  08/30/24            STG- patient will complete bathing with MAX A with use of ae/ad/dme prn (Progressing)       Start:  08/23/24    Expected End:  08/30/24            STG- patient will complete UB dressing with MOD A with use of ae/ad/dme prn (Progressing)       Start:  08/23/24    Expected End:  08/30/24            STG- patient will complete LB  dressing with MAX A with use of ae/ad/dme prn (Progressing)       Start:  08/23/24    Expected End:  08/30/24            STG- patient will complete toileting with MAX A with use of ae/ad/dme prn  (Progressing)       Start:  08/23/24    Expected End:  08/30/24            STG- patient will complete toilet/commode transfers with MAX A with use of ae/ad/dme prn (Progressing)       Start:  08/23/24    Expected End:  08/30/24            STG- patient will complete tub/shower transfers with MAX A with use of ae/ad/dme prn (Progressing)       Start:  08/23/24    Expected End:  08/30/24            STG- patient will complete simple mobility with MOD A with use of ae/ad/dme prn (Progressing)       Start:  08/23/24    Expected End:  08/30/24            LTG- patient will complete grooming with CGA with use of ae/ad/dme prn (Progressing)       Start:  08/23/24    Expected End:  09/06/24            LTG- patient will complete bathing with MOD A with use of ae/ad/dme prn (Progressing)       Start:  08/23/24    Expected End:  09/06/24            LTG- patient will complete UB dressing with Min A  with use of ae/ad/dme prn (Progressing)       Start:  08/23/24    Expected End:  09/06/24            LTG- patient will complete LB dressing with MOD A with use of ae/ad/dme prn (Progressing)       Start:  08/23/24    Expected End:  09/06/24            LTG- patient will complete toileting with MOD A with use of ae/ad/dme prn  (Progressing)       Start:  08/23/24    Expected End:  09/06/24            LTG- patient will complete toilet/commode transfers with Mod A with use of ae/ad/dme prn (Progressing)       Start:  08/23/24    Expected End:  09/06/24            LTG- patient will complete tub/shower transfers with MOD A with use of ae/ad/dme prn (Progressing)       Start:  08/23/24    Expected End:  09/06/24            LTG- patient will complete simple mobility with MIN A with use of ae/ad/dme prn (Progressing)       Start:  08/23/24    Expected  End:  09/06/24

## 2024-08-25 LAB
ANION GAP SERPL CALC-SCNC: 14 MMOL/L (ref 10–20)
BUN SERPL-MCNC: 27 MG/DL (ref 6–23)
CALCIUM SERPL-MCNC: 8.3 MG/DL (ref 8.6–10.3)
CHLORIDE SERPL-SCNC: 107 MMOL/L (ref 98–107)
CO2 SERPL-SCNC: 20 MMOL/L (ref 21–32)
CREAT SERPL-MCNC: 1.76 MG/DL (ref 0.5–1.05)
EGFRCR SERPLBLD CKD-EPI 2021: 31 ML/MIN/1.73M*2
ERYTHROCYTE [DISTWIDTH] IN BLOOD BY AUTOMATED COUNT: 15.2 % (ref 11.5–14.5)
GLUCOSE SERPL-MCNC: 122 MG/DL (ref 74–99)
HCT VFR BLD AUTO: 24.4 % (ref 36–46)
HGB BLD-MCNC: 7.7 G/DL (ref 12–16)
MCH RBC QN AUTO: 30.1 PG (ref 26–34)
MCHC RBC AUTO-ENTMCNC: 31.6 G/DL (ref 32–36)
MCV RBC AUTO: 95 FL (ref 80–100)
NRBC BLD-RTO: 0 /100 WBCS (ref 0–0)
PLATELET # BLD AUTO: 137 X10*3/UL (ref 150–450)
POTASSIUM SERPL-SCNC: 4.6 MMOL/L (ref 3.5–5.3)
RBC # BLD AUTO: 2.56 X10*6/UL (ref 4–5.2)
SODIUM SERPL-SCNC: 136 MMOL/L (ref 136–145)
WBC # BLD AUTO: 7.4 X10*3/UL (ref 4.4–11.3)

## 2024-08-25 PROCEDURE — 2500000004 HC RX 250 GENERAL PHARMACY W/ HCPCS (ALT 636 FOR OP/ED): Performed by: PHYSICAL MEDICINE & REHABILITATION

## 2024-08-25 PROCEDURE — 80048 BASIC METABOLIC PNL TOTAL CA: CPT | Performed by: PHYSICAL MEDICINE & REHABILITATION

## 2024-08-25 PROCEDURE — 2500000001 HC RX 250 WO HCPCS SELF ADMINISTERED DRUGS (ALT 637 FOR MEDICARE OP): Performed by: PHYSICAL MEDICINE & REHABILITATION

## 2024-08-25 PROCEDURE — 85027 COMPLETE CBC AUTOMATED: CPT | Performed by: PHYSICAL MEDICINE & REHABILITATION

## 2024-08-25 PROCEDURE — 1180000001 HC REHAB PRIVATE ROOM DAILY

## 2024-08-25 RX ORDER — SODIUM CHLORIDE AND POTASSIUM CHLORIDE 150; 450 MG/100ML; MG/100ML
100 INJECTION, SOLUTION INTRAVENOUS CONTINUOUS
Status: DISCONTINUED | OUTPATIENT
Start: 2024-08-25 | End: 2024-08-27

## 2024-08-25 RX ADMIN — LATANOPROST 1 DROP: 50 SOLUTION OPHTHALMIC at 20:45

## 2024-08-25 RX ADMIN — DEUTETRABENAZINE 6 MG: 6 TABLET, FILM COATED, EXTENDED RELEASE ORAL at 09:00

## 2024-08-25 RX ADMIN — LAMOTRIGINE 200 MG: 100 TABLET ORAL at 09:00

## 2024-08-25 RX ADMIN — NYSTATIN: 100000 POWDER TOPICAL at 20:45

## 2024-08-25 RX ADMIN — DEXAMETHASONE 0.5 MG: 1 TABLET ORAL at 20:54

## 2024-08-25 RX ADMIN — DEXAMETHASONE 0.5 MG: 1 TABLET ORAL at 14:16

## 2024-08-25 RX ADMIN — DULOXETINE HYDROCHLORIDE 60 MG: 30 CAPSULE, DELAYED RELEASE ORAL at 09:00

## 2024-08-25 RX ADMIN — ACETAMINOPHEN 650 MG: 325 TABLET ORAL at 14:16

## 2024-08-25 RX ADMIN — HYDROCODONE BITARTRATE AND ACETAMINOPHEN 1 TABLET: 5; 325 TABLET ORAL at 00:17

## 2024-08-25 RX ADMIN — DEXAMETHASONE 0.5 MG: 1 TABLET ORAL at 06:51

## 2024-08-25 RX ADMIN — SENNOSIDES 8.6 MG: 8.6 TABLET, FILM COATED ORAL at 20:43

## 2024-08-25 RX ADMIN — FERROUS SULFATE TAB 325 MG (65 MG ELEMENTAL FE) 325 MG: 325 (65 FE) TAB at 20:43

## 2024-08-25 RX ADMIN — ACETAMINOPHEN 650 MG: 325 TABLET ORAL at 20:47

## 2024-08-25 RX ADMIN — LOTILANER OPHTHALMIC SOLUTION 1 DROP: 2.5 SOLUTION/ DROPS OPHTHALMIC at 20:45

## 2024-08-25 RX ADMIN — SUCRALFATE 1 G: 1 TABLET ORAL at 20:42

## 2024-08-25 RX ADMIN — ALLOPURINOL 400 MG: 100 TABLET ORAL at 09:00

## 2024-08-25 RX ADMIN — FAMOTIDINE 20 MG: 20 TABLET, FILM COATED ORAL at 09:00

## 2024-08-25 RX ADMIN — FERROUS SULFATE TAB 325 MG (65 MG ELEMENTAL FE) 325 MG: 325 (65 FE) TAB at 09:01

## 2024-08-25 RX ADMIN — TRAZODONE HYDROCHLORIDE 150 MG: 50 TABLET ORAL at 20:42

## 2024-08-25 RX ADMIN — DEUTETRABENAZINE 24 MG: 24 TABLET, FILM COATED, EXTENDED RELEASE ORAL at 09:00

## 2024-08-25 RX ADMIN — ENOXAPARIN SODIUM 40 MG: 100 INJECTION SUBCUTANEOUS at 17:38

## 2024-08-25 RX ADMIN — POTASSIUM CHLORIDE AND SODIUM CHLORIDE 100 ML/HR: 450; 150 INJECTION, SOLUTION INTRAVENOUS at 15:30

## 2024-08-25 RX ADMIN — DEXAMETHASONE 0.5 MG: 1 TABLET ORAL at 17:46

## 2024-08-25 RX ADMIN — ERTAPENEM SODIUM 1 G: 1 INJECTION, POWDER, LYOPHILIZED, FOR SOLUTION INTRAMUSCULAR; INTRAVENOUS at 09:00

## 2024-08-25 RX ADMIN — DULOXETINE HYDROCHLORIDE 60 MG: 30 CAPSULE, DELAYED RELEASE ORAL at 20:43

## 2024-08-25 ASSESSMENT — PAIN SCALES - GENERAL
PAINLEVEL_OUTOF10: 3
PAINLEVEL_OUTOF10: 0 - NO PAIN
PAINLEVEL_OUTOF10: 4
PAINLEVEL_OUTOF10: 5 - MODERATE PAIN
PAINLEVEL_OUTOF10: 8
PAINLEVEL_OUTOF10: 0 - NO PAIN

## 2024-08-25 ASSESSMENT — PAIN - FUNCTIONAL ASSESSMENT
PAIN_FUNCTIONAL_ASSESSMENT: 0-10

## 2024-08-25 NOTE — PROGRESS NOTES
Infectious disease progress note  Subjective   Abdominoplasty with wound infection (group B strep)   WBC remains stable at 7    Antibiotics  Invanz day 10/14    Objective   Range of Vitals (last 24 hours)  Heart Rate:  [100]   Temp:  [36 °C (96.8 °F)]   BP: (114)/(60)   Daily Weight  08/23/24 : 72.2 kg (159 lb 2.8 oz)    Body mass index is 27.32 kg/m².      Physical Exam  NAD  Regular rate and rhythm  CTAB  Wound VAC noted over patient's abdomen, draining appropriately  No edema or cyanosis is noted over bilateral lower extremities  Appropriate mood/behavior      Relevant Results  Labs  Lab Results   Component Value Date    WBC 7.4 08/25/2024    HGB 7.7 (L) 08/25/2024    HCT 24.4 (L) 08/25/2024    MCV 95 08/25/2024     (L) 08/25/2024     Lab Results   Component Value Date    GLUCOSE 107 (H) 08/24/2024    CALCIUM 8.4 (L) 08/24/2024     08/24/2024    K 4.6 08/24/2024    CO2 19 (L) 08/24/2024     (H) 08/24/2024    BUN 23 08/24/2024    CREATININE 1.52 (H) 08/24/2024   ESR: --  Lab Results   Component Value Date    SEDRATE 16 08/24/2024     Lab Results   Component Value Date    CRP 1.59 (H) 08/24/2024     Lab Results   Component Value Date    ALT 18 08/09/2024    AST 25 08/09/2024    ALKPHOS 136 08/09/2024    BILITOT 0.7 08/09/2024       Microbiology  8-8-2024 wound culture group B strep  8-8-2024 blood culture no growth at 4 days    Imaging  8-8-2024 CT abdomen pelvis scattered subcutaneous air/gas in the deep subcutaneous tissue along the abdomen.  Surgical drains were in good.  Position  Assessment/Plan   1.  Invanz day 10 out of 14. Leucocytosis improved to 7 consistently for 48 hrs now  2.  Patient already has a PICC line in place, we have entered orders for 28 more days of Invanz, please follow-up in 2 weeks with infectious disease  3.  Follow-up with her surgeon    Other issues  RUSSELL  Hyperlipidemia  GERD  I reviewed and interpreted all lab test imaging studies and documentations from other  healthcare providers  I am monitoring for antibiotic side effects and toxicity     This is a preliminary note written by the resident. Please wait for attending addendum for finalization of note and recommendations.      Abiel Viramontes MD  [phone]  8/25/2024  9:36 AM

## 2024-08-25 NOTE — CARE PLAN
The patient's goals for the shift include  safety ands comfort    The clinical goals for the shift include No confusion today

## 2024-08-25 NOTE — PROGRESS NOTES
Infectious disease progress note  Subjective   Abdominoplasty with wound infection (group B strep)   WBC improved from 14 to 7    Antibiotics  Invanz day 9/14    Objective   Range of Vitals (last 24 hours)  Heart Rate:  [100]   Temp:  [36 °C (96.8 °F)]   BP: (114)/(60)   Daily Weight  08/23/24 : 72.2 kg (159 lb 2.8 oz)    Body mass index is 27.32 kg/m².      Physical Exam  NAD  Regular rate and rhythm  CTAB  Wound VAC noted over patient's abdomen, draining appropriately  No edema or cyanosis is noted over bilateral lower extremities  Appropriate mood/behavior      Relevant Results  Labs  Lab Results   Component Value Date    WBC 7.4 08/25/2024    HGB 7.7 (L) 08/25/2024    HCT 24.4 (L) 08/25/2024    MCV 95 08/25/2024     (L) 08/25/2024     Lab Results   Component Value Date    GLUCOSE 107 (H) 08/24/2024    CALCIUM 8.4 (L) 08/24/2024     08/24/2024    K 4.6 08/24/2024    CO2 19 (L) 08/24/2024     (H) 08/24/2024    BUN 23 08/24/2024    CREATININE 1.52 (H) 08/24/2024   ESR: --  Lab Results   Component Value Date    SEDRATE 16 08/24/2024     Lab Results   Component Value Date    CRP 1.59 (H) 08/24/2024     Lab Results   Component Value Date    ALT 18 08/09/2024    AST 25 08/09/2024    ALKPHOS 136 08/09/2024    BILITOT 0.7 08/09/2024       Microbiology  8-8-2024 wound culture group B strep  8-8-2024 blood culture no growth at 4 days    Imaging  8-8-2024 CT abdomen pelvis scattered subcutaneous air/gas in the deep subcutaneous tissue along the abdomen.  Surgical drains were in good.  Position  Assessment/Plan   1.  Invanz day 9 out of 14. Leucocytosis improved to 7. Will repeat in am to look for consistent improvement  2.  Patient already has a PICC line in place, we have entered orders for 28 more days of Invanz, please follow-up in 2 weeks with infectious disease  3.  Follow-up with her surgeon    Other issues  RUSSELL  Hyperlipidemia  GERD  I reviewed and interpreted all lab test imaging studies and  documentations from other healthcare providers  I am monitoring for antibiotic side effects and toxicity     This is a preliminary note written by the resident. Please wait for attending addendum for finalization of note and recommendations.    Abiel Viramontes MD  [phone]

## 2024-08-25 NOTE — CARE PLAN
The patient's goals for the shift include  pain control     The clinical goals for the shift include pt will not be confused today    Problem: Skin  Goal: Decreased wound size/increased tissue granulation at next dressing change  Outcome: Progressing  Flowsheets (Taken 8/25/2024 1724)  Decreased wound size/increased tissue granulation at next dressing change:   Promote sleep for wound healing   Protective dressings over bony prominences  Goal: Participates in plan/prevention/treatment measures  Outcome: Progressing  Flowsheets (Taken 8/25/2024 1724)  Participates in plan/prevention/treatment measures:   Elevate heels   Increase activity/out of bed for meals  Goal: Prevent/manage excess moisture  Outcome: Progressing  Flowsheets (Taken 8/25/2024 1724)  Prevent/manage excess moisture:   Moisturize dry skin   Follow provider orders for dressing changes   Cleanse incontinence/protect with barrier cream   Monitor for/manage infection if present  Goal: Prevent/minimize sheer/friction injuries  Outcome: Progressing  Flowsheets (Taken 8/25/2024 1724)  Prevent/minimize sheer/friction injuries:   Use pull sheet   HOB 30 degrees or less   Turn/reposition every 2 hours/use positioning/transfer devices   Increase activity/out of bed for meals  Goal: Promote/optimize nutrition  Outcome: Progressing  Flowsheets (Taken 8/25/2024 1724)  Promote/optimize nutrition:   Monitor/record intake including meals   Consume > 50% meals/supplements   Offer water/supplements/favorite foods  Goal: Promote skin healing  Outcome: Progressing  Flowsheets (Taken 8/25/2024 1724)  Promote skin healing: Turn/reposition every 2 hours/use positioning/transfer devices     Problem: Fall/Injury  Goal: Not fall by end of shift  Outcome: Progressing  Goal: Be free from injury by end of the shift  Outcome: Progressing  Goal: Verbalize understanding of personal risk factors for fall in the hospital  Outcome: Progressing  Goal: Verbalize understanding of risk  factor reduction measures to prevent injury from fall in the home  Outcome: Progressing  Goal: Use assistive devices by end of the shift  Outcome: Progressing  Goal: Pace activities to prevent fatigue by end of the shift  Outcome: Progressing     Problem: Pain  Goal: Takes deep breaths with improved pain control throughout the shift  Outcome: Progressing  Goal: Turns in bed with improved pain control throughout the shift  Outcome: Progressing  Goal: Walks with improved pain control throughout the shift  Outcome: Progressing  Goal: Performs ADL's with improved pain control throughout shift  Outcome: Progressing  Goal: Participates in PT with improved pain control throughout the shift  Outcome: Progressing  Goal: Free from opioid side effects throughout the shift  Outcome: Progressing  Goal: Free from acute confusion related to pain meds throughout the shift  Outcome: Progressing     Problem: Wound Care  Goal: Area will decrease in size .2x.2 cm per week  Outcome: Progressing

## 2024-08-26 ENCOUNTER — APPOINTMENT (OUTPATIENT)
Dept: VASCULAR MEDICINE | Facility: HOSPITAL | Age: 69
End: 2024-08-26
Payer: MEDICARE

## 2024-08-26 ENCOUNTER — APPOINTMENT (OUTPATIENT)
Dept: PLASTIC SURGERY | Facility: CLINIC | Age: 69
End: 2024-08-26
Payer: MEDICARE

## 2024-08-26 ENCOUNTER — APPOINTMENT (OUTPATIENT)
Dept: RADIOLOGY | Facility: HOSPITAL | Age: 69
End: 2024-08-26
Payer: MEDICARE

## 2024-08-26 LAB
ANION GAP SERPL CALC-SCNC: 10 MMOL/L (ref 10–20)
BUN SERPL-MCNC: 28 MG/DL (ref 6–23)
CALCIUM SERPL-MCNC: 8.1 MG/DL (ref 8.6–10.3)
CHLORIDE SERPL-SCNC: 108 MMOL/L (ref 98–107)
CO2 SERPL-SCNC: 20 MMOL/L (ref 21–32)
CREAT SERPL-MCNC: 1.66 MG/DL (ref 0.5–1.05)
EGFRCR SERPLBLD CKD-EPI 2021: 33 ML/MIN/1.73M*2
ERYTHROCYTE [DISTWIDTH] IN BLOOD BY AUTOMATED COUNT: 15.2 % (ref 11.5–14.5)
GLUCOSE SERPL-MCNC: 109 MG/DL (ref 74–99)
HCT VFR BLD AUTO: 21.8 % (ref 36–46)
HEMOCCULT SP1 STL QL: NEGATIVE
HGB BLD-MCNC: 7.1 G/DL (ref 12–16)
MCH RBC QN AUTO: 30.9 PG (ref 26–34)
MCHC RBC AUTO-ENTMCNC: 32.6 G/DL (ref 32–36)
MCV RBC AUTO: 95 FL (ref 80–100)
NRBC BLD-RTO: 0 /100 WBCS (ref 0–0)
PLATELET # BLD AUTO: 103 X10*3/UL (ref 150–450)
POTASSIUM SERPL-SCNC: 4.8 MMOL/L (ref 3.5–5.3)
RBC # BLD AUTO: 2.3 X10*6/UL (ref 4–5.2)
SODIUM SERPL-SCNC: 133 MMOL/L (ref 136–145)
WBC # BLD AUTO: 5.3 X10*3/UL (ref 4.4–11.3)

## 2024-08-26 PROCEDURE — 2500000001 HC RX 250 WO HCPCS SELF ADMINISTERED DRUGS (ALT 637 FOR MEDICARE OP): Performed by: PHYSICAL MEDICINE & REHABILITATION

## 2024-08-26 PROCEDURE — 80048 BASIC METABOLIC PNL TOTAL CA: CPT | Performed by: PHYSICAL MEDICINE & REHABILITATION

## 2024-08-26 PROCEDURE — 1180000001 HC REHAB PRIVATE ROOM DAILY

## 2024-08-26 PROCEDURE — 93970 EXTREMITY STUDY: CPT | Performed by: SURGERY

## 2024-08-26 PROCEDURE — 97116 GAIT TRAINING THERAPY: CPT | Mod: GP

## 2024-08-26 PROCEDURE — 82270 OCCULT BLOOD FECES: CPT | Performed by: PHYSICAL MEDICINE & REHABILITATION

## 2024-08-26 PROCEDURE — 85027 COMPLETE CBC AUTOMATED: CPT | Performed by: PHYSICAL MEDICINE & REHABILITATION

## 2024-08-26 PROCEDURE — 70450 CT HEAD/BRAIN W/O DYE: CPT

## 2024-08-26 PROCEDURE — 97110 THERAPEUTIC EXERCISES: CPT | Mod: GP

## 2024-08-26 PROCEDURE — 97535 SELF CARE MNGMENT TRAINING: CPT | Mod: GO

## 2024-08-26 PROCEDURE — 97110 THERAPEUTIC EXERCISES: CPT | Mod: GO

## 2024-08-26 PROCEDURE — 70450 CT HEAD/BRAIN W/O DYE: CPT | Performed by: RADIOLOGY

## 2024-08-26 PROCEDURE — 97530 THERAPEUTIC ACTIVITIES: CPT | Mod: GO

## 2024-08-26 PROCEDURE — 2500000004 HC RX 250 GENERAL PHARMACY W/ HCPCS (ALT 636 FOR OP/ED): Performed by: PHYSICAL MEDICINE & REHABILITATION

## 2024-08-26 PROCEDURE — 97530 THERAPEUTIC ACTIVITIES: CPT | Mod: GP

## 2024-08-26 PROCEDURE — 93970 EXTREMITY STUDY: CPT

## 2024-08-26 RX ADMIN — DEXAMETHASONE 0.5 MG: 1 TABLET ORAL at 13:36

## 2024-08-26 RX ADMIN — FERROUS SULFATE TAB 325 MG (65 MG ELEMENTAL FE) 325 MG: 325 (65 FE) TAB at 08:07

## 2024-08-26 RX ADMIN — DULOXETINE HYDROCHLORIDE 60 MG: 30 CAPSULE, DELAYED RELEASE ORAL at 20:39

## 2024-08-26 RX ADMIN — NYSTATIN: 100000 POWDER TOPICAL at 20:37

## 2024-08-26 RX ADMIN — ALLOPURINOL 400 MG: 100 TABLET ORAL at 08:06

## 2024-08-26 RX ADMIN — DEXAMETHASONE 0.5 MG: 1 TABLET ORAL at 06:36

## 2024-08-26 RX ADMIN — LOTILANER OPHTHALMIC SOLUTION 1 DROP: 2.5 SOLUTION/ DROPS OPHTHALMIC at 20:37

## 2024-08-26 RX ADMIN — DEXAMETHASONE 0.5 MG: 1 TABLET ORAL at 16:53

## 2024-08-26 RX ADMIN — TRAZODONE HYDROCHLORIDE 150 MG: 50 TABLET ORAL at 20:39

## 2024-08-26 RX ADMIN — DEUTETRABENAZINE 24 MG: 24 TABLET, FILM COATED, EXTENDED RELEASE ORAL at 08:06

## 2024-08-26 RX ADMIN — DEUTETRABENAZINE 6 MG: 6 TABLET, FILM COATED, EXTENDED RELEASE ORAL at 08:07

## 2024-08-26 RX ADMIN — ENOXAPARIN SODIUM 40 MG: 100 INJECTION SUBCUTANEOUS at 16:53

## 2024-08-26 RX ADMIN — ACETAMINOPHEN 650 MG: 325 TABLET ORAL at 17:42

## 2024-08-26 RX ADMIN — ERTAPENEM SODIUM 1 G: 1 INJECTION, POWDER, LYOPHILIZED, FOR SOLUTION INTRAMUSCULAR; INTRAVENOUS at 08:09

## 2024-08-26 RX ADMIN — FERROUS SULFATE TAB 325 MG (65 MG ELEMENTAL FE) 325 MG: 325 (65 FE) TAB at 20:39

## 2024-08-26 RX ADMIN — FAMOTIDINE 20 MG: 20 TABLET, FILM COATED ORAL at 08:07

## 2024-08-26 RX ADMIN — DEXAMETHASONE 0.5 MG: 1 TABLET ORAL at 20:39

## 2024-08-26 RX ADMIN — Medication 3 MG: at 20:38

## 2024-08-26 RX ADMIN — SUCRALFATE 1 G: 1 TABLET ORAL at 20:38

## 2024-08-26 RX ADMIN — LAMOTRIGINE 200 MG: 100 TABLET ORAL at 10:32

## 2024-08-26 RX ADMIN — SENNOSIDES 8.6 MG: 8.6 TABLET, FILM COATED ORAL at 20:38

## 2024-08-26 RX ADMIN — DULOXETINE HYDROCHLORIDE 60 MG: 30 CAPSULE, DELAYED RELEASE ORAL at 08:07

## 2024-08-26 ASSESSMENT — PAIN - FUNCTIONAL ASSESSMENT
PAIN_FUNCTIONAL_ASSESSMENT: 0-10

## 2024-08-26 ASSESSMENT — PAIN SCALES - GENERAL
PAINLEVEL_OUTOF10: 2
PAINLEVEL_OUTOF10: 3
PAINLEVEL_OUTOF10: 1
PAINLEVEL_OUTOF10: 1
PAINLEVEL_OUTOF10: 0 - NO PAIN

## 2024-08-26 ASSESSMENT — ACTIVITIES OF DAILY LIVING (ADL): HOME_MANAGEMENT_TIME_ENTRY: 20

## 2024-08-26 ASSESSMENT — PAIN DESCRIPTION - LOCATION: LOCATION: ABDOMEN

## 2024-08-26 NOTE — CARE PLAN
Problem: Skin  Goal: Decreased wound size/increased tissue granulation at next dressing change  Outcome: Progressing  Flowsheets (Taken 8/26/2024 1748)  Decreased wound size/increased tissue granulation at next dressing change: Promote sleep for wound healing  Goal: Participates in plan/prevention/treatment measures  Outcome: Progressing  Flowsheets (Taken 8/26/2024 1748)  Participates in plan/prevention/treatment measures: Elevate heels  Goal: Prevent/manage excess moisture  Outcome: Progressing  Flowsheets (Taken 8/26/2024 1748)  Prevent/manage excess moisture: Cleanse incontinence/protect with barrier cream  Goal: Prevent/minimize sheer/friction injuries  Outcome: Progressing  Flowsheets (Taken 8/26/2024 1748)  Prevent/minimize sheer/friction injuries: Increase activity/out of bed for meals  Goal: Promote/optimize nutrition  Outcome: Progressing  Flowsheets (Taken 8/26/2024 1748)  Promote/optimize nutrition: Monitor/record intake including meals  Goal: Promote skin healing  Outcome: Progressing  Flowsheets (Taken 8/26/2024 1748)  Promote skin healing: Turn/reposition every 2 hours/use positioning/transfer devices     Problem: Fall/Injury  Goal: Not fall by end of shift  Outcome: Progressing  Goal: Be free from injury by end of the shift  Outcome: Progressing  Goal: Verbalize understanding of personal risk factors for fall in the hospital  Outcome: Progressing  Goal: Verbalize understanding of risk factor reduction measures to prevent injury from fall in the home  Outcome: Progressing  Goal: Use assistive devices by end of the shift  Outcome: Progressing  Goal: Pace activities to prevent fatigue by end of the shift  Outcome: Progressing     Problem: Pain  Goal: Takes deep breaths with improved pain control throughout the shift  Outcome: Progressing  Goal: Turns in bed with improved pain control throughout the shift  Outcome: Progressing  Goal: Walks with improved pain control throughout the shift  Outcome:  Progressing  Goal: Performs ADL's with improved pain control throughout shift  Outcome: Progressing  Goal: Participates in PT with improved pain control throughout the shift  Outcome: Progressing  Goal: Free from opioid side effects throughout the shift  Outcome: Progressing  Goal: Free from acute confusion related to pain meds throughout the shift  Outcome: Progressing     Problem: Wound Care  Goal: Area will decrease in size .2x.2 cm per week  Outcome: Progressing   The patient's goals for the shift include      The clinical goals for the shift include maintain patient safety

## 2024-08-26 NOTE — PROGRESS NOTES
Infectious disease progress note  Subjective   Abdominoplasty with wound infection (group B strep)    Antibiotics  Invanz end of therapy 9-    Objective   Range of Vitals (last 24 hours)  Heart Rate:  [85-88]   Temp:  [36.7 °C (98.1 °F)]   Resp:  [18]   BP: ()/(52-65)   SpO2:  [98 %]   Daily Weight  08/23/24 : 72.2 kg (159 lb 2.8 oz)    Body mass index is 27.32 kg/m².      Physical Exam  Patient up eating lunch no new complaints  HEENT PERRLA  Chest entry bilaterally  CVS S1-S2 regular  Abdomen soft      Relevant Results  Labs  Lab Results   Component Value Date    WBC 5.3 08/26/2024    HGB 7.1 (L) 08/26/2024    HCT 21.8 (L) 08/26/2024    MCV 95 08/26/2024     (L) 08/26/2024     Lab Results   Component Value Date    GLUCOSE 109 (H) 08/26/2024    CALCIUM 8.1 (L) 08/26/2024     (L) 08/26/2024    K 4.8 08/26/2024    CO2 20 (L) 08/26/2024     (H) 08/26/2024    BUN 28 (H) 08/26/2024    CREATININE 1.66 (H) 08/26/2024   ESR: --  Lab Results   Component Value Date    SEDRATE 16 08/24/2024     Lab Results   Component Value Date    CRP 1.59 (H) 08/24/2024     Lab Results   Component Value Date    ALT 18 08/09/2024    AST 25 08/09/2024    ALKPHOS 136 08/09/2024    BILITOT 0.7 08/09/2024       Microbiology    8-8-2024 wound culture group B strep  8-8-2024 blood culture no growth at 4 days     Imaging  8-8-2024 CT abdomen pelvis scattered subcutaneous air/gas in the deep subcutaneous tissue along the abdomen.  Surgical drains were in good.  Position      Assessment/Plan   1.  Continue Invanz through September 19, 2024  2.  Renal function is improving  Other issues  RUSSELL  Hyperlipidemia  GERD    I reviewed and interpreted all lab test imaging studies and documentations from other healthcare providers  I am monitoring for antibiotic side effects and toxicity     Antonia Ying MD

## 2024-08-26 NOTE — PROGRESS NOTES
Occupational Therapy    OT Treatment    Patient Name: Mariann Drew  MRN: [id number]  Today's Date: 8/26/2024  Time Calculation  Start Time: 0830  Stop Time: 0915  Time Calculation (min): 45 min        Assessment:  End of Session Communication:  (handoff to PT)  End of Session Patient Position: Up in chair, Alarm on     Plan:  Treatment Interventions: ADL retraining, Functional transfer training, UE strengthening/ROM, Endurance training, Equipment evaluation/education, Patient/family training, Compensatory technique education, Fine motor coordination activities  OT Frequency: 5 times per week (6 days/PRN)  Treatment Interventions: ADL retraining, Functional transfer training, UE strengthening/ROM, Endurance training, Equipment evaluation/education, Patient/family training, Compensatory technique education, Fine motor coordination activities    Subjective   Previous Visit Info:  OT Last Visit  OT Received On: 08/26/24    General:  General  Prior to Session Communication: Bedside nurse  Patient Position Received: Alarm on (recliner)  General Comment: patient pleasant and cooperative however noted confusion throughout most of session; appears disoriented to time and situation; requires reoriention several times during session (Notified Dr Zhao of patient's confusion)    Precautions:  Medical Precautions: Fall precautions  Precautions Comment: Wound vac and port/IV       Pain:  Pain Assessment  Pain Assessment: 0-10  0-10 (Numeric) Pain Score: 2  Pain Location:  (incisional pain in groin area)  Pain Interventions: Emotional support, Distraction, Repositioned    Objective         Activities of Daily Living: Grooming  Grooming Where Assessed: Sitting sinkside  Grooming Comments: requires occasional set up, then able to brush teeth, comb hair, wash face with sba    UE Bathing  UE Bathing Level of Assistance: Minimum assistance  UE Bathing Where Assessed: Sitting sinkside  UE Bathing Comments: sponge bath       Bed  Mobility/Transfers: Transfers  Transfer:  (mod assist for stand-pivot transfer from recliner to wheelchair via wheeled walker; min/mod assist sit to stand transfers from wheelchair; mod/max cues for safe hand placement with all transfers)      Functional Mobility:  Functional Mobility  Functional Mobility Performed:  (min/mod assist approach steps to wheelchair from recliner via wheeled walker; mod cues for walker safety)       Therapy/Activity: Therapeutic Exercise  Therapeutic Exercise Performed:  (completes bilateral ue ivanna exercises (weighted with 1-2 lbs) 10 reps x 2 sets x 2 exercises; supervision required and frequent rest breaks; patient fatigues fairly quickly)     EDUCATION:  Education  Individual(s) Educated: Patient  Education Provided:  (safe transfers; walker safety; ADL retraining)  Patient Response to Education:  (patient will benefit from further review)    Goals:  Encounter Problems       Encounter Problems (Active)       OT Problem       STG- patient will complete grooming with MIN A with use of ae/ad/dme prn (Progressing)       Start:  08/23/24    Expected End:  08/30/24            STG- patient will complete bathing with MAX A with use of ae/ad/dme prn (Progressing)       Start:  08/23/24    Expected End:  08/30/24            STG- patient will complete UB dressing with MOD A with use of ae/ad/dme prn (Progressing)       Start:  08/23/24    Expected End:  08/30/24            STG- patient will complete LB dressing with MAX A with use of ae/ad/dme prn (Progressing)       Start:  08/23/24    Expected End:  08/30/24            STG- patient will complete toileting with MAX A with use of ae/ad/dme prn  (Progressing)       Start:  08/23/24    Expected End:  08/30/24            STG- patient will complete toilet/commode transfers with MAX A with use of ae/ad/dme prn (Progressing)       Start:  08/23/24    Expected End:  08/30/24            STG- patient will complete tub/shower transfers with MAX A with  use of ae/ad/dme prn (Progressing)       Start:  08/23/24    Expected End:  08/30/24            STG- patient will complete simple mobility with MOD A with use of ae/ad/dme prn (Progressing)       Start:  08/23/24    Expected End:  08/30/24            LTG- patient will complete grooming with CGA with use of ae/ad/dme prn (Progressing)       Start:  08/23/24    Expected End:  09/06/24            LTG- patient will complete bathing with MOD A with use of ae/ad/dme prn (Progressing)       Start:  08/23/24    Expected End:  09/06/24            LTG- patient will complete UB dressing with Min A  with use of ae/ad/dme prn (Progressing)       Start:  08/23/24    Expected End:  09/06/24            LTG- patient will complete LB dressing with MOD A with use of ae/ad/dme prn (Progressing)       Start:  08/23/24    Expected End:  09/06/24            LTG- patient will complete toileting with MOD A with use of ae/ad/dme prn  (Progressing)       Start:  08/23/24    Expected End:  09/06/24            LTG- patient will complete toilet/commode transfers with Mod A with use of ae/ad/dme prn (Progressing)       Start:  08/23/24    Expected End:  09/06/24            LTG- patient will complete tub/shower transfers with MOD A with use of ae/ad/dme prn (Progressing)       Start:  08/23/24    Expected End:  09/06/24            LTG- patient will complete simple mobility with MIN A with use of ae/ad/dme prn (Progressing)       Start:  08/23/24    Expected End:  09/06/24

## 2024-08-26 NOTE — DISCHARGE SUMMARY
Discharge Diagnosis  Postoperative wound cellulitis    Issues Requiring Follow-Up  cellulits    Test Results Pending At Discharge  Pending Labs       No current pending labs.            Hospital Course    Postoperative wound cellulitis  Active Problems:    Cellulitis, abdominal wall    Disruption of wound, unspecified, initial encounter    Surgical site infection     Postoperative wound cellulitis  Wound dehiscence  RUSSELL  Hyponatremia  Hypochloremia     Admit to surgical floor  Appreciate plastics recs  Continue merrem and vanco  CBC, CMP in am  Wound care, see orders  Drain care  Wound and blood cultures pending  Normal saline at 100 cc an hour     Chronic conditions: Gout, tardive dyskinesia, GERD, anemia, status post gastric bypass, hyperlipidemia     Continue home medications as listed above     DVT prophylaxis     SCDs  Heparin     8/9: some drainage noted, contineu abx, monitor labs, plastics consulted     8/10: Wound culture growing group B strep.  Await final cultures.  Continue empiric antibiotics.  Await thoracic surgery input.     8/11: Wound culture final: Growing group B strep.  Continue empiric antibiotics.  Await surgery input.  Creatinine improved from 1.3 down to 1.0.     8/12: I&D later today doing well. Spoke with plastics     8/13: doing well, wound vac in place consult ID to help manage abx and wound care     8/14: poor vasculature, midline in placed, will stop fluid and give abx only to see if its improves     8/15: faileld midlien, will need 14 days invanz, will order tunnelled picck ecf placement     8/16: 1/13 fro invanz, PT/OT wound care, will need ecf     8/17: day 2 invanz, need 14 day todal     8/18: Continue current antibiotics.  Anticipate return to the OR on Wednesday for additional debridement and closure with plastic surgery.     8/19: dc fludis, closure on Wednesday continue abx     8.20: no issues NPO after midnight for closure     8/21: to OR today for closure        8/22: ed  planning       Pertinent Physical Exam At Time of Discharge  Physical Exam  Constitutional:       Appearance: Normal appearance.   HENT:      Head: Normocephalic and atraumatic.      Right Ear: Tympanic membrane and ear canal normal.      Left Ear: Tympanic membrane and ear canal normal.      Mouth/Throat:      Mouth: Mucous membranes are moist.      Pharynx: Oropharynx is clear.   Eyes:      Extraocular Movements: Extraocular movements intact.      Conjunctiva/sclera: Conjunctivae normal.      Pupils: Pupils are equal, round, and reactive to light.   Cardiovascular:      Rate and Rhythm: Normal rate and regular rhythm.      Pulses: Normal pulses.      Heart sounds: Normal heart sounds.   Pulmonary:      Effort: Pulmonary effort is normal.      Breath sounds: Normal breath sounds.   Abdominal:      General: Abdomen is flat. Bowel sounds are normal.      Palpations: Abdomen is soft.   Musculoskeletal:         General: Normal range of motion.      Cervical back: Normal range of motion and neck supple.   Skin:     General: Skin is warm and dry.      Capillary Refill: Capillary refill takes 2 to 3 seconds.   Neurological:      General: No focal deficit present.      Mental Status: She is alert and oriented to person, place, and time. Mental status is at baseline.   Psychiatric:         Mood and Affect: Mood normal.         Behavior: Behavior normal.         Thought Content: Thought content normal.         Judgment: Judgment normal.         Home Medications     Medication List      START taking these medications     * allopurinol 100 mg tablet; Commonly known as: Zyloprim   * allopurinol 300 mg tablet; Commonly known as: Zyloprim; TAKE ONE   TABLET ( 300 MG ) IN ADDITION TO ONE TABLET ( 100 MG ) DAILY ( TOTAL OF   400 MG DAILY )   * ertapenem 1 gram injection; Commonly known as: INVanz; Infuse 1 g into   a venous catheter once daily for 13 days. Obtain weekly labs: BMP and CBC.   Fax to Dr. Ying at [phone].   *  ertapenem 1 gram injection; Commonly known as: INVanz; Infuse 1 g into   a venous catheter once daily for 28 days. Obtain weekly labs: BMP, CBC,   Sed rate, and CRP. Fax to Dr. Ying at [phone].   HYDROcodone-acetaminophen 5-325 mg tablet; Commonly known as: Norco;   Take 1 tablet by mouth every 6 hours if needed for severe pain (7 - 10).  * This list has 4 medication(s) that are the same as other medications   prescribed for you. Read the directions carefully, and ask your doctor or   other care provider to review them with you.     CHANGE how you take these medications     famotidine 20 mg tablet; Commonly known as: Pepcid; What changed:   Another medication with the same name was removed. Continue taking this   medication, and follow the directions you see here.   Nyamyc 100,000 unit/gram powder; Generic drug: nystatin; APPLY TO   AFFECTED AREA TWICE A DAY; What changed: how much to take   Ozempic 0.25 mg or 0.5 mg (2 mg/3 mL) pen injector; Generic drug:   semaglutide; INJECT 0.5 MG UNDER THE SKIN 1 (ONE) TIME PER WEEK.; What   changed: See the new instructions.     CONTINUE taking these medications     * Austedo XR 24 mg tablet extended release 24 hr; Generic drug:   deutetrabenazine; Take 1 tablet (24 mg) by mouth once daily. Take 1-6mg   tablet and 1-24mg tablet for a total dose of 30mg daily.   * Austedo XR 6 mg tablet extended release 24 hr; Generic drug:   deutetrabenazine; Take 1 tablet (6 mg) by mouth once daily. Take 1-6mg   tablet and 1-24mg tablet for a total dose of 30mg daily.   cyclobenzaprine 5 mg tablet; Commonly known as: Flexeril; Take 1 tablet   (5 mg) by mouth 3 times a day as needed for muscle spasms.   dexAMETHasone 0.5 mg/5 mL oral liquid; Take 5 mL (0.5 mg) by mouth 4   times a day for 10 days.   DULoxetine 60 mg DR capsule; Commonly known as: Cymbalta   FEOSOL ORAL   lamoTRIgine 200 mg tablet; Commonly known as: LaMICtal   Lidocaine Viscous 2 % solution; Generic drug: lidocaine    multivitamin tablet   naratriptan 2.5 mg tablet; Commonly known as: Amerge; Take 1 tablet (2.5   mg) by mouth 1 time if needed for migraine. May repeat once in 4hrs if   headache recurs   sucralfate 1 gram tablet; Commonly known as: Carafate   traZODone 100 mg tablet; Commonly known as: Desyrel   Xdemvy 0.25 % drops; Generic drug: lotilaner   Xelpros 0.005 % drops, emulsion; Generic drug: latanoprost  * This list has 2 medication(s) that are the same as other medications   prescribed for you. Read the directions carefully, and ask your doctor or   other care provider to review them with you.       Outpatient Follow-Up  Future Appointments   Date Time Provider Department Datto   9/4/2024 10:20 AM Jaycee Fox PA-C [id number] [place]   9/10/2024  3:45 PM Olivia Self DO [id number] [place]   9/19/2024 11:30 AM Cuauhtemoc Truong MD [id number] [facility]   9/26/2024  9:00 AM Olivia Herrera MD [id number] [place]   1/7/2025  9:40 AM Dione Guthrie MD [id number] [place]       Jarocho Venegas DO

## 2024-08-26 NOTE — PROGRESS NOTES
Physical Therapy    Physical Therapy Treatment    Patient Name: Mariann Drew  MRN: [id number]  Today's Date: 8/26/2024  Time Calculation  Start Time: 1300  Stop Time: 1345  Time Calculation (min): 45 min    Assessment/Plan   PT Assessment  End of Session Communication: Bedside nurse  End of Session Patient Position: Bed, 2 rail up, Alarm on     PT Plan  Treatment/Interventions: Bed mobility, Transfer training, Gait training, Stair training, Balance training, Strengthening, Endurance training, Range of motion, Therapeutic exercise, Therapeutic activity, Home exercise program, Positioning, Postural re-education  PT Plan: Ongoing PT  PT Frequency: 5 times per week (6 times PRN)  PT Discharge Recommendations:  (Anticipate pt to require intermittent min A at walker level at time of discharge.)  Equipment Recommended upon Discharge: Wheeled walker  PT Recommended Transfer Status: Assist x2 (at time of initial evaluation)      General Visit Information:   PT  Visit  PT Received On: 08/26/24  General  Prior to Session Communication: Bedside nurse  Patient Position Received: Alarm on, Up in chair  General Comment:  (Pt reporting she needs to use restroom at start of session.)    Subjective   Precautions:  Precautions  Medical Precautions: Fall precautions  Post-Surgical Precautions: Abdominal surgery precautions  Precautions Comment: Wound vac and port/IV    Objective   Pain:  Pain Assessment  Pain Assessment: 0-10  0-10 (Numeric) Pain Score: 3  Pain Location: Abdomen  Pain Interventions: Repositioned, Distraction    Treatments:  Bed Mobility  Bed Mobility:  (Pt performs supine/sit with mod/max A x 2.  Pt requries assist x 2 to roll to L side to perform bed mobility via log roll.)  Bed Mobility 1  Bed Mobility Comments 1:  (Pt performs supine/sit with mod A x 2.  Pt unable to scoot in bed to readjust hips.  Needs max cues and assist to reposition in bed.)    Ambulation/Gait Training  Ambulation/Gait Training Performed:  (Pt  ambulates 15 ft with FWW and mod A x 1 to bathroom from bed, pt has major LOB to R, requries mod A x 2 to correct.  Pt ambulates 5 ft with FWW and mod A x 1 plus another to manage IV pole from bathroom to w/c.)  Ambulation/Gait Training 1  Comments/Distance (ft) 1:  (Pt ambulates 65 ft, 35 ft, 10 ft with FWW and min A x 2 plus w/c follow.  Pt reports fatigue during ambulation.)  Transfers  Transfer:  (Pt performs sit/stand transfers with mod A x 1.)      Education Documentation  Pt educated on techniques for transfers and gait training with device in order to maximize safety and independence.  Pt educated on techniques for bed mobility in order to maximize safety, independence and follow precautions.      Encounter Problems       Encounter Problems (Active)       PT Problem       LTG - Pt will perform bed mobility with mod A x 1  (Progressing)       Start:  08/23/24    Expected End:  09/06/24            LTG - Pt will perform transfers with min A x 1.  (Progressing)       Start:  08/23/24    Expected End:  09/06/24            LTG - Pt will amb 150 feet with FWW and CGA.   (Progressing)       Start:  08/23/24    Expected End:  09/06/24            LTG - Pt will up/down 2 stairs with 1 HR and straight cane with min A x 1.  (Progressing)       Start:  08/23/24    Expected End:  09/06/24            STG - Pt will perform bed mobility with mod assist x 2 (Progressing)       Start:  08/23/24    Expected End:  08/30/24            STG - Pt will perform transfers with mod assist x 1 (Progressing)       Start:  08/23/24    Expected End:  08/30/24            STG - Pt will amb 50 feet with FWW and mod/min assist x 1 (Progressing)       Start:  08/23/24    Expected End:  08/30/24            STG - Pt will up/down curb step with FWW and mod A x 2.  (Progressing)       Start:  08/23/24    Expected End:  08/30/24

## 2024-08-26 NOTE — CONSULTS
NEPHROLOGY CONSULTATION NOTE    DATE OF CONSULTATION:  08/26/24     Referring Physician: Afshan Whiting MD    REASON FOR CONSULT: Acute kidney injury    HISTORY OF PRESENT ILLNESS:   Mariann Drew is a 68 y.o. female presenting with postoperative complications of abdominoplasty.  She has been on antibiotics over prolonged period of time she has had incision and drainage of the abscess and washout with wound VAC placement.  Patient had gastric bypass surgery about 6 years ago but she had excessive redundant skin and subcutaneous tissue and lipodystrophy for which he underwent abdominoplasty on 30 July.  Patient had a blood work on 11 July with a creatinine 1.31.  She presented with abdominal abscess around the eighth and received CT scan of the abdomen pelvis with contrast.  She was placed on antibiotic.  She did receive IV fluids and her creatinine improved to around 0.99.  Creatinine since then has been in the 1.2-1.5 range.  On the 25th she had a creatinine elevation to 1.76 and thereby nephrology consultation was requested.  It appears that her systolic blood pressure has been low in the 80s.    Upon reviewing the chart it appears that she has a baseline creatinine around 1.1-1.5.  CT imaging of the kidneys showed symmetrical enhancement without hydronephrosis she had a left lower lobe cyst.  She had been diabetic prior to her gastric bypass surgery.  She was also hypertensive prior to that.  Reviewing her chart it does not appear that she has been on any anti-inflammatory medication.  She has not been on ACE inhibitor or ARB.  She is currently getting ertapenem through her tunneled PICC line.  She did not have any albuminuria back in 2023.     PAST MEDICAL HISTORY:   Past Medical History:   Diagnosis Date    Anxiety     Arthritis     Cervicalgia 02/13/2017    Neck pain, chronic    Depression     Dysphagia, unspecified 02/23/2021    Dysphagia    Encounter for immunization 12/02/2022    Encounter  for immunization    Epidermal cyst 02/04/2014    Epidermal inclusion cyst    Fatty (change of) liver, not elsewhere classified 02/01/2014    Fatty liver    GERD (gastroesophageal reflux disease)     Glaucoma     Liver disease, unspecified     Liver disease, chronic    Localized swelling, mass and lump, unspecified 02/23/2021    Subcutaneous nodules    Long term (current) use of opiate analgesic 02/20/2018    Opiate analgesic use agreement exists    Long term (current) use of opiate analgesic 02/20/2018    Long term current use of opiate analgesic    Nonalcoholic steatohepatitis (HAZEL)     Steatohepatitis, nonalcoholic    Other instability, right knee 05/17/2018    Instability of right knee joint    Other specified abnormal findings of blood chemistry     Elevated LFTs    Other specified symptoms and signs involving the circulatory and respiratory systems 12/01/2015    Globus pharyngeus    Pain in right hand 03/27/2017    Pain of right hand    Pain in right knee 06/21/2018    Acute pain of right knee    Pain in throat 12/01/2015    Throat pain in adult    Pain in unspecified knee 06/28/2017    Knee pain    Pain in unspecified shoulder 03/23/2017    Shoulder pain    Personal history of diseases of the skin and subcutaneous tissue 06/20/2018    History of acne    Personal history of diseases of the skin and subcutaneous tissue 06/02/2014    History of dermatitis    Personal history of malignant neoplasm of breast     Personal history of malignant neoplasm of breast    Personal history of other diseases of the digestive system 10/13/2021    History of gastroesophageal reflux (GERD)    Personal history of other diseases of the musculoskeletal system and connective tissue     Personal history of juvenile rheumatoid arthritis    Personal history of other diseases of the nervous system and sense organs 10/13/2021    History of sleep apnea    Personal history of other diseases of the respiratory system 01/14/2014    Personal  history of asthma    Personal history of other endocrine, nutritional and metabolic disease     History of hyperlipidemia    Personal history of other infectious and parasitic diseases 11/24/2014    History of herpes zoster    Personal history of other specified conditions     History of dysuria    Personal history of peptic ulcer disease 01/14/2014    History of peptic ulcer    Personal history of pneumonia (recurrent) 01/14/2014    History of pneumonia    Personal history of traumatic brain injury 02/23/2021    History of concussion    Polyp of stomach and duodenum 01/14/2014    Gastric polyps    Polyp of stomach and duodenum 01/14/2014    Polyp of duodenum    Repeated falls 11/09/2015    Frequent falls    Secondary and unspecified malignant neoplasm of lymph node, unspecified (Multi)     Metastasis to lymph nodes    Sleep apnea     Tardive dyskinesia     Type 2 diabetes mellitus (Multi)     Urinary tract infection, site not specified 08/01/2019    Acute UTI    Urinary tract infection, site not specified 04/19/2020    Acute UTI   CKD stage III      SURGICAL HISTORY:   Past Surgical History:   Procedure Laterality Date    CARPAL TUNNEL RELEASE  01/13/2014    Neuroplasty Decompression Median Nerve At Carpal Tunnel    CATARACT EXTRACTION      CHOLECYSTECTOMY  01/13/2014    Cholecystectomy    COLONOSCOPY  01/13/2014    Colonoscopy (Fiberoptic)    CT ANGIO NECK  07/07/2023    CT NECK ANGIO W AND WO IV CONTRAST 7/7/2023 PAR CT    CT HEAD ANGIO W AND WO IV CONTRAST  07/07/2023    CT HEAD ANGIO W AND WO IV CONTRAST 7/7/2023 PAR CT    HYSTERECTOMY  10/13/2021    Hysterectomy    LIVER BIOPSY      OTHER SURGICAL HISTORY  12/17/2013    Breast Surgery Reconstruction    OTHER SURGICAL HISTORY  12/17/2013    Modified Radical Mastectomy Left Breast    OTHER SURGICAL HISTORY  12/17/2013    Laparosc Gastric Restrictive Proc By Adjustable Gastric Band    OTHER SURGICAL HISTORY  01/13/2014    Breast Surgery Modified Radical  Mastectomy    OTHER SURGICAL HISTORY  10/13/2021    Esophageal Dilation    OTHER SURGICAL HISTORY      Excision Of Lesion Face Benign 2.1 To 3cm    TOTAL KNEE ARTHROPLASTY  04/22/2014    Knee Replacement           SOCIAL HISTORY:   She reports that she quit smoking about 51 years ago. Her smoking use included cigarettes. She has never used smokeless tobacco. She reports that she does not currently use alcohol. She reports current drug use. Drug: Marijuana.  She lives at home with her .  She has 2 children.  7 grandkids.  She worked as a .    FAMILY HISTORY:  Family History   Problem Relation Name Age of Onset    Arthritis Mother      Coronary artery disease Mother      Coronary artery disease Father      Arthritis Father      Heart failure Father      Glaucoma Father      Prostate cancer Brother      Stroke Maternal Grandmother      Dementia Maternal Grandmother      Stroke Maternal Grandfather      Dementia Maternal Grandfather      Stroke Paternal Grandmother      Hypertension Other      Diabetes Other      Asthma Other      Ulcerative colitis Other      Goiter Other      Psoriasis Other     She is not sure if anyone in her family had kidney disease but thinks that her sister may have some underlying kidney disease.    ALLERGIES:  Penicillins, Cefepime, Adhesive tape-silicones, Avelox [moxifloxacin], Bactrim [sulfamethoxazole-trimethoprim], Cephalexin, Codeine, Desyrel [trazodone], Erythromycin, Hydroxychloroquine, Ibuprofen, Nsaids (non-steroidal anti-inflammatory drug), Other, Percocet [oxycodone-acetaminophen], Percodan [oxycodone-aspirin], Phenergan [promethazine], Toradol [ketorolac], Tramadol, Trintellix [vortioxetine], [id number] antimigraine agents, Adhesive, and Macrolide antibiotics    MEDICATIONS:     No current facility-administered medications on file prior to encounter.     Current Outpatient Medications on File Prior to Encounter   Medication Sig Dispense Refill    allopurinol  (Zyloprim) 100 mg tablet Take 1 tablet (100 mg) by mouth once daily.      allopurinol (Zyloprim) 300 mg tablet TAKE ONE TABLET ( 300 MG ) IN ADDITION TO ONE TABLET ( 100 MG ) DAILY ( TOTAL  MG DAILY ) 90 tablet 3    cyclobenzaprine (Flexeril) 5 mg tablet Take 1 tablet (5 mg) by mouth 3 times a day as needed for muscle spasms. 30 tablet 0    deutetrabenazine (Austedo XR) 24 mg tablet extended release 24 hr Take 1 tablet (24 mg) by mouth once daily. Take 1-6mg tablet and 1-24mg tablet for a total dose of 30mg daily. 90 tablet 3    deutetrabenazine (Austedo XR) 6 mg tablet extended release 24 hr Take 1 tablet (6 mg) by mouth once daily. Take 1-6mg tablet and 1-24mg tablet for a total dose of 30mg daily. 90 tablet 3    dexAMETHasone 0.5 mg/5 mL oral liquid Take 5 mL (0.5 mg) by mouth 4 times a day for 10 days. 237 mL 1    DULoxetine (Cymbalta) 60 mg DR capsule Take 1 capsule (60 mg) by mouth 2 times a day. Do not crush or chew.      ertapenem (INVanz) 1 gram injection Infuse 1 g into a venous catheter once daily for 13 days. Obtain weekly labs: BMP and CBC. Fax to Dr. Ying at [phone]. 13 g 0    ertapenem (INVanz) 1 gram injection Infuse 1 g into a venous catheter once daily for 28 days. Obtain weekly labs: BMP, CBC, Sed rate, and CRP. Fax to Dr. Ying at [phone]. 28 g 0    famotidine (Pepcid) 20 mg tablet Take 1 tablet (20 mg) by mouth once daily in the morning.      ferrous sulfate (FEOSOL ORAL) Take 1 tablet by mouth once daily in the evening. 200 (65 Fe) MG      HYDROcodone-acetaminophen (Norco) 5-325 mg tablet Take 1 tablet by mouth every 6 hours if needed for severe pain (7 - 10). 20 tablet 0    lamoTRIgine (LaMICtal) 200 mg tablet Take 1 tablet (200 mg) by mouth once daily in the morning.      latanoprost (Xelpros) 0.005 % drops, emulsion Administer into affected eye(s) once daily.      lidocaine (Lidocaine Viscous) 2 % solution Take by mouth.      multivitamin tablet Take 1 tablet by  mouth once daily.      naratriptan (Amerge) 2.5 mg tablet Take 1 tablet (2.5 mg) by mouth 1 time if needed for migraine. May repeat once in 4hrs if headache recurs 9 tablet 1    Nyamyc 100,000 unit/gram powder APPLY TO AFFECTED AREA TWICE A DAY 30 g 2    semaglutide (Ozempic) 0.25 mg or 0.5 mg (2 mg/3 mL) pen injector INJECT 0.5 MG UNDER THE SKIN 1 (ONE) TIME PER WEEK. (Patient taking differently: Inject 0.5 mg under the skin 1 (one) time per week. Every Tuesdays) 3 mL 3    sucralfate (Carafate) 1 gram tablet Take 1 tablet (1 g) by mouth once daily at bedtime.      traZODone (Desyrel) 100 mg tablet Take 1.5 tablets (150 mg) by mouth once daily at bedtime.      Xdemvy 0.25 % drops Administer 1 drop into both eyes once daily at bedtime.      [DISCONTINUED] dexAMETHasone 0.5 mg/5 mL oral liquid Use as directed      [DISCONTINUED] famotidine (Pepcid) 40 mg tablet       [DISCONTINUED] mometasone (Elocon) 0.1 % cream Apply topically once daily as needed (Apply topically once daily as needed). 15 g 1    [DISCONTINUED] oxyCODONE (Roxicodone) 5 mg immediate release tablet Take 1 tablet (5 mg) by mouth every 6 hours if needed for severe pain (7 - 10). 15 tablet 0        Scheduled medications  allopurinol, 400 mg, oral, Daily  deutetrabenazine, 24 mg, oral, Daily  deutetrabenazine, 6 mg, oral, Daily  dexAMETHasone, 0.5 mg, oral, 4x daily  DULoxetine, 60 mg, oral, BID  enoxaparin, 40 mg, subcutaneous, q24h  ertapenem, 1 g, intravenous, Daily  famotidine, 20 mg, oral, q AM  ferrous sulfate (325 mg ferrous sulfate), 65 mg of iron, oral, BID  lamoTRIgine, 200 mg, oral, q AM  latanoprost, 1 drop, Both Eyes, Nightly  lotilaner, 1 drop, Both Eyes, Nightly  nystatin, , Topical, BID  semaglutide, 0.5 mg, subcutaneous, Every Sunday  sennosides, 1 tablet, oral, Nightly  sucralfate, 1 g, oral, Nightly  traZODone, 150 mg, oral, Nightly      Continuous medications  potassium chloride-0.45 % NaCl, 100 mL/hr, Last Rate: 100 mL/hr (08/25/24  1530)      PRN medications  PRN medications: acetaminophen **OR** acetaminophen, alum-mag hydroxide-simeth, bisacodyl, bisacodyl, cyclobenzaprine, lidocaine, melatonin, naratriptan     REVIEW OF SYSTEMS:    No headache, no blurry vision, has noticed some memory impairment, some gait imbalance,  no fever or chills,  no chest pain, no shortness of breath, no nausea, no vomiting, no diarrhea, no constipation , no focal weakness, no burning urination, no leg swelling. Rest of the 10 point ROS is negative     PHYSICAL EXAM:    Visit Vitals  /65 (BP Location: Right arm)   Pulse 85   Temp 36.7 °C (98.1 °F)   Resp 18        GENERAL: Awake and Alert, in no distress, Cooperative  EYES: EOMI,  Pallor noted  HEENT: oral mucosa moist  NECK: supple  CHEST: no tachypnea, no crackles, no wheeze  CVS: S1, S2 heard, Regular Rate and Rhythm, no murmurs, no rubs  ABDOMEN: soft, non tender, bowel sounds present, no distention, abdominal surgical scar, drain  MSK: No joint effusion, no tenderness  NEURO: No focal deficit, moving all extremities, no tremor  EXT: chronic bilateral leg edema  PSYCH: normal affect         I&O 24HR    Intake/Output Summary (Last 24 hours) at 8/26/2024 1047  Last data filed at 8/26/2024 0839  Gross per 24 hour   Intake 1118.33 ml   Output 501 ml   Net 617.33 ml       RESULTS:         Lab Results   Component Value Date    WBC 5.3 08/26/2024    HGB 7.1 (L) 08/26/2024    HCT 21.8 (L) 08/26/2024    MCV 95 08/26/2024     (L) 08/26/2024      Lab Results   Component Value Date    GLUCOSE 109 (H) 08/26/2024    CALCIUM 8.1 (L) 08/26/2024     (L) 08/26/2024    K 4.8 08/26/2024    CO2 20 (L) 08/26/2024     (H) 08/26/2024    BUN 28 (H) 08/26/2024    CREATININE 1.66 (H) 08/26/2024         === 10/17/22 ===    HIP W PELVIS    - Impression -  Mild polyarticular osteoarthrosis.     ASSESSMENT/ PLAN:     This is a 68 y.o. year old female who presents with abdominal wound infection following  abdominoplasty following gastric bypass surgery    1.  RUSSELL: Patient's creatinine peaked at 1.76.  Baseline creatinine appears to be between 1.1-1.5.  She likely has antibiotic mediated ATN.  Patient has a creatinine of 1.66 today.  Not on any other nephrotoxic agents.  Currently on ertapenem.  Was on Zosyn and vancomycin in the past.  At this time we are just going to monitor her off any nephrotoxic agents.  Encourage good nutrition.  She is on semaglutide.  Will like to keep her MAP greater than 60 to 65 mmHg.  Trend renal panel.    2.  CKD stage III: Patient has a baseline creatinine between 1.1-1.5.  She has unremarkable renal imaging.  No proteinuria about a year ago.  Had been diabetic and hypertensive in the past prior to her gastric bypass surgery.    3.  Hyponatremia: Mild.  Likely due to poor oral intake in the setting of abdominal wall infection and need for antibiotics    4.  Anemia: Likely multifactorial.  She has had gastric bypass surgery.  His on antibiotic for a chronic abdominal wall wound infection..      Thank you very much for allowing me to participate in the care of this patient.     This note was created using dragon dictation and may contain unintended errors    TOREY BOLIVAR MD    08/26/24

## 2024-08-26 NOTE — PROGRESS NOTES
Occupational Therapy    OT Treatment    Patient Name: Mariann Drew  MRN: [id number]  Today's Date: 8/26/2024  Time Calculation  Start Time: 1000  Stop Time: 1045  Time Calculation (min): 45 min        Assessment:  End of Session Communication: Bedside nurse  End of Session Patient Position: Bed, 2 rail up, Alarm on     Plan:  Treatment Interventions: ADL retraining, Functional transfer training, UE strengthening/ROM, Endurance training, Equipment evaluation/education, Patient/family training, Compensatory technique education, Fine motor coordination activities  OT Frequency: 5 times per week (6 days/PRN)  Treatment Interventions: ADL retraining, Functional transfer training, UE strengthening/ROM, Endurance training, Equipment evaluation/education, Patient/family training, Compensatory technique education, Fine motor coordination activities    Subjective   Previous Visit Info:  OT Last Visit  OT Received On: 08/26/24    General:  General  Prior to Session Communication:  (handoff from PT)  Patient Position Received: Alarm on, Up in chair  General Comment: patient pleasant and cooperative however noted confusion throughout most of session; appears disoriented to time and situation; requires reoriention several times during session    Precautions:  Medical Precautions: Fall precautions  Post-Surgical Precautions: Abdominal surgery precautions  Precautions Comment: Wound vac and port/IV       Pain:  Pain Assessment  Pain Assessment: 0-10  0-10 (Numeric) Pain Score: 3  Pain Location: Abdomen  Pain Interventions: Emotional support, Distraction, Repositioned    Objective      Functional Standing Tolerance:  Functional Standing Tolerance Comments: static standing with bilateral ue support for 45 sec, 55 sec, and 1:13 with min assist; patient unable to stand for longer duration secondary to reports of pain and fatigue.    Bed Mobility/Transfers: Bed Mobility  Bed Mobility:  (mod/max assist x2 sit to  supine)    Transfers  Transfer:  (min/mod assist sit to stand from wheelchair; mod assist stand-pivot transfers via wheeled walker, step-by-step cues for safe technique)      Functional Mobility:  Functional Mobility  Functional Mobility Performed:  (min/mod assist approach steps from wheelchair to bed via wheeled walker; mod cues for walker safety and problem-solving turning to approach surface)       Therapy/Activity: Therapeutic Exercise  Therapeutic Exercise Performed:  (bilateral ue ther ex using one pound weights, 10 reps x 2 sets x 3 exercises, with supervision and min cues for technique; fatigues quickly)     EDUCATION:  Education  Individual(s) Educated: Patient  Education Provided:  (safe transfers, walker safety, energy conservation techniques)  Patient Response to Education:  (fair understanding/follow-through; needs further education)    Goals:  Encounter Problems       Encounter Problems (Active)       OT Problem       STG- patient will complete grooming with MIN A with use of ae/ad/dme prn (Progressing)       Start:  08/23/24    Expected End:  08/30/24            STG- patient will complete bathing with MAX A with use of ae/ad/dme prn (Progressing)       Start:  08/23/24    Expected End:  08/30/24            STG- patient will complete UB dressing with MOD A with use of ae/ad/dme prn (Progressing)       Start:  08/23/24    Expected End:  08/30/24            STG- patient will complete LB dressing with MAX A with use of ae/ad/dme prn (Progressing)       Start:  08/23/24    Expected End:  08/30/24            STG- patient will complete toileting with MAX A with use of ae/ad/dme prn  (Progressing)       Start:  08/23/24    Expected End:  08/30/24            STG- patient will complete toilet/commode transfers with MAX A with use of ae/ad/dme prn (Progressing)       Start:  08/23/24    Expected End:  08/30/24            STG- patient will complete tub/shower transfers with MAX A with use of ae/ad/dme prn  (Progressing)       Start:  08/23/24    Expected End:  08/30/24            STG- patient will complete simple mobility with MOD A with use of ae/ad/dme prn (Progressing)       Start:  08/23/24    Expected End:  08/30/24            LTG- patient will complete grooming with CGA with use of ae/ad/dme prn (Progressing)       Start:  08/23/24    Expected End:  09/06/24            LTG- patient will complete bathing with MOD A with use of ae/ad/dme prn (Progressing)       Start:  08/23/24    Expected End:  09/06/24            LTG- patient will complete UB dressing with Min A  with use of ae/ad/dme prn (Progressing)       Start:  08/23/24    Expected End:  09/06/24            LTG- patient will complete LB dressing with MOD A with use of ae/ad/dme prn (Progressing)       Start:  08/23/24    Expected End:  09/06/24            LTG- patient will complete toileting with MOD A with use of ae/ad/dme prn  (Progressing)       Start:  08/23/24    Expected End:  09/06/24            LTG- patient will complete toilet/commode transfers with Mod A with use of ae/ad/dme prn (Progressing)       Start:  08/23/24    Expected End:  09/06/24            LTG- patient will complete tub/shower transfers with MOD A with use of ae/ad/dme prn (Progressing)       Start:  08/23/24    Expected End:  09/06/24            LTG- patient will complete simple mobility with MIN A with use of ae/ad/dme prn (Progressing)       Start:  08/23/24    Expected End:  09/06/24

## 2024-08-26 NOTE — PROGRESS NOTES
Physical Therapy    Physical Therapy Treatment    Patient Name: Mariann Drew  MRN: [id number]  Today's Date: 8/26/2024  Time Calculation  Start Time: 0915  Stop Time: 1000  Time Calculation (min): 45 min    Assessment/Plan   PT Assessment  End of Session Communication:  (OT)  End of Session Patient Position: Up in chair, Alarm on     PT Plan  Treatment/Interventions: Bed mobility, Transfer training, Gait training, Stair training, Balance training, Strengthening, Endurance training, Range of motion, Therapeutic exercise, Therapeutic activity, Home exercise program, Positioning, Postural re-education  PT Plan: Ongoing PT  PT Frequency: 5 times per week (6 times PRN)  PT Discharge Recommendations:  (Anticipate pt to require intermittent min A at walker level at time of discharge.)  Equipment Recommended upon Discharge: Wheeled walker  PT Recommended Transfer Status: Assist x2 (at time of initial evaluation)      General Visit Information:   PT  Visit  PT Received On: 08/26/24  General  Prior to Session Communication:  (OT)  Patient Position Received: Up in chair, Alarm on  General Comment:  (Pt reports dizziness that has not improved since fall in April. Pt demonstrates mild confusion throughout session.  Discussed with OT, Dr. Zhao made aware.)    Subjective   Precautions:  Precautions  Medical Precautions: Fall precautions  Post-Surgical Precautions: Abdominal surgery precautions  Precautions Comment: Woundvac and port/IV      Objective   Pain:  Pain Assessment  Pain Assessment:  (Pt does not c/o pain at this time.)    Treatments:  Therapeutic Exercise  Therapeutic Exercise Performed: Yes  Pt performs BLE seated AROM exercises: marching, LAQ, hip add isometrics with ball, heel slides with towels, AP 2 x 10 reps each in order to improve strength and endurance for improvement in functional mobility and transfers.     Ambulation/Gait Training  Ambulation/Gait Training Performed:  (Pt ambulates 10 ft x 2 with FWW and  mod A x 1 plus another to manage equipment.  Pt needs mod cues for safety with walker and for sequencing with chair approach.)  Transfers  Transfer:  (Pt performs sit/stand transfers with mod A x 1.)      Education Documentation  Pt educated on techniques for transfers and gait training with device in order to maximize safety and independence.  Pt educated on therapeutic exercises, including optimal techniques to maximize function.    Encounter Problems       Encounter Problems (Active)       PT Problem       LTG - Pt will perform bed mobility with mod A x 1  (Progressing)       Start:  08/23/24    Expected End:  09/06/24            LTG - Pt will perform transfers with min A x 1.  (Progressing)       Start:  08/23/24    Expected End:  09/06/24            LTG - Pt will amb 150 feet with FWW and CGA.   (Progressing)       Start:  08/23/24    Expected End:  09/06/24            LTG - Pt will up/down 2 stairs with 1 HR and straight cane with min A x 1.  (Progressing)       Start:  08/23/24    Expected End:  09/06/24            STG - Pt will perform bed mobility with mod assist x 2 (Progressing)       Start:  08/23/24    Expected End:  08/30/24            STG - Pt will perform transfers with mod assist x 1 (Progressing)       Start:  08/23/24    Expected End:  08/30/24            STG - Pt will amb 50 feet with FWW and mod/min assist x 1 (Progressing)       Start:  08/23/24    Expected End:  08/30/24            STG - Pt will up/down curb step with FWW and mod A x 2.  (Progressing)       Start:  08/23/24    Expected End:  08/30/24

## 2024-08-26 NOTE — PROGRESS NOTES
"Mariann Drew is a 68 y.o. female on day 3 of admission presenting with Debility.    Subjective   Patient is having more confusion.  She does notice this and feels she cannot think well.  Apparently she did have a fall where she hit her head a couple weeks ago and has had more confusion since that time as well as falling.  She denies chest pain she denies shortness of breath she denies abdominal pain.  10 review of systems are negative otherwise       Objective     Physical Exam  Constitutional:       General: no acute distress.     Appearance: Normal appearance.   HENT:      Head: Normocephalic.       Eyes:      Extraocular Movements: Extraocular movements intact.      Conjunctiva/sclera: Conjunctivae normal.   Cardiovascular:      Rate and Rhythm: Normal rate and regular rhythm.   Pulmonary:      Breath sounds: Normal breath sounds. No wheezing, rhonchi or rales.   Abdominal:      General: Abdomen is flat. Bowel sounds are normal.   Musculoskeletal:         General: No swelling, tenderness or deformity.   Abdomen is soft without organomegaly or tenderness.  Apparently she has had some black stools we will check for OB  Skin:     General: Skin is warm and dry.      Findings: No rash.   Neurological:      General: No focal deficit present.      Mental Status:  alert and oriented to person, place, and time.      Cranial Nerves: No cranial nerve deficit.   She is ambulating with a front wheeled walker and mod assist     Psychiatric:         Mood and Affect: Mood normal.      Last Recorded Vitals  Blood pressure 112/65, pulse 85, temperature 36.7 °C (98.1 °F), resp. rate 18, height 1.626 m (5' 4\"), weight 72.2 kg (159 lb 2.8 oz), SpO2 98%.  Intake/Output last 3 Shifts:  I/O last 3 completed shifts:  In: 1018.3 (14.1 mL/kg) [I.V.:1018.3 (14.1 mL/kg)]  Out: 1751 (24.3 mL/kg) [Drains:1750; Stool:1]  Weight: 72.2 kg     Relevant Results  Scheduled medications  allopurinol, 400 mg, oral, Daily  deutetrabenazine, 24 mg, " oral, Daily  deutetrabenazine, 6 mg, oral, Daily  dexAMETHasone, 0.5 mg, oral, 4x daily  DULoxetine, 60 mg, oral, BID  enoxaparin, 40 mg, subcutaneous, q24h  ertapenem, 1 g, intravenous, Daily  famotidine, 20 mg, oral, q AM  ferrous sulfate (325 mg ferrous sulfate), 65 mg of iron, oral, BID  lamoTRIgine, 200 mg, oral, q AM  latanoprost, 1 drop, Both Eyes, Nightly  lotilaner, 1 drop, Both Eyes, Nightly  nystatin, , Topical, BID  semaglutide, 0.5 mg, subcutaneous, Every Sunday  sennosides, 1 tablet, oral, Nightly  sucralfate, 1 g, oral, Nightly  traZODone, 150 mg, oral, Nightly      Continuous medications  potassium chloride-0.45 % NaCl, 100 mL/hr, Last Rate: 100 mL/hr (08/25/24 1530)      PRN medications  PRN medications: acetaminophen **OR** acetaminophen, alum-mag hydroxide-simeth, bisacodyl, bisacodyl, cyclobenzaprine, lidocaine, melatonin, naratriptan     Results for orders placed or performed during the hospital encounter of 08/23/24 (from the past 24 hour(s))   CBC   Result Value Ref Range    WBC 5.3 4.4 - 11.3 x10*3/uL    nRBC 0.0 0.0 - 0.0 /100 WBCs    RBC 2.30 (L) 4.00 - 5.20 x10*6/uL    Hemoglobin 7.1 (L) 12.0 - 16.0 g/dL    Hematocrit 21.8 (L) 36.0 - 46.0 %    MCV 95 80 - 100 fL    MCH 30.9 26.0 - 34.0 pg    MCHC 32.6 32.0 - 36.0 g/dL    RDW 15.2 (H) 11.5 - 14.5 %    Platelets 103 (L) 150 - 450 x10*3/uL   Basic metabolic panel   Result Value Ref Range    Glucose 109 (H) 74 - 99 mg/dL    Sodium 133 (L) 136 - 145 mmol/L    Potassium 4.8 3.5 - 5.3 mmol/L    Chloride 108 (H) 98 - 107 mmol/L    Bicarbonate 20 (L) 21 - 32 mmol/L    Anion Gap 10 10 - 20 mmol/L    Urea Nitrogen 28 (H) 6 - 23 mg/dL    Creatinine 1.66 (H) 0.50 - 1.05 mg/dL    eGFR 33 (L) >60 mL/min/1.73m*2    Calcium 8.1 (L) 8.6 - 10.3 mg/dL            Assessment/Plan   Principal Problem:    Debility  Active Problems:    Anxiety    Balance problems    Diabetes mellitus (Multi)    Hx of obesity    Myofascial pain syndrome, cervical     Hyperlipidemia    Disruption of external surgical wound      1.  Abdominoplasty with wound infection group B strep  Continue IV Invanz  Begin physical therapy for bed mobility, transfers, endurance activities, gait training with an assistive device  Begin occupational therapy for functional mobility, upper limb strengthening, coordination, balance and endurance activities as well as adaptive aids     2.  Gout  Zyloprim 400 mg daily  Monitor for gout symptoms     3.  Tardive dyskinesia  Austedo 30 mg daily  Patient's  will bring in the medication     4.  DVT prophylaxis  Lovenox 40 mg daily     5.  GERD and difficulty swallowing  Pepcid 20 mg daily  Carafate 1 g nightly     6.  Pain  Cymbalta 60 mg twice a day  Lamictal 200 mg daily  Decadron 0.5 mg 4 times a day     7.  Sleep   150 mg nightly     8.  Diabetes mellitus type 2  Semaglutide 0.5 mg weekly     9. FENGI  She has not had a bowel movement for 2 days will begin on a bowel program  DVT prophylaxis she is on Lovenox 40 mg daily    8/26  Patient reportedly had a fall a couple of weeks ago and since that time has had more confusion as well as more falls.  She states she just cannot think clearly since that time.  Will obtain a CT scan of the head to rule out a subdural hematoma  She has had an elevation of her creatinine to 1.6, Dr. Lopez does feel it could be an acute kidney injury from her antibiotic.  Infectious disease has been made aware of this to see if they would like to change the medication  Sodium today is 133, BUN is 28 and creatinine is 1.6  She did have a black stool, she is on iron but will check stools for OB       I spent 38 minutes in the professional and overall care of this patient.

## 2024-08-26 NOTE — CONSULTS
"Nutrition Initial Assessment:   Nutrition Assessment    Reason for Assessment: Admission nursing screening (acute rehab protocol; MST=1 (should =2 for unsure of weight loss and decreased po intakes)    Patient is a 68 y.o. female presenting with debility     PmHx: anemia, GERD, ANKUSH, bariatric surgery \"a couple years ago\", gout, breast cancer s/p L mastectomy    Nutrition History:  Energy Intake: Fair 50-75 %  Food and Nutrient History: Pt is well known to this RD from acute hospital stay.  Pt was eating fair, did not like orange Roderick so was trialing the fruit punch flavor.  Pt is POD# 5 for debridement of abdominal wound with wound vac application, POD#27 abdominoplasty.  Pt was out of room at time of visit today.  Noted to have confusion, CT of head to be done.  Vitamin/Herbal Supplement Use: FeSO4, MVI  Food Allergies/Intolerances:   none   GI Symptoms:  EMILIANO   Oral Problems: None       Anthropometrics:  Height: 162.6 cm (5' 4.02\")   Weight: 72.2 kg (159 lb 2.8 oz)   BMI (Calculated): 27.31  IBW/kg (Dietitian Calculated): 54.5 kg  Percent of IBW: 132 %       Weight History:     Weight Change %:  Weight History / % Weight Change: 8/23 72.2kg  7/30 77.2kg (down 6% in 10 days--likely from surgery, fluid shifts). 7/2 77.6kg, 5/16 74.8kg, 4/3 73.5kg, 2/20 78.7kg, 12/26 78.9kg, 11/17 78kg, 8/11 84.8kg    Nutrition Focused Physical Exam Findings:  defer: not available, however NFPE completed 2 weeks ago   Subcutaneous Fat Loss:      Muscle Wasting:     Edema:  Edema: +2 mild  Edema Location: 2+ BLE, NP BUE  Physical Findings:  Skin: Positive (abdomen)    Nutrition Significant Labs:  CBC Trend:   Results from last 7 days   Lab Units 08/26/24  0541 08/25/24  0726 08/24/24  0652 08/23/24  0415   WBC AUTO x10*3/uL 5.3 7.4 7.1 14.1*   RBC AUTO x10*6/uL 2.30* 2.56* 2.43* 2.75*   HEMOGLOBIN g/dL 7.1* 7.7* 7.4* 8.4*   HEMATOCRIT % 21.8* 24.4* 23.0* 26.4*   MCV fL 95 95 95 96   PLATELETS AUTO x10*3/uL 103* 137* 108* 222    , BMP " Trend:   Results from last 7 days   Lab Units 08/26/24  0541 08/25/24  0952 08/24/24  0652 08/22/24  0945   GLUCOSE mg/dL 109* 122* 107* 124*   CALCIUM mg/dL 8.1* 8.3* 8.4* 8.2*   SODIUM mmol/L 133* 136 136 135*   POTASSIUM mmol/L 4.8 4.6 4.6 4.7   CO2 mmol/L 20* 20* 19* 20*   CHLORIDE mmol/L 108* 107 110* 109*   BUN mg/dL 28* 27* 23 21   CREATININE mg/dL 1.66* 1.76* 1.52* 1.65*    , A1C:  Lab Results   Component Value Date    HGBA1C 4.5 07/11/2024   , BG POCT trend:        Nutrition Specific Medications:  Reviewed     I/O:   Last BM Date: 08/26/24; Stool Appearance: Unable to assess (08/23/24 2300)    Dietary Orders (From admission, onward)       Start     Ordered    08/26/24 1246  Oral nutritional supplements  Until discontinued        Question Answer Comment   Deliver with Lunch    Deliver with Dinner    Select supplement: Roderick        08/26/24 1246    08/23/24 1745  May Participate in Room Service  Once        Question:  .  Answer:  Yes    08/23/24 1745    08/23/24 1426  Adult diet Regular  Diet effective now        Question:  Diet type  Answer:  Regular    08/23/24 1426                     Estimated Needs:      Method for Estimating Needs: 1470-1635kcals (27-30kcals/kg iBW)     Method for Estimating Needs: 65-82g (1.2-1.5g/kg IBW)     Method for Estimating Needs: 1 mL/kcal or as per MD        Nutrition Diagnosis   Malnutrition Diagnosis  Patient has Malnutrition Diagnosis: No    Nutrition Diagnosis  Diagnosis Status (1): Ongoing  Nutrition Diagnosis 1: Increased nutrient needs  Related to (1): physiological causes increasing nutrient needs  As Evidenced by (1): wound healing needs and acute rehab demands increasing physical activity  Additional Nutrition Diagnosis: Diagnosis 2  Diagnosis Status (2): Ongoing  Nutrition Diagnosis 2: Inadequate oral intake  Related to (2): acute illness, recent surgery  As Evidenced by (2): PO intakes are variable, however not a big eater since bariatric surgery       Nutrition  Interventions/Recommendations         Nutrition Prescription:  Individualized Nutrition Prescription Provided for : Regular diet as ordered with Roderick twice daily        Nutrition Interventions:   Interventions: Meals and snacks, Medical food supplement  Meals and Snacks: General healthful diet  Goal: consume >50->75% of meals  Medical Food Supplement: Commercial beverage  Goal: consume >75% of Roderick BID (for an additional 90 kcals, 2.5 gm protein, supplement glutamine and arginine)         Nutrition Education:   N/A       Nutrition Monitoring and Evaluation   Food/Nutrient Related History Monitoring  Monitoring and Evaluation Plan: Energy intake, Fluid intake, Amount of food  Energy Intake: Estimated energy intake  Criteria: Meal/ONS intake to meet >75% of estimated needs  Fluid Intake: Estimated fluid intake  Criteria: fluid intake to meet >75% of estimated need  Amount of Food: Estimated amout of food, Medical food intake  Criteria: Pt to consume >75% of meals/ONS    Body Composition/Growth/Weight History  Monitoring and Evaluation Plan: Weight  Weight: Measured weight  Criteria: reweigh at least every 5 days    Biochemical Data, Medical Tests and Procedures  Monitoring and Evaluation Plan: Electrolyte/renal panel  Criteria: labs WNL    Nutrition Focused Physical Findings  Monitoring and Evaluation Plan: Skin  Criteria: promote healing through adequate nutrition         Time Spent (min): 45 minutes

## 2024-08-26 NOTE — CARE PLAN
The patient's goals for the shift include  safety and comfort    The clinical goals for the shift include pt will not be confused today

## 2024-08-27 LAB
ALBUMIN SERPL BCP-MCNC: 2.3 G/DL (ref 3.4–5)
ANION GAP SERPL CALC-SCNC: 9 MMOL/L (ref 10–20)
BASOPHILS # BLD AUTO: 0.01 X10*3/UL (ref 0–0.1)
BASOPHILS NFR BLD AUTO: 0.1 %
BUN SERPL-MCNC: 27 MG/DL (ref 6–23)
CALCIUM SERPL-MCNC: 8.2 MG/DL (ref 8.6–10.3)
CHLORIDE SERPL-SCNC: 109 MMOL/L (ref 98–107)
CO2 SERPL-SCNC: 22 MMOL/L (ref 21–32)
CREAT SERPL-MCNC: 1.75 MG/DL (ref 0.5–1.05)
EGFRCR SERPLBLD CKD-EPI 2021: 31 ML/MIN/1.73M*2
EOSINOPHIL # BLD AUTO: 0.02 X10*3/UL (ref 0–0.7)
EOSINOPHIL NFR BLD AUTO: 0.2 %
ERYTHROCYTE [DISTWIDTH] IN BLOOD BY AUTOMATED COUNT: 15 % (ref 11.5–14.5)
GLUCOSE SERPL-MCNC: 111 MG/DL (ref 74–99)
HCT VFR BLD AUTO: 26.6 % (ref 36–46)
HGB BLD-MCNC: 8.4 G/DL (ref 12–16)
IMM GRANULOCYTES # BLD AUTO: 0.09 X10*3/UL (ref 0–0.7)
IMM GRANULOCYTES NFR BLD AUTO: 1 % (ref 0–0.9)
LYMPHOCYTES # BLD AUTO: 0.7 X10*3/UL (ref 1.2–4.8)
LYMPHOCYTES NFR BLD AUTO: 7.8 %
MCH RBC QN AUTO: 30.1 PG (ref 26–34)
MCHC RBC AUTO-ENTMCNC: 31.6 G/DL (ref 32–36)
MCV RBC AUTO: 95 FL (ref 80–100)
MONOCYTES # BLD AUTO: 0.63 X10*3/UL (ref 0.1–1)
MONOCYTES NFR BLD AUTO: 7 %
NEUTROPHILS # BLD AUTO: 7.53 X10*3/UL (ref 1.2–7.7)
NEUTROPHILS NFR BLD AUTO: 83.9 %
NRBC BLD-RTO: 0 /100 WBCS (ref 0–0)
OVALOCYTES BLD QL SMEAR: NORMAL
PLATELET # BLD AUTO: 190 X10*3/UL (ref 150–450)
PLATELET CLUMP BLD QL SMEAR: PRESENT
POLYCHROMASIA BLD QL SMEAR: NORMAL
POTASSIUM SERPL-SCNC: 4.5 MMOL/L (ref 3.5–5.3)
RBC # BLD AUTO: 2.79 X10*6/UL (ref 4–5.2)
RBC MORPH BLD: NORMAL
SODIUM SERPL-SCNC: 135 MMOL/L (ref 136–145)
WBC # BLD AUTO: 9 X10*3/UL (ref 4.4–11.3)

## 2024-08-27 PROCEDURE — 97110 THERAPEUTIC EXERCISES: CPT | Mod: GO,CO

## 2024-08-27 PROCEDURE — 97530 THERAPEUTIC ACTIVITIES: CPT | Mod: GP

## 2024-08-27 PROCEDURE — 82040 ASSAY OF SERUM ALBUMIN: CPT | Performed by: PHYSICAL MEDICINE & REHABILITATION

## 2024-08-27 PROCEDURE — 2500000001 HC RX 250 WO HCPCS SELF ADMINISTERED DRUGS (ALT 637 FOR MEDICARE OP): Performed by: PHYSICAL MEDICINE & REHABILITATION

## 2024-08-27 PROCEDURE — 1180000001 HC REHAB PRIVATE ROOM DAILY

## 2024-08-27 PROCEDURE — 85025 COMPLETE CBC W/AUTO DIFF WBC: CPT | Performed by: PHYSICAL MEDICINE & REHABILITATION

## 2024-08-27 PROCEDURE — 97110 THERAPEUTIC EXERCISES: CPT | Mod: GP

## 2024-08-27 PROCEDURE — 97116 GAIT TRAINING THERAPY: CPT | Mod: GP

## 2024-08-27 PROCEDURE — 2500000004 HC RX 250 GENERAL PHARMACY W/ HCPCS (ALT 636 FOR OP/ED): Performed by: PHYSICAL MEDICINE & REHABILITATION

## 2024-08-27 PROCEDURE — 97530 THERAPEUTIC ACTIVITIES: CPT | Mod: GO

## 2024-08-27 PROCEDURE — 99232 SBSQ HOSP IP/OBS MODERATE 35: CPT | Performed by: PHYSICAL MEDICINE & REHABILITATION

## 2024-08-27 PROCEDURE — 80048 BASIC METABOLIC PNL TOTAL CA: CPT | Performed by: PHYSICAL MEDICINE & REHABILITATION

## 2024-08-27 RX ADMIN — SENNOSIDES 8.6 MG: 8.6 TABLET, FILM COATED ORAL at 20:42

## 2024-08-27 RX ADMIN — FAMOTIDINE 20 MG: 20 TABLET, FILM COATED ORAL at 08:43

## 2024-08-27 RX ADMIN — LOTILANER OPHTHALMIC SOLUTION 1 DROP: 2.5 SOLUTION/ DROPS OPHTHALMIC at 20:42

## 2024-08-27 RX ADMIN — DULOXETINE HYDROCHLORIDE 60 MG: 30 CAPSULE, DELAYED RELEASE ORAL at 08:43

## 2024-08-27 RX ADMIN — DEXAMETHASONE 0.5 MG: 1 TABLET ORAL at 13:46

## 2024-08-27 RX ADMIN — ACETAMINOPHEN 650 MG: 325 TABLET ORAL at 20:42

## 2024-08-27 RX ADMIN — ALLOPURINOL 400 MG: 100 TABLET ORAL at 08:43

## 2024-08-27 RX ADMIN — DEXAMETHASONE 0.5 MG: 1 TABLET ORAL at 06:19

## 2024-08-27 RX ADMIN — LAMOTRIGINE 200 MG: 100 TABLET ORAL at 08:42

## 2024-08-27 RX ADMIN — SUCRALFATE 1 G: 1 TABLET ORAL at 20:42

## 2024-08-27 RX ADMIN — DEUTETRABENAZINE 24 MG: 24 TABLET, FILM COATED, EXTENDED RELEASE ORAL at 08:44

## 2024-08-27 RX ADMIN — DEXAMETHASONE 0.5 MG: 1 TABLET ORAL at 18:00

## 2024-08-27 RX ADMIN — FERROUS SULFATE TAB 325 MG (65 MG ELEMENTAL FE) 325 MG: 325 (65 FE) TAB at 08:42

## 2024-08-27 RX ADMIN — DEUTETRABENAZINE 6 MG: 6 TABLET, FILM COATED, EXTENDED RELEASE ORAL at 08:44

## 2024-08-27 RX ADMIN — DULOXETINE HYDROCHLORIDE 60 MG: 30 CAPSULE, DELAYED RELEASE ORAL at 20:42

## 2024-08-27 RX ADMIN — ENOXAPARIN SODIUM 40 MG: 100 INJECTION SUBCUTANEOUS at 18:00

## 2024-08-27 RX ADMIN — FERROUS SULFATE TAB 325 MG (65 MG ELEMENTAL FE) 325 MG: 325 (65 FE) TAB at 20:42

## 2024-08-27 RX ADMIN — ERTAPENEM SODIUM 1 G: 1 INJECTION, POWDER, LYOPHILIZED, FOR SOLUTION INTRAMUSCULAR; INTRAVENOUS at 10:01

## 2024-08-27 RX ADMIN — TRAZODONE HYDROCHLORIDE 150 MG: 50 TABLET ORAL at 20:42

## 2024-08-27 RX ADMIN — DEXAMETHASONE 0.5 MG: 1 TABLET ORAL at 20:45

## 2024-08-27 RX ADMIN — ACETAMINOPHEN 650 MG: 325 TABLET ORAL at 13:46

## 2024-08-27 ASSESSMENT — PAIN SCALES - GENERAL
PAINLEVEL_OUTOF10: 6
PAINLEVEL_OUTOF10: 10 - WORST POSSIBLE PAIN
PAINLEVEL_OUTOF10: 3
PAINLEVEL_OUTOF10: 9
PAINLEVEL_OUTOF10: 5 - MODERATE PAIN
PAINLEVEL_OUTOF10: 4

## 2024-08-27 ASSESSMENT — PAIN - FUNCTIONAL ASSESSMENT
PAIN_FUNCTIONAL_ASSESSMENT: 0-10
PAIN_FUNCTIONAL_ASSESSMENT: 0-10
PAIN_FUNCTIONAL_ASSESSMENT: UNABLE TO SELF-REPORT
PAIN_FUNCTIONAL_ASSESSMENT: 0-10

## 2024-08-27 ASSESSMENT — PAIN DESCRIPTION - ORIENTATION: ORIENTATION: LOWER;MID

## 2024-08-27 ASSESSMENT — ACTIVITIES OF DAILY LIVING (ADL): HOME_MANAGEMENT_TIME_ENTRY: 50

## 2024-08-27 NOTE — PROGRESS NOTES
Physical Therapy    Physical Therapy Treatment    Patient Name: Mariann Drew  MRN: [id number]  Today's Date: 8/27/2024  Time Calculation  Start Time: 0915  Stop Time: 1000  Time Calculation (min): 45 min    Assessment/Plan   PT Assessment  End of Session Communication:  (OT)  End of Session Patient Position: Alarm on, Up in chair     PT Plan  Treatment/Interventions: Bed mobility, Transfer training, Gait training, Stair training, Balance training, Strengthening, Endurance training, Range of motion, Therapeutic exercise, Therapeutic activity, Home exercise program, Positioning, Postural re-education  PT Plan: Ongoing PT  PT Frequency: 5 times per week (6 times PRN)  PT Discharge Recommendations:  (Anticipate pt to require intermittent min A at walker level at time of discharge.)  Equipment Recommended upon Discharge: Wheeled walker  PT Recommended Transfer Status: Assist x2 (at time of initial evaluation)      General Visit Information:   PT  Visit  PT Received On: 08/27/24  General  Family/Caregiver Present: Yes  Caregiver Feedback:  (Pt's  present for family observation this date.)  Prior to Session Communication: Bedside nurse  Patient Position Received: Up in chair, Alarm on  General Comment: Discussed with patient's  pt's need for assist with transfers and mobility. Pt's  reports that he was helping wife with transfers, bed mobility and ambulation short distances prior to most recent admission, since original surgery 7/30. Pt has rollator at home, will need FWW ordered by therapy prior to discharge.  Pt's  reports her cognition seems more confused, but reports she has had issues with pain medication in the past. Discussed bed mobility with pt and . Pt observed bed mobility sit/supine yesterday PM with PT. Pt's  reports their bed at home is very high and pt had difficulty before admission.  would assist or pt would sleep in recliner. Pt and  agree that  recliner will be best option at discharge.    Subjective   Precautions:  Precautions  Medical Precautions: Fall precautions  Post-Surgical Precautions: Abdominal surgery precautions  Precautions Comment: Wound vac and port/IV    Objective   Pain:  Pain Assessment  Pain Assessment: 0-10  0-10 (Numeric) Pain Score: 4  Pain Location: Abdomen  Pain Interventions: Repositioned, Distraction    Treatments:  Therapeutic Exercise  Therapeutic Exercise Performed: Yes  Pt performs BLE seated AROM exercises: marching, LAQ, AP 2 x 10 reps each in order to improve strength and endurance for improvement in functional mobility and transfers.     Ambulation/Gait Training  Ambulation/Gait Training Performed:  (Pt ambulates 25 ft x 3 trials with FWW and min A x 1.)  Ambulation/Gait Training 1  Comments/Distance (ft) 1:  (Pt ambulates 65 ft, 35 ft, 10 ft with FWW and min A x 2 plus w/c follow.  Pt reports fatigue during ambulation.)  Transfers  Transfer:  (Pt performs sit/stand transfers with mod/min A x 1.  Mod cues for hand placement and max cues with sequencing with chair approach.)      Education Documentation  Pt educated on techniques for transfers and gait training with device in order to maximize safety and independence.  Pt educated on therapeutic exercises, including optimal techniques to maximize function.    Encounter Problems       Encounter Problems (Active)       PT Problem       LTG - Pt will perform bed mobility with mod A x 1  (Progressing)       Start:  08/23/24    Expected End:  09/06/24            LTG - Pt will perform transfers with min A x 1.  (Progressing)       Start:  08/23/24    Expected End:  09/06/24            LTG - Pt will amb 150 feet with FWW and CGA.   (Progressing)       Start:  08/23/24    Expected End:  09/06/24            LTG - Pt will up/down 2 stairs with 1 HR and straight cane with min A x 1.  (Progressing)       Start:  08/23/24    Expected End:  09/06/24            STG - Pt will perform bed  mobility with mod assist x 2 (Progressing)       Start:  08/23/24    Expected End:  08/30/24            STG - Pt will perform transfers with mod assist x 1 (Progressing)       Start:  08/23/24    Expected End:  08/30/24            STG - Pt will amb 50 feet with FWW and mod/min assist x 1 (Progressing)       Start:  08/23/24    Expected End:  08/30/24            STG - Pt will up/down curb step with FWW and mod A x 2.  (Progressing)       Start:  08/23/24    Expected End:  08/30/24

## 2024-08-27 NOTE — PROGRESS NOTES
Occupational Therapy    OT Treatment    Patient Name: Mariann Drew  MRN: [id number]  Today's Date: 8/27/2024  Time Calculation  Start Time: 0830  Stop Time: 0915  Time Calculation (min): 45 min        Assessment:  End of Session Communication:  (handoff to PT)  End of Session Patient Position: Alarm on, Up in chair     Plan:  Treatment Interventions: ADL retraining, Functional transfer training, UE strengthening/ROM, Endurance training, Equipment evaluation/education, Patient/family training, Compensatory technique education, Fine motor coordination activities  OT Frequency: 5 times per week (6 days/PRN)  Treatment Interventions: ADL retraining, Functional transfer training, UE strengthening/ROM, Endurance training, Equipment evaluation/education, Patient/family training, Compensatory technique education, Fine motor coordination activities    Subjective   Previous Visit Info:  OT Last Visit  OT Received On: 08/27/24    General:  General  Family/Caregiver Present: Yes  Caregiver Feedback: spouse Morales present for family observation  Prior to Session Communication: Bedside nurse  Patient Position Received: Alarm on, Up in chair    Precautions:  Medical Precautions: Fall precautions  Post-Surgical Precautions: Abdominal surgery precautions  Precautions Comment: Wound vac and port/IV       Pain:  Pain Assessment  Pain Assessment: 0-10  0-10 (Numeric) Pain Score: 3  Pain Type: Surgical pain  Pain Location: Abdomen  Pain Interventions: Emotional support, Repositioned    Objective         Activities of Daily Living: LE Dressing  LE Dressing Comments: instructed patient and spouse on dressing stick and sock aid to improve independence with donning/doffing socks; following instruction, patient able to doff socks with dressing stick with sba and min cues for technique; dons socks with sock aid with sba, mod cues, and increased time     spouse reports he plans to assist patient with socks vs. purchasing adapative equip; pants  and underpants N/A at this time as patient is only wearing nightgowns/dresses       Bed Mobility/Transfers: Transfers  Transfer:  educated spouse on recommendation for 3-in-1 commode for placement over toilet at discharge; spouse with good understanding; patient completes repeated stand-pivot transfers to/from wheelchair and 3-in-1 commode with min/mod assist via wheeled walker; patient requires up to 75% cues for technique and hand placement with transfers secondary to confusion/decreased carryover      EDUCATION:  Education  Individual(s) Educated: Patient, Spouse  Education Provided:  (Spouse highly supportive and demonstrates excellent understanding of recommendations thus far. Spouse is concerned with patient's recent confusion. Nursing and physician aware.)    Goals:  Encounter Problems       Encounter Problems (Active)       OT Problem       STG- patient will complete grooming with MIN A with use of ae/ad/dme prn (Progressing)       Start:  08/23/24    Expected End:  08/30/24            STG- patient will complete bathing with MAX A with use of ae/ad/dme prn (Progressing)       Start:  08/23/24    Expected End:  08/30/24            STG- patient will complete UB dressing with MOD A with use of ae/ad/dme prn (Progressing)       Start:  08/23/24    Expected End:  08/30/24            STG- patient will complete LB dressing with MAX A with use of ae/ad/dme prn (Progressing)       Start:  08/23/24    Expected End:  08/30/24            STG- patient will complete toileting with MAX A with use of ae/ad/dme prn  (Progressing)       Start:  08/23/24    Expected End:  08/30/24            STG- patient will complete toilet/commode transfers with MAX A with use of ae/ad/dme prn (Progressing)       Start:  08/23/24    Expected End:  08/30/24            STG- patient will complete tub/shower transfers with MAX A with use of ae/ad/dme prn (Progressing)       Start:  08/23/24    Expected End:  08/30/24            STG- patient will  complete simple mobility with MOD A with use of ae/ad/dme prn (Progressing)       Start:  08/23/24    Expected End:  08/30/24            LTG- patient will complete grooming with CGA with use of ae/ad/dme prn (Progressing)       Start:  08/23/24    Expected End:  09/06/24            LTG- patient will complete bathing with MOD A with use of ae/ad/dme prn (Progressing)       Start:  08/23/24    Expected End:  09/06/24            LTG- patient will complete UB dressing with Min A  with use of ae/ad/dme prn (Progressing)       Start:  08/23/24    Expected End:  09/06/24            LTG- patient will complete LB dressing with MOD A with use of ae/ad/dme prn (Progressing)       Start:  08/23/24    Expected End:  09/06/24            LTG- patient will complete toileting with MOD A with use of ae/ad/dme prn  (Progressing)       Start:  08/23/24    Expected End:  09/06/24            LTG- patient will complete toilet/commode transfers with Mod A with use of ae/ad/dme prn (Progressing)       Start:  08/23/24    Expected End:  09/06/24            LTG- patient will complete tub/shower transfers with MOD A with use of ae/ad/dme prn (Progressing)       Start:  08/23/24    Expected End:  09/06/24            LTG- patient will complete simple mobility with MIN A with use of ae/ad/dme prn (Progressing)       Start:  08/23/24    Expected End:  09/06/24

## 2024-08-27 NOTE — PROGRESS NOTES
"Physical Therapy    Physical Therapy Treatment    Patient Name: Mariann Drew  MRN: [id number]  Today's Date: 8/27/2024  Time Calculation  Start Time: 1300  Stop Time: 1315  Time Calculation (min): 15 min    Assessment/Plan   PT Assessment  End of Session Communication: Bedside nurse  End of Session Patient Position: Bed, 2 rail up, Alarm on     PT Plan  Treatment/Interventions: Bed mobility, Transfer training, Gait training, Stair training, Balance training, Strengthening, Endurance training, Range of motion, Therapeutic exercise, Therapeutic activity, Home exercise program, Positioning, Postural re-education  PT Plan: Ongoing PT  PT Frequency: 5 times per week (6 times PRN)  PT Discharge Recommendations:  (Anticipate pt to require intermittent min A at walker level at time of discharge.)  Equipment Recommended upon Discharge: Wheeled walker  PT Recommended Transfer Status: Assist x2 (at time of initial evaluation)      General Visit Information:   PT  Visit  PT Received On: 08/27/24  General  Family/Caregiver Present: Yes  Caregiver Feedback:  (Pt's  present for family observation this date.)  Prior to Session Communication: Bedside nurse  Patient Position Received: Up in chair, Alarm on  General Comment: Pt seen for 15 minutes.  Wound vac making loud noise and then \"leak alarm\" noted across screen.  Nursing notified.  Pt returned to supine position per nursing request, on hold at this time.    Subjective   Precautions:  Precautions  Medical Precautions: Fall precautions  Post-Surgical Precautions: Abdominal surgery precautions  Precautions Comment: Wound vac and port/IV  Vital Signs:       Objective   Pain:  Pain Assessment  Pain Assessment: 0-10  0-10 (Numeric) Pain Score: 6  Pain Location: Abdomen  Pain Interventions: Repositioned, Distraction (RN notified, pt requesting tylenol)    Treatments:  Bed Mobility  Bed Mobility:  (Pt performs sit/supine with max A x 2. Max cues for log roll " technique.)    Ambulation/Gait Training  Ambulation/Gait Training Performed:  (Pt ambulates 5 ft with FWW from recliner to w/c and 6 ft with FWW from w/c to bed all with min a x 1 plus another to manage equipment.)  Transfers  Transfer:  (Pt performs sit/stand transfers with mod/min A x 1.  Mod cues for proper hand placement throughout transfers.)      Education Documentation  Pt educated on techniques for transfers and gait training with device in order to maximize safety and independence.      Encounter Problems       Encounter Problems (Active)       PT Problem       LTG - Pt will perform bed mobility with mod A x 1  (Progressing)       Start:  08/23/24    Expected End:  09/06/24            LTG - Pt will perform transfers with min A x 1.  (Progressing)       Start:  08/23/24    Expected End:  09/06/24            LTG - Pt will amb 150 feet with FWW and CGA.   (Progressing)       Start:  08/23/24    Expected End:  09/06/24            LTG - Pt will up/down 2 stairs with 1 HR and straight cane with min A x 1.  (Progressing)       Start:  08/23/24    Expected End:  09/06/24            STG - Pt will perform bed mobility with mod assist x 2 (Progressing)       Start:  08/23/24    Expected End:  08/30/24            STG - Pt will perform transfers with mod assist x 1 (Progressing)       Start:  08/23/24    Expected End:  08/30/24            STG - Pt will amb 50 feet with FWW and mod/min assist x 1 (Progressing)       Start:  08/23/24    Expected End:  08/30/24            STG - Pt will up/down curb step with FWW and mod A x 2.  (Progressing)       Start:  08/23/24    Expected End:  08/30/24

## 2024-08-27 NOTE — PROGRESS NOTES
"  Mariann Drew is a 68 y.o. female on day 4 of admission presenting with Debility.    Subjective   Patient was seen in therapy. Working on standing. She is describing orthostatis upon changing positions. Also ? Of concerns for LE edema that started prior to her acute admission.       Objective       Physical Exam  Pleasant female in NAD. Standing in therapy wth support of a walker.  present.  Alert and Oriented x 3.  CTAB  SNTND+BS  +LE edema    Function:    Assessment/Plan        Last Recorded Vitals  Blood pressure 112/60, pulse 93, temperature 36 °C (96.8 °F), temperature source Temporal, resp. rate 16, height 1.626 m (5' 4.02\"), weight 72.2 kg (159 lb 2.8 oz), SpO2 98%.    Therapy notes from last 24 hours reviewed.    Relevant Results                  Scheduled medications  allopurinol, 400 mg, oral, Daily  deutetrabenazine, 24 mg, oral, Daily  deutetrabenazine, 6 mg, oral, Daily  dexAMETHasone, 0.5 mg, oral, 4x daily  DULoxetine, 60 mg, oral, BID  enoxaparin, 40 mg, subcutaneous, q24h  ertapenem, 1 g, intravenous, Daily  famotidine, 20 mg, oral, q AM  ferrous sulfate (325 mg ferrous sulfate), 65 mg of iron, oral, BID  lamoTRIgine, 200 mg, oral, q AM  latanoprost, 1 drop, Both Eyes, Nightly  lotilaner, 1 drop, Both Eyes, Nightly  nystatin, , Topical, BID  semaglutide, 0.5 mg, subcutaneous, Every Sunday  sennosides, 1 tablet, oral, Nightly  sucralfate, 1 g, oral, Nightly  traZODone, 150 mg, oral, Nightly      Continuous medications     PRN medications  PRN medications: acetaminophen **OR** acetaminophen, alum-mag hydroxide-simeth, bisacodyl, bisacodyl, cyclobenzaprine, lidocaine, melatonin, naratriptan     Results for orders placed or performed during the hospital encounter of 08/23/24 (from the past 24 hour(s))   Occult Blood, Stool    Specimen: Stool   Result Value Ref Range    Occult Blood, Stool X1 Negative Negative   Basic Metabolic Panel   Result Value Ref Range    Glucose 111 (H) 74 - 99 mg/dL    " Sodium 135 (L) 136 - 145 mmol/L    Potassium 4.5 3.5 - 5.3 mmol/L    Chloride 109 (H) 98 - 107 mmol/L    Bicarbonate 22 21 - 32 mmol/L    Anion Gap 9 (L) 10 - 20 mmol/L    Urea Nitrogen 27 (H) 6 - 23 mg/dL    Creatinine 1.75 (H) 0.50 - 1.05 mg/dL    eGFR 31 (L) >60 mL/min/1.73m*2    Calcium 8.2 (L) 8.6 - 10.3 mg/dL   CBC and Auto Differential   Result Value Ref Range    WBC 9.0 4.4 - 11.3 x10*3/uL    nRBC 0.0 0.0 - 0.0 /100 WBCs    RBC 2.79 (L) 4.00 - 5.20 x10*6/uL    Hemoglobin 8.4 (L) 12.0 - 16.0 g/dL    Hematocrit 26.6 (L) 36.0 - 46.0 %    MCV 95 80 - 100 fL    MCH 30.1 26.0 - 34.0 pg    MCHC 31.6 (L) 32.0 - 36.0 g/dL    RDW 15.0 (H) 11.5 - 14.5 %    Platelets 190 150 - 450 x10*3/uL    Neutrophils % 83.9 40.0 - 80.0 %    Immature Granulocytes %, Automated 1.0 (H) 0.0 - 0.9 %    Lymphocytes % 7.8 13.0 - 44.0 %    Monocytes % 7.0 2.0 - 10.0 %    Eosinophils % 0.2 0.0 - 6.0 %    Basophils % 0.1 0.0 - 2.0 %    Neutrophils Absolute 7.53 1.20 - 7.70 x10*3/uL    Immature Granulocytes Absolute, Automated 0.09 0.00 - 0.70 x10*3/uL    Lymphocytes Absolute 0.70 (L) 1.20 - 4.80 x10*3/uL    Monocytes Absolute 0.63 0.10 - 1.00 x10*3/uL    Eosinophils Absolute 0.02 0.00 - 0.70 x10*3/uL    Basophils Absolute 0.01 0.00 - 0.10 x10*3/uL   Morphology   Result Value Ref Range    RBC Morphology See Below     Polychromasia Mild     Ovalocytes Few     Clumped Platelets Present                  Assessment/Plan   Principal Problem:    Debility  Active Problems:    Anxiety    Balance problems    Diabetes mellitus (Multi)    Hx of obesity    Myofascial pain syndrome, cervical    Hyperlipidemia    Disruption of external surgical wound           1.  Abdominoplasty with wound infection group B strep  Continue IV Invanz  Begin physical therapy for bed mobility, transfers, endurance activities, gait training with an assistive device  Begin occupational therapy for functional mobility, upper limb strengthening, coordination, balance and endurance  activities as well as adaptive aids     2.  Gout  Zyloprim 400 mg daily  Monitor for gout symptoms     3.  Tardive dyskinesia  Austedo 30 mg daily  Patient's  will bring in the medication     4.  DVT prophylaxis  Lovenox 40 mg daily     5.  GERD and difficulty swallowing  Pepcid 20 mg daily  Carafate 1 g nightly     6.  Pain  Cymbalta 60 mg twice a day  Lamictal 200 mg daily  Decadron 0.5 mg 4 times a day     7.  Sleep   150 mg nightly     8.  Diabetes mellitus type 2  Semaglutide 0.5 mg weekly     9. FENGI  She has not had a bowel movement for 2 days will begin on a bowel program  DVT prophylaxis she is on Lovenox 40 mg daily    8/27  -ongoing rehab to work on general endurance and mobility  -work on support for the wound vac.  -Reach out to the primary team and support for wound vac orders  -dietary consult for ? Of appropriate nutrition  -eval for LE edema etiology  -check albumin and CMP             I spent 35 35minutes in the professional and overall care of this patient.      Afshan Faustin MD

## 2024-08-27 NOTE — CARE PLAN
The patient's goals for the shift include      The clinical goals for the shift include patient will receive adequate rest this shift      Problem: Skin  Goal: Decreased wound size/increased tissue granulation at next dressing change  Outcome: Progressing  Flowsheets (Taken 8/26/2024 2230)  Decreased wound size/increased tissue granulation at next dressing change: Promote sleep for wound healing  Goal: Participates in plan/prevention/treatment measures  Outcome: Progressing  Flowsheets (Taken 8/26/2024 2230)  Participates in plan/prevention/treatment measures: Elevate heels  Goal: Prevent/manage excess moisture  Outcome: Progressing  Flowsheets (Taken 8/26/2024 2230)  Prevent/manage excess moisture: Moisturize dry skin  Goal: Prevent/minimize sheer/friction injuries  Outcome: Progressing  Flowsheets (Taken 8/26/2024 2230)  Prevent/minimize sheer/friction injuries: Use pull sheet  Goal: Promote/optimize nutrition  Outcome: Progressing  Flowsheets (Taken 8/26/2024 2230)  Promote/optimize nutrition:   Consume > 50% meals/supplements   Monitor/record intake including meals  Goal: Promote skin healing  Outcome: Progressing  Flowsheets (Taken 8/26/2024 2230)  Promote skin healing: Turn/reposition every 2 hours/use positioning/transfer devices     Problem: Fall/Injury  Goal: Not fall by end of shift  Outcome: Progressing  Goal: Be free from injury by end of the shift  Outcome: Progressing  Goal: Verbalize understanding of personal risk factors for fall in the hospital  Outcome: Progressing  Goal: Verbalize understanding of risk factor reduction measures to prevent injury from fall in the home  Outcome: Progressing  Goal: Use assistive devices by end of the shift  Outcome: Progressing  Goal: Pace activities to prevent fatigue by end of the shift  Outcome: Progressing     Problem: Pain  Goal: Takes deep breaths with improved pain control throughout the shift  Outcome: Progressing  Goal: Turns in bed with improved pain  control throughout the shift  Outcome: Progressing  Goal: Walks with improved pain control throughout the shift  Outcome: Progressing  Goal: Performs ADL's with improved pain control throughout shift  Outcome: Progressing  Goal: Participates in PT with improved pain control throughout the shift  Outcome: Progressing  Goal: Free from opioid side effects throughout the shift  Outcome: Progressing  Goal: Free from acute confusion related to pain meds throughout the shift  Outcome: Progressing     Problem: Wound Care  Goal: Area will decrease in size .2x.2 cm per week  Outcome: Progressing

## 2024-08-27 NOTE — PROGRESS NOTES
Occupational Therapy    OT Treatment    Patient Name: Mariann Drew  MRN: [id number]  Today's Date: 8/27/2024  Time Calculation  Start Time: 1000  Stop Time: 1045  Time Calculation (min): 45 min      Assessment:  End of Session Communication: Bedside nurse  End of Session Patient Position: Alarm on, Up in chair     Plan:  Treatment Interventions: ADL retraining, Functional transfer training, UE strengthening/ROM, Endurance training, Equipment evaluation/education, Patient/family training, Compensatory technique education, Fine motor coordination activities  OT Frequency: 5 times per week (6 days/PRN)  Treatment Interventions: ADL retraining, Functional transfer training, UE strengthening/ROM, Endurance training, Equipment evaluation/education, Patient/family training, Compensatory technique education, Fine motor coordination activities    Subjective   Previous Visit Info:  OT Last Visit  OT Received On: 08/27/24  General:  General  Family/Caregiver Present: Yes  Caregiver Feedback: spouse Morales present for family observation  Prior to Session Communication: Bedside nurse  Patient Position Received: Up in chair, Alarm on  General Comment: Patient agreeable to OT session, reports ankles are sore from therapy. Reports legs have increased swelling. RN and MD aware.  reports issues with pain medication causing confusion. Discussed bedside commode, abdominal precautions.  Precautions:  Medical Precautions: Fall precautions  Post-Surgical Precautions: Abdominal surgery precautions  Precautions Comment: Wound vac and port/IV  Vital Signs:     Pain:  Pain Assessment  0-10 (Numeric) Pain Score:  (Reports generalized soreness in ankles from therapy. Does not complain of abdominal pain.)    Bed Mobility/Transfers: Transfers  Transfer: Yes  Transfer 1  Trials/Comments 1: Sit-stand transfers with Min-Mod A    Standing Balance:  Static Standing Balance  Static Standing-Comment/Number of Minutes: Static standing at ww  tolerating x3 trials with Min A for balance tolerating 2 min 20 seconds, 3 minutes, and 2 minutes for greater strength and endurance for ADL completion. Seated rest breaks given 2/2 fatigue.    Therapy/Activity: Therapeutic Exercise  Therapeutic Exercise Performed: Yes  Therapeutic Exercise Activity 1: bue ther ex using 1# weights completing 3 exercises x 3 sets x10 reps to promote greater indep with ADLs and transfers. Moderate cues, attention to task d/t confusion.  EDUCATION:  Education  Individual(s) Educated: Patient, Spouse  Education Provided: Fall precautons, Risk and benefits of OT discussed with patient or other, POC discussed and agreed upon  Risk and Benefits Discussed with Patient/Caregiver/Other: yes  Patient/Caregiver Demonstrated Understanding: yes  Patient Response to Education:  (Patient could use further review.)  Education Comment: Spouse very supportive, reports good understanding.    Goals:  Encounter Problems       Encounter Problems (Active)       OT Problem       STG- patient will complete grooming with MIN A with use of ae/ad/dme prn (Progressing)       Start:  08/23/24    Expected End:  08/30/24            STG- patient will complete bathing with MAX A with use of ae/ad/dme prn (Progressing)       Start:  08/23/24    Expected End:  08/30/24            STG- patient will complete UB dressing with MOD A with use of ae/ad/dme prn (Progressing)       Start:  08/23/24    Expected End:  08/30/24            STG- patient will complete LB dressing with MAX A with use of ae/ad/dme prn (Progressing)       Start:  08/23/24    Expected End:  08/30/24            STG- patient will complete toileting with MAX A with use of ae/ad/dme prn  (Progressing)       Start:  08/23/24    Expected End:  08/30/24            STG- patient will complete toilet/commode transfers with MAX A with use of ae/ad/dme prn (Progressing)       Start:  08/23/24    Expected End:  08/30/24            STG- patient will complete  tub/shower transfers with MAX A with use of ae/ad/dme prn (Progressing)       Start:  08/23/24    Expected End:  08/30/24            STG- patient will complete simple mobility with MOD A with use of ae/ad/dme prn (Progressing)       Start:  08/23/24    Expected End:  08/30/24            LTG- patient will complete grooming with CGA with use of ae/ad/dme prn (Progressing)       Start:  08/23/24    Expected End:  09/06/24            LTG- patient will complete bathing with MOD A with use of ae/ad/dme prn (Progressing)       Start:  08/23/24    Expected End:  09/06/24            LTG- patient will complete UB dressing with Min A  with use of ae/ad/dme prn (Progressing)       Start:  08/23/24    Expected End:  09/06/24            LTG- patient will complete LB dressing with MOD A with use of ae/ad/dme prn (Progressing)       Start:  08/23/24    Expected End:  09/06/24            LTG- patient will complete toileting with MOD A with use of ae/ad/dme prn  (Progressing)       Start:  08/23/24    Expected End:  09/06/24            LTG- patient will complete toilet/commode transfers with Mod A with use of ae/ad/dme prn (Progressing)       Start:  08/23/24    Expected End:  09/06/24            LTG- patient will complete tub/shower transfers with MOD A with use of ae/ad/dme prn (Progressing)       Start:  08/23/24    Expected End:  09/06/24            LTG- patient will complete simple mobility with MIN A with use of ae/ad/dme prn (Progressing)       Start:  08/23/24    Expected End:  09/06/24

## 2024-08-27 NOTE — CARE PLAN
The patient's goals for the shift include  Pain control     The clinical goals for the shift include Patient will be less confused     Problem: Skin  Goal: Decreased wound size/increased tissue granulation at next dressing change  Outcome: Progressing  Flowsheets (Taken 8/27/2024 1732)  Decreased wound size/increased tissue granulation at next dressing change:   Protective dressings over bony prominences   Promote sleep for wound healing  Goal: Participates in plan/prevention/treatment measures  Outcome: Progressing  Flowsheets (Taken 8/27/2024 1732)  Participates in plan/prevention/treatment measures:   Elevate heels   Increase activity/out of bed for meals  Goal: Prevent/manage excess moisture  Outcome: Progressing  Flowsheets (Taken 8/27/2024 1732)  Prevent/manage excess moisture:   Moisturize dry skin   Follow provider orders for dressing changes   Monitor for/manage infection if present   Cleanse incontinence/protect with barrier cream  Goal: Prevent/minimize sheer/friction injuries  Outcome: Progressing  Flowsheets (Taken 8/27/2024 1732)  Prevent/minimize sheer/friction injuries:   Use pull sheet   Increase activity/out of bed for meals   HOB 30 degrees or less   Turn/reposition every 2 hours/use positioning/transfer devices  Goal: Promote/optimize nutrition  Outcome: Progressing  Flowsheets (Taken 8/27/2024 1732)  Promote/optimize nutrition:   Monitor/record intake including meals   Consume > 50% meals/supplements   Offer water/supplements/favorite foods  Goal: Promote skin healing  Outcome: Progressing  Flowsheets (Taken 8/27/2024 1732)  Promote skin healing: Turn/reposition every 2 hours/use positioning/transfer devices     Problem: Fall/Injury  Goal: Not fall by end of shift  Outcome: Progressing  Goal: Be free from injury by end of the shift  Outcome: Progressing  Goal: Verbalize understanding of personal risk factors for fall in the hospital  Outcome: Progressing  Goal: Verbalize understanding of risk  factor reduction measures to prevent injury from fall in the home  Outcome: Progressing  Goal: Use assistive devices by end of the shift  Outcome: Progressing  Goal: Pace activities to prevent fatigue by end of the shift  Outcome: Progressing     Problem: Pain  Goal: Takes deep breaths with improved pain control throughout the shift  Outcome: Progressing  Goal: Turns in bed with improved pain control throughout the shift  Outcome: Progressing  Goal: Walks with improved pain control throughout the shift  Outcome: Progressing  Goal: Performs ADL's with improved pain control throughout shift  Outcome: Progressing  Goal: Participates in PT with improved pain control throughout the shift  Outcome: Progressing  Goal: Free from opioid side effects throughout the shift  Outcome: Progressing  Goal: Free from acute confusion related to pain meds throughout the shift  Outcome: Progressing     Problem: Wound Care  Goal: Area will decrease in size .2x.2 cm per week  Outcome: Progressing

## 2024-08-28 LAB
ALBUMIN SERPL BCP-MCNC: 2.4 G/DL (ref 3.4–5)
ANION GAP SERPL CALC-SCNC: 11 MMOL/L (ref 10–20)
BUN SERPL-MCNC: 29 MG/DL (ref 6–23)
CALCIUM SERPL-MCNC: 8.2 MG/DL (ref 8.6–10.3)
CHLORIDE SERPL-SCNC: 107 MMOL/L (ref 98–107)
CO2 SERPL-SCNC: 20 MMOL/L (ref 21–32)
CREAT SERPL-MCNC: 1.78 MG/DL (ref 0.5–1.05)
EGFRCR SERPLBLD CKD-EPI 2021: 31 ML/MIN/1.73M*2
GLUCOSE SERPL-MCNC: 124 MG/DL (ref 74–99)
PHOSPHATE SERPL-MCNC: 3.4 MG/DL (ref 2.5–4.9)
POTASSIUM SERPL-SCNC: 4.4 MMOL/L (ref 3.5–5.3)
SODIUM SERPL-SCNC: 134 MMOL/L (ref 136–145)

## 2024-08-28 PROCEDURE — 2500000004 HC RX 250 GENERAL PHARMACY W/ HCPCS (ALT 636 FOR OP/ED): Performed by: PHYSICAL MEDICINE & REHABILITATION

## 2024-08-28 PROCEDURE — 97116 GAIT TRAINING THERAPY: CPT | Mod: GP

## 2024-08-28 PROCEDURE — 97110 THERAPEUTIC EXERCISES: CPT | Mod: GP

## 2024-08-28 PROCEDURE — 97530 THERAPEUTIC ACTIVITIES: CPT | Mod: GP

## 2024-08-28 PROCEDURE — 1180000001 HC REHAB PRIVATE ROOM DAILY

## 2024-08-28 PROCEDURE — 97110 THERAPEUTIC EXERCISES: CPT | Mod: GO,CO

## 2024-08-28 PROCEDURE — 97530 THERAPEUTIC ACTIVITIES: CPT | Mod: GO,CO

## 2024-08-28 PROCEDURE — 2500000001 HC RX 250 WO HCPCS SELF ADMINISTERED DRUGS (ALT 637 FOR MEDICARE OP): Performed by: PHYSICAL MEDICINE & REHABILITATION

## 2024-08-28 PROCEDURE — 97535 SELF CARE MNGMENT TRAINING: CPT | Mod: GO,CO

## 2024-08-28 PROCEDURE — 80069 RENAL FUNCTION PANEL: CPT | Performed by: INTERNAL MEDICINE

## 2024-08-28 RX ORDER — ERTAPENEM 1 G/1
500 INJECTION, POWDER, LYOPHILIZED, FOR SOLUTION INTRAMUSCULAR; INTRAVENOUS DAILY
Status: DISCONTINUED | OUTPATIENT
Start: 2024-08-29 | End: 2024-08-28 | Stop reason: RX

## 2024-08-28 RX ADMIN — Medication 3 MG: at 23:03

## 2024-08-28 RX ADMIN — ERTAPENEM SODIUM 1 G: 1 INJECTION, POWDER, LYOPHILIZED, FOR SOLUTION INTRAMUSCULAR; INTRAVENOUS at 09:27

## 2024-08-28 RX ADMIN — DEXAMETHASONE 0.5 MG: 1 TABLET ORAL at 13:55

## 2024-08-28 RX ADMIN — FERROUS SULFATE TAB 325 MG (65 MG ELEMENTAL FE) 325 MG: 325 (65 FE) TAB at 08:44

## 2024-08-28 RX ADMIN — FAMOTIDINE 20 MG: 20 TABLET, FILM COATED ORAL at 08:44

## 2024-08-28 RX ADMIN — DEUTETRABENAZINE 6 MG: 6 TABLET, FILM COATED, EXTENDED RELEASE ORAL at 08:42

## 2024-08-28 RX ADMIN — ENOXAPARIN SODIUM 40 MG: 100 INJECTION SUBCUTANEOUS at 16:48

## 2024-08-28 RX ADMIN — DEXAMETHASONE 0.5 MG: 1 TABLET ORAL at 16:48

## 2024-08-28 RX ADMIN — TRAZODONE HYDROCHLORIDE 150 MG: 50 TABLET ORAL at 23:03

## 2024-08-28 RX ADMIN — DEXAMETHASONE 0.5 MG: 1 TABLET ORAL at 05:31

## 2024-08-28 RX ADMIN — FERROUS SULFATE TAB 325 MG (65 MG ELEMENTAL FE) 325 MG: 325 (65 FE) TAB at 23:03

## 2024-08-28 RX ADMIN — ACETAMINOPHEN 650 MG: 325 TABLET ORAL at 02:16

## 2024-08-28 RX ADMIN — CYCLOBENZAPRINE 5 MG: 10 TABLET, FILM COATED ORAL at 00:12

## 2024-08-28 RX ADMIN — ALLOPURINOL 400 MG: 100 TABLET ORAL at 08:41

## 2024-08-28 RX ADMIN — SUCRALFATE 1 G: 1 TABLET ORAL at 23:03

## 2024-08-28 RX ADMIN — DULOXETINE HYDROCHLORIDE 60 MG: 30 CAPSULE, DELAYED RELEASE ORAL at 23:03

## 2024-08-28 RX ADMIN — ACETAMINOPHEN 650 MG: 325 TABLET ORAL at 23:08

## 2024-08-28 RX ADMIN — LATANOPROST 1 DROP: 50 SOLUTION OPHTHALMIC at 23:02

## 2024-08-28 RX ADMIN — NYSTATIN: 100000 POWDER TOPICAL at 08:44

## 2024-08-28 RX ADMIN — DEXAMETHASONE 0.5 MG: 1 TABLET ORAL at 23:08

## 2024-08-28 RX ADMIN — DEUTETRABENAZINE 24 MG: 24 TABLET, FILM COATED, EXTENDED RELEASE ORAL at 08:45

## 2024-08-28 RX ADMIN — LAMOTRIGINE 200 MG: 100 TABLET ORAL at 08:43

## 2024-08-28 RX ADMIN — SENNOSIDES 8.6 MG: 8.6 TABLET, FILM COATED ORAL at 23:03

## 2024-08-28 RX ADMIN — NYSTATIN: 100000 POWDER TOPICAL at 23:04

## 2024-08-28 RX ADMIN — LOTILANER OPHTHALMIC SOLUTION 1 DROP: 2.5 SOLUTION/ DROPS OPHTHALMIC at 23:02

## 2024-08-28 RX ADMIN — DULOXETINE HYDROCHLORIDE 60 MG: 30 CAPSULE, DELAYED RELEASE ORAL at 08:43

## 2024-08-28 ASSESSMENT — PAIN SCALES - GENERAL
PAINLEVEL_OUTOF10: 3
PAINLEVEL_OUTOF10: 8
PAINLEVEL_OUTOF10: 7
PAINLEVEL_OUTOF10: 7
PAINLEVEL_OUTOF10: 8
PAINLEVEL_OUTOF10: 9

## 2024-08-28 ASSESSMENT — PAIN - FUNCTIONAL ASSESSMENT
PAIN_FUNCTIONAL_ASSESSMENT: 0-10

## 2024-08-28 ASSESSMENT — ACTIVITIES OF DAILY LIVING (ADL): HOME_MANAGEMENT_TIME_ENTRY: 35

## 2024-08-28 NOTE — PROGRESS NOTES
Occupational Therapy    OT Treatment    Patient Name: Mariann Drew  MRN: [id number]  Today's Date: 8/28/2024  Time Calculation  Start Time: 1000  Stop Time: 1045  Time Calculation (min): 45 min        Assessment:  End of Session Patient Position: Bed, 2 rail up, Alarm on     Plan:  Treatment Interventions: ADL retraining, Functional transfer training, UE strengthening/ROM, Endurance training, Equipment evaluation/education, Patient/family training, Compensatory technique education, Fine motor coordination activities  OT Frequency: 5 times per week (6 days/PRN)  Treatment Interventions: ADL retraining, Functional transfer training, UE strengthening/ROM, Endurance training, Equipment evaluation/education, Patient/family training, Compensatory technique education, Fine motor coordination activities    Subjective   Previous Visit Info:  OT Last Visit  OT Received On: 08/28/24  General:  General  Prior to Session Communication: Bedside nurse  Patient Position Received: Up in chair, Alarm on  General Comment: Patient was confused this treatment session. Patient states that she is more fatigued after last session  Precautions:  Medical Precautions: Fall precautions  Post-Surgical Precautions: Abdominal surgery precautions  Precautions Comment: Wound vac and port/IV            Pain:  Pain Assessment  Pain Assessment: 0-10  0-10 (Numeric) Pain Score: 7  Pain Type: Surgical pain  Pain Location: Abdomen  Pain Interventions: Repositioned, Emotional support, Rest  Response to Interventions: reports rest gives some relief    Objective      Activities of Daily Living: LE Dressing  LE Dressing: Yes  LE Dressing Adaptive Equipment: Sock aide, Dressing stick  Sock Level of Assistance: Moderate assistance (Patient requested a sock change: Patient more confused this session and patient required more assistance and increased time this session to don socks.)   Bed Mobility/Transfers: Transfers  Transfer: Yes  Transfer 1  Technique 1:   (sit to supine)  Transfer Device 1: Walker  Transfer Level of Assistance 1: Maximum assistance (to bring legs into bed)  Transfers 2  Trials/Comments 2: via a rolling walker transfers to WC/ recliner/ commode at minimal assistance with up to 25% cues for hand placements    Functional Mobility:  Functional Mobility 1  Comments 1: via a rolling walker completes functional approach steps and side stepping in room with minimal assistance     Therapy/Activity: Therapeutic Exercise  Therapeutic Exercise Performed: Yes  BUE exercises using one pound weights completing 30 repetitions of exercises to promote greater independence with ADL's and transfers.      EDUCATION: Educated regarding walker safety/ safe transfer technique and fall precautions with fair carryover.       Goals:  Encounter Problems       Encounter Problems (Active)       OT Problem       STG- patient will complete grooming with MIN A with use of ae/ad/dme prn (Met)       Start:  08/23/24    Expected End:  08/30/24    Resolved:  08/28/24    Updated to: STG- patient will complete grooming with  Set up with use of ae/ad/dme prn         STG- patient will complete bathing with MAX A with use of ae/ad/dme prn (Progressing)       Start:  08/23/24    Expected End:  09/06/24            STG- patient will complete UB dressing with MOD A with use of ae/ad/dme prn (Met)       Start:  08/23/24    Expected End:  08/30/24    Resolved:  08/28/24    Updated to: STG- patient will complete UB dressing with SBA with use of ae/ad/dme prn         STG- patient will complete LB dressing with MAX A with use of ae/ad/dme prn (Progressing)       Start:  08/23/24    Expected End:  09/06/24            STG- patient will complete toileting with MAX A with use of ae/ad/dme prn  (Progressing)       Start:  08/23/24    Expected End:  09/06/24            STG- patient will complete toilet/commode transfers with MAX A with use of ae/ad/dme prn (Met)       Start:  08/23/24    Expected End:   08/30/24    Resolved:  08/28/24    Updated to: STG- patient will complete toilet/commode transfers with  CGA with use of ae/ad/dme prn         STG- patient will complete tub/shower transfers with MAX A with use of ae/ad/dme prn (Progressing)       Start:  08/23/24    Expected End:  09/06/24            STG- patient will complete simple mobility with MOD A with use of ae/ad/dme prn (Met)       Start:  08/23/24    Expected End:  08/30/24    Resolved:  08/28/24    Updated to: STG- patient will complete simple mobility with CGA with use of ae/ad/dme prn         LTG- patient will complete grooming with CGA with use of ae/ad/dme prn (Met)       Start:  08/23/24    Expected End:  09/06/24    Resolved:  08/28/24    Updated to: LTG- patient will complete grooming with supervision with use of ae/ad/dme prn         LTG- patient will complete bathing with MOD A with use of ae/ad/dme prn (Progressing)       Start:  08/23/24    Expected End:  09/06/24            LTG- patient will complete UB dressing with Min A  with use of ae/ad/dme prn (Met)       Start:  08/23/24    Expected End:  09/06/24    Resolved:  08/28/24    Updated to: LTG- patient will complete UB dressing with set up  with use of ae/ad/dme prn         LTG- patient will complete LB dressing with MOD A with use of ae/ad/dme prn (Progressing)       Start:  08/23/24    Expected End:  09/06/24            LTG- patient will complete toileting with MOD A with use of ae/ad/dme prn  (Progressing)       Start:  08/23/24    Expected End:  09/06/24            LTG- patient will complete toilet/commode transfers with Mod A with use of ae/ad/dme prn (Met)       Start:  08/23/24    Expected End:  09/06/24    Resolved:  08/28/24    Updated to: LTG- patient will complete toilet/commode transfers with CGA with use of ae/ad/dme prn         LTG- patient will complete tub/shower transfers with MOD A with use of ae/ad/dme prn (Progressing)       Start:  08/23/24    Expected End:  09/06/24             LTG- patient will complete simple mobility with MIN A with use of ae/ad/dme prn (Progressing)       Start:  08/23/24    Expected End:  09/06/24            STG- patient will complete grooming with  Set up with use of ae/ad/dme prn (Progressing)       Start:  08/28/24    Expected End:  09/06/24                STG- patient will complete UB dressing with SBA with use of ae/ad/dme prn (Progressing)       Start:  08/28/24    Expected End:  09/06/24                STG- patient will complete toilet/commode transfers with  CGA with use of ae/ad/dme prn (Progressing)       Start:  08/28/24    Expected End:  09/06/24                STG- patient will complete simple mobility with CGA with use of ae/ad/dme prn (Progressing)       Start:  08/28/24    Expected End:  09/06/24                LTG- patient will complete grooming with supervision with use of ae/ad/dme prn (Met)       Start:  08/28/24    Expected End:  09/06/24    Resolved:  08/28/24    Updated to: LTG- patient will complete grooming with set up with use of ae/ad/dme prn             LTG- patient will complete UB dressing with set up  with use of ae/ad/dme prn (Progressing)       Start:  08/28/24    Expected End:  09/06/24                LTG- patient will complete toilet/commode transfers with CGA with use of ae/ad/dme prn (Progressing)       Start:  08/28/24    Expected End:  09/06/24                LTG- patient will complete grooming with set up with use of ae/ad/dme prn (Progressing)       Start:  08/28/24    Expected End:  09/06/24

## 2024-08-28 NOTE — PROGRESS NOTES
Physical Therapy    Physical Therapy Treatment    Patient Name: Mariann Drew  MRN: [id number]  Today's Date: 8/28/2024  Time Calculation  Start Time: 1300  Stop Time: 1353  Time Calculation (min): 53 min         Assessment/Plan         PT Plan  Treatment/Interventions: Bed mobility, Transfer training, Gait training, Stair training, Balance training, Strengthening, Endurance training, Range of motion, Therapeutic exercise, Therapeutic activity, Home exercise program, Positioning, Postural re-education  PT Plan: Ongoing PT  PT Frequency: 5 times per week (6 times PRN)  PT Discharge Recommendations:  (Anticipate pt to require intermittent min A at walker level at time of discharge.)  Equipment Recommended upon Discharge: Wheeled walker  PT Recommended Transfer Status: Assist x2 (at time of initial evaluation)      General Visit Information:   PT  Visit  PT Received On: 08/28/24  General  Patient Position Received: Alarm on, Bed, 2 rail up    Subjective   Precautions:  Precautions  Medical Precautions: Fall precautions  Post-Surgical Precautions: Abdominal surgery precautions  Precautions Comment: Wound vac and port/IV            Objective        Treatments:  Therapeutic Exercise  Therapeutic Exercise Performed:  (performed seated BLE ther ex x10-20 reps.)    Bed Mobility  Bed Mobility:  (bed mob supine to sit with mod assist of 2; HOB slightly elevated and use of bed rail.)    Ambulation/Gait Training  Ambulation/Gait Training Performed:  (gait training 20' with ww from w/c to armchair with min assist of 1. Occ unsteadiness.)    Transfers  Transfer:  transfer training sit/stand from w/c and armchair and stand pivot w/c<>toilet and w/c to recliner with mod assist x1; cues needed for hand placement 100% of time and safe postioning of walker prior to sitting.      Education Documentation  Patient educated in correct bed mobility with instruction for proper trunk positioning, upper and lower body placement and positioning  and use of bed rail if appropriate.    Patient educated in safe techniques for gait with a device in order to maximize safety and functional independence.    Patient educated in safe and proper transfer techniques including proper positioning and hand/foot placement to maximize safety and functional independence.         Encounter Problems       Encounter Problems (Active)       PT Problem       LTG - Pt will perform bed mobility with mod A x 1  (Progressing)       Start:  08/23/24    Expected End:  09/06/24            LTG - Pt will perform transfers with min A x 1.  (Progressing)       Start:  08/23/24    Expected End:  09/06/24            LTG - Pt will amb 150 feet with FWW and CGA.   (Progressing)       Start:  08/23/24    Expected End:  09/06/24            LTG - Pt will up/down 2 stairs with 1 HR and straight cane with min A x 1.  (Progressing)       Start:  08/23/24    Expected End:  09/06/24            STG - Pt will perform bed mobility with mod assist x 2 (Progressing)       Start:  08/23/24    Expected End:  08/30/24            STG - Pt will perform transfers with mod assist x 1 (Met)       Start:  08/23/24    Expected End:  08/30/24    Resolved:  08/28/24         STG - Pt will amb 50 feet with FWW and mod/min assist x 1 (Progressing)       Start:  08/23/24    Expected End:  08/30/24            STG - Pt will up/down curb step with FWW and mod A x 2.  (Progressing)       Start:  08/23/24    Expected End:  08/30/24

## 2024-08-28 NOTE — CARE PLAN
The patient's goals for the shift include      The clinical goals for the shift include patient will receive adequate rest this shift      Problem: Skin  Goal: Decreased wound size/increased tissue granulation at next dressing change  Outcome: Progressing  Flowsheets (Taken 8/27/2024 2251)  Decreased wound size/increased tissue granulation at next dressing change: Promote sleep for wound healing  Goal: Participates in plan/prevention/treatment measures  Outcome: Progressing  Flowsheets (Taken 8/27/2024 2251)  Participates in plan/prevention/treatment measures: Elevate heels  Goal: Prevent/manage excess moisture  Outcome: Progressing  Flowsheets (Taken 8/27/2024 2251)  Prevent/manage excess moisture: Moisturize dry skin  Goal: Prevent/minimize sheer/friction injuries  Outcome: Progressing  Flowsheets (Taken 8/27/2024 2251)  Prevent/minimize sheer/friction injuries: Use pull sheet  Goal: Promote/optimize nutrition  Outcome: Progressing  Flowsheets (Taken 8/27/2024 2251)  Promote/optimize nutrition: Monitor/record intake including meals  Goal: Promote skin healing  Outcome: Progressing  Flowsheets (Taken 8/27/2024 2251)  Promote skin healing: Turn/reposition every 2 hours/use positioning/transfer devices     Problem: Fall/Injury  Goal: Not fall by end of shift  Outcome: Progressing  Goal: Be free from injury by end of the shift  Outcome: Progressing  Goal: Verbalize understanding of personal risk factors for fall in the hospital  Outcome: Progressing  Goal: Verbalize understanding of risk factor reduction measures to prevent injury from fall in the home  Outcome: Progressing  Goal: Use assistive devices by end of the shift  Outcome: Progressing  Goal: Pace activities to prevent fatigue by end of the shift  Outcome: Progressing     Problem: Pain  Goal: Takes deep breaths with improved pain control throughout the shift  Outcome: Progressing  Goal: Turns in bed with improved pain control throughout the shift  Outcome:  Progressing  Goal: Walks with improved pain control throughout the shift  Outcome: Progressing  Goal: Performs ADL's with improved pain control throughout shift  Outcome: Progressing  Goal: Participates in PT with improved pain control throughout the shift  Outcome: Progressing  Goal: Free from opioid side effects throughout the shift  Outcome: Progressing  Goal: Free from acute confusion related to pain meds throughout the shift  Outcome: Progressing     Problem: Wound Care  Goal: Area will decrease in size .2x.2 cm per week  Outcome: Progressing

## 2024-08-28 NOTE — PROGRESS NOTES
"Mariann Drew is a 68 y.o. female on day 5 of admission presenting with Debility.    Subjective   Patient wants her glaucoma meds to be given here.  She denies chest pain or shortness of breath.  She denies abdominal pain.  10 review of systems are negative       Objective     Physical Exam  Constitutional:       General: no acute distress.     Appearance: Normal appearance.   HENT:      Head: Normocephalic.   No eye redness or drainage  Cardiovascular:      Rate and Rhythm: Normal rate and regular rhythm.   Pulmonary:   No adventitious lung sounds  Abdominal:      General: Abdomen is flat. Bowel sounds are normal.   Wound VAC in place  Musculoskeletal:         General: No swelling, tenderness or deformity.   Abdomen is soft without organomegaly or tenderness.  Apparently she has had some black stools we will check for OB  Skin:     General: Skin is warm and dry.      Findings: No rash.   Neurological:      General: No focal deficit present.      Mental Status:  alert and oriented to person, place, and time.      Cranial Nerves: No cranial nerve deficit.   Transfers are min to mod assist      Psychiatric:         Mood and Affect: Mood normal.      Last Recorded Vitals  Blood pressure 105/56, pulse 82, temperature 35.9 °C (96.6 °F), temperature source Temporal, resp. rate 18, height 1.626 m (5' 4.02\"), weight 72.2 kg (159 lb 2.8 oz), SpO2 100%.  Intake/Output last 3 Shifts:  I/O last 3 completed shifts:  In: 1050 (14.5 mL/kg) [I.V.:1000 (13.9 mL/kg); IV Piggyback:50]  Out: 901 (12.5 mL/kg) [Drains:900; Stool:1]  Weight: 72.2 kg     Relevant Results  Scheduled medications  allopurinol, 400 mg, oral, Daily  deutetrabenazine, 24 mg, oral, Daily  deutetrabenazine, 6 mg, oral, Daily  dexAMETHasone, 0.5 mg, oral, 4x daily  DULoxetine, 60 mg, oral, BID  enoxaparin, 40 mg, subcutaneous, q24h  ertapenem, 1 g, intravenous, Daily  famotidine, 20 mg, oral, q AM  ferrous sulfate (325 mg ferrous sulfate), 65 mg of iron, oral, " BID  lamoTRIgine, 200 mg, oral, q AM  latanoprost, 1 drop, Both Eyes, Nightly  lotilaner, 1 drop, Both Eyes, Nightly  nystatin, , Topical, BID  semaglutide, 0.5 mg, subcutaneous, Every Sunday  sennosides, 1 tablet, oral, Nightly  sucralfate, 1 g, oral, Nightly  traZODone, 150 mg, oral, Nightly      Continuous medications       PRN medications  PRN medications: acetaminophen **OR** acetaminophen, alum-mag hydroxide-simeth, bisacodyl, bisacodyl, cyclobenzaprine, lidocaine, melatonin, naratriptan     Results for orders placed or performed during the hospital encounter of 08/23/24 (from the past 24 hour(s))   Renal Function Panel   Result Value Ref Range    Glucose 124 (H) 74 - 99 mg/dL    Sodium 134 (L) 136 - 145 mmol/L    Potassium 4.4 3.5 - 5.3 mmol/L    Chloride 107 98 - 107 mmol/L    Bicarbonate 20 (L) 21 - 32 mmol/L    Anion Gap 11 10 - 20 mmol/L    Urea Nitrogen 29 (H) 6 - 23 mg/dL    Creatinine 1.78 (H) 0.50 - 1.05 mg/dL    eGFR 31 (L) >60 mL/min/1.73m*2    Calcium 8.2 (L) 8.6 - 10.3 mg/dL    Phosphorus 3.4 2.5 - 4.9 mg/dL    Albumin 2.4 (L) 3.4 - 5.0 g/dL            Assessment/Plan   Principal Problem:    Debility  Active Problems:    Anxiety    Balance problems    Diabetes mellitus (Multi)    Hx of obesity    Myofascial pain syndrome, cervical    Hyperlipidemia    Disruption of external surgical wound      1.  Abdominoplasty with wound infection group B strep  Continue IV Invanz  Begin physical therapy for bed mobility, transfers, endurance activities, gait training with an assistive device  Begin occupational therapy for functional mobility, upper limb strengthening, coordination, balance and endurance activities as well as adaptive aids     2.  Gout  Zyloprim 400 mg daily  Monitor for gout symptoms     3.  Tardive dyskinesia  Austedo 30 mg daily  Patient's  will bring in the medication     4.  DVT prophylaxis  Lovenox 40 mg daily     5.  GERD and difficulty swallowing  Pepcid 20 mg daily  Carafate 1 g  nightly     6.  Pain  Cymbalta 60 mg twice a day  Lamictal 200 mg daily  Decadron 0.5 mg 4 times a day     7.  Sleep   150 mg nightly     8.  Diabetes mellitus type 2  Semaglutide 0.5 mg weekly     9. JOSE JUAN  She has not had a bowel movement for 2 days will begin on a bowel program  DVT prophylaxis she is on Lovenox 40 mg daily    8/28  Creatinine today is 1.78 will be sure that infectious disease is aware  Blood pressure today is 105/56  Pain is controlled  Fasting blood sugar is 124  Continue DVT prophylaxis Homans' sign is negative bilaterally         I spent 37 minutes in the professional and overall care of this patient.

## 2024-08-28 NOTE — PROGRESS NOTES
Medication Adjustment    The following medication(s) was/were adjusted for Mariann Drew per protocol/policy due to altered renal function.    Medication(s) adjusted:   Ertapenem 1g q24h adjusted to 500 mg q24h per Estimated Creatinine Clearance: 29.1 mL/min (A) (by C-G formula based on SCr of 1.78 mg/dL (H)).    Please call with any questions.  Afshan Pruitt, PharmD, [facility]  [phone]

## 2024-08-28 NOTE — PROGRESS NOTES
Occupational Therapy    OT Treatment    Patient Name: Mariann Drew  MRN: [id number]  Today's Date: 8/28/2024  Time Calculation  Start Time: 0830  Stop Time: 0915  Time Calculation (min): 45 min        Assessment:  End of Session Patient Position: Alarm on, Up in chair     Plan:  Treatment Interventions: ADL retraining, Functional transfer training, UE strengthening/ROM, Endurance training, Equipment evaluation/education, Patient/family training, Compensatory technique education, Fine motor coordination activities  OT Frequency: 5 times per week (6 days/PRN)  Treatment Interventions: ADL retraining, Functional transfer training, UE strengthening/ROM, Endurance training, Equipment evaluation/education, Patient/family training, Compensatory technique education, Fine motor coordination activities    Subjective      General:  General  Prior to Session Communication: Bedside nurse  Patient Position Received: Alarm off, not on at start of session, Up in chair  General Comment: States she is fatigued this day; Slept poorly  Precautions:  Medical Precautions: Fall precautions  Post-Surgical Precautions: Abdominal surgery precautions  Precautions Comment: Wound vac and port/IV    Pain:  Pain Assessment  Pain Assessment: 0-10  0-10 (Numeric) Pain Score: 7  Pain Type: Surgical pain  Pain Location: Abdomen  Pain Interventions: Medication (See MAR), Emotional support  Response to Interventions: reports that pain is unchanged at end of session    Objective       Activities of Daily Living: Grooming  Grooming Level of Assistance: Setup  Grooming Where Assessed: Sitting sink side    UE Bathing  UE Bathing Adaptive Equipment:  (warm bath wipes)  UE Bathing Level of Assistance: Setup  UE Bathing Where Assessed: Sitting sink side    UE Dressing  UE Dressing Level of Assistance: Contact guard  UE Dressing Where Assessed: Recliner    LE Dressing  LE Dressing: Yes  LE Dressing Adaptive Equipment: Sock aide, Dressing stick  Sock Level of  Assistance: Minimum assistance     Bed Mobility/Transfers: Transfers  Transfer: Yes  Transfer 1  Technique 1: Stand to sit  Transfer Device 1: Walker  Transfer Level of Assistance 1: Minimum assistance  Transfers 2  Trials/Comments 2: via a rolling walker transfers to WC/ recliner/ commode at minimal assistance with up to 25% cues for hand placements   EDUCATION:   Educated regarding fall precautions, Walker safety, AE use with fair carryover during functional tasks.     Goals:  Encounter Problems       Encounter Problems (Active)       OT Problem       STG- patient will complete grooming with MIN A with use of ae/ad/dme prn (Progressing)       Start:  08/23/24    Expected End:  08/30/24            STG- patient will complete bathing with MAX A with use of ae/ad/dme prn (Progressing)       Start:  08/23/24    Expected End:  08/30/24            STG- patient will complete UB dressing with MOD A with use of ae/ad/dme prn (Progressing)       Start:  08/23/24    Expected End:  08/30/24            STG- patient will complete LB dressing with MAX A with use of ae/ad/dme prn (Progressing)       Start:  08/23/24    Expected End:  08/30/24            STG- patient will complete toileting with MAX A with use of ae/ad/dme prn  (Progressing)       Start:  08/23/24    Expected End:  08/30/24            STG- patient will complete toilet/commode transfers with MAX A with use of ae/ad/dme prn (Progressing)       Start:  08/23/24    Expected End:  08/30/24            STG- patient will complete tub/shower transfers with MAX A with use of ae/ad/dme prn (Progressing)       Start:  08/23/24    Expected End:  08/30/24            STG- patient will complete simple mobility with MOD A with use of ae/ad/dme prn (Progressing)       Start:  08/23/24    Expected End:  08/30/24            LTG- patient will complete grooming with CGA with use of ae/ad/dme prn (Progressing)       Start:  08/23/24    Expected End:  09/06/24            LTG- patient will  complete bathing with MOD A with use of ae/ad/dme prn (Progressing)       Start:  08/23/24    Expected End:  09/06/24            LTG- patient will complete UB dressing with Min A  with use of ae/ad/dme prn (Progressing)       Start:  08/23/24    Expected End:  09/06/24            LTG- patient will complete LB dressing with MOD A with use of ae/ad/dme prn (Progressing)       Start:  08/23/24    Expected End:  09/06/24            LTG- patient will complete toileting with MOD A with use of ae/ad/dme prn  (Progressing)       Start:  08/23/24    Expected End:  09/06/24            LTG- patient will complete toilet/commode transfers with Mod A with use of ae/ad/dme prn (Progressing)       Start:  08/23/24    Expected End:  09/06/24            LTG- patient will complete tub/shower transfers with MOD A with use of ae/ad/dme prn (Progressing)       Start:  08/23/24    Expected End:  09/06/24            LTG- patient will complete simple mobility with MIN A with use of ae/ad/dme prn (Progressing)       Start:  08/23/24    Expected End:  09/06/24

## 2024-08-28 NOTE — PROGRESS NOTES
Physical Therapy    Physical Therapy Treatment    Patient Name: Mariann Drew  MRN: [id number]  Today's Date: 8/28/2024  Time Calculation  Start Time: 0915  Stop Time: 1000  Time Calculation (min): 45 min         Assessment/Plan   PT Assessment  End of Session Communication:  (OT)  End of Session Patient Position: Up in chair, Alarm on     PT Plan  Treatment/Interventions: Bed mobility, Transfer training, Gait training, Stair training, Balance training, Strengthening, Endurance training, Range of motion, Therapeutic exercise, Therapeutic activity, Home exercise program, Positioning, Postural re-education  PT Plan: Ongoing PT  PT Frequency: 5 times per week (6 times PRN)  PT Discharge Recommendations:  (Anticipate pt to require intermittent min A at walker level at time of discharge.)  Equipment Recommended upon Discharge: Wheeled walker  PT Recommended Transfer Status: Assist x2 (at time of initial evaluation)      General Visit Information:   PT  Visit  PT Received On: 08/28/24  General  Prior to Session Communication: Bedside nurse  Patient Position Received: Up in chair, Alarm on  General Comment:  (Pt reports she is still feeling confused.   reports pt seemed better on the phone this morning but seems confused again.  Pt's  present for a few minutes at start of session.)    Subjective   Precautions:  Precautions  Medical Precautions: Fall precautions  Post-Surgical Precautions: Abdominal surgery precautions  Precautions Comment: Wound vac and port/IV    Objective   Pain:  Pain Assessment  Pain Assessment: 0-10  0-10 (Numeric) Pain Score: 8  Pain Location: Abdomen  Pain Interventions: Repositioned, Distraction, Emotional support    Treatments:  Therapeutic Exercise  Therapeutic Exercise Performed: Yes  Pt performs BLE seated AROM exercises: marching, LAQ, hip add isometrics with ball, heel slides with towels, AP 2 x 10 reps each in order to improve strength and endurance for improvement in functional  mobility and transfers.     Ambulation/Gait Training  Ambulation/Gait Training Performed:  (Pt ambulates 10 ft x 2 with FWW to/from bathroom with min A x 1 plus another to manage equipment.  Pt requries mod/max cues for safety and sequencing throughout mobility, especially with chair approach)  Transfers  Transfer:  (Pt performs sit/stand transfers with min A x 1 with max cues for proper hand placement.  Increased time and effort with transfers.)      Education Documentation  Pt educated on techniques for transfers and gait training with device in order to maximize safety and independence.  Pt educated on therapeutic exercises, including optimal techniques to maximize function.    Encounter Problems       Encounter Problems (Active)       PT Problem       LTG - Pt will perform bed mobility with mod A x 1  (Progressing)       Start:  08/23/24    Expected End:  09/06/24            LTG - Pt will perform transfers with min A x 1.  (Progressing)       Start:  08/23/24    Expected End:  09/06/24            LTG - Pt will amb 150 feet with FWW and CGA.   (Progressing)       Start:  08/23/24    Expected End:  09/06/24            LTG - Pt will up/down 2 stairs with 1 HR and straight cane with min A x 1.  (Progressing)       Start:  08/23/24    Expected End:  09/06/24            STG - Pt will perform bed mobility with mod assist x 2 (Progressing)       Start:  08/23/24    Expected End:  08/30/24            STG - Pt will perform transfers with mod assist x 1 (Progressing)       Start:  08/23/24    Expected End:  08/30/24            STG - Pt will amb 50 feet with FWW and mod/min assist x 1 (Progressing)       Start:  08/23/24    Expected End:  08/30/24            STG - Pt will up/down curb step with FWW and mod A x 2.  (Progressing)       Start:  08/23/24    Expected End:  08/30/24

## 2024-08-28 NOTE — PROGRESS NOTES
Occupational Therapy    OT Treatment    Patient Name: Mariann Drew  MRN: [id number]  Today's Date: 8/28/2024  Time Calculation  Start Time: 1000  Stop Time: 1045  Time Calculation (min): 45 min        Assessment:  End of Session Patient Position: Bed, 2 rail up, Alarm on     Plan:  Treatment Interventions: ADL retraining, Functional transfer training, UE strengthening/ROM, Endurance training, Equipment evaluation/education, Patient/family training, Compensatory technique education, Fine motor coordination activities  OT Frequency: 5 times per week (6 days/PRN)  Treatment Interventions: ADL retraining, Functional transfer training, UE strengthening/ROM, Endurance training, Equipment evaluation/education, Patient/family training, Compensatory technique education, Fine motor coordination activities    Subjective   Previous Visit Info:  OT Last Visit  OT Received On: 08/28/24  General:  General  Prior to Session Communication: Bedside nurse  Patient Position Received: Up in chair, Alarm on  General Comment: Patient was confused this treatment session. Patient states that she is more fatigued after last session  Precautions:  Medical Precautions: Fall precautions  Post-Surgical Precautions: Abdominal surgery precautions  Precautions Comment: Wound vac and port/IV    Pain:  Pain Assessment  Pain Assessment: 0-10  0-10 (Numeric) Pain Score: 7  Pain Type: Surgical pain  Pain Location: Abdomen  Pain Interventions: Repositioned, Emotional support, Rest  Response to Interventions: reports rest gives some relief    Objective    Activities of Daily Living: LE Dressing  LE Dressing: Yes  LE Dressing Adaptive Equipment: Sock aide, Dressing stick  Sock Level of Assistance: Moderate assistance (Patient requested a sock change: Patient more confused this session and patient required more assistance and increased time this session to don socks.)     Bed Mobility/Transfers: Transfers  Transfer: Yes  Transfer 1  Technique 1:  (sit to  supine)  Transfer Device 1: Walker  Transfer Level of Assistance 1: Maximum assistance (to bring legs into bed)  Transfers 2  Trials/Comments 2: via a rolling walker transfers to WC/ recliner/ commode at minimal assistance with up to 25% cues for hand placements  Functional Mobility:  Functional Mobility 1  Comments 1: via a rolling walker completes functional approach steps and side stepping in room with minimal assistance  Therapy/Activity: Therapeutic Exercise  Therapeutic Exercise Performed: Yes  BUE exercises using one pound weights completing 30 repetitions of 3 exercises to promote greater independence with ADL's and transfers.    EDUCATION:  Education  Individual(s) Educated: Patient  Education Provided: Fall precautions  Risk and Benefits Discussed with Patient/Caregiver/Other: yes  Patient/Caregiver Demonstrated Understanding: yes  Plan of Care Discussed and Agreed Upon: yes  Patient Response to Education: Patient/Caregiver Performed Return Demonstration of Exercises/Activities    Goals:  Encounter Problems       Encounter Problems (Active)       OT Problem       STG- patient will complete grooming with MIN A with use of ae/ad/dme prn (Progressing)       Start:  08/23/24    Expected End:  08/30/24            STG- patient will complete bathing with MAX A with use of ae/ad/dme prn (Progressing)       Start:  08/23/24    Expected End:  08/30/24            STG- patient will complete UB dressing with MOD A with use of ae/ad/dme prn (Progressing)       Start:  08/23/24    Expected End:  08/30/24            STG- patient will complete LB dressing with MAX A with use of ae/ad/dme prn (Progressing)       Start:  08/23/24    Expected End:  08/30/24            STG- patient will complete toileting with MAX A with use of ae/ad/dme prn  (Progressing)       Start:  08/23/24    Expected End:  08/30/24            STG- patient will complete toilet/commode transfers with MAX A with use of ae/ad/dme prn (Progressing)        Start:  08/23/24    Expected End:  08/30/24            STG- patient will complete tub/shower transfers with MAX A with use of ae/ad/dme prn (Progressing)       Start:  08/23/24    Expected End:  08/30/24            STG- patient will complete simple mobility with MOD A with use of ae/ad/dme prn (Progressing)       Start:  08/23/24    Expected End:  08/30/24            LTG- patient will complete grooming with CGA with use of ae/ad/dme prn (Progressing)       Start:  08/23/24    Expected End:  09/06/24            LTG- patient will complete bathing with MOD A with use of ae/ad/dme prn (Progressing)       Start:  08/23/24    Expected End:  09/06/24            LTG- patient will complete UB dressing with Min A  with use of ae/ad/dme prn (Progressing)       Start:  08/23/24    Expected End:  09/06/24            LTG- patient will complete LB dressing with MOD A with use of ae/ad/dme prn (Progressing)       Start:  08/23/24    Expected End:  09/06/24            LTG- patient will complete toileting with MOD A with use of ae/ad/dme prn  (Progressing)       Start:  08/23/24    Expected End:  09/06/24            LTG- patient will complete toilet/commode transfers with Mod A with use of ae/ad/dme prn (Progressing)       Start:  08/23/24    Expected End:  09/06/24            LTG- patient will complete tub/shower transfers with MOD A with use of ae/ad/dme prn (Progressing)       Start:  08/23/24    Expected End:  09/06/24            LTG- patient will complete simple mobility with MIN A with use of ae/ad/dme prn (Progressing)       Start:  08/23/24    Expected End:  09/06/24

## 2024-08-28 NOTE — CARE PLAN
The patient's goals for the shift include      The clinical goals for the shift include patient will get some rest

## 2024-08-28 NOTE — PROGRESS NOTES
Occupational Therapy    OT Treatment    Patient Name: Mariann Drew  MRN: [id number]  Today's Date: 8/28/2024  Time Calculation  Start Time: 0830  Stop Time: 0915  Time Calculation (min): 45 min        Assessment:  End of Session Patient Position: Bed, 2 rail up, Alarm on     Plan:  Treatment Interventions: ADL retraining, Functional transfer training, UE strengthening/ROM, Endurance training, Equipment evaluation/education, Patient/family training, Compensatory technique education, Fine motor coordination activities  OT Frequency: 5 times per week (6 days/PRN)  Treatment Interventions: ADL retraining, Functional transfer training, UE strengthening/ROM, Endurance training, Equipment evaluation/education, Patient/family training, Compensatory technique education, Fine motor coordination activities    Subjective   Previous Visit Info:  OT Last Visit  OT Received On: 08/28/24  General:  General  Prior to Session Communication: Bedside nurse  Patient Position Received: Up in chair, Alarm on  General Comment: Patient was confused this treatment session. Patient states that she is more fatigued after last session  Precautions:  Medical Precautions: Fall precautions  Post-Surgical Precautions: Abdominal surgery precautions  Precautions Comment: Wound vac and port/IV            Pain:  Pain Assessment  Pain Assessment: 0-10  0-10 (Numeric) Pain Score: 7  Pain Type: Surgical pain  Pain Location: Abdomen  Pain Interventions: Repositioned, Emotional support, Rest  Response to Interventions: reports rest gives some relief    Objective    Cognition:     Coordination:     Activities of Daily Living: Grooming  Grooming Level of Assistance: Supervision  Grooming Where Assessed: Sitting sink side    UE Bathing  UE Bathing Adaptive Equipment:  (warm bath wipes)  UE Bathing Level of Assistance: Min A  UE Bathing Where Assessed: Sitting sink side    UE Dressing  UE Dressing Level of Assistance: min A  UE Dressing Where Assessed:  Recliner    Bed Mobility/Transfers: Transfers  Transfer: Yes  Transfer 1  Technique 1:  (sit to supine)  Transfer Device 1: Walker  Transfer Level of Assistance 1: Maximum assistance (to bring legs into bed)  Transfers 2  Trials/Comments 2: via a rolling walker transfers to WC/ recliner/ commode at minimal assistance with up to 25% cues for hand placements    EDUCATION: Educated regarding fall precautions and plan of care with fair carryover        Goals:  Encounter Problems       Encounter Problems (Active)       OT Problem       STG- patient will complete grooming with MIN A with use of ae/ad/dme prn (Met)       Start:  08/23/24    Expected End:  08/30/24    Resolved:  08/28/24    Updated to: STG- patient will complete grooming with  Set up with use of ae/ad/dme prn         STG- patient will complete bathing with MAX A with use of ae/ad/dme prn (Progressing)       Start:  08/23/24    Expected End:  09/06/24            STG- patient will complete UB dressing with MOD A with use of ae/ad/dme prn (Met)       Start:  08/23/24    Expected End:  08/30/24    Resolved:  08/28/24    Updated to: STG- patient will complete UB dressing with SBA with use of ae/ad/dme prn         STG- patient will complete LB dressing with MAX A with use of ae/ad/dme prn (Progressing)       Start:  08/23/24    Expected End:  09/06/24            STG- patient will complete toileting with MAX A with use of ae/ad/dme prn  (Progressing)       Start:  08/23/24    Expected End:  09/06/24            STG- patient will complete toilet/commode transfers with MAX A with use of ae/ad/dme prn (Met)       Start:  08/23/24    Expected End:  08/30/24    Resolved:  08/28/24    Updated to: STG- patient will complete toilet/commode transfers with  CGA with use of ae/ad/dme prn         STG- patient will complete tub/shower transfers with MAX A with use of ae/ad/dme prn (Progressing)       Start:  08/23/24    Expected End:  09/06/24            STG- patient will  complete simple mobility with MOD A with use of ae/ad/dme prn (Met)       Start:  08/23/24    Expected End:  08/30/24    Resolved:  08/28/24    Updated to: STG- patient will complete simple mobility with CGA with use of ae/ad/dme prn         LTG- patient will complete grooming with CGA with use of ae/ad/dme prn (Met)       Start:  08/23/24    Expected End:  09/06/24    Resolved:  08/28/24    Updated to: LTG- patient will complete grooming with supervision with use of ae/ad/dme prn         LTG- patient will complete bathing with MOD A with use of ae/ad/dme prn (Progressing)       Start:  08/23/24    Expected End:  09/06/24            LTG- patient will complete UB dressing with Min A  with use of ae/ad/dme prn (Met)       Start:  08/23/24    Expected End:  09/06/24    Resolved:  08/28/24    Updated to: LTG- patient will complete UB dressing with set up  with use of ae/ad/dme prn         LTG- patient will complete LB dressing with MOD A with use of ae/ad/dme prn (Progressing)       Start:  08/23/24    Expected End:  09/06/24            LTG- patient will complete toileting with MOD A with use of ae/ad/dme prn  (Progressing)       Start:  08/23/24    Expected End:  09/06/24            LTG- patient will complete toilet/commode transfers with Mod A with use of ae/ad/dme prn (Met)       Start:  08/23/24    Expected End:  09/06/24    Resolved:  08/28/24    Updated to: LTG- patient will complete toilet/commode transfers with CGA with use of ae/ad/dme prn         LTG- patient will complete tub/shower transfers with MOD A with use of ae/ad/dme prn (Progressing)       Start:  08/23/24    Expected End:  09/06/24            LTG- patient will complete simple mobility with MIN A with use of ae/ad/dme prn (Progressing)       Start:  08/23/24    Expected End:  09/06/24            STG- patient will complete grooming with  Set up with use of ae/ad/dme prn (Progressing)       Start:  08/28/24    Expected End:  09/06/24                 STG- patient will complete UB dressing with SBA with use of ae/ad/dme prn (Progressing)       Start:  08/28/24    Expected End:  09/06/24                STG- patient will complete toilet/commode transfers with  CGA with use of ae/ad/dme prn (Progressing)       Start:  08/28/24    Expected End:  09/06/24                STG- patient will complete simple mobility with CGA with use of ae/ad/dme prn (Progressing)       Start:  08/28/24    Expected End:  09/06/24                LTG- patient will complete grooming with supervision with use of ae/ad/dme prn (Met)       Start:  08/28/24    Expected End:  09/06/24    Resolved:  08/28/24    Updated to: LTG- patient will complete grooming with set up with use of ae/ad/dme prn             LTG- patient will complete UB dressing with set up  with use of ae/ad/dme prn (Progressing)       Start:  08/28/24    Expected End:  09/06/24                LTG- patient will complete toilet/commode transfers with CGA with use of ae/ad/dme prn (Progressing)       Start:  08/28/24    Expected End:  09/06/24                LTG- patient will complete grooming with set up with use of ae/ad/dme prn (Progressing)       Start:  08/28/24    Expected End:  09/06/24

## 2024-08-28 NOTE — NURSING NOTE
Patient wound vac not intact had to remove couldn't do therapy. Nurse had no wound vac orders so did wet to dry until orders given.

## 2024-08-28 NOTE — PROGRESS NOTES
NEPHROLOGY PROGRESS NOTE    REASON FOR CONSULT: RUSSELL on CKD stage III    SUBJECTIVE:  Patient is participating in therapy.  She has an abdominal wound VAC.  She has some leg swelling.  Appetite is okay.  No trouble breathing.  Getting ertapenem.      OBJECTIVE:    Visit Vitals  /56 (BP Location: Right arm, Patient Position: Lying)   Pulse 82   Temp 35.9 °C (96.6 °F) (Temporal)   Resp 18          Intake/Output Summary (Last 24 hours) at 8/28/2024 0938  Last data filed at 8/28/2024 0325  Gross per 24 hour   Intake 50 ml   Output 451 ml   Net -401 ml        General: Awake and Alert, In no distress, Cooperative  HEENT: Oral mucosa moist, EOMI  NECK: Supple  CHEST: No crackles, no wheeze, no tachypnea  CVS: S1,S2 heard, no rubs, RRR  ABD: Soft, Non Tender, BS present wound VAC noted,  EXT:  Mild bilateral lower extremity edema    MEDICATION:    Scheduled medications  allopurinol, 400 mg, oral, Daily  deutetrabenazine, 24 mg, oral, Daily  deutetrabenazine, 6 mg, oral, Daily  dexAMETHasone, 0.5 mg, oral, 4x daily  DULoxetine, 60 mg, oral, BID  enoxaparin, 40 mg, subcutaneous, q24h  ertapenem, 1 g, intravenous, Daily  famotidine, 20 mg, oral, q AM  ferrous sulfate (325 mg ferrous sulfate), 65 mg of iron, oral, BID  lamoTRIgine, 200 mg, oral, q AM  latanoprost, 1 drop, Both Eyes, Nightly  lotilaner, 1 drop, Both Eyes, Nightly  nystatin, , Topical, BID  semaglutide, 0.5 mg, subcutaneous, Every Sunday  sennosides, 1 tablet, oral, Nightly  sucralfate, 1 g, oral, Nightly  traZODone, 150 mg, oral, Nightly      Continuous medications     PRN medications  PRN medications: acetaminophen **OR** acetaminophen, alum-mag hydroxide-simeth, bisacodyl, bisacodyl, cyclobenzaprine, lidocaine, melatonin, naratriptan     RESULTS:    Lab Results   Component Value Date    WBC 9.0 08/27/2024    HGB 8.4 (L) 08/27/2024    HCT 26.6 (L) 08/27/2024    MCV 95 08/27/2024     08/27/2024        Lab Results   Component Value Date    CREATININE  1.78 (H) 08/28/2024    BUN 29 (H) 08/28/2024     (L) 08/28/2024    K 4.4 08/28/2024     08/28/2024    CO2 20 (L) 08/28/2024        Radiology Imaging reviewed      ASSESSMENT/PLAN:     1.  RUSSELL: Patient's creatinine currently around 1.78.  Baseline creatinine is between 1.1-1.5.  Likely antibiotic mediated ATN.  He is currently on a troponin but was on his diet Zosyn and vancomycin.  Blood pressure is stable.  Will continue to monitor renal function.  He has a wound VAC at the site of the abdominoplasty.      2.  CKD stage III: Unremarkable renal imaging.  No proteinuria.  Status post Gastric bypass surgery    3.  Hyponatremia: Mild.  Serum sodium at 131.    4.  Anemia: Multifactorial.  Hemoglobin at 8.4.    Thank You very much for allowing me to participate in the care of this Patient    This document was created using dragon dictation and may contain unintended error    Shree Lopez MD   08/28/24

## 2024-08-29 ENCOUNTER — PHARMACY VISIT (OUTPATIENT)
Dept: PHARMACY | Facility: CLINIC | Age: 69
End: 2024-08-29
Payer: COMMERCIAL

## 2024-08-29 PROCEDURE — 2500000001 HC RX 250 WO HCPCS SELF ADMINISTERED DRUGS (ALT 637 FOR MEDICARE OP): Performed by: PHYSICAL MEDICINE & REHABILITATION

## 2024-08-29 PROCEDURE — 2500000004 HC RX 250 GENERAL PHARMACY W/ HCPCS (ALT 636 FOR OP/ED): Performed by: PHYSICAL MEDICINE & REHABILITATION

## 2024-08-29 PROCEDURE — 97110 THERAPEUTIC EXERCISES: CPT | Mod: GO,CO

## 2024-08-29 PROCEDURE — 97530 THERAPEUTIC ACTIVITIES: CPT | Mod: GO,CO

## 2024-08-29 PROCEDURE — 97535 SELF CARE MNGMENT TRAINING: CPT | Mod: GO,CO

## 2024-08-29 PROCEDURE — 97530 THERAPEUTIC ACTIVITIES: CPT | Mod: GP

## 2024-08-29 PROCEDURE — 97110 THERAPEUTIC EXERCISES: CPT | Mod: GP

## 2024-08-29 PROCEDURE — 2500000005 HC RX 250 GENERAL PHARMACY W/O HCPCS: Performed by: INTERNAL MEDICINE

## 2024-08-29 PROCEDURE — 2500000004 HC RX 250 GENERAL PHARMACY W/ HCPCS (ALT 636 FOR OP/ED): Performed by: INTERNAL MEDICINE

## 2024-08-29 PROCEDURE — 1180000001 HC REHAB PRIVATE ROOM DAILY

## 2024-08-29 PROCEDURE — 99232 SBSQ HOSP IP/OBS MODERATE 35: CPT | Performed by: PHYSICAL MEDICINE & REHABILITATION

## 2024-08-29 PROCEDURE — 97116 GAIT TRAINING THERAPY: CPT | Mod: GP

## 2024-08-29 RX ORDER — FLUDROCORTISONE ACETATE 0.1 MG/1
0.1 TABLET ORAL DAILY
Status: DISCONTINUED | OUTPATIENT
Start: 2024-08-29 | End: 2024-09-06 | Stop reason: HOSPADM

## 2024-08-29 RX ORDER — ACETAMINOPHEN 160 MG/5ML
650 SOLUTION ORAL EVERY 4 HOURS PRN
Status: DISCONTINUED | OUTPATIENT
Start: 2024-08-29 | End: 2024-09-06 | Stop reason: HOSPADM

## 2024-08-29 RX ORDER — ACETAMINOPHEN 325 MG/1
650 TABLET ORAL EVERY 4 HOURS PRN
Status: DISCONTINUED | OUTPATIENT
Start: 2024-08-29 | End: 2024-09-06 | Stop reason: HOSPADM

## 2024-08-29 RX ADMIN — LAMOTRIGINE 200 MG: 100 TABLET ORAL at 10:12

## 2024-08-29 RX ADMIN — ENOXAPARIN SODIUM 40 MG: 100 INJECTION SUBCUTANEOUS at 16:51

## 2024-08-29 RX ADMIN — DEXAMETHASONE 0.5 MG: 1 TABLET ORAL at 06:40

## 2024-08-29 RX ADMIN — ALLOPURINOL 400 MG: 100 TABLET ORAL at 10:12

## 2024-08-29 RX ADMIN — FLUDROCORTISONE ACETATE 0.1 MG: 0.1 TABLET ORAL at 14:00

## 2024-08-29 RX ADMIN — DEUTETRABENAZINE 6 MG: 6 TABLET, FILM COATED, EXTENDED RELEASE ORAL at 10:12

## 2024-08-29 RX ADMIN — DEXAMETHASONE 0.5 MG: 1 TABLET ORAL at 14:01

## 2024-08-29 RX ADMIN — FERROUS SULFATE TAB 325 MG (65 MG ELEMENTAL FE) 325 MG: 325 (65 FE) TAB at 10:12

## 2024-08-29 RX ADMIN — Medication 3 MG: at 20:53

## 2024-08-29 RX ADMIN — DULOXETINE HYDROCHLORIDE 60 MG: 30 CAPSULE, DELAYED RELEASE ORAL at 20:53

## 2024-08-29 RX ADMIN — ACETAMINOPHEN 650 MG: 325 TABLET ORAL at 17:46

## 2024-08-29 RX ADMIN — NYSTATIN: 100000 POWDER TOPICAL at 20:54

## 2024-08-29 RX ADMIN — SUCRALFATE 1 G: 1 TABLET ORAL at 20:53

## 2024-08-29 RX ADMIN — TRAZODONE HYDROCHLORIDE 150 MG: 50 TABLET ORAL at 20:53

## 2024-08-29 RX ADMIN — SENNOSIDES 8.6 MG: 8.6 TABLET, FILM COATED ORAL at 20:53

## 2024-08-29 RX ADMIN — FAMOTIDINE 20 MG: 20 TABLET, FILM COATED ORAL at 10:12

## 2024-08-29 RX ADMIN — ACETAMINOPHEN 650 MG: 325 TABLET ORAL at 10:12

## 2024-08-29 RX ADMIN — DULOXETINE HYDROCHLORIDE 60 MG: 30 CAPSULE, DELAYED RELEASE ORAL at 10:12

## 2024-08-29 RX ADMIN — DEXAMETHASONE 0.5 MG: 1 TABLET ORAL at 20:53

## 2024-08-29 RX ADMIN — DEUTETRABENAZINE 24 MG: 24 TABLET, FILM COATED, EXTENDED RELEASE ORAL at 10:12

## 2024-08-29 RX ADMIN — FERROUS SULFATE TAB 325 MG (65 MG ELEMENTAL FE) 325 MG: 325 (65 FE) TAB at 20:53

## 2024-08-29 RX ADMIN — LOTILANER OPHTHALMIC SOLUTION 1 DROP: 2.5 SOLUTION/ DROPS OPHTHALMIC at 20:54

## 2024-08-29 RX ADMIN — DEXAMETHASONE 0.5 MG: 1 TABLET ORAL at 16:52

## 2024-08-29 RX ADMIN — WATER 500 MG: 1 INJECTION INTRAMUSCULAR; INTRAVENOUS; SUBCUTANEOUS at 10:18

## 2024-08-29 ASSESSMENT — PAIN - FUNCTIONAL ASSESSMENT
PAIN_FUNCTIONAL_ASSESSMENT: 0-10

## 2024-08-29 ASSESSMENT — COGNITIVE AND FUNCTIONAL STATUS - GENERAL
TURNING FROM BACK TO SIDE WHILE IN FLAT BAD: A LITTLE
WALKING IN HOSPITAL ROOM: A LITTLE
MOVING FROM LYING ON BACK TO SITTING ON SIDE OF FLAT BED WITH BEDRAILS: A LITTLE
STANDING UP FROM CHAIR USING ARMS: A LITTLE
MOVING TO AND FROM BED TO CHAIR: A LITTLE

## 2024-08-29 ASSESSMENT — PAIN SCALES - GENERAL
PAINLEVEL_OUTOF10: 3
PAINLEVEL_OUTOF10: 2
PAINLEVEL_OUTOF10: 3
PAINLEVEL_OUTOF10: 4
PAINLEVEL_OUTOF10: 0 - NO PAIN
PAINLEVEL_OUTOF10: 0 - NO PAIN
PAINLEVEL_OUTOF10: 3

## 2024-08-29 ASSESSMENT — PAIN SCALES - WONG BAKER: WONGBAKER_NUMERICALRESPONSE: HURTS LITTLE MORE

## 2024-08-29 ASSESSMENT — ACTIVITIES OF DAILY LIVING (ADL): HOME_MANAGEMENT_TIME_ENTRY: 30

## 2024-08-29 ASSESSMENT — PAIN DESCRIPTION - ORIENTATION: ORIENTATION: MID

## 2024-08-29 ASSESSMENT — PAIN DESCRIPTION - LOCATION: LOCATION: INCISION

## 2024-08-29 NOTE — CARE PLAN
The patient's goals for the shift include      The clinical goals for the shift include Remain hemodynamically stable, get some quality rest for therapy in the AM      Problem: Skin  Goal: Decreased wound size/increased tissue granulation at next dressing change  Outcome: Progressing  Flowsheets (Taken 8/29/2024 0413)  Decreased wound size/increased tissue granulation at next dressing change: Promote sleep for wound healing  Goal: Participates in plan/prevention/treatment measures  Outcome: Progressing  Flowsheets (Taken 8/29/2024 0413)  Participates in plan/prevention/treatment measures: Elevate heels  Goal: Prevent/manage excess moisture  Outcome: Progressing  Flowsheets (Taken 8/29/2024 0413)  Prevent/manage excess moisture: Cleanse incontinence/protect with barrier cream  Goal: Prevent/minimize sheer/friction injuries  Outcome: Progressing  Flowsheets (Taken 8/29/2024 0413)  Prevent/minimize sheer/friction injuries:   Use pull sheet   HOB 30 degrees or less  Goal: Promote/optimize nutrition  Outcome: Progressing  Flowsheets (Taken 8/29/2024 0413)  Promote/optimize nutrition: Monitor/record intake including meals  Goal: Promote skin healing  Outcome: Progressing  Flowsheets (Taken 8/29/2024 0413)  Promote skin healing:   Assess skin/pad under line(s)/device(s)   Turn/reposition every 2 hours/use positioning/transfer devices     Problem: Fall/Injury  Goal: Not fall by end of shift  Outcome: Progressing  Goal: Be free from injury by end of the shift  Outcome: Progressing  Goal: Verbalize understanding of personal risk factors for fall in the hospital  Outcome: Progressing  Goal: Verbalize understanding of risk factor reduction measures to prevent injury from fall in the home  Outcome: Progressing  Goal: Use assistive devices by end of the shift  Outcome: Progressing  Goal: Pace activities to prevent fatigue by end of the shift  Outcome: Progressing     Problem: Pain  Goal: Takes deep breaths with improved pain  control throughout the shift  Outcome: Progressing  Goal: Turns in bed with improved pain control throughout the shift  Outcome: Progressing  Goal: Walks with improved pain control throughout the shift  Outcome: Progressing  Goal: Performs ADL's with improved pain control throughout shift  Outcome: Progressing  Goal: Participates in PT with improved pain control throughout the shift  Outcome: Progressing  Goal: Free from opioid side effects throughout the shift  Outcome: Progressing  Goal: Free from acute confusion related to pain meds throughout the shift  Outcome: Progressing     Problem: Wound Care  Goal: Area will decrease in size .2x.2 cm per week  Outcome: Progressing

## 2024-08-29 NOTE — PROGRESS NOTES
"Physical Therapy    Physical Therapy Treatment    Patient Name: Mariann Drew  MRN: [id number]  Today's Date: 8/29/2024  Time Calculation  Start Time: 1345  Stop Time: 1430  Time Calculation (min): 45 min         Assessment/Plan   PT Assessment  End of Session Communication: Bedside nurse  End of Session Patient Position: Up in chair, Alarm on     PT Plan  Treatment/Interventions: Bed mobility, Transfer training, Gait training, Stair training, Balance training, Strengthening, Endurance training, Range of motion, Therapeutic exercise, Therapeutic activity, Home exercise program, Positioning, Postural re-education  PT Plan: Ongoing PT  PT Frequency: 5 times per week (6 times PRN)  PT Discharge Recommendations:  (Anticipate pt to require intermittent min A at walker level at time of discharge.)  Equipment Recommended upon Discharge: Wheeled walker  PT Recommended Transfer Status: Assist x2 (at time of initial evaluation)      General Visit Information:   PT  Visit  PT Received On: 08/29/24  General  Patient Position Received: Up in chair, Alarm on  General Comment: Clarified home entry with spouse this session.  Pt has 2 ~7\" steps with grab bar on L side to enter.    Subjective   Precautions:  Precautions  Medical Precautions: Fall precautions  Post-Surgical Precautions: Abdominal surgery precautions  Precautions Comment: Wound vac and port/IV    Vital Signs (Past 2hrs)                 Objective   Pain:  Pain Assessment  Pain Assessment:  (Pt reports stiffness in B hips but does not report pain at this time.)  0-10 (Numeric) Pain Score: 0 - No pain    Treatments:  Bed Mobility  Bed Mobility:  (Pt performs sit/supine with max A x 2.)    Ambulation/Gait Training  Ambulation/Gait Training Performed:  (Pt ambulates 40 ft with FWW and min/CGA plus w/c follow.  Pt ambulates 10 ft x 2 to/from bathroom with FWW and min A x 1 plus mod cues for safety and sequencing.)  Transfers  Transfer:  (Pt performs sit/stand transfers with " "min A x 1.  Mod cues for hand placement and chair approach.)    Stairs  Stairs:  (Pt performs step ups x 3 at 4\" step with LLE leading up/ RLE leading down with BHR. Pt ascends/descends 3 4\" steps with BHR and min/mod A x 2 with mod cues for safety. Pt steps up/down 2\" curb (onto scale) with FWW and mod/min A x 2.)      Education Documentation  Pt educated on techniques for transfers and gait training with device in order to maximize safety and independence.  Pt educated on stair training techniques including proper sequencing to maximize safety.    Encounter Problems       Encounter Problems (Active)       PT Problem       LTG - Pt will perform bed mobility with mod A x 1  (Progressing)       Start:  08/23/24    Expected End:  09/06/24            LTG - Pt will perform transfers with min A x 1.  (Progressing)       Start:  08/23/24    Expected End:  09/06/24            LTG - Pt will amb 150 feet with FWW and CGA.   (Progressing)       Start:  08/23/24    Expected End:  09/06/24            LTG - Pt will up/down 2 stairs with 1 HR and straight cane with min A x 1.  (Progressing)       Start:  08/23/24    Expected End:  09/06/24            STG - Pt will perform bed mobility with mod assist x 2 (Progressing)       Start:  08/23/24    Expected End:  08/30/24            STG - Pt will perform transfers with mod assist x 1 (Met)       Start:  08/23/24    Expected End:  08/30/24    Resolved:  08/28/24         STG - Pt will amb 50 feet with FWW and mod/min assist x 1 (Progressing)       Start:  08/23/24    Expected End:  08/30/24            STG - Pt will up/down curb step with FWW and mod A x 2.  (Progressing)       Start:  08/23/24    Expected End:  08/30/24                   "

## 2024-08-29 NOTE — PROGRESS NOTES
Occupational Therapy    OT Treatment    Patient Name: Mariann Drew  MRN: [id number]  Today's Date: 8/29/2024  Time Calculation  Start Time: 1045  Stop Time: 1130  Time Calculation (min): 45 min        Assessment:  End of Session Patient Position: Alarm on, Up in chair (hand off to nursing for wound vac intervention)     Plan:  Treatment Interventions: ADL retraining, Functional transfer training, UE strengthening/ROM, Endurance training, Equipment evaluation/education, Patient/family training, Compensatory technique education, Fine motor coordination activities  OT Frequency: 5 times per week (6 days/PRN)  Treatment Interventions: ADL retraining, Functional transfer training, UE strengthening/ROM, Endurance training, Equipment evaluation/education, Patient/family training, Compensatory technique education, Fine motor coordination activities    Subjective   Previous Visit Info:  OT Last Visit  OT Received On: 08/29/24  General:  General  Patient Position Received: Up in chair, Alarm on  General Comment:  (reports that she feels dizzy, fatigued and confused.)  Precautions:  Medical Precautions: Fall precautions  Post-Surgical Precautions: Abdominal surgery precautions  Precautions Comment: Wound vac and port/IV  Pain:  Pain Assessment  Pain Assessment: 0-10  0-10 (Numeric) Pain Score: 3  Pain Type: Surgical pain  Pain Location: Abdomen  Pain Orientation: Right, Left  Pain Interventions: Rest, Emotional support  Response to Interventions: reports rest helps with pain management    Objective       Therapy/Activity: Therapeutic Exercise  Therapeutic Exercise Performed: Yes  BUE exercises using one pound weights completing 30 repetitions of 8 exercises to promote greater independence with ADL's and transfers. Educated patient regarding proper technique and need to complete exercises with slow and steady speed to target intended muscle group and avoid using large back muscles. Patient demonstrated good understanding and  carryover with task.    EDUCATION:  Education  Individual(s) Educated: Patient  Education Provided: Fall precautions  Home Program: Strengthening  Risk and Benefits Discussed with Patient/Caregiver/Other: yes  Patient/Caregiver Demonstrated Understanding: yes  Plan of Care Discussed and Agreed Upon: yes  Patient Response to Education: Patient/Caregiver Verbalized Understanding of Information    Goals:  Encounter Problems       Encounter Problems (Active)       OT Problem       STG- patient will complete grooming with MIN A with use of ae/ad/dme prn (Met)       Start:  08/23/24    Expected End:  08/30/24    Resolved:  08/28/24    Updated to: STG- patient will complete grooming with  Set up with use of ae/ad/dme prn         STG- patient will complete bathing with MAX A with use of ae/ad/dme prn (Met)       Start:  08/23/24    Expected End:  09/06/24    Resolved:  08/28/24    Updated to: STG- patient will complete bathing with Mod A with use of ae/ad/dme prn         STG- patient will complete UB dressing with MOD A with use of ae/ad/dme prn (Met)       Start:  08/23/24    Expected End:  08/30/24    Resolved:  08/28/24    Updated to: STG- patient will complete UB dressing with SBA with use of ae/ad/dme prn         STG- patient will complete LB dressing with MAX A with use of ae/ad/dme prn (Met)       Start:  08/23/24    Expected End:  09/06/24    Resolved:  08/28/24    Updated to: STG- patient will complete LB dressing with Mod A with use of ae/ad/dme prn         STG- patient will complete toileting with MAX A with use of ae/ad/dme prn  (Met)       Start:  08/23/24    Expected End:  09/06/24    Resolved:  08/28/24    Updated to: STG- patient will complete toileting with Mod A with use of ae/ad/dme prn         STG- patient will complete toilet/commode transfers with MAX A with use of ae/ad/dme prn (Met)       Start:  08/23/24    Expected End:  08/30/24    Resolved:  08/28/24    Updated to: STG- patient will complete  toilet/commode transfers with  CGA with use of ae/ad/dme prn         STG- patient will complete tub/shower transfers with MAX A with use of ae/ad/dme prn (Progressing)       Start:  08/23/24    Expected End:  09/06/24            STG- patient will complete simple mobility with MOD A with use of ae/ad/dme prn (Met)       Start:  08/23/24    Expected End:  08/30/24    Resolved:  08/28/24    Updated to: STG- patient will complete simple mobility with CGA with use of ae/ad/dme prn         LTG- patient will complete grooming with CGA with use of ae/ad/dme prn (Met)       Start:  08/23/24    Expected End:  09/06/24    Resolved:  08/28/24    Updated to: LTG- patient will complete grooming with supervision with use of ae/ad/dme prn         LTG- patient will complete bathing with MOD A with use of ae/ad/dme prn (Progressing)       Start:  08/23/24    Expected End:  09/06/24            LTG- patient will complete UB dressing with Min A  with use of ae/ad/dme prn (Met)       Start:  08/23/24    Expected End:  09/06/24    Resolved:  08/28/24    Updated to: LTG- patient will complete UB dressing with set up  with use of ae/ad/dme prn         LTG- patient will complete LB dressing with MOD A with use of ae/ad/dme prn (Progressing)       Start:  08/23/24    Expected End:  09/06/24            LTG- patient will complete toileting with MOD A with use of ae/ad/dme prn  (Progressing)       Start:  08/23/24    Expected End:  09/06/24            LTG- patient will complete toilet/commode transfers with Mod A with use of ae/ad/dme prn (Met)       Start:  08/23/24    Expected End:  09/06/24    Resolved:  08/28/24    Updated to: LTG- patient will complete toilet/commode transfers with CGA with use of ae/ad/dme prn         LTG- patient will complete tub/shower transfers with MOD A with use of ae/ad/dme prn (Progressing)       Start:  08/23/24    Expected End:  09/06/24            LTG- patient will complete simple mobility with MIN A with use of  ae/ad/dme prn (Progressing)       Start:  08/23/24    Expected End:  09/06/24            STG- patient will complete grooming with  Set up with use of ae/ad/dme prn (Progressing)       Start:  08/28/24    Expected End:  09/06/24                STG- patient will complete UB dressing with SBA with use of ae/ad/dme prn (Progressing)       Start:  08/28/24    Expected End:  09/06/24                STG- patient will complete toilet/commode transfers with  CGA with use of ae/ad/dme prn (Progressing)       Start:  08/28/24    Expected End:  09/06/24                STG- patient will complete simple mobility with CGA with use of ae/ad/dme prn (Progressing)       Start:  08/28/24    Expected End:  09/06/24                LTG- patient will complete grooming with supervision with use of ae/ad/dme prn (Met)       Start:  08/28/24    Expected End:  09/06/24    Resolved:  08/28/24    Updated to: LTG- patient will complete grooming with set up with use of ae/ad/dme prn             LTG- patient will complete UB dressing with set up  with use of ae/ad/dme prn (Progressing)       Start:  08/28/24    Expected End:  09/06/24                LTG- patient will complete toilet/commode transfers with CGA with use of ae/ad/dme prn (Progressing)       Start:  08/28/24    Expected End:  09/06/24                LTG- patient will complete grooming with set up with use of ae/ad/dme prn (Progressing)       Start:  08/28/24    Expected End:  09/06/24                STG- patient will complete bathing with Mod A with use of ae/ad/dme prn (Progressing)       Start:  08/28/24    Expected End:  09/06/24                STG- patient will complete LB dressing with Mod A with use of ae/ad/dme prn (Progressing)       Start:  08/28/24    Expected End:  09/06/24                STG- patient will complete toileting with Mod A with use of ae/ad/dme prn (Progressing)       Start:  08/28/24    Expected End:  09/06/24

## 2024-08-29 NOTE — PROGRESS NOTES
Occupational Therapy    OT Treatment    Patient Name: Mariann Drew  MRN: [id number]  Today's Date: 8/29/2024  Time Calculation  Start Time: 0830  Stop Time: 0915  Time Calculation (min): 45 min        Assessment:  End of Session Patient Position: Alarm on, Up in chair     Plan:  Treatment Interventions: ADL retraining, Functional transfer training, UE strengthening/ROM, Endurance training, Equipment evaluation/education, Patient/family training, Compensatory technique education, Fine motor coordination activities  OT Frequency: 5 times per week (6 days/PRN)  Treatment Interventions: ADL retraining, Functional transfer training, UE strengthening/ROM, Endurance training, Equipment evaluation/education, Patient/family training, Compensatory technique education, Fine motor coordination activities    Subjective   Previous Visit Info:  OT Last Visit  OT Received On: 08/29/24  General:  General  Patient Position Received: Alarm off, not on at start of session  Precautions:  Medical Precautions: Fall precautions  Post-Surgical Precautions: Abdominal surgery precautions  Precautions Comment: Wound vac and port/IV  Pain:  Pain Assessment  Pain Assessment: 0-10  0-10 (Numeric) Pain Score: 3  Pain Type: Surgical pain  Pain Location: Hip  Pain Orientation: Right, Left  Pain Interventions: Medication (See MAR)  Response to Interventions: patient reports pain is tolerable    Objective       Activities of Daily Living: Grooming  Grooming Level of Assistance:  (SBA)  Grooming Where Assessed: Sitting sink side    UE Bathing  UE Bathing Level of Assistance:  (SBA)  UE Bathing Where Assessed: Sitting sink side    UE Dressing  UE Dressing Level of Assistance: Minimal verbal cues (to don dress and dependent to button front closure)    Toileting  Toileting Level of Assistance: Minimum assistance  Where Assessed: Toilet     Bed Mobility/Transfers: Transfer 1  Trials/Comments 1: sit to stand  at minimal assistance and cues for  hand  placement    Toilet Transfers  Toilet Transfer From: Walker  Toilet Transfer Type: To and from  Toilet Transfer to: Raised toilet seat without rails  Toilet Transfers: Minimal assistance  Toilet Transfers Comments: cues for hand placement    Functional Mobility:  Functional Mobility 1  Comments 1: via a rolling walker completes simple functional mobility around environmental barriers at minimal assistance  EDUCATION: Educated regarding fall precautions, walker safety and dressing skills with fair minus carryover       Goals:  Encounter Problems       Encounter Problems (Active)       OT Problem       STG- patient will complete grooming with MIN A with use of ae/ad/dme prn (Met)       Start:  08/23/24    Expected End:  08/30/24    Resolved:  08/28/24    Updated to: STG- patient will complete grooming with  Set up with use of ae/ad/dme prn         STG- patient will complete bathing with MAX A with use of ae/ad/dme prn (Met)       Start:  08/23/24    Expected End:  09/06/24    Resolved:  08/28/24    Updated to: STG- patient will complete bathing with Mod A with use of ae/ad/dme prn         STG- patient will complete UB dressing with MOD A with use of ae/ad/dme prn (Met)       Start:  08/23/24    Expected End:  08/30/24    Resolved:  08/28/24    Updated to: STG- patient will complete UB dressing with SBA with use of ae/ad/dme prn         STG- patient will complete LB dressing with MAX A with use of ae/ad/dme prn (Met)       Start:  08/23/24    Expected End:  09/06/24    Resolved:  08/28/24    Updated to: STG- patient will complete LB dressing with Mod A with use of ae/ad/dme prn         STG- patient will complete toileting with MAX A with use of ae/ad/dme prn  (Met)       Start:  08/23/24    Expected End:  09/06/24    Resolved:  08/28/24    Updated to: STG- patient will complete toileting with Mod A with use of ae/ad/dme prn         STG- patient will complete toilet/commode transfers with MAX A with use of ae/ad/dme prn  (Met)       Start:  08/23/24    Expected End:  08/30/24    Resolved:  08/28/24    Updated to: STG- patient will complete toilet/commode transfers with  CGA with use of ae/ad/dme prn         STG- patient will complete tub/shower transfers with MAX A with use of ae/ad/dme prn (Progressing)       Start:  08/23/24    Expected End:  09/06/24            STG- patient will complete simple mobility with MOD A with use of ae/ad/dme prn (Met)       Start:  08/23/24    Expected End:  08/30/24    Resolved:  08/28/24    Updated to: STG- patient will complete simple mobility with CGA with use of ae/ad/dme prn         LTG- patient will complete grooming with CGA with use of ae/ad/dme prn (Met)       Start:  08/23/24    Expected End:  09/06/24    Resolved:  08/28/24    Updated to: LTG- patient will complete grooming with supervision with use of ae/ad/dme prn         LTG- patient will complete bathing with MOD A with use of ae/ad/dme prn (Progressing)       Start:  08/23/24    Expected End:  09/06/24            LTG- patient will complete UB dressing with Min A  with use of ae/ad/dme prn (Met)       Start:  08/23/24    Expected End:  09/06/24    Resolved:  08/28/24    Updated to: LTG- patient will complete UB dressing with set up  with use of ae/ad/dme prn         LTG- patient will complete LB dressing with MOD A with use of ae/ad/dme prn (Progressing)       Start:  08/23/24    Expected End:  09/06/24            LTG- patient will complete toileting with MOD A with use of ae/ad/dme prn  (Progressing)       Start:  08/23/24    Expected End:  09/06/24            LTG- patient will complete toilet/commode transfers with Mod A with use of ae/ad/dme prn (Met)       Start:  08/23/24    Expected End:  09/06/24    Resolved:  08/28/24    Updated to: LTG- patient will complete toilet/commode transfers with CGA with use of ae/ad/dme prn         LTG- patient will complete tub/shower transfers with MOD A with use of ae/ad/dme prn (Progressing)        Start:  08/23/24    Expected End:  09/06/24            LTG- patient will complete simple mobility with MIN A with use of ae/ad/dme prn (Progressing)       Start:  08/23/24    Expected End:  09/06/24            STG- patient will complete grooming with  Set up with use of ae/ad/dme prn (Progressing)       Start:  08/28/24    Expected End:  09/06/24                STG- patient will complete UB dressing with SBA with use of ae/ad/dme prn (Progressing)       Start:  08/28/24    Expected End:  09/06/24                STG- patient will complete toilet/commode transfers with  CGA with use of ae/ad/dme prn (Progressing)       Start:  08/28/24    Expected End:  09/06/24                STG- patient will complete simple mobility with CGA with use of ae/ad/dme prn (Progressing)       Start:  08/28/24    Expected End:  09/06/24                LTG- patient will complete grooming with supervision with use of ae/ad/dme prn (Met)       Start:  08/28/24    Expected End:  09/06/24    Resolved:  08/28/24    Updated to: LTG- patient will complete grooming with set up with use of ae/ad/dme prn             LTG- patient will complete UB dressing with set up  with use of ae/ad/dme prn (Progressing)       Start:  08/28/24    Expected End:  09/06/24                LTG- patient will complete toilet/commode transfers with CGA with use of ae/ad/dme prn (Progressing)       Start:  08/28/24    Expected End:  09/06/24                LTG- patient will complete grooming with set up with use of ae/ad/dme prn (Progressing)       Start:  08/28/24    Expected End:  09/06/24                STG- patient will complete bathing with Mod A with use of ae/ad/dme prn (Progressing)       Start:  08/28/24    Expected End:  09/06/24                STG- patient will complete LB dressing with Mod A with use of ae/ad/dme prn (Progressing)       Start:  08/28/24    Expected End:  09/06/24                STG- patient will complete toileting with Mod A with use of  ae/ad/dme prn (Progressing)       Start:  08/28/24    Expected End:  09/06/24

## 2024-08-29 NOTE — CARE PLAN
The patient's goals for the shift include  decreasing pain and mobility.     The clinical goals for the shift include remain hemodynamically stable and decreasing pain and promoting skin integrity.     Over the shift, the patient made progress toward the following goals. Barriers to progression include infection and pain. Recommendations to address these barriers include administering antibiotics, pain medications, repositioning. Promoting good nutrition.

## 2024-08-29 NOTE — PROGRESS NOTES
"  Mariann Drew is a 68 y.o. female on day 6 of admission presenting with Debility.    Subjective   Patient was seen today in therapy.  Had an episode of hypotension requiring her to sit while walking.    We did talk about the etiology.  Will discuss with dietitian regarding to appropriate nutritional status due to her low albumin.    Also look at adding medication to stabilize her blood pressure.-Florinef or midodrine.       Objective       Physical Exam  Pleasant female no apparent distress alert and oriented x 3  Chest clear to auscultation bilaterally  Cardiovascular S1-S2  Abdomen soft nontender nondistended with active bowel sounds.  Wound VAC in place.  Extremities with positive lower extremity edema bilaterally symmetrical +1 to +2.  Negative Homans.  Neurologically intact without gross motor deficit appreciated.  General weakness appreciated.    Function: Min assist overall.  Assessment/Plan        Last Recorded Vitals  Blood pressure 118/69, pulse 102, temperature 35.9 °C (96.6 °F), resp. rate 16, height 1.626 m (5' 4.02\"), weight 72.2 kg (159 lb 2.8 oz), SpO2 98%.    Therapy notes from last 24 hours reviewed.    Relevant Results                  Scheduled medications  allopurinol, 400 mg, oral, Daily  deutetrabenazine, 24 mg, oral, Daily  deutetrabenazine, 6 mg, oral, Daily  dexAMETHasone, 0.5 mg, oral, 4x daily  DULoxetine, 60 mg, oral, BID  enoxaparin, 40 mg, subcutaneous, q24h  ertapenem, 500 mg, intravenous, q24h  famotidine, 20 mg, oral, q AM  ferrous sulfate (325 mg ferrous sulfate), 65 mg of iron, oral, BID  lamoTRIgine, 200 mg, oral, q AM  latanoprost, 1 drop, Both Eyes, Nightly  lotilaner, 1 drop, Both Eyes, Nightly  nystatin, , Topical, BID  semaglutide, 0.5 mg, subcutaneous, Every Sunday  sennosides, 1 tablet, oral, Nightly  sucralfate, 1 g, oral, Nightly  traZODone, 150 mg, oral, Nightly      Continuous medications     PRN medications  PRN medications: acetaminophen **OR** acetaminophen, " alum-mag hydroxide-simeth, bisacodyl, bisacodyl, cyclobenzaprine, lidocaine, melatonin, naratriptan     No results found for this or any previous visit (from the past 24 hour(s)).              Assessment/Plan   Principal Problem:    Debility  Active Problems:    Anxiety    Balance problems    Diabetes mellitus (Multi)    Hx of obesity    Myofascial pain syndrome, cervical    Hyperlipidemia    Disruption of external surgical wound        1.  Abdominoplasty with wound infection group B strep  Continue IV Invanz  Begin physical therapy for bed mobility, transfers, endurance activities, gait training with an assistive device  Begin occupational therapy for functional mobility, upper limb strengthening, coordination, balance and endurance activities as well as adaptive aids     2.  Gout  Zyloprim 400 mg daily  Monitor for gout symptoms     3.  Tardive dyskinesia  Austedo 30 mg daily  Patient's  will bring in the medication     4.  DVT prophylaxis  Lovenox 40 mg daily     5.  GERD and difficulty swallowing  Pepcid 20 mg daily  Carafate 1 g nightly     6.  Pain  Cymbalta 60 mg twice a day  Lamictal 200 mg daily  Decadron 0.5 mg 4 times a day     7.  Sleep   150 mg nightly     8.  Diabetes mellitus type 2  Semaglutide 0.5 mg weekly     9. FENGI  She has not had a bowel movement for 2 days will begin on a bowel program  DVT prophylaxis she is on Lovenox 40 mg daily      8/29  -Continue nutritional support  -Continue 3 hours of PT and OT  -Continue IV antibiotics for wound infection  -Continue wound care with wound VAC and dressing changes  -Follow renal function.  -Recheck electrolytes  -Monitor mental status.  Intermittent episodes of confusion nonfocal.  -Monitor blood pressure.  Moon Levine I was present and led the team conference. The plan of care was developed and agreed upon as discussed and documented during the team meeting.  See separate Note.    Team conference today with the rehab team - PT/OT/ST, SW,  RN, Dietary, Case Management. Additional separate note in the chart for today. Over 35 min spent with paint care, team meetings, and coordination of care.         I spent 35 minutes in the professional and overall care of this patient.      Afshan Faustin MD

## 2024-08-29 NOTE — PROGRESS NOTES
Physical Therapy    Physical Therapy Treatment    Patient Name: Mariann Drew  MRN: [id number]  Today's Date: 8/29/2024  Time Calculation  Start Time: 0915  Stop Time: 1000  Time Calculation (min): 45 min         Assessment/Plan   PT Assessment  End of Session Communication: Bedside nurse (notified wound vac alarming)  End of Session Patient Position: Up in chair, Alarm on     PT Plan  Treatment/Interventions: Bed mobility, Transfer training, Gait training, Stair training, Balance training, Strengthening, Endurance training, Range of motion, Therapeutic exercise, Therapeutic activity, Home exercise program, Positioning, Postural re-education  PT Plan: Ongoing PT  PT Frequency: 5 times per week (6 times PRN)  PT Discharge Recommendations:  (Anticipate pt to require intermittent min A at walker level at time of discharge.)  Equipment Recommended upon Discharge: Wheeled walker  PT Recommended Transfer Status: Assist x2 (at time of initial evaluation)      General Visit Information:   PT  Visit  PT Received On: 08/29/24  General  Patient Position Received: Up in chair, Alarm on  General Comment: Pt reports she is still feeling confused and dizzy at times.  Pt has episode of dizziness during ambulation with LOB requiring up to mod A x 1 to correct.    Subjective   Precautions:  Precautions  Medical Precautions: Fall precautions  Precautions Comment: Wound vac and port/IV    Vital Signs (Past 2hrs)        Date/Time Vitals Session Patient Position Pulse Resp SpO2 BP MAP (mmHg)    08/29/24 0915 --  --  102  --  --  118/69  --                         Objective   Pain:  Pain Assessment  Pain Assessment: 0-10  0-10 (Numeric) Pain Score: 0 - No pain    Treatments:  Therapeutic Exercise  Therapeutic Exercise Performed: Yes  Pt performs BLE seated AROM exercises: marching, LAQ, hip add isometrics with ball, AP 2 x 10 reps each in order to improve strength and endurance for improvement in functional mobility and transfers.      Ambulation/Gait Training  Ambulation/Gait Training Performed:  (Pt ambulates 25 ft with FWW and CGA.  Pt ambulates 10 ft with FWW with min A x 1, pt has LOB requiring mod A x 1 to correct, w/c brought up behind pt.  See vitals.  MD aware. Pt ambulates 25 ft with FWW and min A x 1 plus CGA of another for safety.)  Transfers  Transfer:  (Pt performs sit/stand transfers with min A x 1.  Mod cues for proper hand placement and for sequencing with chair approach for safety.)      Education Documentation  Pt educated on techniques for transfers and gait training with device in order to maximize safety and independence.  Pt educated on therapeutic exercises, including optimal techniques to maximize function.    Encounter Problems       Encounter Problems (Active)       PT Problem       LTG - Pt will perform bed mobility with mod A x 1  (Progressing)       Start:  08/23/24    Expected End:  09/06/24            LTG - Pt will perform transfers with min A x 1.  (Progressing)       Start:  08/23/24    Expected End:  09/06/24            LTG - Pt will amb 150 feet with FWW and CGA.   (Progressing)       Start:  08/23/24    Expected End:  09/06/24            LTG - Pt will up/down 2 stairs with 1 HR and straight cane with min A x 1.  (Progressing)       Start:  08/23/24    Expected End:  09/06/24            STG - Pt will perform bed mobility with mod assist x 2 (Progressing)       Start:  08/23/24    Expected End:  08/30/24            STG - Pt will perform transfers with mod assist x 1 (Met)       Start:  08/23/24    Expected End:  08/30/24    Resolved:  08/28/24         STG - Pt will amb 50 feet with FWW and mod/min assist x 1 (Progressing)       Start:  08/23/24    Expected End:  08/30/24            STG - Pt will up/down curb step with FWW and mod A x 2.  (Progressing)       Start:  08/23/24    Expected End:  08/30/24

## 2024-08-30 LAB
ALBUMIN SERPL BCP-MCNC: 2.5 G/DL (ref 3.4–5)
ANION GAP SERPL CALC-SCNC: 8 MMOL/L (ref 10–20)
ANION GAP SERPL CALC-SCNC: 8 MMOL/L (ref 10–20)
BUN SERPL-MCNC: 31 MG/DL (ref 6–23)
BUN SERPL-MCNC: 31 MG/DL (ref 6–23)
CALCIUM SERPL-MCNC: 8.3 MG/DL (ref 8.6–10.3)
CALCIUM SERPL-MCNC: 8.3 MG/DL (ref 8.6–10.3)
CHLORIDE SERPL-SCNC: 108 MMOL/L (ref 98–107)
CHLORIDE SERPL-SCNC: 108 MMOL/L (ref 98–107)
CO2 SERPL-SCNC: 21 MMOL/L (ref 21–32)
CO2 SERPL-SCNC: 21 MMOL/L (ref 21–32)
CREAT SERPL-MCNC: 1.76 MG/DL (ref 0.5–1.05)
CREAT SERPL-MCNC: 1.76 MG/DL (ref 0.5–1.05)
EGFRCR SERPLBLD CKD-EPI 2021: 31 ML/MIN/1.73M*2
EGFRCR SERPLBLD CKD-EPI 2021: 31 ML/MIN/1.73M*2
GLUCOSE SERPL-MCNC: 108 MG/DL (ref 74–99)
GLUCOSE SERPL-MCNC: 108 MG/DL (ref 74–99)
PHOSPHATE SERPL-MCNC: 3.3 MG/DL (ref 2.5–4.9)
POTASSIUM SERPL-SCNC: 4.4 MMOL/L (ref 3.5–5.3)
POTASSIUM SERPL-SCNC: 4.4 MMOL/L (ref 3.5–5.3)
SODIUM SERPL-SCNC: 133 MMOL/L (ref 136–145)
SODIUM SERPL-SCNC: 133 MMOL/L (ref 136–145)

## 2024-08-30 PROCEDURE — 97116 GAIT TRAINING THERAPY: CPT | Mod: GP,CQ

## 2024-08-30 PROCEDURE — 80069 RENAL FUNCTION PANEL: CPT | Performed by: INTERNAL MEDICINE

## 2024-08-30 PROCEDURE — 2500000005 HC RX 250 GENERAL PHARMACY W/O HCPCS: Performed by: INTERNAL MEDICINE

## 2024-08-30 PROCEDURE — 2500000001 HC RX 250 WO HCPCS SELF ADMINISTERED DRUGS (ALT 637 FOR MEDICARE OP): Performed by: PHYSICAL MEDICINE & REHABILITATION

## 2024-08-30 PROCEDURE — 2500000004 HC RX 250 GENERAL PHARMACY W/ HCPCS (ALT 636 FOR OP/ED): Performed by: PHYSICAL MEDICINE & REHABILITATION

## 2024-08-30 PROCEDURE — 2500000004 HC RX 250 GENERAL PHARMACY W/ HCPCS (ALT 636 FOR OP/ED): Performed by: INTERNAL MEDICINE

## 2024-08-30 PROCEDURE — 97530 THERAPEUTIC ACTIVITIES: CPT | Mod: GO,CO

## 2024-08-30 PROCEDURE — 97530 THERAPEUTIC ACTIVITIES: CPT | Mod: GP,CQ

## 2024-08-30 PROCEDURE — 1180000001 HC REHAB PRIVATE ROOM DAILY

## 2024-08-30 PROCEDURE — 99222 1ST HOSP IP/OBS MODERATE 55: CPT | Performed by: PSYCHIATRY & NEUROLOGY

## 2024-08-30 PROCEDURE — 97110 THERAPEUTIC EXERCISES: CPT | Mod: GP,CQ

## 2024-08-30 PROCEDURE — 97535 SELF CARE MNGMENT TRAINING: CPT | Mod: GO,CO

## 2024-08-30 PROCEDURE — 97110 THERAPEUTIC EXERCISES: CPT | Mod: GO,CO

## 2024-08-30 RX ADMIN — DULOXETINE HYDROCHLORIDE 60 MG: 30 CAPSULE, DELAYED RELEASE ORAL at 20:45

## 2024-08-30 RX ADMIN — ALLOPURINOL 400 MG: 100 TABLET ORAL at 08:41

## 2024-08-30 RX ADMIN — DEUTETRABENAZINE 6 MG: 6 TABLET, FILM COATED, EXTENDED RELEASE ORAL at 08:41

## 2024-08-30 RX ADMIN — ACETAMINOPHEN 650 MG: 325 TABLET ORAL at 06:32

## 2024-08-30 RX ADMIN — LAMOTRIGINE 200 MG: 100 TABLET ORAL at 08:41

## 2024-08-30 RX ADMIN — DEXAMETHASONE 0.5 MG: 1 TABLET ORAL at 13:28

## 2024-08-30 RX ADMIN — DEUTETRABENAZINE 24 MG: 24 TABLET, FILM COATED, EXTENDED RELEASE ORAL at 08:41

## 2024-08-30 RX ADMIN — LATANOPROST 1 DROP: 50 SOLUTION OPHTHALMIC at 20:47

## 2024-08-30 RX ADMIN — FERROUS SULFATE TAB 325 MG (65 MG ELEMENTAL FE) 325 MG: 325 (65 FE) TAB at 20:46

## 2024-08-30 RX ADMIN — FERROUS SULFATE TAB 325 MG (65 MG ELEMENTAL FE) 325 MG: 325 (65 FE) TAB at 08:41

## 2024-08-30 RX ADMIN — DEXAMETHASONE 0.5 MG: 1 TABLET ORAL at 17:42

## 2024-08-30 RX ADMIN — DULOXETINE HYDROCHLORIDE 60 MG: 30 CAPSULE, DELAYED RELEASE ORAL at 08:41

## 2024-08-30 RX ADMIN — ACETAMINOPHEN 650 MG: 325 TABLET ORAL at 13:28

## 2024-08-30 RX ADMIN — ACETAMINOPHEN 650 MG: 325 TABLET ORAL at 18:40

## 2024-08-30 RX ADMIN — ENOXAPARIN SODIUM 40 MG: 100 INJECTION SUBCUTANEOUS at 17:42

## 2024-08-30 RX ADMIN — DEXAMETHASONE 0.5 MG: 1 TABLET ORAL at 06:32

## 2024-08-30 RX ADMIN — DEXAMETHASONE 0.5 MG: 1 TABLET ORAL at 20:44

## 2024-08-30 RX ADMIN — SUCRALFATE 1 G: 1 TABLET ORAL at 20:46

## 2024-08-30 RX ADMIN — NYSTATIN: 100000 POWDER TOPICAL at 20:48

## 2024-08-30 RX ADMIN — FLUDROCORTISONE ACETATE 0.1 MG: 0.1 TABLET ORAL at 13:28

## 2024-08-30 RX ADMIN — FAMOTIDINE 20 MG: 20 TABLET, FILM COATED ORAL at 08:41

## 2024-08-30 RX ADMIN — TRAZODONE HYDROCHLORIDE 150 MG: 50 TABLET ORAL at 20:46

## 2024-08-30 RX ADMIN — WATER 500 MG: 1 INJECTION INTRAMUSCULAR; INTRAVENOUS; SUBCUTANEOUS at 08:41

## 2024-08-30 ASSESSMENT — PAIN DESCRIPTION - LOCATION
LOCATION: LEG
LOCATION: HIP
LOCATION: LEG

## 2024-08-30 ASSESSMENT — PAIN SCALES - GENERAL
PAINLEVEL_OUTOF10: 3
PAINLEVEL_OUTOF10: 3
PAINLEVEL_OUTOF10: 2
PAINLEVEL_OUTOF10: 3
PAINLEVEL_OUTOF10: 4
PAINLEVEL_OUTOF10: 3
PAINLEVEL_OUTOF10: 0 - NO PAIN
PAINLEVEL_OUTOF10: 1
PAINLEVEL_OUTOF10: 2

## 2024-08-30 ASSESSMENT — PAIN - FUNCTIONAL ASSESSMENT
PAIN_FUNCTIONAL_ASSESSMENT: 0-10

## 2024-08-30 ASSESSMENT — PAIN DESCRIPTION - ORIENTATION
ORIENTATION: RIGHT;LEFT
ORIENTATION: RIGHT;LEFT

## 2024-08-30 ASSESSMENT — PAIN SCALES - WONG BAKER: WONGBAKER_NUMERICALRESPONSE: HURTS LITTLE MORE

## 2024-08-30 ASSESSMENT — ACTIVITIES OF DAILY LIVING (ADL): HOME_MANAGEMENT_TIME_ENTRY: 45

## 2024-08-30 NOTE — CARE PLAN
The patient's goals for the shift include  pain control     The clinical goals for the shift include no confussion    Problem: Skin  Goal: Decreased wound size/increased tissue granulation at next dressing change  Outcome: Progressing  Flowsheets (Taken 8/30/2024 1545)  Decreased wound size/increased tissue granulation at next dressing change:   Promote sleep for wound healing   Protective dressings over bony prominences  Goal: Participates in plan/prevention/treatment measures  Outcome: Progressing  Flowsheets (Taken 8/30/2024 1545)  Participates in plan/prevention/treatment measures:   Elevate heels   Increase activity/out of bed for meals  Goal: Prevent/manage excess moisture  Outcome: Progressing  Goal: Prevent/minimize sheer/friction injuries  Outcome: Progressing  Goal: Promote/optimize nutrition  Outcome: Progressing  Goal: Promote skin healing  Outcome: Progressing     Problem: Fall/Injury  Goal: Not fall by end of shift  Outcome: Progressing  Goal: Be free from injury by end of the shift  Outcome: Progressing  Goal: Verbalize understanding of personal risk factors for fall in the hospital  Outcome: Progressing  Goal: Verbalize understanding of risk factor reduction measures to prevent injury from fall in the home  Outcome: Progressing  Goal: Use assistive devices by end of the shift  Outcome: Progressing  Goal: Pace activities to prevent fatigue by end of the shift  Outcome: Progressing     Problem: Pain  Goal: Takes deep breaths with improved pain control throughout the shift  Outcome: Progressing  Goal: Turns in bed with improved pain control throughout the shift  Outcome: Progressing  Goal: Walks with improved pain control throughout the shift  Outcome: Progressing  Goal: Performs ADL's with improved pain control throughout shift  Outcome: Progressing  Goal: Participates in PT with improved pain control throughout the shift  Outcome: Progressing  Goal: Free from opioid side effects throughout the  shift  Outcome: Progressing  Goal: Free from acute confusion related to pain meds throughout the shift  Outcome: Progressing     Problem: Wound Care  Goal: Area will decrease in size .2x.2 cm per week  Outcome: Progressing     Problem: Diabetes  Goal: Achieve decreasing blood glucose levels by end of shift  Outcome: Progressing  Goal: Increase stability of blood glucose readings by end of shift  Outcome: Progressing  Goal: Decrease in ketones present in urine by end of shift  Outcome: Progressing  Goal: Maintain electrolyte levels within acceptable range throughout shift  Outcome: Progressing  Goal: Maintain glucose levels >70mg/dl to <250mg/dl throughout shift  Outcome: Progressing  Goal: No changes in neurological exam by end of shift  Outcome: Progressing  Goal: Learn about and adhere to nutrition recommendations by end of shift  Outcome: Progressing  Goal: Vital signs within normal range for age by end of shift  Outcome: Progressing  Goal: Increase self care and/or family involovement by end of shift  Outcome: Progressing  Goal: Receive DSME education by end of shift  Outcome: Progressing

## 2024-08-30 NOTE — PROGRESS NOTES
"Mariann Drew is a 68 y.o. female on day 7 of admission presenting with Debility.    Subjective   She is tolerating therapies.  She denies chest pain or shortness of breath.  She denies abdominal pain.  10 review of systems are negative       Objective     Physical Exam  Constitutional:       General: no acute distress.     Appearance: Normal appearance.   Neck: No anterior cervical adenopathy or jugular venous distention  Cardiovascular:   Normal S1-S2 no gallops  Pulmonary:   Lungs are clear throughout all fields  Abdominal:      General: Abdomen is flat. Bowel sounds are normal.   Wound VAC in place  Musculoskeletal:         General: No swelling, tenderness or deformity.   Abdomen is soft without organomegaly or tenderness.  Apparently she has had some black stools we will check for OB  Skin:     General: Skin is warm and dry.      Findings: No rash.   Neurological:      General: No focal deficit present.      Mental Status:  alert and oriented to person, place, and time.      Cranial Nerves: No cranial nerve deficit.   Sit to stand transfers are min assist of 1 today, mod cues for hand placement     Psychiatric:         Mood and Affect: Mood normal.      Last Recorded Vitals  Blood pressure 139/84, pulse 85, temperature 36.1 °C (97 °F), temperature source Temporal, resp. rate 18, height 1.626 m (5' 4.02\"), weight 72.2 kg (159 lb 2.8 oz), SpO2 98%.  Intake/Output last 3 Shifts:  I/O last 3 completed shifts:  In: 350 (4.8 mL/kg) [P.O.:300; IV Piggyback:50]  Out: 1050 (14.5 mL/kg) [Drains:1050]  Weight: 72.2 kg     Relevant Results  Scheduled medications  allopurinol, 400 mg, oral, Daily  deutetrabenazine, 24 mg, oral, Daily  deutetrabenazine, 6 mg, oral, Daily  dexAMETHasone, 0.5 mg, oral, 4x daily  DULoxetine, 60 mg, oral, BID  enoxaparin, 40 mg, subcutaneous, q24h  ertapenem, 500 mg, intravenous, q24h  famotidine, 20 mg, oral, q AM  ferrous sulfate (325 mg ferrous sulfate), 65 mg of iron, oral, " BID  fludrocortisone, 0.1 mg, oral, Daily  lamoTRIgine, 200 mg, oral, q AM  latanoprost, 1 drop, Both Eyes, Nightly  lotilaner, 1 drop, Both Eyes, Nightly  nystatin, , Topical, BID  semaglutide, 0.5 mg, subcutaneous, Every Sunday  sennosides, 1 tablet, oral, Nightly  sucralfate, 1 g, oral, Nightly  traZODone, 150 mg, oral, Nightly      Continuous medications       PRN medications  PRN medications: acetaminophen **OR** acetaminophen, alum-mag hydroxide-simeth, bisacodyl, bisacodyl, cyclobenzaprine, lidocaine, melatonin, naratriptan     Results for orders placed or performed during the hospital encounter of 08/23/24 (from the past 24 hour(s))   Renal Function Panel   Result Value Ref Range    Glucose 108 (H) 74 - 99 mg/dL    Sodium 133 (L) 136 - 145 mmol/L    Potassium 4.4 3.5 - 5.3 mmol/L    Chloride 108 (H) 98 - 107 mmol/L    Bicarbonate 21 21 - 32 mmol/L    Anion Gap 8 (L) 10 - 20 mmol/L    Urea Nitrogen 31 (H) 6 - 23 mg/dL    Creatinine 1.76 (H) 0.50 - 1.05 mg/dL    eGFR 31 (L) >60 mL/min/1.73m*2    Calcium 8.3 (L) 8.6 - 10.3 mg/dL    Phosphorus 3.3 2.5 - 4.9 mg/dL    Albumin 2.5 (L) 3.4 - 5.0 g/dL   Basic metabolic panel   Result Value Ref Range    Glucose 108 (H) 74 - 99 mg/dL    Sodium 133 (L) 136 - 145 mmol/L    Potassium 4.4 3.5 - 5.3 mmol/L    Chloride 108 (H) 98 - 107 mmol/L    Bicarbonate 21 21 - 32 mmol/L    Anion Gap 8 (L) 10 - 20 mmol/L    Urea Nitrogen 31 (H) 6 - 23 mg/dL    Creatinine 1.76 (H) 0.50 - 1.05 mg/dL    eGFR 31 (L) >60 mL/min/1.73m*2    Calcium 8.3 (L) 8.6 - 10.3 mg/dL            Assessment/Plan   Principal Problem:    Debility  Active Problems:    Anxiety    Balance problems    Diabetes mellitus (Multi)    Hx of obesity    Myofascial pain syndrome, cervical    Hyperlipidemia    Disruption of external surgical wound      1.  Abdominoplasty with wound infection group B strep  Continue IV Invanz  Begin physical therapy for bed mobility, transfers, endurance activities, gait training with an  assistive device  Begin occupational therapy for functional mobility, upper limb strengthening, coordination, balance and endurance activities as well as adaptive aids     2.  Gout  Zyloprim 400 mg daily  Monitor for gout symptoms     3.  Tardive dyskinesia  Austedo 30 mg daily  Patient's  will bring in the medication     4.  DVT prophylaxis  Lovenox 40 mg daily     5.  GERD and difficulty swallowing  Pepcid 20 mg daily  Carafate 1 g nightly     6.  Pain  Cymbalta 60 mg twice a day  Lamictal 200 mg daily  Decadron 0.5 mg 4 times a day     7.  Sleep   150 mg nightly     8.  Diabetes mellitus type 2  Semaglutide 0.5 mg weekly     9. FENGI  She has not had a bowel movement for 2 days will begin on a bowel program  DVT prophylaxis she is on Lovenox 40 mg daily    8/30  Acute kidney injury, creatinine is 1.76.  Meds dosages have been adjusted renal continues to follow patient  DVT prophylaxis Homans' sign is negative bilaterally continue Lovenox  Diabetes mellitus, fasting blood sugar today is 108  Pain is controlled with Cymbalta and Lamictal  Recheck a BMP on Monday           I spent 36 minutes in the professional and overall care of this patient.

## 2024-08-30 NOTE — PROGRESS NOTES
Physical Therapy    Physical Therapy Treatment    Patient Name: Mariann Drew  MRN: [id number]  Today's Date: 8/30/2024  Time Calculation  Start Time: 0915  Stop Time: 1000  Time Calculation (min): 45 min         Assessment/Plan         PT Plan  Treatment/Interventions: Bed mobility, Transfer training, Gait training, Stair training, Balance training, Strengthening, Endurance training, Range of motion, Therapeutic exercise, Therapeutic activity, Home exercise program, Positioning, Postural re-education  PT Plan: Ongoing PT  PT Frequency: 5 times per week (6 times PRN)  PT Discharge Recommendations:  (Anticipate pt to require intermittent min A at walker level at time of discharge.)  Equipment Recommended upon Discharge: Wheeled walker  PT Recommended Transfer Status: Assist x2 (at time of initial evaluation)      General Visit Information:   PT  Visit  PT Received On: 08/30/24  General  Patient Position Received: Up in chair, Alarm on    Subjective   Precautions:  Precautions  Medical Precautions: Fall precautions  Post-Surgical Precautions: Abdominal surgery precautions  Precautions Comment: Wound vac and port/IV    Objective   Pain:  Pain Assessment  Pain Assessment: 0-10  0-10 (Numeric) Pain Score: 2  Pain Location:  (Right side of trunk)  Pain Interventions: Repositioned, Ambulation/increased activity, Distraction    Treatments:  Therapeutic Exercise  Therapeutic Exercise Performed:  (Pt performed seated BLE ther ex 2-3 x 10 reps each with mod verbal and manual cues for exercise technique and to remain on task.)    Ambulation/Gait Training  Ambulation/Gait Training Performed:  (Pt amb 12 ft x 2 with wh walker and min A x1 and  assist of another to manage equipment.  Focusing on navigating tight spaces with wh walker, side-stepping, approach steps and walker safety.  Further gait training 40 ft with wheeled walker and min A.)    Transfers  Transfer:  (Sit to stand transfer training with min  assist.)    EDUCATION:  Education Comment:  (Pt educated on safe mobility techniques, treatment intervention and goals of treatment.)    Encounter Problems       Encounter Problems (Active)       PT Problem       LTG - Pt will perform bed mobility with mod A x 1  (Progressing)       Start:  08/23/24    Expected End:  09/06/24            LTG - Pt will perform transfers with min A x 1.  (Progressing)       Start:  08/23/24    Expected End:  09/06/24            LTG - Pt will amb 150 feet with FWW and CGA.   (Progressing)       Start:  08/23/24    Expected End:  09/06/24            LTG - Pt will up/down 2 stairs with 1 HR and straight cane with min A x 1.  (Progressing)       Start:  08/23/24    Expected End:  09/06/24            STG - Pt will perform bed mobility with mod assist x 2 (Progressing)       Start:  08/23/24    Expected End:  08/30/24            STG - Pt will perform transfers with mod assist x 1 (Met)       Start:  08/23/24    Expected End:  08/30/24    Resolved:  08/28/24         STG - Pt will amb 50 feet with FWW and mod/min assist x 1 (Progressing)       Start:  08/23/24    Expected End:  08/30/24            STG - Pt will up/down curb step with FWW and mod A x 2.  (Progressing)       Start:  08/23/24    Expected End:  08/30/24

## 2024-08-30 NOTE — PROGRESS NOTES
History  Chief Complaint   Patient presents with    Flank Pain     Patient reports L flank and blood in urine  Patient states that he thinks he's has a kidney stone again  Patient reports at times, there is a lot of blood  Patient had subjective fevers at home   Post-op Problem     Patient had gastric bypass surgery 10 weeks ago and is having pain under the incision by his belly button  No drainage noted  52 YO male presents with 5 days of hematuria and dysuria  States this has been noticed mostly at the end of stream and has been worse in the morning  States pain on urination is located in the suprapubic abdomen and the thoracic back  He states similar pain with a previous kidney stone  Denies associated nausea or vomiting, states he had a subjective fever yesterday but this resolved  He additionally notes pain in the Left lower abdomen which has been constant for some time, concerned as he had gastric bypass 10 weeks ago  He has never had similar abdominal pain in the past  Pt denies CP/SOB/F/C/N/V/D/C  History provided by:  Patient   used: No    Flank Pain   Pain location:  Suprapubic (Middle thoracic back)  Pain quality: aching and sharp    Pain radiates to:  Groin  Onset quality:  Gradual  Duration:  5 days  Timing:  Constant  Progression:  Waxing and waning  Chronicity:  New  Context: previous surgery    Relieved by:  Nothing  Worsened by:  Urination  Ineffective treatments:  None tried  Associated symptoms: fever    Associated symptoms: no anorexia, no chest pain, no chills, no constipation, no cough, no diarrhea, no dysuria, no nausea, no shortness of breath, no sore throat and no vomiting    Fever:     Duration:  1 day    Timing:  Intermittent    Temp source:  Subjective    Progression:  Resolved      Prior to Admission Medications   Prescriptions Last Dose Informant Patient Reported? Taking?    Cholecalciferol (VITAMIN D PO)   Yes No   Sig: Take 1 tablet by mouth daily Occupational Therapy    OT Treatment    Patient Name: Mariann Drew  MRN: [id number]  Today's Date: 8/30/2024  Time Calculation  Start Time: 1300  Stop Time: 1345  Time Calculation (min): 45 min        Assessment:  End of Session Communication: Bedside nurse  End of Session Patient Position: Up in chair, Alarm on     Plan:  Treatment Interventions: ADL retraining, Functional transfer training, UE strengthening/ROM, Endurance training, Equipment evaluation/education, Patient/family training, Compensatory technique education, Fine motor coordination activities  OT Frequency: 5 times per week (6 days/PRN)  Treatment Interventions: ADL retraining, Functional transfer training, UE strengthening/ROM, Endurance training, Equipment evaluation/education, Patient/family training, Compensatory technique education, Fine motor coordination activities    Subjective   Previous Visit Info:  OT Last Visit  OT Received On: 08/30/24  General:  General  Prior to Session Communication: Bedside nurse  Patient Position Received: Alarm on, Up in chair  Precautions:  Medical Precautions: Fall precautions  Post-Surgical Precautions: Abdominal surgery precautions  Precautions Comment: Wound vac and port/IV    Pain:  Pain Assessment  Pain Assessment: 0-10  0-10 (Numeric) Pain Score: 3  Pain Location:  (bilateral hips and legs)  Pain Interventions: Medication (See MAR), Repositioned (elevated legs secondary to increased edema)    Objective      Bed Mobility/Transfers: Transfers  Transfer:  (sit to stand transfers)    Toilet Transfers  Toilet Transfer From: Walker  Toilet Transfer Technique: Ambulating  Toilet Transfers: Minimal assistance    Functional Mobility:  Functional Mobility 1  Comments 1: via a rolling walker completed functional approach steps in room at minimal assistance    Therapy/Activity: Therapeutic Exercise  Therapeutic Exercise Performed: Yes  BUE exercises using one pound weights completing 30 repetitions of 5 exercises to  amLODIPine (NORVASC) 5 mg tablet   No Yes   Sig: Take 1 tablet (5 mg total) by mouth daily   Patient taking differently: Take 5 mg by mouth every morning     amitriptyline (ELAVIL) 50 mg tablet 7/11/2018 at Unknown time  Yes Yes   Sig: Take 50 mg by mouth 2 (two) times a day   artificial tear (LUBRIFRESH P M ) 83-15 % ophthalmic ointment   Yes No   Sig: ADMINISTER 1 APPLICATION TO BOTH EYES DAILY AT BEDTIME   artificial tears (REFRESH) ophthalmic ointment   No No   Sig: Administer 1 application to both eyes daily at bedtime   ergocalciferol (VITAMIN D2) 50,000 units   Yes Yes   Sig: TAKE 1 CAPSULE EVERY OTHER WEEK  omeprazole (PriLOSEC) 20 mg delayed release capsule   Yes No   Sig: Take 20 mg by mouth every morning        Facility-Administered Medications: None       Past Medical History:   Diagnosis Date    Abnormal blood sugar     Abnormal TSH     Back pain     Bilateral anterior knee pain     Chronic pain disorder     back    CPAP (continuous positive airway pressure) dependence     DDD (degenerative disc disease), lumbar     GERD (gastroesophageal reflux disease)     Hematuria     History of Helicobacter pylori infection     Hypertension     Knee pain     Morbid obesity (HCC)     Prediabetes     Sleep apnea     Suspicious nevus     Use of cane as ambulatory aid     Vitamin D deficiency        Past Surgical History:   Procedure Laterality Date    CO EGD TRANSORAL BIOPSY SINGLE/MULTIPLE N/A 12/13/2017    Procedure: ESOPHAGOGASTRODUODENOSCOPY (EGD), with BIOPSY;  Surgeon: Yanique Ariza MD;  Location: [facility];   Service: Bariatrics    CO LAP GASTRIC BYPASS/ENRICO-EN-Y N/A 5/8/2018    Procedure: BYPASS GASTRIC  ENRICO-EN-Y LAPAROSCOPIC AND INTRAOPERATIVE EGD;  Surgeon: Yanique Ariza MD;  Location: [facility];  Service: Bariatrics       Family History   Problem Relation Age of Onset    Hypertension Mother     Prostate cancer Father         ALSO PROSTATE ENLARGEMENT    Hypertension promote greater independence with ADL's and transfers. Educated patient regarding proper technique and need to complete exercises with slow and steady speed to target intended muscle group and avoid using large back muscles. Patient demonstrated good understanding and carryover with task.         EDUCATION:  Education  Individual(s) Educated: Patient  Education Provided: Fall precautions  Home Program: Tendon gliding  Patient Response to Education: Patient/Caregiver Verbalized Understanding of Information, Patient/Caregiver Performed Return Demonstration of Exercises/Activities    Goals:  Encounter Problems       Encounter Problems (Active)       OT Problem       STG- patient will complete grooming with MIN A with use of ae/ad/dme prn (Met)       Start:  08/23/24    Expected End:  08/30/24    Resolved:  08/28/24    Updated to: STG- patient will complete grooming with  Set up with use of ae/ad/dme prn         STG- patient will complete bathing with MAX A with use of ae/ad/dme prn (Met)       Start:  08/23/24    Expected End:  09/06/24    Resolved:  08/28/24    Updated to: STG- patient will complete bathing with Mod A with use of ae/ad/dme prn         STG- patient will complete UB dressing with MOD A with use of ae/ad/dme prn (Met)       Start:  08/23/24    Expected End:  08/30/24    Resolved:  08/28/24    Updated to: STG- patient will complete UB dressing with SBA with use of ae/ad/dme prn         STG- patient will complete LB dressing with MAX A with use of ae/ad/dme prn (Met)       Start:  08/23/24    Expected End:  09/06/24    Resolved:  08/28/24    Updated to: STG- patient will complete LB dressing with Mod A with use of ae/ad/dme prn         STG- patient will complete toileting with MAX A with use of ae/ad/dme prn  (Met)       Start:  08/23/24    Expected End:  09/06/24    Resolved:  08/28/24    Updated to: STG- patient will complete toileting with Mod A with use of ae/ad/dme prn         STG- patient will  Maternal Grandmother      I have reviewed and agree with the history as documented  Social History   Substance Use Topics    Smoking status: Never Smoker    Smokeless tobacco: Never Used    Alcohol use No        Review of Systems   Constitutional: Positive for fever  Negative for chills  HENT: Negative for dental problem and sore throat  Eyes: Negative for visual disturbance  Respiratory: Negative for cough and shortness of breath  Cardiovascular: Negative for chest pain  Gastrointestinal: Negative for abdominal pain, anorexia, constipation, diarrhea, nausea and vomiting  Genitourinary: Positive for flank pain  Negative for dysuria and frequency  Musculoskeletal: Negative for neck pain and neck stiffness  Skin: Negative for rash  Neurological: Negative for dizziness, weakness and light-headedness  Psychiatric/Behavioral: Negative for agitation, behavioral problems and confusion  All other systems reviewed and are negative  Physical Exam  Physical Exam   Constitutional: He is oriented to person, place, and time  He appears well-developed and well-nourished  HENT:   Head: Normocephalic and atraumatic  Eyes: EOM are normal    Neck: Normal range of motion  Cardiovascular: Normal rate, regular rhythm and normal heart sounds  Pulmonary/Chest: Effort normal and breath sounds normal    Abdominal: Soft  There is tenderness in the left lower quadrant  There is no rigidity, no rebound, no guarding, no tenderness at McBurney's point and negative Murcia's sign  Musculoskeletal: Normal range of motion  Neurological: He is alert and oriented to person, place, and time  Skin: Skin is warm and dry  Psychiatric: He has a normal mood and affect  His behavior is normal    Nursing note and vitals reviewed        Vital Signs  ED Triage Vitals [07/12/18 1603]   Temperature Pulse Respirations Blood Pressure SpO2   98 7 °F (37 1 °C) (!) 113 18 132/89 97 %      Temp Source Heart Rate Source complete toilet/commode transfers with MAX A with use of ae/ad/dme prn (Met)       Start:  08/23/24    Expected End:  08/30/24    Resolved:  08/28/24    Updated to: STG- patient will complete toilet/commode transfers with  CGA with use of ae/ad/dme prn         STG- patient will complete tub/shower transfers with MAX A with use of ae/ad/dme prn (Progressing)       Start:  08/23/24    Expected End:  09/06/24            STG- patient will complete simple mobility with MOD A with use of ae/ad/dme prn (Met)       Start:  08/23/24    Expected End:  08/30/24    Resolved:  08/28/24    Updated to: STG- patient will complete simple mobility with CGA with use of ae/ad/dme prn         LTG- patient will complete grooming with CGA with use of ae/ad/dme prn (Met)       Start:  08/23/24    Expected End:  09/06/24    Resolved:  08/28/24    Updated to: LTG- patient will complete grooming with supervision with use of ae/ad/dme prn         LTG- patient will complete bathing with MOD A with use of ae/ad/dme prn (Progressing)       Start:  08/23/24    Expected End:  09/06/24            LTG- patient will complete UB dressing with Min A  with use of ae/ad/dme prn (Met)       Start:  08/23/24    Expected End:  09/06/24    Resolved:  08/28/24    Updated to: LTG- patient will complete UB dressing with set up  with use of ae/ad/dme prn         LTG- patient will complete LB dressing with MOD A with use of ae/ad/dme prn (Progressing)       Start:  08/23/24    Expected End:  09/06/24            LTG- patient will complete toileting with MOD A with use of ae/ad/dme prn  (Progressing)       Start:  08/23/24    Expected End:  09/06/24            LTG- patient will complete toilet/commode transfers with Mod A with use of ae/ad/dme prn (Met)       Start:  08/23/24    Expected End:  09/06/24    Resolved:  08/28/24    Updated to: LTG- patient will complete toilet/commode transfers with CGA with use of ae/ad/dme prn         LTG- patient will complete  Patient Position - Orthostatic VS BP Location FiO2 (%)   Oral Monitor Sitting Right arm --      Pain Score       6           Vitals:    07/12/18 1603 07/12/18 1832 07/12/18 1952   BP: 132/89 116/60 144/56   Pulse: (!) 113 85 87   Patient Position - Orthostatic VS: Sitting Lying Lying       Visual Acuity      ED Medications  Medications   cephalexin (KEFLEX) capsule 500 mg (500 mg Oral Given 7/12/18 1952)   sodium chloride 0 9 % bolus 1,000 mL (0 mL Intravenous Stopped 7/12/18 1805)   morphine (PF) 4 mg/mL injection 4 mg (4 mg Intravenous Given 7/12/18 1703)   iohexol (OMNIPAQUE) 350 MG/ML injection (SINGLE-DOSE) 100 mL (100 mL Intravenous Given 7/12/18 1839)   iohexol (OMNIPAQUE) 240 MG/ML solution 50 mL (50 mL Oral Given 7/12/18 1904)       Diagnostic Studies  Results Reviewed     Procedure Component Value Units Date/Time    Basic metabolic panel [[id number]] Collected:  07/12/18 1701    Lab Status:  Final result Specimen:  Blood from Arm, Left Updated:  07/12/18 1816     Sodium 141 mmol/L      Potassium 4 2 mmol/L      Chloride 103 mmol/L      CO2 31 mmol/L      Anion Gap 7 mmol/L      BUN 16 mg/dL      Creatinine 1 19 mg/dL      Glucose 94 mg/dL      Calcium 9 2 mg/dL      eGFR 72 ml/min/1 73sq m     Narrative:         National Kidney Disease Education Program recommendations are as follows:  GFR calculation is accurate only with a steady state creatinine  Chronic Kidney disease less than 60 ml/min/1 73 sq  meters  Kidney failure less than 15 ml/min/1 73 sq  meters      Hepatic function panel [[id number]]  (Abnormal) Collected:  07/12/18 1701    Lab Status:  Final result Specimen:  Blood from Arm, Left Updated:  07/12/18 1816     Total Bilirubin 0 86 mg/dL      Bilirubin, Direct 0 30 (H) mg/dL      Alkaline Phosphatase 99 U/L      AST 59 (H) U/L      ALT 66 U/L      Total Protein 7 7 g/dL      Albumin 3 4 (L) g/dL     Magnesium [[id number]]  (Normal) Collected:  07/12/18 1701    Lab Status:  Final result Specimen: tub/shower transfers with MOD A with use of ae/ad/dme prn (Progressing)       Start:  08/23/24    Expected End:  09/06/24            LTG- patient will complete simple mobility with MIN A with use of ae/ad/dme prn (Progressing)       Start:  08/23/24    Expected End:  09/06/24            STG- patient will complete grooming with  Set up with use of ae/ad/dme prn (Progressing)       Start:  08/28/24    Expected End:  09/06/24                STG- patient will complete UB dressing with SBA with use of ae/ad/dme prn (Progressing)       Start:  08/28/24    Expected End:  09/06/24                STG- patient will complete toilet/commode transfers with  CGA with use of ae/ad/dme prn (Progressing)       Start:  08/28/24    Expected End:  09/06/24                STG- patient will complete simple mobility with CGA with use of ae/ad/dme prn (Progressing)       Start:  08/28/24    Expected End:  09/06/24                LTG- patient will complete grooming with supervision with use of ae/ad/dme prn (Met)       Start:  08/28/24    Expected End:  09/06/24    Resolved:  08/28/24    Updated to: LTG- patient will complete grooming with set up with use of ae/ad/dme prn             LTG- patient will complete UB dressing with set up  with use of ae/ad/dme prn (Progressing)       Start:  08/28/24    Expected End:  09/06/24                LTG- patient will complete toilet/commode transfers with CGA with use of ae/ad/dme prn (Progressing)       Start:  08/28/24    Expected End:  09/06/24                LTG- patient will complete grooming with set up with use of ae/ad/dme prn (Progressing)       Start:  08/28/24    Expected End:  09/06/24                STG- patient will complete bathing with Mod A with use of ae/ad/dme prn (Progressing)       Start:  08/28/24    Expected End:  09/06/24                STG- patient will complete LB dressing with Mod A with use of ae/ad/dme prn (Progressing)       Start:  08/28/24    Expected End:  09/06/24                 STG- patient will complete toileting with Mod A with use of ae/ad/dme prn (Progressing)       Start:  08/28/24    Expected End:  09/06/24                                  Blood from Arm, Left Updated:  07/12/18 1816     Magnesium 2 6 mg/dL     Lipase [[id number]]  (Normal) Collected:  07/12/18 1701    Lab Status:  Final result Specimen:  Blood from Arm, Left Updated:  07/12/18 1816     Lipase 136 u/L     CBC and differential [[id number]] Collected:  07/12/18 1701    Lab Status:  Final result Specimen:  Blood from Arm, Left Updated:  07/12/18 1720     WBC 9 15 Thousand/uL      RBC 4 81 Million/uL      Hemoglobin 14 7 g/dL      Hematocrit 43 4 %      MCV 90 fL      MCH 30 6 pg      MCHC 33 9 g/dL      RDW 15 0 %      MPV 11 8 fL      Platelets 125 Thousands/uL      nRBC 0 /100 WBCs      Neutrophils Relative 67 %      Lymphocytes Relative 23 %      Monocytes Relative 9 %      Eosinophils Relative 1 %      Basophils Relative 0 %      Neutrophils Absolute 6 14 Thousands/µL      Lymphocytes Absolute 2 08 Thousands/µL      Monocytes Absolute 0 82 Thousand/µL      Eosinophils Absolute 0 10 Thousand/µL      Basophils Absolute 0 01 Thousands/µL     Urine Microscopic [[id number]]  (Abnormal) Collected:  07/12/18 1645    Lab Status:  Final result Specimen:  Urine from Urine, Clean Catch Updated:  07/12/18 1707     RBC, UA       Field obscured, unable to enumerate (A)     /hpf     WBC, UA       Field obscured, unable to enumerate (A)     /hpf     Epithelial Cells       Field obscured, unable to enumerate (A)     /hpf     Bacteria, UA       Field obscured, unable to enumerate (A)     /hpf    Urine culture [[id number]] Collected:  07/12/18 1645    Lab Status:   In process Specimen:  Urine from Urine, Clean Catch Updated:  07/12/18 1705    POCT urinalysis dipstick [[id number]]  (Abnormal) Resulted:  07/12/18 1643    Lab Status:  Final result Specimen:  Urine Updated:  07/12/18 1643    ED Urine Macroscopic [[id number]]  (Abnormal) Collected:  07/12/18 1645    Lab Status:  Final result Specimen:  Urine Updated:  07/12/18 1640     Color, UA Orange     Clarity, UA Cloudy     pH, UA 6 0     Leukocytes, UA Large (A) Nitrite, UA Negative     Protein,  (2+) (A) mg/dl      Glucose, UA Negative mg/dl      Ketones, UA 40 (2+) (A) mg/dl      Urobilinogen, UA 1 0 E U /dl      Bilirubin, UA Interference- unable to analyze (A)     Blood, UA Moderate (A)     Specific [place], UA 1 020    Narrative:       CLINITEK RESULT                 CT abdomen pelvis with contrast   Final Result by Ildefonso Culver MD (07/12 1853)      Suggestion of bladder wall thickening potentially reflecting cystitis  Workstation performed: [id number]                    Procedures  Procedures       Phone Contacts  ED Phone Contact    ED Course                               MDM  Number of Diagnoses or Management Options  Cystitis: new and requires workup  Diagnosis management comments: 1  Flank pain - Pt with microscopic hematuria in specimen cup, shows pictures of gross hematuria from this morning  Will check electrolytes to determine kidney function, CT to determine kidney stone  2  Abdominal pain - Pt with pain for some time, will check LFT's and lipase, CT abdomen for possible seroma/abscess  Amount and/or Complexity of Data Reviewed  Clinical lab tests: ordered and reviewed  Tests in the radiology section of CPT®: ordered and reviewed  Review and summarize past medical records: yes  Independent visualization of images, tracings, or specimens: yes    Patient Progress  Patient progress: stable    CritCare Time    Disposition  Final diagnoses:   Cystitis     Time reflects when diagnosis was documented in both MDM as applicable and the Disposition within this note     Time User Action Codes Description Comment    7/12/2018  7:45 PM Serafin COWAN Add [N30 90] Cystitis       ED Disposition     ED Disposition Condition Comment    Discharge  [facility] discharge to home/self care      Condition at discharge: Stable        Follow-up Information     Follow up With Specialties Details Why Carisa Campos MD Urology Schedule an appointment as soon as possible for a visit  [address] [place] [address]  [phone]            Discharge Medication List as of 7/12/2018  7:48 PM      START taking these medications    Details   cephalexin (KEFLEX) 500 mg capsule Take 1 capsule (500 mg total) by mouth every 6 (six) hours for 7 days, Starting Thu 7/12/2018, Until u 7/19/2018, Print      phenazopyridine (PYRIDIUM) 200 mg tablet Take 1 tablet (200 mg total) by mouth 3 (three) times a day, Starting u 7/12/2018, Print         CONTINUE these medications which have NOT CHANGED    Details   amitriptyline (ELAVIL) 50 mg tablet Take 50 mg by mouth 2 (two) times a day, Until Discontinued, Historical Med      amLODIPine (NORVASC) 5 mg tablet Take 1 tablet (5 mg total) by mouth daily, Starting Thu 3/29/2018, Normal      ergocalciferol (VITAMIN D2) 50,000 units TAKE 1 CAPSULE EVERY OTHER WEEK , Historical Med      artificial tear (LUBRIFRESH P M ) 83-15 % ophthalmic ointment ADMINISTER 1 APPLICATION TO BOTH EYES DAILY AT BEDTIME, Historical Med      artificial tears (REFRESH) ophthalmic ointment Administer 1 application to both eyes daily at bedtime, Starting Thu 3/29/2018, Normal      Cholecalciferol (VITAMIN D PO) Take 1 tablet by mouth daily  , Historical Med      omeprazole (PriLOSEC) 20 mg delayed release capsule Take 20 mg by mouth every morning  , Historical Med           No discharge procedures on file      ED Provider  Electronically Signed by           Tha Escalona MD  07/12/18 4574

## 2024-08-30 NOTE — PROGRESS NOTES
Occupational Therapy    OT Treatment    Patient Name: Mariann Drew  MRN: [id number]  Today's Date: 8/30/2024  Time Calculation  Start Time: 1045  Stop Time: 1130  Time Calculation (min): 45 min        Assessment:  End of Session Communication: Bedside nurse  End of Session Patient Position: Up in chair, Alarm on     Plan:  Treatment Interventions: ADL retraining, Functional transfer training, UE strengthening/ROM, Endurance training, Equipment evaluation/education, Patient/family training, Compensatory technique education, Fine motor coordination activities  OT Frequency: 5 times per week (6 days/PRN)  Treatment Interventions: ADL retraining, Functional transfer training, UE strengthening/ROM, Endurance training, Equipment evaluation/education, Patient/family training, Compensatory technique education, Fine motor coordination activities    Subjective   Previous Visit Info:  OT Last Visit  OT Received On: 08/30/24    General:  General  Prior to Session Communication: Bedside nurse  Patient Position Received: Alarm on, Up in chair    Precautions:  Medical Precautions: Fall precautions  Post-Surgical Precautions: Abdominal surgery precautions  Precautions Comment: Wound vac and port/IV              Pain:  Pain Assessment  Pain Assessment: 0-10  0-10 (Numeric) Pain Score: 1  Pain Location: Abdomen  Pain Orientation: Right  Pain Interventions: Emotional support, Distraction    Objective           Functional Standing Tolerance:  Functional Standing Tolerance Comments: static standing with bilateral ue support for 2:42 and 3:23 with cga    Bed Mobility/Transfers: Transfers  Transfer:  (min assist sit to stand transfers from wheelchair)       Therapy/Activity: Therapeutic Exercise  Therapeutic Exercise Activity 1: patient completes bilateral ue ther ex using 2 lb weighted ivanna at tabletop, 10 reps x 2 sets x 3 exercises, with supervision    EDUCATION:  Education  Individual(s) Educated: Patient  Education Provided:   (energy conservation techniques, safe transfers)  Patient Response to Education: Patient/Caregiver Verbalized Understanding of Information, Patient/Caregiver Performed Return Demonstration of Exercises/Activities    Goals:  Encounter Problems       Encounter Problems (Active)       OT Problem       STG- patient will complete grooming with MIN A with use of ae/ad/dme prn (Met)       Start:  08/23/24    Expected End:  08/30/24    Resolved:  08/28/24    Updated to: STG- patient will complete grooming with  Set up with use of ae/ad/dme prn         STG- patient will complete bathing with MAX A with use of ae/ad/dme prn (Met)       Start:  08/23/24    Expected End:  09/06/24    Resolved:  08/28/24    Updated to: STG- patient will complete bathing with Mod A with use of ae/ad/dme prn         STG- patient will complete UB dressing with MOD A with use of ae/ad/dme prn (Met)       Start:  08/23/24    Expected End:  08/30/24    Resolved:  08/28/24    Updated to: STG- patient will complete UB dressing with SBA with use of ae/ad/dme prn         STG- patient will complete LB dressing with MAX A with use of ae/ad/dme prn (Met)       Start:  08/23/24    Expected End:  09/06/24    Resolved:  08/28/24    Updated to: STG- patient will complete LB dressing with Mod A with use of ae/ad/dme prn         STG- patient will complete toileting with MAX A with use of ae/ad/dme prn  (Met)       Start:  08/23/24    Expected End:  09/06/24    Resolved:  08/28/24    Updated to: STG- patient will complete toileting with Mod A with use of ae/ad/dme prn         STG- patient will complete toilet/commode transfers with MAX A with use of ae/ad/dme prn (Met)       Start:  08/23/24    Expected End:  08/30/24    Resolved:  08/28/24    Updated to: STG- patient will complete toilet/commode transfers with  CGA with use of ae/ad/dme prn         STG- patient will complete tub/shower transfers with MAX A with use of ae/ad/dme prn (Progressing)       Start:   08/23/24    Expected End:  09/06/24            STG- patient will complete simple mobility with MOD A with use of ae/ad/dme prn (Met)       Start:  08/23/24    Expected End:  08/30/24    Resolved:  08/28/24    Updated to: STG- patient will complete simple mobility with CGA with use of ae/ad/dme prn         LTG- patient will complete grooming with CGA with use of ae/ad/dme prn (Met)       Start:  08/23/24    Expected End:  09/06/24    Resolved:  08/28/24    Updated to: LTG- patient will complete grooming with supervision with use of ae/ad/dme prn         LTG- patient will complete bathing with MOD A with use of ae/ad/dme prn (Progressing)       Start:  08/23/24    Expected End:  09/06/24            LTG- patient will complete UB dressing with Min A  with use of ae/ad/dme prn (Met)       Start:  08/23/24    Expected End:  09/06/24    Resolved:  08/28/24    Updated to: LTG- patient will complete UB dressing with set up  with use of ae/ad/dme prn         LTG- patient will complete LB dressing with MOD A with use of ae/ad/dme prn (Progressing)       Start:  08/23/24    Expected End:  09/06/24            LTG- patient will complete toileting with MOD A with use of ae/ad/dme prn  (Progressing)       Start:  08/23/24    Expected End:  09/06/24            LTG- patient will complete toilet/commode transfers with Mod A with use of ae/ad/dme prn (Met)       Start:  08/23/24    Expected End:  09/06/24    Resolved:  08/28/24    Updated to: LTG- patient will complete toilet/commode transfers with CGA with use of ae/ad/dme prn         LTG- patient will complete tub/shower transfers with MOD A with use of ae/ad/dme prn (Progressing)       Start:  08/23/24    Expected End:  09/06/24            LTG- patient will complete simple mobility with MIN A with use of ae/ad/dme prn (Progressing)       Start:  08/23/24    Expected End:  09/06/24            STG- patient will complete grooming with  Set up with use of ae/ad/dme prn (Progressing)        Start:  08/28/24    Expected End:  09/06/24                STG- patient will complete UB dressing with SBA with use of ae/ad/dme prn (Progressing)       Start:  08/28/24    Expected End:  09/06/24                STG- patient will complete toilet/commode transfers with  CGA with use of ae/ad/dme prn (Progressing)       Start:  08/28/24    Expected End:  09/06/24                STG- patient will complete simple mobility with CGA with use of ae/ad/dme prn (Progressing)       Start:  08/28/24    Expected End:  09/06/24                LTG- patient will complete grooming with supervision with use of ae/ad/dme prn (Met)       Start:  08/28/24    Expected End:  09/06/24    Resolved:  08/28/24    Updated to: LTG- patient will complete grooming with set up with use of ae/ad/dme prn             LTG- patient will complete UB dressing with set up  with use of ae/ad/dme prn (Progressing)       Start:  08/28/24    Expected End:  09/06/24                LTG- patient will complete toilet/commode transfers with CGA with use of ae/ad/dme prn (Progressing)       Start:  08/28/24    Expected End:  09/06/24                LTG- patient will complete grooming with set up with use of ae/ad/dme prn (Progressing)       Start:  08/28/24    Expected End:  09/06/24                STG- patient will complete bathing with Mod A with use of ae/ad/dme prn (Progressing)       Start:  08/28/24    Expected End:  09/06/24                STG- patient will complete LB dressing with Mod A with use of ae/ad/dme prn (Progressing)       Start:  08/28/24    Expected End:  09/06/24                STG- patient will complete toileting with Mod A with use of ae/ad/dme prn (Progressing)       Start:  08/28/24    Expected End:  09/06/24

## 2024-08-30 NOTE — PROGRESS NOTES
Infectious disease progress note  Subjective   Abdominoplasty with wound infection (group B strep)     Antibiotics  Invanz end of therapy 9-    Objective   Range of Vitals (last 24 hours)  Heart Rate:  []   Temp:  [36.1 °C (97 °F)]   Resp:  [18]   BP: ()/(52-84)   SpO2:  [98 %-100 %]   Daily Weight  08/26/24 : 72.2 kg (159 lb 2.8 oz)    Body mass index is 27.31 kg/m².      Physical Exam  Patient in physical therapy doing well and progressing  Wound VAC in place      Relevant Results  Labs  Lab Results   Component Value Date    WBC 9.0 08/27/2024    HGB 8.4 (L) 08/27/2024    HCT 26.6 (L) 08/27/2024    MCV 95 08/27/2024     08/27/2024     Lab Results   Component Value Date    GLUCOSE 108 (H) 08/30/2024    GLUCOSE 108 (H) 08/30/2024    CALCIUM 8.3 (L) 08/30/2024    CALCIUM 8.3 (L) 08/30/2024     (L) 08/30/2024     (L) 08/30/2024    K 4.4 08/30/2024    K 4.4 08/30/2024    CO2 21 08/30/2024    CO2 21 08/30/2024     (H) 08/30/2024     (H) 08/30/2024    BUN 31 (H) 08/30/2024    BUN 31 (H) 08/30/2024    CREATININE 1.76 (H) 08/30/2024    CREATININE 1.76 (H) 08/30/2024   ESR: --  Lab Results   Component Value Date    SEDRATE 16 08/24/2024     Lab Results   Component Value Date    CRP 1.59 (H) 08/24/2024     Lab Results   Component Value Date    ALT 18 08/09/2024    AST 25 08/09/2024    ALKPHOS 136 08/09/2024    BILITOT 0.7 08/09/2024       Microbiology  8-8-2024 wound culture group B strep  8-8-2024 blood culture no growth at 4 days     Imaging  8-8-2024 CT abdomen pelvis scattered subcutaneous air/gas in the deep subcutaneous tissue along the abdomen.  Surgical drains were in good.  Position      Assessment/Plan   1.  Continue Invanz through September 19, 2024.  The dose was adjusted to her creatinine clearance by pharmacy    Other issues  RUSSELL  Hyperlipidemia  GERD    I reviewed and interpreted all lab test imaging studies and documentations from other healthcare providers  I  am monitoring for antibiotic side effects and toxicity     Antonia Ying MD

## 2024-08-30 NOTE — CARE PLAN
The patient's goals for the shift include      The clinical goals for the shift include patient will receive adequate rest this shift      Problem: Skin  Goal: Decreased wound size/increased tissue granulation at next dressing change  Outcome: Progressing  Flowsheets (Taken 8/29/2024 0413 by Mary Kay Lucas RN)  Decreased wound size/increased tissue granulation at next dressing change: Promote sleep for wound healing  Goal: Participates in plan/prevention/treatment measures  Outcome: Progressing  Flowsheets (Taken 8/29/2024 2310)  Participates in plan/prevention/treatment measures: Elevate heels  Goal: Prevent/manage excess moisture  Outcome: Progressing  Flowsheets (Taken 8/29/2024 2310)  Prevent/manage excess moisture: Moisturize dry skin  Goal: Prevent/minimize sheer/friction injuries  Outcome: Progressing  Flowsheets (Taken 8/29/2024 2310)  Prevent/minimize sheer/friction injuries: Turn/reposition every 2 hours/use positioning/transfer devices  Goal: Promote/optimize nutrition  Outcome: Progressing  Flowsheets (Taken 8/29/2024 2310)  Promote/optimize nutrition: Monitor/record intake including meals  Goal: Promote skin healing  Outcome: Progressing  Flowsheets (Taken 8/29/2024 2310)  Promote skin healing: Turn/reposition every 2 hours/use positioning/transfer devices     Problem: Fall/Injury  Goal: Not fall by end of shift  Outcome: Progressing  Goal: Be free from injury by end of the shift  Outcome: Progressing  Goal: Verbalize understanding of personal risk factors for fall in the hospital  Outcome: Progressing  Goal: Verbalize understanding of risk factor reduction measures to prevent injury from fall in the home  Outcome: Progressing  Goal: Use assistive devices by end of the shift  Outcome: Progressing  Goal: Pace activities to prevent fatigue by end of the shift  Outcome: Progressing     Problem: Pain  Goal: Takes deep breaths with improved pain control throughout the shift  Outcome: Progressing  Goal: Turns  in bed with improved pain control throughout the shift  Outcome: Progressing  Goal: Walks with improved pain control throughout the shift  Outcome: Progressing  Goal: Performs ADL's with improved pain control throughout shift  Outcome: Progressing  Goal: Participates in PT with improved pain control throughout the shift  Outcome: Progressing  Goal: Free from opioid side effects throughout the shift  Outcome: Progressing  Goal: Free from acute confusion related to pain meds throughout the shift  Outcome: Progressing     Problem: Wound Care  Goal: Area will decrease in size .2x.2 cm per week  Outcome: Progressing

## 2024-08-30 NOTE — PROGRESS NOTES
Physical Therapy    Physical Therapy Treatment    Patient Name: Mariann Drew  MRN: [id number]  Today's Date: 8/30/2024  Time Calculation  Start Time: 1345  Stop Time: 1430  Time Calculation (min): 45 min         Assessment/Plan   PT Assessment  End of Session Communication: Bedside nurse  End of Session Patient Position: Bed, 2 rail up, Alarm on (call light and needs within reach.)     PT Plan  Treatment/Interventions: Bed mobility, Transfer training, Gait training, Stair training, Balance training, Strengthening, Endurance training, Range of motion, Therapeutic exercise, Therapeutic activity, Home exercise program, Positioning, Postural re-education  PT Plan: Ongoing PT  PT Frequency: 5 times per week (6 times PRN)  PT Discharge Recommendations:  (Anticipate pt to require intermittent min A at walker level at time of discharge.)  Equipment Recommended upon Discharge: Wheeled walker  PT Recommended Transfer Status: Assist x2 (at time of initial evaluation)      General Visit Information:   PT  Visit  PT Received On: 08/30/24  General  Patient Position Received: Alarm on, Up in chair  General Comment: Patient pleasant and agreeable to therapy.    Subjective   Precautions:  Precautions  Medical Precautions: Fall precautions  Post-Surgical Precautions: Abdominal surgery precautions  Precautions Comment: Wound vac and port/IV    Objective   Pain:  Pain Assessment  Pain Assessment: 0-10  0-10 (Numeric) Pain Score: 3  Pain Location: Leg  Pain Orientation: Right, Left  Pain Interventions: Distraction, Repositioned, Emotional support  Treatments:  Therapeutic Exercise  Therapeutic Exercise Performed: Yes  Patient performed seated therex, B LE: APs, hip flexion, LAQ, heel slides, and hip abd/add all [id number]. Done to increase B LE strength to improve overall functional mobility and independence     Therapeutic Activity  Therapeutic Activity Performed: Yes  Patient performed blocked practice sit<>stand [id number] with Fortino x1 and  "FWW for support. Done to increase strength in B LE and improve sequencing and independence with sit<>stand transfer.      Bed Mobility  Bed Mobility: Yes  Patient performed sit>sup transfer with ModA x1. B LE assist. Cues for sequencing and control.      Ambulation/Gait Training  Ambulation/Gait Training Performed: Yes  Patient ambulated ~50ft x2 trials with FWW and Fortino x1 + assist of another for WC follow/managing equipment. Patient demo'd slow step through gait with decreased step length and increased time in double stance. Prolonged seated rest to recover.     Transfers  Transfer: Yes  Patient performed sit<>stand and stand pivot transfer (WC>EOB) with Fortino x1 and FWW for support.     Stairs  Stairs: Yes  Patient performed 2-6\" steps up/down with B HR with initial Fortino x2 however, increased to ModA x2 to decsend. Cues to advance hands on handrail and for proper sequencing.       Education Comments  Educated patient on proper sequencing and technique with stair training.         OP EDUCATION:       Encounter Problems       Encounter Problems (Active)       PT Problem       LTG - Pt will perform bed mobility with mod A x 1  (Progressing)       Start:  08/23/24    Expected End:  09/06/24            LTG - Pt will perform transfers with min A x 1.  (Progressing)       Start:  08/23/24    Expected End:  09/06/24            LTG - Pt will amb 150 feet with FWW and CGA.   (Progressing)       Start:  08/23/24    Expected End:  09/06/24            LTG - Pt will up/down 2 stairs with 1 HR and straight cane with min A x 1.  (Progressing)       Start:  08/23/24    Expected End:  09/06/24            STG - Pt will perform bed mobility with mod assist x 2 (Progressing)       Start:  08/23/24    Expected End:  08/30/24            STG - Pt will perform transfers with mod assist x 1 (Met)       Start:  08/23/24    Expected End:  08/30/24    Resolved:  08/28/24         STG - Pt will amb 50 feet with FWW and mod/min assist x 1 " (Progressing)       Start:  08/23/24    Expected End:  08/30/24            STG - Pt will up/down curb step with FWW and mod A x 2.  (Progressing)       Start:  08/23/24    Expected End:  08/30/24

## 2024-08-30 NOTE — PROGRESS NOTES
Occupational Therapy    OT Treatment    Patient Name: Mariann Drew  MRN: [id number]  Today's Date: 8/30/2024  Time Calculation  Start Time: 0745  Stop Time: 0830  Time Calculation (min): 45 min        Assessment:  End of Session Communication: Bedside nurse  End of Session Patient Position: Up in chair, Alarm on     Plan:  Treatment Interventions: ADL retraining, Functional transfer training, UE strengthening/ROM, Endurance training, Equipment evaluation/education, Patient/family training, Compensatory technique education, Fine motor coordination activities  OT Frequency: 5 times per week (6 days/PRN)  Treatment Interventions: ADL retraining, Functional transfer training, UE strengthening/ROM, Endurance training, Equipment evaluation/education, Patient/family training, Compensatory technique education, Fine motor coordination activities    Subjective   Previous Visit Info:  OT Last Visit  OT Received On: 08/30/24  General:  General  Patient Position Received: Up in chair, Alarm on  General Comment:  (reports that she feels dizzy, fatigued and confused.)  Precautions:  Medical Precautions: Fall precautions  Post-Surgical Precautions: Abdominal surgery precautions  Precautions Comment: Wound vac and port/IV            Pain:  Pain Assessment  Pain Assessment: 0-10  0-10 (Numeric) Pain Score: 0 - No pain  Pain Type: Surgical pain  Pain Location: Abdomen  Pain Interventions: Rest, Emotional support  Response to Interventions: reports rest helps with pain management    Objective       Activities of Daily Living: Grooming  Grooming Level of Assistance:  (sba)  Grooming Where Assessed: Sitting sink side  UE Bathing  UE Bathing Level of Assistance:  (sba)  UE Bathing Where Assessed: Sitting sink side  UE Dressing  UE Dressing Level of Assistance: Minimum assistance  UE Dressing Comments: Patient requires assistance to thread head and arms into pull over dress  LE Dressing  LE Dressing Comments: patient unable to wear LE  garments with elastic or waist bands secondary abdominal surgery    Bed Mobility/Transfers: Transfers  Transfer:  (sit to stand transfers) minimal assistance   EDUCATION:  Education  Individual(s) Educated: Patient  Education Provided: Fall precautions  Risk and Benefits Discussed with Patient/Caregiver/Other: yes  Patient/Caregiver Demonstrated Understanding: yes  Plan of Care Discussed and Agreed Upon: yes  Patient Response to Education: Patient/Caregiver Verbalized Understanding of Information    Goals:  Encounter Problems       Encounter Problems (Active)       OT Problem       STG- patient will complete grooming with MIN A with use of ae/ad/dme prn (Met)       Start:  08/23/24    Expected End:  08/30/24    Resolved:  08/28/24    Updated to: STG- patient will complete grooming with  Set up with use of ae/ad/dme prn         STG- patient will complete bathing with MAX A with use of ae/ad/dme prn (Met)       Start:  08/23/24    Expected End:  09/06/24    Resolved:  08/28/24    Updated to: STG- patient will complete bathing with Mod A with use of ae/ad/dme prn         STG- patient will complete UB dressing with MOD A with use of ae/ad/dme prn (Met)       Start:  08/23/24    Expected End:  08/30/24    Resolved:  08/28/24    Updated to: STG- patient will complete UB dressing with SBA with use of ae/ad/dme prn         STG- patient will complete LB dressing with MAX A with use of ae/ad/dme prn (Met)       Start:  08/23/24    Expected End:  09/06/24    Resolved:  08/28/24    Updated to: STG- patient will complete LB dressing with Mod A with use of ae/ad/dme prn         STG- patient will complete toileting with MAX A with use of ae/ad/dme prn  (Met)       Start:  08/23/24    Expected End:  09/06/24    Resolved:  08/28/24    Updated to: STG- patient will complete toileting with Mod A with use of ae/ad/dme prn         STG- patient will complete toilet/commode transfers with MAX A with use of ae/ad/dme prn (Met)       Start:   08/23/24    Expected End:  08/30/24    Resolved:  08/28/24    Updated to: STG- patient will complete toilet/commode transfers with  CGA with use of ae/ad/dme prn         STG- patient will complete tub/shower transfers with MAX A with use of ae/ad/dme prn (Progressing)       Start:  08/23/24    Expected End:  09/06/24            STG- patient will complete simple mobility with MOD A with use of ae/ad/dme prn (Met)       Start:  08/23/24    Expected End:  08/30/24    Resolved:  08/28/24    Updated to: STG- patient will complete simple mobility with CGA with use of ae/ad/dme prn         LTG- patient will complete grooming with CGA with use of ae/ad/dme prn (Met)       Start:  08/23/24    Expected End:  09/06/24    Resolved:  08/28/24    Updated to: LTG- patient will complete grooming with supervision with use of ae/ad/dme prn         LTG- patient will complete bathing with MOD A with use of ae/ad/dme prn (Progressing)       Start:  08/23/24    Expected End:  09/06/24            LTG- patient will complete UB dressing with Min A  with use of ae/ad/dme prn (Met)       Start:  08/23/24    Expected End:  09/06/24    Resolved:  08/28/24    Updated to: LTG- patient will complete UB dressing with set up  with use of ae/ad/dme prn         LTG- patient will complete LB dressing with MOD A with use of ae/ad/dme prn (Progressing)       Start:  08/23/24    Expected End:  09/06/24            LTG- patient will complete toileting with MOD A with use of ae/ad/dme prn  (Progressing)       Start:  08/23/24    Expected End:  09/06/24            LTG- patient will complete toilet/commode transfers with Mod A with use of ae/ad/dme prn (Met)       Start:  08/23/24    Expected End:  09/06/24    Resolved:  08/28/24    Updated to: LTG- patient will complete toilet/commode transfers with CGA with use of ae/ad/dme prn         LTG- patient will complete tub/shower transfers with MOD A with use of ae/ad/dme prn (Progressing)       Start:  08/23/24     Expected End:  09/06/24            LTG- patient will complete simple mobility with MIN A with use of ae/ad/dme prn (Progressing)       Start:  08/23/24    Expected End:  09/06/24            STG- patient will complete grooming with  Set up with use of ae/ad/dme prn (Progressing)       Start:  08/28/24    Expected End:  09/06/24                STG- patient will complete UB dressing with SBA with use of ae/ad/dme prn (Progressing)       Start:  08/28/24    Expected End:  09/06/24                STG- patient will complete toilet/commode transfers with  CGA with use of ae/ad/dme prn (Progressing)       Start:  08/28/24    Expected End:  09/06/24                STG- patient will complete simple mobility with CGA with use of ae/ad/dme prn (Progressing)       Start:  08/28/24    Expected End:  09/06/24                LTG- patient will complete grooming with supervision with use of ae/ad/dme prn (Met)       Start:  08/28/24    Expected End:  09/06/24    Resolved:  08/28/24    Updated to: LTG- patient will complete grooming with set up with use of ae/ad/dme prn             LTG- patient will complete UB dressing with set up  with use of ae/ad/dme prn (Progressing)       Start:  08/28/24    Expected End:  09/06/24                LTG- patient will complete toilet/commode transfers with CGA with use of ae/ad/dme prn (Progressing)       Start:  08/28/24    Expected End:  09/06/24                LTG- patient will complete grooming with set up with use of ae/ad/dme prn (Progressing)       Start:  08/28/24    Expected End:  09/06/24                STG- patient will complete bathing with Mod A with use of ae/ad/dme prn (Progressing)       Start:  08/28/24    Expected End:  09/06/24                STG- patient will complete LB dressing with Mod A with use of ae/ad/dme prn (Progressing)       Start:  08/28/24    Expected End:  09/06/24                STG- patient will complete toileting with Mod A with use of ae/ad/dme prn  (Progressing)       Start:  08/28/24    Expected End:  09/06/24

## 2024-08-30 NOTE — PROGRESS NOTES
NEPHROLOGY PROGRESS NOTE    REASON FOR CONSULT: RUSSELL on CKD stage III    SUBJECTIVE:  Patient is participating in therapy.  She has the wound VAC at the abdominoplasty site.  She is on the ertapenem.  She has been having some diarrhea had it this morning.  Appetite is slowly getting better.  No trouble breathing.    OBJECTIVE:    Visit Vitals  /84 (BP Location: Right arm, Patient Position: Lying)   Pulse 85   Temp 36.1 °C (97 °F) (Temporal)   Resp 18          Intake/Output Summary (Last 24 hours) at 8/30/2024 1157  Last data filed at 8/30/2024 0312  Gross per 24 hour   Intake 350 ml   Output 500 ml   Net -150 ml        General: Awake and Alert, In no distress, Cooperative  HEENT: Oral mucosa moist, EOMI  NECK: Supple  CHEST: No crackles, no wheeze, no tachypnea  CVS: S1,S2 heard, no rubs, RRR  ABD: Soft, Non Tender, BS present wound VAC noted,  EXT:  Mild bilateral lower extremity edema    MEDICATION:    Scheduled medications  allopurinol, 400 mg, oral, Daily  deutetrabenazine, 24 mg, oral, Daily  deutetrabenazine, 6 mg, oral, Daily  dexAMETHasone, 0.5 mg, oral, 4x daily  DULoxetine, 60 mg, oral, BID  enoxaparin, 40 mg, subcutaneous, q24h  ertapenem, 500 mg, intravenous, q24h  famotidine, 20 mg, oral, q AM  ferrous sulfate (325 mg ferrous sulfate), 65 mg of iron, oral, BID  fludrocortisone, 0.1 mg, oral, Daily  lamoTRIgine, 200 mg, oral, q AM  latanoprost, 1 drop, Both Eyes, Nightly  lotilaner, 1 drop, Both Eyes, Nightly  nystatin, , Topical, BID  semaglutide, 0.5 mg, subcutaneous, Every Sunday  sennosides, 1 tablet, oral, Nightly  sucralfate, 1 g, oral, Nightly  traZODone, 150 mg, oral, Nightly      Continuous medications     PRN medications  PRN medications: acetaminophen **OR** acetaminophen, alum-mag hydroxide-simeth, bisacodyl, bisacodyl, cyclobenzaprine, lidocaine, melatonin, naratriptan     RESULTS:    Lab Results   Component Value Date    WBC 9.0 08/27/2024    HGB 8.4 (L) 08/27/2024    HCT 26.6 (L)  08/27/2024    MCV 95 08/27/2024     08/27/2024        Lab Results   Component Value Date    CREATININE 1.76 (H) 08/30/2024    CREATININE 1.76 (H) 08/30/2024    BUN 31 (H) 08/30/2024    BUN 31 (H) 08/30/2024     (L) 08/30/2024     (L) 08/30/2024    K 4.4 08/30/2024    K 4.4 08/30/2024     (H) 08/30/2024     (H) 08/30/2024    CO2 21 08/30/2024    CO2 21 08/30/2024        Radiology Imaging reviewed      ASSESSMENT/PLAN:     1.  RUSSELL: Patient's creatinine currently around 1.76.  Baseline creatinine is between 1.1-1.5.  Likely antibiotic mediated ATN.  Currently on ertapenem but previously was on Zosyn Vanco combo.  Blood pressure is stable.       2.  CKD stage III: Unremarkable renal imaging.  No proteinuria.  Status post Gastric bypass surgery    3.  Hyponatremia: Mild and stable.  Serum sodium at 133.    4.  Anemia: Multifactorial.  Hemoglobin at 8.4.    Thank You very much for allowing me to participate in the care of this Patient    This document was created using dragon dictation and may contain unintended error    Shree Lopez MD   08/30/24

## 2024-08-30 NOTE — CONSULTS
Inpatient consult to Neurology  Consult performed by: Olivia Herrera MD  Consult ordered by: Afshan Faustin MD        History Of Present Illness  Mariann Drew is a 68 y.o. female admitted to rehab with a wound infection following an abdominoplasty for gastric bypass surgery.  Neurology consulted for confusion. She does have a history of anemia, GERD, obstructive sleep apnea, breast cancer with left mastectomy, gout, and tardive dyskinesia.  Her gastric bypass surgery was on July 30 and she developed fevers and purulent drainage from her BÁRBARA drains roughly 2 to 3 days after surgery.  She was admitted to [facility] and has been on IV antibiotics.  She did have debridement but the wound was unable to be closed and she is on a wound VAC.  She feels well, sleeping okay.  She got dizzy at times.  She walked stairs with PT but had pain in abdomen during this.  On Florinef for low BP which stabilized.   On steroid as well.   Past Medical History  Past Medical History:   Diagnosis Date    Anxiety     Arthritis     Cervicalgia 02/13/2017    Neck pain, chronic    Depression     Dysphagia, unspecified 02/23/2021    Dysphagia    Encounter for immunization 12/02/2022    Encounter for immunization    Epidermal cyst 02/04/2014    Epidermal inclusion cyst    Fatty (change of) liver, not elsewhere classified 02/01/2014    Fatty liver    GERD (gastroesophageal reflux disease)     Glaucoma     Liver disease, unspecified     Liver disease, chronic    Localized swelling, mass and lump, unspecified 02/23/2021    Subcutaneous nodules    Long term (current) use of opiate analgesic 02/20/2018    Opiate analgesic use agreement exists    Long term (current) use of opiate analgesic 02/20/2018    Long term current use of opiate analgesic    Nonalcoholic steatohepatitis (HAZEL)     Steatohepatitis, nonalcoholic    Other instability, right knee 05/17/2018    Instability of right knee joint    Other specified abnormal  findings of blood chemistry     Elevated LFTs    Other specified symptoms and signs involving the circulatory and respiratory systems 12/01/2015    Globus pharyngeus    Pain in right hand 03/27/2017    Pain of right hand    Pain in right knee 06/21/2018    Acute pain of right knee    Pain in throat 12/01/2015    Throat pain in adult    Pain in unspecified knee 06/28/2017    Knee pain    Pain in unspecified shoulder 03/23/2017    Shoulder pain    Personal history of diseases of the skin and subcutaneous tissue 06/20/2018    History of acne    Personal history of diseases of the skin and subcutaneous tissue 06/02/2014    History of dermatitis    Personal history of malignant neoplasm of breast     Personal history of malignant neoplasm of breast    Personal history of other diseases of the digestive system 10/13/2021    History of gastroesophageal reflux (GERD)    Personal history of other diseases of the musculoskeletal system and connective tissue     Personal history of juvenile rheumatoid arthritis    Personal history of other diseases of the nervous system and sense organs 10/13/2021    History of sleep apnea    Personal history of other diseases of the respiratory system 01/14/2014    Personal history of asthma    Personal history of other endocrine, nutritional and metabolic disease     History of hyperlipidemia    Personal history of other infectious and parasitic diseases 11/24/2014    History of herpes zoster    Personal history of other specified conditions     History of dysuria    Personal history of peptic ulcer disease 01/14/2014    History of peptic ulcer    Personal history of pneumonia (recurrent) 01/14/2014    History of pneumonia    Personal history of traumatic brain injury 02/23/2021    History of concussion    Polyp of stomach and duodenum 01/14/2014    Gastric polyps    Polyp of stomach and duodenum 01/14/2014    Polyp of duodenum    Repeated falls 11/09/2015    Frequent falls    Secondary and  unspecified malignant neoplasm of lymph node, unspecified (Multi)     Metastasis to lymph nodes    Sleep apnea     Tardive dyskinesia     Type 2 diabetes mellitus (Multi)     Urinary tract infection, site not specified 2019    Acute UTI    Urinary tract infection, site not specified 2020    Acute UTI     Surgical History  Past Surgical History:   Procedure Laterality Date    CARPAL TUNNEL RELEASE  2014    Neuroplasty Decompression Median Nerve At Carpal Tunnel    CATARACT EXTRACTION      CHOLECYSTECTOMY  2014    Cholecystectomy    COLONOSCOPY  2014    Colonoscopy (Fiberoptic)    CT ANGIO NECK  2023    CT NECK ANGIO W AND WO IV CONTRAST 2023 PAR CT    CT HEAD ANGIO W AND WO IV CONTRAST  2023    CT HEAD ANGIO W AND WO IV CONTRAST 2023 PAR CT    HYSTERECTOMY  10/13/2021    Hysterectomy    LIVER BIOPSY      OTHER SURGICAL HISTORY  2013    Breast Surgery Reconstruction    OTHER SURGICAL HISTORY  2013    Modified Radical Mastectomy Left Breast    OTHER SURGICAL HISTORY  2013    Laparosc Gastric Restrictive Proc By Adjustable Gastric Band    OTHER SURGICAL HISTORY  2014    Breast Surgery Modified Radical Mastectomy    OTHER SURGICAL HISTORY  10/13/2021    Esophageal Dilation    OTHER SURGICAL HISTORY      Excision Of Lesion Face Benign 2.1 To 3cm    TOTAL KNEE ARTHROPLASTY  2014    Knee Replacement     Social History  Social History     Tobacco Use    Smoking status: Former     Current packs/day: 0.00     Types: Cigarettes     Quit date: 1973     Years since quittin.6    Smokeless tobacco: Never    Tobacco comments:     Only smoked for 2mon; 2 cigs a day   Vaping Use    Vaping status: Never Used   Substance Use Topics    Alcohol use: Not Currently     Comment: once a month 1 glass of wine    Drug use: Yes     Types: Marijuana     Comment: 1 gummie bear per night     Allergies  Penicillins, Cefepime, Adhesive tape-silicones, Avelox  [moxifloxacin], Bactrim [sulfamethoxazole-trimethoprim], Cephalexin, Codeine, Erythromycin, Hydroxychloroquine, Ibuprofen, Nsaids (non-steroidal anti-inflammatory drug), Other, Percocet [oxycodone-acetaminophen], Percodan [oxycodone-aspirin], Phenergan [promethazine], Toradol [ketorolac], Tramadol, Trintellix [vortioxetine], [id number] antimigraine agents, Adhesive, and Macrolide antibiotics  Medications Prior to Admission   Medication Sig Dispense Refill Last Dose    allopurinol (Zyloprim) 100 mg tablet Take 1 tablet (100 mg) by mouth once daily.       allopurinol (Zyloprim) 300 mg tablet TAKE ONE TABLET ( 300 MG ) IN ADDITION TO ONE TABLET ( 100 MG ) DAILY ( TOTAL  MG DAILY ) 90 tablet 3     cyclobenzaprine (Flexeril) 5 mg tablet Take 1 tablet (5 mg) by mouth 3 times a day as needed for muscle spasms. 30 tablet 0     deutetrabenazine (Austedo XR) 24 mg tablet extended release 24 hr Take 1 tablet (24 mg) by mouth once daily. Take 1-6mg tablet and 1-24mg tablet for a total dose of 30mg daily. 90 tablet 3     deutetrabenazine (Austedo XR) 6 mg tablet extended release 24 hr Take 1 tablet (6 mg) by mouth once daily. Take 1-6mg tablet and 1-24mg tablet for a total dose of 30mg daily. 90 tablet 3     dexAMETHasone 0.5 mg/5 mL oral liquid Take 5 mL (0.5 mg) by mouth 4 times a day for 10 days. 237 mL 1     DULoxetine (Cymbalta) 60 mg DR capsule Take 1 capsule (60 mg) by mouth 2 times a day. Do not crush or chew.       [] ertapenem (INVanz) 1 gram injection Infuse 1 g into a venous catheter once daily for 13 days. Obtain weekly labs: BMP and CBC. Fax to Dr. Ying at [phone]. 13 g 0     ertapenem (INVanz) 1 gram injection Infuse 1 g into a venous catheter once daily for 28 days. Obtain weekly labs: BMP, CBC, Sed rate, and CRP. Fax to Dr. Ying at [phone]. 28 g 0     famotidine (Pepcid) 20 mg tablet Take 1 tablet (20 mg) by mouth once daily in the morning.       ferrous sulfate (FEOSOL ORAL)  Take 1 tablet by mouth once daily in the evening. 200 (65 Fe) MG       HYDROcodone-acetaminophen (Norco) 5-325 mg tablet Take 1 tablet by mouth every 6 hours if needed for severe pain (7 - 10). 20 tablet 0     lamoTRIgine (LaMICtal) 200 mg tablet Take 1 tablet (200 mg) by mouth once daily in the morning.       latanoprost (Xelpros) 0.005 % drops, emulsion Administer into affected eye(s) once daily.       lidocaine (Lidocaine Viscous) 2 % solution Take by mouth.       multivitamin tablet Take 1 tablet by mouth once daily.       naratriptan (Amerge) 2.5 mg tablet Take 1 tablet (2.5 mg) by mouth 1 time if needed for migraine. May repeat once in 4hrs if headache recurs 9 tablet 1     Nyamyc 100,000 unit/gram powder APPLY TO AFFECTED AREA TWICE A DAY 30 g 2     semaglutide (Ozempic) 0.25 mg or 0.5 mg (2 mg/3 mL) pen injector INJECT 0.5 MG UNDER THE SKIN 1 (ONE) TIME PER WEEK. (Patient taking differently: Inject 0.5 mg under the skin 1 (one) time per week. Every Tuesdays) 3 mL 3     sucralfate (Carafate) 1 gram tablet Take 1 tablet (1 g) by mouth once daily at bedtime.       traZODone (Desyrel) 100 mg tablet Take 1.5 tablets (150 mg) by mouth once daily at bedtime.       Xdemvy 0.25 % drops Administer 1 drop into both eyes once daily at bedtime.          Review of Systems  Exam:   Appearance:  no acute distress, cooperative.  HEENT: normocephalic /atraumatic.  Cardiovascular/Lungs/Abdomen: No carotid bruits to auscultation bilaterally, heart is regular in rate and rhythm.  Extremities/Skin: abdominal wound vac    NEUROLOGICAL EXAMINATION:    Mental status:  alert and oriented to person, place, date and situation.  remote memory and long term memory intact, fund of knowledge intact, attention and concentration intact.  No dysarthria. No signs of aphasia.     Cranial nerves:    II/III: Fundoscopic examination attempted at bedside, limited. Visual fields are full. Pupils are 2 mm and reactive bilaterally.  III/IV/VI:  "Extraocular movements are full with no nystagmus.   V: Facial sensation is intact to light touch.  VII: Face is symmetric.  VIII: hearing is intact bilaterally.  IX/X: Palate elevates symmetrically to phonation.  XI: Sternocleidomastoid is MRC 5/5 to strength testing.  XII: Tongue is midline.    Motor exam: Strength is MRC 5/5 throughout ext.     Sensory exam: Sensation is intact to light touch throughout.    Reflexes: Reflexes are 2+ and symmetric. Bilateral plantar responses are flexor.    Coordination: intact in both ext      Last Recorded Vitals  Blood pressure 139/84, pulse 85, temperature 36.1 °C (97 °F), temperature source Temporal, resp. rate 18, height 1.626 m (5' 4.02\"), weight 72.2 kg (159 lb 2.8 oz), SpO2 98%.    Relevant Results  Scheduled medications  allopurinol, 400 mg, oral, Daily  deutetrabenazine, 24 mg, oral, Daily  deutetrabenazine, 6 mg, oral, Daily  dexAMETHasone, 0.5 mg, oral, 4x daily  DULoxetine, 60 mg, oral, BID  enoxaparin, 40 mg, subcutaneous, q24h  ertapenem, 500 mg, intravenous, q24h  famotidine, 20 mg, oral, q AM  ferrous sulfate (325 mg ferrous sulfate), 65 mg of iron, oral, BID  fludrocortisone, 0.1 mg, oral, Daily  lamoTRIgine, 200 mg, oral, q AM  latanoprost, 1 drop, Both Eyes, Nightly  lotilaner, 1 drop, Both Eyes, Nightly  nystatin, , Topical, BID  semaglutide, 0.5 mg, subcutaneous, Every Sunday  sennosides, 1 tablet, oral, Nightly  sucralfate, 1 g, oral, Nightly  traZODone, 150 mg, oral, Nightly      Continuous medications     PRN medications  PRN medications: acetaminophen **OR** acetaminophen, alum-mag hydroxide-simeth, bisacodyl, bisacodyl, cyclobenzaprine, lidocaine, melatonin, naratriptan  Results for orders placed or performed during the hospital encounter of 08/23/24 (from the past 24 hour(s))   Renal Function Panel   Result Value Ref Range    Glucose 108 (H) 74 - 99 mg/dL    Sodium 133 (L) 136 - 145 mmol/L    Potassium 4.4 3.5 - 5.3 mmol/L    Chloride 108 (H) 98 - 107 " mmol/L    Bicarbonate 21 21 - 32 mmol/L    Anion Gap 8 (L) 10 - 20 mmol/L    Urea Nitrogen 31 (H) 6 - 23 mg/dL    Creatinine 1.76 (H) 0.50 - 1.05 mg/dL    eGFR 31 (L) >60 mL/min/1.73m*2    Calcium 8.3 (L) 8.6 - 10.3 mg/dL    Phosphorus 3.3 2.5 - 4.9 mg/dL    Albumin 2.5 (L) 3.4 - 5.0 g/dL   Basic metabolic panel   Result Value Ref Range    Glucose 108 (H) 74 - 99 mg/dL    Sodium 133 (L) 136 - 145 mmol/L    Potassium 4.4 3.5 - 5.3 mmol/L    Chloride 108 (H) 98 - 107 mmol/L    Bicarbonate 21 21 - 32 mmol/L    Anion Gap 8 (L) 10 - 20 mmol/L    Urea Nitrogen 31 (H) 6 - 23 mg/dL    Creatinine 1.76 (H) 0.50 - 1.05 mg/dL    eGFR 31 (L) >60 mL/min/1.73m*2    Calcium 8.3 (L) 8.6 - 10.3 mg/dL                    [place]amaria Coma Scale  Best Eye Response: Spontaneous  Best Verbal Response: Oriented  Best Motor Response: Follows commands  Sims Coma Scale Score: 15                 I have personally reviewed the following imaging results Lower extremity venous duplex bilateral    Result Date: 8/26/2024           [place] [address] Tel [phone] and Fax [phone]  Vascular Lab Report [facility] US LOWER EXTREMITY VENOUS DUPLEX BILATERAL  Patient Name:      JOSÉ LUIS Pablo Physician:  [id number] Tyler Velásquez MD Study Date:        8/26/2024             Ordering Physician: [id number] EDYTA CARBALLO MRN/PID:           [id number]              Technologist:       Princess Mendez RVNETTIE Accession#:        [id number]          Technologist 2: Date of Birth/Age: 1955 / 68 years Encounter#:         [id number] Gender:            F Admission Status:  Inpatient             Location Performed: [facility]  Diagnosis/ICD: Other specified soft tissue disorders-M79.89 Indication:    Limb swelling CPT Codes:      47477 Peripheral venous duplex scan for DVT complete  CONCLUSIONS: Right Lower Venous: No evidence of acute deep vein thrombus visualized in the right lower extremity. Cannot rule out thrombus in non-visualized posterior tibial and Peroneal veins due to edema. Left Lower Venous: No evidence of acute deep vein thrombus visualized in the left lower extremity. Cannot rule out thrombus in non-visualized peroneal vein due to edema.  Imaging & Doppler Findings:  Right                 Compressible Thrombus        Flow Distal External Iliac     Yes        None   Spontaneous/Phasic CFV                       Yes        None   Spontaneous/Phasic PFV                       Yes        None FV Proximal               Yes        None   Spontaneous/Phasic FV Mid                    Yes        None FV Distal                 Yes        None Popliteal                 Yes        None   Spontaneous/Phasic  Left                  Compress Thrombus        Flow Distal External Iliac   Yes      None   Spontaneous/Phasic CFV                     Yes      None   Spontaneous/Phasic PFV                     Yes      None FV Proximal             Yes      None   Spontaneous/Phasic FV Mid                  Yes      None FV Distal               Yes      None Popliteal               Yes      None   Spontaneous/Phasic PTV                     Yes      None  [id number] Tyler Velásquez MD Electronically signed by [id number] Tyler Velásquez MD on 8/26/2024 at 5:11:48 PM  ** Final **     CT head wo IV contrast    Result Date: 8/26/2024  Interpreted By:  Nunu Garcia, STUDY: CT HEAD WO IV CONTRAST; ;  8/26/2024 12:38 pm   INDICATION: Signs/Symptoms:confusion.   COMPARISON: 07/07/2023   ACCESSION NUMBER(S): [id number]   ORDERING CLINICIAN: EDYTA CARBALLO   TECHNIQUE: Serial axial images of the head were obtained without intravenous contrast. Sagittal and coronal reconstructions were generated.   FINDINGS: The ventricles are midline and normal in size.   There are no  "acute parenchymal abnormalities.   There is no hemorrhage or extra-axial fluid.   There is no obvious scalp hematoma or skull fracture.   Paranasal sinuses and mastoids are unremarkable. The patient appears to be status post bilateral cataract surgery.   COMPARISON OF FINDINGS: The brain is similar.       No acute intracranial abnormality     MACRO: none   Signed by: Nunu Garcia 8/26/2024 12:52 PM Dictation workstation:   [id number]    IR CVC tunneled    Result Date: 8/19/2024  Interpreted By:  Brandon Blackman, STUDY: IR CVC TUNNELED;  8/16/2024 10:20 am   INDICATION: Signs/Symptoms:tunnelled picc.   COMPARISON: None.   ACCESSION NUMBER(S): [id number]   ORDERING CLINICIAN: EDMOND DYE   TECHNIQUE: INTERVENTIONALIST(S): Brandon Blackman MD   The history and physical exam pertinent to the procedure were reviewed and no updates were made.\"   CONSENT: The patient/patient's POA/next of kin was informed of the nature of the proposed procedure. The purposes, alternatives, risks, and benefits were explained and discussed. All questions were answered and consent was obtained.   RADIATION EXPOSURE: Fluoroscopy time: 0.1 min. Dose: 0.3 mGy.     SEDATION: None   MEDICATION/CONTRAST: No additional   TIME OUT: A time out was performed immediately prior to procedure start with the interventional team, correctly identifying the patient name, date of birth, MRN, procedure, anatomy (including marking of site and side), patient position, procedure consent form, relevant laboratory and imaging test results, antibiotic administration, safety precautions, and procedure-specific equipment needs.   COMPLICATIONS: No immediate adverse events identified.   FINDINGS: In the recumbent position, the patient was positioned on the angiography table. The right supraclavicular and infraclavicular cutaneous tissues were prepared and draped in usual sterile manner.   The supraclavicular access site was screened with gray-scale ultrasound with subsequent " subcutaneous instillation of Lidocaine 1% local anesthesia. The plan tunnel site of the infraclavicular chest was also anesthetized. A small skin nick was made at the intended venotomy site and the tunnel entry site. The subcutaneous tissues were dissected using blunt instrumentation. Ultrasound images demonstrate a patent right internal jugular vein. Under direct ultrasound guidance and Seldinger/micropuncture technique, the right internal jugular vein was accessed. An ultrasound digital spot image was acquired and stored on the  PACS. Via the access needle, a 0.018 in marking wire was advanced to the level of the IVC. An image was stored. Then, the wire was used to measure the intravascular length of the planned catheter to the level of the superior cavoatrial junction. The needle was then exchanged for a 5 [place] peel-away sheath. Then, the 5 [place] single lumen small bore catheter was brought through the tunnel. It was cut to length  and fed through the peel-away sheath which was peeled off. The suture was secured at the tunnel exit site using pursestring suture (3-0 Ethilon). The access site was covered with sterile hCG dressing. There is good skin apposition at the venotomy site. The venotomy site was covered with Steri-Strips and sterile dressing. The catheter was flushed using heparin 10 units/mL.   Completion radiograph demonstrates no evidence of kinking. The tip of the catheter is at the cavoatrial junction. No pneumothorax. Catheter is ready to use.     The patient tolerated the procedure without complication.       Technically successful right IJ tunneled small bore catheter placement.   I was present for and/or performed the critical portions of the procedure and immediately available throughout the entire procedure.   I personally reviewed the image(s)/study and interpretation. I agree with the findings as stated.   Performed and dictated at [facility].   MACRO:  None   Signed by: Brandon Blackman 8/19/2024 11:55 AM Dictation workstation:   [id number]    Bedside Midline Imaging    Result Date: 8/14/2024  These images are not reportable by radiology and will not be interpreted by  Radiologists.    Bedside Midline Imaging    Result Date: 8/13/2024  These images are not reportable by radiology and will not be interpreted by  Radiologists.    CT abdomen pelvis w IV contrast    Result Date: 8/8/2024  Interpreted By:  Thomas Zacarias, STUDY: CT ABDOMEN PELVIS W IV CONTRAST; 8/8/2024 4:43 pm   INDICATION: Signs/Symptoms:Recent abdominoplasty, purulent drainage, wound infection;   COMPARISON: None   ACCESSION NUMBER(S): [id number]   ORDERING CLINICIAN: YAW BALDWIN   TECHNIQUE: Contiguous axial images of the abdomen/pelvis were performed with IV contrast. 75 ml of Omnipaque 350 was utilized. Coronal and sagittal reformatted images were also obtained. All CT examinations are performed with 1 or more of the following dose reduction techniques: Automated exposure control, adjustment of mA and/or kv according to patient's size, or use of iterative reconstruction techniques.     FINDINGS: The liver is nodular in contour consistent with cirrhosis. No focal hepatic lesions. The common bile duct, pancreas, and adrenal glands are unremarkable. 2 cm hypodense lesion in the spleen most likely a small cyst. Spleen is otherwise normal in size and unremarkable. Prior cholecystectomy with surgical clips in the gallbladder fossa.   The kidneys enhance symmetrically. No urolithiasis is seen. No hydroureteronephrosis is seen. There are a few hypodense lesions in the kidneys bilaterally which are too small to characterize on the right. The largest in the left kidney is compatible with a cyst in the lower pole measuring 3.7 x 3.6 cm.   The visualized aorta is unremarkable.   The small bowel is not dilated. The appendix is not identified however no evidence for acute inflammatory change. Mild  diverticulosis of the colon. No evidence for acute diverticulitis.   The bladder is minimally distended and otherwise unremarkable.   The visualized osseous structures are intact.   Limited images of the lower thorax show a partially visualized left mastectomy and breast implant.   There is scattered subcutaneous air/gas in the deep subcutaneous tissues along the abdomen. Surgical drains are noted in place anterior to the abdominal musculature. Findings are limited to the deep subcutaneous tissues. No free intraperitoneal air or fluid is seen. No abscess or collections are noted in the region of the abdominoplasty and drains. There are multiple gas pockets however no fluid collections or abscess is seen.       1. scattered subcutaneous air/gas in the deep subcutaneous tissues along the abdomen. Surgical drains are in good position anterior to the abdominal musculature. Findings are limited to the deep subcutaneous tissues. No free intraperitoneal air or fluid is seen. No abscess or collections are noted in the region of the abdominoplasty and drains. There are multiple subcutaneous gas/air pockets however no fluid collections or abscess is seen.   2. Hepatic cirrhosis. No focal hepatic lesions.   3. Diverticulosis of the colon without evidence for acute diverticulitis.       Assessment/Plan:  Continue austedo for TD.  Her confusion seems improved.   Follow with me as scheduled.  No further neurologic testing needed at this time.    Thank you for this consultation.  Please call neurologist on-call for any questions or concerns.

## 2024-08-31 PROCEDURE — 1180000001 HC REHAB PRIVATE ROOM DAILY

## 2024-08-31 PROCEDURE — 2500000001 HC RX 250 WO HCPCS SELF ADMINISTERED DRUGS (ALT 637 FOR MEDICARE OP): Performed by: PHYSICAL MEDICINE & REHABILITATION

## 2024-08-31 PROCEDURE — 2500000004 HC RX 250 GENERAL PHARMACY W/ HCPCS (ALT 636 FOR OP/ED): Performed by: INTERNAL MEDICINE

## 2024-08-31 PROCEDURE — 97535 SELF CARE MNGMENT TRAINING: CPT | Mod: GO

## 2024-08-31 PROCEDURE — 97110 THERAPEUTIC EXERCISES: CPT | Mod: GP

## 2024-08-31 PROCEDURE — 97110 THERAPEUTIC EXERCISES: CPT | Mod: GO

## 2024-08-31 PROCEDURE — 2500000004 HC RX 250 GENERAL PHARMACY W/ HCPCS (ALT 636 FOR OP/ED): Performed by: PHYSICAL MEDICINE & REHABILITATION

## 2024-08-31 PROCEDURE — 2500000005 HC RX 250 GENERAL PHARMACY W/O HCPCS: Performed by: INTERNAL MEDICINE

## 2024-08-31 PROCEDURE — 97530 THERAPEUTIC ACTIVITIES: CPT | Mod: GP

## 2024-08-31 PROCEDURE — 97116 GAIT TRAINING THERAPY: CPT | Mod: GP

## 2024-08-31 RX ADMIN — FERROUS SULFATE TAB 325 MG (65 MG ELEMENTAL FE) 325 MG: 325 (65 FE) TAB at 21:59

## 2024-08-31 RX ADMIN — DEUTETRABENAZINE 6 MG: 6 TABLET, FILM COATED, EXTENDED RELEASE ORAL at 09:23

## 2024-08-31 RX ADMIN — LOTILANER OPHTHALMIC SOLUTION 1 DROP: 2.5 SOLUTION/ DROPS OPHTHALMIC at 22:01

## 2024-08-31 RX ADMIN — DEXAMETHASONE 0.5 MG: 1 TABLET ORAL at 21:59

## 2024-08-31 RX ADMIN — Medication 3 MG: at 21:59

## 2024-08-31 RX ADMIN — ACETAMINOPHEN 650 MG: 325 TABLET ORAL at 22:06

## 2024-08-31 RX ADMIN — LATANOPROST 1 DROP: 50 SOLUTION OPHTHALMIC at 22:00

## 2024-08-31 RX ADMIN — DEXAMETHASONE 0.5 MG: 1 TABLET ORAL at 13:09

## 2024-08-31 RX ADMIN — NYSTATIN: 100000 POWDER TOPICAL at 22:30

## 2024-08-31 RX ADMIN — ALLOPURINOL 400 MG: 100 TABLET ORAL at 09:23

## 2024-08-31 RX ADMIN — ACETAMINOPHEN 650 MG: 325 TABLET ORAL at 17:43

## 2024-08-31 RX ADMIN — DEXAMETHASONE 0.5 MG: 1 TABLET ORAL at 06:24

## 2024-08-31 RX ADMIN — DEUTETRABENAZINE 24 MG: 24 TABLET, FILM COATED, EXTENDED RELEASE ORAL at 11:23

## 2024-08-31 RX ADMIN — SENNOSIDES 8.6 MG: 8.6 TABLET, FILM COATED ORAL at 21:59

## 2024-08-31 RX ADMIN — FAMOTIDINE 20 MG: 20 TABLET, FILM COATED ORAL at 09:21

## 2024-08-31 RX ADMIN — WATER 500 MG: 1 INJECTION INTRAMUSCULAR; INTRAVENOUS; SUBCUTANEOUS at 10:02

## 2024-08-31 RX ADMIN — DULOXETINE HYDROCHLORIDE 60 MG: 30 CAPSULE, DELAYED RELEASE ORAL at 09:22

## 2024-08-31 RX ADMIN — DULOXETINE HYDROCHLORIDE 60 MG: 30 CAPSULE, DELAYED RELEASE ORAL at 21:59

## 2024-08-31 RX ADMIN — FERROUS SULFATE TAB 325 MG (65 MG ELEMENTAL FE) 325 MG: 325 (65 FE) TAB at 09:22

## 2024-08-31 RX ADMIN — ENOXAPARIN SODIUM 40 MG: 100 INJECTION SUBCUTANEOUS at 16:51

## 2024-08-31 RX ADMIN — NYSTATIN: 100000 POWDER TOPICAL at 09:25

## 2024-08-31 RX ADMIN — LAMOTRIGINE 200 MG: 100 TABLET ORAL at 09:25

## 2024-08-31 RX ADMIN — CYCLOBENZAPRINE 5 MG: 10 TABLET, FILM COATED ORAL at 21:59

## 2024-08-31 RX ADMIN — SUCRALFATE 1 G: 1 TABLET ORAL at 21:59

## 2024-08-31 RX ADMIN — TRAZODONE HYDROCHLORIDE 150 MG: 50 TABLET ORAL at 21:59

## 2024-08-31 RX ADMIN — FLUDROCORTISONE ACETATE 0.1 MG: 0.1 TABLET ORAL at 09:24

## 2024-08-31 RX ADMIN — DEXAMETHASONE 0.5 MG: 1 TABLET ORAL at 16:52

## 2024-08-31 ASSESSMENT — PAIN SCALES - GENERAL
PAINLEVEL_OUTOF10: 4
PAINLEVEL_OUTOF10: 8
PAINLEVEL_OUTOF10: 0 - NO PAIN
PAINLEVEL_OUTOF10: 0 - NO PAIN
PAINLEVEL_OUTOF10: 8

## 2024-08-31 ASSESSMENT — PAIN - FUNCTIONAL ASSESSMENT
PAIN_FUNCTIONAL_ASSESSMENT: 0-10

## 2024-08-31 ASSESSMENT — PAIN DESCRIPTION - ORIENTATION: ORIENTATION: RIGHT;LEFT

## 2024-08-31 ASSESSMENT — ACTIVITIES OF DAILY LIVING (ADL): HOME_MANAGEMENT_TIME_ENTRY: 37

## 2024-08-31 ASSESSMENT — PAIN DESCRIPTION - LOCATION: LOCATION: HIP

## 2024-08-31 NOTE — PROGRESS NOTES
Occupational Therapy    OT Treatment    Patient Name: Mariann Drew  MRN: [id number]  Today's Date: 8/31/2024  Time Calculation  Start Time: 0745  Stop Time: 0830  Time Calculation (min): 45 min        Assessment:  End of Session Patient Position: Up in chair (Handoff to PT in pt's room)     Plan:  Treatment Interventions: ADL retraining, Functional transfer training, UE strengthening/ROM, Endurance training, Equipment evaluation/education, Patient/family training, Compensatory technique education, Fine motor coordination activities  OT Frequency: 5 times per week (6 days/PRN)  Treatment Interventions: ADL retraining, Functional transfer training, UE strengthening/ROM, Endurance training, Equipment evaluation/education, Patient/family training, Compensatory technique education, Fine motor coordination activities    Subjective   Previous Visit Info:  OT Last Visit  OT Received On: 08/31/24  General:  General  Patient Position Received: Alarm on, Up in chair  General Comment: Pt agreeable to OT tx session.  Precautions:  Medical Precautions: Fall precautions  Post-Surgical Precautions: Abdominal surgery precautions  Precautions Comment: Wound vac and port/IV            Pain:  Pain Assessment  Pain Assessment: 0-10  0-10 (Numeric) Pain Score: 0 - No pain    Objective    Cognition:  Cognition  Orientation Level: Oriented X4  Problem Solving: Exceptions to WFL  Processing Speed: Delayed  Coordination:     Activities of Daily Living: Grooming  Grooming Level of Assistance: Setup  Grooming Where Assessed: Chair, Sitting sinkside  Grooming Comments: Pt completed oral care and light grooming tsaks while seated in WC with overall s/u assist for retrieving items.    UE Dressing  UE Dressing Level of Assistance: Contact guard  UE Dressing Where Assessed: Chair  UE Dressing Comments: Pt donned night gown while seated/ standing from WC with overall touching assist required for STS transfer to pull gown down over hips    LE  Dressing  LE Dressing Comments: not assessed or completed d/t wound vac placed low on abdomen    Toileting  Toileting Level of Assistance: Contact guard  Where Assessed: Bedside commode, Toilet  Toileting Comments: CGA required for steadying balance during ammon-care while standing to FWW.  Functional Standing Tolerance:     Bed Mobility/Transfers: Transfers  Transfer: Yes  Transfer 1  Transfer From 1: Sit to, Stand to  Trials/Comments 1: CGA with VCs for hand placement    Toilet Transfers  Toilet Transfer From: Walker  Toilet Transfer Type: To and from  Toilet Transfer to: Standard bedside commode  Toilet Transfer Technique: Ambulating  Toilet Transfers: Contact guard    Functional Mobility:  Functional Mobility  Functional Mobility Performed: Yes  Functional Mobility 1  Comments 1: CGA required for transfers/mobility with FWW in room to/from toilet from recliner       Therapy/Activity: Therapeutic Exercise  Therapeutic Exercise Performed: Yes  Therapeutic Exercise Activity 1: Pt completed use of arm ergometer while seated utilized without resistance with respect to abdominal precautions to increse bilat UE strength/endurance required for increased independence in ADL/IADLs. Pt tolerated 8 minutes with min-mod rest breaks.       Education Documentation  No documentation found.  Education Comments  No comments found.        OP EDUCATION:       Goals:  Encounter Problems       Encounter Problems (Active)       OT Problem       STG- patient will complete grooming with MIN A with use of ae/ad/dme prn (Met)       Start:  08/23/24    Expected End:  08/30/24    Resolved:  08/28/24    Updated to: STG- patient will complete grooming with  Set up with use of ae/ad/dme prn         STG- patient will complete bathing with MAX A with use of ae/ad/dme prn (Met)       Start:  08/23/24    Expected End:  09/06/24    Resolved:  08/28/24    Updated to: STG- patient will complete bathing with Mod A with use of ae/ad/dme prn         STG-  patient will complete UB dressing with MOD A with use of ae/ad/dme prn (Met)       Start:  08/23/24    Expected End:  08/30/24    Resolved:  08/28/24    Updated to: STG- patient will complete UB dressing with SBA with use of ae/ad/dme prn         STG- patient will complete LB dressing with MAX A with use of ae/ad/dme prn (Met)       Start:  08/23/24    Expected End:  09/06/24    Resolved:  08/28/24    Updated to: STG- patient will complete LB dressing with Mod A with use of ae/ad/dme prn         STG- patient will complete toileting with MAX A with use of ae/ad/dme prn  (Met)       Start:  08/23/24    Expected End:  09/06/24    Resolved:  08/28/24    Updated to: STG- patient will complete toileting with Mod A with use of ae/ad/dme prn         STG- patient will complete toilet/commode transfers with MAX A with use of ae/ad/dme prn (Met)       Start:  08/23/24    Expected End:  08/30/24    Resolved:  08/28/24    Updated to: STG- patient will complete toilet/commode transfers with  CGA with use of ae/ad/dme prn         STG- patient will complete tub/shower transfers with MAX A with use of ae/ad/dme prn (Progressing)       Start:  08/23/24    Expected End:  09/06/24            STG- patient will complete simple mobility with MOD A with use of ae/ad/dme prn (Met)       Start:  08/23/24    Expected End:  08/30/24    Resolved:  08/28/24    Updated to: STG- patient will complete simple mobility with CGA with use of ae/ad/dme prn         LTG- patient will complete grooming with CGA with use of ae/ad/dme prn (Met)       Start:  08/23/24    Expected End:  09/06/24    Resolved:  08/28/24    Updated to: LTG- patient will complete grooming with supervision with use of ae/ad/dme prn         LTG- patient will complete bathing with MOD A with use of ae/ad/dme prn (Progressing)       Start:  08/23/24    Expected End:  09/06/24            LTG- patient will complete UB dressing with Min A  with use of ae/ad/dme prn (Met)       Start:   08/23/24    Expected End:  09/06/24    Resolved:  08/28/24    Updated to: LTG- patient will complete UB dressing with set up  with use of ae/ad/dme prn         LTG- patient will complete LB dressing with MOD A with use of ae/ad/dme prn (Progressing)       Start:  08/23/24    Expected End:  09/06/24            LTG- patient will complete toileting with MOD A with use of ae/ad/dme prn  (Progressing)       Start:  08/23/24    Expected End:  09/06/24            LTG- patient will complete toilet/commode transfers with Mod A with use of ae/ad/dme prn (Met)       Start:  08/23/24    Expected End:  09/06/24    Resolved:  08/28/24    Updated to: LTG- patient will complete toilet/commode transfers with CGA with use of ae/ad/dme prn         LTG- patient will complete tub/shower transfers with MOD A with use of ae/ad/dme prn (Progressing)       Start:  08/23/24    Expected End:  09/06/24            LTG- patient will complete simple mobility with MIN A with use of ae/ad/dme prn (Progressing)       Start:  08/23/24    Expected End:  09/06/24            STG- patient will complete grooming with  Set up with use of ae/ad/dme prn (Progressing)       Start:  08/28/24    Expected End:  09/06/24                STG- patient will complete UB dressing with SBA with use of ae/ad/dme prn (Progressing)       Start:  08/28/24    Expected End:  09/06/24                STG- patient will complete toilet/commode transfers with  CGA with use of ae/ad/dme prn (Progressing)       Start:  08/28/24    Expected End:  09/06/24                STG- patient will complete simple mobility with CGA with use of ae/ad/dme prn (Progressing)       Start:  08/28/24    Expected End:  09/06/24                LTG- patient will complete grooming with supervision with use of ae/ad/dme prn (Met)       Start:  08/28/24    Expected End:  09/06/24    Resolved:  08/28/24    Updated to: LTG- patient will complete grooming with set up with use of ae/ad/dme prn             LTG-  patient will complete UB dressing with set up  with use of ae/ad/dme prn (Progressing)       Start:  08/28/24    Expected End:  09/06/24                LTG- patient will complete toilet/commode transfers with CGA with use of ae/ad/dme prn (Progressing)       Start:  08/28/24    Expected End:  09/06/24                LTG- patient will complete grooming with set up with use of ae/ad/dme prn (Progressing)       Start:  08/28/24    Expected End:  09/06/24                STG- patient will complete bathing with Mod A with use of ae/ad/dme prn (Progressing)       Start:  08/28/24    Expected End:  09/06/24                STG- patient will complete LB dressing with Mod A with use of ae/ad/dme prn (Progressing)       Start:  08/28/24    Expected End:  09/06/24                STG- patient will complete toileting with Mod A with use of ae/ad/dme prn (Progressing)       Start:  08/28/24    Expected End:  09/06/24

## 2024-08-31 NOTE — CARE PLAN
The patient's goals for the shift include      The clinical goals for the shift include no confussion

## 2024-08-31 NOTE — PROGRESS NOTES
"Physical Therapy    Physical Therapy Treatment    Patient Name: Mariann Drew  MRN: [id number]  Today's Date: 8/31/2024  Time Calculation  Start Time: 0830  Stop Time: 0915  Time Calculation (min): 45 min         Assessment/Plan         PT Plan  Treatment/Interventions: Bed mobility, Transfer training, Gait training, Stair training, Balance training, Strengthening, Endurance training, Range of motion, Therapeutic exercise, Therapeutic activity, Home exercise program, Positioning, Postural re-education  PT Plan: Ongoing PT  PT Frequency: 5 times per week (6 times PRN)  PT Discharge Recommendations:  (Anticipate pt to require intermittent min A at walker level at time of discharge.)  Equipment Recommended upon Discharge: Wheeled walker  PT Recommended Transfer Status: Assist x2 (at time of initial evaluation)      General Visit Information:   PT  Visit  PT Received On: 08/31/24  General  Patient Position Received: Alarm on, Up in chair  General Comment: Pt reports feeling \"foggy\" this date.    Subjective   Precautions:  Precautions  Medical Precautions: Fall precautions  Post-Surgical Precautions: Abdominal surgery precautions  Precautions Comment: Wound vac and port/IV      Objective   Pain:  Pain Assessment  Pain Assessment: 0-10  0-10 (Numeric) Pain Score: 0 - No pain     Treatments:  Therapeutic Exercise  Therapeutic Exercise Performed:  (Pt performed seated BLE ther ex 2 x 10 reps each with verbal and manual cues for exercise technique and  to remain on task.  Ex performed to improve strength and mobility.)    Bed Mobility  Bed Mobility:  (Sit to supine with mod assist at BLE.)    Ambulation/Gait Training  Ambulation/Gait Training Performed:  (Pt amb 26 ft and 50 ft with wheeled walker and min assist.  Assist of another to manage equipment.)    Transfers  Transfer:  (Sit to stand transfer training with min assist.  Pivot transfer training with approach steps with walker and min assist.)    EDUCATION:  Education " Comment:  (Pt educated on treatment intervention and goals of treatment.)    Encounter Problems       Encounter Problems (Active)       PT Problem       LTG - Pt will perform bed mobility with mod A x 1  (Progressing)       Start:  08/23/24    Expected End:  09/06/24            LTG - Pt will perform transfers with min A x 1.  (Progressing)       Start:  08/23/24    Expected End:  09/06/24            LTG - Pt will amb 150 feet with FWW and CGA.   (Progressing)       Start:  08/23/24    Expected End:  09/06/24            LTG - Pt will up/down 2 stairs with 1 HR and straight cane with min A x 1.  (Progressing)       Start:  08/23/24    Expected End:  09/06/24            STG - Pt will perform bed mobility with mod assist x 2 (Progressing)       Start:  08/23/24    Expected End:  08/30/24            STG - Pt will perform transfers with mod assist x 1 (Met)       Start:  08/23/24    Expected End:  08/30/24    Resolved:  08/28/24         STG - Pt will amb 50 feet with FWW and mod/min assist x 1 (Progressing)       Start:  08/23/24    Expected End:  08/30/24            STG - Pt will up/down curb step with FWW and mod A x 2.  (Progressing)       Start:  08/23/24    Expected End:  08/30/24

## 2024-09-01 VITALS
OXYGEN SATURATION: 98 % | TEMPERATURE: 97.3 F | SYSTOLIC BLOOD PRESSURE: 114 MMHG | HEART RATE: 114 BPM | DIASTOLIC BLOOD PRESSURE: 57 MMHG | BODY MASS INDEX: 27.17 KG/M2 | WEIGHT: 159.17 LBS | RESPIRATION RATE: 18 BRPM | HEIGHT: 64 IN

## 2024-09-01 PROCEDURE — 2500000001 HC RX 250 WO HCPCS SELF ADMINISTERED DRUGS (ALT 637 FOR MEDICARE OP): Performed by: PHYSICAL MEDICINE & REHABILITATION

## 2024-09-01 PROCEDURE — 2500000004 HC RX 250 GENERAL PHARMACY W/ HCPCS (ALT 636 FOR OP/ED): Performed by: PHYSICAL MEDICINE & REHABILITATION

## 2024-09-01 PROCEDURE — 1180000001 HC REHAB PRIVATE ROOM DAILY

## 2024-09-01 RX ORDER — ZINC OXIDE 20 G/100G
1 OINTMENT TOPICAL
Status: DISCONTINUED | OUTPATIENT
Start: 2024-09-01 | End: 2024-09-01

## 2024-09-01 RX ORDER — NYSTATIN 100000 U/G
1 CREAM TOPICAL 2 TIMES DAILY
Status: DISCONTINUED | OUTPATIENT
Start: 2024-09-01 | End: 2024-09-06 | Stop reason: HOSPADM

## 2024-09-01 RX ORDER — ZINC OXIDE 20 G/100G
1 OINTMENT TOPICAL 2 TIMES DAILY
Status: DISCONTINUED | OUTPATIENT
Start: 2024-09-01 | End: 2024-09-06 | Stop reason: HOSPADM

## 2024-09-01 RX ADMIN — DEXAMETHASONE 0.5 MG: 1 TABLET ORAL at 22:09

## 2024-09-01 RX ADMIN — CYCLOBENZAPRINE 5 MG: 10 TABLET, FILM COATED ORAL at 17:45

## 2024-09-01 RX ADMIN — DEXAMETHASONE 0.5 MG: 1 TABLET ORAL at 13:24

## 2024-09-01 RX ADMIN — ALLOPURINOL 400 MG: 100 TABLET ORAL at 09:02

## 2024-09-01 RX ADMIN — ENOXAPARIN SODIUM 40 MG: 100 INJECTION SUBCUTANEOUS at 17:13

## 2024-09-01 RX ADMIN — DEXAMETHASONE 0.5 MG: 1 TABLET ORAL at 17:13

## 2024-09-01 RX ADMIN — LAMOTRIGINE 200 MG: 100 TABLET ORAL at 09:11

## 2024-09-01 RX ADMIN — ACETAMINOPHEN 650 MG: 325 TABLET ORAL at 09:10

## 2024-09-01 RX ADMIN — DULOXETINE HYDROCHLORIDE 60 MG: 30 CAPSULE, DELAYED RELEASE ORAL at 22:09

## 2024-09-01 RX ADMIN — LATANOPROST 1 DROP: 50 SOLUTION OPHTHALMIC at 22:28

## 2024-09-01 RX ADMIN — ACETAMINOPHEN 650 MG: 325 TABLET ORAL at 17:45

## 2024-09-01 RX ADMIN — FERROUS SULFATE TAB 325 MG (65 MG ELEMENTAL FE) 325 MG: 325 (65 FE) TAB at 22:09

## 2024-09-01 RX ADMIN — FAMOTIDINE 20 MG: 20 TABLET, FILM COATED ORAL at 09:02

## 2024-09-01 RX ADMIN — ACETAMINOPHEN 650 MG: 325 TABLET ORAL at 22:10

## 2024-09-01 RX ADMIN — SUCRALFATE 1 G: 1 TABLET ORAL at 22:09

## 2024-09-01 RX ADMIN — FLUDROCORTISONE ACETATE 0.1 MG: 0.1 TABLET ORAL at 09:11

## 2024-09-01 RX ADMIN — DEXAMETHASONE 0.5 MG: 1 TABLET ORAL at 06:48

## 2024-09-01 RX ADMIN — FERROUS SULFATE TAB 325 MG (65 MG ELEMENTAL FE) 325 MG: 325 (65 FE) TAB at 09:04

## 2024-09-01 RX ADMIN — ZINC OXIDE 1 APPLICATION: 200 OINTMENT TOPICAL at 17:16

## 2024-09-01 RX ADMIN — DEUTETRABENAZINE 24 MG: 24 TABLET, FILM COATED, EXTENDED RELEASE ORAL at 09:02

## 2024-09-01 RX ADMIN — DULOXETINE HYDROCHLORIDE 60 MG: 30 CAPSULE, DELAYED RELEASE ORAL at 09:02

## 2024-09-01 RX ADMIN — NYSTATIN 1 APPLICATION: 100000 CREAM TOPICAL at 22:25

## 2024-09-01 RX ADMIN — ZINC OXIDE 1 APPLICATION: 200 OINTMENT TOPICAL at 22:25

## 2024-09-01 RX ADMIN — NYSTATIN 1 APPLICATION: 100000 CREAM TOPICAL at 17:16

## 2024-09-01 RX ADMIN — Medication 3 MG: at 22:09

## 2024-09-01 RX ADMIN — NYSTATIN: 100000 POWDER TOPICAL at 09:03

## 2024-09-01 RX ADMIN — LOTILANER OPHTHALMIC SOLUTION 1 DROP: 2.5 SOLUTION/ DROPS OPHTHALMIC at 22:28

## 2024-09-01 RX ADMIN — DEUTETRABENAZINE 6 MG: 6 TABLET, FILM COATED, EXTENDED RELEASE ORAL at 09:07

## 2024-09-01 RX ADMIN — TRAZODONE HYDROCHLORIDE 150 MG: 50 TABLET ORAL at 22:09

## 2024-09-01 RX ADMIN — SENNOSIDES 8.6 MG: 8.6 TABLET, FILM COATED ORAL at 22:09

## 2024-09-01 ASSESSMENT — PAIN SCALES - GENERAL
PAINLEVEL_OUTOF10: 4
PAINLEVEL_OUTOF10: 2
PAINLEVEL_OUTOF10: 5 - MODERATE PAIN
PAINLEVEL_OUTOF10: 0 - NO PAIN

## 2024-09-01 ASSESSMENT — PAIN - FUNCTIONAL ASSESSMENT
PAIN_FUNCTIONAL_ASSESSMENT: 0-10

## 2024-09-01 ASSESSMENT — PAIN DESCRIPTION - LOCATION: LOCATION: ABDOMEN

## 2024-09-01 NOTE — CARE PLAN
The patient's goals for the shift include      The clinical goals for the shift include Remain hemodynamically stable, get some quality rest.      Problem: Skin  Goal: Decreased wound size/increased tissue granulation at next dressing change  Outcome: Progressing  Flowsheets (Taken 9/1/2024 0033)  Decreased wound size/increased tissue granulation at next dressing change: Promote sleep for wound healing  Goal: Participates in plan/prevention/treatment measures  Outcome: Progressing  Flowsheets (Taken 9/1/2024 0033)  Participates in plan/prevention/treatment measures: Elevate heels  Goal: Promote/optimize nutrition  Outcome: Progressing  Flowsheets (Taken 9/1/2024 0033)  Promote/optimize nutrition: Monitor/record intake including meals  Goal: Promote skin healing  Outcome: Progressing  Flowsheets (Taken 9/1/2024 0033)  Promote skin healing:   Assess skin/pad under line(s)/device(s)   Protective dressings over bony prominences     Problem: Fall/Injury  Goal: Verbalize understanding of risk factor reduction measures to prevent injury from fall in the home  Outcome: Progressing  Goal: Use assistive devices by end of the shift  Outcome: Progressing  Goal: Pace activities to prevent fatigue by end of the shift  Outcome: Progressing     Problem: Pain  Goal: Takes deep breaths with improved pain control throughout the shift  Outcome: Progressing  Goal: Turns in bed with improved pain control throughout the shift  Outcome: Progressing  Goal: Walks with improved pain control throughout the shift  Outcome: Progressing  Goal: Performs ADL's with improved pain control throughout shift  Outcome: Progressing  Goal: Participates in PT with improved pain control throughout the shift  Outcome: Progressing     Problem: Wound Care  Goal: Area will decrease in size .2x.2 cm per week  Outcome: Progressing     Problem: Diabetes  Goal: Achieve decreasing blood glucose levels by end of shift  Outcome: Progressing  Goal: Increase stability of  blood glucose readings by end of shift  Outcome: Progressing  Goal: Decrease in ketones present in urine by end of shift  Outcome: Progressing  Goal: Maintain electrolyte levels within acceptable range throughout shift  Outcome: Progressing  Goal: Maintain glucose levels >70mg/dl to <250mg/dl throughout shift  Outcome: Progressing  Goal: No changes in neurological exam by end of shift  Outcome: Progressing  Goal: Learn about and adhere to nutrition recommendations by end of shift  Outcome: Progressing  Goal: Vital signs within normal range for age by end of shift  Outcome: Progressing  Goal: Increase self care and/or family involovement by end of shift  Outcome: Progressing  Goal: Receive DSME education by end of shift  Outcome: Progressing

## 2024-09-01 NOTE — PROGRESS NOTES
"Mariann Drew is a 68 y.o. female on day 9 of admission presenting with Debility.    Subjective   Patient states she is having discomfort around her rectum.  Otherwise 10 review of systems are negative       Objective     Physical Exam  Constitutional:       General: no acute distress.     Appearance: Normal appearance.   No anterior cervical pain or swelling  Cardiovascular:   Heart demonstrates a regular rate and rhythm without gallops or murmurs  Pulmonary:   Lungs are clear throughout all fields  Abdominal:      General: Abdomen is flat. Bowel sounds are normal.   Wound VAC in place  Musculoskeletal:         General: No swelling, tenderness or deformity.   Abdomen is soft without organomegaly or tenderness.  Apparently she has had some black stools we will check for OB  Skin:     General: Skin is warm and dry.   In the rectal area she does have some redness consistent with a yeast type rash.  There is a scab on the right side of the buttock.  Neurological:      General: No focal deficit present.      Mental Status:  alert and oriented to person, place, and time.      Cranial Nerves: No cranial nerve deficit.   Transfer training is min assist     Psychiatric:         Mood and Affect: Mood normal.      Last Recorded Vitals  Blood pressure 113/62, pulse 89, temperature 36.2 °C (97.2 °F), resp. rate 18, height 1.626 m (5' 4.02\"), weight 72.2 kg (159 lb 2.8 oz), SpO2 98%.  Intake/Output last 3 Shifts:  I/O last 3 completed shifts:  In: 480 (6.6 mL/kg) [P.O.:480]  Out: 1425 (19.7 mL/kg) [Drains:1425]  Weight: 72.2 kg     Relevant Results  Scheduled medications  allopurinol, 400 mg, oral, Daily  deutetrabenazine, 24 mg, oral, Daily  deutetrabenazine, 6 mg, oral, Daily  dexAMETHasone, 0.5 mg, oral, 4x daily  DULoxetine, 60 mg, oral, BID  enoxaparin, 40 mg, subcutaneous, q24h  ertapenem, 500 mg, intravenous, q24h  famotidine, 20 mg, oral, q AM  ferrous sulfate (325 mg ferrous sulfate), 65 mg of iron, oral, " BID  fludrocortisone, 0.1 mg, oral, Daily  lamoTRIgine, 200 mg, oral, q AM  latanoprost, 1 drop, Both Eyes, Nightly  lotilaner, 1 drop, Both Eyes, Nightly  nystatin, 1 Application, Topical, BID  semaglutide, 0.5 mg, subcutaneous, Every Sunday  sennosides, 1 tablet, oral, Nightly  sucralfate, 1 g, oral, Nightly  traZODone, 150 mg, oral, Nightly  zinc oxide, 1 Application, Topical, BID      Continuous medications       PRN medications  PRN medications: acetaminophen **OR** acetaminophen, alum-mag hydroxide-simeth, bisacodyl, bisacodyl, cyclobenzaprine, lidocaine, melatonin, naratriptan     No results found for this or any previous visit (from the past 24 hour(s)).           Assessment/Plan   Principal Problem:    Debility  Active Problems:    Anxiety    Balance problems    Diabetes mellitus (Multi)    Hx of obesity    Myofascial pain syndrome, cervical    Hyperlipidemia    Disruption of external surgical wound      1.  Abdominoplasty with wound infection group B strep  Continue IV Invanz  Begin physical therapy for bed mobility, transfers, endurance activities, gait training with an assistive device  Begin occupational therapy for functional mobility, upper limb strengthening, coordination, balance and endurance activities as well as adaptive aids     2.  Gout  Zyloprim 400 mg daily  Monitor for gout symptoms     3.  Tardive dyskinesia  Austedo 30 mg daily  Patient's  will bring in the medication     4.  DVT prophylaxis  Lovenox 40 mg daily     5.  GERD and difficulty swallowing  Pepcid 20 mg daily  Carafate 1 g nightly     6.  Pain  Cymbalta 60 mg twice a day  Lamictal 200 mg daily  Decadron 0.5 mg 4 times a day     7.  Sleep   150 mg nightly     8.  Diabetes mellitus type 2  Semaglutide 0.5 mg weekly     9. FENGI  She has not had a bowel movement for 2 days will begin on a bowel program  DVT prophylaxis she is on Lovenox 40 mg daily    9/1  Transfer training is min assist  Will use Lotrimin cream and zinc  oxide on the yeast type areas in the buttock folds  Pain is controlled  DVT prophylaxis with Lovenox, Homans' sign is negative bilaterally  BMP will be rechecked in a.m.           I spent 32 minutes in the professional and overall care of this patient.

## 2024-09-02 LAB
ANION GAP SERPL CALC-SCNC: 9 MMOL/L (ref 10–20)
BUN SERPL-MCNC: 44 MG/DL (ref 6–23)
CALCIUM SERPL-MCNC: 8.1 MG/DL (ref 8.6–10.3)
CHLORIDE SERPL-SCNC: 108 MMOL/L (ref 98–107)
CO2 SERPL-SCNC: 24 MMOL/L (ref 21–32)
CREAT SERPL-MCNC: 1.65 MG/DL (ref 0.5–1.05)
EGFRCR SERPLBLD CKD-EPI 2021: 34 ML/MIN/1.73M*2
ERYTHROCYTE [DISTWIDTH] IN BLOOD BY AUTOMATED COUNT: 14.5 % (ref 11.5–14.5)
ERYTHROCYTE [SEDIMENTATION RATE] IN BLOOD BY WESTERGREN METHOD: 5 MM/H (ref 0–30)
GLUCOSE SERPL-MCNC: 113 MG/DL (ref 74–99)
HCT VFR BLD AUTO: 27.4 % (ref 36–46)
HGB BLD-MCNC: 8.8 G/DL (ref 12–16)
MCH RBC QN AUTO: 29.7 PG (ref 26–34)
MCHC RBC AUTO-ENTMCNC: 32.1 G/DL (ref 32–36)
MCV RBC AUTO: 93 FL (ref 80–100)
NRBC BLD-RTO: 0 /100 WBCS (ref 0–0)
PLATELET # BLD AUTO: 125 X10*3/UL (ref 150–450)
POTASSIUM SERPL-SCNC: 4.5 MMOL/L (ref 3.5–5.3)
RBC # BLD AUTO: 2.96 X10*6/UL (ref 4–5.2)
SODIUM SERPL-SCNC: 136 MMOL/L (ref 136–145)
WBC # BLD AUTO: 10.1 X10*3/UL (ref 4.4–11.3)

## 2024-09-02 PROCEDURE — 97530 THERAPEUTIC ACTIVITIES: CPT | Mod: GP

## 2024-09-02 PROCEDURE — 97116 GAIT TRAINING THERAPY: CPT | Mod: GP

## 2024-09-02 PROCEDURE — 2500000004 HC RX 250 GENERAL PHARMACY W/ HCPCS (ALT 636 FOR OP/ED): Performed by: PHYSICAL MEDICINE & REHABILITATION

## 2024-09-02 PROCEDURE — 85027 COMPLETE CBC AUTOMATED: CPT | Performed by: INTERNAL MEDICINE

## 2024-09-02 PROCEDURE — 1180000001 HC REHAB PRIVATE ROOM DAILY

## 2024-09-02 PROCEDURE — 97530 THERAPEUTIC ACTIVITIES: CPT | Mod: GO,CO

## 2024-09-02 PROCEDURE — 97110 THERAPEUTIC EXERCISES: CPT | Mod: GO,CO

## 2024-09-02 PROCEDURE — 85652 RBC SED RATE AUTOMATED: CPT | Performed by: PHYSICAL MEDICINE & REHABILITATION

## 2024-09-02 PROCEDURE — 80048 BASIC METABOLIC PNL TOTAL CA: CPT | Performed by: PHYSICAL MEDICINE & REHABILITATION

## 2024-09-02 PROCEDURE — 2500000005 HC RX 250 GENERAL PHARMACY W/O HCPCS: Performed by: INTERNAL MEDICINE

## 2024-09-02 PROCEDURE — 2500000004 HC RX 250 GENERAL PHARMACY W/ HCPCS (ALT 636 FOR OP/ED): Performed by: INTERNAL MEDICINE

## 2024-09-02 PROCEDURE — 2500000001 HC RX 250 WO HCPCS SELF ADMINISTERED DRUGS (ALT 637 FOR MEDICARE OP): Performed by: PHYSICAL MEDICINE & REHABILITATION

## 2024-09-02 RX ADMIN — FERROUS SULFATE TAB 325 MG (65 MG ELEMENTAL FE) 325 MG: 325 (65 FE) TAB at 20:36

## 2024-09-02 RX ADMIN — DEXAMETHASONE 0.5 MG: 1 TABLET ORAL at 18:04

## 2024-09-02 RX ADMIN — LOTILANER OPHTHALMIC SOLUTION 1 DROP: 2.5 SOLUTION/ DROPS OPHTHALMIC at 20:37

## 2024-09-02 RX ADMIN — ACETAMINOPHEN 650 MG: 325 TABLET ORAL at 13:54

## 2024-09-02 RX ADMIN — FERROUS SULFATE TAB 325 MG (65 MG ELEMENTAL FE) 325 MG: 325 (65 FE) TAB at 08:56

## 2024-09-02 RX ADMIN — ACETAMINOPHEN 650 MG: 325 TABLET ORAL at 20:47

## 2024-09-02 RX ADMIN — ALLOPURINOL 400 MG: 100 TABLET ORAL at 08:56

## 2024-09-02 RX ADMIN — ACETAMINOPHEN 650 MG: 325 TABLET ORAL at 08:56

## 2024-09-02 RX ADMIN — NYSTATIN 1 APPLICATION: 100000 CREAM TOPICAL at 08:55

## 2024-09-02 RX ADMIN — DEXAMETHASONE 0.5 MG: 1 TABLET ORAL at 13:54

## 2024-09-02 RX ADMIN — ENOXAPARIN SODIUM 40 MG: 100 INJECTION SUBCUTANEOUS at 18:04

## 2024-09-02 RX ADMIN — SENNOSIDES 8.6 MG: 8.6 TABLET, FILM COATED ORAL at 20:36

## 2024-09-02 RX ADMIN — DEXAMETHASONE 0.5 MG: 1 TABLET ORAL at 21:38

## 2024-09-02 RX ADMIN — WATER 500 MG: 1 INJECTION INTRAMUSCULAR; INTRAVENOUS; SUBCUTANEOUS at 11:38

## 2024-09-02 RX ADMIN — SUCRALFATE 1 G: 1 TABLET ORAL at 20:36

## 2024-09-02 RX ADMIN — DEXAMETHASONE 0.5 MG: 1 TABLET ORAL at 06:01

## 2024-09-02 RX ADMIN — DULOXETINE HYDROCHLORIDE 60 MG: 30 CAPSULE, DELAYED RELEASE ORAL at 20:36

## 2024-09-02 RX ADMIN — LATANOPROST 1 DROP: 50 SOLUTION OPHTHALMIC at 20:37

## 2024-09-02 RX ADMIN — LAMOTRIGINE 200 MG: 100 TABLET ORAL at 08:55

## 2024-09-02 RX ADMIN — DEUTETRABENAZINE 6 MG: 6 TABLET, FILM COATED, EXTENDED RELEASE ORAL at 08:55

## 2024-09-02 RX ADMIN — TRAZODONE HYDROCHLORIDE 150 MG: 50 TABLET ORAL at 20:36

## 2024-09-02 RX ADMIN — FAMOTIDINE 20 MG: 20 TABLET, FILM COATED ORAL at 08:56

## 2024-09-02 RX ADMIN — ZINC OXIDE 1 APPLICATION: 200 OINTMENT TOPICAL at 20:37

## 2024-09-02 RX ADMIN — NYSTATIN 1 APPLICATION: 100000 CREAM TOPICAL at 20:36

## 2024-09-02 RX ADMIN — DULOXETINE HYDROCHLORIDE 60 MG: 30 CAPSULE, DELAYED RELEASE ORAL at 08:56

## 2024-09-02 RX ADMIN — DEUTETRABENAZINE 24 MG: 24 TABLET, FILM COATED, EXTENDED RELEASE ORAL at 08:55

## 2024-09-02 RX ADMIN — ZINC OXIDE 1 APPLICATION: 200 OINTMENT TOPICAL at 08:55

## 2024-09-02 RX ADMIN — FLUDROCORTISONE ACETATE 0.1 MG: 0.1 TABLET ORAL at 08:56

## 2024-09-02 ASSESSMENT — PAIN SCALES - GENERAL
PAINLEVEL_OUTOF10: 3
PAINLEVEL_OUTOF10: 0 - NO PAIN
PAINLEVEL_OUTOF10: 2
PAINLEVEL_OUTOF10: 0 - NO PAIN
PAINLEVEL_OUTOF10: 2
PAINLEVEL_OUTOF10: 0 - NO PAIN
PAINLEVEL_OUTOF10: 0 - NO PAIN
PAINLEVEL_OUTOF10: 2
PAINLEVEL_OUTOF10: 0 - NO PAIN

## 2024-09-02 ASSESSMENT — PAIN - FUNCTIONAL ASSESSMENT
PAIN_FUNCTIONAL_ASSESSMENT: 0-10

## 2024-09-02 ASSESSMENT — PAIN DESCRIPTION - LOCATION
LOCATION: ABDOMEN
LOCATION: LEG

## 2024-09-02 NOTE — PROGRESS NOTES
Physical Therapy    Physical Therapy Treatment    Patient Name: Mariann Drew  MRN: [id number]  Today's Date: 9/2/2024  Time Calculation  Start Time: 1300  Stop Time: 1345  Time Calculation (min): 45 min         Assessment/Plan         PT Plan  Treatment/Interventions: Bed mobility, Transfer training, Gait training, Stair training, Balance training, Strengthening, Endurance training, Range of motion, Therapeutic exercise, Therapeutic activity, Home exercise program, Positioning, Postural re-education  PT Plan: Ongoing PT  PT Frequency: 5 times per week (6 times PRN)  PT Discharge Recommendations:  (Anticipate pt to require intermittent min A at walker level at time of discharge.)  Equipment Recommended upon Discharge: Wheeled walker  PT Recommended Transfer Status: Assist x2 (at time of initial evaluation)      General Visit Information:   PT  Visit  PT Received On: 09/02/24  General  Family/Caregiver Present: Yes (Spouse present for education and training.)  Patient Position Received: Bed, 2 rail up, Alarm on    Subjective   Precautions:  Precautions  Medical Precautions: Fall precautions  Post-Surgical Precautions: Abdominal surgery precautions  Precautions Comment: Wound vac and port/IV      Objective   Pain:  Pain Assessment  Pain Assessment: 0-10  0-10 (Numeric) Pain Score: 0 - No pain     Treatments:  Bed Mobility  Bed Mobility:  (Supine to sit with max assist.)    Ambulation/Gait Training  Ambulation/Gait Training Performed:  (Pt amb 40 ft x1 and 60 ft x2 with wheeled walker and min assist and wheelchair follow.)    Transfers  Transfer:  (Sit to stand transfer training from various surfaces and heights with min assist.)    Stairs  Stairs:  (Pt negotiated 2 stairs x2 (with seated rest break) using one rail and side-stepping technique with min/mod assist x2.  RLE weakness/fatigue evident this session.)    EDUCATION:  Education Comment:  (Pt educated on safe mobility techniques and mobility goals.)    Encounter  Problems       Encounter Problems (Active)       PT Problem       LTG - Pt will perform bed mobility with mod A x 1  (Progressing)       Start:  08/23/24    Expected End:  09/06/24            LTG - Pt will perform transfers with min A x 1.  (Progressing)       Start:  08/23/24    Expected End:  09/06/24            LTG - Pt will amb 150 feet with FWW and CGA.   (Progressing)       Start:  08/23/24    Expected End:  09/06/24            LTG - Pt will up/down 2 stairs with 1 HR and straight cane with min A x 1.  (Progressing)       Start:  08/23/24    Expected End:  09/06/24            STG - Pt will perform bed mobility with mod assist x 2 (Progressing)       Start:  08/23/24    Expected End:  08/30/24            STG - Pt will perform transfers with mod assist x 1 (Met)       Start:  08/23/24    Expected End:  08/30/24    Resolved:  08/28/24         STG - Pt will amb 50 feet with FWW and mod/min assist x 1 (Progressing)       Start:  08/23/24    Expected End:  08/30/24            STG - Pt will up/down curb step with FWW and mod A x 2.  (Progressing)       Start:  08/23/24    Expected End:  08/30/24

## 2024-09-02 NOTE — PROGRESS NOTES
Occupational Therapy    OT Treatment    Patient Name: Mariann Drew  MRN: [id number]  Today's Date: 9/2/2024  Time Calculation  Start Time: 0852  Stop Time: 1000  Time Calculation (min): 68 min    Plan:  Treatment Interventions: ADL retraining, Functional transfer training, UE strengthening/ROM, Endurance training, Equipment evaluation/education, Patient/family training, Compensatory technique education, Fine motor coordination activities  OT Frequency: 5 times per week (6 days/PRN)  Treatment Interventions: ADL retraining, Functional transfer training, UE strengthening/ROM, Endurance training, Equipment evaluation/education, Patient/family training, Compensatory technique education, Fine motor coordination activities    Subjective   Previous Visit Info:  OT Last Visit  OT Received On: 09/02/24  General:  General  Family/Caregiver Present: Yes  Patient Position Received: Up in chair, Alarm on  Precautions:  Medical Precautions: Fall precautions  Post-Surgical Precautions: Abdominal surgery precautions  Precautions Comment: Wound vac and port/IV    Pain:  Pain Assessment  Pain Assessment: 0-10  0-10 (Numeric) Pain Score: 0 - No pain    Objective    Bed Mobility/Transfers:    Transfers  Transfer:  (Minimal assistance for sit to stand transfers)  Transfer 1  Trials/Comments 1: transfers via a rolling walker from WC to bedside recliner with minimal assistance.  Shower Transfers  Shower Transfers Comments: Patient will be sponge bathing secondary abdominal wounds  Functional Mobility:  Functional Mobility 1  Comments 1: via a rolling walker completes simple functional approach steps at CGA/[place] short distances secondary to fatigue  Therapy/Activity: Therapeutic Exercise  Therapeutic Exercise Performed: Yes  BUE exercises using one pound weights completing 30 repetitions of 4 exercises to promote greater independence with ADL's and transfers.  EDUCATION:  Education  Individual(s) Educated: Patient  Education Provided: Fall  precautions  Home Program: Strengthening (use of DME in home (3 in 1 commode) placement))  Risk and Benefits Discussed with Patient/Caregiver/Other: yes  Patient/Caregiver Demonstrated Understanding: yes  Plan of Care Discussed and Agreed Upon: yes  Patient Response to Education: Patient/Caregiver Verbalized Understanding of Information, Patient/Caregiver Asked Appropriate Questions  Education Comment: Patient and spouse participated in teaching regarding goals, progress, level of assistance Spouse, DME needs and limitations.  Spouse very supportive, reports good understanding and demonstrates good ability to assist patient at discharge.    Goals:  Encounter Problems       Encounter Problems (Active)       OT Problem       STG- patient will complete grooming with MIN A with use of ae/ad/dme prn (Met)       Start:  08/23/24    Expected End:  08/30/24    Resolved:  08/28/24    Updated to: STG- patient will complete grooming with  Set up with use of ae/ad/dme prn         STG- patient will complete bathing with MAX A with use of ae/ad/dme prn (Met)       Start:  08/23/24    Expected End:  09/06/24    Resolved:  08/28/24    Updated to: STG- patient will complete bathing with Mod A with use of ae/ad/dme prn         STG- patient will complete UB dressing with MOD A with use of ae/ad/dme prn (Met)       Start:  08/23/24    Expected End:  08/30/24    Resolved:  08/28/24    Updated to: STG- patient will complete UB dressing with SBA with use of ae/ad/dme prn         STG- patient will complete LB dressing with MAX A with use of ae/ad/dme prn (Met)       Start:  08/23/24    Expected End:  09/06/24    Resolved:  08/28/24    Updated to: STG- patient will complete LB dressing with Mod A with use of ae/ad/dme prn         STG- patient will complete toileting with MAX A with use of ae/ad/dme prn  (Met)       Start:  08/23/24    Expected End:  09/06/24    Resolved:  08/28/24    Updated to: STG- patient will complete toileting with Mod  A with use of ae/ad/dme prn         STG- patient will complete toilet/commode transfers with MAX A with use of ae/ad/dme prn (Met)       Start:  08/23/24    Expected End:  08/30/24    Resolved:  08/28/24    Updated to: STG- patient will complete toilet/commode transfers with  CGA with use of ae/ad/dme prn         STG- patient will complete tub/shower transfers with MAX A with use of ae/ad/dme prn (Progressing)       Start:  08/23/24    Expected End:  09/06/24            STG- patient will complete simple mobility with MOD A with use of ae/ad/dme prn (Met)       Start:  08/23/24    Expected End:  08/30/24    Resolved:  08/28/24    Updated to: STG- patient will complete simple mobility with CGA with use of ae/ad/dme prn         LTG- patient will complete grooming with CGA with use of ae/ad/dme prn (Met)       Start:  08/23/24    Expected End:  09/06/24    Resolved:  08/28/24    Updated to: LTG- patient will complete grooming with supervision with use of ae/ad/dme prn         LTG- patient will complete bathing with MOD A with use of ae/ad/dme prn (Progressing)       Start:  08/23/24    Expected End:  09/06/24            LTG- patient will complete UB dressing with Min A  with use of ae/ad/dme prn (Met)       Start:  08/23/24    Expected End:  09/06/24    Resolved:  08/28/24    Updated to: LTG- patient will complete UB dressing with set up  with use of ae/ad/dme prn         LTG- patient will complete LB dressing with MOD A with use of ae/ad/dme prn (Progressing)       Start:  08/23/24    Expected End:  09/06/24            LTG- patient will complete toileting with MOD A with use of ae/ad/dme prn  (Progressing)       Start:  08/23/24    Expected End:  09/06/24            LTG- patient will complete toilet/commode transfers with Mod A with use of ae/ad/dme prn (Met)       Start:  08/23/24    Expected End:  09/06/24    Resolved:  08/28/24    Updated to: LTG- patient will complete toilet/commode transfers with CGA with use of  ae/ad/dme prn         LTG- patient will complete tub/shower transfers with MOD A with use of ae/ad/dme prn (Progressing)       Start:  08/23/24    Expected End:  09/06/24            LTG- patient will complete simple mobility with MIN A with use of ae/ad/dme prn (Progressing)       Start:  08/23/24    Expected End:  09/06/24            STG- patient will complete grooming with  Set up with use of ae/ad/dme prn (Progressing)       Start:  08/28/24    Expected End:  09/06/24                STG- patient will complete UB dressing with SBA with use of ae/ad/dme prn (Progressing)       Start:  08/28/24    Expected End:  09/06/24                STG- patient will complete toilet/commode transfers with  CGA with use of ae/ad/dme prn (Progressing)       Start:  08/28/24    Expected End:  09/06/24                STG- patient will complete simple mobility with CGA with use of ae/ad/dme prn (Progressing)       Start:  08/28/24    Expected End:  09/06/24                LTG- patient will complete grooming with supervision with use of ae/ad/dme prn (Met)       Start:  08/28/24    Expected End:  09/06/24    Resolved:  08/28/24    Updated to: LTG- patient will complete grooming with set up with use of ae/ad/dme prn             LTG- patient will complete UB dressing with set up  with use of ae/ad/dme prn (Progressing)       Start:  08/28/24    Expected End:  09/06/24                LTG- patient will complete toilet/commode transfers with CGA with use of ae/ad/dme prn (Progressing)       Start:  08/28/24    Expected End:  09/06/24                LTG- patient will complete grooming with set up with use of ae/ad/dme prn (Progressing)       Start:  08/28/24    Expected End:  09/06/24                STG- patient will complete bathing with Mod A with use of ae/ad/dme prn (Progressing)       Start:  08/28/24    Expected End:  09/06/24                STG- patient will complete LB dressing with Mod A with use of ae/ad/dme prn (Progressing)        Start:  08/28/24    Expected End:  09/06/24                STG- patient will complete toileting with Mod A with use of ae/ad/dme prn (Progressing)       Start:  08/28/24    Expected End:  09/06/24

## 2024-09-02 NOTE — PROGRESS NOTES
Physical Therapy    Physical Therapy Treatment    Patient Name: Mariann Drew  MRN: [id number]  Today's Date: 9/2/2024  Time Calculation  Start Time: 0829  Stop Time: 0852  Time Calculation (min): 23 min         Assessment/Plan         PT Plan  Treatment/Interventions: Bed mobility, Transfer training, Gait training, Stair training, Balance training, Strengthening, Endurance training, Range of motion, Therapeutic exercise, Therapeutic activity, Home exercise program, Positioning, Postural re-education  PT Plan: Ongoing PT  PT Frequency: 5 times per week (6 times PRN)  PT Discharge Recommendations:  (Anticipate pt to require intermittent min A at walker level at time of discharge.)  Equipment Recommended upon Discharge: Wheeled walker  PT Recommended Transfer Status: Assist x2 (at time of initial evaluation)      General Visit Information:   PT  Visit  PT Received On: 09/02/24  General  Family/Caregiver Present: Yes (Spouse present for education and training.)  Patient Position Received: Up in chair, Alarm on    Subjective   Precautions:  Precautions  Medical Precautions: Fall precautions  Post-Surgical Precautions: Abdominal surgery precautions  Precautions Comment: Wound vac and port/IV      Objective   Pain:  Pain Assessment  Pain Assessment: 0-10  0-10 (Numeric) Pain Score: 0 - No pain    Treatments:  Bed Mobility  Bed Mobility:  (Per spouse, pt will sleep in recliner at discharge as bed at home is too high.  Spouse was educated on the need to assist pt at [facility] if bed mobility is attempted.)    Ambulation/Gait Training  Ambulation/Gait Training Performed:  (Pt amb 35 ft with wheeled walker and min assist.  Assist of another for equipment.)    Transfers  Transfer:  (Sit to stand transfer training with min assist.)    Stairs  Stairs:  (Pt negotiated 2 - 6 inch stairs via side-step method using one rail and min assist x2.  This method mimics home environment.  Spouse educated on recommendation for the assist of an  additional person for entry into home at discharge.)    EDUCATION:  Education Comment:  (Pt & spouse present for education and training regarding pt's condition, recovery process, rehab process, safe mobility techniques, bed mobility, transfers, gait with walker, stair negotiation, DME and purchasing options, home-going needs and home safety)    Encounter Problems       Encounter Problems (Active)       PT Problem       LTG - Pt will perform bed mobility with mod A x 1  (Progressing)       Start:  08/23/24    Expected End:  09/06/24            LTG - Pt will perform transfers with min A x 1.  (Progressing)       Start:  08/23/24    Expected End:  09/06/24            LTG - Pt will amb 150 feet with FWW and CGA.   (Progressing)       Start:  08/23/24    Expected End:  09/06/24            LTG - Pt will up/down 2 stairs with 1 HR and straight cane with min A x 1.  (Progressing)       Start:  08/23/24    Expected End:  09/06/24            STG - Pt will perform bed mobility with mod assist x 2 (Progressing)       Start:  08/23/24    Expected End:  08/30/24            STG - Pt will perform transfers with mod assist x 1 (Met)       Start:  08/23/24    Expected End:  08/30/24    Resolved:  08/28/24         STG - Pt will amb 50 feet with FWW and mod/min assist x 1 (Progressing)       Start:  08/23/24    Expected End:  08/30/24            STG - Pt will up/down curb step with FWW and mod A x 2.  (Progressing)       Start:  08/23/24    Expected End:  08/30/24

## 2024-09-02 NOTE — CARE PLAN
The patient's goals for the shift include      The clinical goals for the shift include Remain hemodynamically stable, get some quality rest      Problem: Skin  Goal: Decreased wound size/increased tissue granulation at next dressing change  Outcome: Progressing  Flowsheets (Taken 9/2/2024 0052)  Decreased wound size/increased tissue granulation at next dressing change: Promote sleep for wound healing  Goal: Participates in plan/prevention/treatment measures  Outcome: Progressing  Flowsheets (Taken 9/2/2024 0052)  Participates in plan/prevention/treatment measures: Elevate heels  Goal: Promote/optimize nutrition  Outcome: Progressing  Flowsheets (Taken 9/2/2024 0052)  Promote/optimize nutrition: Monitor/record intake including meals  Goal: Promote skin healing  Outcome: Progressing  Flowsheets (Taken 9/2/2024 0052)  Promote skin healing:   Turn/reposition every 2 hours/use positioning/transfer devices   Assess skin/pad under line(s)/device(s)     Problem: Fall/Injury  Goal: Verbalize understanding of risk factor reduction measures to prevent injury from fall in the home  Outcome: Progressing  Goal: Use assistive devices by end of the shift  Outcome: Progressing  Goal: Pace activities to prevent fatigue by end of the shift  Outcome: Progressing     Problem: Pain  Goal: Takes deep breaths with improved pain control throughout the shift  Outcome: Progressing  Goal: Turns in bed with improved pain control throughout the shift  Outcome: Progressing  Goal: Walks with improved pain control throughout the shift  Outcome: Progressing  Goal: Performs ADL's with improved pain control throughout shift  Outcome: Progressing  Goal: Participates in PT with improved pain control throughout the shift  Outcome: Progressing     Problem: Wound Care  Goal: Area will decrease in size .2x.2 cm per week  Outcome: Progressing     Problem: Diabetes  Goal: Achieve decreasing blood glucose levels by end of shift  Outcome: Progressing  Goal:  Increase stability of blood glucose readings by end of shift  Outcome: Progressing  Goal: Decrease in ketones present in urine by end of shift  Outcome: Progressing  Goal: Maintain electrolyte levels within acceptable range throughout shift  Outcome: Progressing  Goal: Maintain glucose levels >70mg/dl to <250mg/dl throughout shift  Outcome: Progressing  Goal: No changes in neurological exam by end of shift  Outcome: Progressing  Goal: Learn about and adhere to nutrition recommendations by end of shift  Outcome: Progressing  Goal: Vital signs within normal range for age by end of shift  Outcome: Progressing  Goal: Increase self care and/or family involovement by end of shift  Outcome: Progressing  Goal: Receive DSME education by end of shift  Outcome: Progressing

## 2024-09-02 NOTE — PROGRESS NOTES
Occupational Therapy    OT Treatment    Patient Name: Mariann Drew  MRN: [id number]  Today's Date: 9/2/2024  Time Calculation  Start Time: 1345  Stop Time: 1430  Time Calculation (min): 45 min        Assessment:        Plan:  Treatment Interventions: ADL retraining, Functional transfer training, UE strengthening/ROM, Endurance training, Equipment evaluation/education, Patient/family training, Compensatory technique education, Fine motor coordination activities  OT Frequency: 5 times per week (6 days/PRN)  Treatment Interventions: ADL retraining, Functional transfer training, UE strengthening/ROM, Endurance training, Equipment evaluation/education, Patient/family training, Compensatory technique education, Fine motor coordination activities    Subjective   Previous Visit Info:  OT Last Visit  OT Received On: 09/02/24  General:  General  Family/Caregiver Present: No  Patient Position Received: Up in chair  Precautions:  Medical Precautions: Fall precautions  Post-Surgical Precautions: Abdominal surgery precautions  Precautions Comment: Wound vac and port/IV    Pain:  Pain Assessment  Pain Assessment: 0-10  0-10 (Numeric) Pain Score: 0 - No pain  Pain Interventions: Medication (See MAR), Rest  Response to Interventions: by end of session patient stated she started to feel some relief    Objective    Bed Mobility/Transfers: Bed Mobility  Bed Mobility: Yes  Bed Mobility 1  Bed Mobility 1: Sitting to supine  Level of Assistance 1: Maximum assistance (to bring legs into bed)  Transfers  Transfer:  (Minimal assistance for sit to stand transfers)  Transfer 1  Trials/Comments 1: sit to stand transfers at CGA/Min A  Transfers 2  Trials/Comments 2: via a rolling walker taking approach steps transfers to edge of  bed with minimal assistance  Standing Balance:  Static Standing Balance  Static Standing-Level of Assistance: Contact guard  Static Standing-Comment/Number of Minutes: tolerates 1.5 and then 2.5 mins of  st  Therapy/Activity: Therapeutic Exercise  Therapeutic Exercise Performed: Yes  BUE exercises using one pound weights completing 30 repetitions of 4 exercises to promote greater independence with ADL's and transfers.  EDUCATION:  Education  Individual(s) Educated: Patient  Education Provided: Fall precautions  Home Program: Strengthening  Risk and Benefits Discussed with Patient/Caregiver/Other: yes  Patient/Caregiver Demonstrated Understanding: yes  Plan of Care Discussed and Agreed Upon: yes  Patient Response to Education: Patient/Caregiver Performed Return Demonstration of Exercises/Activities, Patient/Caregiver Verbalized Understanding of Information    Goals:  Encounter Problems       Encounter Problems (Active)       OT Problem       STG- patient will complete grooming with MIN A with use of ae/ad/dme prn (Met)       Start:  08/23/24    Expected End:  08/30/24    Resolved:  08/28/24    Updated to: STG- patient will complete grooming with  Set up with use of ae/ad/dme prn         STG- patient will complete bathing with MAX A with use of ae/ad/dme prn (Met)       Start:  08/23/24    Expected End:  09/06/24    Resolved:  08/28/24    Updated to: STG- patient will complete bathing with Mod A with use of ae/ad/dme prn         STG- patient will complete UB dressing with MOD A with use of ae/ad/dme prn (Met)       Start:  08/23/24    Expected End:  08/30/24    Resolved:  08/28/24    Updated to: STG- patient will complete UB dressing with SBA with use of ae/ad/dme prn         STG- patient will complete LB dressing with MAX A with use of ae/ad/dme prn (Met)       Start:  08/23/24    Expected End:  09/06/24    Resolved:  08/28/24    Updated to: STG- patient will complete LB dressing with Mod A with use of ae/ad/dme prn         STG- patient will complete toileting with MAX A with use of ae/ad/dme prn  (Met)       Start:  08/23/24    Expected End:  09/06/24    Resolved:  08/28/24    Updated to: STG- patient will complete  toileting with Mod A with use of ae/ad/dme prn         STG- patient will complete toilet/commode transfers with MAX A with use of ae/ad/dme prn (Met)       Start:  08/23/24    Expected End:  08/30/24    Resolved:  08/28/24    Updated to: STG- patient will complete toilet/commode transfers with  CGA with use of ae/ad/dme prn         STG- patient will complete tub/shower transfers with MAX A with use of ae/ad/dme prn (Progressing)       Start:  08/23/24    Expected End:  09/06/24            STG- patient will complete simple mobility with MOD A with use of ae/ad/dme prn (Met)       Start:  08/23/24    Expected End:  08/30/24    Resolved:  08/28/24    Updated to: STG- patient will complete simple mobility with CGA with use of ae/ad/dme prn         LTG- patient will complete grooming with CGA with use of ae/ad/dme prn (Met)       Start:  08/23/24    Expected End:  09/06/24    Resolved:  08/28/24    Updated to: LTG- patient will complete grooming with supervision with use of ae/ad/dme prn         LTG- patient will complete bathing with MOD A with use of ae/ad/dme prn (Progressing)       Start:  08/23/24    Expected End:  09/06/24            LTG- patient will complete UB dressing with Min A  with use of ae/ad/dme prn (Met)       Start:  08/23/24    Expected End:  09/06/24    Resolved:  08/28/24    Updated to: LTG- patient will complete UB dressing with set up  with use of ae/ad/dme prn         LTG- patient will complete LB dressing with MOD A with use of ae/ad/dme prn (Progressing)       Start:  08/23/24    Expected End:  09/06/24            LTG- patient will complete toileting with MOD A with use of ae/ad/dme prn  (Progressing)       Start:  08/23/24    Expected End:  09/06/24            LTG- patient will complete toilet/commode transfers with Mod A with use of ae/ad/dme prn (Met)       Start:  08/23/24    Expected End:  09/06/24    Resolved:  08/28/24    Updated to: LTG- patient will complete toilet/commode transfers  with CGA with use of ae/ad/dme prn         LTG- patient will complete tub/shower transfers with MOD A with use of ae/ad/dme prn (Progressing)       Start:  08/23/24    Expected End:  09/06/24            LTG- patient will complete simple mobility with MIN A with use of ae/ad/dme prn (Progressing)       Start:  08/23/24    Expected End:  09/06/24            STG- patient will complete grooming with  Set up with use of ae/ad/dme prn (Progressing)       Start:  08/28/24    Expected End:  09/06/24                STG- patient will complete UB dressing with SBA with use of ae/ad/dme prn (Progressing)       Start:  08/28/24    Expected End:  09/06/24                STG- patient will complete toilet/commode transfers with  CGA with use of ae/ad/dme prn (Progressing)       Start:  08/28/24    Expected End:  09/06/24                STG- patient will complete simple mobility with CGA with use of ae/ad/dme prn (Progressing)       Start:  08/28/24    Expected End:  09/06/24                LTG- patient will complete grooming with supervision with use of ae/ad/dme prn (Met)       Start:  08/28/24    Expected End:  09/06/24    Resolved:  08/28/24    Updated to: LTG- patient will complete grooming with set up with use of ae/ad/dme prn             LTG- patient will complete UB dressing with set up  with use of ae/ad/dme prn (Progressing)       Start:  08/28/24    Expected End:  09/06/24                LTG- patient will complete toilet/commode transfers with CGA with use of ae/ad/dme prn (Progressing)       Start:  08/28/24    Expected End:  09/06/24                LTG- patient will complete grooming with set up with use of ae/ad/dme prn (Progressing)       Start:  08/28/24    Expected End:  09/06/24                STG- patient will complete bathing with Mod A with use of ae/ad/dme prn (Progressing)       Start:  08/28/24    Expected End:  09/06/24                STG- patient will complete LB dressing with Mod A with use of ae/ad/dme  prn (Progressing)       Start:  08/28/24    Expected End:  09/06/24                STG- patient will complete toileting with Mod A with use of ae/ad/dme prn (Progressing)       Start:  08/28/24    Expected End:  09/06/24

## 2024-09-02 NOTE — CARE PLAN
The patient's goals for the shift include  participate in scheduled therapy     The clinical goals for the shift include No issues with wound vac    Problem: Skin  Goal: Decreased wound size/increased tissue granulation at next dressing change  Outcome: Progressing  Flowsheets (Taken 9/2/2024 1542)  Decreased wound size/increased tissue granulation at next dressing change:   Promote sleep for wound healing   Protective dressings over bony prominences  Goal: Participates in plan/prevention/treatment measures  Outcome: Progressing  Flowsheets (Taken 9/2/2024 1542)  Participates in plan/prevention/treatment measures:   Elevate heels   Increase activity/out of bed for meals  Goal: Promote/optimize nutrition  Outcome: Progressing  Flowsheets (Taken 9/2/2024 1542)  Promote/optimize nutrition:   Monitor/record intake including meals   Consume > 50% meals/supplements   Offer water/supplements/favorite foods  Goal: Promote skin healing  Outcome: Progressing  Flowsheets (Taken 9/2/2024 1542)  Promote skin healing: Turn/reposition every 2 hours/use positioning/transfer devices     Problem: Fall/Injury  Goal: Verbalize understanding of risk factor reduction measures to prevent injury from fall in the home  Outcome: Progressing  Goal: Use assistive devices by end of the shift  Outcome: Progressing  Goal: Pace activities to prevent fatigue by end of the shift  Outcome: Progressing     Problem: Pain  Goal: Takes deep breaths with improved pain control throughout the shift  Outcome: Progressing  Goal: Turns in bed with improved pain control throughout the shift  Outcome: Progressing  Goal: Walks with improved pain control throughout the shift  Outcome: Progressing  Goal: Performs ADL's with improved pain control throughout shift  Outcome: Progressing  Goal: Participates in PT with improved pain control throughout the shift  Outcome: Progressing     Problem: Wound Care  Goal: Area will decrease in size .2x.2 cm per week  Outcome:  Progressing     Problem: Diabetes  Goal: Achieve decreasing blood glucose levels by end of shift  Outcome: Progressing  Goal: Increase stability of blood glucose readings by end of shift  Outcome: Progressing  Goal: Decrease in ketones present in urine by end of shift  Outcome: Progressing  Goal: Maintain electrolyte levels within acceptable range throughout shift  Outcome: Progressing  Goal: Maintain glucose levels >70mg/dl to <250mg/dl throughout shift  Outcome: Progressing  Goal: No changes in neurological exam by end of shift  Outcome: Progressing  Goal: Learn about and adhere to nutrition recommendations by end of shift  Outcome: Progressing  Goal: Vital signs within normal range for age by end of shift  Outcome: Progressing  Goal: Increase self care and/or family involovement by end of shift  Outcome: Progressing  Goal: Receive DSME education by end of shift  Outcome: Progressing

## 2024-09-03 PROCEDURE — 2500000005 HC RX 250 GENERAL PHARMACY W/O HCPCS: Performed by: INTERNAL MEDICINE

## 2024-09-03 PROCEDURE — 97530 THERAPEUTIC ACTIVITIES: CPT | Mod: GP

## 2024-09-03 PROCEDURE — 2500000004 HC RX 250 GENERAL PHARMACY W/ HCPCS (ALT 636 FOR OP/ED): Performed by: PHYSICAL MEDICINE & REHABILITATION

## 2024-09-03 PROCEDURE — 97535 SELF CARE MNGMENT TRAINING: CPT | Mod: GO

## 2024-09-03 PROCEDURE — 1180000001 HC REHAB PRIVATE ROOM DAILY

## 2024-09-03 PROCEDURE — 2500000001 HC RX 250 WO HCPCS SELF ADMINISTERED DRUGS (ALT 637 FOR MEDICARE OP): Performed by: PHYSICAL MEDICINE & REHABILITATION

## 2024-09-03 PROCEDURE — 97110 THERAPEUTIC EXERCISES: CPT | Mod: GP

## 2024-09-03 PROCEDURE — 97530 THERAPEUTIC ACTIVITIES: CPT | Mod: GO,CO

## 2024-09-03 PROCEDURE — 97116 GAIT TRAINING THERAPY: CPT | Mod: GP

## 2024-09-03 PROCEDURE — 99232 SBSQ HOSP IP/OBS MODERATE 35: CPT | Performed by: PHYSICAL MEDICINE & REHABILITATION

## 2024-09-03 PROCEDURE — 2500000004 HC RX 250 GENERAL PHARMACY W/ HCPCS (ALT 636 FOR OP/ED): Performed by: INTERNAL MEDICINE

## 2024-09-03 RX ORDER — ALLOPURINOL 100 MG/1
100 TABLET ORAL DAILY
Status: DISCONTINUED | OUTPATIENT
Start: 2024-09-04 | End: 2024-09-06 | Stop reason: HOSPADM

## 2024-09-03 RX ADMIN — DEUTETRABENAZINE 24 MG: 24 TABLET, FILM COATED, EXTENDED RELEASE ORAL at 08:50

## 2024-09-03 RX ADMIN — ALLOPURINOL 400 MG: 100 TABLET ORAL at 08:49

## 2024-09-03 RX ADMIN — FERROUS SULFATE TAB 325 MG (65 MG ELEMENTAL FE) 325 MG: 325 (65 FE) TAB at 20:52

## 2024-09-03 RX ADMIN — FLUDROCORTISONE ACETATE 0.1 MG: 0.1 TABLET ORAL at 08:50

## 2024-09-03 RX ADMIN — LOTILANER OPHTHALMIC SOLUTION 1 DROP: 2.5 SOLUTION/ DROPS OPHTHALMIC at 20:56

## 2024-09-03 RX ADMIN — NYSTATIN 1 APPLICATION: 100000 CREAM TOPICAL at 21:01

## 2024-09-03 RX ADMIN — DEXAMETHASONE 0.5 MG: 1 TABLET ORAL at 20:53

## 2024-09-03 RX ADMIN — DEXAMETHASONE 0.5 MG: 1 TABLET ORAL at 06:14

## 2024-09-03 RX ADMIN — DEUTETRABENAZINE 6 MG: 6 TABLET, FILM COATED, EXTENDED RELEASE ORAL at 08:50

## 2024-09-03 RX ADMIN — SUCRALFATE 1 G: 1 TABLET ORAL at 20:51

## 2024-09-03 RX ADMIN — DULOXETINE HYDROCHLORIDE 60 MG: 30 CAPSULE, DELAYED RELEASE ORAL at 20:53

## 2024-09-03 RX ADMIN — LATANOPROST 1 DROP: 50 SOLUTION OPHTHALMIC at 21:01

## 2024-09-03 RX ADMIN — ENOXAPARIN SODIUM 40 MG: 100 INJECTION SUBCUTANEOUS at 17:25

## 2024-09-03 RX ADMIN — ACETAMINOPHEN 650 MG: 325 TABLET ORAL at 05:10

## 2024-09-03 RX ADMIN — FERROUS SULFATE TAB 325 MG (65 MG ELEMENTAL FE) 325 MG: 325 (65 FE) TAB at 08:50

## 2024-09-03 RX ADMIN — TRAZODONE HYDROCHLORIDE 150 MG: 50 TABLET ORAL at 20:52

## 2024-09-03 RX ADMIN — WATER 500 MG: 1 INJECTION INTRAMUSCULAR; INTRAVENOUS; SUBCUTANEOUS at 08:50

## 2024-09-03 RX ADMIN — LAMOTRIGINE 200 MG: 100 TABLET ORAL at 08:50

## 2024-09-03 RX ADMIN — ZINC OXIDE 1 APPLICATION: 200 OINTMENT TOPICAL at 21:01

## 2024-09-03 RX ADMIN — ACETAMINOPHEN 650 MG: 325 TABLET ORAL at 20:57

## 2024-09-03 RX ADMIN — SENNOSIDES 8.6 MG: 8.6 TABLET, FILM COATED ORAL at 20:52

## 2024-09-03 RX ADMIN — DEXAMETHASONE 0.5 MG: 1 TABLET ORAL at 17:25

## 2024-09-03 RX ADMIN — FAMOTIDINE 20 MG: 20 TABLET, FILM COATED ORAL at 08:50

## 2024-09-03 RX ADMIN — DULOXETINE HYDROCHLORIDE 60 MG: 30 CAPSULE, DELAYED RELEASE ORAL at 08:50

## 2024-09-03 ASSESSMENT — PAIN SCALES - PAIN ASSESSMENT IN ADVANCED DEMENTIA (PAINAD): TOTALSCORE: MEDICATION (SEE MAR)

## 2024-09-03 ASSESSMENT — ACTIVITIES OF DAILY LIVING (ADL): HOME_MANAGEMENT_TIME_ENTRY: 20

## 2024-09-03 ASSESSMENT — PAIN - FUNCTIONAL ASSESSMENT
PAIN_FUNCTIONAL_ASSESSMENT: 0-10

## 2024-09-03 ASSESSMENT — PAIN DESCRIPTION - LOCATION: LOCATION: ABDOMEN

## 2024-09-03 ASSESSMENT — PAIN SCALES - GENERAL
PAINLEVEL_OUTOF10: 3
PAINLEVEL_OUTOF10: 0 - NO PAIN
PAINLEVEL_OUTOF10: 4
PAINLEVEL_OUTOF10: 2
PAINLEVEL_OUTOF10: 3
PAINLEVEL_OUTOF10: 0 - NO PAIN

## 2024-09-03 NOTE — PROGRESS NOTES
Occupational Therapy    OT Treatment    Patient Name: Mariann Drew  MRN: [id number]  Today's Date: 9/3/2024  Time Calculation  Start Time: 0800  Stop Time: 0830  Time Calculation (min): 30 min        Assessment:  End of Session Communication: Bedside nurse  End of Session Patient Position: Up in chair, Alarm on     Plan:  Treatment Interventions: ADL retraining, Functional transfer training, UE strengthening/ROM, Endurance training, Equipment evaluation/education, Patient/family training, Compensatory technique education, Fine motor coordination activities  OT Frequency: 5 times per week (6 days/PRN)  Treatment Interventions: ADL retraining, Functional transfer training, UE strengthening/ROM, Endurance training, Equipment evaluation/education, Patient/family training, Compensatory technique education, Fine motor coordination activities    Subjective   Previous Visit Info:  OT Last Visit  OT Received On: 09/03/24  General:  General  Patient Position Received: Up in chair, Alarm on (pt in recliner upon approach)  General Comment: pt scheduled for 7:45 adl, however, just started breakfast, stating no one told her she can start eating when tray was brought in; session started late at 8:00 after pt finished eating.  Precautions:  Medical Precautions: Fall precautions  Post-Surgical Precautions: Abdominal surgery precautions  Precautions Comment: Wound vac and port/IV            Pain:  Pain Assessment  Pain Assessment: 0-10  0-10 (Numeric) Pain Score: 3  Pain Location: Abdomen  Pain Interventions: Repositioned, Distraction    Objective         Activities of Daily Living: Grooming  Grooming Level of Assistance: Setup  Grooming Where Assessed: Chair, Sitting sinkside  Grooming Comments: for oral care, brushing hair    UE Bathing  UE Bathing Level of Assistance:  (sba)  UE Bathing Where Assessed: Sitting sinkside    UE Dressing  UE Dressing Level of Assistance: Close supervision  UE Dressing Where Assessed: Wheelchair  UE  Dressing Comments: for overhead dress    Toileting  Toileting Level of Assistance:  (sba for hygiene in sitting)  Toileting Comments: pt does not wear undergarments/pants for clothing mgmt d/t abd precautions       Bed Mobility/Transfers: Transfers  Transfer:  (sit to stand cga, commode transfers via wwalker cga and cues for safety techs/hand placement)      Functional Mobility:  Functional Mobility  Functional Mobility Performed:  (simple mobility via wwalker in room, to bathroom, to sink, to wc min a initially, improved cga)        EDUCATION:  safe transfer techs; adl techs/safety; reports understanding of information, however, still requires some cuing for application.       Goals:  Encounter Problems       Encounter Problems (Active)       OT Problem       STG- patient will complete grooming with MIN A with use of ae/ad/dme prn (Met)       Start:  08/23/24    Expected End:  08/30/24    Resolved:  08/28/24    Updated to: STG- patient will complete grooming with  Set up with use of ae/ad/dme prn         STG- patient will complete bathing with MAX A with use of ae/ad/dme prn (Met)       Start:  08/23/24    Expected End:  09/06/24    Resolved:  08/28/24    Updated to: STG- patient will complete bathing with Mod A with use of ae/ad/dme prn         STG- patient will complete UB dressing with MOD A with use of ae/ad/dme prn (Met)       Start:  08/23/24    Expected End:  08/30/24    Resolved:  08/28/24    Updated to: STG- patient will complete UB dressing with SBA with use of ae/ad/dme prn         STG- patient will complete LB dressing with MAX A with use of ae/ad/dme prn (Met)       Start:  08/23/24    Expected End:  09/06/24    Resolved:  08/28/24    Updated to: STG- patient will complete LB dressing with Mod A with use of ae/ad/dme prn         STG- patient will complete toileting with MAX A with use of ae/ad/dme prn  (Met)       Start:  08/23/24    Expected End:  09/06/24    Resolved:  08/28/24    Updated to: STG-  patient will complete toileting with Mod A with use of ae/ad/dme prn         STG- patient will complete toilet/commode transfers with MAX A with use of ae/ad/dme prn (Met)       Start:  08/23/24    Expected End:  08/30/24    Resolved:  08/28/24    Updated to: STG- patient will complete toilet/commode transfers with  CGA with use of ae/ad/dme prn         STG- patient will complete tub/shower transfers with MAX A with use of ae/ad/dme prn (Progressing)       Start:  08/23/24    Expected End:  09/06/24            STG- patient will complete simple mobility with MOD A with use of ae/ad/dme prn (Met)       Start:  08/23/24    Expected End:  08/30/24    Resolved:  08/28/24    Updated to: STG- patient will complete simple mobility with CGA with use of ae/ad/dme prn         LTG- patient will complete grooming with CGA with use of ae/ad/dme prn (Met)       Start:  08/23/24    Expected End:  09/06/24    Resolved:  08/28/24    Updated to: LTG- patient will complete grooming with supervision with use of ae/ad/dme prn         LTG- patient will complete bathing with MOD A with use of ae/ad/dme prn (Progressing)       Start:  08/23/24    Expected End:  09/06/24            LTG- patient will complete UB dressing with Min A  with use of ae/ad/dme prn (Met)       Start:  08/23/24    Expected End:  09/06/24    Resolved:  08/28/24    Updated to: LTG- patient will complete UB dressing with set up  with use of ae/ad/dme prn         LTG- patient will complete LB dressing with MOD A with use of ae/ad/dme prn (Progressing)       Start:  08/23/24    Expected End:  09/06/24            LTG- patient will complete toileting with MOD A with use of ae/ad/dme prn  (Progressing)       Start:  08/23/24    Expected End:  09/06/24            LTG- patient will complete toilet/commode transfers with Mod A with use of ae/ad/dme prn (Met)       Start:  08/23/24    Expected End:  09/06/24    Resolved:  08/28/24    Updated to: LTG- patient will complete  toilet/commode transfers with CGA with use of ae/ad/dme prn         LTG- patient will complete tub/shower transfers with MOD A with use of ae/ad/dme prn (Progressing)       Start:  08/23/24    Expected End:  09/06/24            LTG- patient will complete simple mobility with MIN A with use of ae/ad/dme prn (Progressing)       Start:  08/23/24    Expected End:  09/06/24            STG- patient will complete grooming with  Set up with use of ae/ad/dme prn (Progressing)       Start:  08/28/24    Expected End:  09/06/24                STG- patient will complete UB dressing with SBA with use of ae/ad/dme prn (Progressing)       Start:  08/28/24    Expected End:  09/06/24                STG- patient will complete toilet/commode transfers with  CGA with use of ae/ad/dme prn (Progressing)       Start:  08/28/24    Expected End:  09/06/24                STG- patient will complete simple mobility with CGA with use of ae/ad/dme prn (Progressing)       Start:  08/28/24    Expected End:  09/06/24                LTG- patient will complete grooming with supervision with use of ae/ad/dme prn (Met)       Start:  08/28/24    Expected End:  09/06/24    Resolved:  08/28/24    Updated to: LTG- patient will complete grooming with set up with use of ae/ad/dme prn             LTG- patient will complete UB dressing with set up  with use of ae/ad/dme prn (Progressing)       Start:  08/28/24    Expected End:  09/06/24                LTG- patient will complete toilet/commode transfers with CGA with use of ae/ad/dme prn (Progressing)       Start:  08/28/24    Expected End:  09/06/24                LTG- patient will complete grooming with set up with use of ae/ad/dme prn (Progressing)       Start:  08/28/24    Expected End:  09/06/24                STG- patient will complete bathing with Mod A with use of ae/ad/dme prn (Progressing)       Start:  08/28/24    Expected End:  09/06/24                STG- patient will complete LB dressing with Mod A  with use of ae/ad/dme prn (Progressing)       Start:  08/28/24    Expected End:  09/06/24                STG- patient will complete toileting with Mod A with use of ae/ad/dme prn (Progressing)       Start:  08/28/24    Expected End:  09/06/24

## 2024-09-03 NOTE — PROGRESS NOTES
Physical Therapy    Physical Therapy Treatment    Patient Name: Mariann Drew  MRN: [id number]  Today's Date: 9/3/2024  Time Calculation  Start Time: 0915  Stop Time: 1000  Time Calculation (min): 45 min    Assessment/Plan   PT Assessment  End of Session Communication:  (OT)  End of Session Patient Position: Up in chair, Alarm on     PT Plan  Treatment/Interventions: Bed mobility, Transfer training, Gait training, Stair training, Balance training, Strengthening, Endurance training, Range of motion, Therapeutic exercise, Therapeutic activity, Home exercise program, Positioning, Postural re-education  PT Plan: Ongoing PT  PT Frequency: 5 times per week (6 times PRN)  PT Discharge Recommendations:  (Anticipate pt to require intermittent min A at walker level at time of discharge.)  Equipment Recommended upon Discharge: Wheeled walker  PT Recommended Transfer Status: Assist x2 (at time of initial evaluation)      General Visit Information:   PT  Visit  PT Received On: 09/03/24  General  Prior to Session Communication: Bedside nurse  Patient Position Received: Up in chair, Alarm on  General Comment:  (Pt reports she slept ok, pt feels ready for upcoming discharge.)    Subjective   Precautions:  Precautions  Medical Precautions: Fall precautions  Post-Surgical Precautions: Abdominal surgery precautions  Precautions Comment: Wound vac and port/IV      Objective   Pain:  Pain Assessment  Pain Assessment:  (Pt does not rate pain but reports soreness in abdomen at this time)  Pain Interventions: Repositioned, Distraction    Treatments:  Therapeutic Exercise  Therapeutic Exercise Performed: Yes  Pt performs BLE seated AROM exercises: marching, LAQ, hip add isometrics with ball, heel slides with towels, AP 2 x 10 reps each in order to improve strength and endurance for improvement in functional mobility and transfers.     Ambulation/Gait Training  Ambulation/Gait Training Performed:  (Pt ambulates 50 ft, 50 ft with FWW and CGA.   Pt requires encouragement to complete 50 ft.  Pt reports fatigue after ambulation trials.)  Transfers  Transfer:  (Pt performs sit/stand with CGA, increased time and effort. Cues for sequencing when returning to sit with chair approach.)      Education Documentation  Pt educated on techniques for transfers and gait training with device in order to maximize safety and independence.  Pt educated on therapeutic exercises, including optimal techniques to maximize function.    Encounter Problems       Encounter Problems (Active)       PT Problem       LTG - Pt will perform bed mobility with mod A x 1  (Progressing)       Start:  08/23/24    Expected End:  09/06/24            LTG - Pt will perform transfers with min A x 1.  (Progressing)       Start:  08/23/24    Expected End:  09/06/24            LTG - Pt will amb 150 feet with FWW and CGA.   (Progressing)       Start:  08/23/24    Expected End:  09/06/24            LTG - Pt will up/down 2 stairs with 1 HR and straight cane with min A x 1.  (Progressing)       Start:  08/23/24    Expected End:  09/06/24            STG - Pt will perform bed mobility with mod assist x 2 (Progressing)       Start:  08/23/24    Expected End:  08/30/24            STG - Pt will perform transfers with mod assist x 1 (Met)       Start:  08/23/24    Expected End:  08/30/24    Resolved:  08/28/24         STG - Pt will amb 50 feet with FWW and mod/min assist x 1 (Progressing)       Start:  08/23/24    Expected End:  08/30/24            STG - Pt will up/down curb step with FWW and mod A x 2.  (Progressing)       Start:  08/23/24    Expected End:  08/30/24

## 2024-09-03 NOTE — CARE PLAN
The patient's goals for the shift include  to participate in scheduled therapy     The clinical goals for the shift include No issues with wound  vac    Problem: Skin  Goal: Decreased wound size/increased tissue granulation at next dressing change  Outcome: Progressing  Flowsheets (Taken 9/3/2024 1823)  Decreased wound size/increased tissue granulation at next dressing change:   Promote sleep for wound healing   Protective dressings over bony prominences  Goal: Participates in plan/prevention/treatment measures  Outcome: Progressing  Flowsheets (Taken 9/3/2024 1823)  Participates in plan/prevention/treatment measures:   Elevate heels   Increase activity/out of bed for meals  Goal: Promote/optimize nutrition  Outcome: Progressing  Flowsheets (Taken 9/3/2024 1823)  Promote/optimize nutrition:   Monitor/record intake including meals   Consume > 50% meals/supplements   Offer water/supplements/favorite foods  Goal: Promote skin healing  Outcome: Progressing  Flowsheets (Taken 9/3/2024 1823)  Promote skin healing: Turn/reposition every 2 hours/use positioning/transfer devices     Problem: Fall/Injury  Goal: Verbalize understanding of risk factor reduction measures to prevent injury from fall in the home  Outcome: Progressing  Goal: Use assistive devices by end of the shift  Outcome: Progressing  Goal: Pace activities to prevent fatigue by end of the shift  Outcome: Progressing     Problem: Pain  Goal: Takes deep breaths with improved pain control throughout the shift  Outcome: Progressing  Goal: Turns in bed with improved pain control throughout the shift  Outcome: Progressing  Goal: Walks with improved pain control throughout the shift  Outcome: Progressing  Goal: Performs ADL's with improved pain control throughout shift  Outcome: Progressing  Goal: Participates in PT with improved pain control throughout the shift  Outcome: Progressing     Problem: Wound Care  Goal: Area will decrease in size .2x.2 cm per  week  Outcome: Progressing     Problem: Diabetes  Goal: Achieve decreasing blood glucose levels by end of shift  Outcome: Progressing  Goal: Increase stability of blood glucose readings by end of shift  Outcome: Progressing  Goal: Decrease in ketones present in urine by end of shift  Outcome: Progressing  Goal: Maintain electrolyte levels within acceptable range throughout shift  Outcome: Progressing  Goal: Maintain glucose levels >70mg/dl to <250mg/dl throughout shift  Outcome: Progressing  Goal: No changes in neurological exam by end of shift  Outcome: Progressing  Goal: Learn about and adhere to nutrition recommendations by end of shift  Outcome: Progressing  Goal: Vital signs within normal range for age by end of shift  Outcome: Progressing  Goal: Increase self care and/or family involovement by end of shift  Outcome: Progressing  Goal: Receive DSME education by end of shift  Outcome: Progressing

## 2024-09-03 NOTE — PROGRESS NOTES
"  Mariann Drew is a 68 y.o. female on day 11 of admission presenting with Debility.    Subjective   She is doing better. Pending appointment tomorrow home for wound care.     Delay DC home utnil the end of the week.       Objective       Physical Exam    Pleasant female no apparent distress alert and oriented x 3  Chest clear to auscultation bilaterally  Cardiovascular S1-S2  Abdomen soft nontender nondistended with active bowel sounds.  Wound VAC in place.  Extremities with positive lower extremity edema bilaterally symmetrical +1 to +2.  Negative Homans.  Neurologically intact without gross motor deficit appreciated.  General weakness appreciated.     Function: Min assist overall.    Function: min/CGA   Assessment/Plan        Last Recorded Vitals  Blood pressure 105/74, pulse 98, temperature 36.3 °C (97.3 °F), temperature source Temporal, resp. rate 18, height 1.626 m (5' 4.02\"), weight 72.2 kg (159 lb 2.8 oz), SpO2 100%.    Therapy notes from last 24 hours reviewed.    Relevant Results                  Scheduled medications  [START ON 9/4/2024] allopurinol, 100 mg, oral, Daily  deutetrabenazine, 24 mg, oral, Daily  deutetrabenazine, 6 mg, oral, Daily  dexAMETHasone, 0.5 mg, oral, 4x daily  DULoxetine, 60 mg, oral, BID  enoxaparin, 40 mg, subcutaneous, q24h  ertapenem, 500 mg, intravenous, q24h  famotidine, 20 mg, oral, q AM  ferrous sulfate (325 mg ferrous sulfate), 65 mg of iron, oral, BID  fludrocortisone, 0.1 mg, oral, Daily  lamoTRIgine, 200 mg, oral, q AM  latanoprost, 1 drop, Both Eyes, Nightly  lotilaner, 1 drop, Both Eyes, Nightly  nystatin, 1 Application, Topical, BID  sennosides, 1 tablet, oral, Nightly  sucralfate, 1 g, oral, Nightly  traZODone, 150 mg, oral, Nightly  zinc oxide, 1 Application, Topical, BID      Continuous medications     PRN medications  PRN medications: acetaminophen **OR** acetaminophen, alum-mag hydroxide-simeth, bisacodyl, bisacodyl, cyclobenzaprine, lidocaine, melatonin, " naratriptan     No results found for this or any previous visit (from the past 24 hour(s)).              Assessment/Plan   Principal Problem:    Debility  Active Problems:    Anxiety    Balance problems    Diabetes mellitus (Multi)    Hx of obesity    Myofascial pain syndrome, cervical    Hyperlipidemia    Disruption of external surgical wound          1.  Abdominoplasty with wound infection group B strep  Continue IV Invanz  Begin physical therapy for bed mobility, transfers, endurance activities, gait training with an assistive device  Begin occupational therapy for functional mobility, upper limb strengthening, coordination, balance and endurance activities as well as adaptive aids     2.  Gout  Zyloprim 400 mg daily  Monitor for gout symptoms     3.  Tardive dyskinesia  Austedo 30 mg daily  Patient's  will bring in the medication     4.  DVT prophylaxis  Lovenox 40 mg daily     5.  GERD and difficulty swallowing  Pepcid 20 mg daily  Carafate 1 g nightly     6.  Pain  Cymbalta 60 mg twice a day  Lamictal 200 mg daily  Decadron 0.5 mg 4 times a day     7.  Sleep   150 mg nightly     8.  Diabetes mellitus type 2  Semaglutide 0.5 mg weekly     9. FENGI  She has not had a bowel movement for 2 days will begin on a bowel program  DVT prophylaxis she is on Lovenox 40 mg daily     9/3  -ongoing therapy  -pending wound care management  -Pain is controlled  -BS controlled  -ongoing management of renal fucntion       I spent 35 minutes in the professional and overall care of this patient.      Afshan Faustin MD

## 2024-09-03 NOTE — PROGRESS NOTES
Nephrology Consult Progress Note    Mariann Drew is a 68 y.o. female on day 11 of admission presenting with Debility.      Subjective   No acute events overnight, persistent abd pain, tolerating po, nonoliguric, no urinary complains       Objective          Physical Exam    Vitals 24HR  Heart Rate:  [92-99]   Temp:  [36.3 °C (97.3 °F)-36.6 °C (97.9 °F)]   Resp:  [20]   BP: (108-130)/(58-65)   SpO2:  [97 %-98 %]        AAOx3, sick looking, in pain, on RA  Decreased AEBL  RRR no murmurs  Abd - wounds not examined  Chronic skin changes           I&O 24HR    Intake/Output Summary (Last 24 hours) at 9/3/2024 1309  Last data filed at 9/3/2024 1016  Gross per 24 hour   Intake 50 ml   Output 480 ml   Net -430 ml         Medications  Medications Prior to Admission   Medication Sig Dispense Refill Last Dose    allopurinol (Zyloprim) 100 mg tablet Take 1 tablet (100 mg) by mouth once daily.       allopurinol (Zyloprim) 300 mg tablet TAKE ONE TABLET ( 300 MG ) IN ADDITION TO ONE TABLET ( 100 MG ) DAILY ( TOTAL  MG DAILY ) 90 tablet 3     cyclobenzaprine (Flexeril) 5 mg tablet Take 1 tablet (5 mg) by mouth 3 times a day as needed for muscle spasms. 30 tablet 0     deutetrabenazine (Austedo XR) 24 mg tablet extended release 24 hr Take 1 tablet (24 mg) by mouth once daily. Take 1-6mg tablet and 1-24mg tablet for a total dose of 30mg daily. 90 tablet 3     deutetrabenazine (Austedo XR) 6 mg tablet extended release 24 hr Take 1 tablet (6 mg) by mouth once daily. Take 1-6mg tablet and 1-24mg tablet for a total dose of 30mg daily. 90 tablet 3     dexAMETHasone 0.5 mg/5 mL oral liquid Take 5 mL (0.5 mg) by mouth 4 times a day for 10 days. 237 mL 1     DULoxetine (Cymbalta) 60 mg DR capsule Take 1 capsule (60 mg) by mouth 2 times a day. Do not crush or chew.       [] ertapenem (INVanz) 1 gram injection Infuse 1 g into a venous catheter once daily for 13 days. Obtain weekly labs: BMP and CBC. Fax to Dr. Ying at  [phone]. 13 g 0     ertapenem (INVanz) 1 gram injection Infuse 1 g into a venous catheter once daily for 28 days. Obtain weekly labs: BMP, CBC, Sed rate, and CRP. Fax to Dr. Ying at [phone]. 28 g 0     famotidine (Pepcid) 20 mg tablet Take 1 tablet (20 mg) by mouth once daily in the morning.       ferrous sulfate (FEOSOL ORAL) Take 1 tablet by mouth once daily in the evening. 200 (65 Fe) MG       HYDROcodone-acetaminophen (Norco) 5-325 mg tablet Take 1 tablet by mouth every 6 hours if needed for severe pain (7 - 10). 20 tablet 0     lamoTRIgine (LaMICtal) 200 mg tablet Take 1 tablet (200 mg) by mouth once daily in the morning.       latanoprost (Xelpros) 0.005 % drops, emulsion Administer into affected eye(s) once daily.       lidocaine (Lidocaine Viscous) 2 % solution Take by mouth.       multivitamin tablet Take 1 tablet by mouth once daily.       naratriptan (Amerge) 2.5 mg tablet Take 1 tablet (2.5 mg) by mouth 1 time if needed for migraine. May repeat once in 4hrs if headache recurs 9 tablet 1     Nyamyc 100,000 unit/gram powder APPLY TO AFFECTED AREA TWICE A DAY 30 g 2     semaglutide (Ozempic) 0.25 mg or 0.5 mg (2 mg/3 mL) pen injector INJECT 0.5 MG UNDER THE SKIN 1 (ONE) TIME PER WEEK. (Patient taking differently: Inject 0.5 mg under the skin 1 (one) time per week. Every Tuesdays) 3 mL 3     sucralfate (Carafate) 1 gram tablet Take 1 tablet (1 g) by mouth once daily at bedtime.       traZODone (Desyrel) 100 mg tablet Take 1.5 tablets (150 mg) by mouth once daily at bedtime.       Xdemvy 0.25 % drops Administer 1 drop into both eyes once daily at bedtime.         Scheduled medications  allopurinol, 400 mg, oral, Daily  deutetrabenazine, 24 mg, oral, Daily  deutetrabenazine, 6 mg, oral, Daily  dexAMETHasone, 0.5 mg, oral, 4x daily  DULoxetine, 60 mg, oral, BID  enoxaparin, 40 mg, subcutaneous, q24h  ertapenem, 500 mg, intravenous, q24h  famotidine, 20 mg, oral, q AM  ferrous sulfate (325 mg  ferrous sulfate), 65 mg of iron, oral, BID  fludrocortisone, 0.1 mg, oral, Daily  lamoTRIgine, 200 mg, oral, q AM  latanoprost, 1 drop, Both Eyes, Nightly  lotilaner, 1 drop, Both Eyes, Nightly  nystatin, 1 Application, Topical, BID  sennosides, 1 tablet, oral, Nightly  sucralfate, 1 g, oral, Nightly  traZODone, 150 mg, oral, Nightly  zinc oxide, 1 Application, Topical, BID      Continuous medications     PRN medications  PRN medications: acetaminophen **OR** acetaminophen, alum-mag hydroxide-simeth, bisacodyl, bisacodyl, cyclobenzaprine, lidocaine, melatonin, naratriptan    Relevant Results      Admission on 08/23/2024   Component Date Value Ref Range Status    WBC 08/24/2024 7.1  4.4 - 11.3 x10*3/uL Final    nRBC 08/24/2024 0.0  0.0 - 0.0 /100 WBCs Final    RBC 08/24/2024 2.43 (L)  4.00 - 5.20 x10*6/uL Final    Hemoglobin 08/24/2024 7.4 (L)  12.0 - 16.0 g/dL Final    Hematocrit 08/24/2024 23.0 (L)  36.0 - 46.0 % Final    MCV 08/24/2024 95  80 - 100 fL Final    MCH 08/24/2024 30.5  26.0 - 34.0 pg Final    MCHC 08/24/2024 32.2  32.0 - 36.0 g/dL Final    RDW 08/24/2024 14.9 (H)  11.5 - 14.5 % Final    Platelets 08/24/2024 108 (L)  150 - 450 x10*3/uL Final    Glucose 08/24/2024 107 (H)  74 - 99 mg/dL Final    Sodium 08/24/2024 136  136 - 145 mmol/L Final    Potassium 08/24/2024 4.6  3.5 - 5.3 mmol/L Final    Chloride 08/24/2024 110 (H)  98 - 107 mmol/L Final    Bicarbonate 08/24/2024 19 (L)  21 - 32 mmol/L Final    Anion Gap 08/24/2024 12  10 - 20 mmol/L Final    Urea Nitrogen 08/24/2024 23  6 - 23 mg/dL Final    Creatinine 08/24/2024 1.52 (H)  0.50 - 1.05 mg/dL Final    eGFR 08/24/2024 37 (L)  >60 mL/min/1.73m*2 Final    Calculations of estimated GFR are performed using the 2021 CKD-EPI Study Refit equation without the race variable for the IDMS-Traceable creatinine methods.  https://jasn.asnjournals.org/content/early/2021/09/22/ASN.6358718996    Calcium 08/24/2024 8.4 (L)  8.6 - 10.3 mg/dL Final    C-Reactive  Protein 08/24/2024 1.59 (H)  <1.00 mg/dL Final    Sedimentation Rate 08/24/2024 16  0 - 30 mm/h Final    WBC 08/25/2024 7.4  4.4 - 11.3 x10*3/uL Final    nRBC 08/25/2024 0.0  0.0 - 0.0 /100 WBCs Final    RBC 08/25/2024 2.56 (L)  4.00 - 5.20 x10*6/uL Final    Hemoglobin 08/25/2024 7.7 (L)  12.0 - 16.0 g/dL Final    Hematocrit 08/25/2024 24.4 (L)  36.0 - 46.0 % Final    MCV 08/25/2024 95  80 - 100 fL Final    MCH 08/25/2024 30.1  26.0 - 34.0 pg Final    MCHC 08/25/2024 31.6 (L)  32.0 - 36.0 g/dL Final    RDW 08/25/2024 15.2 (H)  11.5 - 14.5 % Final    Platelets 08/25/2024 137 (L)  150 - 450 x10*3/uL Final    Glucose 08/25/2024 122 (H)  74 - 99 mg/dL Final    Sodium 08/25/2024 136  136 - 145 mmol/L Final    Potassium 08/25/2024 4.6  3.5 - 5.3 mmol/L Final    Chloride 08/25/2024 107  98 - 107 mmol/L Final    Bicarbonate 08/25/2024 20 (L)  21 - 32 mmol/L Final    Anion Gap 08/25/2024 14  10 - 20 mmol/L Final    Urea Nitrogen 08/25/2024 27 (H)  6 - 23 mg/dL Final    Creatinine 08/25/2024 1.76 (H)  0.50 - 1.05 mg/dL Final    eGFR 08/25/2024 31 (L)  >60 mL/min/1.73m*2 Final    Calculations of estimated GFR are performed using the 2021 CKD-EPI Study Refit equation without the race variable for the IDMS-Traceable creatinine methods.  https://jasn.asnjournals.org/content/early/2021/09/22/ASN.3631748936    Calcium 08/25/2024 8.3 (L)  8.6 - 10.3 mg/dL Final    WBC 08/26/2024 5.3  4.4 - 11.3 x10*3/uL Final    nRBC 08/26/2024 0.0  0.0 - 0.0 /100 WBCs Final    RBC 08/26/2024 2.30 (L)  4.00 - 5.20 x10*6/uL Final    Hemoglobin 08/26/2024 7.1 (L)  12.0 - 16.0 g/dL Final    Hematocrit 08/26/2024 21.8 (L)  36.0 - 46.0 % Final    MCV 08/26/2024 95  80 - 100 fL Final    MCH 08/26/2024 30.9  26.0 - 34.0 pg Final    MCHC 08/26/2024 32.6  32.0 - 36.0 g/dL Final    RDW 08/26/2024 15.2 (H)  11.5 - 14.5 % Final    Platelets 08/26/2024 103 (L)  150 - 450 x10*3/uL Final    Glucose 08/26/2024 109 (H)  74 - 99 mg/dL Final    Sodium 08/26/2024 133  (L)  136 - 145 mmol/L Final    Potassium 08/26/2024 4.8  3.5 - 5.3 mmol/L Final    Chloride 08/26/2024 108 (H)  98 - 107 mmol/L Final    Bicarbonate 08/26/2024 20 (L)  21 - 32 mmol/L Final    Anion Gap 08/26/2024 10  10 - 20 mmol/L Final    Urea Nitrogen 08/26/2024 28 (H)  6 - 23 mg/dL Final    Creatinine 08/26/2024 1.66 (H)  0.50 - 1.05 mg/dL Final    eGFR 08/26/2024 33 (L)  >60 mL/min/1.73m*2 Final    Calculations of estimated GFR are performed using the 2021 CKD-EPI Study Refit equation without the race variable for the IDMS-Traceable creatinine methods.  https://jasn.asnjournals.org/content/early/2021/09/22/ASN.5090235074    Calcium 08/26/2024 8.1 (L)  8.6 - 10.3 mg/dL Final    Occult Blood, Stool X1 08/26/2024 Negative  Negative Final    Glucose 08/27/2024 111 (H)  74 - 99 mg/dL Final    Sodium 08/27/2024 135 (L)  136 - 145 mmol/L Final    Potassium 08/27/2024 4.5  3.5 - 5.3 mmol/L Final    Chloride 08/27/2024 109 (H)  98 - 107 mmol/L Final    Bicarbonate 08/27/2024 22  21 - 32 mmol/L Final    Anion Gap 08/27/2024 9 (L)  10 - 20 mmol/L Final    Urea Nitrogen 08/27/2024 27 (H)  6 - 23 mg/dL Final    Creatinine 08/27/2024 1.75 (H)  0.50 - 1.05 mg/dL Final    eGFR 08/27/2024 31 (L)  >60 mL/min/1.73m*2 Final    Calculations of estimated GFR are performed using the 2021 CKD-EPI Study Refit equation without the race variable for the IDMS-Traceable creatinine methods.  https://jasn.asnjournals.org/content/early/2021/09/22/ASN.1903968042    Calcium 08/27/2024 8.2 (L)  8.6 - 10.3 mg/dL Final    WBC 08/27/2024 9.0  4.4 - 11.3 x10*3/uL Final    nRBC 08/27/2024 0.0  0.0 - 0.0 /100 WBCs Final    RBC 08/27/2024 2.79 (L)  4.00 - 5.20 x10*6/uL Final    Hemoglobin 08/27/2024 8.4 (L)  12.0 - 16.0 g/dL Final    Hematocrit 08/27/2024 26.6 (L)  36.0 - 46.0 % Final    MCV 08/27/2024 95  80 - 100 fL Final    MCH 08/27/2024 30.1  26.0 - 34.0 pg Final    MCHC 08/27/2024 31.6 (L)  32.0 - 36.0 g/dL Final    RDW 08/27/2024 15.0 (H)  11.5  - 14.5 % Final    Platelets 08/27/2024 190  150 - 450 x10*3/uL Final    Platelet count verified by smear review.    Neutrophils % 08/27/2024 83.9  40.0 - 80.0 % Final    Immature Granulocytes %, Automated 08/27/2024 1.0 (H)  0.0 - 0.9 % Final    Immature Granulocyte Count (IG) includes promyelocytes, myelocytes and metamyelocytes but does not include bands. Percent differential counts (%) should be interpreted in the context of the absolute cell counts (cells/UL).    Lymphocytes % 08/27/2024 7.8  13.0 - 44.0 % Final    Monocytes % 08/27/2024 7.0  2.0 - 10.0 % Final    Eosinophils % 08/27/2024 0.2  0.0 - 6.0 % Final    Basophils % 08/27/2024 0.1  0.0 - 2.0 % Final    Neutrophils Absolute 08/27/2024 7.53  1.20 - 7.70 x10*3/uL Final    Percent differential counts (%) should be interpreted in the context of the absolute cell counts (cells/uL).    Immature Granulocytes Absolute, Au* 08/27/2024 0.09  0.00 - 0.70 x10*3/uL Final    Lymphocytes Absolute 08/27/2024 0.70 (L)  1.20 - 4.80 x10*3/uL Final    Monocytes Absolute 08/27/2024 0.63  0.10 - 1.00 x10*3/uL Final    Eosinophils Absolute 08/27/2024 0.02  0.00 - 0.70 x10*3/uL Final    Basophils Absolute 08/27/2024 0.01  0.00 - 0.10 x10*3/uL Final    RBC Morphology 08/27/2024 See Below   Final    Polychromasia 08/27/2024 Mild   Final    Ovalocytes 08/27/2024 Few   Final    Clumped Platelets 08/27/2024 Present   Final    Albumin 08/27/2024 2.3 (L)  3.4 - 5.0 g/dL Final    Glucose 08/28/2024 124 (H)  74 - 99 mg/dL Final    Sodium 08/28/2024 134 (L)  136 - 145 mmol/L Final    Potassium 08/28/2024 4.4  3.5 - 5.3 mmol/L Final    Chloride 08/28/2024 107  98 - 107 mmol/L Final    Bicarbonate 08/28/2024 20 (L)  21 - 32 mmol/L Final    Anion Gap 08/28/2024 11  10 - 20 mmol/L Final    Urea Nitrogen 08/28/2024 29 (H)  6 - 23 mg/dL Final    Creatinine 08/28/2024 1.78 (H)  0.50 - 1.05 mg/dL Final    eGFR 08/28/2024 31 (L)  >60 mL/min/1.73m*2 Final    Calculations of estimated GFR are  performed using the 2021 CKD-EPI Study Refit equation without the race variable for the IDMS-Traceable creatinine methods.  https://jasn.asnjournals.org/content/early/2021/09/22/ASN.2127091108    Calcium 08/28/2024 8.2 (L)  8.6 - 10.3 mg/dL Final    Phosphorus 08/28/2024 3.4  2.5 - 4.9 mg/dL Final    The performance characteristics of phosphorus testing in heparinized plasma have been validated by the individual  laboratory site where testing is performed. Testing on heparinized plasma is not approved by the FDA; however, such approval is not necessary.    Albumin 08/28/2024 2.4 (L)  3.4 - 5.0 g/dL Final    Glucose 08/30/2024 108 (H)  74 - 99 mg/dL Final    Sodium 08/30/2024 133 (L)  136 - 145 mmol/L Final    Potassium 08/30/2024 4.4  3.5 - 5.3 mmol/L Final    Chloride 08/30/2024 108 (H)  98 - 107 mmol/L Final    Bicarbonate 08/30/2024 21  21 - 32 mmol/L Final    Anion Gap 08/30/2024 8 (L)  10 - 20 mmol/L Final    Urea Nitrogen 08/30/2024 31 (H)  6 - 23 mg/dL Final    Creatinine 08/30/2024 1.76 (H)  0.50 - 1.05 mg/dL Final    eGFR 08/30/2024 31 (L)  >60 mL/min/1.73m*2 Final    Calculations of estimated GFR are performed using the 2021 CKD-EPI Study Refit equation without the race variable for the IDMS-Traceable creatinine methods.  https://jasn.asnjournals.org/content/early/2021/09/22/ASN.9715969249    Calcium 08/30/2024 8.3 (L)  8.6 - 10.3 mg/dL Final    Phosphorus 08/30/2024 3.3  2.5 - 4.9 mg/dL Final    The performance characteristics of phosphorus testing in heparinized plasma have been validated by the individual  laboratory site where testing is performed. Testing on heparinized plasma is not approved by the FDA; however, such approval is not necessary.    Albumin 08/30/2024 2.5 (L)  3.4 - 5.0 g/dL Final    Glucose 08/30/2024 108 (H)  74 - 99 mg/dL Final    Sodium 08/30/2024 133 (L)  136 - 145 mmol/L Final    Potassium 08/30/2024 4.4  3.5 - 5.3 mmol/L Final    Chloride 08/30/2024 108 (H)  98 - 107 mmol/L  Final    Bicarbonate 08/30/2024 21  21 - 32 mmol/L Final    Anion Gap 08/30/2024 8 (L)  10 - 20 mmol/L Final    Urea Nitrogen 08/30/2024 31 (H)  6 - 23 mg/dL Final    Creatinine 08/30/2024 1.76 (H)  0.50 - 1.05 mg/dL Final    eGFR 08/30/2024 31 (L)  >60 mL/min/1.73m*2 Final    Calculations of estimated GFR are performed using the 2021 CKD-EPI Study Refit equation without the race variable for the IDMS-Traceable creatinine methods.  https://jasn.asnjournals.org/content/early/2021/09/22/ASN.3233857413    Calcium 08/30/2024 8.3 (L)  8.6 - 10.3 mg/dL Final    Sedimentation Rate 09/02/2024 5  0 - 30 mm/h Final    WBC 09/02/2024 10.1  4.4 - 11.3 x10*3/uL Final    nRBC 09/02/2024 0.0  0.0 - 0.0 /100 WBCs Final    RBC 09/02/2024 2.96 (L)  4.00 - 5.20 x10*6/uL Final    Hemoglobin 09/02/2024 8.8 (L)  12.0 - 16.0 g/dL Final    Hematocrit 09/02/2024 27.4 (L)  36.0 - 46.0 % Final    MCV 09/02/2024 93  80 - 100 fL Final    MCH 09/02/2024 29.7  26.0 - 34.0 pg Final    MCHC 09/02/2024 32.1  32.0 - 36.0 g/dL Final    RDW 09/02/2024 14.5  11.5 - 14.5 % Final    Platelets 09/02/2024 125 (L)  150 - 450 x10*3/uL Final    Glucose 09/02/2024 113 (H)  74 - 99 mg/dL Final    Sodium 09/02/2024 136  136 - 145 mmol/L Final    Potassium 09/02/2024 4.5  3.5 - 5.3 mmol/L Final    Chloride 09/02/2024 108 (H)  98 - 107 mmol/L Final    Bicarbonate 09/02/2024 24  21 - 32 mmol/L Final    Anion Gap 09/02/2024 9 (L)  10 - 20 mmol/L Final    Urea Nitrogen 09/02/2024 44 (H)  6 - 23 mg/dL Final    Creatinine 09/02/2024 1.65 (H)  0.50 - 1.05 mg/dL Final    eGFR 09/02/2024 34 (L)  >60 mL/min/1.73m*2 Final    Calculations of estimated GFR are performed using the 2021 CKD-EPI Study Refit equation without the race variable for the IDMS-Traceable creatinine methods.  https://jasn.asnjournals.org/content/early/2021/09/22/ASN.2061318155    Calcium 09/02/2024 8.1 (L)  8.6 - 10.3 mg/dL Final      Lower extremity venous duplex bilateral    Result Date: 8/26/2024            [place] [id number] Tel [phone] and Fax [phone]  Vascular Lab Report VASC US LOWER EXTREMITY VENOUS DUPLEX BILATERAL  Patient Name:      JOSÉ LUIS DEVINE        Reading Physician:  [id number] Tyler Velásquez MD Study Date:        8/26/2024             Ordering Physician: [id number] EDYTA CARBALLO MRN/PID:           [id number]              Technologist:       Princess Mendez RVT Accession#:        [id number]          Technologist 2: Date of Birth/Age: 1955 / 68 years Encounter#:         [id number] Gender:            F Admission Status:  Inpatient             Location Performed: [facility]  Diagnosis/ICD: Other specified soft tissue disorders-M79.89 Indication:    Limb swelling CPT Codes:     28743 Peripheral venous duplex scan for DVT complete  CONCLUSIONS: Right Lower Venous: No evidence of acute deep vein thrombus visualized in the right lower extremity. Cannot rule out thrombus in non-visualized posterior tibial and Peroneal veins due to edema. Left Lower Venous: No evidence of acute deep vein thrombus visualized in the left lower extremity. Cannot rule out thrombus in non-visualized peroneal vein due to edema.  Imaging & Doppler Findings:  Right                 Compressible Thrombus        Flow Distal External Iliac     Yes        None   Spontaneous/Phasic CFV                       Yes        None   Spontaneous/Phasic PFV                       Yes        None FV Proximal               Yes        None   Spontaneous/Phasic FV Mid                    Yes        None FV Distal                 Yes        None Popliteal                 Yes        None   Spontaneous/Phasic  Left                  Compress Thrombus        Flow Distal External Iliac   Yes      None   Spontaneous/Phasic  "CFV                     Yes      None   Spontaneous/Phasic PFV                     Yes      None FV Proximal             Yes      None   Spontaneous/Phasic FV Mid                  Yes      None FV Distal               Yes      None Popliteal               Yes      None   Spontaneous/Phasic PTV                     Yes      None  [id number] Tyler Velásquez MD Electronically signed by [id number] Tyler Velásquez MD on 8/26/2024 at 5:11:48 PM  ** Final **     CT head wo IV contrast    Result Date: 8/26/2024  Interpreted By:  Nunu Garcia, STUDY: CT HEAD WO IV CONTRAST; ;  8/26/2024 12:38 pm   INDICATION: Signs/Symptoms:confusion.   COMPARISON: 07/07/2023   ACCESSION NUMBER(S): [id number]   ORDERING CLINICIAN: EDYTA CARBALLO   TECHNIQUE: Serial axial images of the head were obtained without intravenous contrast. Sagittal and coronal reconstructions were generated.   FINDINGS: The ventricles are midline and normal in size.   There are no acute parenchymal abnormalities.   There is no hemorrhage or extra-axial fluid.   There is no obvious scalp hematoma or skull fracture.   Paranasal sinuses and mastoids are unremarkable. The patient appears to be status post bilateral cataract surgery.   COMPARISON OF FINDINGS: The brain is similar.       No acute intracranial abnormality     MACRO: none   Signed by: Nunu Garcia 8/26/2024 12:52 PM Dictation workstation:   [id number]    IR CVC tunneled    Result Date: 8/19/2024  Interpreted By:  Brandon Blackman, STUDY: IR CVC TUNNELED;  8/16/2024 10:20 am   INDICATION: Signs/Symptoms:tunnelled picc.   COMPARISON: None.   ACCESSION NUMBER(S): [id number]   ORDERING CLINICIAN: EDMOND DYE   TECHNIQUE: INTERVENTIONALIST(S): Brandon Blackman MD   The history and physical exam pertinent to the procedure were reviewed and no updates were made.\"   CONSENT: The patient/patient's POA/next of kin was informed of the nature of the proposed procedure. The purposes, alternatives, risks, and benefits were " explained and discussed. All questions were answered and consent was obtained.   RADIATION EXPOSURE: Fluoroscopy time: 0.1 min. Dose: 0.3 mGy.     SEDATION: None   MEDICATION/CONTRAST: No additional   TIME OUT: A time out was performed immediately prior to procedure start with the interventional team, correctly identifying the patient name, date of birth, MRN, procedure, anatomy (including marking of site and side), patient position, procedure consent form, relevant laboratory and imaging test results, antibiotic administration, safety precautions, and procedure-specific equipment needs.   COMPLICATIONS: No immediate adverse events identified.   FINDINGS: In the recumbent position, the patient was positioned on the angiography table. The right supraclavicular and infraclavicular cutaneous tissues were prepared and draped in usual sterile manner.   The supraclavicular access site was screened with gray-scale ultrasound with subsequent subcutaneous instillation of Lidocaine 1% local anesthesia. The plan tunnel site of the infraclavicular chest was also anesthetized. A small skin nick was made at the intended venotomy site and the tunnel entry site. The subcutaneous tissues were dissected using blunt instrumentation. Ultrasound images demonstrate a patent right internal jugular vein. Under direct ultrasound guidance and Seldinger/micropuncture technique, the right internal jugular vein was accessed. An ultrasound digital spot image was acquired and stored on the  PACS. Via the access needle, a 0.018 in marking wire was advanced to the level of the IVC. An image was stored. Then, the wire was used to measure the intravascular length of the planned catheter to the level of the superior cavoatrial junction. The needle was then exchanged for a 5 [place] peel-away sheath. Then, the 5 [place] single lumen small bore catheter was brought through the tunnel. It was cut to length  and fed through the peel-away sheath which was  peeled off. The suture was secured at the tunnel exit site using pursestring suture (3-0 Ethilon). The access site was covered with sterile hCG dressing. There is good skin apposition at the venotomy site. The venotomy site was covered with Steri-Strips and sterile dressing. The catheter was flushed using heparin 10 units/mL.   Completion radiograph demonstrates no evidence of kinking. The tip of the catheter is at the cavoatrial junction. No pneumothorax. Catheter is ready to use.     The patient tolerated the procedure without complication.       Technically successful right IJ tunneled small bore catheter placement.   I was present for and/or performed the critical portions of the procedure and immediately available throughout the entire procedure.   I personally reviewed the image(s)/study and interpretation. I agree with the findings as stated.   Performed and dictated at [facility].   MACRO: None   Signed by: Brandon Blackman 8/19/2024 11:55 AM Dictation workstation:   [id number]    Bedside Midline Imaging    Result Date: 8/14/2024  These images are not reportable by radiology and will not be interpreted by  Radiologists.    Bedside Midline Imaging    Result Date: 8/13/2024  These images are not reportable by radiology and will not be interpreted by  Radiologists.    CT abdomen pelvis w IV contrast    Result Date: 8/8/2024  Interpreted By:  Thomas Zacarias, STUDY: CT ABDOMEN PELVIS W IV CONTRAST; 8/8/2024 4:43 pm   INDICATION: Signs/Symptoms:Recent abdominoplasty, purulent drainage, wound infection;   COMPARISON: None   ACCESSION NUMBER(S): [id number]   ORDERING CLINICIAN: YAW BALDWIN   TECHNIQUE: Contiguous axial images of the abdomen/pelvis were performed with IV contrast. 75 ml of Omnipaque 350 was utilized. Coronal and sagittal reformatted images were also obtained. All CT examinations are performed with 1 or more of the following dose reduction techniques:  Automated exposure control, adjustment of mA and/or kv according to patient's size, or use of iterative reconstruction techniques.     FINDINGS: The liver is nodular in contour consistent with cirrhosis. No focal hepatic lesions. The common bile duct, pancreas, and adrenal glands are unremarkable. 2 cm hypodense lesion in the spleen most likely a small cyst. Spleen is otherwise normal in size and unremarkable. Prior cholecystectomy with surgical clips in the gallbladder fossa.   The kidneys enhance symmetrically. No urolithiasis is seen. No hydroureteronephrosis is seen. There are a few hypodense lesions in the kidneys bilaterally which are too small to characterize on the right. The largest in the left kidney is compatible with a cyst in the lower pole measuring 3.7 x 3.6 cm.   The visualized aorta is unremarkable.   The small bowel is not dilated. The appendix is not identified however no evidence for acute inflammatory change. Mild diverticulosis of the colon. No evidence for acute diverticulitis.   The bladder is minimally distended and otherwise unremarkable.   The visualized osseous structures are intact.   Limited images of the lower thorax show a partially visualized left mastectomy and breast implant.   There is scattered subcutaneous air/gas in the deep subcutaneous tissues along the abdomen. Surgical drains are noted in place anterior to the abdominal musculature. Findings are limited to the deep subcutaneous tissues. No free intraperitoneal air or fluid is seen. No abscess or collections are noted in the region of the abdominoplasty and drains. There are multiple gas pockets however no fluid collections or abscess is seen.       1. scattered subcutaneous air/gas in the deep subcutaneous tissues along the abdomen. Surgical drains are in good position anterior to the abdominal musculature. Findings are limited to the deep subcutaneous tissues. No free intraperitoneal air or fluid is seen. No abscess or  collections are noted in the region of the abdominoplasty and drains. There are multiple subcutaneous gas/air pockets however no fluid collections or abscess is seen.   2. Hepatic cirrhosis. No focal hepatic lesions.   3. Diverticulosis of the colon without evidence for acute diverticulitis.   Signed by: Thomas Zacarias 8/8/2024 5:03 PM Dictation workstation:   [id number]        ASSESSMENT AND PLAN    Nonoliguric RUSSELL on CKD, baseline creatinine 0.9-1, was 1.5 on admission - 1.7 - 1.65  Hyponatremia, most corrected Na at target  NAGMA, controlled  Hypocalcemia  Hyperuricemia    Plan  - avoid hypotension and nephrotoxins  - unclear to me why on florinef, will follow  - reduce allopurinol to 100 mg every day  - will follow    MARS reviewed,     Thank you for the opportunity to assist in the care of this patient, please call with questions  Brooklynn Shepherd MD PhD

## 2024-09-03 NOTE — PROGRESS NOTES
Occupational Therapy    OT Treatment    Patient Name: Mariann Drew  MRN: [id number]  Today's Date: 9/3/2024  Time Calculation  Start Time: 1000  Stop Time: 1045  Time Calculation (min): 45 min        Assessment:  End of Session Communication: Bedside nurse  End of Session Patient Position: Up in chair, Alarm on     Plan:  Treatment Interventions: ADL retraining, Functional transfer training, UE strengthening/ROM, Endurance training, Equipment evaluation/education, Patient/family training, Compensatory technique education, Fine motor coordination activities  OT Frequency: 5 times per week (6 days/PRN)  Treatment Interventions: ADL retraining, Functional transfer training, UE strengthening/ROM, Endurance training, Equipment evaluation/education, Patient/family training, Compensatory technique education, Fine motor coordination activities    Subjective   Previous Visit Info:  OT Last Visit  OT Received On: 09/03/24  General:  General  Prior to Session Communication: Bedside nurse  Patient Position Received: Up in chair, Alarm on  General Comment: Pt agreeable to OT session. Reports feeling better overall.  Precautions:  Medical Precautions: Fall precautions  Post-Surgical Precautions: Abdominal surgery precautions  Precautions Comment: Wound vac and port/IV    Pain:  Pain Assessment  0-10 (Numeric) Pain Score:  (Patient does not complain of pain)    Bed Mobility/Transfers: Transfers  Transfer: Yes  Transfer 1  Trials/Comments 1: Sit<>stand transfer with CGA cues for hand placement when returning to sit.  Transfers 2  Trials/Comments 2: Pivot transfer via ww from wheelchair>recliner with CGA cues for technique for approach steps to recliner.      Functional Mobility:  Functional Mobility 1  Comments 1: Simple functional mobility via ww from wheelchair <>3 in1 commode with CGA.  Standing Balance:  Static Standing Balance  Static Standing-Comment/Number of Minutes: Static standing at ww during table top task with  unilateral support tolerating 3 minutes with CGA for improved strength and endurance with ADLs and transfers.  Dynamic Standing Balance  Dynamic Standing-Comments: Dynamic standing balance tolerating ~1 minute intervals incorporating multiple sit-stand transfers with CGA for transfers/balance x 15 minutes. Requires hand placement cues 50% of transfers.  EDUCATION:   Fall precautons;POC discussed and agreed upon;     Goals:  Encounter Problems       Encounter Problems (Active)       OT Problem       STG- patient will complete grooming with MIN A with use of ae/ad/dme prn (Met)       Start:  08/23/24    Expected End:  08/30/24    Resolved:  08/28/24    Updated to: STG- patient will complete grooming with  Set up with use of ae/ad/dme prn         STG- patient will complete bathing with MAX A with use of ae/ad/dme prn (Met)       Start:  08/23/24    Expected End:  09/06/24    Resolved:  08/28/24    Updated to: STG- patient will complete bathing with Mod A with use of ae/ad/dme prn         STG- patient will complete UB dressing with MOD A with use of ae/ad/dme prn (Met)       Start:  08/23/24    Expected End:  08/30/24    Resolved:  08/28/24    Updated to: STG- patient will complete UB dressing with SBA with use of ae/ad/dme prn         STG- patient will complete LB dressing with MAX A with use of ae/ad/dme prn (Met)       Start:  08/23/24    Expected End:  09/06/24    Resolved:  08/28/24    Updated to: STG- patient will complete LB dressing with Mod A with use of ae/ad/dme prn         STG- patient will complete toileting with MAX A with use of ae/ad/dme prn  (Met)       Start:  08/23/24    Expected End:  09/06/24    Resolved:  08/28/24    Updated to: STG- patient will complete toileting with Mod A with use of ae/ad/dme prn         STG- patient will complete toilet/commode transfers with MAX A with use of ae/ad/dme prn (Met)       Start:  08/23/24    Expected End:  08/30/24    Resolved:  08/28/24    Updated to: STG-  patient will complete toilet/commode transfers with  CGA with use of ae/ad/dme prn         STG- patient will complete tub/shower transfers with MAX A with use of ae/ad/dme prn (Progressing)       Start:  08/23/24    Expected End:  09/06/24            STG- patient will complete simple mobility with MOD A with use of ae/ad/dme prn (Met)       Start:  08/23/24    Expected End:  08/30/24    Resolved:  08/28/24    Updated to: STG- patient will complete simple mobility with CGA with use of ae/ad/dme prn         LTG- patient will complete grooming with CGA with use of ae/ad/dme prn (Met)       Start:  08/23/24    Expected End:  09/06/24    Resolved:  08/28/24    Updated to: LTG- patient will complete grooming with supervision with use of ae/ad/dme prn         LTG- patient will complete bathing with MOD A with use of ae/ad/dme prn (Progressing)       Start:  08/23/24    Expected End:  09/06/24            LTG- patient will complete UB dressing with Min A  with use of ae/ad/dme prn (Met)       Start:  08/23/24    Expected End:  09/06/24    Resolved:  08/28/24    Updated to: LTG- patient will complete UB dressing with set up  with use of ae/ad/dme prn         LTG- patient will complete LB dressing with MOD A with use of ae/ad/dme prn (Progressing)       Start:  08/23/24    Expected End:  09/06/24            LTG- patient will complete toileting with MOD A with use of ae/ad/dme prn  (Progressing)       Start:  08/23/24    Expected End:  09/06/24            LTG- patient will complete toilet/commode transfers with Mod A with use of ae/ad/dme prn (Met)       Start:  08/23/24    Expected End:  09/06/24    Resolved:  08/28/24    Updated to: LTG- patient will complete toilet/commode transfers with CGA with use of ae/ad/dme prn         LTG- patient will complete tub/shower transfers with MOD A with use of ae/ad/dme prn (Progressing)       Start:  08/23/24    Expected End:  09/06/24            LTG- patient will complete simple mobility  with MIN A with use of ae/ad/dme prn (Progressing)       Start:  08/23/24    Expected End:  09/06/24            STG- patient will complete grooming with  Set up with use of ae/ad/dme prn (Progressing)       Start:  08/28/24    Expected End:  09/06/24                STG- patient will complete UB dressing with SBA with use of ae/ad/dme prn (Progressing)       Start:  08/28/24    Expected End:  09/06/24                STG- patient will complete toilet/commode transfers with  CGA with use of ae/ad/dme prn (Progressing)       Start:  08/28/24    Expected End:  09/06/24                STG- patient will complete simple mobility with CGA with use of ae/ad/dme prn (Progressing)       Start:  08/28/24    Expected End:  09/06/24                LTG- patient will complete grooming with supervision with use of ae/ad/dme prn (Met)       Start:  08/28/24    Expected End:  09/06/24    Resolved:  08/28/24    Updated to: LTG- patient will complete grooming with set up with use of ae/ad/dme prn             LTG- patient will complete UB dressing with set up  with use of ae/ad/dme prn (Progressing)       Start:  08/28/24    Expected End:  09/06/24                LTG- patient will complete toilet/commode transfers with CGA with use of ae/ad/dme prn (Progressing)       Start:  08/28/24    Expected End:  09/06/24                LTG- patient will complete grooming with set up with use of ae/ad/dme prn (Progressing)       Start:  08/28/24    Expected End:  09/06/24                STG- patient will complete bathing with Mod A with use of ae/ad/dme prn (Progressing)       Start:  08/28/24    Expected End:  09/06/24                STG- patient will complete LB dressing with Mod A with use of ae/ad/dme prn (Progressing)       Start:  08/28/24    Expected End:  09/06/24                STG- patient will complete toileting with Mod A with use of ae/ad/dme prn (Progressing)       Start:  08/28/24    Expected End:  09/06/24

## 2024-09-03 NOTE — PROGRESS NOTES
"Nutrition Follow Up Assessment:     Nutrition History:  Energy Intake: Fair 50-75 %  Food and Nutrient History: Pt was out of room working with therapy at time of attempted visit today. Will continue with Regular diet as ordered. Will also continue with Ensure clear at B, magic cup at L, and Gelatein at D. Last BM 9/2. Na+ 136.  Vitamin/Herbal Supplement Use: FeSO4, MVI  Food Allergies/Intolerances:  None  GI Symptoms: None  Oral Problems: None       Anthropometrics:  Height: 162.6 cm (5' 4.02\")   Weight: 72.2 kg (159 lb 2.8 oz)   BMI (Calculated): 27.31  IBW/kg (Dietitian Calculated): 54.5 kg  Percent of IBW: 132 %       Weight History:     Weight Change %:   No new weight, however requested standing scale weight today     Nutrition Focused Physical Exam Findings:  defer: completed on 8/9/24  Subcutaneous Fat Loss:      Muscle Wasting:     Edema:  Edema: +3 moderate, +1 trace  Edema Location: NPO BUE, 1+ generalized, 3+ BLE  Physical Findings:  Skin: Positive (abdomen with wound vac)    Nutrition Significant Labs:  BMP Trend:   Results from last 7 days   Lab Units 09/02/24  0600 08/30/24  0634 08/28/24  0544   GLUCOSE mg/dL 113* 108*  108* 124*   CALCIUM mg/dL 8.1* 8.3*  8.3* 8.2*   SODIUM mmol/L 136 133*  133* 134*   POTASSIUM mmol/L 4.5 4.4  4.4 4.4   CO2 mmol/L 24 21  21 20*   CHLORIDE mmol/L 108* 108*  108* 107   BUN mg/dL 44* 31*  31* 29*   CREATININE mg/dL 1.65* 1.76*  1.76* 1.78*        Nutrition Specific Medications:  Reviewed     I/O:   Last BM Date: 09/02/24; Stool Appearance: Formed (09/01/24 2229)    Dietary Orders (From admission, onward)       Start     Ordered    08/29/24 1343  Oral nutritional supplements  Until discontinued        Question Answer Comment   Deliver with Dinner    Select supplement: Gelatein Sugar Free        08/29/24 1342    08/29/24 1343  Oral nutritional supplements  Until discontinued        Question Answer Comment   Deliver with Breakfast    Select supplement: Product " from home - please specify    Additional Details cottage cheese        08/29/24 1342    08/29/24 1342  Oral nutritional supplements  Until discontinued        Question Answer Comment   Deliver with Breakfast    Select supplement: Ensure Clear        08/29/24 1342    08/29/24 1342  Oral nutritional supplements  Until discontinued        Question Answer Comment   Deliver with Lunch    Select supplement: Magic Cup        08/29/24 1342    08/23/24 1745  May Participate in Room Service  Once        Question:  .  Answer:  Yes    08/23/24 1745    08/23/24 1426  Adult diet Regular  Diet effective now        Question:  Diet type  Answer:  Regular    08/23/24 1426                     Estimated Needs:      Method for Estimating Needs: 1470-1635kcals (27-30kcals/kg iBW)     Method for Estimating Needs: 65-82g (1.2-1.5g/kg IBW)     Method for Estimating Needs: 1 mL/kcal or as per MD        Nutrition Diagnosis   Malnutrition Diagnosis  Patient has Malnutrition Diagnosis: No    Nutrition Diagnosis  Patient has Nutrition Diagnosis: Yes  Diagnosis Status (1): Ongoing  Nutrition Diagnosis 1: Increased nutrient needs  Related to (1): physiological causes increasing nutrient needs  As Evidenced by (1): wound healing needs and acute rehab demands increasing physical activity  Additional Nutrition Diagnosis: Diagnosis 2  Diagnosis Status (2): Ongoing  Nutrition Diagnosis 2: Inadequate oral intake  Related to (2): acute illness, recent surgery  As Evidenced by (2): PO intakes are variable, however not a big eater since bariatric surgery       Nutrition Interventions/Recommendations         Nutrition Prescription:  Individualized Nutrition Prescription Provided for : Regular diet. Ensure clear at B, magic cup at L, and Gelatein at D.        Nutrition Interventions:   Interventions: Meals and snacks, Medical food supplement  Meals and Snacks: General healthful diet  Goal: consume .50->75% of meals-met/ongoing  Medical Food Supplement:  Commercial beverage, Commercial food  Goal: consume >75% of Ensure clear at breakfast (for an additional 240 kcals, 9 gm protein each)  Additional Interventions: consume >75% of Magic cup at Lunch (for an additional 290 kcals, 9 gm protein each); consume >75% of SF Gelatein at dinner (80 kcals, 20gm protein each)         Nutrition Education:   N/A       Nutrition Monitoring and Evaluation   Food/Nutrient Related History Monitoring  Monitoring and Evaluation Plan: Energy intake, Fluid intake, Amount of food  Energy Intake: Estimated energy intake  Criteria: Meal/ONS intake to meet >75% of estimated needs  Fluid Intake: Estimated fluid intake  Criteria: fluid intake to meet >75% of estimated need  Amount of Food: Estimated amout of food, Medical food intake  Criteria: Pt to consume >75% of meals/ONS    Body Composition/Growth/Weight History  Monitoring and Evaluation Plan: Weight  Weight: Measured weight  Criteria: reweigh at least every 5 days    Biochemical Data, Medical Tests and Procedures  Monitoring and Evaluation Plan: Electrolyte/renal panel  Criteria: labs WNL    Nutrition Focused Physical Findings  Monitoring and Evaluation Plan: Skin  Criteria: promote healing through adequate nutrition         Time Spent (min): 30 minutes

## 2024-09-03 NOTE — CARE PLAN
The patient's goals for the shift include      The clinical goals for the shift include No issues with wound vac    Problem: Skin  Goal: Decreased wound size/increased tissue granulation at next dressing change  Outcome: Progressing  Flowsheets (Taken 9/2/2024 2338)  Decreased wound size/increased tissue granulation at next dressing change:   Promote sleep for wound healing   Utilize specialty bed per algorithm   Protective dressings over bony prominences  Goal: Participates in plan/prevention/treatment measures  Outcome: Progressing  Flowsheets (Taken 9/2/2024 1542 by Mary Laboy RN)  Participates in plan/prevention/treatment measures:   Elevate heels   Increase activity/out of bed for meals  Goal: Promote/optimize nutrition  Outcome: Progressing  Flowsheets (Taken 9/2/2024 2338)  Promote/optimize nutrition:   Assist with feeding   Monitor/record intake including meals   Consume > 50% meals/supplements   Offer water/supplements/favorite foods  Goal: Promote skin healing  Outcome: Progressing  Flowsheets (Taken 9/2/2024 2338)  Promote skin healing:   Assess skin/pad under line(s)/device(s)   Protective dressings over bony prominences   Turn/reposition every 2 hours/use positioning/transfer devices   Ensure correct size (line/device) and apply per  instructions     Problem: Fall/Injury  Goal: Verbalize understanding of risk factor reduction measures to prevent injury from fall in the home  Outcome: Progressing  Goal: Use assistive devices by end of the shift  Outcome: Progressing  Goal: Pace activities to prevent fatigue by end of the shift  Outcome: Progressing     Problem: Pain  Goal: Takes deep breaths with improved pain control throughout the shift  Outcome: Progressing  Goal: Turns in bed with improved pain control throughout the shift  Outcome: Progressing  Goal: Walks with improved pain control throughout the shift  Outcome: Progressing  Goal: Performs ADL's with improved pain control  throughout shift  Outcome: Progressing  Goal: Participates in PT with improved pain control throughout the shift  Outcome: Progressing     Problem: Wound Care  Goal: Area will decrease in size .2x.2 cm per week  Outcome: Progressing     Problem: Diabetes  Goal: Achieve decreasing blood glucose levels by end of shift  Outcome: Progressing  Goal: Increase stability of blood glucose readings by end of shift  Outcome: Progressing  Goal: Decrease in ketones present in urine by end of shift  Outcome: Progressing  Goal: Maintain electrolyte levels within acceptable range throughout shift  Outcome: Progressing  Goal: Maintain glucose levels >70mg/dl to <250mg/dl throughout shift  Outcome: Progressing  Goal: No changes in neurological exam by end of shift  Outcome: Progressing  Goal: Learn about and adhere to nutrition recommendations by end of shift  Outcome: Progressing  Goal: Vital signs within normal range for age by end of shift  Outcome: Progressing  Goal: Increase self care and/or family involovement by end of shift  Outcome: Progressing  Goal: Receive DSME education by end of shift  Outcome: Progressing

## 2024-09-03 NOTE — PROGRESS NOTES
Physical Therapy    Physical Therapy Treatment    Patient Name: Mariann Drew  MRN: [id number]  Today's Date: 9/3/2024  Time Calculation  Start Time: 1300  Stop Time: 1345  Time Calculation (min): 45 min    Assessment/Plan   PT Assessment  End of Session Communication: Bedside nurse  End of Session Patient Position: Up in chair, Alarm on     PT Plan  Treatment/Interventions: Bed mobility, Transfer training, Gait training, Stair training, Balance training, Strengthening, Endurance training, Range of motion, Therapeutic exercise, Therapeutic activity, Home exercise program, Positioning, Postural re-education  PT Plan: Ongoing PT  PT Frequency: 5 times per week (6 times PRN)  PT Discharge Recommendations:  (Anticipate pt to require intermittent min A at walker level at time of discharge.)  Equipment Recommended upon Discharge: Wheeled walker  PT Recommended Transfer Status: Assist x2 (at time of initial evaluation)      General Visit Information:   PT  Visit  PT Received On: 09/03/24  General  Prior to Session Communication: Bedside nurse  Patient Position Received: Up in chair, Alarm on  General Comment:  (Pt reports she feels sleepy this afternoon.)    Subjective   Precautions:  Precautions  Medical Precautions: Fall precautions  Post-Surgical Precautions: Abdominal surgery precautions  Precautions Comment: Wound vac and port/IV      Objective   Pain:  Pain Assessment  Pain Assessment: 0-10  0-10 (Numeric) Pain Score: 0 - No pain  Pain Interventions: Repositioned, Distraction    Treatments:  Bed Mobility  Bed Mobility:  (Pt performs sit/supine with max A x 1.)    Ambulation/Gait Training  Ambulation/Gait Training Performed:  (Pt ambulates 50 ft, 50 ft with FWW and CGA/min A x 1.  Cues for safety throughout, assist to avoid obstacles. Pt ambualtes 5 ft from w/c to bathroom and 12 ft from bathroom to bed with FWW and CGA, min A x 1 for commode/bed approach.)  Transfers  Transfer:  (Pt performs sit/stand transfers with  "CGA, cues for proper hand placement at times.)    Stairs  Stairs:  (Pt ascends/descends 2 6\" steps x 2 reps for 2 sets with seated rest between sets.  Pt utilizes BUE on LHR ascending, requires mod/min A x 2.  Mod cues throughout for safety and sequencing.)      Education Documentation  Pt educated on techniques for transfers and gait training with device in order to maximize safety and independence.  Pt educated on stair training techniques including proper sequencing to maximize safety.    Encounter Problems       Encounter Problems (Active)       PT Problem       LTG - Pt will perform bed mobility with mod A x 1  (Progressing)       Start:  08/23/24    Expected End:  09/06/24            LTG - Pt will perform transfers with min A x 1.  (Progressing)       Start:  08/23/24    Expected End:  09/06/24            LTG - Pt will amb 150 feet with FWW and CGA.   (Progressing)       Start:  08/23/24    Expected End:  09/06/24            LTG - Pt will up/down 2 stairs with 1 HR and straight cane with min A x 1.  (Progressing)       Start:  08/23/24    Expected End:  09/06/24            STG - Pt will perform bed mobility with mod assist x 2 (Progressing)       Start:  08/23/24    Expected End:  08/30/24            STG - Pt will perform transfers with mod assist x 1 (Met)       Start:  08/23/24    Expected End:  08/30/24    Resolved:  08/28/24         STG - Pt will amb 50 feet with FWW and mod/min assist x 1 (Progressing)       Start:  08/23/24    Expected End:  08/30/24            STG - Pt will up/down curb step with FWW and mod A x 2.  (Progressing)       Start:  08/23/24    Expected End:  08/30/24                   "

## 2024-09-04 ENCOUNTER — OFFICE VISIT (OUTPATIENT)
Dept: PLASTIC SURGERY | Facility: CLINIC | Age: 69
End: 2024-09-04
Payer: MEDICARE

## 2024-09-04 ENCOUNTER — APPOINTMENT (OUTPATIENT)
Dept: PLASTIC SURGERY | Facility: CLINIC | Age: 69
End: 2024-09-04
Payer: MEDICARE

## 2024-09-04 DIAGNOSIS — T81.30XD ABDOMINAL WOUND DEHISCENCE, SUBSEQUENT ENCOUNTER: Primary | ICD-10-CM

## 2024-09-04 PROCEDURE — 1180000001 HC REHAB PRIVATE ROOM DAILY

## 2024-09-04 PROCEDURE — 99024 POSTOP FOLLOW-UP VISIT: CPT | Performed by: PHYSICIAN ASSISTANT

## 2024-09-04 PROCEDURE — 2500000005 HC RX 250 GENERAL PHARMACY W/O HCPCS: Performed by: INTERNAL MEDICINE

## 2024-09-04 PROCEDURE — 2500000004 HC RX 250 GENERAL PHARMACY W/ HCPCS (ALT 636 FOR OP/ED): Performed by: PHYSICAL MEDICINE & REHABILITATION

## 2024-09-04 PROCEDURE — 2500000001 HC RX 250 WO HCPCS SELF ADMINISTERED DRUGS (ALT 637 FOR MEDICARE OP): Performed by: INTERNAL MEDICINE

## 2024-09-04 PROCEDURE — 2500000001 HC RX 250 WO HCPCS SELF ADMINISTERED DRUGS (ALT 637 FOR MEDICARE OP): Performed by: PHYSICAL MEDICINE & REHABILITATION

## 2024-09-04 PROCEDURE — 97110 THERAPEUTIC EXERCISES: CPT | Mod: GP

## 2024-09-04 PROCEDURE — 97116 GAIT TRAINING THERAPY: CPT | Mod: GP

## 2024-09-04 PROCEDURE — 2500000004 HC RX 250 GENERAL PHARMACY W/ HCPCS (ALT 636 FOR OP/ED): Performed by: INTERNAL MEDICINE

## 2024-09-04 PROCEDURE — 97535 SELF CARE MNGMENT TRAINING: CPT | Mod: GO

## 2024-09-04 PROCEDURE — 3044F HG A1C LEVEL LT 7.0%: CPT | Performed by: PHYSICIAN ASSISTANT

## 2024-09-04 PROCEDURE — 97530 THERAPEUTIC ACTIVITIES: CPT | Mod: GP

## 2024-09-04 PROCEDURE — 1111F DSCHRG MED/CURRENT MED MERGE: CPT | Performed by: PHYSICIAN ASSISTANT

## 2024-09-04 PROCEDURE — 97530 THERAPEUTIC ACTIVITIES: CPT | Mod: GO

## 2024-09-04 RX ORDER — SIMETHICONE 80 MG
80 TABLET,CHEWABLE ORAL 4 TIMES DAILY PRN
Status: DISCONTINUED | OUTPATIENT
Start: 2024-09-04 | End: 2024-09-06 | Stop reason: HOSPADM

## 2024-09-04 RX ADMIN — DEXAMETHASONE 0.5 MG: 1 TABLET ORAL at 06:41

## 2024-09-04 RX ADMIN — FERROUS SULFATE TAB 325 MG (65 MG ELEMENTAL FE) 325 MG: 325 (65 FE) TAB at 21:46

## 2024-09-04 RX ADMIN — SIMETHICONE 80 MG: 80 TABLET, CHEWABLE ORAL at 21:45

## 2024-09-04 RX ADMIN — ZINC OXIDE 1 APPLICATION: 200 OINTMENT TOPICAL at 21:45

## 2024-09-04 RX ADMIN — ALLOPURINOL 100 MG: 100 TABLET ORAL at 08:27

## 2024-09-04 RX ADMIN — TRAZODONE HYDROCHLORIDE 150 MG: 50 TABLET ORAL at 21:46

## 2024-09-04 RX ADMIN — SUCRALFATE 1 G: 1 TABLET ORAL at 21:46

## 2024-09-04 RX ADMIN — ENOXAPARIN SODIUM 40 MG: 100 INJECTION SUBCUTANEOUS at 17:47

## 2024-09-04 RX ADMIN — SENNOSIDES 8.6 MG: 8.6 TABLET, FILM COATED ORAL at 21:46

## 2024-09-04 RX ADMIN — LOTILANER OPHTHALMIC SOLUTION 1 DROP: 2.5 SOLUTION/ DROPS OPHTHALMIC at 21:46

## 2024-09-04 RX ADMIN — LAMOTRIGINE 200 MG: 100 TABLET ORAL at 08:27

## 2024-09-04 RX ADMIN — NYSTATIN 1 APPLICATION: 100000 CREAM TOPICAL at 08:27

## 2024-09-04 RX ADMIN — DEXAMETHASONE 0.5 MG: 1 TABLET ORAL at 17:47

## 2024-09-04 RX ADMIN — DEUTETRABENAZINE 6 MG: 6 TABLET, FILM COATED, EXTENDED RELEASE ORAL at 08:27

## 2024-09-04 RX ADMIN — FLUDROCORTISONE ACETATE 0.1 MG: 0.1 TABLET ORAL at 08:27

## 2024-09-04 RX ADMIN — FERROUS SULFATE TAB 325 MG (65 MG ELEMENTAL FE) 325 MG: 325 (65 FE) TAB at 08:27

## 2024-09-04 RX ADMIN — DULOXETINE HYDROCHLORIDE 60 MG: 30 CAPSULE, DELAYED RELEASE ORAL at 08:27

## 2024-09-04 RX ADMIN — DEUTETRABENAZINE 24 MG: 24 TABLET, FILM COATED, EXTENDED RELEASE ORAL at 08:27

## 2024-09-04 RX ADMIN — DEXAMETHASONE 0.5 MG: 1 TABLET ORAL at 21:46

## 2024-09-04 RX ADMIN — ACETAMINOPHEN 650 MG: 325 TABLET ORAL at 06:40

## 2024-09-04 RX ADMIN — DEXAMETHASONE 0.5 MG: 1 TABLET ORAL at 13:12

## 2024-09-04 RX ADMIN — NYSTATIN 1 APPLICATION: 100000 CREAM TOPICAL at 21:45

## 2024-09-04 RX ADMIN — LATANOPROST 1 DROP: 50 SOLUTION OPHTHALMIC at 21:45

## 2024-09-04 RX ADMIN — FAMOTIDINE 20 MG: 20 TABLET, FILM COATED ORAL at 08:27

## 2024-09-04 RX ADMIN — ACETAMINOPHEN 650 MG: 325 TABLET ORAL at 21:38

## 2024-09-04 RX ADMIN — ZINC OXIDE 1 APPLICATION: 200 OINTMENT TOPICAL at 08:27

## 2024-09-04 RX ADMIN — WATER 500 MG: 1 INJECTION INTRAMUSCULAR; INTRAVENOUS; SUBCUTANEOUS at 08:27

## 2024-09-04 RX ADMIN — DULOXETINE HYDROCHLORIDE 60 MG: 30 CAPSULE, DELAYED RELEASE ORAL at 21:46

## 2024-09-04 RX ADMIN — ACETAMINOPHEN 650 MG: 325 TABLET ORAL at 17:47

## 2024-09-04 RX ADMIN — Medication 3 MG: at 21:47

## 2024-09-04 ASSESSMENT — PAIN - FUNCTIONAL ASSESSMENT
PAIN_FUNCTIONAL_ASSESSMENT: 0-10

## 2024-09-04 ASSESSMENT — PAIN SCALES - GENERAL
PAINLEVEL_OUTOF10: 0 - NO PAIN
PAINLEVEL_OUTOF10: 5 - MODERATE PAIN
PAINLEVEL_OUTOF10: 3
PAINLEVEL_OUTOF10: 3
PAINLEVEL_OUTOF10: 6
PAINLEVEL_OUTOF10: 2

## 2024-09-04 ASSESSMENT — PAIN DESCRIPTION - ORIENTATION: ORIENTATION: LEFT;UPPER

## 2024-09-04 ASSESSMENT — PAIN DESCRIPTION - LOCATION
LOCATION: ABDOMEN
LOCATION: ABDOMEN

## 2024-09-04 ASSESSMENT — ACTIVITIES OF DAILY LIVING (ADL): HOME_MANAGEMENT_TIME_ENTRY: 30

## 2024-09-04 NOTE — CARE PLAN
The patient's goals for the shift include  rest and safety    The clinical goals for the shift include No issues with wound  vac

## 2024-09-04 NOTE — PROGRESS NOTES
Occupational Therapy                 Therapy Communication Note    Patient Name: Mariann Drew  MRN: [id number]  Today's Date: 9/4/2024     Discipline: Occupational Therapy    Missed Visit Reason: Missed Visit Reason:  (pt in with RN for wound management and reapplication of wound vac after out of facility appt.)    Missed Time: Attempt    Comment:

## 2024-09-04 NOTE — PROGRESS NOTES
Department of Plastic and Reconstructive Surgery            Post Operative Visit    Date: 09/04/24  Date of Surgery: 7/30/24, 8/12/24, 8/21/24    Subjective   Mariann Drew is a 68 y.o. female who presents for POV. They are s/p [id number] abdominoplasty on 7/30/24 with Dr. Tiwari. Her post op course was complicated by post operative wound infection with group B strep requiring surgical debridement and wash out. She is s/p debridement of skin, subcutaneous tissue, and fascia [id number] on 8/12/24. On 8/21/24 she returned to the OR for excisional debridement 67x40, partial complex closure left side laterally 16cm, and placement of wound vac.       She presents for POV. She has been at acute rehab following surgical intervention. She is being followed by infectious disease who recommended Invanz until 9/19/24. There are plans for discharge from rehab on Friday with home health care for continued wound management.       Objective   Vital Signs:   There were no vitals filed for this visit.      Gen: interactive and pleasant  Head: NCAT  Eyes: EOMI, PERRLA  Mouth: MMM  Throat: trachea midline  Cor: RRR  Pulm: nonlabored breathing  Abd: s/nt/nd  Neuro: AAOx3  Ext: extremities perfused    Focused exam of the: abdomen  Abdominal wound present, wound vac removed. There is healthy granulation tissue present at wound edges. Muscle present in wound bed. There is no odor. There is no purulent drainage. Wound edges are viable. There is no gross evidence of infection.     Assessment/Plan     Mariann Drew is a 68 y.o. female who presents for POV. They are s/p [id number] abdominoplasty on 7/30/24 with Dr. Tiwari. Her post op course was complicated by post operative wound infection with group B strep requiring surgical debridement and wash out. She is s/p debridement of skin, subcutaneous tissue, and fascia [id number] on 8/12/24. On 8/21/24 she returned to the OR for excisional debridement 67x40, partial  complex closure left side laterally 16cm, and placement of wound vac.     She presents for POV. Wound vac removed in office today with healthy wound there is no evidence of necrosis or purulent drainage. She is to return to rehab. Continue wound care at this time. She will follow up in 1 week with Dr Tiwari to discuss operative timeline. Antibiotics per ID recommendations. She has a history of diabetes currently managed with Semaglutide 0.5mg weekly.       Plan:   Follow up in 1 week with Dr Tiwari  Antibiotics per ID recommendations  Continue wound vac  -settinmmHg  -Change: every Monday and Thursday  -irrigate with saline prior to placing wound vac  -black granufoam dressing       I spent 30 minutes with this patient. Greater than 50% of this time was spent in the counselling and/or coordination of care of this patient.  This note was created using voice recognition software and was not corrected for typographical or grammatical errors.    Signature: Jaycee Delvalle PA-C

## 2024-09-04 NOTE — PROGRESS NOTES
"Mariann Drew is a 68 y.o. female on day 12 of admission presenting with Debility.    Subjective   Creams are helping her discomfort around the rectum  She denies chest pain, she denies shortness of breath, she denies abdominal pain.  10 review of systems are negative         Physical Exam  Constitutional:       General: no acute distress.     Appearance: Normal appearance.   No jugular venous distention  Cardiovascular:   S1-S2 no gallops  Pulmonary:   Lungs are clear throughout all fields  Abdominal:      General: Abdomen is flat. Bowel sounds are normal.   Wound VAC in place  Musculoskeletal:         General: No swelling, tenderness or deformity.   Abdomen is soft , bowel sounds are active  Skin:     General: Skin is warm and dry.   In the rectal area she does have some redness consistent with a yeast type rash.  There is a scab on the right side of the buttock.  Neurological:      General: No focal deficit present.      Mental Status:  alert and oriented to person, place, and time.      Cranial Nerves: No cranial nerve deficit.   Sit to stand transfers are contact-guard assist     Psychiatric:         Mood and Affect: Mood normal.      Last Recorded Vitals  Blood pressure 122/70, pulse 101, temperature 36.2 °C (97.2 °F), temperature source Temporal, resp. rate 18, height 1.626 m (5' 4.02\"), weight 72.2 kg (159 lb 2.8 oz), SpO2 98%.  Intake/Output last 3 Shifts:  I/O last 3 completed shifts:  In: 50 (0.7 mL/kg) [IV Piggyback:50]  Out: 980 (13.6 mL/kg) [Drains:980]  Weight: 72.2 kg     Relevant Results  Scheduled medications  allopurinol, 100 mg, oral, Daily  deutetrabenazine, 24 mg, oral, Daily  deutetrabenazine, 6 mg, oral, Daily  dexAMETHasone, 0.5 mg, oral, 4x daily  DULoxetine, 60 mg, oral, BID  enoxaparin, 40 mg, subcutaneous, q24h  ertapenem, 500 mg, intravenous, q24h  famotidine, 20 mg, oral, q AM  ferrous sulfate (325 mg ferrous sulfate), 65 mg of iron, oral, BID  fludrocortisone, 0.1 mg, oral, " Daily  lamoTRIgine, 200 mg, oral, q AM  latanoprost, 1 drop, Both Eyes, Nightly  lotilaner, 1 drop, Both Eyes, Nightly  nystatin, 1 Application, Topical, BID  sennosides, 1 tablet, oral, Nightly  sucralfate, 1 g, oral, Nightly  traZODone, 150 mg, oral, Nightly  zinc oxide, 1 Application, Topical, BID      Continuous medications       PRN medications  PRN medications: acetaminophen **OR** acetaminophen, alum-mag hydroxide-simeth, bisacodyl, bisacodyl, cyclobenzaprine, lidocaine, melatonin, naratriptan     No results found for this or any previous visit (from the past 24 hour(s)).           Assessment/Plan   Principal Problem:    Debility  Active Problems:    Anxiety    Balance problems    Diabetes mellitus (Multi)    Hx of obesity    Myofascial pain syndrome, cervical    Hyperlipidemia    Disruption of external surgical wound      1.  Abdominoplasty with wound infection group B strep  Continue IV Invanz  Begin physical therapy for bed mobility, transfers, endurance activities, gait training with an assistive device  Begin occupational therapy for functional mobility, upper limb strengthening, coordination, balance and endurance activities as well as adaptive aids     2.  Gout  Zyloprim 400 mg daily  Monitor for gout symptoms     3.  Tardive dyskinesia  Austedo 30 mg daily  Patient's  will bring in the medication     4.  DVT prophylaxis  Lovenox 40 mg daily     5.  GERD and difficulty swallowing  Pepcid 20 mg daily  Carafate 1 g nightly     6.  Pain  Cymbalta 60 mg twice a day  Lamictal 200 mg daily  Decadron 0.5 mg 4 times a day     7.  Sleep   150 mg nightly     8.  Diabetes mellitus type 2  Semaglutide 0.5 mg weekly     9. FENGI  She has not had a bowel movement for 2 days will begin on a bowel program  DVT prophylaxis she is on Lovenox 40 mg daily    9/4  Patient is progressing well.  She is ambulating with a front wheeled walker and contact-guard to min assist  DVT prophylaxis with Lovenox, Homans' sign is  negative bilaterally  On 9/2/2024 BUN is 44 and creatinine is 1.65, infectious diseases following patient  Pain is controlled  Discharge planning           I spent 36 minutes in the professional and overall care of this patient.

## 2024-09-04 NOTE — PROGRESS NOTES
Nephrology Consult Progress Note    Mariann Drew is a 68 y.o. female on day 12 of admission presenting with Debility.      Subjective   No acute events overnight, persistent abd pain, tolerating po, nonoliguric, no urinary complains       Objective          Physical Exam    Vitals 24HR  Heart Rate:  []   Temp:  [36.2 °C (97.2 °F)-36.3 °C (97.3 °F)]   Resp:  [18]   BP: (105-122)/(70-74)   SpO2:  [98 %-100 %]       Sleeping but araousable,  AAOx3, sick looking, in pain, on RA  Decreased AEBL  RRR no murmurs  Abd - wounds not examined, wound vac+  Chronic skin changes, edema           I&O 24HR    Intake/Output Summary (Last 24 hours) at 2024 1448  Last data filed at 2024 1056  Gross per 24 hour   Intake 50 ml   Output 1300 ml   Net -1250 ml         Medications  Medications Prior to Admission   Medication Sig Dispense Refill Last Dose    allopurinol (Zyloprim) 100 mg tablet Take 1 tablet (100 mg) by mouth once daily.       allopurinol (Zyloprim) 300 mg tablet TAKE ONE TABLET ( 300 MG ) IN ADDITION TO ONE TABLET ( 100 MG ) DAILY ( TOTAL  MG DAILY ) 90 tablet 3     cyclobenzaprine (Flexeril) 5 mg tablet Take 1 tablet (5 mg) by mouth 3 times a day as needed for muscle spasms. 30 tablet 0     deutetrabenazine (Austedo XR) 24 mg tablet extended release 24 hr Take 1 tablet (24 mg) by mouth once daily. Take 1-6mg tablet and 1-24mg tablet for a total dose of 30mg daily. 90 tablet 3     deutetrabenazine (Austedo XR) 6 mg tablet extended release 24 hr Take 1 tablet (6 mg) by mouth once daily. Take 1-6mg tablet and 1-24mg tablet for a total dose of 30mg daily. 90 tablet 3     dexAMETHasone 0.5 mg/5 mL oral liquid Take 5 mL (0.5 mg) by mouth 4 times a day for 10 days. 237 mL 1     DULoxetine (Cymbalta) 60 mg DR capsule Take 1 capsule (60 mg) by mouth 2 times a day. Do not crush or chew.       [] ertapenem (INVanz) 1 gram injection Infuse 1 g into a venous catheter once daily for 13 days. Obtain weekly  labs: BMP and CBC. Fax to Dr. Ying at [phone]. 13 g 0     ertapenem (INVanz) 1 gram injection Infuse 1 g into a venous catheter once daily for 28 days. Obtain weekly labs: BMP, CBC, Sed rate, and CRP. Fax to Dr. Ying at [phone]. 28 g 0     famotidine (Pepcid) 20 mg tablet Take 1 tablet (20 mg) by mouth once daily in the morning.       ferrous sulfate (FEOSOL ORAL) Take 1 tablet by mouth once daily in the evening. 200 (65 Fe) MG       HYDROcodone-acetaminophen (Norco) 5-325 mg tablet Take 1 tablet by mouth every 6 hours if needed for severe pain (7 - 10). 20 tablet 0     lamoTRIgine (LaMICtal) 200 mg tablet Take 1 tablet (200 mg) by mouth once daily in the morning.       latanoprost (Xelpros) 0.005 % drops, emulsion Administer into affected eye(s) once daily.       lidocaine (Lidocaine Viscous) 2 % solution Take by mouth.       multivitamin tablet Take 1 tablet by mouth once daily.       naratriptan (Amerge) 2.5 mg tablet Take 1 tablet (2.5 mg) by mouth 1 time if needed for migraine. May repeat once in 4hrs if headache recurs 9 tablet 1     Nyamyc 100,000 unit/gram powder APPLY TO AFFECTED AREA TWICE A DAY 30 g 2     semaglutide (Ozempic) 0.25 mg or 0.5 mg (2 mg/3 mL) pen injector INJECT 0.5 MG UNDER THE SKIN 1 (ONE) TIME PER WEEK. (Patient taking differently: Inject 0.5 mg under the skin 1 (one) time per week. Every Tuesdays) 3 mL 3     sucralfate (Carafate) 1 gram tablet Take 1 tablet (1 g) by mouth once daily at bedtime.       traZODone (Desyrel) 100 mg tablet Take 1.5 tablets (150 mg) by mouth once daily at bedtime.       Xdemvy 0.25 % drops Administer 1 drop into both eyes once daily at bedtime.         Scheduled medications  allopurinol, 100 mg, oral, Daily  deutetrabenazine, 24 mg, oral, Daily  deutetrabenazine, 6 mg, oral, Daily  dexAMETHasone, 0.5 mg, oral, 4x daily  DULoxetine, 60 mg, oral, BID  enoxaparin, 40 mg, subcutaneous, q24h  ertapenem, 500 mg, intravenous, q24h  famotidine, 20  mg, oral, q AM  ferrous sulfate (325 mg ferrous sulfate), 65 mg of iron, oral, BID  fludrocortisone, 0.1 mg, oral, Daily  lamoTRIgine, 200 mg, oral, q AM  latanoprost, 1 drop, Both Eyes, Nightly  lotilaner, 1 drop, Both Eyes, Nightly  nystatin, 1 Application, Topical, BID  sennosides, 1 tablet, oral, Nightly  sucralfate, 1 g, oral, Nightly  traZODone, 150 mg, oral, Nightly  zinc oxide, 1 Application, Topical, BID      Continuous medications     PRN medications  PRN medications: acetaminophen **OR** acetaminophen, alum-mag hydroxide-simeth, bisacodyl, bisacodyl, cyclobenzaprine, lidocaine, melatonin, naratriptan    Relevant Results      Admission on 08/23/2024   Component Date Value Ref Range Status    WBC 08/24/2024 7.1  4.4 - 11.3 x10*3/uL Final    nRBC 08/24/2024 0.0  0.0 - 0.0 /100 WBCs Final    RBC 08/24/2024 2.43 (L)  4.00 - 5.20 x10*6/uL Final    Hemoglobin 08/24/2024 7.4 (L)  12.0 - 16.0 g/dL Final    Hematocrit 08/24/2024 23.0 (L)  36.0 - 46.0 % Final    MCV 08/24/2024 95  80 - 100 fL Final    MCH 08/24/2024 30.5  26.0 - 34.0 pg Final    MCHC 08/24/2024 32.2  32.0 - 36.0 g/dL Final    RDW 08/24/2024 14.9 (H)  11.5 - 14.5 % Final    Platelets 08/24/2024 108 (L)  150 - 450 x10*3/uL Final    Glucose 08/24/2024 107 (H)  74 - 99 mg/dL Final    Sodium 08/24/2024 136  136 - 145 mmol/L Final    Potassium 08/24/2024 4.6  3.5 - 5.3 mmol/L Final    Chloride 08/24/2024 110 (H)  98 - 107 mmol/L Final    Bicarbonate 08/24/2024 19 (L)  21 - 32 mmol/L Final    Anion Gap 08/24/2024 12  10 - 20 mmol/L Final    Urea Nitrogen 08/24/2024 23  6 - 23 mg/dL Final    Creatinine 08/24/2024 1.52 (H)  0.50 - 1.05 mg/dL Final    eGFR 08/24/2024 37 (L)  >60 mL/min/1.73m*2 Final    Calculations of estimated GFR are performed using the 2021 CKD-EPI Study Refit equation without the race variable for the IDMS-Traceable creatinine methods.  https://jasn.asnjournals.org/content/early/2021/09/22/ASN.2029850813    Calcium 08/24/2024 8.4 (L)  8.6  - 10.3 mg/dL Final    C-Reactive Protein 08/24/2024 1.59 (H)  <1.00 mg/dL Final    Sedimentation Rate 08/24/2024 16  0 - 30 mm/h Final    WBC 08/25/2024 7.4  4.4 - 11.3 x10*3/uL Final    nRBC 08/25/2024 0.0  0.0 - 0.0 /100 WBCs Final    RBC 08/25/2024 2.56 (L)  4.00 - 5.20 x10*6/uL Final    Hemoglobin 08/25/2024 7.7 (L)  12.0 - 16.0 g/dL Final    Hematocrit 08/25/2024 24.4 (L)  36.0 - 46.0 % Final    MCV 08/25/2024 95  80 - 100 fL Final    MCH 08/25/2024 30.1  26.0 - 34.0 pg Final    MCHC 08/25/2024 31.6 (L)  32.0 - 36.0 g/dL Final    RDW 08/25/2024 15.2 (H)  11.5 - 14.5 % Final    Platelets 08/25/2024 137 (L)  150 - 450 x10*3/uL Final    Glucose 08/25/2024 122 (H)  74 - 99 mg/dL Final    Sodium 08/25/2024 136  136 - 145 mmol/L Final    Potassium 08/25/2024 4.6  3.5 - 5.3 mmol/L Final    Chloride 08/25/2024 107  98 - 107 mmol/L Final    Bicarbonate 08/25/2024 20 (L)  21 - 32 mmol/L Final    Anion Gap 08/25/2024 14  10 - 20 mmol/L Final    Urea Nitrogen 08/25/2024 27 (H)  6 - 23 mg/dL Final    Creatinine 08/25/2024 1.76 (H)  0.50 - 1.05 mg/dL Final    eGFR 08/25/2024 31 (L)  >60 mL/min/1.73m*2 Final    Calculations of estimated GFR are performed using the 2021 CKD-EPI Study Refit equation without the race variable for the IDMS-Traceable creatinine methods.  https://jasn.asnjournals.org/content/early/2021/09/22/ASN.1089658261    Calcium 08/25/2024 8.3 (L)  8.6 - 10.3 mg/dL Final    WBC 08/26/2024 5.3  4.4 - 11.3 x10*3/uL Final    nRBC 08/26/2024 0.0  0.0 - 0.0 /100 WBCs Final    RBC 08/26/2024 2.30 (L)  4.00 - 5.20 x10*6/uL Final    Hemoglobin 08/26/2024 7.1 (L)  12.0 - 16.0 g/dL Final    Hematocrit 08/26/2024 21.8 (L)  36.0 - 46.0 % Final    MCV 08/26/2024 95  80 - 100 fL Final    MCH 08/26/2024 30.9  26.0 - 34.0 pg Final    MCHC 08/26/2024 32.6  32.0 - 36.0 g/dL Final    RDW 08/26/2024 15.2 (H)  11.5 - 14.5 % Final    Platelets 08/26/2024 103 (L)  150 - 450 x10*3/uL Final    Glucose 08/26/2024 109 (H)  74 - 99 mg/dL  Final    Sodium 08/26/2024 133 (L)  136 - 145 mmol/L Final    Potassium 08/26/2024 4.8  3.5 - 5.3 mmol/L Final    Chloride 08/26/2024 108 (H)  98 - 107 mmol/L Final    Bicarbonate 08/26/2024 20 (L)  21 - 32 mmol/L Final    Anion Gap 08/26/2024 10  10 - 20 mmol/L Final    Urea Nitrogen 08/26/2024 28 (H)  6 - 23 mg/dL Final    Creatinine 08/26/2024 1.66 (H)  0.50 - 1.05 mg/dL Final    eGFR 08/26/2024 33 (L)  >60 mL/min/1.73m*2 Final    Calculations of estimated GFR are performed using the 2021 CKD-EPI Study Refit equation without the race variable for the IDMS-Traceable creatinine methods.  https://jasn.asnjournals.org/content/early/2021/09/22/ASN.1783261849    Calcium 08/26/2024 8.1 (L)  8.6 - 10.3 mg/dL Final    Occult Blood, Stool X1 08/26/2024 Negative  Negative Final    Glucose 08/27/2024 111 (H)  74 - 99 mg/dL Final    Sodium 08/27/2024 135 (L)  136 - 145 mmol/L Final    Potassium 08/27/2024 4.5  3.5 - 5.3 mmol/L Final    Chloride 08/27/2024 109 (H)  98 - 107 mmol/L Final    Bicarbonate 08/27/2024 22  21 - 32 mmol/L Final    Anion Gap 08/27/2024 9 (L)  10 - 20 mmol/L Final    Urea Nitrogen 08/27/2024 27 (H)  6 - 23 mg/dL Final    Creatinine 08/27/2024 1.75 (H)  0.50 - 1.05 mg/dL Final    eGFR 08/27/2024 31 (L)  >60 mL/min/1.73m*2 Final    Calculations of estimated GFR are performed using the 2021 CKD-EPI Study Refit equation without the race variable for the IDMS-Traceable creatinine methods.  https://jasn.asnjournals.org/content/early/2021/09/22/ASN.6441470340    Calcium 08/27/2024 8.2 (L)  8.6 - 10.3 mg/dL Final    WBC 08/27/2024 9.0  4.4 - 11.3 x10*3/uL Final    nRBC 08/27/2024 0.0  0.0 - 0.0 /100 WBCs Final    RBC 08/27/2024 2.79 (L)  4.00 - 5.20 x10*6/uL Final    Hemoglobin 08/27/2024 8.4 (L)  12.0 - 16.0 g/dL Final    Hematocrit 08/27/2024 26.6 (L)  36.0 - 46.0 % Final    MCV 08/27/2024 95  80 - 100 fL Final    MCH 08/27/2024 30.1  26.0 - 34.0 pg Final    MCHC 08/27/2024 31.6 (L)  32.0 - 36.0 g/dL Final     RDW 08/27/2024 15.0 (H)  11.5 - 14.5 % Final    Platelets 08/27/2024 190  150 - 450 x10*3/uL Final    Platelet count verified by smear review.    Neutrophils % 08/27/2024 83.9  40.0 - 80.0 % Final    Immature Granulocytes %, Automated 08/27/2024 1.0 (H)  0.0 - 0.9 % Final    Immature Granulocyte Count (IG) includes promyelocytes, myelocytes and metamyelocytes but does not include bands. Percent differential counts (%) should be interpreted in the context of the absolute cell counts (cells/UL).    Lymphocytes % 08/27/2024 7.8  13.0 - 44.0 % Final    Monocytes % 08/27/2024 7.0  2.0 - 10.0 % Final    Eosinophils % 08/27/2024 0.2  0.0 - 6.0 % Final    Basophils % 08/27/2024 0.1  0.0 - 2.0 % Final    Neutrophils Absolute 08/27/2024 7.53  1.20 - 7.70 x10*3/uL Final    Percent differential counts (%) should be interpreted in the context of the absolute cell counts (cells/uL).    Immature Granulocytes Absolute, Au* 08/27/2024 0.09  0.00 - 0.70 x10*3/uL Final    Lymphocytes Absolute 08/27/2024 0.70 (L)  1.20 - 4.80 x10*3/uL Final    Monocytes Absolute 08/27/2024 0.63  0.10 - 1.00 x10*3/uL Final    Eosinophils Absolute 08/27/2024 0.02  0.00 - 0.70 x10*3/uL Final    Basophils Absolute 08/27/2024 0.01  0.00 - 0.10 x10*3/uL Final    RBC Morphology 08/27/2024 See Below   Final    Polychromasia 08/27/2024 Mild   Final    Ovalocytes 08/27/2024 Few   Final    Clumped Platelets 08/27/2024 Present   Final    Albumin 08/27/2024 2.3 (L)  3.4 - 5.0 g/dL Final    Glucose 08/28/2024 124 (H)  74 - 99 mg/dL Final    Sodium 08/28/2024 134 (L)  136 - 145 mmol/L Final    Potassium 08/28/2024 4.4  3.5 - 5.3 mmol/L Final    Chloride 08/28/2024 107  98 - 107 mmol/L Final    Bicarbonate 08/28/2024 20 (L)  21 - 32 mmol/L Final    Anion Gap 08/28/2024 11  10 - 20 mmol/L Final    Urea Nitrogen 08/28/2024 29 (H)  6 - 23 mg/dL Final    Creatinine 08/28/2024 1.78 (H)  0.50 - 1.05 mg/dL Final    eGFR 08/28/2024 31 (L)  >60 mL/min/1.73m*2 Final     Calculations of estimated GFR are performed using the 2021 CKD-EPI Study Refit equation without the race variable for the IDMS-Traceable creatinine methods.  https://jasn.asnjournals.org/content/early/2021/09/22/ASN.9816691895    Calcium 08/28/2024 8.2 (L)  8.6 - 10.3 mg/dL Final    Phosphorus 08/28/2024 3.4  2.5 - 4.9 mg/dL Final    The performance characteristics of phosphorus testing in heparinized plasma have been validated by the individual  laboratory site where testing is performed. Testing on heparinized plasma is not approved by the FDA; however, such approval is not necessary.    Albumin 08/28/2024 2.4 (L)  3.4 - 5.0 g/dL Final    Glucose 08/30/2024 108 (H)  74 - 99 mg/dL Final    Sodium 08/30/2024 133 (L)  136 - 145 mmol/L Final    Potassium 08/30/2024 4.4  3.5 - 5.3 mmol/L Final    Chloride 08/30/2024 108 (H)  98 - 107 mmol/L Final    Bicarbonate 08/30/2024 21  21 - 32 mmol/L Final    Anion Gap 08/30/2024 8 (L)  10 - 20 mmol/L Final    Urea Nitrogen 08/30/2024 31 (H)  6 - 23 mg/dL Final    Creatinine 08/30/2024 1.76 (H)  0.50 - 1.05 mg/dL Final    eGFR 08/30/2024 31 (L)  >60 mL/min/1.73m*2 Final    Calculations of estimated GFR are performed using the 2021 CKD-EPI Study Refit equation without the race variable for the IDMS-Traceable creatinine methods.  https://jasn.asnjournals.org/content/early/2021/09/22/ASN.3007195986    Calcium 08/30/2024 8.3 (L)  8.6 - 10.3 mg/dL Final    Phosphorus 08/30/2024 3.3  2.5 - 4.9 mg/dL Final    The performance characteristics of phosphorus testing in heparinized plasma have been validated by the individual  laboratory site where testing is performed. Testing on heparinized plasma is not approved by the FDA; however, such approval is not necessary.    Albumin 08/30/2024 2.5 (L)  3.4 - 5.0 g/dL Final    Glucose 08/30/2024 108 (H)  74 - 99 mg/dL Final    Sodium 08/30/2024 133 (L)  136 - 145 mmol/L Final    Potassium 08/30/2024 4.4  3.5 - 5.3 mmol/L Final    Chloride  08/30/2024 108 (H)  98 - 107 mmol/L Final    Bicarbonate 08/30/2024 21  21 - 32 mmol/L Final    Anion Gap 08/30/2024 8 (L)  10 - 20 mmol/L Final    Urea Nitrogen 08/30/2024 31 (H)  6 - 23 mg/dL Final    Creatinine 08/30/2024 1.76 (H)  0.50 - 1.05 mg/dL Final    eGFR 08/30/2024 31 (L)  >60 mL/min/1.73m*2 Final    Calculations of estimated GFR are performed using the 2021 CKD-EPI Study Refit equation without the race variable for the IDMS-Traceable creatinine methods.  https://jasn.asnjournals.org/content/early/2021/09/22/ASN.6330813458    Calcium 08/30/2024 8.3 (L)  8.6 - 10.3 mg/dL Final    Sedimentation Rate 09/02/2024 5  0 - 30 mm/h Final    WBC 09/02/2024 10.1  4.4 - 11.3 x10*3/uL Final    nRBC 09/02/2024 0.0  0.0 - 0.0 /100 WBCs Final    RBC 09/02/2024 2.96 (L)  4.00 - 5.20 x10*6/uL Final    Hemoglobin 09/02/2024 8.8 (L)  12.0 - 16.0 g/dL Final    Hematocrit 09/02/2024 27.4 (L)  36.0 - 46.0 % Final    MCV 09/02/2024 93  80 - 100 fL Final    MCH 09/02/2024 29.7  26.0 - 34.0 pg Final    MCHC 09/02/2024 32.1  32.0 - 36.0 g/dL Final    RDW 09/02/2024 14.5  11.5 - 14.5 % Final    Platelets 09/02/2024 125 (L)  150 - 450 x10*3/uL Final    Glucose 09/02/2024 113 (H)  74 - 99 mg/dL Final    Sodium 09/02/2024 136  136 - 145 mmol/L Final    Potassium 09/02/2024 4.5  3.5 - 5.3 mmol/L Final    Chloride 09/02/2024 108 (H)  98 - 107 mmol/L Final    Bicarbonate 09/02/2024 24  21 - 32 mmol/L Final    Anion Gap 09/02/2024 9 (L)  10 - 20 mmol/L Final    Urea Nitrogen 09/02/2024 44 (H)  6 - 23 mg/dL Final    Creatinine 09/02/2024 1.65 (H)  0.50 - 1.05 mg/dL Final    eGFR 09/02/2024 34 (L)  >60 mL/min/1.73m*2 Final    Calculations of estimated GFR are performed using the 2021 CKD-EPI Study Refit equation without the race variable for the IDMS-Traceable creatinine methods.  https://jasn.asnjournals.org/content/early/2021/09/22/ASN.8259419837    Calcium 09/02/2024 8.1 (L)  8.6 - 10.3 mg/dL Final      Lower extremity venous duplex  bilateral    Result Date: 8/26/2024           [facility] [address] [place], [place] [address] Tel [phone] and Fax [phone]  Vascular Lab Report [facility] US LOWER EXTREMITY VENOUS DUPLEX BILATERAL  Patient Name:      JOSÉ LUIS DEVINE        Reading Physician:  [id number] Tyler Velásquez MD Study Date:        8/26/2024             Ordering Physician: [id number] EDYTA CARBALLO MRN/PID:           [id number]              Technologist:       Princess Mendez RVT Accession#:        [id number]          Technologist 2: Date of Birth/Age: 1955 / 68 years Encounter#:         [id number] Gender:            F Admission Status:  Inpatient             Location Performed: [facility]  Diagnosis/ICD: Other specified soft tissue disorders-M79.89 Indication:    Limb swelling CPT Codes:     51980 Peripheral venous duplex scan for DVT complete  CONCLUSIONS: Right Lower Venous: No evidence of acute deep vein thrombus visualized in the right lower extremity. Cannot rule out thrombus in non-visualized posterior tibial and Peroneal veins due to edema. Left Lower Venous: No evidence of acute deep vein thrombus visualized in the left lower extremity. Cannot rule out thrombus in non-visualized peroneal vein due to edema.  Imaging & Doppler Findings:  Right                 Compressible Thrombus        Flow Distal External Iliac     Yes        None   Spontaneous/Phasic CFV                       Yes        None   Spontaneous/Phasic PFV                       Yes        None FV Proximal               Yes        None   Spontaneous/Phasic FV Mid                    Yes        None FV Distal                 Yes        None Popliteal                 Yes        None   Spontaneous/Phasic  Left                  Compress Thrombus        Flow Distal External  "Iliac   Yes      None   Spontaneous/Phasic CFV                     Yes      None   Spontaneous/Phasic PFV                     Yes      None FV Proximal             Yes      None   Spontaneous/Phasic FV Mid                  Yes      None FV Distal               Yes      None Popliteal               Yes      None   Spontaneous/Phasic PTV                     Yes      None  [id number] Tyler Velásquez MD Electronically signed by [id number] Tyler Velásquez MD on 8/26/2024 at 5:11:48 PM  ** Final **     CT head wo IV contrast    Result Date: 8/26/2024  Interpreted By:  Nunu Garcia, STUDY: CT HEAD WO IV CONTRAST; ;  8/26/2024 12:38 pm   INDICATION: Signs/Symptoms:confusion.   COMPARISON: 07/07/2023   ACCESSION NUMBER(S): [id number]   ORDERING CLINICIAN: EDYTA CARBALLO   TECHNIQUE: Serial axial images of the head were obtained without intravenous contrast. Sagittal and coronal reconstructions were generated.   FINDINGS: The ventricles are midline and normal in size.   There are no acute parenchymal abnormalities.   There is no hemorrhage or extra-axial fluid.   There is no obvious scalp hematoma or skull fracture.   Paranasal sinuses and mastoids are unremarkable. The patient appears to be status post bilateral cataract surgery.   COMPARISON OF FINDINGS: The brain is similar.       No acute intracranial abnormality     MACRO: none   Signed by: Nunu Garcia 8/26/2024 12:52 PM Dictation workstation:   [id number]    IR CVC tunneled    Result Date: 8/19/2024  Interpreted By:  Brandon Blackman, STUDY: IR CVC TUNNELED;  8/16/2024 10:20 am   INDICATION: Signs/Symptoms:tunnelled picc.   COMPARISON: None.   ACCESSION NUMBER(S): [id number]   ORDERING CLINICIAN: EDMOND DYE   TECHNIQUE: INTERVENTIONALIST(S): Brandon Blackman MD   The history and physical exam pertinent to the procedure were reviewed and no updates were made.\"   CONSENT: The patient/patient's POA/next of kin was informed of the nature of the proposed procedure. The purposes, " alternatives, risks, and benefits were explained and discussed. All questions were answered and consent was obtained.   RADIATION EXPOSURE: Fluoroscopy time: 0.1 min. Dose: 0.3 mGy.     SEDATION: None   MEDICATION/CONTRAST: No additional   TIME OUT: A time out was performed immediately prior to procedure start with the interventional team, correctly identifying the patient name, date of birth, MRN, procedure, anatomy (including marking of site and side), patient position, procedure consent form, relevant laboratory and imaging test results, antibiotic administration, safety precautions, and procedure-specific equipment needs.   COMPLICATIONS: No immediate adverse events identified.   FINDINGS: In the recumbent position, the patient was positioned on the angiography table. The right supraclavicular and infraclavicular cutaneous tissues were prepared and draped in usual sterile manner.   The supraclavicular access site was screened with gray-scale ultrasound with subsequent subcutaneous instillation of Lidocaine 1% local anesthesia. The plan tunnel site of the infraclavicular chest was also anesthetized. A small skin nick was made at the intended venotomy site and the tunnel entry site. The subcutaneous tissues were dissected using blunt instrumentation. Ultrasound images demonstrate a patent right internal jugular vein. Under direct ultrasound guidance and Seldinger/micropuncture technique, the right internal jugular vein was accessed. An ultrasound digital spot image was acquired and stored on the  PACS. Via the access needle, a 0.018 in marking wire was advanced to the level of the IVC. An image was stored. Then, the wire was used to measure the intravascular length of the planned catheter to the level of the superior cavoatrial junction. The needle was then exchanged for a 5 Slovak peel-away sheath. Then, the 5 Slovak single lumen small bore catheter was brought through the tunnel. It was cut to length  and fed  through the peel-away sheath which was peeled off. The suture was secured at the tunnel exit site using pursestring suture (3-0 Ethilon). The access site was covered with sterile hCG dressing. There is good skin apposition at the venotomy site. The venotomy site was covered with Steri-Strips and sterile dressing. The catheter was flushed using heparin 10 units/mL.   Completion radiograph demonstrates no evidence of kinking. The tip of the catheter is at the cavoatrial junction. No pneumothorax. Catheter is ready to use.     The patient tolerated the procedure without complication.       Technically successful right IJ tunneled small bore catheter placement.   I was present for and/or performed the critical portions of the procedure and immediately available throughout the entire procedure.   I personally reviewed the image(s)/study and interpretation. I agree with the findings as stated.   Performed and dictated at [facility].   MACRO: None   Signed by: Brandon Blackman 8/19/2024 11:55 AM Dictation workstation:   [id number]    Bedside Midline Imaging    Result Date: 8/14/2024  These images are not reportable by radiology and will not be interpreted by  Radiologists.    Bedside Midline Imaging    Result Date: 8/13/2024  These images are not reportable by radiology and will not be interpreted by  Radiologists.    CT abdomen pelvis w IV contrast    Result Date: 8/8/2024  Interpreted By:  Thomas Zacarias, STUDY: CT ABDOMEN PELVIS W IV CONTRAST; 8/8/2024 4:43 pm   INDICATION: Signs/Symptoms:Recent abdominoplasty, purulent drainage, wound infection;   COMPARISON: None   ACCESSION NUMBER(S): [id number]   ORDERING CLINICIAN: YAW BALDWIN   TECHNIQUE: Contiguous axial images of the abdomen/pelvis were performed with IV contrast. 75 ml of Omnipaque 350 was utilized. Coronal and sagittal reformatted images were also obtained. All CT examinations are performed with 1 or more of the  following dose reduction techniques: Automated exposure control, adjustment of mA and/or kv according to patient's size, or use of iterative reconstruction techniques.     FINDINGS: The liver is nodular in contour consistent with cirrhosis. No focal hepatic lesions. The common bile duct, pancreas, and adrenal glands are unremarkable. 2 cm hypodense lesion in the spleen most likely a small cyst. Spleen is otherwise normal in size and unremarkable. Prior cholecystectomy with surgical clips in the gallbladder fossa.   The kidneys enhance symmetrically. No urolithiasis is seen. No hydroureteronephrosis is seen. There are a few hypodense lesions in the kidneys bilaterally which are too small to characterize on the right. The largest in the left kidney is compatible with a cyst in the lower pole measuring 3.7 x 3.6 cm.   The visualized aorta is unremarkable.   The small bowel is not dilated. The appendix is not identified however no evidence for acute inflammatory change. Mild diverticulosis of the colon. No evidence for acute diverticulitis.   The bladder is minimally distended and otherwise unremarkable.   The visualized osseous structures are intact.   Limited images of the lower thorax show a partially visualized left mastectomy and breast implant.   There is scattered subcutaneous air/gas in the deep subcutaneous tissues along the abdomen. Surgical drains are noted in place anterior to the abdominal musculature. Findings are limited to the deep subcutaneous tissues. No free intraperitoneal air or fluid is seen. No abscess or collections are noted in the region of the abdominoplasty and drains. There are multiple gas pockets however no fluid collections or abscess is seen.       1. scattered subcutaneous air/gas in the deep subcutaneous tissues along the abdomen. Surgical drains are in good position anterior to the abdominal musculature. Findings are limited to the deep subcutaneous tissues. No free intraperitoneal  air or fluid is seen. No abscess or collections are noted in the region of the abdominoplasty and drains. There are multiple subcutaneous gas/air pockets however no fluid collections or abscess is seen.   2. Hepatic cirrhosis. No focal hepatic lesions.   3. Diverticulosis of the colon without evidence for acute diverticulitis.   Signed by: Thomas Zacarias 8/8/2024 5:03 PM Dictation workstation:   [id number]        ASSESSMENT AND PLAN    Nonoliguric RUSSELL on CKD, baseline creatinine 0.9-1, was 1.5 on admission - 1.7 - 1.65  Hyponatremia, mild, most corrected Na values at target  NAGMA, controlled  Hypocalcemia  Hyperuricemia  Low albumin, malnutrition    Plan  - avoid hypotension and nephrotoxins  - labs in am tomorrow, might need diuretics on discharge  - unclear to me why on florinef, BP on the low side, will follow  - reduce allopurinol to 100 mg every day and recheck uric acid  - will follow    MARS reviewed,     Thank you for the opportunity to assist in the care of this patient, please call with questions  Brooklynn Shepherd MD PhD

## 2024-09-04 NOTE — PROGRESS NOTES
Occupational Therapy    OT Treatment    Patient Name: Mariann Drew  MRN: [id number]  Today's Date: 9/4/2024  Time Calculation  Start Time: 0745  Stop Time: 0830  Time Calculation (min): 45 min        Assessment:  End of Session Communication: Bedside nurse  End of Session Patient Position: Up in chair, Alarm on     Plan:  Treatment Interventions: ADL retraining, Functional transfer training, UE strengthening/ROM, Endurance training, Equipment evaluation/education, Patient/family training, Compensatory technique education, Fine motor coordination activities  OT Frequency: 5 times per week (6 days/PRN)  Treatment Interventions: ADL retraining, Functional transfer training, UE strengthening/ROM, Endurance training, Equipment evaluation/education, Patient/family training, Compensatory technique education, Fine motor coordination activities    Subjective   Previous Visit Info:  OT Last Visit  OT Received On: 09/04/24  General:  General  Prior to Session Communication: Bedside nurse  Patient Position Received: Up in chair, Alarm on (recliner)  Precautions:  Medical Precautions: Fall precautions  Post-Surgical Precautions: Abdominal surgery precautions  Precautions Comment: Wound vac and port/IV            Pain:  Pain Assessment  Pain Assessment: 0-10  0-10 (Numeric) Pain Score: 5 - Moderate pain  Pain Type: Surgical pain  Pain Location: Abdomen  Pain Interventions: Repositioned, Rest, Distraction (meds administered after session)    Objective         Activities of Daily Living: Grooming  Grooming Level of Assistance:  (brushing teeth, hair, deodorant application supervision)  Grooming Where Assessed: Sitting sinkside, Wheelchair    UE Bathing  UE Bathing Level of Assistance:  (sba and cues for safety and sequencing, assist for back)  UE Bathing Where Assessed: Sitting sinkside    LE Bathing  LE Bathing Level of Assistance:  (min a le in sitting, cga for periarea/buttocks in standing)    UE Dressing  UE Dressing Comments:  "sba in sitting for overhead dress and open front sweater, cga for standing to pull down    LE Dressing  LE Dressing Adaptive Equipment: Reacher, Sock aide, Dressing stick  Sock Level of Assistance:  (sba/cga and mod cues for proper techs, reports effortful \"arthritic hands\")  Compression Hose Level of Assistance:  (noted increased swelling in le's/feet, consulted RN regarding support hose vs tubigrips, RN to order larger size for pt)  LE Dressing Where Assessed: Wheelchair    Toileting  Toileting Level of Assistance:  (sba/cga in standing for hygiene)  Where Assessed: Toilet, Bedside commode    Bed Mobility/Transfers: Transfers  Transfer:  (sit to stand cga, cues for hand placement; commode transfers via wwalker mod cues cga - letting go of walker during turn)      Functional Mobility:  Functional Mobility  Functional Mobility Performed:  (simple mobility via wwalker to bathroom, to sink, wc cga/min a and min cues for walker safety techs and assist for wound vac device/tubing)       Therapy/Activity: Therapeutic Exercise  Therapeutic Exercise Activity 1: pt completes bilat ue dowel heather ther ex program 10 x 5 planes with sba and min cues for techs to promote indep in adl, transfers, mobility            EDUCATION:  Education  Individual(s) Educated: Patient  Education Provided:  (fall precautions, abd precautions, adl/walker safety during mobility/transfers; energy conservation techs)  Patient Response to Education: Patient/Caregiver Verbalized Understanding of Information  Education Comment: could benefit from continued education    Goals:  Encounter Problems       Encounter Problems (Active)       OT Problem       STG- patient will complete grooming with MIN A with use of ae/ad/dme prn (Met)       Start:  08/23/24    Expected End:  08/30/24    Resolved:  08/28/24    Updated to: STG- patient will complete grooming with  Set up with use of ae/ad/dme prn         STG- patient will complete bathing with MAX A with use of " ae/ad/dme prn (Met)       Start:  08/23/24    Expected End:  09/06/24    Resolved:  08/28/24    Updated to: STG- patient will complete bathing with Mod A with use of ae/ad/dme prn         STG- patient will complete UB dressing with MOD A with use of ae/ad/dme prn (Met)       Start:  08/23/24    Expected End:  08/30/24    Resolved:  08/28/24    Updated to: STG- patient will complete UB dressing with SBA with use of ae/ad/dme prn         STG- patient will complete LB dressing with MAX A with use of ae/ad/dme prn (Met)       Start:  08/23/24    Expected End:  09/06/24    Resolved:  08/28/24    Updated to: STG- patient will complete LB dressing with Mod A with use of ae/ad/dme prn         STG- patient will complete toileting with MAX A with use of ae/ad/dme prn  (Met)       Start:  08/23/24    Expected End:  09/06/24    Resolved:  08/28/24    Updated to: STG- patient will complete toileting with Mod A with use of ae/ad/dme prn         STG- patient will complete toilet/commode transfers with MAX A with use of ae/ad/dme prn (Met)       Start:  08/23/24    Expected End:  08/30/24    Resolved:  08/28/24    Updated to: STG- patient will complete toilet/commode transfers with  CGA with use of ae/ad/dme prn         STG- patient will complete tub/shower transfers with MAX A with use of ae/ad/dme prn (Progressing)       Start:  08/23/24    Expected End:  09/06/24            STG- patient will complete simple mobility with MOD A with use of ae/ad/dme prn (Met)       Start:  08/23/24    Expected End:  08/30/24    Resolved:  08/28/24    Updated to: STG- patient will complete simple mobility with CGA with use of ae/ad/dme prn         LTG- patient will complete grooming with CGA with use of ae/ad/dme prn (Met)       Start:  08/23/24    Expected End:  09/06/24    Resolved:  08/28/24    Updated to: LTG- patient will complete grooming with supervision with use of ae/ad/dme prn         LTG- patient will complete bathing with MOD A with  use of ae/ad/dme prn (Progressing)       Start:  08/23/24    Expected End:  09/06/24            LTG- patient will complete UB dressing with Min A  with use of ae/ad/dme prn (Met)       Start:  08/23/24    Expected End:  09/06/24    Resolved:  08/28/24    Updated to: LTG- patient will complete UB dressing with set up  with use of ae/ad/dme prn         LTG- patient will complete LB dressing with MOD A with use of ae/ad/dme prn (Progressing)       Start:  08/23/24    Expected End:  09/06/24            LTG- patient will complete toileting with MOD A with use of ae/ad/dme prn  (Progressing)       Start:  08/23/24    Expected End:  09/06/24            LTG- patient will complete toilet/commode transfers with Mod A with use of ae/ad/dme prn (Met)       Start:  08/23/24    Expected End:  09/06/24    Resolved:  08/28/24    Updated to: LTG- patient will complete toilet/commode transfers with CGA with use of ae/ad/dme prn         LTG- patient will complete tub/shower transfers with MOD A with use of ae/ad/dme prn (Progressing)       Start:  08/23/24    Expected End:  09/06/24            LTG- patient will complete simple mobility with MIN A with use of ae/ad/dme prn (Progressing)       Start:  08/23/24    Expected End:  09/06/24            STG- patient will complete grooming with  Set up with use of ae/ad/dme prn (Progressing)       Start:  08/28/24    Expected End:  09/06/24                STG- patient will complete UB dressing with SBA with use of ae/ad/dme prn (Progressing)       Start:  08/28/24    Expected End:  09/06/24                STG- patient will complete toilet/commode transfers with  CGA with use of ae/ad/dme prn (Progressing)       Start:  08/28/24    Expected End:  09/06/24                STG- patient will complete simple mobility with CGA with use of ae/ad/dme prn (Progressing)       Start:  08/28/24    Expected End:  09/06/24                LTG- patient will complete grooming with supervision with use of  ae/ad/dme prn (Met)       Start:  08/28/24    Expected End:  09/06/24    Resolved:  08/28/24    Updated to: LTG- patient will complete grooming with set up with use of ae/ad/dme prn             LTG- patient will complete UB dressing with set up  with use of ae/ad/dme prn (Progressing)       Start:  08/28/24    Expected End:  09/06/24                LTG- patient will complete toilet/commode transfers with CGA with use of ae/ad/dme prn (Progressing)       Start:  08/28/24    Expected End:  09/06/24                LTG- patient will complete grooming with set up with use of ae/ad/dme prn (Progressing)       Start:  08/28/24    Expected End:  09/06/24                STG- patient will complete bathing with Mod A with use of ae/ad/dme prn (Progressing)       Start:  08/28/24    Expected End:  09/06/24                STG- patient will complete LB dressing with Mod A with use of ae/ad/dme prn (Progressing)       Start:  08/28/24    Expected End:  09/06/24                STG- patient will complete toileting with Mod A with use of ae/ad/dme prn (Progressing)       Start:  08/28/24    Expected End:  09/06/24

## 2024-09-04 NOTE — PROGRESS NOTES
Physical Therapy    Physical Therapy Treatment    Patient Name: Mariann Drew  MRN: [id number]  Today's Date: 9/4/2024  Time Calculation  Start Time: 1300  Stop Time: 1345  Time Calculation (min): 45 min      Assessment/Plan   PT Assessment  End of Session Communication: Bedside nurse  End of Session Patient Position: Alarm on, Bed, 2 rail up     PT Plan  Treatment/Interventions: Bed mobility, Transfer training, Gait training, Stair training, Balance training, Strengthening, Endurance training, Range of motion, Therapeutic exercise, Therapeutic activity, Home exercise program, Positioning, Postural re-education  PT Plan: Ongoing PT  PT Frequency: 5 times per week (6 times PRN)  PT Discharge Recommendations:  (Anticipate pt to require intermittent min A at walker level at time of discharge.)  Equipment Recommended upon Discharge: Wheeled walker  PT Recommended Transfer Status: Assist x2 (at time of initial evaluation)      General Visit Information:   PT  Visit  PT Received On: 09/04/24  General  Missed Visit: Yes  Missed Visit Reason:  (Pt offsite for medical appointment. Will continue with PT when available.)  Prior to Session Communication: Bedside nurse  Patient Position Received: Up in chair, Alarm on  General Comment: Pt returned from appointment. Per nursing wound vac removed at appointment, pt has dressing and abdominal binder. Nursing will address wound vac after therapy. Dressing came loose toward end of session, nursing aware and pt returned to bed in supine position.    Subjective   Precautions:  Precautions  Medical Precautions: Fall precautions  Post-Surgical Precautions: Abdominal surgery precautions      Objective   Pain:  Pain Assessment  Pain Assessment: 0-10  0-10 (Numeric) Pain Score: 2  Pain Location: Abdomen  Pain Interventions: Repositioned, Distraction    Treatments:  Therapeutic Exercise  Therapeutic Exercise Performed: Yes    Bed Mobility  Bed Mobility:  (Pt performs sit/supine with mod A x 1  to lift BLE up into bed.)    Ambulation/Gait Training  Ambulation/Gait Training Performed:  (Pt ambulates 50 ft, 50 ft, 50 ft with FWW and CGA.  Pt has 1 LOB requiring min A to correct, pt runs into ramp with wheeled walker despite safety cues.)  Transfers  Transfer:  (Pt performs sit/stand transfers with CGA)      Education Documentation  Pt educated on techniques for transfers and gait training with device in order to maximize safety and independence.  Pt educated on therapeutic exercises, including optimal techniques to maximize function.    Encounter Problems       Encounter Problems (Active)       PT Problem       LTG - Pt will perform bed mobility with mod A x 1  (Progressing)       Start:  08/23/24    Expected End:  09/06/24            LTG - Pt will perform transfers with min A x 1.  (Progressing)       Start:  08/23/24    Expected End:  09/06/24            LTG - Pt will amb 150 feet with FWW and CGA.   (Progressing)       Start:  08/23/24    Expected End:  09/06/24            LTG - Pt will up/down 2 stairs with 1 HR and straight cane with min A x 1.  (Progressing)       Start:  08/23/24    Expected End:  09/06/24            STG - Pt will perform bed mobility with mod assist x 2 (Progressing)       Start:  08/23/24    Expected End:  08/30/24            STG - Pt will perform transfers with mod assist x 1 (Met)       Start:  08/23/24    Expected End:  08/30/24    Resolved:  08/28/24         STG - Pt will amb 50 feet with FWW and mod/min assist x 1 (Progressing)       Start:  08/23/24    Expected End:  08/30/24            STG - Pt will up/down curb step with FWW and mod A x 2.  (Progressing)       Start:  08/23/24    Expected End:  08/30/24

## 2024-09-04 NOTE — PROGRESS NOTES
Physical Therapy                 Therapy Communication Note    Patient Name: Mariann Drew  MRN: [id number]  Today's Date: 9/4/2024   Time: 1045    Discipline: Physical Therapy    Missed Visit Reason: Missed Visit Reason:  (Pt offsite for medical appointment. Will continue with PT when available.)    Missed Time: Attempt    Comment:

## 2024-09-04 NOTE — CARE PLAN
The patient's goals for the shift include  wound vac reapplied     The clinical goals for the shift include No signs of infection     Problem: Skin  Goal: Decreased wound size/increased tissue granulation at next dressing change  Outcome: Progressing  Flowsheets (Taken 9/4/2024 1337)  Decreased wound size/increased tissue granulation at next dressing change:   Promote sleep for wound healing   Protective dressings over bony prominences  Goal: Participates in plan/prevention/treatment measures  Outcome: Progressing  Flowsheets (Taken 9/4/2024 1337)  Participates in plan/prevention/treatment measures:   Elevate heels   Increase activity/out of bed for meals  Goal: Promote/optimize nutrition  Outcome: Progressing  Flowsheets (Taken 9/4/2024 1337)  Promote/optimize nutrition:   Monitor/record intake including meals   Consume > 50% meals/supplements   Offer water/supplements/favorite foods  Goal: Promote skin healing  Outcome: Progressing  Flowsheets (Taken 9/4/2024 1337)  Promote skin healing: Turn/reposition every 2 hours/use positioning/transfer devices     Problem: Fall/Injury  Goal: Verbalize understanding of risk factor reduction measures to prevent injury from fall in the home  Outcome: Progressing  Goal: Use assistive devices by end of the shift  Outcome: Progressing  Goal: Pace activities to prevent fatigue by end of the shift  Outcome: Progressing     Problem: Pain  Goal: Takes deep breaths with improved pain control throughout the shift  Outcome: Progressing  Goal: Turns in bed with improved pain control throughout the shift  Outcome: Progressing  Goal: Walks with improved pain control throughout the shift  Outcome: Progressing  Goal: Performs ADL's with improved pain control throughout shift  Outcome: Progressing  Goal: Participates in PT with improved pain control throughout the shift  Outcome: Progressing     Problem: Wound Care  Goal: Area will decrease in size .2x.2 cm per week  Outcome: Progressing      Problem: Diabetes  Goal: Achieve decreasing blood glucose levels by end of shift  Outcome: Progressing  Goal: Increase stability of blood glucose readings by end of shift  Outcome: Progressing  Goal: Decrease in ketones present in urine by end of shift  Outcome: Progressing  Goal: Maintain electrolyte levels within acceptable range throughout shift  Outcome: Progressing  Goal: Maintain glucose levels >70mg/dl to <250mg/dl throughout shift  Outcome: Progressing  Goal: No changes in neurological exam by end of shift  Outcome: Progressing  Goal: Learn about and adhere to nutrition recommendations by end of shift  Outcome: Progressing  Goal: Vital signs within normal range for age by end of shift  Outcome: Progressing  Goal: Increase self care and/or family involovement by end of shift  Outcome: Progressing  Goal: Receive DSME education by end of shift  Outcome: Progressing

## 2024-09-05 ENCOUNTER — HOME HEALTH ADMISSION (OUTPATIENT)
Dept: HOME HEALTH SERVICES | Facility: HOME HEALTH | Age: 69
End: 2024-09-05
Payer: MEDICARE

## 2024-09-05 ENCOUNTER — APPOINTMENT (OUTPATIENT)
Dept: PLASTIC SURGERY | Facility: CLINIC | Age: 69
End: 2024-09-05
Payer: MEDICARE

## 2024-09-05 LAB
ANION GAP SERPL CALC-SCNC: 11 MMOL/L (ref 10–20)
BUN SERPL-MCNC: 41 MG/DL (ref 6–23)
CALCIUM SERPL-MCNC: 8 MG/DL (ref 8.6–10.3)
CHLORIDE SERPL-SCNC: 106 MMOL/L (ref 98–107)
CO2 SERPL-SCNC: 22 MMOL/L (ref 21–32)
CREAT SERPL-MCNC: 1.71 MG/DL (ref 0.5–1.05)
EGFRCR SERPLBLD CKD-EPI 2021: 32 ML/MIN/1.73M*2
ERYTHROCYTE [DISTWIDTH] IN BLOOD BY AUTOMATED COUNT: 15 % (ref 11.5–14.5)
GLUCOSE SERPL-MCNC: 114 MG/DL (ref 74–99)
HCT VFR BLD AUTO: 28.8 % (ref 36–46)
HGB BLD-MCNC: 9.3 G/DL (ref 12–16)
MCH RBC QN AUTO: 30 PG (ref 26–34)
MCHC RBC AUTO-ENTMCNC: 32.3 G/DL (ref 32–36)
MCV RBC AUTO: 93 FL (ref 80–100)
NRBC BLD-RTO: 0 /100 WBCS (ref 0–0)
PLATELET # BLD AUTO: 138 X10*3/UL (ref 150–450)
POTASSIUM SERPL-SCNC: 4.6 MMOL/L (ref 3.5–5.3)
RBC # BLD AUTO: 3.1 X10*6/UL (ref 4–5.2)
SODIUM SERPL-SCNC: 134 MMOL/L (ref 136–145)
URATE SERPL-MCNC: 4.3 MG/DL (ref 2.3–6.7)
WBC # BLD AUTO: 12.1 X10*3/UL (ref 4.4–11.3)

## 2024-09-05 PROCEDURE — 1180000001 HC REHAB PRIVATE ROOM DAILY

## 2024-09-05 PROCEDURE — 80048 BASIC METABOLIC PNL TOTAL CA: CPT | Performed by: INTERNAL MEDICINE

## 2024-09-05 PROCEDURE — 85027 COMPLETE CBC AUTOMATED: CPT | Performed by: INTERNAL MEDICINE

## 2024-09-05 PROCEDURE — 97110 THERAPEUTIC EXERCISES: CPT | Mod: GO

## 2024-09-05 PROCEDURE — 97530 THERAPEUTIC ACTIVITIES: CPT | Mod: GO

## 2024-09-05 PROCEDURE — 97530 THERAPEUTIC ACTIVITIES: CPT | Mod: GP

## 2024-09-05 PROCEDURE — 99232 SBSQ HOSP IP/OBS MODERATE 35: CPT | Performed by: PHYSICAL MEDICINE & REHABILITATION

## 2024-09-05 PROCEDURE — 2500000001 HC RX 250 WO HCPCS SELF ADMINISTERED DRUGS (ALT 637 FOR MEDICARE OP): Performed by: PHYSICAL MEDICINE & REHABILITATION

## 2024-09-05 PROCEDURE — 97116 GAIT TRAINING THERAPY: CPT | Mod: GP

## 2024-09-05 PROCEDURE — 2500000005 HC RX 250 GENERAL PHARMACY W/O HCPCS: Performed by: INTERNAL MEDICINE

## 2024-09-05 PROCEDURE — 2500000004 HC RX 250 GENERAL PHARMACY W/ HCPCS (ALT 636 FOR OP/ED): Performed by: INTERNAL MEDICINE

## 2024-09-05 PROCEDURE — 97110 THERAPEUTIC EXERCISES: CPT | Mod: GP

## 2024-09-05 PROCEDURE — 2500000004 HC RX 250 GENERAL PHARMACY W/ HCPCS (ALT 636 FOR OP/ED): Performed by: PHYSICAL MEDICINE & REHABILITATION

## 2024-09-05 PROCEDURE — 97535 SELF CARE MNGMENT TRAINING: CPT | Mod: GO

## 2024-09-05 PROCEDURE — 2500000001 HC RX 250 WO HCPCS SELF ADMINISTERED DRUGS (ALT 637 FOR MEDICARE OP): Performed by: INTERNAL MEDICINE

## 2024-09-05 PROCEDURE — 84550 ASSAY OF BLOOD/URIC ACID: CPT | Performed by: INTERNAL MEDICINE

## 2024-09-05 RX ORDER — ALLOPURINOL 100 MG/1
100 TABLET ORAL DAILY
Qty: 30 TABLET | Refills: 0 | Status: SHIPPED | OUTPATIENT
Start: 2024-09-06 | End: 2024-10-06

## 2024-09-05 RX ORDER — NYSTATIN 100000 [USP'U]/G
POWDER TOPICAL 2 TIMES DAILY
Qty: 30 G | Refills: 0 | Status: SHIPPED | OUTPATIENT
Start: 2024-09-05 | End: 2024-10-05

## 2024-09-05 RX ORDER — ACETAMINOPHEN 325 MG/1
650 TABLET ORAL EVERY 4 HOURS PRN
Qty: 30 TABLET | Refills: 0 | Status: SHIPPED | OUTPATIENT
Start: 2024-09-05

## 2024-09-05 RX ORDER — POTASSIUM CHLORIDE 20 MEQ/1
20 TABLET, EXTENDED RELEASE ORAL DAILY
Qty: 30 TABLET | Refills: 0 | Status: SHIPPED | OUTPATIENT
Start: 2024-09-06 | End: 2024-10-06

## 2024-09-05 RX ORDER — CYCLOBENZAPRINE HCL 5 MG
5 TABLET ORAL 3 TIMES DAILY PRN
Qty: 30 TABLET | Refills: 0 | Status: SHIPPED | OUTPATIENT
Start: 2024-09-05

## 2024-09-05 RX ORDER — ERTAPENEM 1 G/1
500 INJECTION, POWDER, LYOPHILIZED, FOR SOLUTION INTRAMUSCULAR; INTRAVENOUS DAILY
Qty: 19 G | Refills: 0 | Status: SHIPPED | OUTPATIENT
Start: 2024-09-19 | End: 2024-09-05 | Stop reason: DRUGHIGH

## 2024-09-05 RX ORDER — SUCRALFATE 1 G/1
1 TABLET ORAL NIGHTLY
Qty: 30 TABLET | Refills: 0 | Status: SHIPPED | OUTPATIENT
Start: 2024-09-05 | End: 2024-10-05

## 2024-09-05 RX ORDER — POTASSIUM CHLORIDE 20 MEQ/1
20 TABLET, EXTENDED RELEASE ORAL DAILY
Status: DISCONTINUED | OUTPATIENT
Start: 2024-09-06 | End: 2024-09-06 | Stop reason: HOSPADM

## 2024-09-05 RX ORDER — DEXAMETHASONE 0.5 MG/5ML
0.5 ELIXIR ORAL 4 TIMES DAILY
Qty: 600 ML | Refills: 0 | Status: SHIPPED | OUTPATIENT
Start: 2024-09-05 | End: 2024-10-05

## 2024-09-05 RX ORDER — FUROSEMIDE 40 MG/1
40 TABLET ORAL DAILY
Qty: 30 TABLET | Refills: 0 | Status: SHIPPED | OUTPATIENT
Start: 2024-09-05 | End: 2024-10-05

## 2024-09-05 RX ORDER — FUROSEMIDE 40 MG/1
40 TABLET ORAL DAILY
Status: DISCONTINUED | OUTPATIENT
Start: 2024-09-05 | End: 2024-09-06 | Stop reason: HOSPADM

## 2024-09-05 RX ORDER — FLUDROCORTISONE ACETATE 0.1 MG/1
0.1 TABLET ORAL DAILY
Qty: 30 TABLET | Refills: 0 | Status: SHIPPED | OUTPATIENT
Start: 2024-09-06 | End: 2024-10-06

## 2024-09-05 RX ADMIN — LOTILANER OPHTHALMIC SOLUTION 1 DROP: 2.5 SOLUTION/ DROPS OPHTHALMIC at 20:35

## 2024-09-05 RX ADMIN — DEUTETRABENAZINE 6 MG: 6 TABLET, FILM COATED, EXTENDED RELEASE ORAL at 08:15

## 2024-09-05 RX ADMIN — SENNOSIDES 8.6 MG: 8.6 TABLET, FILM COATED ORAL at 20:34

## 2024-09-05 RX ADMIN — LAMOTRIGINE 200 MG: 100 TABLET ORAL at 09:07

## 2024-09-05 RX ADMIN — FAMOTIDINE 20 MG: 20 TABLET, FILM COATED ORAL at 08:12

## 2024-09-05 RX ADMIN — DEUTETRABENAZINE 24 MG: 24 TABLET, FILM COATED, EXTENDED RELEASE ORAL at 08:15

## 2024-09-05 RX ADMIN — WATER 500 MG: 1 INJECTION INTRAMUSCULAR; INTRAVENOUS; SUBCUTANEOUS at 09:07

## 2024-09-05 RX ADMIN — DULOXETINE HYDROCHLORIDE 60 MG: 30 CAPSULE, DELAYED RELEASE ORAL at 08:16

## 2024-09-05 RX ADMIN — ENOXAPARIN SODIUM 40 MG: 100 INJECTION SUBCUTANEOUS at 17:19

## 2024-09-05 RX ADMIN — NYSTATIN 1 APPLICATION: 100000 CREAM TOPICAL at 20:35

## 2024-09-05 RX ADMIN — DEXAMETHASONE 0.5 MG: 1 TABLET ORAL at 12:05

## 2024-09-05 RX ADMIN — DEXAMETHASONE 0.5 MG: 1 TABLET ORAL at 20:34

## 2024-09-05 RX ADMIN — DEXAMETHASONE 0.5 MG: 1 TABLET ORAL at 06:27

## 2024-09-05 RX ADMIN — TRAZODONE HYDROCHLORIDE 150 MG: 50 TABLET ORAL at 20:34

## 2024-09-05 RX ADMIN — ZINC OXIDE 1 APPLICATION: 200 OINTMENT TOPICAL at 20:34

## 2024-09-05 RX ADMIN — Medication 3 MG: at 20:34

## 2024-09-05 RX ADMIN — ALLOPURINOL 100 MG: 100 TABLET ORAL at 08:13

## 2024-09-05 RX ADMIN — NYSTATIN 1 APPLICATION: 100000 CREAM TOPICAL at 08:11

## 2024-09-05 RX ADMIN — FERROUS SULFATE TAB 325 MG (65 MG ELEMENTAL FE) 325 MG: 325 (65 FE) TAB at 08:11

## 2024-09-05 RX ADMIN — ZINC OXIDE 1 APPLICATION: 200 OINTMENT TOPICAL at 08:10

## 2024-09-05 RX ADMIN — FUROSEMIDE 40 MG: 40 TABLET ORAL at 15:19

## 2024-09-05 RX ADMIN — LATANOPROST 1 DROP: 50 SOLUTION OPHTHALMIC at 20:35

## 2024-09-05 RX ADMIN — DEXAMETHASONE 0.5 MG: 1 TABLET ORAL at 17:18

## 2024-09-05 RX ADMIN — ACETAMINOPHEN 650 MG: 325 TABLET ORAL at 20:33

## 2024-09-05 RX ADMIN — DULOXETINE HYDROCHLORIDE 60 MG: 30 CAPSULE, DELAYED RELEASE ORAL at 20:34

## 2024-09-05 RX ADMIN — SUCRALFATE 1 G: 1 TABLET ORAL at 20:34

## 2024-09-05 RX ADMIN — FERROUS SULFATE TAB 325 MG (65 MG ELEMENTAL FE) 325 MG: 325 (65 FE) TAB at 20:34

## 2024-09-05 RX ADMIN — FLUDROCORTISONE ACETATE 0.1 MG: 0.1 TABLET ORAL at 08:10

## 2024-09-05 ASSESSMENT — PAIN - FUNCTIONAL ASSESSMENT
PAIN_FUNCTIONAL_ASSESSMENT: 0-10
PAIN_FUNCTIONAL_ASSESSMENT: UNABLE TO SELF-REPORT
PAIN_FUNCTIONAL_ASSESSMENT: 0-10

## 2024-09-05 ASSESSMENT — PAIN SCALES - GENERAL
PAINLEVEL_OUTOF10: 0 - NO PAIN
PAINLEVEL_OUTOF10: 3
PAINLEVEL_OUTOF10: 0 - NO PAIN
PAINLEVEL_OUTOF10: 2
PAINLEVEL_OUTOF10: 0 - NO PAIN

## 2024-09-05 ASSESSMENT — ACTIVITIES OF DAILY LIVING (ADL)
BATHING_WHERE_ASSESSED: SITTING SINKSIDE;STANDING SINKSIDE
HOME_MANAGEMENT_TIME_ENTRY: 25
HOME_MANAGEMENT_TIME_ENTRY: 30

## 2024-09-05 ASSESSMENT — PAIN DESCRIPTION - LOCATION: LOCATION: HIP

## 2024-09-05 ASSESSMENT — PAIN DESCRIPTION - ORIENTATION: ORIENTATION: RIGHT;LEFT

## 2024-09-05 ASSESSMENT — BRIEF INTERVIEW FOR MENTAL STATUS (BIMS): GENERAL MEMORY AND RECALL ABILITY: CURRENT SEASON;LOCATION OF OWN ROOM;RECOGNIZES APPROPRIATE HEALTHCARE SETTING

## 2024-09-05 NOTE — PROGRESS NOTES
Nephrology Consult Progress Note    Mariann Drew is a 68 y.o. female on day 13 of admission presenting with Debility.      Subjective   No acute events overnight, persistent abd pain, tolerating po, nonoliguric, no urinary complains       Objective          Physical Exam    Vitals 24HR  Heart Rate:  []   Temp:  [36.1 °C (97 °F)-36.3 °C (97.3 °F)]   Resp:  [16]   BP: (100-125)/(62)   Weight:  [78.4 kg (172 lb 12.8 oz)]   SpO2:  [99 %-100 %]       Sleeping but araousable,  AAOx3, sick looking, in pain, on RA  Decreased AEBL  RRR no murmurs  Abd - wounds not examined, wound vac+  Chronic skin changes, edema +           I&O 24HR  No intake or output data in the 24 hours ending 24 1257        Medications  Medications Prior to Admission   Medication Sig Dispense Refill Last Dose    allopurinol (Zyloprim) 100 mg tablet Take 1 tablet (100 mg) by mouth once daily.       allopurinol (Zyloprim) 300 mg tablet TAKE ONE TABLET ( 300 MG ) IN ADDITION TO ONE TABLET ( 100 MG ) DAILY ( TOTAL  MG DAILY ) 90 tablet 3     deutetrabenazine (Austedo XR) 24 mg tablet extended release 24 hr Take 1 tablet (24 mg) by mouth once daily. Take 1-6mg tablet and 1-24mg tablet for a total dose of 30mg daily. 90 tablet 3     deutetrabenazine (Austedo XR) 6 mg tablet extended release 24 hr Take 1 tablet (6 mg) by mouth once daily. Take 1-6mg tablet and 1-24mg tablet for a total dose of 30mg daily. 90 tablet 3     DULoxetine (Cymbalta) 60 mg DR capsule Take 1 capsule (60 mg) by mouth 2 times a day. Do not crush or chew.       [] ertapenem (INVanz) 1 gram injection Infuse 1 g into a venous catheter once daily for 13 days. Obtain weekly labs: BMP and CBC. Fax to Dr. Ying at [phone]. 13 g 0     famotidine (Pepcid) 20 mg tablet Take 1 tablet (20 mg) by mouth once daily in the morning.       ferrous sulfate (FEOSOL ORAL) Take 1 tablet by mouth once daily in the evening. 200 (65 Fe) MG       HYDROcodone-acetaminophen  (Norco) 5-325 mg tablet Take 1 tablet by mouth every 6 hours if needed for severe pain (7 - 10). 20 tablet 0     lamoTRIgine (LaMICtal) 200 mg tablet Take 1 tablet (200 mg) by mouth once daily in the morning.       latanoprost (Xelpros) 0.005 % drops, emulsion Administer into affected eye(s) once daily.       lidocaine (Lidocaine Viscous) 2 % solution Take by mouth.       multivitamin tablet Take 1 tablet by mouth once daily.       naratriptan (Amerge) 2.5 mg tablet Take 1 tablet (2.5 mg) by mouth 1 time if needed for migraine. May repeat once in 4hrs if headache recurs 9 tablet 1     semaglutide (Ozempic) 0.25 mg or 0.5 mg (2 mg/3 mL) pen injector INJECT 0.5 MG UNDER THE SKIN 1 (ONE) TIME PER WEEK. (Patient taking differently: Inject 0.5 mg under the skin 1 (one) time per week. Every Tuesdays) 3 mL 3     traZODone (Desyrel) 100 mg tablet Take 1.5 tablets (150 mg) by mouth once daily at bedtime.       Xdemvy 0.25 % drops Administer 1 drop into both eyes once daily at bedtime.       [DISCONTINUED] cyclobenzaprine (Flexeril) 5 mg tablet Take 1 tablet (5 mg) by mouth 3 times a day as needed for muscle spasms. 30 tablet 0     [DISCONTINUED] dexAMETHasone 0.5 mg/5 mL oral liquid Take 5 mL (0.5 mg) by mouth 4 times a day for 10 days. 237 mL 1     [DISCONTINUED] ertapenem (INVanz) 1 gram injection Infuse 1 g into a venous catheter once daily for 28 days. Obtain weekly labs: BMP, CBC, Sed rate, and CRP. Fax to Dr. Ying at [phone]. 28 g 0     [DISCONTINUED] Nyamyc 100,000 unit/gram powder APPLY TO AFFECTED AREA TWICE A DAY 30 g 2     [DISCONTINUED] sucralfate (Carafate) 1 gram tablet Take 1 tablet (1 g) by mouth once daily at bedtime.         Scheduled medications  allopurinol, 100 mg, oral, Daily  deutetrabenazine, 24 mg, oral, Daily  deutetrabenazine, 6 mg, oral, Daily  dexAMETHasone, 0.5 mg, oral, 4x daily  DULoxetine, 60 mg, oral, BID  enoxaparin, 40 mg, subcutaneous, q24h  ertapenem, 500 mg, intravenous,  q24h  famotidine, 20 mg, oral, q AM  ferrous sulfate (325 mg ferrous sulfate), 65 mg of iron, oral, BID  fludrocortisone, 0.1 mg, oral, Daily  lamoTRIgine, 200 mg, oral, q AM  latanoprost, 1 drop, Both Eyes, Nightly  lotilaner, 1 drop, Both Eyes, Nightly  nystatin, 1 Application, Topical, BID  sennosides, 1 tablet, oral, Nightly  sucralfate, 1 g, oral, Nightly  traZODone, 150 mg, oral, Nightly  zinc oxide, 1 Application, Topical, BID      Continuous medications     PRN medications  PRN medications: acetaminophen **OR** acetaminophen, alum-mag hydroxide-simeth, bisacodyl, bisacodyl, cyclobenzaprine, lidocaine, melatonin, naratriptan, simethicone    Relevant Results      Admission on 08/23/2024   Component Date Value Ref Range Status    WBC 08/24/2024 7.1  4.4 - 11.3 x10*3/uL Final    nRBC 08/24/2024 0.0  0.0 - 0.0 /100 WBCs Final    RBC 08/24/2024 2.43 (L)  4.00 - 5.20 x10*6/uL Final    Hemoglobin 08/24/2024 7.4 (L)  12.0 - 16.0 g/dL Final    Hematocrit 08/24/2024 23.0 (L)  36.0 - 46.0 % Final    MCV 08/24/2024 95  80 - 100 fL Final    MCH 08/24/2024 30.5  26.0 - 34.0 pg Final    MCHC 08/24/2024 32.2  32.0 - 36.0 g/dL Final    RDW 08/24/2024 14.9 (H)  11.5 - 14.5 % Final    Platelets 08/24/2024 108 (L)  150 - 450 x10*3/uL Final    Glucose 08/24/2024 107 (H)  74 - 99 mg/dL Final    Sodium 08/24/2024 136  136 - 145 mmol/L Final    Potassium 08/24/2024 4.6  3.5 - 5.3 mmol/L Final    Chloride 08/24/2024 110 (H)  98 - 107 mmol/L Final    Bicarbonate 08/24/2024 19 (L)  21 - 32 mmol/L Final    Anion Gap 08/24/2024 12  10 - 20 mmol/L Final    Urea Nitrogen 08/24/2024 23  6 - 23 mg/dL Final    Creatinine 08/24/2024 1.52 (H)  0.50 - 1.05 mg/dL Final    eGFR 08/24/2024 37 (L)  >60 mL/min/1.73m*2 Final    Calculations of estimated GFR are performed using the 2021 CKD-EPI Study Refit equation without the race variable for the IDMS-Traceable creatinine methods.  https://jasn.asnjournals.org/content/early/2021/09/22/ASN.2964115571     Calcium 08/24/2024 8.4 (L)  8.6 - 10.3 mg/dL Final    C-Reactive Protein 08/24/2024 1.59 (H)  <1.00 mg/dL Final    Sedimentation Rate 08/24/2024 16  0 - 30 mm/h Final    WBC 08/25/2024 7.4  4.4 - 11.3 x10*3/uL Final    nRBC 08/25/2024 0.0  0.0 - 0.0 /100 WBCs Final    RBC 08/25/2024 2.56 (L)  4.00 - 5.20 x10*6/uL Final    Hemoglobin 08/25/2024 7.7 (L)  12.0 - 16.0 g/dL Final    Hematocrit 08/25/2024 24.4 (L)  36.0 - 46.0 % Final    MCV 08/25/2024 95  80 - 100 fL Final    MCH 08/25/2024 30.1  26.0 - 34.0 pg Final    MCHC 08/25/2024 31.6 (L)  32.0 - 36.0 g/dL Final    RDW 08/25/2024 15.2 (H)  11.5 - 14.5 % Final    Platelets 08/25/2024 137 (L)  150 - 450 x10*3/uL Final    Glucose 08/25/2024 122 (H)  74 - 99 mg/dL Final    Sodium 08/25/2024 136  136 - 145 mmol/L Final    Potassium 08/25/2024 4.6  3.5 - 5.3 mmol/L Final    Chloride 08/25/2024 107  98 - 107 mmol/L Final    Bicarbonate 08/25/2024 20 (L)  21 - 32 mmol/L Final    Anion Gap 08/25/2024 14  10 - 20 mmol/L Final    Urea Nitrogen 08/25/2024 27 (H)  6 - 23 mg/dL Final    Creatinine 08/25/2024 1.76 (H)  0.50 - 1.05 mg/dL Final    eGFR 08/25/2024 31 (L)  >60 mL/min/1.73m*2 Final    Calculations of estimated GFR are performed using the 2021 CKD-EPI Study Refit equation without the race variable for the IDMS-Traceable creatinine methods.  https://jasn.asnjournals.org/content/early/2021/09/22/ASN.2833591153    Calcium 08/25/2024 8.3 (L)  8.6 - 10.3 mg/dL Final    WBC 08/26/2024 5.3  4.4 - 11.3 x10*3/uL Final    nRBC 08/26/2024 0.0  0.0 - 0.0 /100 WBCs Final    RBC 08/26/2024 2.30 (L)  4.00 - 5.20 x10*6/uL Final    Hemoglobin 08/26/2024 7.1 (L)  12.0 - 16.0 g/dL Final    Hematocrit 08/26/2024 21.8 (L)  36.0 - 46.0 % Final    MCV 08/26/2024 95  80 - 100 fL Final    MCH 08/26/2024 30.9  26.0 - 34.0 pg Final    MCHC 08/26/2024 32.6  32.0 - 36.0 g/dL Final    RDW 08/26/2024 15.2 (H)  11.5 - 14.5 % Final    Platelets 08/26/2024 103 (L)  150 - 450 x10*3/uL Final     Glucose 08/26/2024 109 (H)  74 - 99 mg/dL Final    Sodium 08/26/2024 133 (L)  136 - 145 mmol/L Final    Potassium 08/26/2024 4.8  3.5 - 5.3 mmol/L Final    Chloride 08/26/2024 108 (H)  98 - 107 mmol/L Final    Bicarbonate 08/26/2024 20 (L)  21 - 32 mmol/L Final    Anion Gap 08/26/2024 10  10 - 20 mmol/L Final    Urea Nitrogen 08/26/2024 28 (H)  6 - 23 mg/dL Final    Creatinine 08/26/2024 1.66 (H)  0.50 - 1.05 mg/dL Final    eGFR 08/26/2024 33 (L)  >60 mL/min/1.73m*2 Final    Calculations of estimated GFR are performed using the 2021 CKD-EPI Study Refit equation without the race variable for the IDMS-Traceable creatinine methods.  https://jasn.asnjournals.org/content/early/2021/09/22/ASN.6281607313    Calcium 08/26/2024 8.1 (L)  8.6 - 10.3 mg/dL Final    Occult Blood, Stool X1 08/26/2024 Negative  Negative Final    Glucose 08/27/2024 111 (H)  74 - 99 mg/dL Final    Sodium 08/27/2024 135 (L)  136 - 145 mmol/L Final    Potassium 08/27/2024 4.5  3.5 - 5.3 mmol/L Final    Chloride 08/27/2024 109 (H)  98 - 107 mmol/L Final    Bicarbonate 08/27/2024 22  21 - 32 mmol/L Final    Anion Gap 08/27/2024 9 (L)  10 - 20 mmol/L Final    Urea Nitrogen 08/27/2024 27 (H)  6 - 23 mg/dL Final    Creatinine 08/27/2024 1.75 (H)  0.50 - 1.05 mg/dL Final    eGFR 08/27/2024 31 (L)  >60 mL/min/1.73m*2 Final    Calculations of estimated GFR are performed using the 2021 CKD-EPI Study Refit equation without the race variable for the IDMS-Traceable creatinine methods.  https://jasn.asnjournals.org/content/early/2021/09/22/ASN.1522720293    Calcium 08/27/2024 8.2 (L)  8.6 - 10.3 mg/dL Final    WBC 08/27/2024 9.0  4.4 - 11.3 x10*3/uL Final    nRBC 08/27/2024 0.0  0.0 - 0.0 /100 WBCs Final    RBC 08/27/2024 2.79 (L)  4.00 - 5.20 x10*6/uL Final    Hemoglobin 08/27/2024 8.4 (L)  12.0 - 16.0 g/dL Final    Hematocrit 08/27/2024 26.6 (L)  36.0 - 46.0 % Final    MCV 08/27/2024 95  80 - 100 fL Final    MCH 08/27/2024 30.1  26.0 - 34.0 pg Final    MCHC  08/27/2024 31.6 (L)  32.0 - 36.0 g/dL Final    RDW 08/27/2024 15.0 (H)  11.5 - 14.5 % Final    Platelets 08/27/2024 190  150 - 450 x10*3/uL Final    Platelet count verified by smear review.    Neutrophils % 08/27/2024 83.9  40.0 - 80.0 % Final    Immature Granulocytes %, Automated 08/27/2024 1.0 (H)  0.0 - 0.9 % Final    Immature Granulocyte Count (IG) includes promyelocytes, myelocytes and metamyelocytes but does not include bands. Percent differential counts (%) should be interpreted in the context of the absolute cell counts (cells/UL).    Lymphocytes % 08/27/2024 7.8  13.0 - 44.0 % Final    Monocytes % 08/27/2024 7.0  2.0 - 10.0 % Final    Eosinophils % 08/27/2024 0.2  0.0 - 6.0 % Final    Basophils % 08/27/2024 0.1  0.0 - 2.0 % Final    Neutrophils Absolute 08/27/2024 7.53  1.20 - 7.70 x10*3/uL Final    Percent differential counts (%) should be interpreted in the context of the absolute cell counts (cells/uL).    Immature Granulocytes Absolute, Au* 08/27/2024 0.09  0.00 - 0.70 x10*3/uL Final    Lymphocytes Absolute 08/27/2024 0.70 (L)  1.20 - 4.80 x10*3/uL Final    Monocytes Absolute 08/27/2024 0.63  0.10 - 1.00 x10*3/uL Final    Eosinophils Absolute 08/27/2024 0.02  0.00 - 0.70 x10*3/uL Final    Basophils Absolute 08/27/2024 0.01  0.00 - 0.10 x10*3/uL Final    RBC Morphology 08/27/2024 See Below   Final    Polychromasia 08/27/2024 Mild   Final    Ovalocytes 08/27/2024 Few   Final    Clumped Platelets 08/27/2024 Present   Final    Albumin 08/27/2024 2.3 (L)  3.4 - 5.0 g/dL Final    Glucose 08/28/2024 124 (H)  74 - 99 mg/dL Final    Sodium 08/28/2024 134 (L)  136 - 145 mmol/L Final    Potassium 08/28/2024 4.4  3.5 - 5.3 mmol/L Final    Chloride 08/28/2024 107  98 - 107 mmol/L Final    Bicarbonate 08/28/2024 20 (L)  21 - 32 mmol/L Final    Anion Gap 08/28/2024 11  10 - 20 mmol/L Final    Urea Nitrogen 08/28/2024 29 (H)  6 - 23 mg/dL Final    Creatinine 08/28/2024 1.78 (H)  0.50 - 1.05 mg/dL Final    eGFR  08/28/2024 31 (L)  >60 mL/min/1.73m*2 Final    Calculations of estimated GFR are performed using the 2021 CKD-EPI Study Refit equation without the race variable for the IDMS-Traceable creatinine methods.  https://jasn.asnjournals.org/content/early/2021/09/22/ASN.0539084136    Calcium 08/28/2024 8.2 (L)  8.6 - 10.3 mg/dL Final    Phosphorus 08/28/2024 3.4  2.5 - 4.9 mg/dL Final    The performance characteristics of phosphorus testing in heparinized plasma have been validated by the individual  laboratory site where testing is performed. Testing on heparinized plasma is not approved by the FDA; however, such approval is not necessary.    Albumin 08/28/2024 2.4 (L)  3.4 - 5.0 g/dL Final    Glucose 08/30/2024 108 (H)  74 - 99 mg/dL Final    Sodium 08/30/2024 133 (L)  136 - 145 mmol/L Final    Potassium 08/30/2024 4.4  3.5 - 5.3 mmol/L Final    Chloride 08/30/2024 108 (H)  98 - 107 mmol/L Final    Bicarbonate 08/30/2024 21  21 - 32 mmol/L Final    Anion Gap 08/30/2024 8 (L)  10 - 20 mmol/L Final    Urea Nitrogen 08/30/2024 31 (H)  6 - 23 mg/dL Final    Creatinine 08/30/2024 1.76 (H)  0.50 - 1.05 mg/dL Final    eGFR 08/30/2024 31 (L)  >60 mL/min/1.73m*2 Final    Calculations of estimated GFR are performed using the 2021 CKD-EPI Study Refit equation without the race variable for the IDMS-Traceable creatinine methods.  https://jasn.asnjournals.org/content/early/2021/09/22/ASN.7923078422    Calcium 08/30/2024 8.3 (L)  8.6 - 10.3 mg/dL Final    Phosphorus 08/30/2024 3.3  2.5 - 4.9 mg/dL Final    The performance characteristics of phosphorus testing in heparinized plasma have been validated by the individual  laboratory site where testing is performed. Testing on heparinized plasma is not approved by the FDA; however, such approval is not necessary.    Albumin 08/30/2024 2.5 (L)  3.4 - 5.0 g/dL Final    Glucose 08/30/2024 108 (H)  74 - 99 mg/dL Final    Sodium 08/30/2024 133 (L)  136 - 145 mmol/L Final    Potassium  08/30/2024 4.4  3.5 - 5.3 mmol/L Final    Chloride 08/30/2024 108 (H)  98 - 107 mmol/L Final    Bicarbonate 08/30/2024 21  21 - 32 mmol/L Final    Anion Gap 08/30/2024 8 (L)  10 - 20 mmol/L Final    Urea Nitrogen 08/30/2024 31 (H)  6 - 23 mg/dL Final    Creatinine 08/30/2024 1.76 (H)  0.50 - 1.05 mg/dL Final    eGFR 08/30/2024 31 (L)  >60 mL/min/1.73m*2 Final    Calculations of estimated GFR are performed using the 2021 CKD-EPI Study Refit equation without the race variable for the IDMS-Traceable creatinine methods.  https://jasn.asnjournals.org/content/early/2021/09/22/ASN.8927140723    Calcium 08/30/2024 8.3 (L)  8.6 - 10.3 mg/dL Final    Sedimentation Rate 09/02/2024 5  0 - 30 mm/h Final    WBC 09/02/2024 10.1  4.4 - 11.3 x10*3/uL Final    nRBC 09/02/2024 0.0  0.0 - 0.0 /100 WBCs Final    RBC 09/02/2024 2.96 (L)  4.00 - 5.20 x10*6/uL Final    Hemoglobin 09/02/2024 8.8 (L)  12.0 - 16.0 g/dL Final    Hematocrit 09/02/2024 27.4 (L)  36.0 - 46.0 % Final    MCV 09/02/2024 93  80 - 100 fL Final    MCH 09/02/2024 29.7  26.0 - 34.0 pg Final    MCHC 09/02/2024 32.1  32.0 - 36.0 g/dL Final    RDW 09/02/2024 14.5  11.5 - 14.5 % Final    Platelets 09/02/2024 125 (L)  150 - 450 x10*3/uL Final    Glucose 09/02/2024 113 (H)  74 - 99 mg/dL Final    Sodium 09/02/2024 136  136 - 145 mmol/L Final    Potassium 09/02/2024 4.5  3.5 - 5.3 mmol/L Final    Chloride 09/02/2024 108 (H)  98 - 107 mmol/L Final    Bicarbonate 09/02/2024 24  21 - 32 mmol/L Final    Anion Gap 09/02/2024 9 (L)  10 - 20 mmol/L Final    Urea Nitrogen 09/02/2024 44 (H)  6 - 23 mg/dL Final    Creatinine 09/02/2024 1.65 (H)  0.50 - 1.05 mg/dL Final    eGFR 09/02/2024 34 (L)  >60 mL/min/1.73m*2 Final    Calculations of estimated GFR are performed using the 2021 CKD-EPI Study Refit equation without the race variable for the IDMS-Traceable creatinine methods.  https://jasn.asnjournals.org/content/early/2021/09/22/ASN.4603786963    Calcium 09/02/2024 8.1 (L)  8.6 - 10.3  mg/dL Final    Glucose 09/05/2024 114 (H)  74 - 99 mg/dL Final    Sodium 09/05/2024 134 (L)  136 - 145 mmol/L Final    Potassium 09/05/2024 4.6  3.5 - 5.3 mmol/L Final    Chloride 09/05/2024 106  98 - 107 mmol/L Final    Bicarbonate 09/05/2024 22  21 - 32 mmol/L Final    Anion Gap 09/05/2024 11  10 - 20 mmol/L Final    Urea Nitrogen 09/05/2024 41 (H)  6 - 23 mg/dL Final    Creatinine 09/05/2024 1.71 (H)  0.50 - 1.05 mg/dL Final    eGFR 09/05/2024 32 (L)  >60 mL/min/1.73m*2 Final    Calculations of estimated GFR are performed using the 2021 CKD-EPI Study Refit equation without the race variable for the IDMS-Traceable creatinine methods.  https://jasn.asnjournals.org/content/early/2021/09/22/ASN.7433277840    Calcium 09/05/2024 8.0 (L)  8.6 - 10.3 mg/dL Final    WBC 09/05/2024 12.1 (H)  4.4 - 11.3 x10*3/uL Final    nRBC 09/05/2024 0.0  0.0 - 0.0 /100 WBCs Final    RBC 09/05/2024 3.10 (L)  4.00 - 5.20 x10*6/uL Final    Hemoglobin 09/05/2024 9.3 (L)  12.0 - 16.0 g/dL Final    Hematocrit 09/05/2024 28.8 (L)  36.0 - 46.0 % Final    MCV 09/05/2024 93  80 - 100 fL Final    MCH 09/05/2024 30.0  26.0 - 34.0 pg Final    MCHC 09/05/2024 32.3  32.0 - 36.0 g/dL Final    RDW 09/05/2024 15.0 (H)  11.5 - 14.5 % Final    Platelets 09/05/2024 138 (L)  150 - 450 x10*3/uL Final    Uric Acid 09/05/2024 4.3  2.3 - 6.7 mg/dL Final    Venipuncture immediately after or during the administration of Metamizole may lead to falsely low results. Testing should be performed immediately  prior to Metamizole dosing.      Lower extremity venous duplex bilateral    Result Date: 8/26/2024           [place] [id number] Tel [phone] and Fax [phone]  Vascular Lab Report [facility] US LOWER EXTREMITY VENOUS DUPLEX BILATERAL  Patient Name:      JOSÉ LUIS DEVINE        [place]alph Physician:  [id number] Tyler Velásquez MD Study Date:        8/26/2024              Ordering Physician: [id number] EDYTA ALY                                                              HARIS MRN/PID:           [id number]              Technologist:       Princess Mendez RVT Accession#:        [id number]          Technologist 2: Date of Birth/Age: 1955 / 68 years Encounter#:         [id number] Gender:            F Admission Status:  Inpatient             Location Performed: [facility]  Diagnosis/ICD: Other specified soft tissue disorders-M79.89 Indication:    Limb swelling CPT Codes:     39470 Peripheral venous duplex scan for DVT complete  CONCLUSIONS: Right Lower Venous: No evidence of acute deep vein thrombus visualized in the right lower extremity. Cannot rule out thrombus in non-visualized posterior tibial and Peroneal veins due to edema. Left Lower Venous: No evidence of acute deep vein thrombus visualized in the left lower extremity. Cannot rule out thrombus in non-visualized peroneal vein due to edema.  Imaging & Doppler Findings:  Right                 Compressible Thrombus        Flow Distal External Iliac     Yes        None   Spontaneous/Phasic CFV                       Yes        None   Spontaneous/Phasic PFV                       Yes        None FV Proximal               Yes        None   Spontaneous/Phasic FV Mid                    Yes        None FV Distal                 Yes        None Popliteal                 Yes        None   Spontaneous/Phasic  Left                  Compress Thrombus        Flow Distal External Iliac   Yes      None   Spontaneous/Phasic CFV                     Yes      None   Spontaneous/Phasic PFV                     Yes      None FV Proximal             Yes      None   Spontaneous/Phasic FV Mid                  Yes      None FV Distal               Yes      None Popliteal               Yes      None   Spontaneous/Phasic PTV                     Yes      None  [id number] Tyler Velásquez MD  "Electronically signed by [id number] Tyler Velásquez MD on 8/26/2024 at 5:11:48 PM  ** Final **     CT head wo IV contrast    Result Date: 8/26/2024  Interpreted By:  Nunu Garcia, STUDY: CT HEAD WO IV CONTRAST; ;  8/26/2024 12:38 pm   INDICATION: Signs/Symptoms:confusion.   COMPARISON: 07/07/2023   ACCESSION NUMBER(S): [id number]   ORDERING CLINICIAN: EDYTA CARBALLO   TECHNIQUE: Serial axial images of the head were obtained without intravenous contrast. Sagittal and coronal reconstructions were generated.   FINDINGS: The ventricles are midline and normal in size.   There are no acute parenchymal abnormalities.   There is no hemorrhage or extra-axial fluid.   There is no obvious scalp hematoma or skull fracture.   Paranasal sinuses and mastoids are unremarkable. The patient appears to be status post bilateral cataract surgery.   COMPARISON OF FINDINGS: The brain is similar.       No acute intracranial abnormality     MACRO: none   Signed by: Nunu Garcia 8/26/2024 12:52 PM Dictation workstation:   [id number]    IR CVC tunneled    Result Date: 8/19/2024  Interpreted By:  Brandon Blackman, STUDY: IR CVC TUNNELED;  8/16/2024 10:20 am   INDICATION: Signs/Symptoms:tunnelled picc.   COMPARISON: None.   ACCESSION NUMBER(S): [id number]   ORDERING CLINICIAN: EDMOND DYE   TECHNIQUE: INTERVENTIONALIST(S): Brandon Blackman MD   The history and physical exam pertinent to the procedure were reviewed and no updates were made.\"   CONSENT: The patient/patient's POA/next of kin was informed of the nature of the proposed procedure. The purposes, alternatives, risks, and benefits were explained and discussed. All questions were answered and consent was obtained.   RADIATION EXPOSURE: Fluoroscopy time: 0.1 min. Dose: 0.3 mGy.     SEDATION: None   MEDICATION/CONTRAST: No additional   TIME OUT: A time out was performed immediately prior to procedure start with the interventional team, correctly identifying the patient name, date of birth, MRN, " procedure, anatomy (including marking of site and side), patient position, procedure consent form, relevant laboratory and imaging test results, antibiotic administration, safety precautions, and procedure-specific equipment needs.   COMPLICATIONS: No immediate adverse events identified.   FINDINGS: In the recumbent position, the patient was positioned on the angiography table. The right supraclavicular and infraclavicular cutaneous tissues were prepared and draped in usual sterile manner.   The supraclavicular access site was screened with gray-scale ultrasound with subsequent subcutaneous instillation of Lidocaine 1% local anesthesia. The plan tunnel site of the infraclavicular chest was also anesthetized. A small skin nick was made at the intended venotomy site and the tunnel entry site. The subcutaneous tissues were dissected using blunt instrumentation. Ultrasound images demonstrate a patent right internal jugular vein. Under direct ultrasound guidance and Seldinger/micropuncture technique, the right internal jugular vein was accessed. An ultrasound digital spot image was acquired and stored on the  PACS. Via the access needle, a 0.018 in marking wire was advanced to the level of the IVC. An image was stored. Then, the wire was used to measure the intravascular length of the planned catheter to the level of the superior cavoatrial junction. The needle was then exchanged for a 5 Estonian peel-away sheath. Then, the 5 Estonian single lumen small bore catheter was brought through the tunnel. It was cut to length  and fed through the peel-away sheath which was peeled off. The suture was secured at the tunnel exit site using pursestring suture (3-0 Ethilon). The access site was covered with sterile hCG dressing. There is good skin apposition at the venotomy site. The venotomy site was covered with Steri-Strips and sterile dressing. The catheter was flushed using heparin 10 units/mL.   Completion radiograph  demonstrates no evidence of kinking. The tip of the catheter is at the cavoatrial junction. No pneumothorax. Catheter is ready to use.     The patient tolerated the procedure without complication.       Technically successful right IJ tunneled small bore catheter placement.   I was present for and/or performed the critical portions of the procedure and immediately available throughout the entire procedure.   I personally reviewed the image(s)/study and interpretation. I agree with the findings as stated.   Performed and dictated at [facility].   MACRO: None   Signed by: Brandon Blackman 8/19/2024 11:55 AM Dictation workstation:   [id number]    Bedside Midline Imaging    Result Date: 8/14/2024  These images are not reportable by radiology and will not be interpreted by  Radiologists.    Bedside Midline Imaging    Result Date: 8/13/2024  These images are not reportable by radiology and will not be interpreted by  Radiologists.    CT abdomen pelvis w IV contrast    Result Date: 8/8/2024  Interpreted By:  Thomas Zacarias, STUDY: CT ABDOMEN PELVIS W IV CONTRAST; 8/8/2024 4:43 pm   INDICATION: Signs/Symptoms:Recent abdominoplasty, purulent drainage, wound infection;   COMPARISON: None   ACCESSION NUMBER(S): [id number]   ORDERING CLINICIAN: YAW BALDWIN   TECHNIQUE: Contiguous axial images of the abdomen/pelvis were performed with IV contrast. 75 ml of Omnipaque 350 was utilized. Coronal and sagittal reformatted images were also obtained. All CT examinations are performed with 1 or more of the following dose reduction techniques: Automated exposure control, adjustment of mA and/or kv according to patient's size, or use of iterative reconstruction techniques.     FINDINGS: The liver is nodular in contour consistent with cirrhosis. No focal hepatic lesions. The common bile duct, pancreas, and adrenal glands are unremarkable. 2 cm hypodense lesion in the spleen most likely a small  cyst. Spleen is otherwise normal in size and unremarkable. Prior cholecystectomy with surgical clips in the gallbladder fossa.   The kidneys enhance symmetrically. No urolithiasis is seen. No hydroureteronephrosis is seen. There are a few hypodense lesions in the kidneys bilaterally which are too small to characterize on the right. The largest in the left kidney is compatible with a cyst in the lower pole measuring 3.7 x 3.6 cm.   The visualized aorta is unremarkable.   The small bowel is not dilated. The appendix is not identified however no evidence for acute inflammatory change. Mild diverticulosis of the colon. No evidence for acute diverticulitis.   The bladder is minimally distended and otherwise unremarkable.   The visualized osseous structures are intact.   Limited images of the lower thorax show a partially visualized left mastectomy and breast implant.   There is scattered subcutaneous air/gas in the deep subcutaneous tissues along the abdomen. Surgical drains are noted in place anterior to the abdominal musculature. Findings are limited to the deep subcutaneous tissues. No free intraperitoneal air or fluid is seen. No abscess or collections are noted in the region of the abdominoplasty and drains. There are multiple gas pockets however no fluid collections or abscess is seen.       1. scattered subcutaneous air/gas in the deep subcutaneous tissues along the abdomen. Surgical drains are in good position anterior to the abdominal musculature. Findings are limited to the deep subcutaneous tissues. No free intraperitoneal air or fluid is seen. No abscess or collections are noted in the region of the abdominoplasty and drains. There are multiple subcutaneous gas/air pockets however no fluid collections or abscess is seen.   2. Hepatic cirrhosis. No focal hepatic lesions.   3. Diverticulosis of the colon without evidence for acute diverticulitis.   Signed by: Thomas Zacarias 8/8/2024 5:03 PM Dictation  workstation:   [id number]        ASSESSMENT AND PLAN    Nonoliguric RUSSELL on CKD, baseline creatinine 0.9-1, was 1.5 on admission - 1.7 - 1.65  Hyponatremia, mild, most corrected Na values at target  NAGMA, controlled  Hypocalcemia  Hyperuricemia  Low albumin, malnutrition    Plan  - avoid hypotension and nephrotoxins  - creatinine stabilizing but remains above baseline  - start lasix daily and continue on discharge, will fup in 2 weeks with labs to ensure renal function stable and electrolytes ok (meds reconciled for dc)  - ok to continue florinef on dc  - reduce allopurinol to 100 mg every day as uric acid wnl  - will follow    MARS reviewed,     Thank you for the opportunity to assist in the care of this patient, please call with questions  Brooklynn Shepherd MD PhD

## 2024-09-05 NOTE — PROGRESS NOTES
Occupational Therapy    OT Treatment    Patient Name: Mariann Drew  MRN: [id number]  Today's Date: 9/5/2024  Time Calculation  Start Time: 0800  Stop Time: 0830  Time Calculation (min): 30 min        Assessment:  End of Session Communication:  (to PT session)  End of Session Patient Position: Up in chair, Alarm on     Plan:  Treatment Interventions: ADL retraining, Functional transfer training, UE strengthening/ROM, Endurance training, Equipment evaluation/education, Patient/family training, Compensatory technique education, Fine motor coordination activities  OT Frequency: 5 times per week (6 days/PRN)  Treatment Interventions: ADL retraining, Functional transfer training, UE strengthening/ROM, Endurance training, Equipment evaluation/education, Patient/family training, Compensatory technique education, Fine motor coordination activities    Subjective   Previous Visit Info:  OT Last Visit  OT Received On: 09/05/24  General:  General  Missed Visit: Yes  Missed Visit Reason:  (pt in with RN for wound management and reapplication of wound vac after out of facility appt.)  Prior to Session Communication: Bedside nurse  Patient Position Received: Up in chair, Alarm on  Precautions:  Medical Precautions: Fall precautions  Post-Surgical Precautions: Abdominal surgery precautions  Precautions Comment: Wound vac and port/IV    Vital Signs (Past 2hrs)                 Pain:  Pain Assessment  Pain Assessment: 0-10  0-10 (Numeric) Pain Score: 3  Pain Location: Abdomen    Objective    Cognition:     Coordination:     Activities of Daily Living: Grooming  Grooming Level of Assistance:  (sba and min cues d/t some confusion with tasks, oral care, brushing hair, deodorant application)  Grooming Where Assessed: Sitting sinkside, Wheelchair    UE Bathing  UE Bathing Level of Assistance:  (sba, assist for back)  UE Bathing Where Assessed: Sitting sinkside    LE Bathing  LE Bathing Level of Assistance:  (min a le and ammon/buttock  area)  LE Bathing Where Assessed: Sitting sinkside, Standing sinkside    UE Dressing  UE Dressing Level of Assistance:  (sba and cues for orienting/treading sleeves properly; open front sweater sba; up to cga for pulling down dress and sweater in back in standing)  Functional Standing Tolerance:  Time: tolerates 1:00 to 1:30 min standing at sink for adl's  Activity: increased fatigue with standing; sba/cga overall  Bed Mobility/Transfers: Transfers  Transfer:  (sit to stand initial cga, then sba following trials)       EDUCATION:  Pt on transfer/adl safety techs, energy conservation techs, pursed lip breathing; pt reports understanding, but could benefit from continued review  Education  Individual(s) Educated: Patient    Goals:  Encounter Problems       Encounter Problems (Active)       OT Problem       STG- patient will complete grooming with MIN A with use of ae/ad/dme prn (Met)       Start:  08/23/24    Expected End:  08/30/24    Resolved:  08/28/24    Updated to: STG- patient will complete grooming with  Set up with use of ae/ad/dme prn         STG- patient will complete bathing with MAX A with use of ae/ad/dme prn (Met)       Start:  08/23/24    Expected End:  09/06/24    Resolved:  08/28/24    Updated to: STG- patient will complete bathing with Mod A with use of ae/ad/dme prn         STG- patient will complete UB dressing with MOD A with use of ae/ad/dme prn (Met)       Start:  08/23/24    Expected End:  08/30/24    Resolved:  08/28/24    Updated to: STG- patient will complete UB dressing with SBA with use of ae/ad/dme prn         STG- patient will complete LB dressing with MAX A with use of ae/ad/dme prn (Met)       Start:  08/23/24    Expected End:  09/06/24    Resolved:  08/28/24    Updated to: STG- patient will complete LB dressing with Mod A with use of ae/ad/dme prn         STG- patient will complete toileting with MAX A with use of ae/ad/dme prn  (Met)       Start:  08/23/24    Expected End:  09/06/24     Resolved:  08/28/24    Updated to: STG- patient will complete toileting with Mod A with use of ae/ad/dme prn         STG- patient will complete toilet/commode transfers with MAX A with use of ae/ad/dme prn (Met)       Start:  08/23/24    Expected End:  08/30/24    Resolved:  08/28/24    Updated to: STG- patient will complete toilet/commode transfers with  CGA with use of ae/ad/dme prn         STG- patient will complete simple mobility with MOD A with use of ae/ad/dme prn (Met)       Start:  08/23/24    Expected End:  08/30/24    Resolved:  08/28/24    Updated to: STG- patient will complete simple mobility with CGA with use of ae/ad/dme prn         LTG- patient will complete grooming with CGA with use of ae/ad/dme prn (Met)       Start:  08/23/24    Expected End:  09/06/24    Resolved:  08/28/24    Updated to: LTG- patient will complete grooming with supervision with use of ae/ad/dme prn         LTG- patient will complete bathing with MOD A with use of ae/ad/dme prn (Met)       Start:  08/23/24    Expected End:  09/06/24    Resolved:  09/04/24         LTG- patient will complete UB dressing with Min A  with use of ae/ad/dme prn (Met)       Start:  08/23/24    Expected End:  09/06/24    Resolved:  08/28/24    Updated to: LTG- patient will complete UB dressing with set up  with use of ae/ad/dme prn         LTG- patient will complete LB dressing with MOD A with use of ae/ad/dme prn (Met)       Start:  08/23/24    Expected End:  09/06/24    Resolved:  09/04/24         LTG- patient will complete toileting with MOD A with use of ae/ad/dme prn  (Met)       Start:  08/23/24    Expected End:  09/06/24    Resolved:  09/04/24         LTG- patient will complete toilet/commode transfers with Mod A with use of ae/ad/dme prn (Met)       Start:  08/23/24    Expected End:  09/06/24    Resolved:  08/28/24    Updated to: LTG- patient will complete toilet/commode transfers with CGA with use of ae/ad/dme prn         LTG- patient will  complete simple mobility with MIN A with use of ae/ad/dme prn (Met)       Start:  08/23/24    Expected End:  09/06/24    Resolved:  09/04/24         STG- patient will complete simple mobility with CGA with use of ae/ad/dme prn (Progressing)       Start:  08/28/24    Expected End:  09/06/24                LTG- patient will complete grooming with supervision with use of ae/ad/dme prn (Met)       Start:  08/28/24    Expected End:  09/06/24    Resolved:  08/28/24    Updated to: LTG- patient will complete grooming with set up with use of ae/ad/dme prn             LTG- patient will complete UB dressing with set up  with use of ae/ad/dme prn (Progressing)       Start:  08/28/24    Expected End:  09/06/24                LTG- patient will complete toilet/commode transfers with CGA with use of ae/ad/dme prn (Progressing)       Start:  08/28/24    Expected End:  09/06/24                LTG- patient will complete grooming with set up with use of ae/ad/dme prn (Progressing)       Start:  08/28/24    Expected End:  09/06/24                STG- patient will complete bathing with Mod A with use of ae/ad/dme prn (Met)       Start:  08/28/24    Expected End:  09/06/24    Resolved:  09/04/24             STG- patient will complete LB dressing with Mod A with use of ae/ad/dme prn (Met)       Start:  08/28/24    Expected End:  09/06/24    Resolved:  09/04/24             STG- patient will complete toileting with Mod A with use of ae/ad/dme prn (Met)       Start:  08/28/24    Expected End:  09/06/24    Resolved:  09/04/24

## 2024-09-05 NOTE — PROGRESS NOTES
Occupational Therapy    OT Treatment    Patient Name: Mariann Drew  MRN: [id number]  Today's Date: 9/5/2024  Time Calculation  Start Time: 1045  Stop Time: 1130  Time Calculation (min): 45 min        Assessment:  End of Session Communication:  (to PT session)  End of Session Patient Position: Up in chair, Alarm on     Plan:  Treatment Interventions: ADL retraining, Functional transfer training, UE strengthening/ROM, Endurance training, Equipment evaluation/education, Patient/family training, Compensatory technique education, Fine motor coordination activities  OT Frequency: 5 times per week (6 days/PRN)  Treatment Interventions: ADL retraining, Functional transfer training, UE strengthening/ROM, Endurance training, Equipment evaluation/education, Patient/family training, Compensatory technique education, Fine motor coordination activities    Subjective   Previous Visit Info:  OT Last Visit  OT Received On: 09/05/24  General:  General  Missed Visit: Yes  Missed Visit Reason:  (pt in with RN for wound management and reapplication of wound vac after out of facility appt.)  Prior to Session Communication: Bedside nurse  Patient Position Received: Up in chair, Alarm on  Precautions:  Medical Precautions: Fall precautions  Post-Surgical Precautions: Abdominal surgery precautions  Precautions Comment: Wound vac and port/IV    Vital Signs (Past 2hrs)                 Pain:  Pain Assessment  Pain Assessment: 0-10  0-10 (Numeric) Pain Score: 0 - No pain (denies)  Pain Location: Abdomen    Objective         Activities of Daily Living: Grooming  Grooming Level of Assistance:  (washing hands at sink sba)  Grooming Where Assessed: Standing sinkside    UE Bathing  UE Bathing Level of Assistance:  (pt requesting to wash hair at sink, OTR completed hair washing while pt standing over sink at sba with ue support)  UE Bathing Where Assessed: Standing sinkside    UE Dressing  UE Dressing Level of Assistance:  (supervision)  UE Dressing  Where Assessed: Wheelchair  UE Dressing Comments: sweater on/off    Toileting  Toileting Level of Assistance:  (sba)  Where Assessed: Toilet  Functional Standing Tolerance:  Time: 1-3 min during adl's at sink  Activity: increased fatigue with standing; sba/cga overall  Bed Mobility/Transfers: Transfers  Transfer:  (sba sit to stand and commode transfer via wwalker; last commode to chair with increased fatigue sba/cga)      Functional Mobility:  Functional Mobility  Functional Mobility Performed:  (multiple funct mobility via wwalker in room to bathroom, to chair intially sba and with fatigue, sba/cga (assist for mangaging wound vac tubing/equipment))       EDUCATION:  Education  Individual(s) Educated: Patient  Education Provided:  (transfer/adl safety techs, energy conservation techs/pursed lip breathing techs; reviewed home going recommendations/dme)  Patient Response to Education: Patient/Caregiver Verbalized Understanding of Information    Goals:  Encounter Problems       Encounter Problems (Active)       OT Problem       STG- patient will complete grooming with MIN A with use of ae/ad/dme prn (Met)       Start:  08/23/24    Expected End:  08/30/24    Resolved:  08/28/24    Updated to: STG- patient will complete grooming with  Set up with use of ae/ad/dme prn         STG- patient will complete bathing with MAX A with use of ae/ad/dme prn (Met)       Start:  08/23/24    Expected End:  09/06/24    Resolved:  08/28/24    Updated to: STG- patient will complete bathing with Mod A with use of ae/ad/dme prn         STG- patient will complete UB dressing with MOD A with use of ae/ad/dme prn (Met)       Start:  08/23/24    Expected End:  08/30/24    Resolved:  08/28/24    Updated to: STG- patient will complete UB dressing with SBA with use of ae/ad/dme prn         STG- patient will complete LB dressing with MAX A with use of ae/ad/dme prn (Met)       Start:  08/23/24    Expected End:  09/06/24    Resolved:  08/28/24     Updated to: STG- patient will complete LB dressing with Mod A with use of ae/ad/dme prn         STG- patient will complete toileting with MAX A with use of ae/ad/dme prn  (Met)       Start:  08/23/24    Expected End:  09/06/24    Resolved:  08/28/24    Updated to: STG- patient will complete toileting with Mod A with use of ae/ad/dme prn         STG- patient will complete toilet/commode transfers with MAX A with use of ae/ad/dme prn (Met)       Start:  08/23/24    Expected End:  08/30/24    Resolved:  08/28/24    Updated to: STG- patient will complete toilet/commode transfers with  CGA with use of ae/ad/dme prn         STG- patient will complete simple mobility with MOD A with use of ae/ad/dme prn (Met)       Start:  08/23/24    Expected End:  08/30/24    Resolved:  08/28/24    Updated to: STG- patient will complete simple mobility with CGA with use of ae/ad/dme prn         LTG- patient will complete grooming with CGA with use of ae/ad/dme prn (Met)       Start:  08/23/24    Expected End:  09/06/24    Resolved:  08/28/24    Updated to: LTG- patient will complete grooming with supervision with use of ae/ad/dme prn         LTG- patient will complete bathing with MOD A with use of ae/ad/dme prn (Met)       Start:  08/23/24    Expected End:  09/06/24    Resolved:  09/04/24         LTG- patient will complete UB dressing with Min A  with use of ae/ad/dme prn (Met)       Start:  08/23/24    Expected End:  09/06/24    Resolved:  08/28/24    Updated to: LTG- patient will complete UB dressing with set up  with use of ae/ad/dme prn         LTG- patient will complete LB dressing with MOD A with use of ae/ad/dme prn (Met)       Start:  08/23/24    Expected End:  09/06/24    Resolved:  09/04/24         LTG- patient will complete toileting with MOD A with use of ae/ad/dme prn  (Met)       Start:  08/23/24    Expected End:  09/06/24    Resolved:  09/04/24         LTG- patient will complete toilet/commode transfers with Mod A with  use of ae/ad/dme prn (Met)       Start:  08/23/24    Expected End:  09/06/24    Resolved:  08/28/24    Updated to: LTG- patient will complete toilet/commode transfers with CGA with use of ae/ad/dme prn         LTG- patient will complete simple mobility with MIN A with use of ae/ad/dme prn (Met)       Start:  08/23/24    Expected End:  09/06/24    Resolved:  09/04/24         STG- patient will complete simple mobility with CGA with use of ae/ad/dme prn (Progressing)       Start:  08/28/24    Expected End:  09/06/24                LTG- patient will complete grooming with supervision with use of ae/ad/dme prn (Met)       Start:  08/28/24    Expected End:  09/06/24    Resolved:  08/28/24    Updated to: LTG- patient will complete grooming with set up with use of ae/ad/dme prn             LTG- patient will complete UB dressing with set up  with use of ae/ad/dme prn (Progressing)       Start:  08/28/24    Expected End:  09/06/24                LTG- patient will complete toilet/commode transfers with CGA with use of ae/ad/dme prn (Progressing)       Start:  08/28/24    Expected End:  09/06/24                LTG- patient will complete grooming with set up with use of ae/ad/dme prn (Progressing)       Start:  08/28/24    Expected End:  09/06/24                STG- patient will complete bathing with Mod A with use of ae/ad/dme prn (Met)       Start:  08/28/24    Expected End:  09/06/24    Resolved:  09/04/24             STG- patient will complete LB dressing with Mod A with use of ae/ad/dme prn (Met)       Start:  08/28/24    Expected End:  09/06/24    Resolved:  09/04/24             STG- patient will complete toileting with Mod A with use of ae/ad/dme prn (Met)       Start:  08/28/24    Expected End:  09/06/24    Resolved:  09/04/24

## 2024-09-05 NOTE — PROGRESS NOTES
Physical Therapy    Physical Therapy Treatment    Patient Name: Mariann Drew  MRN: [id number]  Today's Date: 9/5/2024  Time Calculation  Start Time: 0830  Stop Time: 0915  Time Calculation (min): 45 min         Assessment/Plan   PT Assessment  End of Session Communication: Bedside nurse  End of Session Patient Position: Up in chair, Alarm on     PT Plan  Treatment/Interventions: Bed mobility, Transfer training, Gait training, Stair training, Balance training, Strengthening, Endurance training, Range of motion, Therapeutic exercise, Therapeutic activity, Home exercise program, Positioning, Postural re-education  PT Plan: Ongoing PT  PT Frequency: 5 times per week (6 times PRN)  PT Discharge Recommendations:  (Anticipate pt to require intermittent min A at walker level at time of discharge.)  Equipment Recommended upon Discharge: Wheeled walker  PT Recommended Transfer Status: Assist x2 (at time of initial evaluation)      General Visit Information:   PT  Visit  PT Received On: 09/05/24  General  Missed Visit: Yes  Missed Visit Reason:  (Pt offsite for medical appointment. Will continue with PT when available.)  Prior to Session Communication: Bedside nurse  Patient Position Received: Up in chair, Alarm on  General Comment:  (Pt reports she feels good this morning, a little nervous about going home but feeling ready.)    Subjective   Precautions:  Precautions  Medical Precautions: Fall precautions  Post-Surgical Precautions: Abdominal surgery precautions  Precautions Comment: Wound vac and port/IV    Vital Signs (Past 2hrs)                 Objective   Pain:  Pain Assessment  Pain Assessment: 0-10  0-10 (Numeric) Pain Score: 0 - No pain  Pain Location: Abdomen  Pain Interventions: Repositioned, Distraction    Treatments:  Therapeutic Exercise  Therapeutic Exercise Performed: Yes  Pt performs BLE seated AROM exercises: marching, LAQ, hip add isometrics with ball, AP 2 x 10 reps each in order to improve strength and  endurance for improvement in functional mobility and transfers.     Ambulation/Gait Training  Ambulation/Gait Training Performed:  (Pt ambulates 50 ft x 2, 50 ft over tile, carpet and thresholds all with CGA.  Pt bumps into one obstacle on L side during ambualtion, no LOB.)  Transfers  Transfer:  (Pt performs sit/stand transfers with SBA, occasional cues for safety with chair approach.)    Stairs  Stairs:  (Pt ascends/descends 4 steps x 2 trials with seated rest break between trials.  Pt uses BUE support on LHR (ascending) requires mod A x 1 plus CGA for safety.)     Object From Floor  Devices:  (Pt ambulates 50 ft with FWW and picks up 2 rings from floor with use of reacher all with CGA)      Education Documentation  Pt educated on techniques for transfers and gait training with device in order to maximize safety and independence.  Pt educated on therapeutic exercises, including optimal techniques to maximize function.  Pt educated on stair training techniques including proper sequencing to maximize safety.      Encounter Problems       Encounter Problems (Active)       PT Problem       LTG - Pt will perform bed mobility with mod A x 1  (Progressing)       Start:  08/23/24    Expected End:  09/06/24            LTG - Pt will perform transfers with min A x 1.  (Met)       Start:  08/23/24    Expected End:  09/06/24    Resolved:  09/04/24    Updated to: LTG - Pt will perform transfers with SBA    Update reason: goal met         LTG - Pt will amb 150 feet with FWW and CGA.   (Progressing)       Start:  08/23/24    Expected End:  09/06/24            LTG - Pt will up/down 2 stairs with 1 HR and straight cane with min A x 1.  (Progressing)       Start:  08/23/24    Expected End:  09/06/24            STG - Pt will perform bed mobility with mod assist x 2 (Met)       Start:  08/23/24    Expected End:  08/30/24    Resolved:  09/04/24         STG - Pt will perform transfers with mod assist x 1 (Met)       Start:   08/23/24    Expected End:  08/30/24    Resolved:  08/28/24         STG - Pt will amb 50 feet with FWW and mod/min assist x 1 (Met)       Start:  08/23/24    Expected End:  08/30/24    Resolved:  09/04/24         STG - Pt will up/down curb step with FWW and mod A x 2.  (Met)       Start:  08/23/24    Expected End:  08/30/24    Resolved:  09/04/24         LTG - Pt will perform transfers with SBA (Progressing)       Start:  09/04/24    Expected End:  09/06/24

## 2024-09-05 NOTE — PROGRESS NOTES
"Occupational Therapy    OT Treatment    Patient Name: Mariann Drew  MRN: [id number]  Today's Date: 9/5/2024  Time Calculation  Start Time: 1345  Stop Time: 1410  Time Calculation (min): 25 min        Assessment:  End of Session Communication: Bedside nurse  End of Session Patient Position: Bed, 2 rail up, Alarm on     Plan:  Treatment Interventions: ADL retraining, Functional transfer training, UE strengthening/ROM, Endurance training, Equipment evaluation/education, Patient/family training, Compensatory technique education, Fine motor coordination activities  OT Frequency: 5 times per week (6 days/PRN)  Treatment Interventions: ADL retraining, Functional transfer training, UE strengthening/ROM, Endurance training, Equipment evaluation/education, Patient/family training, Compensatory technique education, Fine motor coordination activities    Subjective   Previous Visit Info:  OT Last Visit  OT Received On: 09/05/24  General:  General  Family/Caregiver Present: Yes  Caregiver Feedback:  in room at end of session; was updated on OT tasks during sessions today  Prior to Session Communication:  (PT)  Patient Position Received: Up in chair, Alarm on  General Comment: pt reports extremely fatigued this session, shortened session and transferred to bed  Precautions:  Medical Precautions: Fall precautions  Post-Surgical Precautions: Abdominal surgery precautions  Precautions Comment: Wound vac and port/IV    Vital Signs (Past 2hrs)                 Pain:  Pain Assessment  Pain Assessment: 0-10  0-10 (Numeric) Pain Score:  (\" a little\" in abdomen)  Pain Location: Abdomen  Pain Interventions: Rest, Repositioned, Distraction    Objective      Functional Standing Tolerance:  Time: 1-3 min during adl's at sink  Activity: increased fatigue with standing; sba/cga overall  Bed Mobility/Transfers: Bed Mobility  Bed Mobility:  (sit to supine mod/max a, le swelling- positioned on pillows for elevation)    Transfers  Transfer:  " (sit to stand sba, pivot transfer to bed via wwalker sba, min cues for turning fully)      Functional Mobility:  Functional Mobility  Functional Mobility Performed:  (approach steps to bed sba via wwalker)       Therapy/Activity: Therapeutic Exercise  Therapeutic Exercise Activity 1: completes bilat ivanna task with no added resistance 10-15 x4 planes with supervision and cues for keeping track of reps to promote indep in adl, transfers, mobility          EDUCATION:  Education  Individual(s) Educated: Patient  Education Provided:  (transfer  techs/safety techs/hand placement; energy conservation techs)  Patient Response to Education: Patient/Caregiver Verbalized Understanding of Information    Goals:  Encounter Problems       Encounter Problems (Active)       OT Problem       STG- patient will complete grooming with MIN A with use of ae/ad/dme prn (Met)       Start:  08/23/24    Expected End:  08/30/24    Resolved:  08/28/24    Updated to: STG- patient will complete grooming with  Set up with use of ae/ad/dme prn         STG- patient will complete bathing with MAX A with use of ae/ad/dme prn (Met)       Start:  08/23/24    Expected End:  09/06/24    Resolved:  08/28/24    Updated to: STG- patient will complete bathing with Mod A with use of ae/ad/dme prn         STG- patient will complete UB dressing with MOD A with use of ae/ad/dme prn (Met)       Start:  08/23/24    Expected End:  08/30/24    Resolved:  08/28/24    Updated to: STG- patient will complete UB dressing with SBA with use of ae/ad/dme prn         STG- patient will complete LB dressing with MAX A with use of ae/ad/dme prn (Met)       Start:  08/23/24    Expected End:  09/06/24    Resolved:  08/28/24    Updated to: STG- patient will complete LB dressing with Mod A with use of ae/ad/dme prn         STG- patient will complete toileting with MAX A with use of ae/ad/dme prn  (Met)       Start:  08/23/24    Expected End:  09/06/24    Resolved:  08/28/24     Updated to: STG- patient will complete toileting with Mod A with use of ae/ad/dme prn         STG- patient will complete toilet/commode transfers with MAX A with use of ae/ad/dme prn (Met)       Start:  08/23/24    Expected End:  08/30/24    Resolved:  08/28/24    Updated to: STG- patient will complete toilet/commode transfers with  CGA with use of ae/ad/dme prn         STG- patient will complete simple mobility with MOD A with use of ae/ad/dme prn (Met)       Start:  08/23/24    Expected End:  08/30/24    Resolved:  08/28/24    Updated to: STG- patient will complete simple mobility with CGA with use of ae/ad/dme prn         LTG- patient will complete grooming with CGA with use of ae/ad/dme prn (Met)       Start:  08/23/24    Expected End:  09/06/24    Resolved:  08/28/24    Updated to: LTG- patient will complete grooming with supervision with use of ae/ad/dme prn         LTG- patient will complete bathing with MOD A with use of ae/ad/dme prn (Met)       Start:  08/23/24    Expected End:  09/06/24    Resolved:  09/04/24         LTG- patient will complete UB dressing with Min A  with use of ae/ad/dme prn (Met)       Start:  08/23/24    Expected End:  09/06/24    Resolved:  08/28/24    Updated to: LTG- patient will complete UB dressing with set up  with use of ae/ad/dme prn         LTG- patient will complete LB dressing with MOD A with use of ae/ad/dme prn (Met)       Start:  08/23/24    Expected End:  09/06/24    Resolved:  09/04/24         LTG- patient will complete toileting with MOD A with use of ae/ad/dme prn  (Met)       Start:  08/23/24    Expected End:  09/06/24    Resolved:  09/04/24         LTG- patient will complete toilet/commode transfers with Mod A with use of ae/ad/dme prn (Met)       Start:  08/23/24    Expected End:  09/06/24    Resolved:  08/28/24    Updated to: LTG- patient will complete toilet/commode transfers with CGA with use of ae/ad/dme prn         LTG- patient will complete simple mobility  with MIN A with use of ae/ad/dme prn (Met)       Start:  08/23/24    Expected End:  09/06/24    Resolved:  09/04/24         STG- patient will complete simple mobility with CGA with use of ae/ad/dme prn (Progressing)       Start:  08/28/24    Expected End:  09/06/24                LTG- patient will complete grooming with supervision with use of ae/ad/dme prn (Met)       Start:  08/28/24    Expected End:  09/06/24    Resolved:  08/28/24    Updated to: LTG- patient will complete grooming with set up with use of ae/ad/dme prn             LTG- patient will complete UB dressing with set up  with use of ae/ad/dme prn (Progressing)       Start:  08/28/24    Expected End:  09/06/24                LTG- patient will complete toilet/commode transfers with CGA with use of ae/ad/dme prn (Progressing)       Start:  08/28/24    Expected End:  09/06/24                LTG- patient will complete grooming with set up with use of ae/ad/dme prn (Progressing)       Start:  08/28/24    Expected End:  09/06/24                STG- patient will complete bathing with Mod A with use of ae/ad/dme prn (Met)       Start:  08/28/24    Expected End:  09/06/24    Resolved:  09/04/24             STG- patient will complete LB dressing with Mod A with use of ae/ad/dme prn (Met)       Start:  08/28/24    Expected End:  09/06/24    Resolved:  09/04/24             STG- patient will complete toileting with Mod A with use of ae/ad/dme prn (Met)       Start:  08/28/24    Expected End:  09/06/24    Resolved:  09/04/24

## 2024-09-05 NOTE — CARE PLAN
The patient's goals for the shift include      The clinical goals for the shift include Remain hemodynamically stable, get some quality rest for PT/OT      Problem: Skin  Goal: Decreased wound size/increased tissue granulation at next dressing change  Outcome: Progressing  Flowsheets (Taken 9/4/2024 2323)  Decreased wound size/increased tissue granulation at next dressing change: Promote sleep for wound healing  Goal: Participates in plan/prevention/treatment measures  Outcome: Progressing  Flowsheets (Taken 9/4/2024 2323)  Participates in plan/prevention/treatment measures: Elevate heels  Goal: Promote/optimize nutrition  Outcome: Progressing  Flowsheets (Taken 9/4/2024 2323)  Promote/optimize nutrition: Monitor/record intake including meals  Goal: Promote skin healing  Outcome: Progressing  Flowsheets (Taken 9/4/2024 2323)  Promote skin healing:   Turn/reposition every 2 hours/use positioning/transfer devices   Assess skin/pad under line(s)/device(s)     Problem: Fall/Injury  Goal: Verbalize understanding of risk factor reduction measures to prevent injury from fall in the home  Outcome: Progressing  Goal: Use assistive devices by end of the shift  Outcome: Progressing  Goal: Pace activities to prevent fatigue by end of the shift  Outcome: Progressing     Problem: Pain  Goal: Takes deep breaths with improved pain control throughout the shift  Outcome: Progressing  Goal: Turns in bed with improved pain control throughout the shift  Outcome: Progressing  Goal: Walks with improved pain control throughout the shift  Outcome: Progressing  Goal: Performs ADL's with improved pain control throughout shift  Outcome: Progressing  Goal: Participates in PT with improved pain control throughout the shift  Outcome: Progressing     Problem: Wound Care  Goal: Area will decrease in size .2x.2 cm per week  Outcome: Progressing     Problem: Diabetes  Goal: Achieve decreasing blood glucose levels by end of shift  Outcome:  Progressing  Goal: Increase stability of blood glucose readings by end of shift  Outcome: Progressing  Goal: Decrease in ketones present in urine by end of shift  Outcome: Progressing  Goal: Maintain electrolyte levels within acceptable range throughout shift  Outcome: Progressing  Goal: Maintain glucose levels >70mg/dl to <250mg/dl throughout shift  Outcome: Progressing  Goal: No changes in neurological exam by end of shift  Outcome: Progressing  Goal: Learn about and adhere to nutrition recommendations by end of shift  Outcome: Progressing  Goal: Vital signs within normal range for age by end of shift  Outcome: Progressing  Goal: Increase self care and/or family involovement by end of shift  Outcome: Progressing  Goal: Receive DSME education by end of shift  Outcome: Progressing

## 2024-09-05 NOTE — PROGRESS NOTES
Physical Therapy    Physical Therapy Treatment    Patient Name: Mariann Drew  MRN: [id number]  Today's Date: 9/5/2024  Time Calculation  Start Time: 1300  Stop Time: 1345  Time Calculation (min): 45 min      Assessment/Plan   PT Assessment  End of Session Communication: Bedside nurse  End of Session Patient Position: Up in chair, Alarm on     PT Plan  Treatment/Interventions: Bed mobility, Transfer training, Gait training, Stair training, Balance training, Strengthening, Endurance training, Range of motion, Therapeutic exercise, Therapeutic activity, Home exercise program, Positioning, Postural re-education  PT Plan: Ongoing PT  PT Frequency: 5 times per week (6 times PRN)  PT Discharge Recommendations:  (Anticipate pt to require intermittent min A at walker level at time of discharge.)  Equipment Recommended upon Discharge: Wheeled walker  PT Recommended Transfer Status: Assist x2 (at time of initial evaluation)      General Visit Information:   PT  Visit  PT Received On: 09/05/24  General  Prior to Session Communication: Bedside nurse  Patient Position Received: Up in chair, Alarm on  General Comment:  (Pt reports she feels good this morning, a little nervous about going home but feeling ready.)    Subjective   Precautions:  Precautions  Medical Precautions: Fall precautions  Post-Surgical Precautions: Abdominal surgery precautions  Precautions Comment: Wound vac and port/IV    Objective   Pain:  Pain Assessment  Pain Assessment: 0-10  0-10 (Numeric) Pain Score: 0 - No pain    Treatments:  Therapeutic Exercise  Therapeutic Exercise Performed: Yes  Pt performs BLE seated AROM exercises: marching, LAQ, hip add isometrics with ball, heel slides with towels, AP 2 x 10 reps each in order to improve strength and endurance for improvement in functional mobility and transfers.     Ambulation/Gait Training  Ambulation/Gait Training Performed:  (Pt attempts to ambulate 100 ft, too fatigued part way through ambulation  requiring seated rest break.  Pt ambulates 50 ft, 50 ft, 50 ft with FWW and CGA.  Slow shuffled gait throughout.)  Transfers  Transfer:  (Pt performs sit/stand transfer with SBA.)  Transfer 1  Trials/Comments 1:  (Pt performs repeated sit/stand transfer x 5 reps with SBA.)      Education Documentation  Pt educated on techniques for transfers and gait training with device in order to maximize safety and independence.  Pt educated on therapeutic exercises, including optimal techniques to maximize function.      Encounter Problems       Encounter Problems (Active)       PT Problem       LTG - Pt will perform bed mobility with mod A x 1  (Progressing)       Start:  08/23/24    Expected End:  09/06/24            LTG - Pt will perform transfers with min A x 1.  (Met)       Start:  08/23/24    Expected End:  09/06/24    Resolved:  09/04/24    Updated to: LTG - Pt will perform transfers with SBA    Update reason: goal met         LTG - Pt will amb 150 feet with FWW and CGA.   (Progressing)       Start:  08/23/24    Expected End:  09/06/24            LTG - Pt will up/down 2 stairs with 1 HR and straight cane with min A x 1.  (Progressing)       Start:  08/23/24    Expected End:  09/06/24            STG - Pt will perform bed mobility with mod assist x 2 (Met)       Start:  08/23/24    Expected End:  08/30/24    Resolved:  09/04/24         STG - Pt will perform transfers with mod assist x 1 (Met)       Start:  08/23/24    Expected End:  08/30/24    Resolved:  08/28/24         STG - Pt will amb 50 feet with FWW and mod/min assist x 1 (Met)       Start:  08/23/24    Expected End:  08/30/24    Resolved:  09/04/24         STG - Pt will up/down curb step with FWW and mod A x 2.  (Met)       Start:  08/23/24    Expected End:  08/30/24    Resolved:  09/04/24         LTG - Pt will perform transfers with SBA (Progressing)       Start:  09/04/24    Expected End:  09/06/24

## 2024-09-05 NOTE — PROGRESS NOTES
"  Mariann Drew is a 68 y.o. female on day 13 of admission presenting with Debility.    Subjective   Patient is doing well overall.  Improve mobility.  She did have the wound care evaluation.  Orders are in place.  Still requiring assist for bed mobility.       Objective       Physical Exam  Pleasant female in no apparent distress.  More alert and awake.  Less dizziness.  Chest clear to auscultation bilaterally  Cardiovascular S1-S2  Abdomen soft nontender nondistended with active bowel sounds  +2 lower extremity edema unchanged overall  Negative Homans bilaterally.  Function: Mod assist for bed mobility up to contact-guard assist for transfers and ambulation.  Assessment/Plan        Last Recorded Vitals  Blood pressure 125/62, pulse 94, temperature 36.1 °C (97 °F), resp. rate 16, height 1.626 m (5' 4.02\"), weight 78.4 kg (172 lb 12.8 oz), SpO2 100%.    Therapy notes from last 24 hours reviewed.    Relevant Results                  Scheduled medications  allopurinol, 100 mg, oral, Daily  deutetrabenazine, 24 mg, oral, Daily  deutetrabenazine, 6 mg, oral, Daily  dexAMETHasone, 0.5 mg, oral, 4x daily  DULoxetine, 60 mg, oral, BID  enoxaparin, 40 mg, subcutaneous, q24h  ertapenem, 500 mg, intravenous, q24h  famotidine, 20 mg, oral, q AM  ferrous sulfate (325 mg ferrous sulfate), 65 mg of iron, oral, BID  fludrocortisone, 0.1 mg, oral, Daily  lamoTRIgine, 200 mg, oral, q AM  latanoprost, 1 drop, Both Eyes, Nightly  lotilaner, 1 drop, Both Eyes, Nightly  nystatin, 1 Application, Topical, BID  sennosides, 1 tablet, oral, Nightly  sucralfate, 1 g, oral, Nightly  traZODone, 150 mg, oral, Nightly  zinc oxide, 1 Application, Topical, BID      Continuous medications     PRN medications  PRN medications: acetaminophen **OR** acetaminophen, alum-mag hydroxide-simeth, bisacodyl, bisacodyl, cyclobenzaprine, lidocaine, melatonin, naratriptan, simethicone     Results for orders placed or performed during the hospital encounter of " 08/23/24 (from the past 24 hour(s))   Basic Metabolic Panel   Result Value Ref Range    Glucose 114 (H) 74 - 99 mg/dL    Sodium 134 (L) 136 - 145 mmol/L    Potassium 4.6 3.5 - 5.3 mmol/L    Chloride 106 98 - 107 mmol/L    Bicarbonate 22 21 - 32 mmol/L    Anion Gap 11 10 - 20 mmol/L    Urea Nitrogen 41 (H) 6 - 23 mg/dL    Creatinine 1.71 (H) 0.50 - 1.05 mg/dL    eGFR 32 (L) >60 mL/min/1.73m*2    Calcium 8.0 (L) 8.6 - 10.3 mg/dL   CBC   Result Value Ref Range    WBC 12.1 (H) 4.4 - 11.3 x10*3/uL    nRBC 0.0 0.0 - 0.0 /100 WBCs    RBC 3.10 (L) 4.00 - 5.20 x10*6/uL    Hemoglobin 9.3 (L) 12.0 - 16.0 g/dL    Hematocrit 28.8 (L) 36.0 - 46.0 %    MCV 93 80 - 100 fL    MCH 30.0 26.0 - 34.0 pg    MCHC 32.3 32.0 - 36.0 g/dL    RDW 15.0 (H) 11.5 - 14.5 %    Platelets 138 (L) 150 - 450 x10*3/uL   Uric Acid   Result Value Ref Range    Uric Acid 4.3 2.3 - 6.7 mg/dL                 Assessment/Plan   Principal Problem:    Debility  Active Problems:    Anxiety    Balance problems    Diabetes mellitus (Multi)    Hx of obesity    Myofascial pain syndrome, cervical    Hyperlipidemia    Disruption of external surgical wound        1.  Abdominoplasty with wound infection group B strep  Continue IV Invanz  Begin physical therapy for bed mobility, transfers, endurance activities, gait training with an assistive device  Begin occupational therapy for functional mobility, upper limb strengthening, coordination, balance and endurance activities as well as adaptive aids     2.  Gout  Zyloprim 400 mg daily  Monitor for gout symptoms     3.  Tardive dyskinesia  Austedo 30 mg daily  Patient's  will bring in the medication     4.  DVT prophylaxis  Lovenox 40 mg daily     5.  GERD and difficulty swallowing  Pepcid 20 mg daily  Carafate 1 g nightly     6.  Pain  Cymbalta 60 mg twice a day  Lamictal 200 mg daily  Decadron 0.5 mg 4 times a day     7.  Sleep   150 mg nightly     8.  Diabetes mellitus type 2  Semaglutide 0.5 mg weekly     9.  MELBAWAYNE  She has not had a bowel movement for 2 days will begin on a bowel program  DVT prophylaxis she is on Lovenox 40 mg daily     9/5  -Ongoing rehab prior to discharge  -Ongoing wound management and home health care  -IV antibiotics at discharge  -Will need intermittent assist at the time of discharge  -Continue to advance nutritional progress  -Neurologically stable with improving cognition.  -Appreciate nephrology input.  -Discharge planning    I was present and led the team conference. The plan of care was developed and agreed upon as discussed and documented during the team meeting.  See separate Note.    Team conference today with the rehab team - PT/OT/ST, SW, RN, Dietary, Case Management. Additional separate note in the chart for today. Over 35 min spent with paint care, team meetings, and coordination of care.         I spent 35 minutes in the professional and overall care of this patient.      Afshan Faustin MD

## 2024-09-06 ENCOUNTER — DOCUMENTATION (OUTPATIENT)
Dept: INFUSION THERAPY | Age: 69
End: 2024-09-06
Payer: MEDICARE

## 2024-09-06 ENCOUNTER — HOME INFUSION (OUTPATIENT)
Dept: INFUSION THERAPY | Age: 69
End: 2024-09-06
Payer: MEDICARE

## 2024-09-06 VITALS
HEART RATE: 91 BPM | HEIGHT: 64 IN | SYSTOLIC BLOOD PRESSURE: 123 MMHG | RESPIRATION RATE: 16 BRPM | BODY MASS INDEX: 29.5 KG/M2 | DIASTOLIC BLOOD PRESSURE: 68 MMHG | OXYGEN SATURATION: 100 % | WEIGHT: 172.8 LBS | TEMPERATURE: 96.8 F

## 2024-09-06 LAB
ANION GAP SERPL CALC-SCNC: 11 MMOL/L (ref 10–20)
BUN SERPL-MCNC: 42 MG/DL (ref 6–23)
CALCIUM SERPL-MCNC: 7.9 MG/DL (ref 8.6–10.3)
CHLORIDE SERPL-SCNC: 105 MMOL/L (ref 98–107)
CO2 SERPL-SCNC: 23 MMOL/L (ref 21–32)
CREAT SERPL-MCNC: 1.7 MG/DL (ref 0.5–1.05)
EGFRCR SERPLBLD CKD-EPI 2021: 32 ML/MIN/1.73M*2
GLUCOSE SERPL-MCNC: 108 MG/DL (ref 74–99)
POTASSIUM SERPL-SCNC: 4.2 MMOL/L (ref 3.5–5.3)
SODIUM SERPL-SCNC: 135 MMOL/L (ref 136–145)

## 2024-09-06 PROCEDURE — 80048 BASIC METABOLIC PNL TOTAL CA: CPT | Performed by: PHYSICAL MEDICINE & REHABILITATION

## 2024-09-06 PROCEDURE — 2500000001 HC RX 250 WO HCPCS SELF ADMINISTERED DRUGS (ALT 637 FOR MEDICARE OP): Performed by: INTERNAL MEDICINE

## 2024-09-06 PROCEDURE — 99239 HOSP IP/OBS DSCHRG MGMT >30: CPT | Performed by: PHYSICAL MEDICINE & REHABILITATION

## 2024-09-06 PROCEDURE — 2500000001 HC RX 250 WO HCPCS SELF ADMINISTERED DRUGS (ALT 637 FOR MEDICARE OP): Performed by: PHYSICAL MEDICINE & REHABILITATION

## 2024-09-06 PROCEDURE — 2500000005 HC RX 250 GENERAL PHARMACY W/O HCPCS: Performed by: PHYSICAL MEDICINE & REHABILITATION

## 2024-09-06 PROCEDURE — 2500000004 HC RX 250 GENERAL PHARMACY W/ HCPCS (ALT 636 FOR OP/ED): Performed by: PHYSICAL MEDICINE & REHABILITATION

## 2024-09-06 PROCEDURE — 2500000002 HC RX 250 W HCPCS SELF ADMINISTERED DRUGS (ALT 637 FOR MEDICARE OP, ALT 636 FOR OP/ED): Performed by: INTERNAL MEDICINE

## 2024-09-06 RX ORDER — ERTAPENEM 1 G/1
500 INJECTION, POWDER, LYOPHILIZED, FOR SOLUTION INTRAMUSCULAR; INTRAVENOUS DAILY
Qty: 6.5 G | Refills: 0 | Status: SHIPPED
Start: 2024-09-06 | End: 2024-09-11

## 2024-09-06 RX ADMIN — FAMOTIDINE 20 MG: 20 TABLET, FILM COATED ORAL at 08:28

## 2024-09-06 RX ADMIN — LAMOTRIGINE 200 MG: 100 TABLET ORAL at 08:23

## 2024-09-06 RX ADMIN — DEUTETRABENAZINE 6 MG: 6 TABLET, FILM COATED, EXTENDED RELEASE ORAL at 08:24

## 2024-09-06 RX ADMIN — DULOXETINE HYDROCHLORIDE 60 MG: 30 CAPSULE, DELAYED RELEASE ORAL at 08:22

## 2024-09-06 RX ADMIN — FUROSEMIDE 40 MG: 40 TABLET ORAL at 08:21

## 2024-09-06 RX ADMIN — NYSTATIN 1 APPLICATION: 100000 CREAM TOPICAL at 08:21

## 2024-09-06 RX ADMIN — ALLOPURINOL 100 MG: 100 TABLET ORAL at 08:22

## 2024-09-06 RX ADMIN — FLUDROCORTISONE ACETATE 0.1 MG: 0.1 TABLET ORAL at 08:22

## 2024-09-06 RX ADMIN — WATER 500 MG: 1 INJECTION INTRAMUSCULAR; INTRAVENOUS; SUBCUTANEOUS at 15:52

## 2024-09-06 RX ADMIN — FERROUS SULFATE TAB 325 MG (65 MG ELEMENTAL FE) 325 MG: 325 (65 FE) TAB at 08:22

## 2024-09-06 RX ADMIN — ZINC OXIDE 1 APPLICATION: 200 OINTMENT TOPICAL at 08:22

## 2024-09-06 RX ADMIN — POTASSIUM CHLORIDE 20 MEQ: 1500 TABLET, EXTENDED RELEASE ORAL at 08:21

## 2024-09-06 RX ADMIN — DEUTETRABENAZINE 24 MG: 24 TABLET, FILM COATED, EXTENDED RELEASE ORAL at 08:26

## 2024-09-06 RX ADMIN — DEXAMETHASONE 0.5 MG: 1 TABLET ORAL at 16:47

## 2024-09-06 RX ADMIN — DEXAMETHASONE 0.5 MG: 1 TABLET ORAL at 06:13

## 2024-09-06 ASSESSMENT — BRIEF INTERVIEW FOR MENTAL STATUS (BIMS)
ASKED TO RECALL BED: YES, NO CUE REQUIRED
BIMS SUMMARY SCORE: 10
WHAT YEAR IS IT: MISSED BY MORE THAN 5 YEARS
ASKED TO RECALL SOCK: NO, COULD NOT RECALL
WHAT DAY OF THE WEEK IS IT: CORRECT
INITIAL REPETITION OF BED BLUE SOCK - FIRST ATTEMPT: 3
WHAT MONTH IS IT: ACCURATE WITHIN 5 DAYS
COGNITIVE PATTERN ASSESSMENT USED: STAFF ASSESSMENT
GENERAL MEMORY AND RECALL ABILITY: CURRENT SEASON;LOCATION OF OWN ROOM;STAFF NAMES AND FACES;RECOGNIZES APPROPRIATE HEALTHCARE SETTING
GENERAL FUNCTIONAL COGNITION RATING: NEEDED SOME HELP
ASKED TO RECALL BLUE: YES, NO CUE REQUIRED

## 2024-09-06 ASSESSMENT — PAIN SCALES - GENERAL: PAINLEVEL_OUTOF10: 0 - NO PAIN

## 2024-09-06 ASSESSMENT — PAIN - FUNCTIONAL ASSESSMENT: PAIN_FUNCTIONAL_ASSESSMENT: 0-10

## 2024-09-06 NOTE — CARE PLAN
The patient's goals for the shift include  remaining free from falls.    The clinical goals for the shift include remain Hemodynamically stable

## 2024-09-06 NOTE — CARE PLAN
PT met 7/8 ltg from [facility], [place]. Pt discharged home at sba/cga for transfers and mobility at walker level, up to min a for adl's. Family teaching completed Followup with [facility] OT recommended.    Problem: OT Problem  Goal: STG- patient will complete simple mobility with CGA with use of ae/ad/dme prn  Outcome: Met  Goal: LTG- patient will complete toilet/commode transfers with CGA with use of ae/ad/dme prn  Outcome: Met  Goal: LTG- patient will complete grooming with set up with use of ae/ad/dme prn  Outcome: Met     Problem: OT Problem  Goal: LTG- patient will complete UB dressing with set up  with use of ae/ad/dme prn  Outcome: Not met

## 2024-09-06 NOTE — PROGRESS NOTES
Nephrology Consult Progress Note    Mariann Drew is a 68 y.o. female on day 14 of admission presenting with Debility.      Subjective   No acute events overnight, persistent abd pain, tolerating po, nonoliguric, no urinary complains       Objective          Physical Exam    Vitals 24HR  Heart Rate:  []   Temp:  [36 °C (96.8 °F)-36.2 °C (97.2 °F)]   Resp:  [16]   BP: (110-123)/(62-68)   SpO2:  [100 %]       Sleeping but araousable,  AAOx3, sick looking, in pain, on RA  Decreased AEBL  RRR no murmurs  Abd - wounds not examined, wound vac+  Chronic skin changes, edema +           I&O 24HR    Intake/Output Summary (Last 24 hours) at 2024 1245  Last data filed at 2024 0600  Gross per 24 hour   Intake --   Output 450 ml   Net -450 ml           Medications  Medications Prior to Admission   Medication Sig Dispense Refill Last Dose    allopurinol (Zyloprim) 100 mg tablet Take 1 tablet (100 mg) by mouth once daily.       allopurinol (Zyloprim) 300 mg tablet TAKE ONE TABLET ( 300 MG ) IN ADDITION TO ONE TABLET ( 100 MG ) DAILY ( TOTAL  MG DAILY ) 90 tablet 3     deutetrabenazine (Austedo XR) 24 mg tablet extended release 24 hr Take 1 tablet (24 mg) by mouth once daily. Take 1-6mg tablet and 1-24mg tablet for a total dose of 30mg daily. 90 tablet 3     deutetrabenazine (Austedo XR) 6 mg tablet extended release 24 hr Take 1 tablet (6 mg) by mouth once daily. Take 1-6mg tablet and 1-24mg tablet for a total dose of 30mg daily. 90 tablet 3     DULoxetine (Cymbalta) 60 mg DR capsule Take 1 capsule (60 mg) by mouth 2 times a day. Do not crush or chew.       [] ertapenem (INVanz) 1 gram injection Infuse 1 g into a venous catheter once daily for 13 days. Obtain weekly labs: BMP and CBC. Fax to Dr. Ying at [phone]. 13 g 0     famotidine (Pepcid) 20 mg tablet Take 1 tablet (20 mg) by mouth once daily in the morning.       ferrous sulfate (FEOSOL ORAL) Take 1 tablet by mouth once daily in the  evening. 200 (65 Fe) MG       HYDROcodone-acetaminophen (Norco) 5-325 mg tablet Take 1 tablet by mouth every 6 hours if needed for severe pain (7 - 10). 20 tablet 0     lamoTRIgine (LaMICtal) 200 mg tablet Take 1 tablet (200 mg) by mouth once daily in the morning.       latanoprost (Xelpros) 0.005 % drops, emulsion Administer into affected eye(s) once daily.       lidocaine (Lidocaine Viscous) 2 % solution Take by mouth.       multivitamin tablet Take 1 tablet by mouth once daily.       naratriptan (Amerge) 2.5 mg tablet Take 1 tablet (2.5 mg) by mouth 1 time if needed for migraine. May repeat once in 4hrs if headache recurs 9 tablet 1     semaglutide (Ozempic) 0.25 mg or 0.5 mg (2 mg/3 mL) pen injector INJECT 0.5 MG UNDER THE SKIN 1 (ONE) TIME PER WEEK. (Patient taking differently: Inject 0.5 mg under the skin 1 (one) time per week. Every Tuesdays) 3 mL 3     traZODone (Desyrel) 100 mg tablet Take 1.5 tablets (150 mg) by mouth once daily at bedtime.       Xdemvy 0.25 % drops Administer 1 drop into both eyes once daily at bedtime.       [DISCONTINUED] cyclobenzaprine (Flexeril) 5 mg tablet Take 1 tablet (5 mg) by mouth 3 times a day as needed for muscle spasms. 30 tablet 0     [DISCONTINUED] dexAMETHasone 0.5 mg/5 mL oral liquid Take 5 mL (0.5 mg) by mouth 4 times a day for 10 days. 237 mL 1     [DISCONTINUED] ertapenem (INVanz) 1 gram injection Infuse 1 g into a venous catheter once daily for 28 days. Obtain weekly labs: BMP, CBC, Sed rate, and CRP. Fax to Dr. Ying at [phone]. 28 g 0     [DISCONTINUED] Nyamyc 100,000 unit/gram powder APPLY TO AFFECTED AREA TWICE A DAY 30 g 2     [DISCONTINUED] sucralfate (Carafate) 1 gram tablet Take 1 tablet (1 g) by mouth once daily at bedtime.         Scheduled medications  allopurinol, 100 mg, oral, Daily  deutetrabenazine, 24 mg, oral, Daily  deutetrabenazine, 6 mg, oral, Daily  dexAMETHasone, 0.5 mg, oral, 4x daily  DULoxetine, 60 mg, oral, BID  enoxaparin, 40 mg,  subcutaneous, q24h  ertapenem, 500 mg, intravenous, q24h  famotidine, 20 mg, oral, q AM  ferrous sulfate (325 mg ferrous sulfate), 65 mg of iron, oral, BID  fludrocortisone, 0.1 mg, oral, Daily  furosemide, 40 mg, oral, Daily  lamoTRIgine, 200 mg, oral, q AM  latanoprost, 1 drop, Both Eyes, Nightly  lotilaner, 1 drop, Both Eyes, Nightly  nystatin, 1 Application, Topical, BID  potassium chloride CR, 20 mEq, oral, Daily  sennosides, 1 tablet, oral, Nightly  sucralfate, 1 g, oral, Nightly  traZODone, 150 mg, oral, Nightly  zinc oxide, 1 Application, Topical, BID      Continuous medications     PRN medications  PRN medications: acetaminophen **OR** acetaminophen, alum-mag hydroxide-simeth, bisacodyl, bisacodyl, cyclobenzaprine, lidocaine, melatonin, naratriptan, simethicone    Relevant Results      Admission on 08/23/2024   Component Date Value Ref Range Status    WBC 08/24/2024 7.1  4.4 - 11.3 x10*3/uL Final    nRBC 08/24/2024 0.0  0.0 - 0.0 /100 WBCs Final    RBC 08/24/2024 2.43 (L)  4.00 - 5.20 x10*6/uL Final    Hemoglobin 08/24/2024 7.4 (L)  12.0 - 16.0 g/dL Final    Hematocrit 08/24/2024 23.0 (L)  36.0 - 46.0 % Final    MCV 08/24/2024 95  80 - 100 fL Final    MCH 08/24/2024 30.5  26.0 - 34.0 pg Final    MCHC 08/24/2024 32.2  32.0 - 36.0 g/dL Final    RDW 08/24/2024 14.9 (H)  11.5 - 14.5 % Final    Platelets 08/24/2024 108 (L)  150 - 450 x10*3/uL Final    Glucose 08/24/2024 107 (H)  74 - 99 mg/dL Final    Sodium 08/24/2024 136  136 - 145 mmol/L Final    Potassium 08/24/2024 4.6  3.5 - 5.3 mmol/L Final    Chloride 08/24/2024 110 (H)  98 - 107 mmol/L Final    Bicarbonate 08/24/2024 19 (L)  21 - 32 mmol/L Final    Anion Gap 08/24/2024 12  10 - 20 mmol/L Final    Urea Nitrogen 08/24/2024 23  6 - 23 mg/dL Final    Creatinine 08/24/2024 1.52 (H)  0.50 - 1.05 mg/dL Final    eGFR 08/24/2024 37 (L)  >60 mL/min/1.73m*2 Final    Calculations of estimated GFR are performed using the 2021 CKD-EPI Study Refit equation without the  race variable for the IDMS-Traceable creatinine methods.  https://jasn.asnjournals.org/content/early/2021/09/22/ASN.6217014262    Calcium 08/24/2024 8.4 (L)  8.6 - 10.3 mg/dL Final    C-Reactive Protein 08/24/2024 1.59 (H)  <1.00 mg/dL Final    Sedimentation Rate 08/24/2024 16  0 - 30 mm/h Final    WBC 08/25/2024 7.4  4.4 - 11.3 x10*3/uL Final    nRBC 08/25/2024 0.0  0.0 - 0.0 /100 WBCs Final    RBC 08/25/2024 2.56 (L)  4.00 - 5.20 x10*6/uL Final    Hemoglobin 08/25/2024 7.7 (L)  12.0 - 16.0 g/dL Final    Hematocrit 08/25/2024 24.4 (L)  36.0 - 46.0 % Final    MCV 08/25/2024 95  80 - 100 fL Final    MCH 08/25/2024 30.1  26.0 - 34.0 pg Final    MCHC 08/25/2024 31.6 (L)  32.0 - 36.0 g/dL Final    RDW 08/25/2024 15.2 (H)  11.5 - 14.5 % Final    Platelets 08/25/2024 137 (L)  150 - 450 x10*3/uL Final    Glucose 08/25/2024 122 (H)  74 - 99 mg/dL Final    Sodium 08/25/2024 136  136 - 145 mmol/L Final    Potassium 08/25/2024 4.6  3.5 - 5.3 mmol/L Final    Chloride 08/25/2024 107  98 - 107 mmol/L Final    Bicarbonate 08/25/2024 20 (L)  21 - 32 mmol/L Final    Anion Gap 08/25/2024 14  10 - 20 mmol/L Final    Urea Nitrogen 08/25/2024 27 (H)  6 - 23 mg/dL Final    Creatinine 08/25/2024 1.76 (H)  0.50 - 1.05 mg/dL Final    eGFR 08/25/2024 31 (L)  >60 mL/min/1.73m*2 Final    Calculations of estimated GFR are performed using the 2021 CKD-EPI Study Refit equation without the race variable for the IDMS-Traceable creatinine methods.  https://jasn.asnjournals.org/content/early/2021/09/22/ASN.0043401235    Calcium 08/25/2024 8.3 (L)  8.6 - 10.3 mg/dL Final    WBC 08/26/2024 5.3  4.4 - 11.3 x10*3/uL Final    nRBC 08/26/2024 0.0  0.0 - 0.0 /100 WBCs Final    RBC 08/26/2024 2.30 (L)  4.00 - 5.20 x10*6/uL Final    Hemoglobin 08/26/2024 7.1 (L)  12.0 - 16.0 g/dL Final    Hematocrit 08/26/2024 21.8 (L)  36.0 - 46.0 % Final    MCV 08/26/2024 95  80 - 100 fL Final    MCH 08/26/2024 30.9  26.0 - 34.0 pg Final    MCHC 08/26/2024 32.6  32.0 - 36.0  g/dL Final    RDW 08/26/2024 15.2 (H)  11.5 - 14.5 % Final    Platelets 08/26/2024 103 (L)  150 - 450 x10*3/uL Final    Glucose 08/26/2024 109 (H)  74 - 99 mg/dL Final    Sodium 08/26/2024 133 (L)  136 - 145 mmol/L Final    Potassium 08/26/2024 4.8  3.5 - 5.3 mmol/L Final    Chloride 08/26/2024 108 (H)  98 - 107 mmol/L Final    Bicarbonate 08/26/2024 20 (L)  21 - 32 mmol/L Final    Anion Gap 08/26/2024 10  10 - 20 mmol/L Final    Urea Nitrogen 08/26/2024 28 (H)  6 - 23 mg/dL Final    Creatinine 08/26/2024 1.66 (H)  0.50 - 1.05 mg/dL Final    eGFR 08/26/2024 33 (L)  >60 mL/min/1.73m*2 Final    Calculations of estimated GFR are performed using the 2021 CKD-EPI Study Refit equation without the race variable for the IDMS-Traceable creatinine methods.  https://jasn.asnjournals.org/content/early/2021/09/22/ASN.9082448610    Calcium 08/26/2024 8.1 (L)  8.6 - 10.3 mg/dL Final    Occult Blood, Stool X1 08/26/2024 Negative  Negative Final    Glucose 08/27/2024 111 (H)  74 - 99 mg/dL Final    Sodium 08/27/2024 135 (L)  136 - 145 mmol/L Final    Potassium 08/27/2024 4.5  3.5 - 5.3 mmol/L Final    Chloride 08/27/2024 109 (H)  98 - 107 mmol/L Final    Bicarbonate 08/27/2024 22  21 - 32 mmol/L Final    Anion Gap 08/27/2024 9 (L)  10 - 20 mmol/L Final    Urea Nitrogen 08/27/2024 27 (H)  6 - 23 mg/dL Final    Creatinine 08/27/2024 1.75 (H)  0.50 - 1.05 mg/dL Final    eGFR 08/27/2024 31 (L)  >60 mL/min/1.73m*2 Final    Calculations of estimated GFR are performed using the 2021 CKD-EPI Study Refit equation without the race variable for the IDMS-Traceable creatinine methods.  https://jasn.asnjournals.org/content/early/2021/09/22/ASN.4821892327    Calcium 08/27/2024 8.2 (L)  8.6 - 10.3 mg/dL Final    WBC 08/27/2024 9.0  4.4 - 11.3 x10*3/uL Final    nRBC 08/27/2024 0.0  0.0 - 0.0 /100 WBCs Final    RBC 08/27/2024 2.79 (L)  4.00 - 5.20 x10*6/uL Final    Hemoglobin 08/27/2024 8.4 (L)  12.0 - 16.0 g/dL Final    Hematocrit 08/27/2024 26.6  (L)  36.0 - 46.0 % Final    MCV 08/27/2024 95  80 - 100 fL Final    MCH 08/27/2024 30.1  26.0 - 34.0 pg Final    MCHC 08/27/2024 31.6 (L)  32.0 - 36.0 g/dL Final    RDW 08/27/2024 15.0 (H)  11.5 - 14.5 % Final    Platelets 08/27/2024 190  150 - 450 x10*3/uL Final    Platelet count verified by smear review.    Neutrophils % 08/27/2024 83.9  40.0 - 80.0 % Final    Immature Granulocytes %, Automated 08/27/2024 1.0 (H)  0.0 - 0.9 % Final    Immature Granulocyte Count (IG) includes promyelocytes, myelocytes and metamyelocytes but does not include bands. Percent differential counts (%) should be interpreted in the context of the absolute cell counts (cells/UL).    Lymphocytes % 08/27/2024 7.8  13.0 - 44.0 % Final    Monocytes % 08/27/2024 7.0  2.0 - 10.0 % Final    Eosinophils % 08/27/2024 0.2  0.0 - 6.0 % Final    Basophils % 08/27/2024 0.1  0.0 - 2.0 % Final    Neutrophils Absolute 08/27/2024 7.53  1.20 - 7.70 x10*3/uL Final    Percent differential counts (%) should be interpreted in the context of the absolute cell counts (cells/uL).    Immature Granulocytes Absolute, Au* 08/27/2024 0.09  0.00 - 0.70 x10*3/uL Final    Lymphocytes Absolute 08/27/2024 0.70 (L)  1.20 - 4.80 x10*3/uL Final    Monocytes Absolute 08/27/2024 0.63  0.10 - 1.00 x10*3/uL Final    Eosinophils Absolute 08/27/2024 0.02  0.00 - 0.70 x10*3/uL Final    Basophils Absolute 08/27/2024 0.01  0.00 - 0.10 x10*3/uL Final    RBC Morphology 08/27/2024 See Below   Final    Polychromasia 08/27/2024 Mild   Final    Ovalocytes 08/27/2024 Few   Final    Clumped Platelets 08/27/2024 Present   Final    Albumin 08/27/2024 2.3 (L)  3.4 - 5.0 g/dL Final    Glucose 08/28/2024 124 (H)  74 - 99 mg/dL Final    Sodium 08/28/2024 134 (L)  136 - 145 mmol/L Final    Potassium 08/28/2024 4.4  3.5 - 5.3 mmol/L Final    Chloride 08/28/2024 107  98 - 107 mmol/L Final    Bicarbonate 08/28/2024 20 (L)  21 - 32 mmol/L Final    Anion Gap 08/28/2024 11  10 - 20 mmol/L Final    Urea  Nitrogen 08/28/2024 29 (H)  6 - 23 mg/dL Final    Creatinine 08/28/2024 1.78 (H)  0.50 - 1.05 mg/dL Final    eGFR 08/28/2024 31 (L)  >60 mL/min/1.73m*2 Final    Calculations of estimated GFR are performed using the 2021 CKD-EPI Study Refit equation without the race variable for the IDMS-Traceable creatinine methods.  https://jasn.asnjournals.org/content/early/2021/09/22/ASN.0653000526    Calcium 08/28/2024 8.2 (L)  8.6 - 10.3 mg/dL Final    Phosphorus 08/28/2024 3.4  2.5 - 4.9 mg/dL Final    The performance characteristics of phosphorus testing in heparinized plasma have been validated by the individual  laboratory site where testing is performed. Testing on heparinized plasma is not approved by the FDA; however, such approval is not necessary.    Albumin 08/28/2024 2.4 (L)  3.4 - 5.0 g/dL Final    Glucose 08/30/2024 108 (H)  74 - 99 mg/dL Final    Sodium 08/30/2024 133 (L)  136 - 145 mmol/L Final    Potassium 08/30/2024 4.4  3.5 - 5.3 mmol/L Final    Chloride 08/30/2024 108 (H)  98 - 107 mmol/L Final    Bicarbonate 08/30/2024 21  21 - 32 mmol/L Final    Anion Gap 08/30/2024 8 (L)  10 - 20 mmol/L Final    Urea Nitrogen 08/30/2024 31 (H)  6 - 23 mg/dL Final    Creatinine 08/30/2024 1.76 (H)  0.50 - 1.05 mg/dL Final    eGFR 08/30/2024 31 (L)  >60 mL/min/1.73m*2 Final    Calculations of estimated GFR are performed using the 2021 CKD-EPI Study Refit equation without the race variable for the IDMS-Traceable creatinine methods.  https://jasn.asnjournals.org/content/early/2021/09/22/ASN.8748638127    Calcium 08/30/2024 8.3 (L)  8.6 - 10.3 mg/dL Final    Phosphorus 08/30/2024 3.3  2.5 - 4.9 mg/dL Final    The performance characteristics of phosphorus testing in heparinized plasma have been validated by the individual  laboratory site where testing is performed. Testing on heparinized plasma is not approved by the FDA; however, such approval is not necessary.    Albumin 08/30/2024 2.5 (L)  3.4 - 5.0 g/dL Final    Glucose  08/30/2024 108 (H)  74 - 99 mg/dL Final    Sodium 08/30/2024 133 (L)  136 - 145 mmol/L Final    Potassium 08/30/2024 4.4  3.5 - 5.3 mmol/L Final    Chloride 08/30/2024 108 (H)  98 - 107 mmol/L Final    Bicarbonate 08/30/2024 21  21 - 32 mmol/L Final    Anion Gap 08/30/2024 8 (L)  10 - 20 mmol/L Final    Urea Nitrogen 08/30/2024 31 (H)  6 - 23 mg/dL Final    Creatinine 08/30/2024 1.76 (H)  0.50 - 1.05 mg/dL Final    eGFR 08/30/2024 31 (L)  >60 mL/min/1.73m*2 Final    Calculations of estimated GFR are performed using the 2021 CKD-EPI Study Refit equation without the race variable for the IDMS-Traceable creatinine methods.  https://jasn.asnjournals.org/content/early/2021/09/22/ASN.9748369384    Calcium 08/30/2024 8.3 (L)  8.6 - 10.3 mg/dL Final    Sedimentation Rate 09/02/2024 5  0 - 30 mm/h Final    WBC 09/02/2024 10.1  4.4 - 11.3 x10*3/uL Final    nRBC 09/02/2024 0.0  0.0 - 0.0 /100 WBCs Final    RBC 09/02/2024 2.96 (L)  4.00 - 5.20 x10*6/uL Final    Hemoglobin 09/02/2024 8.8 (L)  12.0 - 16.0 g/dL Final    Hematocrit 09/02/2024 27.4 (L)  36.0 - 46.0 % Final    MCV 09/02/2024 93  80 - 100 fL Final    MCH 09/02/2024 29.7  26.0 - 34.0 pg Final    MCHC 09/02/2024 32.1  32.0 - 36.0 g/dL Final    RDW 09/02/2024 14.5  11.5 - 14.5 % Final    Platelets 09/02/2024 125 (L)  150 - 450 x10*3/uL Final    Glucose 09/02/2024 113 (H)  74 - 99 mg/dL Final    Sodium 09/02/2024 136  136 - 145 mmol/L Final    Potassium 09/02/2024 4.5  3.5 - 5.3 mmol/L Final    Chloride 09/02/2024 108 (H)  98 - 107 mmol/L Final    Bicarbonate 09/02/2024 24  21 - 32 mmol/L Final    Anion Gap 09/02/2024 9 (L)  10 - 20 mmol/L Final    Urea Nitrogen 09/02/2024 44 (H)  6 - 23 mg/dL Final    Creatinine 09/02/2024 1.65 (H)  0.50 - 1.05 mg/dL Final    eGFR 09/02/2024 34 (L)  >60 mL/min/1.73m*2 Final    Calculations of estimated GFR are performed using the 2021 CKD-EPI Study Refit equation without the race variable for the IDMS-Traceable creatinine  methods.  https://jasn.asnjournals.org/content/early/2021/09/22/ASN.6786446185    Calcium 09/02/2024 8.1 (L)  8.6 - 10.3 mg/dL Final    Glucose 09/05/2024 114 (H)  74 - 99 mg/dL Final    Sodium 09/05/2024 134 (L)  136 - 145 mmol/L Final    Potassium 09/05/2024 4.6  3.5 - 5.3 mmol/L Final    Chloride 09/05/2024 106  98 - 107 mmol/L Final    Bicarbonate 09/05/2024 22  21 - 32 mmol/L Final    Anion Gap 09/05/2024 11  10 - 20 mmol/L Final    Urea Nitrogen 09/05/2024 41 (H)  6 - 23 mg/dL Final    Creatinine 09/05/2024 1.71 (H)  0.50 - 1.05 mg/dL Final    eGFR 09/05/2024 32 (L)  >60 mL/min/1.73m*2 Final    Calculations of estimated GFR are performed using the 2021 CKD-EPI Study Refit equation without the race variable for the IDMS-Traceable creatinine methods.  https://jasn.asnjournals.org/content/early/2021/09/22/ASN.8345076886    Calcium 09/05/2024 8.0 (L)  8.6 - 10.3 mg/dL Final    WBC 09/05/2024 12.1 (H)  4.4 - 11.3 x10*3/uL Final    nRBC 09/05/2024 0.0  0.0 - 0.0 /100 WBCs Final    RBC 09/05/2024 3.10 (L)  4.00 - 5.20 x10*6/uL Final    Hemoglobin 09/05/2024 9.3 (L)  12.0 - 16.0 g/dL Final    Hematocrit 09/05/2024 28.8 (L)  36.0 - 46.0 % Final    MCV 09/05/2024 93  80 - 100 fL Final    MCH 09/05/2024 30.0  26.0 - 34.0 pg Final    MCHC 09/05/2024 32.3  32.0 - 36.0 g/dL Final    RDW 09/05/2024 15.0 (H)  11.5 - 14.5 % Final    Platelets 09/05/2024 138 (L)  150 - 450 x10*3/uL Final    Uric Acid 09/05/2024 4.3  2.3 - 6.7 mg/dL Final    Venipuncture immediately after or during the administration of Metamizole may lead to falsely low results. Testing should be performed immediately  prior to Metamizole dosing.    Glucose 09/06/2024 108 (H)  74 - 99 mg/dL Final    Sodium 09/06/2024 135 (L)  136 - 145 mmol/L Final    Potassium 09/06/2024 4.2  3.5 - 5.3 mmol/L Final    Chloride 09/06/2024 105  98 - 107 mmol/L Final    Bicarbonate 09/06/2024 23  21 - 32 mmol/L Final    Anion Gap 09/06/2024 11  10 - 20 mmol/L Final    Urea  Nitrogen 09/06/2024 42 (H)  6 - 23 mg/dL Final    Creatinine 09/06/2024 1.70 (H)  0.50 - 1.05 mg/dL Final    eGFR 09/06/2024 32 (L)  >60 mL/min/1.73m*2 Final    Calculations of estimated GFR are performed using the 2021 CKD-EPI Study Refit equation without the race variable for the IDMS-Traceable creatinine methods.  https://jasn.asnjournals.org/content/early/2021/09/22/ASN.7418075394    Calcium 09/06/2024 7.9 (L)  8.6 - 10.3 mg/dL Final      Lower extremity venous duplex bilateral    Result Date: 8/26/2024           [place] [id number] Tel [phone] and Fax [phone]  Vascular Lab Report VASC US LOWER EXTREMITY VENOUS DUPLEX BILATERAL  Patient Name:      JOSÉ LUIS REYNOLDS SYBIL        Reading Physician:  [id number] Tyler Velásquez MD Study Date:        8/26/2024             Ordering Physician: [id number] EDYTA CARBALLO MRN/PID:           [id number]              Technologist:       Princess Mendez RVT Accession#:        [id number]          Technologist 2: Date of Birth/Age: 1955 / 68 years Encounter#:         [id number] Gender:            F Admission Status:  Inpatient             Location Performed: [facility]  Diagnosis/ICD: Other specified soft tissue disorders-M79.89 Indication:    Limb swelling CPT Codes:     11076 Peripheral venous duplex scan for DVT complete  CONCLUSIONS: Right Lower Venous: No evidence of acute deep vein thrombus visualized in the right lower extremity. Cannot rule out thrombus in non-visualized posterior tibial and Peroneal veins due to edema. Left Lower Venous: No evidence of acute deep vein thrombus visualized in the left lower extremity. Cannot rule out thrombus in non-visualized peroneal vein due to edema.  Imaging & Doppler Findings:  Right                  Compressible Thrombus        Flow Distal External Iliac     Yes        None   Spontaneous/Phasic CFV                       Yes        None   Spontaneous/Phasic PFV                       Yes        None FV Proximal               Yes        None   Spontaneous/Phasic FV Mid                    Yes        None FV Distal                 Yes        None Popliteal                 Yes        None   Spontaneous/Phasic  Left                  Compress Thrombus        Flow Distal External Iliac   Yes      None   Spontaneous/Phasic CFV                     Yes      None   Spontaneous/Phasic PFV                     Yes      None FV Proximal             Yes      None   Spontaneous/Phasic FV Mid                  Yes      None FV Distal               Yes      None Popliteal               Yes      None   Spontaneous/Phasic PTV                     Yes      None  [id number] Tyler Velásquez MD Electronically signed by [id number] Tyler Velásquez MD on 8/26/2024 at 5:11:48 PM  ** Final **     CT head wo IV contrast    Result Date: 8/26/2024  Interpreted By:  Nunu Garcia, STUDY: CT HEAD WO IV CONTRAST; ;  8/26/2024 12:38 pm   INDICATION: Signs/Symptoms:confusion.   COMPARISON: 07/07/2023   ACCESSION NUMBER(S): [id number]   ORDERING CLINICIAN: EDYTA CARBALLO   TECHNIQUE: Serial axial images of the head were obtained without intravenous contrast. Sagittal and coronal reconstructions were generated.   FINDINGS: The ventricles are midline and normal in size.   There are no acute parenchymal abnormalities.   There is no hemorrhage or extra-axial fluid.   There is no obvious scalp hematoma or skull fracture.   Paranasal sinuses and mastoids are unremarkable. The patient appears to be status post bilateral cataract surgery.   COMPARISON OF FINDINGS: The brain is similar.       No acute intracranial abnormality     MACRO: none   Signed by: Nunu Garcia 8/26/2024 12:52 PM Dictation workstation:   [id number]    IR CVC tunneled    Result Date:  "8/19/2024  Interpreted By:  Brandon Blackman, STUDY: IR CVC TUNNELED;  8/16/2024 10:20 am   INDICATION: Signs/Symptoms:tunnelled picc.   COMPARISON: None.   ACCESSION NUMBER(S): [id number]   ORDERING CLINICIAN: EDMOND DYE   TECHNIQUE: INTERVENTIONALIST(S): [place] Blackman MD   The history and physical exam pertinent to the procedure were reviewed and no updates were made.\"   CONSENT: The patient/patient's POA/next of kin was informed of the nature of the proposed procedure. The purposes, alternatives, risks, and benefits were explained and discussed. All questions were answered and consent was obtained.   RADIATION EXPOSURE: Fluoroscopy time: 0.1 min. Dose: 0.3 mGy.     SEDATION: None   MEDICATION/CONTRAST: No additional   TIME OUT: A time out was performed immediately prior to procedure start with the interventional team, correctly identifying the patient name, date of birth, MRN, procedure, anatomy (including marking of site and side), patient position, procedure consent form, relevant laboratory and imaging test results, antibiotic administration, safety precautions, and procedure-specific equipment needs.   COMPLICATIONS: No immediate adverse events identified.   FINDINGS: In the recumbent position, the patient was positioned on the angiography table. The right supraclavicular and infraclavicular cutaneous tissues were prepared and draped in usual sterile manner.   The supraclavicular access site was screened with gray-scale ultrasound with subsequent subcutaneous instillation of Lidocaine 1% local anesthesia. The plan tunnel site of the infraclavicular chest was also anesthetized. A small skin nick was made at the intended venotomy site and the tunnel entry site. The subcutaneous tissues were dissected using blunt instrumentation. Ultrasound images demonstrate a patent right internal jugular vein. Under direct ultrasound guidance and Seldinger/micropuncture technique, the right internal jugular vein was accessed. " An ultrasound digital spot image was acquired and stored on the  PACS. Via the access needle, a 0.018 in marking wire was advanced to the level of the IVC. An image was stored. Then, the wire was used to measure the intravascular length of the planned catheter to the level of the superior cavoatrial junction. The needle was then exchanged for a 5 Maltese peel-away sheath. Then, the 5 Maltese single lumen small bore catheter was brought through the tunnel. It was cut to length  and fed through the peel-away sheath which was peeled off. The suture was secured at the tunnel exit site using pursestring suture (3-0 Ethilon). The access site was covered with sterile hCG dressing. There is good skin apposition at the venotomy site. The venotomy site was covered with Steri-Strips and sterile dressing. The catheter was flushed using heparin 10 units/mL.   Completion radiograph demonstrates no evidence of kinking. The tip of the catheter is at the cavoatrial junction. No pneumothorax. Catheter is ready to use.     The patient tolerated the procedure without complication.       Technically successful right IJ tunneled small bore catheter placement.   I was present for and/or performed the critical portions of the procedure and immediately available throughout the entire procedure.   I personally reviewed the image(s)/study and interpretation. I agree with the findings as stated.   Performed and dictated at [facility].   MACRO: None   Signed by: Brandon Blackman 8/19/2024 11:55 AM Dictation workstation:   [id number]    Bedside Midline Imaging    Result Date: 8/14/2024  These images are not reportable by radiology and will not be interpreted by  Radiologists.    Bedside Midline Imaging    Result Date: 8/13/2024  These images are not reportable by radiology and will not be interpreted by  Radiologists.    CT abdomen pelvis w IV contrast    Result Date: 8/8/2024  Interpreted By:  Rell  Thomas, STUDY: CT ABDOMEN PELVIS W IV CONTRAST; 8/8/2024 4:43 pm   INDICATION: Signs/Symptoms:Recent abdominoplasty, purulent drainage, wound infection;   COMPARISON: None   ACCESSION NUMBER(S): [id number]   ORDERING CLINICIAN: YAW BALDWIN   TECHNIQUE: Contiguous axial images of the abdomen/pelvis were performed with IV contrast. 75 ml of Omnipaque 350 was utilized. Coronal and sagittal reformatted images were also obtained. All CT examinations are performed with 1 or more of the following dose reduction techniques: Automated exposure control, adjustment of mA and/or kv according to patient's size, or use of iterative reconstruction techniques.     FINDINGS: The liver is nodular in contour consistent with cirrhosis. No focal hepatic lesions. The common bile duct, pancreas, and adrenal glands are unremarkable. 2 cm hypodense lesion in the spleen most likely a small cyst. Spleen is otherwise normal in size and unremarkable. Prior cholecystectomy with surgical clips in the gallbladder fossa.   The kidneys enhance symmetrically. No urolithiasis is seen. No hydroureteronephrosis is seen. There are a few hypodense lesions in the kidneys bilaterally which are too small to characterize on the right. The largest in the left kidney is compatible with a cyst in the lower pole measuring 3.7 x 3.6 cm.   The visualized aorta is unremarkable.   The small bowel is not dilated. The appendix is not identified however no evidence for acute inflammatory change. Mild diverticulosis of the colon. No evidence for acute diverticulitis.   The bladder is minimally distended and otherwise unremarkable.   The visualized osseous structures are intact.   Limited images of the lower thorax show a partially visualized left mastectomy and breast implant.   There is scattered subcutaneous air/gas in the deep subcutaneous tissues along the abdomen. Surgical drains are noted in place anterior to the abdominal musculature. Findings are limited  to the deep subcutaneous tissues. No free intraperitoneal air or fluid is seen. No abscess or collections are noted in the region of the abdominoplasty and drains. There are multiple gas pockets however no fluid collections or abscess is seen.       1. scattered subcutaneous air/gas in the deep subcutaneous tissues along the abdomen. Surgical drains are in good position anterior to the abdominal musculature. Findings are limited to the deep subcutaneous tissues. No free intraperitoneal air or fluid is seen. No abscess or collections are noted in the region of the abdominoplasty and drains. There are multiple subcutaneous gas/air pockets however no fluid collections or abscess is seen.   2. Hepatic cirrhosis. No focal hepatic lesions.   3. Diverticulosis of the colon without evidence for acute diverticulitis.   Signed by: Thomas Zacarias 8/8/2024 5:03 PM Dictation workstation:   [id number]        ASSESSMENT AND PLAN    Nonoliguric RUSSELL on CKD, baseline creatinine 0.9-1, was 1.5 on admission - 1.7 - 1.65  Hyponatremia, mild, most corrected Na values at target  NAGMA, controlled  Hypocalcemia  Hyperuricemia  Low albumin, malnutrition    Plan  - avoid hypotension and nephrotoxins  - creatinine stabilizing but remains above baseline  - continue lasix and kdur daily on discharge, will fup in 2 weeks with labs to ensure renal function stable and electrolytes ok (meds reconciled for dc)  - ok to continue florinef on dc  - reduce allopurinol to 100 mg every day as uric acid wnl  - will follow    MARS reviewed,     Thank you for the opportunity to assist in the care of this patient, please call with questions  Brooklynn Shepherd MD PhD

## 2024-09-06 NOTE — PROGRESS NOTES
REVIEWED PT INFO AS CORRECT.   DX...  abdominplasy with wound infection  REVIEWED ALLERGIES... multiple- see complete list in epic  PMH... greta, hld, gerd, anemia, lauren, breast ca, gout, tardive dyskinesia  NO MEDICATION INTERACTIONS.  REVIEWED RELEVANT BASELINE VALUES  LAB ARE ... weekly bmp and cbc results to dr Ying  PT HAS …..  sl tunneled cvc placed 8/16/24 FLUSH PER [facility] PROTOCOL.  CONTINUE MED THRU TENTATIVE STOP DATE …. ertapenem 500mg iv q24h via gravity (elastomeric) thru 9/19/24  CARE PLAN DONE TODAY    Patient is a new admit to [facility] for receipt of ertapenem 500mg daily thru 9/19/24 for a wound infection following abdominoplasty for gastric bypass surgery. Bypass surgery done on 7/30/24 and pt developed fevers and purulent drainage from BÁRBARA drains 2-3 days post surgery. Debridement done but wound unable to be closed and pt on wound vac.    Weekly labs are ordered as above with results to dr Ying.    Processed fill for 6 x ertapenem HP for mix and straight delivery 9/6/24 to cover doses 9/7 thru 9/12/24.    Followup 9/12/24 with refill of remaining doses straight. Check progress and labs.

## 2024-09-06 NOTE — PROGRESS NOTES
ADULT 68Y.O. FEMALE ADMITTED TO [facility] WITH ORDER FOR IV ERTAPENEM THERAPY.. PT HAS A SL TUNNELED IV LINE ACCESS WITH ORDER TO FLUSH AND CATH DRSG PER [facility] PROTOCOL... INFUSION ONCE DAILY VIA HP... SOC TOMORROW ... SENDING STND SUPPLIES AND FLUSHES TO MATCH INFUSIONS PLUS INITIAL EXTRAS FOR DELIVERY TONIGHT...  SPOKE WITH , AND ADDRESS CONFIRMED, LEFT DETAILED INFORMATION...OK FOR DELIVERY CLOSER TO 9PM... NO QUESTIONS FOR [facility] AT THIS TIME...

## 2024-09-06 NOTE — CARE PLAN
Patient achieved 1/4 LTGs and 4/4 STGs established at initial evaluation.  Patient discharged home at CGA walker level with mod/min A x 2 for stair negotiation.  Family teaching done with spouse on 9/2/24.  Recommend continued C PT at this time. Patient issued wheeled walker and adjusted to appropriate height for patient.  Patient understands choices in obtaining DME prior to issuing walker.        Problem: PT Problem  Goal: LTG - Pt will perform transfers with SBA  Outcome: Met     Problem: PT Problem  Goal: LTG - Pt will perform bed mobility with mod A x 1   Outcome: Not met  Goal: LTG - Pt will amb 150 feet with FWW and CGA.    Outcome: Not met  Goal: LTG - Pt will up/down 2 stairs with 1 HR and straight cane with min A x 1.   Outcome: Not met

## 2024-09-06 NOTE — DISCHARGE SUMMARY
Discharge Diagnosis  Debility    Issues Requiring Follow-Up  Debility.    Discharge Meds     Medication List      START taking these medications     acetaminophen 325 mg tablet; Commonly known as: Tylenol; Take 2 tablets   (650 mg) by mouth every 4 hours if needed for mild pain (1 - 3), moderate   pain (4 - 6), headaches or fever (temp greater than 38.0 C).   * ertapenem 50 mg in sodium chloride 0.9% 50 mL IV; Infuse 50 mg at 100   mL/hr over 30 minutes into a venous catheter once every 24 hours.   * ertapenem 50 mg in sodium chloride 0.9% 50 mL IV; Infuse 50 mg at 100   mL/hr over 30 minutes into a venous catheter once every 24 hours.   fludrocortisone 0.1 mg tablet; Commonly known as: Florinef; Take 1   tablet (0.1 mg) by mouth once daily.   furosemide 40 mg tablet; Commonly known as: Lasix; Take 1 tablet (40 mg)   by mouth once daily.   potassium chloride CR 20 mEq ER tablet; Commonly known as: Klor-Con M20;   Take 1 tablet (20 mEq) by mouth once daily. Do not crush or chew. Do not   fill before September 6, 2024.  * This list has 2 medication(s) that are the same as other medications   prescribed for you. Read the directions carefully, and ask your doctor or   other care provider to review them with you.     CHANGE how you take these medications     allopurinol 100 mg tablet; Commonly known as: Zyloprim; Take 1 tablet   (100 mg) by mouth once daily.; What changed: medication strength, See the   new instructions., Another medication with the same name was removed.   Continue taking this medication, and follow the directions you see here.     CONTINUE taking these medications     * Austedo XR 24 mg tablet extended release 24 hr; Generic drug:   deutetrabenazine; Take 1 tablet (24 mg) by mouth once daily. Take 1-6mg   tablet and 1-24mg tablet for a total dose of 30mg daily.   * Austedo XR 6 mg tablet extended release 24 hr; Generic drug:   deutetrabenazine; Take 1 tablet (6 mg) by mouth once daily. Take 1-6mg    tablet and 1-24mg tablet for a total dose of 30mg daily.   cyclobenzaprine 5 mg tablet; Commonly known as: Flexeril; Take 1 tablet   (5 mg) by mouth 3 times a day as needed for muscle spasms.   dexAMETHasone 0.5 mg/5 mL oral liquid; Take 5 mL (0.5 mg) by mouth 4   times a day.   DULoxetine 60 mg DR capsule; Commonly known as: Cymbalta   famotidine 20 mg tablet; Commonly known as: Pepcid   FEOSOL ORAL   lamoTRIgine 200 mg tablet; Commonly known as: LaMICtal   multivitamin tablet   naratriptan 2.5 mg tablet; Commonly known as: Amerge; Take 1 tablet (2.5   mg) by mouth 1 time if needed for migraine. May repeat once in 4hrs if   headache recurs   nystatin 100,000 unit/gram powder; Commonly known as: Nyamyc; Apply   topically 2 times a day. to affected area   sucralfate 1 gram tablet; Commonly known as: Carafate; Take 1 tablet (1   g) by mouth once daily at bedtime.   traZODone 100 mg tablet; Commonly known as: Desyrel   Xdemvy 0.25 % drops; Generic drug: lotilaner   Xelpros 0.005 % drops, emulsion; Generic drug: latanoprost  * This list has 2 medication(s) that are the same as other medications   prescribed for you. Read the directions carefully, and ask your doctor or   other care provider to review them with you.     STOP taking these medications     ertapenem 1 gram injection; Commonly known as: INVanz   HYDROcodone-acetaminophen 5-325 mg tablet; Commonly known as: Norco   Lidocaine Viscous 2 % solution; Generic drug: lidocaine   Ozempic 0.25 mg or 0.5 mg (2 mg/3 mL) pen injector; Generic drug:   semaglutide     ASK your doctor about these medications     alteplase 2 mg injection; Commonly known as: Cathflo Activase; 2 mL (2   mg) by intra-catheter route 1 time for 1 dose.; Ask about: Should I take   this medication?       Test Results Pending At Discharge  Pending Labs       No current pending labs.            Hospital Course   Mariann Drew is a 68 y.o. female presenting with a wound infection following an  abdominoplasty for gastric bypass surgery.  She does have a history of anemia, GERD, obstructive sleep apnea, breast cancer with left mastectomy, gout, and tardive dyskinesia.  Her gastric bypass surgery was on July 30 and she develops fevers and purulent drainage from her BÁRBARA drains 2 to 3 days after surgery.  She was admitted to [facility] and is now receiving IV antibiotics.  She did have debridement but the wound was unable to be closed and she is on a wound VAC.      She participated in three hours of PT/OT and improved to the pint she was intermittent min A for functional activities including transfers and ambulation.  She still needed some assist for ADLs.  These will be adapted at the time of discharge.  She will be sleeping in a recliner chair in using dresses for her clothing.    Patient did have a follow-up for her wound.  Wound care orders were completed for outpatient follow-up.    Patient will continue with IV antibiotics at home, wound care management and PT and OT to continue to increase general strength and mobility.    Patient has some baseline underlying mental slowness.  This was of concern and a head CT was ordered.  No acute changes were identified.  She also was seen by neurology and no acute changes were identified or recommendations.    Patient does have bilateral lower extremity edema which has been new since her gastric bypass.  She does have significantly low albumin and nutritional support has been a focus.  Cardiac function is stable without etiology of the lower extremity edema outside of third spacing due to nutritional depletion.  No evidence of DVTs and duplex scans were negative.    Patient will follow-up with home health care PT OT RN.  She will also follow-up with her plastic surgeon and general surgeon post bypass.    Time spent with the patient today for discharge including: history, physical exam, coordination of care with the rehab team members, communication with patient  and/or family, medication reconciliation and documentation was 35 minutes.      Pertinent Physical Exam At Time of Discharge  Physical Exam  Pleasant female no apparent distress  Alert and oriented x 3  Cardiovascular S1-S2 without murmurs rubs or gallops  Abdomen soft nontender nondistended with active bowel sounds  Negative Homans bilaterally but +2-3 lower extremity edema.  Functioning at a contact-guard up to min assist level overall.    Outpatient Follow-Up  Future Appointments   Date Time Provider Department Center   9/9/2024  3:40 PM Anibal Tiwari MD [id number] [place]   9/10/2024  3:45 PM Olivia Self DO [id number] [place]   9/11/2024 11:00 AM Woundcare Provider [id number] [place]   9/19/2024 11:30 AM Cuauhtemoc Truogn MD [id number] [facility]   9/26/2024  9:00 AM Olivia Herrera MD [id number] [place]   1/7/2025  9:40 AM Dione Guthrie MD [id number] [place]         Afshan Faustin MD

## 2024-09-06 NOTE — CARE PLAN
The patient's goals for the shift include      The clinical goals for the shift include Remain hemodynamically stable, get some quality rest for DC in AM.      Problem: Skin  Goal: Decreased wound size/increased tissue granulation at next dressing change  Outcome: Progressing  Flowsheets (Taken 9/5/2024 2209)  Decreased wound size/increased tissue granulation at next dressing change: Promote sleep for wound healing  Goal: Participates in plan/prevention/treatment measures  Outcome: Progressing  Flowsheets (Taken 9/5/2024 2209)  Participates in plan/prevention/treatment measures: Elevate heels  Goal: Promote/optimize nutrition  Outcome: Progressing  Flowsheets (Taken 9/5/2024 2209)  Promote/optimize nutrition: Monitor/record intake including meals  Goal: Promote skin healing  Outcome: Progressing  Flowsheets (Taken 9/5/2024 2209)  Promote skin healing:   Turn/reposition every 2 hours/use positioning/transfer devices   Assess skin/pad under line(s)/device(s)     Problem: Fall/Injury  Goal: Verbalize understanding of risk factor reduction measures to prevent injury from fall in the home  Outcome: Progressing  Goal: Use assistive devices by end of the shift  Outcome: Progressing  Goal: Pace activities to prevent fatigue by end of the shift  Outcome: Progressing     Problem: Pain  Goal: Takes deep breaths with improved pain control throughout the shift  Outcome: Progressing  Goal: Turns in bed with improved pain control throughout the shift  Outcome: Progressing  Goal: Walks with improved pain control throughout the shift  Outcome: Progressing  Goal: Performs ADL's with improved pain control throughout shift  Outcome: Progressing  Goal: Participates in PT with improved pain control throughout the shift  Outcome: Progressing     Problem: Wound Care  Goal: Area will decrease in size .2x.2 cm per week  Outcome: Progressing     Problem: Diabetes  Goal: Achieve decreasing blood glucose levels by end of shift  Outcome:  Progressing  Goal: Increase stability of blood glucose readings by end of shift  Outcome: Progressing  Goal: Decrease in ketones present in urine by end of shift  Outcome: Progressing  Goal: Maintain electrolyte levels within acceptable range throughout shift  Outcome: Progressing  Goal: Maintain glucose levels >70mg/dl to <250mg/dl throughout shift  Outcome: Progressing  Goal: No changes in neurological exam by end of shift  Outcome: Progressing  Goal: Learn about and adhere to nutrition recommendations by end of shift  Outcome: Progressing  Goal: Vital signs within normal range for age by end of shift  Outcome: Progressing  Goal: Increase self care and/or family involovement by end of shift  Outcome: Progressing  Goal: Receive DSME education by end of shift  Outcome: Progressing

## 2024-09-06 NOTE — PROGRESS NOTES
Infectious disease progress note  Subjective   Abdominoplasty with wound infection (group B strep)     Antibiotics  Invanz end of therapy 9-    Antibiotics    Objective   Range of Vitals (last 24 hours)  Heart Rate:  []   Temp:  [36 °C (96.8 °F)-36.2 °C (97.2 °F)]   Resp:  [16]   BP: (110-123)/(62-68)   SpO2:  [100 %]   Daily Weight  09/04/24 : 78.4 kg (172 lb 12.8 oz)    Body mass index is 29.65 kg/m².      Physical Exam  Patient resting in bed waiting to go home      Relevant Results  Labs  Lab Results   Component Value Date    WBC 12.1 (H) 09/05/2024    HGB 9.3 (L) 09/05/2024    HCT 28.8 (L) 09/05/2024    MCV 93 09/05/2024     (L) 09/05/2024     Lab Results   Component Value Date    GLUCOSE 108 (H) 09/06/2024    CALCIUM 7.9 (L) 09/06/2024     (L) 09/06/2024    K 4.2 09/06/2024    CO2 23 09/06/2024     09/06/2024    BUN 42 (H) 09/06/2024    CREATININE 1.70 (H) 09/06/2024   ESR: --  Lab Results   Component Value Date    SEDRATE 5 09/02/2024     Lab Results   Component Value Date    CRP 1.59 (H) 08/24/2024     Lab Results   Component Value Date    ALT 18 08/09/2024    AST 25 08/09/2024    ALKPHOS 136 08/09/2024    BILITOT 0.7 08/09/2024       Microbiology    8-8-2024 wound culture group B strep  8-8-2024 blood culture no growth at 4 days     Imaging  8-8-2024 CT abdomen pelvis scattered subcutaneous air/gas in the deep subcutaneous tissue along the abdomen.  Surgical drains were in good.  Position  Imaging    Assessment/Plan   1.  Will continue Invanz 500 mg IV daily through 9-  Will put the corrected dosing in the computer for home care  2.  Patient to follow-up with me in 2 weeks at my office.  Card was given to her to make an appointment    Other issues  RUSSELL  Hyperlipidemia  GERD    I reviewed and interpreted all lab test imaging studies and documentations from other healthcare providers  I am monitoring for antibiotic side effects and toxicity     Antonia Ying MD

## 2024-09-07 ENCOUNTER — HOME CARE VISIT (OUTPATIENT)
Dept: HOME HEALTH SERVICES | Facility: HOME HEALTH | Age: 69
End: 2024-09-07
Payer: MEDICARE

## 2024-09-07 VITALS
SYSTOLIC BLOOD PRESSURE: 110 MMHG | DIASTOLIC BLOOD PRESSURE: 60 MMHG | HEART RATE: 108 BPM | RESPIRATION RATE: 18 BRPM | OXYGEN SATURATION: 97 % | TEMPERATURE: 97.3 F

## 2024-09-07 VITALS — RESPIRATION RATE: 20 BRPM

## 2024-09-07 PROCEDURE — G0151 HHCP-SERV OF PT,EA 15 MIN: HCPCS | Mod: HHH

## 2024-09-07 PROCEDURE — G0299 HHS/HOSPICE OF RN EA 15 MIN: HCPCS | Mod: HHH

## 2024-09-07 PROCEDURE — 169592 NO-PAY CLAIM PROCEDURE

## 2024-09-07 SDOH — HEALTH STABILITY: PHYSICAL HEALTH: PHYSICAL EXERCISE: 5

## 2024-09-07 SDOH — HEALTH STABILITY: PHYSICAL HEALTH: EXERCISE TYPE: WHEP

## 2024-09-07 SDOH — HEALTH STABILITY: PHYSICAL HEALTH: EXERCISE ACTIVITY: ANKLE PUMPS, QUAD AND GLUT SETS, HEEL SLIDES AND ABD

## 2024-09-07 SDOH — HEALTH STABILITY: PHYSICAL HEALTH: PHYSICAL EXERCISE: SUPINE

## 2024-09-07 SDOH — HEALTH STABILITY: PHYSICAL HEALTH: EXERCISE ACTIVITIES SETS: 1

## 2024-09-07 ASSESSMENT — ENCOUNTER SYMPTOMS
HIGHEST PAIN SEVERITY IN PAST 24 HOURS: 2/10
PAIN SEVERITY GOAL: 0/10
LOWEST PAIN SEVERITY IN PAST 24 HOURS: 0/10
LOWEST PAIN SEVERITY IN PAST 24 HOURS: 3/10
PERSON REPORTING PAIN: PATIENT
OCCASIONAL FEELINGS OF UNSTEADINESS: 1
HIGHEST PAIN SEVERITY IN PAST 24 HOURS: 4/10
APPETITE LEVEL: GOOD
SUBJECTIVE PAIN PROGRESSION: UNCHANGED
PAIN LOCATION: ABDOMEN
PAIN: 1
PAIN SEVERITY GOAL: 0/10
MUSCLE WEAKNESS: 1

## 2024-09-07 ASSESSMENT — ACTIVITIES OF DAILY LIVING (ADL)
AMBULATION ASSISTANCE ON FLAT SURFACES: 1
ENTERING_EXITING_HOME: STAND BY ASSIST
AMBULATION_DISTANCE/DURATION_TOLERATED: 25 FT
OASIS_M1830: 02

## 2024-09-07 ASSESSMENT — PAIN SCALES - PAIN ASSESSMENT IN ADVANCED DEMENTIA (PAINAD): BREATHING: 0

## 2024-09-09 ENCOUNTER — HOME CARE VISIT (OUTPATIENT)
Dept: HOME HEALTH SERVICES | Facility: HOME HEALTH | Age: 69
End: 2024-09-09
Payer: MEDICARE

## 2024-09-09 ENCOUNTER — APPOINTMENT (OUTPATIENT)
Dept: PLASTIC SURGERY | Facility: CLINIC | Age: 69
End: 2024-09-09
Payer: MEDICARE

## 2024-09-09 ENCOUNTER — PATIENT OUTREACH (OUTPATIENT)
Dept: PRIMARY CARE | Facility: CLINIC | Age: 69
End: 2024-09-09
Payer: MEDICARE

## 2024-09-09 VITALS
OXYGEN SATURATION: 98 % | HEART RATE: 105 BPM | TEMPERATURE: 97.1 F | RESPIRATION RATE: 18 BRPM | DIASTOLIC BLOOD PRESSURE: 62 MMHG | SYSTOLIC BLOOD PRESSURE: 108 MMHG

## 2024-09-09 PROCEDURE — G0157 HHC PT ASSISTANT EA 15: HCPCS | Mod: CQ,HHH

## 2024-09-09 PROCEDURE — G0299 HHS/HOSPICE OF RN EA 15 MIN: HCPCS | Mod: HHH

## 2024-09-09 SDOH — ECONOMIC STABILITY: GENERAL

## 2024-09-09 ASSESSMENT — ENCOUNTER SYMPTOMS
LOWEST PAIN SEVERITY IN PAST 24 HOURS: 5/10
LOWEST PAIN SEVERITY IN PAST 24 HOURS: 3/10
CHANGE IN APPETITE: UNCHANGED
PAIN LOCATION: ABDOMEN
PAIN: 1
APPETITE LEVEL: GOOD
LOWER EXTREMITY EDEMA: 1
LAST BOWEL MOVEMENT: 67091
BOWEL PATTERN NORMAL: 1
PAIN LOCATION - PAIN SEVERITY: 5/10
PAIN LOCATION - PAIN QUALITY: STABBING
PAIN LOCATION - PAIN FREQUENCY: CONSTANT
PAIN SEVERITY GOAL: 0/10
PERSON REPORTING PAIN: PATIENT
PAIN SEVERITY GOAL: 0/10
HIGHEST PAIN SEVERITY IN PAST 24 HOURS: 5/10
SUBJECTIVE PAIN PROGRESSION: GRADUALLY IMPROVING
MUSCLE WEAKNESS: 1
HIGHEST PAIN SEVERITY IN PAST 24 HOURS: 5/10

## 2024-09-09 ASSESSMENT — ACTIVITIES OF DAILY LIVING (ADL)
MONEY MANAGEMENT (EXPENSES/BILLS): INDEPENDENT
AMBULATION ASSISTANCE: STAND BY ASSIST
CURRENT_FUNCTION: STAND BY ASSIST

## 2024-09-09 NOTE — PROGRESS NOTES
Discharge Facility:[facility]  Discharge Diagnosis:Debility;  wound infection following an abdominoplasty for gastric bypass surgery.HHC to be present. IV antibiotics  Admission Date:8.23.24  Discharge Date: 9.6.24    PCP Appointment Date:9.10.24  Specialist Appointment Date:   Hospital Encounter and Summary Linked: Yes  Appt within 2 business days

## 2024-09-10 ENCOUNTER — HOME INFUSION (OUTPATIENT)
Dept: INFUSION THERAPY | Age: 69
End: 2024-09-10

## 2024-09-10 ENCOUNTER — HOME CARE VISIT (OUTPATIENT)
Dept: HOME HEALTH SERVICES | Facility: HOME HEALTH | Age: 69
End: 2024-09-10
Payer: MEDICARE

## 2024-09-10 ENCOUNTER — APPOINTMENT (OUTPATIENT)
Dept: PRIMARY CARE | Facility: CLINIC | Age: 69
End: 2024-09-10
Payer: MEDICARE

## 2024-09-10 ENCOUNTER — LAB REQUISITION (OUTPATIENT)
Dept: LAB | Facility: HOSPITAL | Age: 69
End: 2024-09-10
Payer: MEDICARE

## 2024-09-10 VITALS
HEART RATE: 104 BPM | DIASTOLIC BLOOD PRESSURE: 42 MMHG | SYSTOLIC BLOOD PRESSURE: 98 MMHG | TEMPERATURE: 98.5 F | RESPIRATION RATE: 16 BRPM | OXYGEN SATURATION: 95 %

## 2024-09-10 DIAGNOSIS — Z47.89 ENCOUNTER FOR OTHER ORTHOPEDIC AFTERCARE: ICD-10-CM

## 2024-09-10 LAB
ANION GAP SERPL CALC-SCNC: 21 MMOL/L (ref 10–20)
BUN SERPL-MCNC: 29 MG/DL (ref 6–23)
CALCIUM SERPL-MCNC: 7.4 MG/DL (ref 8.6–10.6)
CHLORIDE SERPL-SCNC: 101 MMOL/L (ref 98–107)
CO2 SERPL-SCNC: 20 MMOL/L (ref 21–32)
CREAT SERPL-MCNC: 1.59 MG/DL (ref 0.5–1.05)
CRP SERPL-MCNC: 2.34 MG/DL
EGFRCR SERPLBLD CKD-EPI 2021: 35 ML/MIN/1.73M*2
ERYTHROCYTE [DISTWIDTH] IN BLOOD BY AUTOMATED COUNT: 14.9 % (ref 11.5–14.5)
ERYTHROCYTE [SEDIMENTATION RATE] IN BLOOD BY WESTERGREN METHOD: 4 MM/H (ref 0–30)
GLUCOSE SERPL-MCNC: 159 MG/DL (ref 74–99)
HCT VFR BLD AUTO: 29.9 % (ref 36–46)
HGB BLD-MCNC: 9.2 G/DL (ref 12–16)
MCH RBC QN AUTO: 29.9 PG (ref 26–34)
MCHC RBC AUTO-ENTMCNC: 30.8 G/DL (ref 32–36)
MCV RBC AUTO: 97 FL (ref 80–100)
NRBC BLD-RTO: 0 /100 WBCS (ref 0–0)
PLATELET # BLD AUTO: 97 X10*3/UL (ref 150–450)
POTASSIUM SERPL-SCNC: 2.6 MMOL/L (ref 3.5–5.3)
RBC # BLD AUTO: 3.08 X10*6/UL (ref 4–5.2)
SODIUM SERPL-SCNC: 139 MMOL/L (ref 136–145)
WBC # BLD AUTO: 10.3 X10*3/UL (ref 4.4–11.3)

## 2024-09-10 PROCEDURE — G0299 HHS/HOSPICE OF RN EA 15 MIN: HCPCS | Mod: HHH

## 2024-09-10 PROCEDURE — 85652 RBC SED RATE AUTOMATED: CPT

## 2024-09-10 ASSESSMENT — ENCOUNTER SYMPTOMS
PERSON REPORTING PAIN: PATIENT
HIGHEST PAIN SEVERITY IN PAST 24 HOURS: 2/10
MUSCLE WEAKNESS: 1
DIZZINESS: 1
APPETITE LEVEL: GOOD
PAIN SEVERITY GOAL: 0/10
HIGHEST PAIN SEVERITY IN PAST 24 HOURS: 0/10
PAIN: 1
APPETITE LEVEL: GOOD
LOWEST PAIN SEVERITY IN PAST 24 HOURS: 0/10

## 2024-09-10 ASSESSMENT — PAIN SCALES - PAIN ASSESSMENT IN ADVANCED DEMENTIA (PAINAD): BREATHING: 0

## 2024-09-10 NOTE — HOME HEALTH
PT SEEN FOR WOUND VAC CANNISTER DROP OFF. DROPPED OF 6 CANNISTERS. DURING VISIT 5 MORE DLEIVERED. WILL PLACE ORDER FOR MORE-DRAINS   VERY HEAVILY. PAIN MINIMAL IS A 2. VITALS GOOD. LUNGS CLEAR. NO OTHER CONCERNS Patient's EF (Ejection Fraction) is updated echo is ordered and results are pending  Core Heart Failure Medications: diuretics discussed  Discharge Plans: home  Family Present: no  Advanced Directives: patient does not have advance directives and declines assistance completing them at this time    Patient has a past medical history of Hypertension and Obesity. Comorbidities reviewed and education provided. Patient and family's stated goal of care: reduce shortness of breath prior to discharge. Long term goal: not discussed at this time    Lifestyle goal discussed: daily wts    Patient's current functional capacity: Slight limitation of physical activity. Comfortable at rest. Ordinary physical activity results in fatigue, palpitation, dyspnea. Pt resting in bed at this time on room air. Pt denies shortness of breath. Pt without lower extremity edema. Patient's weights and intake/output reviewed:    Patient Vitals for the past 96 hrs (Last 3 readings):   Weight   12/31/19 0803 (!) 318 lb (144.2 kg)       Intake/Output Summary (Last 24 hours) at 1/2/2020 1254  Last data filed at 1/2/2020 0759  Gross per 24 hour   Intake 360 ml   Output 100 ml   Net 260 ml       Patient provided with verbal and written education on CHF signs/symptoms, discharge medications, daily weights, low sodium diet, activity and follow-up. Heart Failure self-management education/written zone tool reviewed and encouraged to call at early signs of worsening CHF or when in yellow zone. Pt reports she is here with pneumonia. She follows with [facility] cardiology. She has a scale and weighs almost daily. She is able to teach back she has an awareness of a low sodium diet of 2300 mg and FR of 64 oz daily. She had appointment with Monse Uribe CNP with cardiology today but will reschedule.      Notified patient of scheduled hospital follow-up appointment with ROYAL Borja CNP for 1/10 at 11:15 Am but will reschedule if pt is discharged today in order to have a 7 day post hospital F/U appointment. Instructed patient to call the doctor post discharge if they experience shortness of breath, chest pain, swelling, cough, or weight gain of 3 pounds in a day / 5 pounds in a week. Also, notified patient to call the doctor with dizziness, increased fatigue, decreased or difficulty urinating. Pt education needs reinforcement. No additional questions at this time. Education Time: 15 Minutes    Recommendations:  1. Encourage follow-up appointment compliance. Next appointment: 1/10  2. Educate further on fluid restriction 48 oz - 64 oz during inpatient stay so they can understand how to measure intake at home. 3. Review sodium restrictions. Encourage patient to not add table salt to their foods and avoid foods that are high in sodium. 4. Continue to educate on S/S. Stress the importance of calling the MD with the earliest signs of an acute exacerbation. 5. Emphasize daily weights - instruct patient to call the MD if they gain 2-3 lb in a day or 5 lb in a week. 6. Provided patient with CHF Resource Line for questions and concerns.      1/2/2020 12:54 PM

## 2024-09-10 NOTE — PROGRESS NOTES
Reviewed chart and Patient is a recent admit to [facility] for receipt of ertapenem 500mg daily thru 9/19/24 for a wound infection following abdominoplasty for gastric bypass surgery. Bypass surgery done on 7/30/24 and pt developed fevers and purulent drainage from BÁRBARA drains 2-3 days post surgery. Debridement done but wound unable to be closed and pt on wound vac.     Weekly labs are ordered with results to dr Ying.    Pharmacist received call from spouse. Infusions going well and inventory correct. Agrees to delivery by 8pm on 09/12/24 of remainder of therapy. Will send with standard flushes and supplies.  to call before arrival.     Processed refill for 7 x ertapenem HP for mix and straight delivery 9/12/24 to cover doses 9/13 thru 9/19/24.     Followup 9/19/24 with dr Ynig on plan of care. Patient has tunneled line.

## 2024-09-11 ENCOUNTER — OFFICE VISIT (OUTPATIENT)
Dept: WOUND CARE | Facility: CLINIC | Age: 69
End: 2024-09-11
Payer: MEDICARE

## 2024-09-11 ENCOUNTER — HOME INFUSION (OUTPATIENT)
Dept: INFUSION THERAPY | Age: 69
End: 2024-09-11
Payer: MEDICARE

## 2024-09-11 ENCOUNTER — HOME CARE VISIT (OUTPATIENT)
Dept: HOME HEALTH SERVICES | Facility: HOME HEALTH | Age: 69
End: 2024-09-11
Payer: MEDICARE

## 2024-09-11 ENCOUNTER — ANESTHESIA EVENT (OUTPATIENT)
Dept: OPERATING ROOM | Facility: HOSPITAL | Age: 69
End: 2024-09-11
Payer: MEDICARE

## 2024-09-11 VITALS — SYSTOLIC BLOOD PRESSURE: 90 MMHG | OXYGEN SATURATION: 99 % | HEART RATE: 110 BPM | DIASTOLIC BLOOD PRESSURE: 60 MMHG

## 2024-09-11 DIAGNOSIS — T81.31XD DISRUPTION OF EXTERNAL SURGICAL WOUND, SUBSEQUENT ENCOUNTER: ICD-10-CM

## 2024-09-11 PROCEDURE — G0157 HHC PT ASSISTANT EA 15: HCPCS | Mod: CQ,HHH

## 2024-09-11 PROCEDURE — G0152 HHCP-SERV OF OT,EA 15 MIN: HCPCS | Mod: HHH

## 2024-09-11 PROCEDURE — 99214 OFFICE O/P EST MOD 30 MIN: CPT

## 2024-09-11 RX ORDER — ERTAPENEM 1 G/1
500 INJECTION, POWDER, LYOPHILIZED, FOR SOLUTION INTRAMUSCULAR; INTRAVENOUS DAILY
Qty: 7 G | Refills: 0 | Status: SHIPPED
Start: 2024-09-11 | End: 2024-09-25

## 2024-09-11 ASSESSMENT — ACTIVITIES OF DAILY LIVING (ADL)
TOILETING: 1
GROOMING_CURRENT_FUNCTION: MINIMUM ASSIST
TOILETING: MODERATE ASSIST
FEEDING ASSESSED: 1
CURRENT_FUNCTION: MODERATE ASSIST
LAUNDRY: DEPENDENT
FEEDING: INDEPENDENT
LAUNDRY ASSESSED: 1
BATHING_CURRENT_FUNCTION: MODERATE ASSIST
PHYSICAL TRANSFERS ASSESSED: 1
DRESSING_LB_CURRENT_FUNCTION: MODERATE ASSIST
DRESSING_UB_CURRENT_FUNCTION: MINIMUM ASSIST
BATHING ASSESSED: 1
GROOMING ASSESSED: 1
PREPARING MEALS: DEPENDENT

## 2024-09-11 ASSESSMENT — ENCOUNTER SYMPTOMS
PAIN: 1
PERSON REPORTING PAIN: PATIENT
PAIN LOCATION: RIGHT KNEE
HIGHEST PAIN SEVERITY IN PAST 24 HOURS: 4/10
HIGHEST PAIN SEVERITY IN PAST 24 HOURS: 10/10
PAIN LOCATION - PAIN SEVERITY: 4/10
PAIN: 1
LOWEST PAIN SEVERITY IN PAST 24 HOURS: 2/10
PERSON REPORTING PAIN: PATIENT

## 2024-09-11 NOTE — HOME HEALTH
Patient seen with spouse for OT evaluation visit. Patient was recently hospitalized with gastric bypass surgery on 7.30.24. She returned home and developed infections and had multiple surgeries to clean them out. Her last surgery was 8.12.24 and she went to the rehab unit at [facility] from 8.23.24-9.6.24.     Lives with spouse in a [place] home with 2 steps to enter the front door. Currently staying on the 1st floor with half bathroom (sleeping in her recliner chair). Laundry in the basement (12 steps down). 2nd floor has a stair lift with bedroom and a full bathroom with a walk-in shower.     Medical history of gout, tardive dyskinesia, breast CA with left mastectomy, anemia, GERD, ANKUSH.     Patient has a wheeled walker, shower chair, wall mounted grab bars, handheld shower, raised toilet seats.     Prior to hospitalization (before her 1st surgery), was independent with ADLs and IADLs, she used a wheeled walker. She drove.     Barthel index-69 out of 100.     UE AROM WNL except shoulder flex/abd to apprxoimately 90 degrees. UE strength 4M out of 5, except shoulders 3M out of 5. Patient and spouse in agreement with OT POC. Plan to see 1w1, 2w7. Share case with LESIA Lorenz. OT to focus on most self care areas, bathroom transfers and UE exercises.

## 2024-09-11 NOTE — CASE COMMUNICATION
OT evaluation completed 9.11.24. Plan to see 1w1, 2w7. Share case with Magy. OT to focus on most self care areas, bathroom transfers and UE exercises.
Abdomen soft, non-tender, no guarding.

## 2024-09-11 NOTE — PROGRESS NOTES
Tel call from Dr. Ying to extend ertapenem through 9/25/24 (appointment date). Order updated in Epic, gold copy to intake. RN notified. Requesting labs drawn early next week in the morning with results faxed to Dr. Ying at [phone].    Next follow up 9/18 check labs, progress, OVN remainder

## 2024-09-12 ENCOUNTER — HOME CARE VISIT (OUTPATIENT)
Dept: HOME HEALTH SERVICES | Facility: HOME HEALTH | Age: 69
End: 2024-09-12
Payer: MEDICARE

## 2024-09-12 ENCOUNTER — DOCUMENTATION (OUTPATIENT)
Dept: PHARMACY | Facility: CLINIC | Age: 69
End: 2024-09-12

## 2024-09-12 PROCEDURE — G0299 HHS/HOSPICE OF RN EA 15 MIN: HCPCS | Mod: HHH

## 2024-09-12 ASSESSMENT — PAIN SCALES - PAIN ASSESSMENT IN ADVANCED DEMENTIA (PAINAD): BREATHING: 0

## 2024-09-12 ASSESSMENT — ENCOUNTER SYMPTOMS
MUSCLE WEAKNESS: 1
APPETITE LEVEL: GOOD
LOWER EXTREMITY EDEMA: 1
LOWEST PAIN SEVERITY IN PAST 24 HOURS: 0/10
PAIN SEVERITY GOAL: 0/10
HIGHEST PAIN SEVERITY IN PAST 24 HOURS: 0/10

## 2024-09-14 ENCOUNTER — HOME CARE VISIT (OUTPATIENT)
Dept: HOME HEALTH SERVICES | Facility: HOME HEALTH | Age: 69
End: 2024-09-14
Payer: MEDICARE

## 2024-09-14 VITALS
TEMPERATURE: 97.5 F | SYSTOLIC BLOOD PRESSURE: 108 MMHG | HEART RATE: 96 BPM | DIASTOLIC BLOOD PRESSURE: 68 MMHG | RESPIRATION RATE: 12 BRPM

## 2024-09-14 PROCEDURE — G0299 HHS/HOSPICE OF RN EA 15 MIN: HCPCS | Mod: HHH

## 2024-09-14 ASSESSMENT — ENCOUNTER SYMPTOMS
PAIN: SEE NOTE
PAIN: 1
PAIN SEVERITY GOAL: 3/10
HIGHEST PAIN SEVERITY IN PAST 24 HOURS: 3/10
APPETITE LEVEL: GOOD
LOWEST PAIN SEVERITY IN PAST 24 HOURS: 3/10

## 2024-09-16 ENCOUNTER — TELEPHONE (OUTPATIENT)
Dept: PRIMARY CARE | Facility: CLINIC | Age: 69
End: 2024-09-16
Payer: MEDICARE

## 2024-09-16 ENCOUNTER — APPOINTMENT (OUTPATIENT)
Dept: RADIOLOGY | Facility: HOSPITAL | Age: 69
DRG: 299 | End: 2024-09-16
Payer: MEDICARE

## 2024-09-16 ENCOUNTER — HOSPITAL ENCOUNTER (INPATIENT)
Facility: HOSPITAL | Age: 69
LOS: 5 days | Discharge: NURSING, REHAB OR LONG-TERM CARE | DRG: 299 | End: 2024-09-21
Attending: STUDENT IN AN ORGANIZED HEALTH CARE EDUCATION/TRAINING PROGRAM | Admitting: STUDENT IN AN ORGANIZED HEALTH CARE EDUCATION/TRAINING PROGRAM
Payer: MEDICARE

## 2024-09-16 ENCOUNTER — APPOINTMENT (OUTPATIENT)
Dept: VASCULAR MEDICINE | Facility: HOSPITAL | Age: 69
DRG: 299 | End: 2024-09-16
Payer: MEDICARE

## 2024-09-16 ENCOUNTER — HOME CARE VISIT (OUTPATIENT)
Dept: HOME HEALTH SERVICES | Facility: HOME HEALTH | Age: 69
End: 2024-09-16
Payer: MEDICARE

## 2024-09-16 ENCOUNTER — APPOINTMENT (OUTPATIENT)
Dept: CARDIOLOGY | Facility: HOSPITAL | Age: 69
DRG: 299 | End: 2024-09-16
Payer: MEDICARE

## 2024-09-16 VITALS
OXYGEN SATURATION: 100 % | DIASTOLIC BLOOD PRESSURE: 60 MMHG | HEART RATE: 60 BPM | TEMPERATURE: 97.7 F | SYSTOLIC BLOOD PRESSURE: 88 MMHG

## 2024-09-16 DIAGNOSIS — I82.4Y3 ACUTE DEEP VEIN THROMBOSIS (DVT) OF PROXIMAL VEIN OF BOTH LOWER EXTREMITIES (MULTI): Primary | ICD-10-CM

## 2024-09-16 DIAGNOSIS — R06.02 SOB (SHORTNESS OF BREATH): ICD-10-CM

## 2024-09-16 DIAGNOSIS — R06.02 SHORTNESS OF BREATH: ICD-10-CM

## 2024-09-16 LAB
ALBUMIN SERPL BCP-MCNC: 2.3 G/DL (ref 3.4–5)
ALP SERPL-CCNC: 167 U/L (ref 33–136)
ALT SERPL W P-5'-P-CCNC: 23 U/L (ref 7–45)
ANION GAP SERPL CALC-SCNC: 14 MMOL/L (ref 10–20)
APTT PPP: 28 SECONDS (ref 27–38)
AST SERPL W P-5'-P-CCNC: 26 U/L (ref 9–39)
BASOPHILS # BLD AUTO: 0.07 X10*3/UL (ref 0–0.1)
BASOPHILS NFR BLD AUTO: 0.7 %
BILIRUB SERPL-MCNC: 0.4 MG/DL (ref 0–1.2)
BNP SERPL-MCNC: 22 PG/ML (ref 0–99)
BUN SERPL-MCNC: 30 MG/DL (ref 6–23)
CALCIUM SERPL-MCNC: 8 MG/DL (ref 8.6–10.3)
CARDIAC TROPONIN I PNL SERPL HS: 7 NG/L (ref 0–13)
CHLORIDE SERPL-SCNC: 104 MMOL/L (ref 98–107)
CO2 SERPL-SCNC: 23 MMOL/L (ref 21–32)
CREAT SERPL-MCNC: 1.92 MG/DL (ref 0.5–1.05)
EGFRCR SERPLBLD CKD-EPI 2021: 28 ML/MIN/1.73M*2
EOSINOPHIL # BLD AUTO: 0.35 X10*3/UL (ref 0–0.7)
EOSINOPHIL NFR BLD AUTO: 3.5 %
ERYTHROCYTE [DISTWIDTH] IN BLOOD BY AUTOMATED COUNT: 14.4 % (ref 11.5–14.5)
GLUCOSE SERPL-MCNC: 131 MG/DL (ref 74–99)
HCT VFR BLD AUTO: 32.4 % (ref 36–46)
HGB BLD-MCNC: 10.4 G/DL (ref 12–16)
IMM GRANULOCYTES # BLD AUTO: 0.06 X10*3/UL (ref 0–0.7)
IMM GRANULOCYTES NFR BLD AUTO: 0.6 % (ref 0–0.9)
INR PPP: 1.2 (ref 0.9–1.1)
LYMPHOCYTES # BLD AUTO: 1.19 X10*3/UL (ref 1.2–4.8)
LYMPHOCYTES NFR BLD AUTO: 11.8 %
MAGNESIUM SERPL-MCNC: 1.49 MG/DL (ref 1.6–2.4)
MCH RBC QN AUTO: 29.5 PG (ref 26–34)
MCHC RBC AUTO-ENTMCNC: 32.1 G/DL (ref 32–36)
MCV RBC AUTO: 92 FL (ref 80–100)
MONOCYTES # BLD AUTO: 0.66 X10*3/UL (ref 0.1–1)
MONOCYTES NFR BLD AUTO: 6.6 %
NEUTROPHILS # BLD AUTO: 7.74 X10*3/UL (ref 1.2–7.7)
NEUTROPHILS NFR BLD AUTO: 76.8 %
NRBC BLD-RTO: 0 /100 WBCS (ref 0–0)
PLATELET # BLD AUTO: 118 X10*3/UL (ref 150–450)
POTASSIUM SERPL-SCNC: 4.3 MMOL/L (ref 3.5–5.3)
PROT SERPL-MCNC: 5.1 G/DL (ref 6.4–8.2)
PROTHROMBIN TIME: 13.6 SECONDS (ref 9.8–12.8)
RBC # BLD AUTO: 3.52 X10*6/UL (ref 4–5.2)
SODIUM SERPL-SCNC: 137 MMOL/L (ref 136–145)
UFH PPP CHRO-ACNC: 0.9 IU/ML
UFH PPP CHRO-ACNC: 1.4 IU/ML
WBC # BLD AUTO: 10.1 X10*3/UL (ref 4.4–11.3)

## 2024-09-16 PROCEDURE — 85520 HEPARIN ASSAY: CPT | Performed by: STUDENT IN AN ORGANIZED HEALTH CARE EDUCATION/TRAINING PROGRAM

## 2024-09-16 PROCEDURE — 2500000004 HC RX 250 GENERAL PHARMACY W/ HCPCS (ALT 636 FOR OP/ED)

## 2024-09-16 PROCEDURE — 85610 PROTHROMBIN TIME: CPT | Performed by: STUDENT IN AN ORGANIZED HEALTH CARE EDUCATION/TRAINING PROGRAM

## 2024-09-16 PROCEDURE — 70450 CT HEAD/BRAIN W/O DYE: CPT

## 2024-09-16 PROCEDURE — 93970 EXTREMITY STUDY: CPT

## 2024-09-16 PROCEDURE — 72125 CT NECK SPINE W/O DYE: CPT | Performed by: RADIOLOGY

## 2024-09-16 PROCEDURE — 85025 COMPLETE CBC W/AUTO DIFF WBC: CPT | Performed by: STUDENT IN AN ORGANIZED HEALTH CARE EDUCATION/TRAINING PROGRAM

## 2024-09-16 PROCEDURE — 84484 ASSAY OF TROPONIN QUANT: CPT | Performed by: STUDENT IN AN ORGANIZED HEALTH CARE EDUCATION/TRAINING PROGRAM

## 2024-09-16 PROCEDURE — 74176 CT ABD & PELVIS W/O CONTRAST: CPT | Performed by: RADIOLOGY

## 2024-09-16 PROCEDURE — 96366 THER/PROPH/DIAG IV INF ADDON: CPT

## 2024-09-16 PROCEDURE — 2500000004 HC RX 250 GENERAL PHARMACY W/ HCPCS (ALT 636 FOR OP/ED): Performed by: STUDENT IN AN ORGANIZED HEALTH CARE EDUCATION/TRAINING PROGRAM

## 2024-09-16 PROCEDURE — 80053 COMPREHEN METABOLIC PANEL: CPT | Performed by: STUDENT IN AN ORGANIZED HEALTH CARE EDUCATION/TRAINING PROGRAM

## 2024-09-16 PROCEDURE — 96367 TX/PROPH/DG ADDL SEQ IV INF: CPT

## 2024-09-16 PROCEDURE — 1200000002 HC GENERAL ROOM WITH TELEMETRY DAILY

## 2024-09-16 PROCEDURE — 70450 CT HEAD/BRAIN W/O DYE: CPT | Performed by: RADIOLOGY

## 2024-09-16 PROCEDURE — 99291 CRITICAL CARE FIRST HOUR: CPT | Performed by: STUDENT IN AN ORGANIZED HEALTH CARE EDUCATION/TRAINING PROGRAM

## 2024-09-16 PROCEDURE — 83735 ASSAY OF MAGNESIUM: CPT | Performed by: STUDENT IN AN ORGANIZED HEALTH CARE EDUCATION/TRAINING PROGRAM

## 2024-09-16 PROCEDURE — 96365 THER/PROPH/DIAG IV INF INIT: CPT

## 2024-09-16 PROCEDURE — 93005 ELECTROCARDIOGRAM TRACING: CPT

## 2024-09-16 PROCEDURE — 71045 X-RAY EXAM CHEST 1 VIEW: CPT | Performed by: RADIOLOGY

## 2024-09-16 PROCEDURE — 74176 CT ABD & PELVIS W/O CONTRAST: CPT

## 2024-09-16 PROCEDURE — 71045 X-RAY EXAM CHEST 1 VIEW: CPT

## 2024-09-16 PROCEDURE — G0157 HHC PT ASSISTANT EA 15: HCPCS | Mod: CQ,HHH

## 2024-09-16 PROCEDURE — 2500000001 HC RX 250 WO HCPCS SELF ADMINISTERED DRUGS (ALT 637 FOR MEDICARE OP)

## 2024-09-16 PROCEDURE — 83880 ASSAY OF NATRIURETIC PEPTIDE: CPT | Performed by: STUDENT IN AN ORGANIZED HEALTH CARE EDUCATION/TRAINING PROGRAM

## 2024-09-16 PROCEDURE — 93970 EXTREMITY STUDY: CPT | Performed by: SURGERY

## 2024-09-16 PROCEDURE — 72125 CT NECK SPINE W/O DYE: CPT

## 2024-09-16 RX ORDER — NYSTATIN 100000 [USP'U]/G
POWDER TOPICAL 2 TIMES DAILY
Status: DISCONTINUED | OUTPATIENT
Start: 2024-09-16 | End: 2024-09-21 | Stop reason: HOSPADM

## 2024-09-16 RX ORDER — HEPARIN SODIUM 10000 [USP'U]/100ML
0-4500 INJECTION, SOLUTION INTRAVENOUS CONTINUOUS
Status: DISCONTINUED | OUTPATIENT
Start: 2024-09-16 | End: 2024-09-20

## 2024-09-16 RX ORDER — POLYETHYLENE GLYCOL 3350 17 G/17G
17 POWDER, FOR SOLUTION ORAL DAILY PRN
Status: DISCONTINUED | OUTPATIENT
Start: 2024-09-16 | End: 2024-09-21 | Stop reason: HOSPADM

## 2024-09-16 RX ORDER — HEPARIN SODIUM 5000 [USP'U]/ML
80 INJECTION, SOLUTION INTRAVENOUS; SUBCUTANEOUS ONCE
Status: COMPLETED | OUTPATIENT
Start: 2024-09-16 | End: 2024-09-16

## 2024-09-16 RX ORDER — LAMOTRIGINE 100 MG/1
200 TABLET ORAL EVERY MORNING
Status: DISCONTINUED | OUTPATIENT
Start: 2024-09-17 | End: 2024-09-21 | Stop reason: HOSPADM

## 2024-09-16 RX ORDER — SUMATRIPTAN 50 MG/1
50 TABLET, FILM COATED ORAL EVERY 2 HOUR PRN
Status: DISCONTINUED | OUTPATIENT
Start: 2024-09-16 | End: 2024-09-21 | Stop reason: HOSPADM

## 2024-09-16 RX ORDER — SODIUM CHLORIDE, SODIUM LACTATE, POTASSIUM CHLORIDE, CALCIUM CHLORIDE 600; 310; 30; 20 MG/100ML; MG/100ML; MG/100ML; MG/100ML
75 INJECTION, SOLUTION INTRAVENOUS CONTINUOUS
Status: DISCONTINUED | OUTPATIENT
Start: 2024-09-16 | End: 2024-09-20

## 2024-09-16 RX ORDER — ALLOPURINOL 100 MG/1
100 TABLET ORAL DAILY
Status: DISCONTINUED | OUTPATIENT
Start: 2024-09-17 | End: 2024-09-21 | Stop reason: HOSPADM

## 2024-09-16 RX ORDER — SUCRALFATE 1 G/1
1 TABLET ORAL NIGHTLY
Status: DISCONTINUED | OUTPATIENT
Start: 2024-09-16 | End: 2024-09-21 | Stop reason: HOSPADM

## 2024-09-16 RX ORDER — VANCOMYCIN HYDROCHLORIDE 1 G/200ML
1000 INJECTION, SOLUTION INTRAVENOUS ONCE
Status: COMPLETED | OUTPATIENT
Start: 2024-09-16 | End: 2024-09-16

## 2024-09-16 RX ORDER — MEROPENEM 1 G/1
1 INJECTION, POWDER, FOR SOLUTION INTRAVENOUS ONCE
Status: COMPLETED | OUTPATIENT
Start: 2024-09-16 | End: 2024-09-16

## 2024-09-16 RX ORDER — FLUDROCORTISONE ACETATE 0.1 MG/1
0.1 TABLET ORAL DAILY
Status: DISCONTINUED | OUTPATIENT
Start: 2024-09-17 | End: 2024-09-21 | Stop reason: HOSPADM

## 2024-09-16 RX ORDER — PANTOPRAZOLE SODIUM 40 MG/10ML
40 INJECTION, POWDER, LYOPHILIZED, FOR SOLUTION INTRAVENOUS
Status: DISCONTINUED | OUTPATIENT
Start: 2024-09-17 | End: 2024-09-21 | Stop reason: HOSPADM

## 2024-09-16 RX ORDER — VANCOMYCIN HYDROCHLORIDE 1 G/20ML
INJECTION, POWDER, LYOPHILIZED, FOR SOLUTION INTRAVENOUS DAILY PRN
Status: DISCONTINUED | OUTPATIENT
Start: 2024-09-16 | End: 2024-09-17

## 2024-09-16 RX ORDER — PANTOPRAZOLE SODIUM 40 MG/1
40 TABLET, DELAYED RELEASE ORAL
Status: DISCONTINUED | OUTPATIENT
Start: 2024-09-17 | End: 2024-09-21 | Stop reason: HOSPADM

## 2024-09-16 RX ORDER — PANTOPRAZOLE SODIUM 20 MG/1
20 TABLET, DELAYED RELEASE ORAL DAILY
COMMUNITY

## 2024-09-16 RX ORDER — MAGNESIUM SULFATE HEPTAHYDRATE 40 MG/ML
2 INJECTION, SOLUTION INTRAVENOUS ONCE
Status: COMPLETED | OUTPATIENT
Start: 2024-09-16 | End: 2024-09-16

## 2024-09-16 RX ORDER — DEXAMETHASONE 1 MG/1
0.5 TABLET ORAL 4 TIMES DAILY
Status: DISCONTINUED | OUTPATIENT
Start: 2024-09-16 | End: 2024-09-21 | Stop reason: HOSPADM

## 2024-09-16 RX ORDER — INSULIN LISPRO 100 [IU]/ML
0-5 INJECTION, SOLUTION INTRAVENOUS; SUBCUTANEOUS
Status: DISCONTINUED | OUTPATIENT
Start: 2024-09-17 | End: 2024-09-21 | Stop reason: HOSPADM

## 2024-09-16 RX ORDER — MEROPENEM 1 G/1
1 INJECTION, POWDER, FOR SOLUTION INTRAVENOUS EVERY 12 HOURS
Status: DISCONTINUED | OUTPATIENT
Start: 2024-09-17 | End: 2024-09-17

## 2024-09-16 RX ORDER — ACETAMINOPHEN 325 MG/1
650 TABLET ORAL EVERY 4 HOURS PRN
Status: DISCONTINUED | OUTPATIENT
Start: 2024-09-16 | End: 2024-09-21 | Stop reason: HOSPADM

## 2024-09-16 RX ORDER — SODIUM CHLORIDE 9 MG/ML
10 INJECTION, SOLUTION INTRAMUSCULAR; INTRAVENOUS; SUBCUTANEOUS DAILY
Status: DISCONTINUED | OUTPATIENT
Start: 2024-09-17 | End: 2024-09-21 | Stop reason: HOSPADM

## 2024-09-16 RX ORDER — TRAZODONE HYDROCHLORIDE 50 MG/1
150 TABLET ORAL NIGHTLY
Status: DISCONTINUED | OUTPATIENT
Start: 2024-09-16 | End: 2024-09-21 | Stop reason: HOSPADM

## 2024-09-16 RX ORDER — HEPARIN SODIUM 5000 [USP'U]/ML
3000-6000 INJECTION, SOLUTION INTRAVENOUS; SUBCUTANEOUS EVERY 4 HOURS PRN
Status: DISCONTINUED | OUTPATIENT
Start: 2024-09-16 | End: 2024-09-19

## 2024-09-16 RX ADMIN — SODIUM CHLORIDE, POTASSIUM CHLORIDE, SODIUM LACTATE AND CALCIUM CHLORIDE 100 ML/HR: 600; 310; 30; 20 INJECTION, SOLUTION INTRAVENOUS at 18:57

## 2024-09-16 RX ADMIN — DEXAMETHASONE 0.5 MG: 1 TABLET ORAL at 23:26

## 2024-09-16 RX ADMIN — NYSTATIN: 100000 POWDER TOPICAL at 23:09

## 2024-09-16 RX ADMIN — VANCOMYCIN HYDROCHLORIDE 1000 MG: 1 INJECTION, SOLUTION INTRAVENOUS at 16:37

## 2024-09-16 RX ADMIN — ACETAMINOPHEN 650 MG: 325 TABLET ORAL at 23:29

## 2024-09-16 RX ADMIN — TRAZODONE HYDROCHLORIDE 150 MG: 50 TABLET ORAL at 23:25

## 2024-09-16 RX ADMIN — MEROPENEM 1 G: 1 INJECTION, POWDER, FOR SOLUTION INTRAVENOUS at 15:25

## 2024-09-16 RX ADMIN — HEPARIN SODIUM 5750 UNITS: 5000 INJECTION INTRAVENOUS; SUBCUTANEOUS at 15:01

## 2024-09-16 RX ADMIN — HEPARIN SODIUM 1300 UNITS/HR: 10000 INJECTION, SOLUTION INTRAVENOUS at 15:04

## 2024-09-16 RX ADMIN — MAGNESIUM SULFATE HEPTAHYDRATE 2 G: 40 INJECTION, SOLUTION INTRAVENOUS at 12:58

## 2024-09-16 SDOH — SOCIAL STABILITY: SOCIAL INSECURITY: DOES ANYONE TRY TO KEEP YOU FROM HAVING/CONTACTING OTHER FRIENDS OR DOING THINGS OUTSIDE YOUR HOME?: NO

## 2024-09-16 SDOH — SOCIAL STABILITY: SOCIAL INSECURITY: HAS ANYONE EVER THREATENED TO HURT YOUR FAMILY OR YOUR PETS?: NO

## 2024-09-16 SDOH — SOCIAL STABILITY: SOCIAL INSECURITY: HAVE YOU HAD ANY THOUGHTS OF HARMING ANYONE ELSE?: NO

## 2024-09-16 SDOH — SOCIAL STABILITY: SOCIAL INSECURITY: DO YOU FEEL UNSAFE GOING BACK TO THE PLACE WHERE YOU ARE LIVING?: NO

## 2024-09-16 SDOH — SOCIAL STABILITY: SOCIAL INSECURITY: ARE THERE ANY APPARENT SIGNS OF INJURIES/BEHAVIORS THAT COULD BE RELATED TO ABUSE/NEGLECT?: NO

## 2024-09-16 SDOH — SOCIAL STABILITY: SOCIAL INSECURITY: HAVE YOU HAD THOUGHTS OF HARMING ANYONE ELSE?: NO

## 2024-09-16 SDOH — SOCIAL STABILITY: SOCIAL INSECURITY: ARE YOU OR HAVE YOU BEEN THREATENED OR ABUSED PHYSICALLY, EMOTIONALLY, OR SEXUALLY BY ANYONE?: NO

## 2024-09-16 SDOH — SOCIAL STABILITY: SOCIAL INSECURITY: WERE YOU ABLE TO COMPLETE ALL THE BEHAVIORAL HEALTH SCREENINGS?: YES

## 2024-09-16 SDOH — SOCIAL STABILITY: SOCIAL INSECURITY: DO YOU FEEL ANYONE HAS EXPLOITED OR TAKEN ADVANTAGE OF YOU FINANCIALLY OR OF YOUR PERSONAL PROPERTY?: NO

## 2024-09-16 SDOH — SOCIAL STABILITY: SOCIAL INSECURITY: ABUSE: ADULT

## 2024-09-16 ASSESSMENT — COGNITIVE AND FUNCTIONAL STATUS - GENERAL
MOVING FROM LYING ON BACK TO SITTING ON SIDE OF FLAT BED WITH BEDRAILS: A LITTLE
TURNING FROM BACK TO SIDE WHILE IN FLAT BAD: A LITTLE
MOVING FROM LYING ON BACK TO SITTING ON SIDE OF FLAT BED WITH BEDRAILS: A LITTLE
WALKING IN HOSPITAL ROOM: A LOT
CLIMB 3 TO 5 STEPS WITH RAILING: A LOT
PATIENT BASELINE BEDBOUND: NO
PERSONAL GROOMING: A LITTLE
DRESSING REGULAR LOWER BODY CLOTHING: A LITTLE
DAILY ACTIVITIY SCORE: 19
WALKING IN HOSPITAL ROOM: A LOT
HELP NEEDED FOR BATHING: A LITTLE
TOILETING: A LITTLE
TURNING FROM BACK TO SIDE WHILE IN FLAT BAD: A LITTLE
MOBILITY SCORE: 16
DRESSING REGULAR UPPER BODY CLOTHING: A LITTLE
CLIMB 3 TO 5 STEPS WITH RAILING: A LOT
STANDING UP FROM CHAIR USING ARMS: A LITTLE
STANDING UP FROM CHAIR USING ARMS: A LITTLE
MOVING TO AND FROM BED TO CHAIR: A LITTLE
MOVING TO AND FROM BED TO CHAIR: A LITTLE
MOBILITY SCORE: 16

## 2024-09-16 ASSESSMENT — ACTIVITIES OF DAILY LIVING (ADL)
BATHING: NEEDS ASSISTANCE
WALKS IN HOME: NEEDS ASSISTANCE
JUDGMENT_ADEQUATE_SAFELY_COMPLETE_DAILY_ACTIVITIES: YES
GROOMING: NEEDS ASSISTANCE
HEARING - LEFT EAR: FUNCTIONAL
ADEQUATE_TO_COMPLETE_ADL: YES
LACK_OF_TRANSPORTATION: NO
TOILETING: NEEDS ASSISTANCE
HEARING - RIGHT EAR: FUNCTIONAL
FEEDING YOURSELF: INDEPENDENT
PATIENT'S MEMORY ADEQUATE TO SAFELY COMPLETE DAILY ACTIVITIES?: NO
ASSISTIVE_DEVICE: WALKER
DRESSING YOURSELF: NEEDS ASSISTANCE

## 2024-09-16 ASSESSMENT — ENCOUNTER SYMPTOMS
DIARRHEA: 0
SHORTNESS OF BREATH: 1
HIGHEST PAIN SEVERITY IN PAST 24 HOURS: 7/10
WHEEZING: 0
LOWEST PAIN SEVERITY IN PAST 24 HOURS: 7/10
SINUS PRESSURE: 0
DIZZINESS: 0
ABDOMINAL PAIN: 0
PALPITATIONS: 0
CHILLS: 0
PAIN SEVERITY GOAL: 0/10
COUGH: 0
NAUSEA: 0
DIFFICULTY URINATING: 0
FEVER: 1
RHINORRHEA: 0
PAIN LOCATION: RIGHT LEG
PAIN: 1
DYSURIA: 0
WEAKNESS: 1
VOMITING: 0
SORE THROAT: 0
ABDOMINAL DISTENTION: 0
NUMBNESS: 0
ACTIVITY CHANGE: 1
SUBJECTIVE PAIN PROGRESSION: WAXING AND WANING

## 2024-09-16 ASSESSMENT — COLUMBIA-SUICIDE SEVERITY RATING SCALE - C-SSRS
1. IN THE PAST MONTH, HAVE YOU WISHED YOU WERE DEAD OR WISHED YOU COULD GO TO SLEEP AND NOT WAKE UP?: NO
6. HAVE YOU EVER DONE ANYTHING, STARTED TO DO ANYTHING, OR PREPARED TO DO ANYTHING TO END YOUR LIFE?: NO
6. HAVE YOU EVER DONE ANYTHING, STARTED TO DO ANYTHING, OR PREPARED TO DO ANYTHING TO END YOUR LIFE?: NO
1. IN THE PAST MONTH, HAVE YOU WISHED YOU WERE DEAD OR WISHED YOU COULD GO TO SLEEP AND NOT WAKE UP?: NO
2. HAVE YOU ACTUALLY HAD ANY THOUGHTS OF KILLING YOURSELF?: NO
2. HAVE YOU ACTUALLY HAD ANY THOUGHTS OF KILLING YOURSELF?: NO

## 2024-09-16 ASSESSMENT — LIFESTYLE VARIABLES
AUDIT-C TOTAL SCORE: 0
HOW OFTEN DO YOU HAVE A DRINK CONTAINING ALCOHOL: NEVER
SUBSTANCE_ABUSE_PAST_12_MONTHS: NO
PRESCIPTION_ABUSE_PAST_12_MONTHS: NO
HOW OFTEN DO YOU HAVE 6 OR MORE DRINKS ON ONE OCCASION: NEVER
AUDIT-C TOTAL SCORE: 0
HOW MANY STANDARD DRINKS CONTAINING ALCOHOL DO YOU HAVE ON A TYPICAL DAY: PATIENT DOES NOT DRINK
SKIP TO QUESTIONS 9-10: 1

## 2024-09-16 ASSESSMENT — PATIENT HEALTH QUESTIONNAIRE - PHQ9
2. FEELING DOWN, DEPRESSED OR HOPELESS: NOT AT ALL
1. LITTLE INTEREST OR PLEASURE IN DOING THINGS: NOT AT ALL
SUM OF ALL RESPONSES TO PHQ9 QUESTIONS 1 & 2: 0

## 2024-09-16 ASSESSMENT — PAIN SCALES - GENERAL: PAINLEVEL_OUTOF10: 4

## 2024-09-16 ASSESSMENT — PAIN DESCRIPTION - ORIENTATION: ORIENTATION: ANTERIOR

## 2024-09-16 ASSESSMENT — PAIN DESCRIPTION - LOCATION: LOCATION: ABDOMEN

## 2024-09-16 NOTE — CONSULTS
Vancomycin Dosing by Pharmacy- EMERGENCY DEPARTMENT    Mariann Drew is a 68 y.o. year old female who Pharmacy has been consulted to give a ONE TIME ONLY vancomycin dose in the Emergency Department for cellulitis, skin and soft tissue.     Visit Vitals  /60   Pulse (!) 101   Resp 20      Lab Results   Component Value Date    CREATININE 1.92 (H) 09/16/2024    CREATININE 1.59 (H) 09/10/2024    CREATININE 1.70 (H) 09/06/2024    CREATININE 1.71 (H) 09/05/2024   Estimated Creatinine Clearance: 27 mL/min (A) (by C-G formula based on SCr of 1.92 mg/dL (H)).    Wt Readings from Last 1 Encounters:   09/16/24 72.6 kg (160 lb)        Assessment/Plan     Patient will be given a one time dose of 1000 mg  Pharmacy will sign off at this time. If vancomycin is to be continued, please re-consult Pharmacy.       Natalia Bruce, MauD

## 2024-09-16 NOTE — TELEPHONE ENCOUNTER
Homecare called stating she is not unable to walk- appears weak and shaky-    Bilateral pitting edema R>L with right side tenderness.     Homecare nurse asked if should be seen in office or ED. I advised ED since she is unable to walk. Will need to contact EMS for transport.

## 2024-09-16 NOTE — ED PROVIDER NOTES
EMERGENCY DEPARTMENT ENCOUNTER      Pt Name: Mariann Drew  MRN: [id number]  Birthdate 1955  Date of evaluation: 9/16/2024  Provider: Joaquín White DO    CHIEF COMPLAINT       Chief Complaint   Patient presents with    Lower Extremity Issue       HISTORY OF PRESENT ILLNESS    Mariann Drew is a 68 y.o. female who presents to the emergency department with EMS for lower extremity swelling which has increased as well as generalized weakness.  Patient is a very poor medical historian.  She does endorse a previous abdominal surgery although cannot clarify what for or if there were any complications.  She does have a wound VAC in place.  Through records it appears that she is scheduled for debridement in 2 days.  She reports that she has been having frequent falls at home the most recent was yesterday.  She denies any close head injury or associated injuries with the fall.  Spouse did call EMS today due to the leg swelling and generalized weakness for further evaluation.  Patient has no current complaints at this time.  She has been compliant with her Lasix for diuresis.  Does have a right sided chest wall port for which she states was previously for a Lasix infusion.  Will need to clarify.          Nursing Notes were reviewed.    REVIEW OF SYSTEMS     CONSTITUTIONAL: Denies fever, sweats, chills.   NEURO: Endorses generalized weakness.  Denies numbness, tingling, headache.   HEENT: Denies sore throat, rhinorrhea, changes in vision.   CARDIO: Denies chest pain, palpitations.  PULM: Denies shortness of breath, cough.   GI: Denies abdominal pain, nausea, vomiting, diarrhea, constipation, melena, hematochezia.  : Denies painful urination, frequency, hematuria.   MSK: Endorses lower extremity swelling, frequent falls.  SKIN: Denies rash, lesions.   ENDOCRINE: Denies unexpected weight-loss.   HEME: Denies bleeding disorder.     PAST MEDICAL HISTORY     Past Medical History:   Diagnosis Date    Anxiety      Arthritis     Cervicalgia 02/13/2017    Neck pain, chronic    Depression     Dysphagia, unspecified 02/23/2021    Dysphagia    Encounter for immunization 12/02/2022    Encounter for immunization    Epidermal cyst 02/04/2014    Epidermal inclusion cyst    Fatty (change of) liver, not elsewhere classified 02/01/2014    Fatty liver    GERD (gastroesophageal reflux disease)     Glaucoma     Liver disease, unspecified     Liver disease, chronic    Localized swelling, mass and lump, unspecified 02/23/2021    Subcutaneous nodules    Long term (current) use of opiate analgesic 02/20/2018    Opiate analgesic use agreement exists    Long term (current) use of opiate analgesic 02/20/2018    Long term current use of opiate analgesic    Nonalcoholic steatohepatitis (HAZEL)     Steatohepatitis, nonalcoholic    Other instability, right knee 05/17/2018    Instability of right knee joint    Other specified abnormal findings of blood chemistry     Elevated LFTs    Other specified symptoms and signs involving the circulatory and respiratory systems 12/01/2015    Globus pharyngeus    Pain in right hand 03/27/2017    Pain of right hand    Pain in right knee 06/21/2018    Acute pain of right knee    Pain in throat 12/01/2015    Throat pain in adult    Pain in unspecified knee 06/28/2017    Knee pain    Pain in unspecified shoulder 03/23/2017    Shoulder pain    Personal history of diseases of the skin and subcutaneous tissue 06/20/2018    History of acne    Personal history of diseases of the skin and subcutaneous tissue 06/02/2014    History of dermatitis    Personal history of malignant neoplasm of breast     Personal history of malignant neoplasm of breast    Personal history of other diseases of the digestive system 10/13/2021    History of gastroesophageal reflux (GERD)    Personal history of other diseases of the musculoskeletal system and connective tissue     Personal history of juvenile rheumatoid arthritis    Personal history of  other diseases of the nervous system and sense organs 10/13/2021    History of sleep apnea    Personal history of other diseases of the respiratory system 01/14/2014    Personal history of asthma    Personal history of other endocrine, nutritional and metabolic disease     History of hyperlipidemia    Personal history of other infectious and parasitic diseases 11/24/2014    History of herpes zoster    Personal history of other specified conditions     History of dysuria    Personal history of peptic ulcer disease 01/14/2014    History of peptic ulcer    Personal history of pneumonia (recurrent) 01/14/2014    History of pneumonia    Personal history of traumatic brain injury 02/23/2021    History of concussion    Polyp of stomach and duodenum 01/14/2014    Gastric polyps    Polyp of stomach and duodenum 01/14/2014    Polyp of duodenum    Repeated falls 11/09/2015    Frequent falls    Secondary and unspecified malignant neoplasm of lymph node, unspecified (Multi)     Metastasis to lymph nodes    Sleep apnea     Tardive dyskinesia     Type 2 diabetes mellitus (Multi)     Urinary tract infection, site not specified 08/01/2019    Acute UTI    Urinary tract infection, site not specified 04/19/2020    Acute UTI       SURGICAL HISTORY       Past Surgical History:   Procedure Laterality Date    CARPAL TUNNEL RELEASE  01/13/2014    Neuroplasty Decompression Median Nerve At Carpal Tunnel    CATARACT EXTRACTION      CHOLECYSTECTOMY  01/13/2014    Cholecystectomy    COLONOSCOPY  01/13/2014    Colonoscopy (Fiberoptic)    CT ANGIO NECK  07/07/2023    CT NECK ANGIO W AND WO IV CONTRAST 7/7/2023 PAR CT    CT HEAD ANGIO W AND WO IV CONTRAST  07/07/2023    CT HEAD ANGIO W AND WO IV CONTRAST 7/7/2023 PAR CT    HYSTERECTOMY  10/13/2021    Hysterectomy    LIVER BIOPSY      OTHER SURGICAL HISTORY  12/17/2013    Breast Surgery Reconstruction    OTHER SURGICAL HISTORY  12/17/2013    Modified Radical Mastectomy Left Breast    OTHER SURGICAL  HISTORY  2013    Laparosc Gastric Restrictive Proc By Adjustable Gastric Band    OTHER SURGICAL HISTORY  2014    Breast Surgery Modified Radical Mastectomy    OTHER SURGICAL HISTORY  10/13/2021    Esophageal Dilation    OTHER SURGICAL HISTORY      Excision Of Lesion Face Benign 2.1 To 3cm    TOTAL KNEE ARTHROPLASTY  2014    Knee Replacement       ALLERGIES     Penicillins, Cefepime, Avelox [moxifloxacin], Bactrim [sulfamethoxazole-trimethoprim], Oxycodone, Phenergan [promethazine], Tramadol, Trintellix [vortioxetine], Adhesive tape-silicones, Cephalexin, Codeine, Erythromycin, Hydroxychloroquine, Nsaids (non-steroidal anti-inflammatory drug), and Rcpjzisd-4-me0 antimigraine agents    FAMILY HISTORY       Family History   Problem Relation Name Age of Onset    Arthritis Mother      Coronary artery disease Mother      Coronary artery disease Father      Arthritis Father      Heart failure Father      Glaucoma Father      Prostate cancer Brother      Stroke Maternal Grandmother      Dementia Maternal Grandmother      Stroke Maternal Grandfather      Dementia Maternal Grandfather      Stroke Paternal Grandmother      Hypertension Other      Diabetes Other      Asthma Other      Ulcerative colitis Other      Goiter Other      Psoriasis Other          SOCIAL HISTORY       Social History     Socioeconomic History    Marital status:    Occupational History    Occupation: collections   Tobacco Use    Smoking status: Former     Current packs/day: 0.00     Types: Cigarettes     Quit date:      Years since quittin.7    Smokeless tobacco: Never    Tobacco comments:     Only smoked for 2mon; 2 cigs a day   Vaping Use    Vaping status: Never Used   Substance and Sexual Activity    Alcohol use: Not Currently     Comment: once a month 1 glass of wine    Drug use: Yes     Types: Marijuana     Comment: 1 gummie bear per night    Sexual activity: Defer     Social Determinants of Health     Financial  Resource Strain: Low Risk  (9/16/2024)    Overall Financial Resource Strain (CARDIA)     Difficulty of Paying Living Expenses: Not very hard   Transportation Needs: No Transportation Needs (9/16/2024)    PRAPARE - Transportation     Lack of Transportation (Medical): No     Lack of Transportation (Non-Medical): No   Social Connections: Feeling Socially Integrated (9/7/2024)    OASIS : Social Isolation     Frequency of experiencing loneliness or isolation: Never   Housing Stability: Low Risk  (9/16/2024)    Housing Stability Vital Sign     Unable to Pay for Housing in the Last Year: No     Number of Times Moved in the Last Year: 1     Homeless in the Last Year: No   Recent Concern: Housing Stability - High Risk (8/23/2024)    Housing Stability Vital Sign     Unable to Pay for Housing in the Last Year: Yes     Number of Times Moved in the Last Year: 1     Homeless in the Last Year: No       PHYSICAL EXAM   VS: As documented in the triage note from today's date and EMR flowsheet were reviewed.  Gen: Well developed. No acute distress. Seated in bed. Appears nontoxic.  Alert and oriented to person time place.  Skin: Warm. Dry.  Open abdominal surgical site appears clean.  There is clear discharge suspect secondary to third spacing from patient's significant peripheral edema.  No evidence of infection or necrotizing infection.  No rashes or lesions.  Port over the right chest wall appears clean no signs of infection.  Eyes: Pupils equally round and reactive to light. Clear sclera. EOMI.  HENT: Atraumatic appearance. Mucosal membranes moist. No oral lesions, uvula midline, airway patent.  TMs clear bilaterally nares clear bilaterally no evidence of basilar skull fracture trachea is midline no midline cervical tenderness or step-offs.  CV: Regular rate and regular rhythm. S1, S2.  +2 bilateral pitting pedal edema. Warm extremities.  Resp: Nonlabored breathing Clear to auscultation bilaterally. No increased work of  breathing.   GI: Soft and nontender. No rebound or guarding.  No CVA tenderness.  MSK: Symmetric muscle bulk. No joint swelling in the extremities. Compartments are soft. Neurovascularly intact x4 extremities. Radial pulses +2 equal bilaterally.  Pedal pulses +2 equal body.  No T or L-spine tenderness or step-offs.  Pelvis is stable.  Neuro: Alert. CN II - XII intact. Speech fluent. Moving all extremities. No focal deficits. Gait normal.  Psych: Appropriate. Kempt.    DIAGNOSTIC RESULTS   RADIOLOGY:   Non-plain film images such as CT, Ultrasound and MRI are read by the radiologist. Plain radiographic images are visualized and preliminarily interpreted by the emergency physician with the below findings: Chest x-ray no evidence of pneumonia no widened mediastinum.      Interpretation per the Radiologist below, if available at the time of this note:    CT head wo IV contrast   Final Result   No acute intracranial process.                  MACRO:   None.        Signed by: Devin Delgado 9/16/2024 2:19 PM   Dictation workstation:   [id number]      CT cervical spine wo IV contrast   Final Result   No acute fracture or subluxation of the cervical spine.        Multilevel degenerative disc and facet changes of the cervical spine.        MACRO:   None.        Signed by: Devin Delgado 9/16/2024 2:21 PM   Dictation workstation:   [id number]      CT abdomen pelvis wo IV contrast   Final Result   Irregular subcutaneous tissue edema of the abdomen and pelvis.   Multiple foci of soft tissue gas in the anterior abdominal and pelvic   subcutaneous tissues anteriorly. No organized subcutaneous tissue   fluid collection.        No renal or ureteral calculi. No hydroureteronephrosis bilaterally.        Cirrhosis.        Small-moderate volume ascites with generalized mesenteric edema.        Vague round 1.1 cm low-attenuation probable cystic lesion of the   pancreatic head. Cystic neoplasm can not be excluded. Follow-up    pancreatic MRI with contrast may be considered for further   characterization.        MACRO:   None.        Signed by: Devin Delgado 9/16/2024 2:32 PM   Dictation workstation:   [id number]      Vascular US Lower Extremity Venous Duplex Bilateral         XR chest 1 view   Final Result   1.  Low inspiratory volume. No focal infiltrate.   2. Partially visualized mild contour irregularity of the left humeral   neck may be related to old trauma. Correlate with injury history and   pain in this region.                  MACRO:   None.        Signed by: Devin Delgado 9/16/2024 12:40 PM   Dictation workstation:   [id number]      NM Lung perfusion with spect    (Results Pending)         ED BEDSIDE ULTRASOUND:   Performed by ED Physician - none    LABS:  Labs Reviewed   CBC WITH AUTO DIFFERENTIAL - Abnormal       Result Value    WBC 10.1      nRBC 0.0      RBC 3.52 (*)     Hemoglobin 10.4 (*)     Hematocrit 32.4 (*)     MCV 92      MCH 29.5      MCHC 32.1      RDW 14.4      Platelets 118 (*)     Neutrophils % 76.8      Immature Granulocytes %, Automated 0.6      Lymphocytes % 11.8      Monocytes % 6.6      Eosinophils % 3.5      Basophils % 0.7      Neutrophils Absolute 7.74 (*)     Immature Granulocytes Absolute, Automated 0.06      Lymphocytes Absolute 1.19 (*)     Monocytes Absolute 0.66      Eosinophils Absolute 0.35      Basophils Absolute 0.07     COMPREHENSIVE METABOLIC PANEL - Abnormal    Glucose 131 (*)     Sodium 137      Potassium 4.3      Chloride 104      Bicarbonate 23      Anion Gap 14      Urea Nitrogen 30 (*)     Creatinine 1.92 (*)     eGFR 28 (*)     Calcium 8.0 (*)     Albumin 2.3 (*)     Alkaline Phosphatase 167 (*)     Total Protein 5.1 (*)     AST 26      Bilirubin, Total 0.4      ALT 23     MAGNESIUM - Abnormal    Magnesium 1.49 (*)    COAGULATION SCREEN - Abnormal    Protime 13.6 (*)     INR 1.2 (*)     aPTT 28      Narrative:     The APTT is no longer used for monitoring Unfractionated  Heparin Therapy. For monitoring Heparin Therapy, use the Heparin Assay.   TROPONIN I, HIGH SENSITIVITY - Normal    Troponin I, High Sensitivity 7      Narrative:     Less than 99th percentile of normal range cutoff-  Female and children under 18 years old <14 ng/L; Male <21 ng/L: Negative  Repeat testing should be performed if clinically indicated.     Female and children under 18 years old 14-50 ng/L; Male 21-50 ng/L:  Consistent with possible cardiac damage and possible increased clinical   risk. Serial measurements may help to assess extent of myocardial damage.     >50 ng/L: Consistent with cardiac damage, increased clinical risk and  myocardial infarction. Serial measurements may help assess extent of   myocardial damage.      NOTE: Children less than 1 year old may have higher baseline troponin   levels and results should be interpreted in conjunction with the overall   clinical context.     NOTE: Troponin I testing is performed using a different   testing methodology at [facility] than at other   [facility] hospitals. Direct result comparisons should only   be made within the same method.   B-TYPE NATRIURETIC PEPTIDE - Normal    BNP 22      Narrative:        <100 pg/mL - Heart failure unlikely  100-299 pg/mL - Intermediate probability of acute heart                  failure exacerbation. Correlate with clinical                  context and patient history.    >=300 pg/mL - Heart Failure likely. Correlate with clinical                  context and patient history.    BNP testing is performed using different testing methodology at [facility] than at other [facility] hospitals. Direct result comparisons should only be made within the same method.      URINALYSIS WITH REFLEX MICROSCOPIC   HEPARIN ASSAY       All other labs were within normal range or not returned as of this dictation.    EMERGENCY DEPARTMENT COURSE/MDM:   Vitals:    I reviewed the patient's triage vitals and they are patient  arrives mildly tachycardic saturating appropriately.    Due to the above findings the following was ordered basic labs to include CT imaging of the head and neck secondary to reported falls as well as CT imaging of the abdomen pelvis.    Clinically I have a very low concern for infection throughout patient's abdominoplasty site appears clean with clear fluid drainage.  There is no leukocytosis.  CT imaging did show minimal gas formation although this wound is in fact open.  I do not believe this is consistent with necrotizing infection.  As it is open and there is clear drainage will cover with antibiotics due to allergies will cover with meropenem/vancomycin.  Cardio troponin within normal range no acute injury pattern on EKG doubt atypical ACS.  Found to be mildly hypomagnesemic was replenished.  Anemia is improved from baseline.  CT imaging of the head and neck is grossly unremarkable.  All incidental findings on CT imaging of the head cervical spine abdomen pelvis thoroughly discussed with patient as well as her spouse at bedside recommended follow-up.  Duplex of the lower extremities consistent with DVT patient's neurovascular intact distally.  Decision was made for heparinization due to debridement surgery in the coming 2 days.  Patient cannot tolerate CTA to rule out PE due to GFR.  Will need nonemergent VQ scan.  I have a low concern for massive PE as patient is hemodynamically stable heparinization at this time would be the appropriate treatment.  I spoke with the admitting physician who is agreed to accept the patient I did update the in-house NP as well.  They did take over care at time of admission order.  Patient remains hemodynamically stable.  Patient neurovascularly intact bilateral lower extremities equal appropriate pulses.    Critical Care    Performed by: Joaquín White DO  Authorized by: Joaquín White DO    Critical care provider statement:     Critical care time (minutes):  48     Critical care time was exclusive of:  Separately billable procedures and treating other patients and teaching time    Critical care was necessary to treat or prevent imminent or life-threatening deterioration of the following conditions: Lower extremity DVTs requiring heparinization.    Critical care was time spent personally by me on the following activities:  Ordering and performing treatments and interventions, ordering and review of laboratory studies, ordering and review of radiographic studies, pulse oximetry, re-evaluation of patient's condition, review of old charts, examination of patient and evaluation of patient's response to treatment    Care discussed with: admitting provider          ED Course as of 09/16/24 1955   Mon Sep 16, 2024   1229 Considered CTA of the chest although patient hemodynamically stable cannot tolerate contrast secondary to GFR. [MG]   1607 Interpreted by the Emergency Department Attending: ECG revealed sinus tachycardia at a rate of 102 beats per minute with CT interval 156 , QRS of 78 , QTc of 471.  No acute injury pattern.  No previous EKG to compare. [MG]      ED Course User Index  [MG] Joaquín White DO         Diagnoses as of 09/16/24 1955   Acute deep vein thrombosis (DVT) of proximal vein of both lower extremities (Multi)   SOB (shortness of breath)       Patient was counseled regarding labs, imaging, likely diagnosis, and plan. All questions were answered.     ------------------------------------------------------------------  Information provided by the patient and EMS  Consults hospitalist  Past medical history complicating workup recent abdominoplasty with complications debridement  Previous medical records reviewed hospital admission 8/23/2024  Considered CTA of the chest although her GFR will not tolerate  Shared medical decision making patient and family appreciative of care agreeable with hospital admission for further workup and  treatment  ------------------------------------------------------------------  ED Medications administered this visit:    Medications   heparin 25,000 Units in dextrose 5% 250 mL (100 Units/mL) infusion (premix) ( intravenous Not Given 9/16/24 1515)   heparin (porcine) injection 3,000-6,000 Units (has no administration in time range)   lactated Ringer's infusion (100 mL/hr intravenous New Bag 9/16/24 1857)   magnesium sulfate 2 g in sterile water for injection 50 mL (0 g intravenous Stopped 9/16/24 1458)   heparin (porcine) injection 5,750 Units (5,750 Units intravenous Given 9/16/24 1501)   meropenem (Merrem) 1 g in sodium chloride 0.9%  mL (0 g intravenous Stopped 9/16/24 1555)   vancomycin (Vancocin) 1,000 mg in dextrose 5%  mL (0 mg intravenous Stopped 9/16/24 1930)          Final Impression:   1. Acute deep vein thrombosis (DVT) of proximal vein of both lower extremities (Multi)    2. Shortness of breath    3. SOB (shortness of breath)          Joaquín White DO    (Please note that portions of this note were completed with a voice recognition program.  Efforts were made to edit the dictations but occasionally words are mis-transcribed.)     Joaquín White DO  09/16/24 2002

## 2024-09-16 NOTE — H&P
"History Of Present Illness  Mariann Drew is a 68 y.o. female with a past medical history of abdominoplasty and gastric bypass surgery (7/30/2024), T2DM, GERD, Hx of breast cancer s/p L mastectomy, gout, tardive dyskinesia who presents to the emergency department for lower extremity swelling and generalized weakness.  Of note, patient developed fevers and purulent drainage from BÁRBARA drains 2-3 days postsurgery. She was admitted to [facility] and treated with IV antibiotics. Debridement was performed, but wound was unable to be closed and patient was placed on a wound VAC.  Patient does follow with Dr. Ying for her wound and has home health nurses who assist her with wound care management.  Patient states she has been experiencing bilateral lower extremity swelling for \"about 20 days\" that started in just her legs, but has now spread to her feet.  She states she has never had this type of leg swelling before, and states they are tender to the touch.  She also states she has been having trouble walking and has fallen \"multiple times.\"  She states she is also experiencing generalized weakness.  She states that 4 days ago, she began to feel short of breath as well.  She denies fever/chills, chest pain, nausea/vomiting, diarrhea, URI symptoms, urinary symptoms, numbness/tingling.  Denies history of DVT/PE, tobacco use.  She did have a recent surgery on 7/30/2024.  She states her abdominal wound is \"doing well,\" without any surrounding erythema or pain.  She does states she has noticed pale yellow drainage coming from the wound.  Per record review, patient has been on Invanz.  The patient tells me she has not been taking this antibiotic, because she states she has been \"out of it (the medicine).\"  Patient also states she has been taking Lasix every day, though she does not feel like it is helping.    ED course: On arrival, patient's /65, heart rate 100, respirations 20, pulse ox 97%.  Labs and imaging performed, " revealing creatinine 1.92 and BUN 30 (1.59 and 29 on 9/10), magnesium 1.49.  BNP 22.  Initial troponin 7.  No leukocytosis.  Hemoglobin 10.4.  Platelets 118.  Imaging shows mild diastasis rectus with mild midline protrusion of the abdominal contents, foci of soft tissue gas also seen throughout the anterior subcutaneous tissues along with overlying bandage artifact.  Background 1.1 cm low-attenuation probable cystic lesion of the pancreatic head-cystic neoplasm cannot be excluded.  Vascular ultrasound lower extremities shows bilateral DVTs.  Patient given IV heparin, magnesium,  Merrem, vancomycin in the ED. patient to be admitted inpatient under Dr. Venegas. Patient to be admitted inpatient under Dr. Venegas.      Past Medical History  Past Medical History:   Diagnosis Date    Anxiety     Arthritis     Cervicalgia 02/13/2017    Neck pain, chronic    Depression     Dysphagia, unspecified 02/23/2021    Dysphagia    Encounter for immunization 12/02/2022    Encounter for immunization    Epidermal cyst 02/04/2014    Epidermal inclusion cyst    Fatty (change of) liver, not elsewhere classified 02/01/2014    Fatty liver    GERD (gastroesophageal reflux disease)     Glaucoma     Liver disease, unspecified     Liver disease, chronic    Localized swelling, mass and lump, unspecified 02/23/2021    Subcutaneous nodules    Long term (current) use of opiate analgesic 02/20/2018    Opiate analgesic use agreement exists    Long term (current) use of opiate analgesic 02/20/2018    Long term current use of opiate analgesic    Nonalcoholic steatohepatitis (HAZEL)     Steatohepatitis, nonalcoholic    Other instability, right knee 05/17/2018    Instability of right knee joint    Other specified abnormal findings of blood chemistry     Elevated LFTs    Other specified symptoms and signs involving the circulatory and respiratory systems 12/01/2015    Globus pharyngeus    Pain in right hand 03/27/2017    Pain of right hand    Pain in right knee  06/21/2018    Acute pain of right knee    Pain in throat 12/01/2015    Throat pain in adult    Pain in unspecified knee 06/28/2017    Knee pain    Pain in unspecified shoulder 03/23/2017    Shoulder pain    Personal history of diseases of the skin and subcutaneous tissue 06/20/2018    History of acne    Personal history of diseases of the skin and subcutaneous tissue 06/02/2014    History of dermatitis    Personal history of malignant neoplasm of breast     Personal history of malignant neoplasm of breast    Personal history of other diseases of the digestive system 10/13/2021    History of gastroesophageal reflux (GERD)    Personal history of other diseases of the musculoskeletal system and connective tissue     Personal history of juvenile rheumatoid arthritis    Personal history of other diseases of the nervous system and sense organs 10/13/2021    History of sleep apnea    Personal history of other diseases of the respiratory system 01/14/2014    Personal history of asthma    Personal history of other endocrine, nutritional and metabolic disease     History of hyperlipidemia    Personal history of other infectious and parasitic diseases 11/24/2014    History of herpes zoster    Personal history of other specified conditions     History of dysuria    Personal history of peptic ulcer disease 01/14/2014    History of peptic ulcer    Personal history of pneumonia (recurrent) 01/14/2014    History of pneumonia    Personal history of traumatic brain injury 02/23/2021    History of concussion    Polyp of stomach and duodenum 01/14/2014    Gastric polyps    Polyp of stomach and duodenum 01/14/2014    Polyp of duodenum    Repeated falls 11/09/2015    Frequent falls    Secondary and unspecified malignant neoplasm of lymph node, unspecified (Multi)     Metastasis to lymph nodes    Sleep apnea     Tardive dyskinesia     Type 2 diabetes mellitus (Multi)     Urinary tract infection, site not specified 08/01/2019    Acute UTI     Urinary tract infection, site not specified 04/19/2020    Acute UTI       Surgical History  Past Surgical History:   Procedure Laterality Date    CARPAL TUNNEL RELEASE  01/13/2014    Neuroplasty Decompression Median Nerve At Carpal Tunnel    CATARACT EXTRACTION      CHOLECYSTECTOMY  01/13/2014    Cholecystectomy    COLONOSCOPY  01/13/2014    Colonoscopy (Fiberoptic)    CT ANGIO NECK  07/07/2023    CT NECK ANGIO W AND WO IV CONTRAST 7/7/2023 PAR CT    CT HEAD ANGIO W AND WO IV CONTRAST  07/07/2023    CT HEAD ANGIO W AND WO IV CONTRAST 7/7/2023 PAR CT    HYSTERECTOMY  10/13/2021    Hysterectomy    LIVER BIOPSY      OTHER SURGICAL HISTORY  12/17/2013    Breast Surgery Reconstruction    OTHER SURGICAL HISTORY  12/17/2013    Modified Radical Mastectomy Left Breast    OTHER SURGICAL HISTORY  12/17/2013    Laparosc Gastric Restrictive Proc By Adjustable Gastric Band    OTHER SURGICAL HISTORY  01/13/2014    Breast Surgery Modified Radical Mastectomy    OTHER SURGICAL HISTORY  10/13/2021    Esophageal Dilation    OTHER SURGICAL HISTORY      Excision Of Lesion Face Benign 2.1 To 3cm    TOTAL KNEE ARTHROPLASTY  04/22/2014    Knee Replacement        Social History  She reports that she quit smoking about 51 years ago. Her smoking use included cigarettes. She has never used smokeless tobacco. She reports that she does not currently use alcohol. She reports current drug use. Drug: Marijuana.    Family History  Family History   Problem Relation Name Age of Onset    Arthritis Mother      Coronary artery disease Mother      Coronary artery disease Father      Arthritis Father      Heart failure Father      Glaucoma Father      Prostate cancer Brother      Stroke Maternal Grandmother      Dementia Maternal Grandmother      Stroke Maternal Grandfather      Dementia Maternal Grandfather      Stroke Paternal Grandmother      Hypertension Other      Diabetes Other      Asthma Other      Ulcerative colitis Other      Goiter Other       Psoriasis Other          Allergies  Penicillins, Cefepime, Avelox [moxifloxacin], Bactrim [sulfamethoxazole-trimethoprim], Oxycodone, Phenergan [promethazine], Tramadol, Trintellix [vortioxetine], Adhesive tape-silicones, Cephalexin, Codeine, Erythromycin, Hydroxychloroquine, Nsaids (non-steroidal anti-inflammatory drug), and Ebtfqixl-1-ep4 antimigraine agents    Review of Systems   Constitutional:  Positive for activity change and fever. Negative for chills.   HENT:  Negative for congestion, rhinorrhea, sinus pressure and sore throat.    Respiratory:  Positive for shortness of breath. Negative for cough and wheezing.    Cardiovascular:  Positive for leg swelling. Negative for chest pain and palpitations.   Gastrointestinal:  Negative for abdominal distention, abdominal pain, diarrhea, nausea and vomiting.   Genitourinary:  Negative for difficulty urinating, dysuria and urgency.   Neurological:  Positive for weakness. Negative for dizziness and numbness.        Physical Exam  Constitutional:       General: She is not in acute distress.     Appearance: She is not ill-appearing.   HENT:      Head: Normocephalic and atraumatic.      Nose: Nose normal.      Mouth/Throat:      Mouth: Mucous membranes are moist.      Pharynx: Oropharynx is clear.   Eyes:      Extraocular Movements: Extraocular movements intact.      Conjunctiva/sclera: Conjunctivae normal.      Pupils: Pupils are equal, round, and reactive to light.   Cardiovascular:      Rate and Rhythm: Regular rhythm. Tachycardia present.      Pulses: Normal pulses.   Pulmonary:      Effort: Pulmonary effort is normal.      Breath sounds: Normal breath sounds.   Abdominal:      Comments: Abdominal wound VAC in place   Musculoskeletal:      Cervical back: Normal range of motion.      Right lower leg: Edema (3+ pitting edema) present.      Left lower leg: Edema (3+ pitting edema) present.   Skin:     General: Skin is warm and dry.      Findings: No erythema.  "  Neurological:      General: No focal deficit present.      Mental Status: She is alert and oriented to person, place, and time.      Motor: Weakness present.   Psychiatric:         Mood and Affect: Mood normal.         Behavior: Behavior normal.          Last Recorded Vitals  Blood pressure 100/52, pulse (!) 108, resp. rate 20, height 1.626 m (5' 4\"), weight 72.6 kg (160 lb), SpO2 100%.    Relevant Results    Scheduled medications  vancomycin, 1,000 mg, intravenous, Once      Continuous medications  heparin, 0-4,500 Units/hr, Last Rate: 1,300 Units/hr (09/16/24 1504)      PRN medications  PRN medications: heparin    Results for orders placed or performed during the hospital encounter of 09/16/24 (from the past 24 hour(s))   CBC and Auto Differential   Result Value Ref Range    WBC 10.1 4.4 - 11.3 x10*3/uL    nRBC 0.0 0.0 - 0.0 /100 WBCs    RBC 3.52 (L) 4.00 - 5.20 x10*6/uL    Hemoglobin 10.4 (L) 12.0 - 16.0 g/dL    Hematocrit 32.4 (L) 36.0 - 46.0 %    MCV 92 80 - 100 fL    MCH 29.5 26.0 - 34.0 pg    MCHC 32.1 32.0 - 36.0 g/dL    RDW 14.4 11.5 - 14.5 %    Platelets 118 (L) 150 - 450 x10*3/uL    Neutrophils % 76.8 40.0 - 80.0 %    Immature Granulocytes %, Automated 0.6 0.0 - 0.9 %    Lymphocytes % 11.8 13.0 - 44.0 %    Monocytes % 6.6 2.0 - 10.0 %    Eosinophils % 3.5 0.0 - 6.0 %    Basophils % 0.7 0.0 - 2.0 %    Neutrophils Absolute 7.74 (H) 1.20 - 7.70 x10*3/uL    Immature Granulocytes Absolute, Automated 0.06 0.00 - 0.70 x10*3/uL    Lymphocytes Absolute 1.19 (L) 1.20 - 4.80 x10*3/uL    Monocytes Absolute 0.66 0.10 - 1.00 x10*3/uL    Eosinophils Absolute 0.35 0.00 - 0.70 x10*3/uL    Basophils Absolute 0.07 0.00 - 0.10 x10*3/uL   Comprehensive Metabolic Panel   Result Value Ref Range    Glucose 131 (H) 74 - 99 mg/dL    Sodium 137 136 - 145 mmol/L    Potassium 4.3 3.5 - 5.3 mmol/L    Chloride 104 98 - 107 mmol/L    Bicarbonate 23 21 - 32 mmol/L    Anion Gap 14 10 - 20 mmol/L    Urea Nitrogen 30 (H) 6 - 23 mg/dL    " Creatinine 1.92 (H) 0.50 - 1.05 mg/dL    eGFR 28 (L) >60 mL/min/1.73m*2    Calcium 8.0 (L) 8.6 - 10.3 mg/dL    Albumin 2.3 (L) 3.4 - 5.0 g/dL    Alkaline Phosphatase 167 (H) 33 - 136 U/L    Total Protein 5.1 (L) 6.4 - 8.2 g/dL    AST 26 9 - 39 U/L    Bilirubin, Total 0.4 0.0 - 1.2 mg/dL    ALT 23 7 - 45 U/L   Magnesium   Result Value Ref Range    Magnesium 1.49 (L) 1.60 - 2.40 mg/dL   Troponin I, High Sensitivity   Result Value Ref Range    Troponin I, High Sensitivity 7 0 - 13 ng/L   B-type natriuretic peptide   Result Value Ref Range    BNP 22 0 - 99 pg/mL   Coagulation Screen   Result Value Ref Range    Protime 13.6 (H) 9.8 - 12.8 seconds    INR 1.2 (H) 0.9 - 1.1    aPTT 28 27 - 38 seconds     CT abdomen pelvis wo IV contrast    Result Date: 9/16/2024  Interpreted By:  Devin Delgado, STUDY: CT ABDOMEN PELVIS WO IV CONTRAST;  9/16/2024 1:59 pm   INDICATION: Signs/Symptoms:Rule out any significant deep tissue infection mostly clear drainage from previous abdominoplasty.     COMPARISON: 08/08/2024.   ACCESSION NUMBER(S): [id number]   ORDERING CLINICIAN: GABRIELLA ALTMAN   TECHNIQUE: CT of the abdomen and pelvis was performed. No contrast was administered. Coronal and sagittal reformations were made.   FINDINGS: There is some motion artifact as well as streak artifact related to patient's arms not being raised for the scan.   LOWER CHEST: Bibasilar mild irregular predominantly dependent atelectasis/scarring is present. Left breast implant again seen along with surgical clips along the left chest wall.   ABDOMEN:   LIVER: Cirrhotic morphology of the liver again seen with irregularity of the hepatic surface contour. No focal hepatic lesion identified.   BILE DUCTS: Nondilated.   GALLBLADDER: Surgically absent.   PANCREAS: There is some pancreatic parenchymal atrophy similar to prior with insinuating fat throughout the gland. There is a vague round approximate 1.1 cm low-attenuation probable cystic lesion of the  pancreatic head   SPLEEN: Spleen is not significantly enlarged.   ADRENAL GLANDS: Within normal limits.   KIDNEYS AND URETERS: No renal or ureteral calculi are seen bilaterally.  No hydroureteronephrosis bilaterally. Left renal lower pole lateral partially exophytic 3.7 cm cyst is again seen. Additional previously visualized smaller renal cysts are less conspicuous on noncontrast exam.   Urinary bladder is partially distended. No bladder calculi. Scattered small pelvic phleboliths are present.   VESSELS: Scattered small atherosclerotic calcifications are seen in the aorta and branch vessels. No aortic aneurysm. Unenhanced IVC is unremarkable.   BOWEL: Postoperative changes of the stomach with suture line along the greater curvature again seen. No bowel obstruction. Appendix not identified.   No appreciable significant diverticular disease.   PERITONEUM/RETROPERITONEUM/LYMPH NODES: Small-moderate volume ascites is present along with mild generalized mesenteric and omental edema. No free intraperitoneal air.   No retroperitoneal fluid collection or lymphadenopathy.       ABDOMINAL WALL: There is mild diastasis rectus with mild midline protrusion of the abdominal contents. Irregular subcutaneous tissue edema is seen throughout the abdomen and pelvis. Foci of soft tissue gas are also seen throughout the anterior subcutaneous tissues along with overlying bandage artifact.   BONE AND SOFT TISSUE: Thoracolumbar mild levocurvature may be partially exaggerated by positioning. Facet arthrosis is greatest in the lower lumbar spine. There is joint space narrowing and marginal spurring of both hips.   Right gluteal region subcutaneous tissue stimulator device is seen with associated lead extending through a right mid sacral foramen.       Irregular subcutaneous tissue edema of the abdomen and pelvis. Multiple foci of soft tissue gas in the anterior abdominal and pelvic subcutaneous tissues anteriorly. No organized subcutaneous  tissue fluid collection.   No renal or ureteral calculi. No hydroureteronephrosis bilaterally.   Cirrhosis.   Small-moderate volume ascites with generalized mesenteric edema.   Vague round 1.1 cm low-attenuation probable cystic lesion of the pancreatic head. Cystic neoplasm can not be excluded. Follow-up pancreatic MRI with contrast may be considered for further characterization.   MACRO: None.   Signed by: Devin Delgado 9/16/2024 2:32 PM Dictation workstation:   [id number]    CT cervical spine wo IV contrast    Result Date: 9/16/2024  Interpreted By:  Devin Delgado, STUDY: CT CERVICAL SPINE WO IV CONTRAST;  9/16/2024 1:59 pm   INDICATION: Signs/Symptoms:fall.     COMPARISON: None.   ACCESSION NUMBER(S): [id number]   ORDERING CLINICIAN: GABRIELLA ALTMAN   TECHNIQUE: Contiguous unenhanced axial images were obtained through the cervical spine with coronal and sagittal reformations of the axial data.   FINDINGS: ALIGNMENT: The craniocervical junction appears intact.  The dens and atlantoaxial relation appear intact with regional irregular marginal spurring. There is mild reversal of the cervical lordosis which may be exaggerated by positioning and/or spasm.   VERTEBRAE/DISC SPACES: Multilevel disc space narrowing and endplate spurring is most prominent at C4-5, C5-6 and C6-7. Mild facet arthrosis is present bilaterally throughout the cervical spine as well. No acute fracture, subluxation, or compression deformity is seen.   ADDITIONAL FINDINGS: Prevertebral soft tissues are not thickened.   Surgical clips are seen in the left lower neck posterolateral soft tissues.       No acute fracture or subluxation of the cervical spine.   Multilevel degenerative disc and facet changes of the cervical spine.   MACRO: None.   Signed by: Devin Delgado 9/16/2024 2:21 PM Dictation workstation:   [id number]    CT head wo IV contrast    Result Date: 9/16/2024  Interpreted By:  Devin Delgado, STUDY: CT HEAD WO IV  CONTRAST;  9/16/2024 1:59 pm   INDICATION: Signs/Symptoms:fall.     COMPARISON: 08/26/2024.   ACCESSION NUMBER(S): [id number]   ORDERING CLINICIAN: GABRIELLA ALTMAN   TECHNIQUE: Contiguous unenhanced axial images were obtained through the brain.   FINDINGS: INTRACRANIAL: No acute intracranial bleed, midline shift, or mass effect is seen. Gray-white differentiation is maintained. No extra-axial fluid collection or hydrocephalus. Left basal ganglia small dystrophic calcification is again seen.   Bones are intact.   EXTRACRANIAL: Visualized paranasal sinuses and mastoids are clear.       No acute intracranial process.       MACRO: None.   Signed by: Devin Delgado 9/16/2024 2:19 PM Dictation workstation:   [id number]    XR chest 1 view    Result Date: 9/16/2024  Interpreted By:  Devin Delgado, STUDY: XR CHEST 1 VIEW;  9/16/2024 11:44 am   INDICATION: Signs/Symptoms:fall.     COMPARISON: None.   ACCESSION NUMBER(S): [id number]   ORDERING CLINICIAN: GABRIELLA ALTMAN   FINDINGS: CARDIOMEDIASTINAL SILHOUETTE: Right jugular central line has tip at the SVC level. Cardiac silhouette is borderline in size and may be partially exaggerated by low inspiratory volume.   LUNGS: Inspiratory volume is low. No focal infiltrate. No definite pleural effusion. No pneumothorax is seen.   ABDOMEN: Upper abdominal surgical clips are present.   BONES: Thoracic mild dextrocurvature may be partially exaggerated by positioning. There is partial visualization of degenerative changes of the shoulders as well as a left humeral head surgical anchor. Mild contour irregularity of the left humeral neck may be related to old trauma. Left axillary region surgical clips are also noted.       1.  Low inspiratory volume. No focal infiltrate. 2. Partially visualized mild contour irregularity of the left humeral neck may be related to old trauma. Correlate with injury history and pain in this region.       MACRO: None.   Signed by: Devin  Sfiligoj 9/16/2024 12:40 PM Dictation workstation:   [id number]    Vascular US Lower Extremity Venous Duplex Bilateral    Result Date: 9/16/2024  Preliminary Cardiology Report          [facility] [address] [place] [address] Tel [phone] and Fax [phone]          Preliminary Vascular Lab Report  VASC US LOWER EXTREMITY VENOUS DUPLEX BILATERAL  Patient Name:      JOSÉ LUIS DEVINE Reading Physician:  [id number] Andrew Day MD, RPVI Study Date:        9/16/2024      Ordering Physician: [id number] JOAQUÍN WHITE MRN/PID:           [id number]       Technologist:       Verito Logan RVT Accession#:        [id number]   Technologist 2: Date of Birth/Age: 1955     Encounter#:         [id number] Gender:            F Admission Status:  Emergency      Location Performed: [facility]  Diagnosis/ICD: Shortness of breath-R06.02 Indication:    Shortness of Breath Procedure/CPT: 45950 Peripheral venous duplex scan for DVT complete  Patient History: Gastric Bypass                  Abdominoplasty.  **CRITICAL RESULT** Critical Result: DVT BLE Notification called to Joaquín White DO on 9/16/2024 at 12:24:59 PM by VIRGILIO Logan.  PRELIMINARY CONCLUSIONS: Right Lower Venous: There is acute non-occlusive deep vein thrombosis visualized in the distal external iliac, common femoral, profunda and proximal femoral veins. Additional Findings; Soft tissue edema is noted within the calf and Popliteal segments of the RLE. Left Lower Venous: There is acute non-occlusive deep vein thrombosis visualized in the distal external iliac, common femoral and proximal femoral veins. Additional Findings; Soft tissue edema is noted within the left calf and Popliteal segments.  Imaging & Doppler Findings:  Right                 Compressible      Thrombus              Flow Distal External Iliac   Partial    Acute non-occlusive Spontaneous/Phasic CFV                     Partial    Acute non-occlusive     Continuous PFV                      Partial    Acute non-occlusive FV Proximal             Partial    Acute non-occlusive     Continuous FV Mid                    Yes             None FV Distal                 Yes             None Popliteal                 Yes             None         Spontaneous/Phasic  Left                  Compress      Thrombus              Flow Distal External Iliac Partial  Acute non-occlusive     Continuous CFV                   Partial  Acute non-occlusive Spontaneous/Phasic PFV                     Yes           None FV Proximal           Partial  Acute non-occlusive Spontaneous/Phasic FV Mid                  Yes           None FV Distal               Yes           None Popliteal               Yes           None         Spontaneous/Phasic VASCULAR PRELIMINARY REPORT completed by Verito Logan RVT on 9/16/2024 at 12:29:04 PM  ** Final **         Assessment/Plan   Assessment & Plan  Acute deep vein thrombosis (DVT) of proximal vein of both lower extremities (Multi)    Deep vein thrombosis, bilateral lower extremities  BLE edema  -Vascular surgery consult and recommendations appreciated  -V/Q scan ordered due to patient's RUSSELL (pt experiencing shortness of breath and tachycardia)  -Heparin drip initiated in the ED, continue    Abdominal wound  Hx of abdominoplasty and gastric bypass surgery (7/30/2024)  Hypoalbuminemia  -Infectious disease consult and reccomendations appreciated, pt follows with Dr. Ying  -CT imaging results as above  -Pt inititated on Merrem and vancomycin in the ED, will continue. Further abx adjustments per ID  -Per [facility] note, patient is to have wound vac changed every Monday and Thursday. Wound vac setting is 125 mmHg, irrigate with saline prior to placing. Black granufoam dressing  -Wound prevention techniques    Acute kidney injury  -Cr 1.92 today, 1.59 on 9/10  -BMP in the a.m.  -Will hold diuresis for now due to RUSSELL. Also holding on fluids due to pitting edema in  BLE    Hypomagnesemia  -Magnesium 1.49 today, replacement ordered in the ED  -Monitor with BMP in the a.m.    Thrombocytopenia  -Platelets 118 today, appears chronic  -Monitor with CBC in the AM    Cystic lesion of pancreatic head on CT imaging  -Cystic neoplasm cannot be excluded, per radiologist  -Follow-up pancreatic MRI with contrast may be considered for further characterization    History of falls  -PT/OT eval and treat  -Fall precautions    T2DM  -Sliding scale insulin  -Diabetic diet, hypoglycemic protocol    DVT Prophylaxis  -Heparin  -SCDs      I spent 60 minutes in the professional and overall care of this patient.      Naty Terrell PA-C

## 2024-09-16 NOTE — PROGRESS NOTES
Pharmacy Medication History Review    Mariann Drew is a 68 y.o. female admitted for No Principal Problem: There is no principal problem currently on the Problem List. Please update the Problem List and refresh.. Pharmacy reviewed the patient's [id number] medications and allergies for accuracy.    The list below reflectives the updated PTA list. Please review each medication in order reconciliation for additional clarification and justification.  Prior to Admission medications    Medication Sig Start Date End Date Authorizing Provider   0.9 % sodium chloride (sodium chloride, PF, 0.9%) injection Infuse 10 mL into a venous catheter early in the morning.. MEDICATION ADMINISTRATION: FLUSH WITH 10 MLS NORMAL SALINE BEFORE AND AFTER EACH DOSE OF MEDICATION, THEN FLUSH WITH 5 MLS 10 UNITS PER ML HEPARIN FLUSH.   LABS: 10 MLS NORMAL SALINE FLUSH BEFORE LAB DRAW, FOLLOWED BY 20 MLS NORMAL SALINE AFTER LAB DRAW OBTAINED, THEN FLUSH WITH 5 MLS 10 UNITS PER ML HEPARIN FLUSH.   Historical Provider, MD   acetaminophen (Tylenol) 325 mg tablet Take 2 tablets (650 mg) by mouth every 4 hours if needed for mild pain (1 - 3), moderate pain (4 - 6), headaches or fever (temp greater than 38.0 C).   Afshan Faustin MD   allopurinol (Zyloprim) 100 mg tablet Take 1 tablet (100 mg) by mouth once daily.   Afshan Faustin MD   cyclobenzaprine (Flexeril) 5 mg tablet Take 1 tablet (5 mg) by mouth 3 times a day as needed for muscle spasms.   Afshan Faustin MD   deutetrabenazine (Austedo XR) 24 mg tablet extended release 24 hr Take 1 tablet (24 mg) by mouth once daily. Take 1-6mg tablet and 1-24mg tablet for a total dose of 30mg daily.   Olivia Herrera MD   deutetrabenazine (Austedo XR) 6 mg tablet extended release 24 hr Take 1 tablet (6 mg) by mouth once daily. Take 1-6mg tablet and 1-24mg tablet for a total dose of 30mg daily.   Olivia Herrera MD   dexAMETHasone 0.5 mg/5 mL oral  liquid Take 5 mL (0.5 mg) by mouth 4 times a day.   Afshan Faustin MD   DULoxetine (Cymbalta) 60 mg DR capsule Take 1 capsule (60 mg) by mouth 2 times a day. Do not crush or chew.   Historical Provider, MD   ertapenem (INVanz) 1 gram injection Infuse 0.5 g (500 mg) into a venous catheter once daily for 14 days. Obtain weekly labs: BMP and CBC. Fax to Dr. Ying at [phone]. 9/11/24 9/25/24 Antonia Ying MD   famotidine (Pepcid) 20 mg tablet Take 1 tablet (20 mg) by mouth once daily in the morning.   Historical Provider, MD   ferrous sulfate (FEOSOL ORAL) Take 1 tablet by mouth once daily in the evening. 200 (65 Fe) MG   Historical Provider, MD   fludrocortisone (Florinef) 0.1 mg tablet Take 1 tablet (0.1 mg) by mouth once daily.   Afshan Faustin MD   furosemide (Lasix) 40 mg tablet Take 1 tablet (40 mg) by mouth once daily.   Brooklynn Shepherd MD PhD   heparin sodium,porcine/PF (HEPARIN, PORCINE, LOCK FLUSH IV) Infuse 50 Units into a venous catheter early in the morning. MEDICATION ADMINISTRATION: FLUSH WITH 10 MLS NORMAL SALINE BEFORE AND AFTER EACH DOSE OF MEDICATION, THEN FLUSH WITH 5 MLS 10 UNITS PER ML HEPARIN FLUSH.   LABS: 10 MLS NORMAL SALINE FLUSH BEFORE LAB DRAW, FOLLOWED BY 20 MLS NORMAL SALINE AFTER LAB DRAW OBTAINED, THEN FLUSH WITH 5 MLS 10 UNITS PER ML HEPARIN FLUSH.     Indications: temporary redness of face and neck   Historical Provider, MD   lamoTRIgine (LaMICtal) 200 mg tablet Take 1 tablet (200 mg) by mouth once daily in the morning.   Historical Provider, MD   multivitamin tablet Take 1 tablet by mouth once daily.   Historical Provider, MD   naratriptan (Amerge) 2.5 mg tablet Take 1 tablet (2.5 mg) by mouth 1 time if needed for migraine. May repeat once in 4hrs if headache recurs   Olivia Self, DO   nystatin (Nyamyc) 100,000 unit/gram powder Apply topically 2 times a day. to affected area   Afshan Faustin MD   pantoprazole (ProtoNix) 20  mg EC tablet Take 1 tablet (20 mg) by mouth once daily. Do not crush, chew, or split.   Historical Provider, MD   potassium chloride CR (Klor-Con M20) 20 mEq ER tablet Take 1 tablet (20 mEq) by mouth once daily. Do not crush or chew. Do not fill before September 6, 2024.   Brooklynn Shepherd MD PhD   sucralfate (Carafate) 1 gram tablet Take 1 tablet (1 g) by mouth once daily at bedtime.   Afshan Faustin MD   traZODone (Desyrel) 100 mg tablet Take 1.5 tablets (150 mg) by mouth once daily at bedtime.   Historical Provider, MD   Xdemvy 0.25 % drops Administer 1 drop into both eyes once daily at bedtime.   Historical Provider, MD        The list below reflectives the updated allergy list. Please review each documented allergy for additional clarification and justification.  Allergies  Reviewed by Jenny Thompson RN on 9/16/2024        Severity Reactions Comments    Penicillins High Anaphylaxis, Hives     Cefepime Medium Hives     Avelox [moxifloxacin] Not Specified Unknown     Bactrim [sulfamethoxazole-trimethoprim] Not Specified Unknown     Oxycodone Not Specified Unknown     Phenergan [promethazine] Not Specified Unknown     Tramadol Not Specified Unknown     Trintellix [vortioxetine] Not Specified Unknown     Adhesive Tape-silicones Low Rash     Cephalexin Low Hives     Codeine Low Rash     Erythromycin Low Hives     Hydroxychloroquine Low Itching     Nsaids (non-steroidal Anti-inflammatory Drug) Low Nausea/vomiting     [id number] Antimigraine Agents Low Palpitations             Below are additional concerns with the patient's PTA list.      Janay Luong

## 2024-09-16 NOTE — ASSESSMENT & PLAN NOTE
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 Ying  -CT imaging results as above  -Pt inititated on Merrem and vancomycin in the ED, will continue. Further abx adjustments per ID  -Per [facility] note, patient is to have wound vac changed every Monday and Thursday. Wound vac setting is 125 mmHg, irrigate with saline prior to placing. Black granufoam dressing  -Wound prevention techniques    Acute kidney injury  -Cr 1.92 today, 1.59 on 9/10  -BMP in the a.m.  -Will hold diuresis for now due to RUSSELL. Also holding on fluids due to pitting edema in BLE    Hypomagnesemia  -Magnesium 1.49 today, replacement ordered in the ED  -Monitor with BMP in the a.m.    Thrombocytopenia  -Platelets 118 today, appears chronic  -Monitor with CBC in the AM    Cystic lesion of pancreatic head on CT imaging  -Cystic neoplasm cannot be excluded, per radiologist  -Follow-up pancreatic MRI with contrast may be considered for further characterization    History of falls  -PT/OT eval and treat  -Fall precautions    T2DM  -Sliding scale insulin  -Diabetic diet, hypoglycemic protocol    DVT Prophylaxis  -Heparin  -SCDs

## 2024-09-17 ENCOUNTER — HOME INFUSION (OUTPATIENT)
Dept: INFUSION THERAPY | Age: 69
End: 2024-09-17

## 2024-09-17 ENCOUNTER — APPOINTMENT (OUTPATIENT)
Dept: RADIOLOGY | Facility: HOSPITAL | Age: 69
DRG: 299 | End: 2024-09-17
Payer: MEDICARE

## 2024-09-17 LAB
ANION GAP SERPL CALC-SCNC: 16 MMOL/L (ref 10–20)
APPEARANCE UR: CLEAR
BILIRUB UR STRIP.AUTO-MCNC: NEGATIVE MG/DL
BUN SERPL-MCNC: 32 MG/DL (ref 6–23)
CALCIUM SERPL-MCNC: 8.1 MG/DL (ref 8.6–10.3)
CHLORIDE SERPL-SCNC: 102 MMOL/L (ref 98–107)
CO2 SERPL-SCNC: 22 MMOL/L (ref 21–32)
COLOR UR: YELLOW
CREAT SERPL-MCNC: 2.21 MG/DL (ref 0.5–1.05)
EGFRCR SERPLBLD CKD-EPI 2021: 24 ML/MIN/1.73M*2
ERYTHROCYTE [DISTWIDTH] IN BLOOD BY AUTOMATED COUNT: 14.4 % (ref 11.5–14.5)
GLUCOSE BLD MANUAL STRIP-MCNC: 110 MG/DL (ref 74–99)
GLUCOSE BLD MANUAL STRIP-MCNC: 119 MG/DL (ref 74–99)
GLUCOSE BLD MANUAL STRIP-MCNC: 123 MG/DL (ref 74–99)
GLUCOSE BLD MANUAL STRIP-MCNC: 134 MG/DL (ref 74–99)
GLUCOSE BLD MANUAL STRIP-MCNC: 142 MG/DL (ref 74–99)
GLUCOSE SERPL-MCNC: 130 MG/DL (ref 74–99)
GLUCOSE UR STRIP.AUTO-MCNC: NORMAL MG/DL
HCT VFR BLD AUTO: 32.7 % (ref 36–46)
HGB BLD-MCNC: 10 G/DL (ref 12–16)
HYALINE CASTS #/AREA URNS AUTO: ABNORMAL /LPF
KETONES UR STRIP.AUTO-MCNC: NEGATIVE MG/DL
LEUKOCYTE ESTERASE UR QL STRIP.AUTO: ABNORMAL
MCH RBC QN AUTO: 29.3 PG (ref 26–34)
MCHC RBC AUTO-ENTMCNC: 30.6 G/DL (ref 32–36)
MCV RBC AUTO: 96 FL (ref 80–100)
MUCOUS THREADS #/AREA URNS AUTO: ABNORMAL /LPF
NITRITE UR QL STRIP.AUTO: NEGATIVE
NRBC BLD-RTO: 0 /100 WBCS (ref 0–0)
PH UR STRIP.AUTO: 5 [PH]
PLATELET # BLD AUTO: 100 X10*3/UL (ref 150–450)
POTASSIUM SERPL-SCNC: 4.7 MMOL/L (ref 3.5–5.3)
PROT UR STRIP.AUTO-MCNC: NEGATIVE MG/DL
RBC # BLD AUTO: 3.41 X10*6/UL (ref 4–5.2)
RBC # UR STRIP.AUTO: ABNORMAL /UL
RBC #/AREA URNS AUTO: ABNORMAL /HPF
SODIUM SERPL-SCNC: 135 MMOL/L (ref 136–145)
SP GR UR STRIP.AUTO: 1.02
SQUAMOUS #/AREA URNS AUTO: ABNORMAL /HPF
UFH PPP CHRO-ACNC: 0.5 IU/ML
UFH PPP CHRO-ACNC: 0.7 IU/ML
UFH PPP CHRO-ACNC: 0.9 IU/ML
UROBILINOGEN UR STRIP.AUTO-MCNC: NORMAL MG/DL
WBC # BLD AUTO: 8.8 X10*3/UL (ref 4.4–11.3)
WBC #/AREA URNS AUTO: ABNORMAL /HPF

## 2024-09-17 PROCEDURE — 2500000001 HC RX 250 WO HCPCS SELF ADMINISTERED DRUGS (ALT 637 FOR MEDICARE OP)

## 2024-09-17 PROCEDURE — 1200000002 HC GENERAL ROOM WITH TELEMETRY DAILY

## 2024-09-17 PROCEDURE — 81001 URINALYSIS AUTO W/SCOPE: CPT | Performed by: STUDENT IN AN ORGANIZED HEALTH CARE EDUCATION/TRAINING PROGRAM

## 2024-09-17 PROCEDURE — 2500000005 HC RX 250 GENERAL PHARMACY W/O HCPCS: Performed by: INTERNAL MEDICINE

## 2024-09-17 PROCEDURE — 2500000004 HC RX 250 GENERAL PHARMACY W/ HCPCS (ALT 636 FOR OP/ED): Performed by: INTERNAL MEDICINE

## 2024-09-17 PROCEDURE — 85027 COMPLETE CBC AUTOMATED: CPT

## 2024-09-17 PROCEDURE — 82947 ASSAY GLUCOSE BLOOD QUANT: CPT

## 2024-09-17 PROCEDURE — 3430000001 HC RX 343 DIAGNOSTIC RADIOPHARMACEUTICALS: Performed by: STUDENT IN AN ORGANIZED HEALTH CARE EDUCATION/TRAINING PROGRAM

## 2024-09-17 PROCEDURE — A9540 TC99M MAA: HCPCS | Performed by: STUDENT IN AN ORGANIZED HEALTH CARE EDUCATION/TRAINING PROGRAM

## 2024-09-17 PROCEDURE — 85520 HEPARIN ASSAY: CPT | Performed by: STUDENT IN AN ORGANIZED HEALTH CARE EDUCATION/TRAINING PROGRAM

## 2024-09-17 PROCEDURE — 84100 ASSAY OF PHOSPHORUS: CPT | Performed by: INTERNAL MEDICINE

## 2024-09-17 PROCEDURE — 83735 ASSAY OF MAGNESIUM: CPT | Performed by: INTERNAL MEDICINE

## 2024-09-17 PROCEDURE — 80048 BASIC METABOLIC PNL TOTAL CA: CPT

## 2024-09-17 PROCEDURE — 78803 RP LOCLZJ TUM SPECT 1 AREA: CPT | Performed by: STUDENT IN AN ORGANIZED HEALTH CARE EDUCATION/TRAINING PROGRAM

## 2024-09-17 PROCEDURE — 99223 1ST HOSP IP/OBS HIGH 75: CPT | Performed by: STUDENT IN AN ORGANIZED HEALTH CARE EDUCATION/TRAINING PROGRAM

## 2024-09-17 PROCEDURE — 99222 1ST HOSP IP/OBS MODERATE 55: CPT | Performed by: NURSE PRACTITIONER

## 2024-09-17 PROCEDURE — 2500000004 HC RX 250 GENERAL PHARMACY W/ HCPCS (ALT 636 FOR OP/ED): Performed by: STUDENT IN AN ORGANIZED HEALTH CARE EDUCATION/TRAINING PROGRAM

## 2024-09-17 PROCEDURE — 2500000004 HC RX 250 GENERAL PHARMACY W/ HCPCS (ALT 636 FOR OP/ED)

## 2024-09-17 PROCEDURE — 78803 RP LOCLZJ TUM SPECT 1 AREA: CPT

## 2024-09-17 RX ADMIN — KIT FOR THE PREPARATION OF TECHNETIUM TC 99M ALBUMIN AGGREGATED 4.4 MILLICURIE: 2.5 INJECTION, POWDER, FOR SOLUTION INTRAVENOUS at 08:05

## 2024-09-17 RX ADMIN — SODIUM CHLORIDE, POTASSIUM CHLORIDE, SODIUM LACTATE AND CALCIUM CHLORIDE 100 ML/HR: 600; 310; 30; 20 INJECTION, SOLUTION INTRAVENOUS at 06:00

## 2024-09-17 RX ADMIN — DEXAMETHASONE 0.5 MG: 1 TABLET ORAL at 21:27

## 2024-09-17 RX ADMIN — DEXAMETHASONE 0.5 MG: 1 TABLET ORAL at 06:06

## 2024-09-17 RX ADMIN — PANTOPRAZOLE SODIUM 40 MG: 40 TABLET, DELAYED RELEASE ORAL at 06:06

## 2024-09-17 RX ADMIN — SUCRALFATE 1 G: 1 TABLET ORAL at 01:22

## 2024-09-17 RX ADMIN — ERTAPENEM SODIUM 500 MG: 1 INJECTION, POWDER, LYOPHILIZED, FOR SOLUTION INTRAMUSCULAR; INTRAVENOUS at 10:49

## 2024-09-17 RX ADMIN — DEXAMETHASONE 0.5 MG: 1 TABLET ORAL at 17:44

## 2024-09-17 RX ADMIN — DEXAMETHASONE 0.5 MG: 1 TABLET ORAL at 12:26

## 2024-09-17 RX ADMIN — NYSTATIN: 100000 POWDER TOPICAL at 21:31

## 2024-09-17 RX ADMIN — SUCRALFATE 1 G: 1 TABLET ORAL at 23:30

## 2024-09-17 RX ADMIN — ALLOPURINOL 100 MG: 100 TABLET ORAL at 09:38

## 2024-09-17 RX ADMIN — MEROPENEM 1 G: 1 INJECTION, POWDER, FOR SOLUTION INTRAVENOUS at 06:00

## 2024-09-17 RX ADMIN — FLUDROCORTISONE ACETATE 0.1 MG: 0.1 TABLET ORAL at 09:38

## 2024-09-17 RX ADMIN — HEPARIN SODIUM 900 UNITS/HR: 10000 INJECTION, SOLUTION INTRAVENOUS at 16:32

## 2024-09-17 RX ADMIN — LAMOTRIGINE 200 MG: 100 TABLET ORAL at 09:38

## 2024-09-17 RX ADMIN — TRAZODONE HYDROCHLORIDE 150 MG: 50 TABLET ORAL at 21:27

## 2024-09-17 RX ADMIN — ACETAMINOPHEN 650 MG: 325 TABLET ORAL at 09:38

## 2024-09-17 ASSESSMENT — ENCOUNTER SYMPTOMS
SORE THROAT: 0
ABDOMINAL DISTENTION: 0
WEAKNESS: 1
ABDOMINAL PAIN: 0
WHEEZING: 0
DIZZINESS: 0
NAUSEA: 0
VOMITING: 0
CHILLS: 0
PALPITATIONS: 0
FEVER: 1
DIARRHEA: 0
COUGH: 0
SHORTNESS OF BREATH: 1
SINUS PRESSURE: 0
RHINORRHEA: 0
DIFFICULTY URINATING: 0
DYSURIA: 0
ACTIVITY CHANGE: 1
NUMBNESS: 0

## 2024-09-17 ASSESSMENT — COGNITIVE AND FUNCTIONAL STATUS - GENERAL
MOVING FROM LYING ON BACK TO SITTING ON SIDE OF FLAT BED WITH BEDRAILS: A LITTLE
TURNING FROM BACK TO SIDE WHILE IN FLAT BAD: A LITTLE
HELP NEEDED FOR BATHING: A LITTLE
WALKING IN HOSPITAL ROOM: A LOT
CLIMB 3 TO 5 STEPS WITH RAILING: A LOT
MOVING TO AND FROM BED TO CHAIR: A LITTLE
PERSONAL GROOMING: A LITTLE
DRESSING REGULAR UPPER BODY CLOTHING: A LITTLE
MOVING FROM LYING ON BACK TO SITTING ON SIDE OF FLAT BED WITH BEDRAILS: A LITTLE
DRESSING REGULAR LOWER BODY CLOTHING: A LITTLE
TURNING FROM BACK TO SIDE WHILE IN FLAT BAD: A LITTLE
CLIMB 3 TO 5 STEPS WITH RAILING: A LOT
STANDING UP FROM CHAIR USING ARMS: A LITTLE
MOVING TO AND FROM BED TO CHAIR: A LITTLE
MOBILITY SCORE: 16
WALKING IN HOSPITAL ROOM: A LOT
STANDING UP FROM CHAIR USING ARMS: A LITTLE
DAILY ACTIVITIY SCORE: 19
MOBILITY SCORE: 16
TOILETING: A LITTLE

## 2024-09-17 ASSESSMENT — PAIN SCALES - GENERAL
PAINLEVEL_OUTOF10: 2
PAINLEVEL_OUTOF10: 0 - NO PAIN

## 2024-09-17 ASSESSMENT — PAIN - FUNCTIONAL ASSESSMENT: PAIN_FUNCTIONAL_ASSESSMENT: 0-10

## 2024-09-17 ASSESSMENT — PAIN DESCRIPTION - LOCATION: LOCATION: ANKLE

## 2024-09-17 NOTE — CONSULTS
"Consults  Vascular Surgery  Reason For Consult  Bilateral DVTs    History Of Present Illness  Mariann Drew is a 68 y.o. female presenting with  a past medical history of abdominoplasty and gastric bypass surgery (7/30/2024), T2DM, GERD, Hx of breast cancer s/p L mastectomy, gout, tardive dyskinesia who presents to the emergency department for lower extremity swelling and generalized weakness.  Of note, patient developed fevers and purulent drainage from BÁRBARA drains 2-3 days postsurgery. She was admitted to [facility] and treated with IV antibiotics. Debridement was performed, but wound was unable to be closed and patient was placed on a wound VAC.  Patient does follow with Dr. Ying for her wound and has home health nurses who assist her with wound care management.  Patient states she has been experiencing bilateral lower extremity swelling for \"about 40 days\" that started in just her legs, but has now spread to her feet.  She states she has never had this type of leg swelling before, and states they are tender to the touch.  She also states she has been having trouble walking and has fallen \"multiple times.\"  She states she is also experiencing generalized weakness.  She states that 4 days ago, she began to feel short of breath as well.  She denies fever/chills, chest pain, nausea/vomiting, diarrhea, URI symptoms, urinary symptoms, numbness/tingling.  Denies history of DVT/PE, tobacco use.  She did have a recent surgery on 7/30/2024.  She states her abdominal wound is \"doing well,\" without any surrounding erythema or pain.  She does states she has noticed pale yellow drainage coming from the wound.  Per record review, patient has been on Invanz.  The patient tells me she has not been taking this antibiotic, because she states she has been \"out of it (the medicine).\"  Patient also states she has been taking Lasix every day, though she does not feel like it is helping.  I evaluated patient at bedside and did " discuss Patient case with Dr. Bobo.  Patient information obtained from chart review and interview with patient.     Past Medical History  She has a past medical history of Anxiety, Arthritis, Cervicalgia (02/13/2017), Depression, Dysphagia, unspecified (02/23/2021), Encounter for immunization (12/02/2022), Epidermal cyst (02/04/2014), Fatty (change of) liver, not elsewhere classified (02/01/2014), GERD (gastroesophageal reflux disease), Glaucoma, Liver disease, unspecified, Localized swelling, mass and lump, unspecified (02/23/2021), Long term (current) use of opiate analgesic (02/20/2018), Long term (current) use of opiate analgesic (02/20/2018), Nonalcoholic steatohepatitis (HAZEL), Other instability, right knee (05/17/2018), Other specified abnormal findings of blood chemistry, Other specified symptoms and signs involving the circulatory and respiratory systems (12/01/2015), Pain in right hand (03/27/2017), Pain in right knee (06/21/2018), Pain in throat (12/01/2015), Pain in unspecified knee (06/28/2017), Pain in unspecified shoulder (03/23/2017), Personal history of diseases of the skin and subcutaneous tissue (06/20/2018), Personal history of diseases of the skin and subcutaneous tissue (06/02/2014), Personal history of malignant neoplasm of breast, Personal history of other diseases of the digestive system (10/13/2021), Personal history of other diseases of the musculoskeletal system and connective tissue, Personal history of other diseases of the nervous system and sense organs (10/13/2021), Personal history of other diseases of the respiratory system (01/14/2014), Personal history of other endocrine, nutritional and metabolic disease, Personal history of other infectious and parasitic diseases (11/24/2014), Personal history of other specified conditions, Personal history of peptic ulcer disease (01/14/2014), Personal history of pneumonia (recurrent) (01/14/2014), Personal history of traumatic brain  injury (02/23/2021), Polyp of stomach and duodenum (01/14/2014), Polyp of stomach and duodenum (01/14/2014), Repeated falls (11/09/2015), Secondary and unspecified malignant neoplasm of lymph node, unspecified (Multi), Sleep apnea, Tardive dyskinesia, Type 2 diabetes mellitus (Multi), Urinary tract infection, site not specified (08/01/2019), and Urinary tract infection, site not specified (04/19/2020).    She has no past medical history of Scoliosis.    Surgical History  She has a past surgical history that includes Other surgical history (12/17/2013); Other surgical history (12/17/2013); Other surgical history (12/17/2013); Total knee arthroplasty (04/22/2014); Other surgical history (01/13/2014); Carpal tunnel release (01/13/2014); Colonoscopy (01/13/2014); Cholecystectomy (01/13/2014); Other surgical history (10/13/2021); Other surgical history; Hysterectomy (10/13/2021); Liver biopsy; CT angio head w and wo IV contrast (07/07/2023); CT angio neck (07/07/2023); and Cataract extraction.     Social History  She reports that she quit smoking about 51 years ago. Her smoking use included cigarettes. She has never used smokeless tobacco. She reports that she does not currently use alcohol. She reports current drug use. Drug: Marijuana.    Family History  Family History   Problem Relation Name Age of Onset    Arthritis Mother      Coronary artery disease Mother      Coronary artery disease Father      Arthritis Father      Heart failure Father      Glaucoma Father      Prostate cancer Brother      Stroke Maternal Grandmother      Dementia Maternal Grandmother      Stroke Maternal Grandfather      Dementia Maternal Grandfather      Stroke Paternal Grandmother      Hypertension Other      Diabetes Other      Asthma Other      Ulcerative colitis Other      Goiter Other      Psoriasis Other          Allergies  Penicillins, Cefepime, Avelox [moxifloxacin], Bactrim [sulfamethoxazole-trimethoprim], Oxycodone, Phenergan  "[promethazine], Tramadol, Trintellix [vortioxetine], Adhesive tape-silicones, Cephalexin, Codeine, Erythromycin, Hydroxychloroquine, Nsaids (non-steroidal anti-inflammatory drug), and [id number] antimigraine agents    Review of Systems A 10 point ROS was performed with the patient denying any complaint at this time aside from those listed in the HPI above.     Physical Exam  Constitutional: Well developed , awake/alert/oriented x3, in no distress,  Eyes: Clear sclera  ENMT: mucous membranes are moist, no apparent injury, no lesions seen,   Head/neck: Neck supple, trachea  is midline, no apparent injury, no bruits, no mass, no stridor  Respiratory/thorax: Breath sounds clear and equal bilaterally slightly diminished throughout, thorax symmetric  Cardiac/Vascular: Regular, rate and rhythm, no murmurs, 2+ radial pulses, palpable bilateral DP and PTs, sensory neuro intact  Gastrointestinal: Nondistended , wound VAC dressing in place positive bowel sounds, no bruits.  Musculoskeletal: Moves all extremities, limited range of motion , no joint swelling,   Extremities: No cyanosis, no contusions or wounds, bilateral lower extremity edema +2  Neurological: Alert and oriented x3,   Lymphatic: No significant lymphadenopathy  Skin: Warm and dry, no lesions, no rashes  Psychological: Appropriate mood and behavior  Last Recorded Vitals  Blood pressure 95/61, pulse 95, temperature 36.3 °C (97.3 °F), resp. rate 18, height 1.626 m (5' 4\"), weight 72.6 kg (160 lb), SpO2 100%.       Results for orders placed or performed during the hospital encounter of 09/16/24 (from the past 24 hour(s))   Heparin Assay, UFH   Result Value Ref Range    Heparin Unfractionated 1.4 (HH) See Comment Below for Therapeutic Ranges IU/mL   Heparin Assay, UFH   Result Value Ref Range    Heparin Unfractionated 0.9 See Comment Below for Therapeutic Ranges IU/mL   POCT GLUCOSE   Result Value Ref Range    POCT Glucose 119 (H) 74 - 99 mg/dL   CBC   Result " Value Ref Range    WBC 8.8 4.4 - 11.3 x10*3/uL    nRBC 0.0 0.0 - 0.0 /100 WBCs    RBC 3.41 (L) 4.00 - 5.20 x10*6/uL    Hemoglobin 10.0 (L) 12.0 - 16.0 g/dL    Hematocrit 32.7 (L) 36.0 - 46.0 %    MCV 96 80 - 100 fL    MCH 29.3 26.0 - 34.0 pg    MCHC 30.6 (L) 32.0 - 36.0 g/dL    RDW 14.4 11.5 - 14.5 %    Platelets 100 (L) 150 - 450 x10*3/uL   Basic metabolic panel   Result Value Ref Range    Glucose 130 (H) 74 - 99 mg/dL    Sodium 135 (L) 136 - 145 mmol/L    Potassium 4.7 3.5 - 5.3 mmol/L    Chloride 102 98 - 107 mmol/L    Bicarbonate 22 21 - 32 mmol/L    Anion Gap 16 10 - 20 mmol/L    Urea Nitrogen 32 (H) 6 - 23 mg/dL    Creatinine 2.21 (H) 0.50 - 1.05 mg/dL    eGFR 24 (L) >60 mL/min/1.73m*2    Calcium 8.1 (L) 8.6 - 10.3 mg/dL   Heparin Assay   Result Value Ref Range    Heparin Unfractionated 0.7 See Comment Below for Therapeutic Ranges IU/mL   POCT GLUCOSE   Result Value Ref Range    POCT Glucose 110 (H) 74 - 99 mg/dL   Urinalysis with Reflex Microscopic   Result Value Ref Range    Color, Urine Yellow Light-Yellow, Yellow, Dark-Yellow    Appearance, Urine Clear Clear    Specific Gravity, Urine 1.017 1.005 - 1.035    pH, Urine 5.0 5.0, 5.5, 6.0, 6.5, 7.0, 7.5, 8.0    Protein, Urine NEGATIVE NEGATIVE, 10 (TRACE), 20 (TRACE) mg/dL    Glucose, Urine Normal Normal mg/dL    Blood, Urine 0.06 (1+) (A) NEGATIVE    Ketones, Urine NEGATIVE NEGATIVE mg/dL    Bilirubin, Urine NEGATIVE NEGATIVE    Urobilinogen, Urine Normal Normal mg/dL    Nitrite, Urine NEGATIVE NEGATIVE    Leukocyte Esterase, Urine 25 Melisa/µL (A) NEGATIVE   Microscopic Only, Urine   Result Value Ref Range    WBC, Urine 1-5 1-5, NONE /HPF    RBC, Urine 6-10 (A) NONE, 1-2, 3-5 /HPF    Squamous Epithelial Cells, Urine 1-9 (SPARSE) Reference range not established. /HPF    Mucus, Urine FEW Reference range not established. /LPF    Hyaline Casts, Urine 4+ (A) NONE /LPF   Heparin Assay   Result Value Ref Range    Heparin Unfractionated 0.9 See Comment Below for  Therapeutic Ranges IU/mL   POCT GLUCOSE   Result Value Ref Range    POCT Glucose 134 (H) 74 - 99 mg/dL       NM Lung perfusion with spect    Result Date: 9/17/2024  Interpreted By:  Beny Braxton and Kaur Arashdeep STUDY: NM LUNG PERFUSION WITH SPECT;  9/17/2024 9:06 am   INDICATION: Signs/Symptoms:Shortness of breath, bilateral DVTs.  <ICD-10 Codes - EPIC>   COMPARISON: Chest x-ray from 09/16/2024.   ACCESSION NUMBER(S): [id number]   ORDERING CLINICIAN: BRETT ZAMORANO   TECHNIQUE: DIVISION OF NUCLEAR MEDICINE PERFUSION LUNG SCANS   Multiple perfusion images of the lungs were acquired after the intravenous administration of 4.4 mCi of Tc-99m macroaggregated albumin (MAA). In addition, SPECT  of the chest was performed.   FINDINGS: Perfusion images of both lungs demonstrate mild heterogeneity throughout the lung fields bilaterally.   No wedge-shaped subsegmental or segmental perfusion defect throughout bilateral lung fields to suggest acute pulmonary embolism (low probability).       1. No wedge-shaped subsegmental or segmental perfusion defect seen on SPECT to suggest acute pulmonary embolism (low probability).   The interpretation above is based on modified PIOPED II and PISAPED criteria.   I personally reviewed the images/study and I agree with the findings as stated by Qamar Sinha MD.  This study was interpreted at [facility], [place], [place].   MACRO: None   Signed by: Beny Braxton 9/17/2024 11:37 AM Dictation workstation:   [id number]    ECG 12 lead    Result Date: 9/17/2024  Sinus tachycardia Inferior infarct , age undetermined Anterolateral infarct , age undetermined Abnormal ECG    CT abdomen pelvis wo IV contrast    Result Date: 9/16/2024  Interpreted By:  Devin Delgado, STUDY: CT ABDOMEN PELVIS WO IV CONTRAST;  9/16/2024 1:59 pm   INDICATION: Signs/Symptoms:Rule out any significant deep tissue infection mostly clear drainage from previous abdominoplasty.     COMPARISON:  08/08/2024.   ACCESSION NUMBER(S): [id number]   ORDERING CLINICIAN: GABRIELLA ALTMAN   TECHNIQUE: CT of the abdomen and pelvis was performed. No contrast was administered. Coronal and sagittal reformations were made.   FINDINGS: There is some motion artifact as well as streak artifact related to patient's arms not being raised for the scan.   LOWER CHEST: Bibasilar mild irregular predominantly dependent atelectasis/scarring is present. Left breast implant again seen along with surgical clips along the left chest wall.   ABDOMEN:   LIVER: Cirrhotic morphology of the liver again seen with irregularity of the hepatic surface contour. No focal hepatic lesion identified.   BILE DUCTS: Nondilated.   GALLBLADDER: Surgically absent.   PANCREAS: There is some pancreatic parenchymal atrophy similar to prior with insinuating fat throughout the gland. There is a vague round approximate 1.1 cm low-attenuation probable cystic lesion of the pancreatic head   SPLEEN: Spleen is not significantly enlarged.   ADRENAL GLANDS: Within normal limits.   KIDNEYS AND URETERS: No renal or ureteral calculi are seen bilaterally.  No hydroureteronephrosis bilaterally. Left renal lower pole lateral partially exophytic 3.7 cm cyst is again seen. Additional previously visualized smaller renal cysts are less conspicuous on noncontrast exam.   Urinary bladder is partially distended. No bladder calculi. Scattered small pelvic phleboliths are present.   VESSELS: Scattered small atherosclerotic calcifications are seen in the aorta and branch vessels. No aortic aneurysm. Unenhanced IVC is unremarkable.   BOWEL: Postoperative changes of the stomach with suture line along the greater curvature again seen. No bowel obstruction. Appendix not identified.   No appreciable significant diverticular disease.   PERITONEUM/RETROPERITONEUM/LYMPH NODES: Small-moderate volume ascites is present along with mild generalized mesenteric and omental edema. No free  intraperitoneal air.   No retroperitoneal fluid collection or lymphadenopathy.       ABDOMINAL WALL: There is mild diastasis rectus with mild midline protrusion of the abdominal contents. Irregular subcutaneous tissue edema is seen throughout the abdomen and pelvis. Foci of soft tissue gas are also seen throughout the anterior subcutaneous tissues along with overlying bandage artifact.   BONE AND SOFT TISSUE: Thoracolumbar mild levocurvature may be partially exaggerated by positioning. Facet arthrosis is greatest in the lower lumbar spine. There is joint space narrowing and marginal spurring of both hips.   Right gluteal region subcutaneous tissue stimulator device is seen with associated lead extending through a right mid sacral foramen.       Irregular subcutaneous tissue edema of the abdomen and pelvis. Multiple foci of soft tissue gas in the anterior abdominal and pelvic subcutaneous tissues anteriorly. No organized subcutaneous tissue fluid collection.   No renal or ureteral calculi. No hydroureteronephrosis bilaterally.   Cirrhosis.   Small-moderate volume ascites with generalized mesenteric edema.   Vague round 1.1 cm low-attenuation probable cystic lesion of the pancreatic head. Cystic neoplasm can not be excluded. Follow-up pancreatic MRI with contrast may be considered for further characterization.   MACRO: None.   Signed by: Devin Delgado 9/16/2024 2:32 PM Dictation workstation:   [id number]    CT cervical spine wo IV contrast    Result Date: 9/16/2024  Interpreted By:  Devin Delgado, STUDY: CT CERVICAL SPINE WO IV CONTRAST;  9/16/2024 1:59 pm   INDICATION: Signs/Symptoms:fall.     COMPARISON: None.   ACCESSION NUMBER(S): [id number]   ORDERING CLINICIAN: GABRIELLA ALTMAN   TECHNIQUE: Contiguous unenhanced axial images were obtained through the cervical spine with coronal and sagittal reformations of the axial data.   FINDINGS: ALIGNMENT: The craniocervical junction appears intact.  The dens  and atlantoaxial relation appear intact with regional irregular marginal spurring. There is mild reversal of the cervical lordosis which may be exaggerated by positioning and/or spasm.   VERTEBRAE/DISC SPACES: Multilevel disc space narrowing and endplate spurring is most prominent at C4-5, C5-6 and C6-7. Mild facet arthrosis is present bilaterally throughout the cervical spine as well. No acute fracture, subluxation, or compression deformity is seen.   ADDITIONAL FINDINGS: Prevertebral soft tissues are not thickened.   Surgical clips are seen in the left lower neck posterolateral soft tissues.       No acute fracture or subluxation of the cervical spine.   Multilevel degenerative disc and facet changes of the cervical spine.   MACRO: None.   Signed by: Devin Delgado 9/16/2024 2:21 PM Dictation workstation:   [id number]    CT head wo IV contrast    Result Date: 9/16/2024  Interpreted By:  Devin Delgado, STUDY: CT HEAD WO IV CONTRAST;  9/16/2024 1:59 pm   INDICATION: Signs/Symptoms:fall.     COMPARISON: 08/26/2024.   ACCESSION NUMBER(S): [id number]   ORDERING CLINICIAN: GABRIELLA ALTMAN   TECHNIQUE: Contiguous unenhanced axial images were obtained through the brain.   FINDINGS: INTRACRANIAL: No acute intracranial bleed, midline shift, or mass effect is seen. Gray-white differentiation is maintained. No extra-axial fluid collection or hydrocephalus. Left basal ganglia small dystrophic calcification is again seen.   Bones are intact.   EXTRACRANIAL: Visualized paranasal sinuses and mastoids are clear.       No acute intracranial process.       MACRO: None.   Signed by: Devin Delgado 9/16/2024 2:19 PM Dictation workstation:   [id number]    XR chest 1 view    Result Date: 9/16/2024  Interpreted By:  Devin Delgado, STUDY: XR CHEST 1 VIEW;  9/16/2024 11:44 am   INDICATION: Signs/Symptoms:fall.     COMPARISON: None.   ACCESSION NUMBER(S): [id number]   ORDERING CLINICIAN: GABRIELLA ALTMAN   FINDINGS:  CARDIOMEDIASTINAL SILHOUETTE: Right jugular central line has tip at the SVC level. Cardiac silhouette is borderline in size and may be partially exaggerated by low inspiratory volume.   LUNGS: Inspiratory volume is low. No focal infiltrate. No definite pleural effusion. No pneumothorax is seen.   ABDOMEN: Upper abdominal surgical clips are present.   BONES: Thoracic mild dextrocurvature may be partially exaggerated by positioning. There is partial visualization of degenerative changes of the shoulders as well as a left humeral head surgical anchor. Mild contour irregularity of the left humeral neck may be related to old trauma. Left axillary region surgical clips are also noted.       1.  Low inspiratory volume. No focal infiltrate. 2. Partially visualized mild contour irregularity of the left humeral neck may be related to old trauma. Correlate with injury history and pain in this region.       MACRO: None.   Signed by: Devin Delgado 9/16/2024 12:40 PM Dictation workstation:   [id number]    Vascular US Lower Extremity Venous Duplex Bilateral    Result Date: 9/16/2024  Preliminary Cardiology Report          [place] [address] Tel [phone] and Fax [phone]          Preliminary Vascular Lab Report  VASC US LOWER EXTREMITY VENOUS DUPLEX BILATERAL  Patient Name:      JOSÉ LUIS REYNOLDS SYBIL Reading Physician:  [id number] Andrew Day MD, RPVI Study Date:        9/16/2024      Ordering Physician: [id number] ALTMAN MRN/PID:           [id number]       Technologist:       Verito Logan RVNETTIE Accession#:        [id number]   Technologist 2: Date of Birth/Age: 1955     Encounter#:         [id number] Gender:            F Admission Status:  Emergency      Location Performed: [facility]  Diagnosis/ICD: Shortness of breath-R06.02 Indication:    Shortness of Breath Procedure/CPT: 46780 Peripheral venous duplex scan for DVT complete  Patient History: Gastric Bypass                   Abdominoplasty.  **CRITICAL RESULT** Critical Result: DVT BLE Notification called to Joaquín White DO on 9/16/2024 at 12:24:59 PM by VIRGILIO Logan.  PRELIMINARY CONCLUSIONS: Right Lower Venous: There is acute non-occlusive deep vein thrombosis visualized in the distal external iliac, common femoral, profunda and proximal femoral veins. Additional Findings; Soft tissue edema is noted within the calf and Popliteal segments of the RLE. Left Lower Venous: There is acute non-occlusive deep vein thrombosis visualized in the distal external iliac, common femoral and proximal femoral veins. Additional Findings; Soft tissue edema is noted within the left calf and Popliteal segments.  Imaging & Doppler Findings:  Right                 Compressible      Thrombus              Flow Distal External Iliac   Partial    Acute non-occlusive Spontaneous/Phasic CFV                     Partial    Acute non-occlusive     Continuous PFV                     Partial    Acute non-occlusive FV Proximal             Partial    Acute non-occlusive     Continuous FV Mid                    Yes             None FV Distal                 Yes             None Popliteal                 Yes             None         Spontaneous/Phasic  Left                  Compress      Thrombus              Flow Distal External Iliac Partial  Acute non-occlusive     Continuous CFV                   Partial  Acute non-occlusive Spontaneous/Phasic PFV                     Yes           None FV Proximal           Partial  Acute non-occlusive Spontaneous/Phasic FV Mid                  Yes           None FV Distal               Yes           None Popliteal               Yes           None         Spontaneous/Phasic VASCULAR PRELIMINARY REPORT completed by Verito Logan RVNETTIE on 9/16/2024 at 12:29:04 PM  ** Final **     Lower extremity venous duplex bilateral    Result Date: 8/26/2024           [facility] [address] [place] [address] Tel  [phone] and Fax [phone]  Vascular Lab Report VASC US LOWER EXTREMITY VENOUS DUPLEX BILATERAL  Patient Name:      JOSÉ LUIS DEVINE        Reading Physician:  [id number] Tyler Velásquez MD Study Date:        8/26/2024             Ordering Physician: [id number] EDYTA CARBALLO MRN/PID:           [id number]              Technologist:       Princess Mendez RVT Accession#:        [id number]          Technologist 2: Date of Birth/Age: 1955 / 68 years Encounter#:         [id number] Gender:            F Admission Status:  Inpatient             Location Performed: [facility]  Diagnosis/ICD: Other specified soft tissue disorders-M79.89 Indication:    Limb swelling CPT Codes:     79675 Peripheral venous duplex scan for DVT complete  CONCLUSIONS: Right Lower Venous: No evidence of acute deep vein thrombus visualized in the right lower extremity. Cannot rule out thrombus in non-visualized posterior tibial and Peroneal veins due to edema. Left Lower Venous: No evidence of acute deep vein thrombus visualized in the left lower extremity. Cannot rule out thrombus in non-visualized peroneal vein due to edema.  Imaging & Doppler Findings:  Right                 Compressible Thrombus        Flow Distal External Iliac     Yes        None   Spontaneous/Phasic CFV                       Yes        None   Spontaneous/Phasic PFV                       Yes        None FV Proximal               Yes        None   Spontaneous/Phasic FV Mid                    Yes        None FV Distal                 Yes        None Popliteal                 Yes        None   Spontaneous/Phasic  Left                  Compress Thrombus        Flow Distal External Iliac   Yes      None   Spontaneous/Phasic CFV                     Yes      None   Spontaneous/Phasic PFV                      Yes      None FV Proximal             Yes      None   Spontaneous/Phasic FV Mid                  Yes      None FV Distal               Yes      None Popliteal               Yes      None   Spontaneous/Phasic PTV                     Yes      None  [id number] Tyler Velásquez MD Electronically signed by [id number] Tyler Velásquez MD on 8/26/2024 at 5:11:48 PM  ** Final **     CT head wo IV contrast    Result Date: 8/26/2024  Interpreted By:  Nunu Garcia, STUDY: CT HEAD WO IV CONTRAST; ;  8/26/2024 12:38 pm   INDICATION: Signs/Symptoms:confusion.   COMPARISON: 07/07/2023   ACCESSION NUMBER(S): [id number]   ORDERING CLINICIAN: EDYTA CARBALLO   TECHNIQUE: Serial axial images of the head were obtained without intravenous contrast. Sagittal and coronal reconstructions were generated.   FINDINGS: The ventricles are midline and normal in size.   There are no acute parenchymal abnormalities.   There is no hemorrhage or extra-axial fluid.   There is no obvious scalp hematoma or skull fracture.   Paranasal sinuses and mastoids are unremarkable. The patient appears to be status post bilateral cataract surgery.   COMPARISON OF FINDINGS: The brain is similar.       No acute intracranial abnormality     MACRO: none   Signed by: Nunu Garcia 8/26/2024 12:52 PM Dictation workstation:   [id number]    Assessment/Plan  1. Acute deep vein thrombosis (DVT) of proximal vein of both lower extremities (Multi)        2. Shortness of breath  Vascular US Lower Extremity Venous Duplex Bilateral      3. SOB (shortness of breath)             Images studied nuclear medicine lung perfusion negative for PE, patient positive for bilateral nonocclusive external iliac femoral vein and superficial femoral vein DVT.  Discussed patient case with Dr. Bobo.  No emergent surgical intervention or endovascular intervention planned.  Due to patient's infectious process over the last couple months as well as immobility probably the provoking factors  for DVTs.  Patient to continue on anticoagulation infusion may change to oral anticoagulation tomorrow if no plans for procedure or surgery.  Continue to monitor for bleeding.  Continue elevation of bilateral lower extremities to reduce venous congestion and pressure.  Patient will need compression stockings after discharge.  Patient will need anticoagulation therapy for minimum 6 months with repeat venous duplex.  Will continue to observe.    Thank you very much for allowing Vascular Surgery to be involved in the care of your patient sincerely Daniel ROBLES .  (This note was generated with voice recognition software and may contain errors including spelling, grammar, syntax and missed recognition of what was dictated, of which may not have been fully corrected)

## 2024-09-17 NOTE — PROGRESS NOTES
Patient admitted to [facility] on 9/16 with increased weakness and DVT  May to go SNF at discharge

## 2024-09-17 NOTE — CARE PLAN
The patient's goals for the shift include      The clinical goals for the shift include comfort      Problem: Safety - Adult  Goal: Free from fall injury  Outcome: Progressing     Problem: Discharge Planning  Goal: Discharge to home or other facility with appropriate resources  Outcome: Progressing     Problem: Chronic Conditions and Co-morbidities  Goal: Patient's chronic conditions and co-morbidity symptoms are monitored and maintained or improved  Outcome: Progressing     Problem: Skin  Goal: Decreased wound size/increased tissue granulation at next dressing change  Outcome: Progressing  Flowsheets (Taken 9/17/2024 0036 by Kateryna Merchant RN)  Decreased wound size/increased tissue granulation at next dressing change: Promote sleep for wound healing     Problem: Skin  Goal: Decreased wound size/increased tissue granulation at next dressing change  Outcome: Progressing  Flowsheets (Taken 9/17/2024 0036 by Kateryna Merchant RN)  Decreased wound size/increased tissue granulation at next dressing change: Promote sleep for wound healing     Problem: Skin  Goal: Prevent/manage excess moisture  Outcome: Progressing  Flowsheets (Taken 9/17/2024 0036 by Kateryna Merchant RN)  Prevent/manage excess moisture: Cleanse incontinence/protect with barrier cream     Problem: Skin  Goal: Promote/optimize nutrition  Outcome: Progressing  Flowsheets (Taken 9/17/2024 0036 by Kateryna Merchant RN)  Promote/optimize nutrition: Consume > 50% meals/supplements

## 2024-09-17 NOTE — PROGRESS NOTES
Occupational Therapy                 Therapy Communication Note    Patient Name: Mariann Drew  MRN: [id number]  Department: [facility], [place] [facility]  Room: [facility]/932-  Today's Date: 9/17/2024     Discipline: Occupational Therapy    Missed Visit Reason: Missed Visit Reason:  (patient with BLE DVTs, heparin restarted. will reattempt as able/medically appropriate)    Missed Time: Attempt

## 2024-09-17 NOTE — H&P
"History Of Present Illness  Mariann Drew is a 68 y.o. female with a past medical history of abdominoplasty and gastric bypass surgery (7/30/2024), T2DM, GERD, Hx of breast cancer s/p L mastectomy, gout, tardive dyskinesia who presents to the emergency department for lower extremity swelling and generalized weakness.  Of note, patient developed fevers and purulent drainage from BÁRBARA drains 2-3 days postsurgery. She was admitted to [facility] and treated with IV antibiotics. Debridement was performed, but wound was unable to be closed and patient was placed on a wound VAC.  Patient does follow with Dr. Ying for her wound and has home health nurses who assist her with wound care management.  Patient states she has been experiencing bilateral lower extremity swelling for \"about 20 days\" that started in just her legs, but has now spread to her feet.  She states she has never had this type of leg swelling before, and states they are tender to the touch.  She also states she has been having trouble walking and has fallen \"multiple times.\"  She states she is also experiencing generalized weakness.  She states that 4 days ago, she began to feel short of breath as well.  She denies fever/chills, chest pain, nausea/vomiting, diarrhea, URI symptoms, urinary symptoms, numbness/tingling.  Denies history of DVT/PE, tobacco use.  She did have a recent surgery on 7/30/2024.  She states her abdominal wound is \"doing well,\" without any surrounding erythema or pain.  She does states she has noticed pale yellow drainage coming from the wound.  Per record review, patient has been on Invanz.  The patient tells me she has not been taking this antibiotic, because she states she has been \"out of it (the medicine).\"  Patient also states she has been taking Lasix every day, though she does not feel like it is helping.    ED course: On arrival, patient's /65, heart rate 100, respirations 20, pulse ox 97%.  Labs and imaging performed, " revealing creatinine 1.92 and BUN 30 (1.59 and 29 on 9/10), magnesium 1.49.  BNP 22.  Initial troponin 7.  No leukocytosis.  Hemoglobin 10.4.  Platelets 118.  Imaging shows mild diastasis rectus with mild midline protrusion of the abdominal contents, foci of soft tissue gas also seen throughout the anterior subcutaneous tissues along with overlying bandage artifact.  Background 1.1 cm low-attenuation probable cystic lesion of the pancreatic head-cystic neoplasm cannot be excluded.  Vascular ultrasound lower extremities shows bilateral DVTs.  Patient given IV heparin, magnesium,  Merrem, vancomycin in the ED. patient to be admitted inpatient under Dr. Venegas. Patient to be admitted inpatient under Dr. Venegas.      Past Medical History  Past Medical History:   Diagnosis Date    Anxiety     Arthritis     Cervicalgia 02/13/2017    Neck pain, chronic    Depression     Dysphagia, unspecified 02/23/2021    Dysphagia    Encounter for immunization 12/02/2022    Encounter for immunization    Epidermal cyst 02/04/2014    Epidermal inclusion cyst    Fatty (change of) liver, not elsewhere classified 02/01/2014    Fatty liver    GERD (gastroesophageal reflux disease)     Glaucoma     Liver disease, unspecified     Liver disease, chronic    Localized swelling, mass and lump, unspecified 02/23/2021    Subcutaneous nodules    Long term (current) use of opiate analgesic 02/20/2018    Opiate analgesic use agreement exists    Long term (current) use of opiate analgesic 02/20/2018    Long term current use of opiate analgesic    Nonalcoholic steatohepatitis (HAZEL)     Steatohepatitis, nonalcoholic    Other instability, right knee 05/17/2018    Instability of right knee joint    Other specified abnormal findings of blood chemistry     Elevated LFTs    Other specified symptoms and signs involving the circulatory and respiratory systems 12/01/2015    Globus pharyngeus    Pain in right hand 03/27/2017    Pain of right hand    Pain in right knee  06/21/2018    Acute pain of right knee    Pain in throat 12/01/2015    Throat pain in adult    Pain in unspecified knee 06/28/2017    Knee pain    Pain in unspecified shoulder 03/23/2017    Shoulder pain    Personal history of diseases of the skin and subcutaneous tissue 06/20/2018    History of acne    Personal history of diseases of the skin and subcutaneous tissue 06/02/2014    History of dermatitis    Personal history of malignant neoplasm of breast     Personal history of malignant neoplasm of breast    Personal history of other diseases of the digestive system 10/13/2021    History of gastroesophageal reflux (GERD)    Personal history of other diseases of the musculoskeletal system and connective tissue     Personal history of juvenile rheumatoid arthritis    Personal history of other diseases of the nervous system and sense organs 10/13/2021    History of sleep apnea    Personal history of other diseases of the respiratory system 01/14/2014    Personal history of asthma    Personal history of other endocrine, nutritional and metabolic disease     History of hyperlipidemia    Personal history of other infectious and parasitic diseases 11/24/2014    History of herpes zoster    Personal history of other specified conditions     History of dysuria    Personal history of peptic ulcer disease 01/14/2014    History of peptic ulcer    Personal history of pneumonia (recurrent) 01/14/2014    History of pneumonia    Personal history of traumatic brain injury 02/23/2021    History of concussion    Polyp of stomach and duodenum 01/14/2014    Gastric polyps    Polyp of stomach and duodenum 01/14/2014    Polyp of duodenum    Repeated falls 11/09/2015    Frequent falls    Secondary and unspecified malignant neoplasm of lymph node, unspecified (Multi)     Metastasis to lymph nodes    Sleep apnea     Tardive dyskinesia     Type 2 diabetes mellitus (Multi)     Urinary tract infection, site not specified 08/01/2019    Acute UTI     Urinary tract infection, site not specified 04/19/2020    Acute UTI       Surgical History  Past Surgical History:   Procedure Laterality Date    CARPAL TUNNEL RELEASE  01/13/2014    Neuroplasty Decompression Median Nerve At Carpal Tunnel    CATARACT EXTRACTION      CHOLECYSTECTOMY  01/13/2014    Cholecystectomy    COLONOSCOPY  01/13/2014    Colonoscopy (Fiberoptic)    CT ANGIO NECK  07/07/2023    CT NECK ANGIO W AND WO IV CONTRAST 7/7/2023 PAR CT    CT HEAD ANGIO W AND WO IV CONTRAST  07/07/2023    CT HEAD ANGIO W AND WO IV CONTRAST 7/7/2023 PAR CT    HYSTERECTOMY  10/13/2021    Hysterectomy    LIVER BIOPSY      OTHER SURGICAL HISTORY  12/17/2013    Breast Surgery Reconstruction    OTHER SURGICAL HISTORY  12/17/2013    Modified Radical Mastectomy Left Breast    OTHER SURGICAL HISTORY  12/17/2013    Laparosc Gastric Restrictive Proc By Adjustable Gastric Band    OTHER SURGICAL HISTORY  01/13/2014    Breast Surgery Modified Radical Mastectomy    OTHER SURGICAL HISTORY  10/13/2021    Esophageal Dilation    OTHER SURGICAL HISTORY      Excision Of Lesion Face Benign 2.1 To 3cm    TOTAL KNEE ARTHROPLASTY  04/22/2014    Knee Replacement        Social History  She reports that she quit smoking about 51 years ago. Her smoking use included cigarettes. She has never used smokeless tobacco. She reports that she does not currently use alcohol. She reports current drug use. Drug: Marijuana.    Family History  Family History   Problem Relation Name Age of Onset    Arthritis Mother      Coronary artery disease Mother      Coronary artery disease Father      Arthritis Father      Heart failure Father      Glaucoma Father      Prostate cancer Brother      Stroke Maternal Grandmother      Dementia Maternal Grandmother      Stroke Maternal Grandfather      Dementia Maternal Grandfather      Stroke Paternal Grandmother      Hypertension Other      Diabetes Other      Asthma Other      Ulcerative colitis Other      Goiter Other       Psoriasis Other          Allergies  Penicillins, Cefepime, Avelox [moxifloxacin], Bactrim [sulfamethoxazole-trimethoprim], Oxycodone, Phenergan [promethazine], Tramadol, Trintellix [vortioxetine], Adhesive tape-silicones, Cephalexin, Codeine, Erythromycin, Hydroxychloroquine, Nsaids (non-steroidal anti-inflammatory drug), and Apavloud-5-no5 antimigraine agents    Review of Systems   Constitutional:  Positive for activity change and fever. Negative for chills.   HENT:  Negative for congestion, rhinorrhea, sinus pressure and sore throat.    Respiratory:  Positive for shortness of breath. Negative for cough and wheezing.    Cardiovascular:  Positive for leg swelling. Negative for chest pain and palpitations.   Gastrointestinal:  Negative for abdominal distention, abdominal pain, diarrhea, nausea and vomiting.   Genitourinary:  Negative for difficulty urinating, dysuria and urgency.   Neurological:  Positive for weakness. Negative for dizziness and numbness.        Physical Exam  Constitutional:       General: She is not in acute distress.     Appearance: She is not ill-appearing.   HENT:      Head: Normocephalic and atraumatic.      Nose: Nose normal.      Mouth/Throat:      Mouth: Mucous membranes are moist.      Pharynx: Oropharynx is clear.   Eyes:      Extraocular Movements: Extraocular movements intact.      Conjunctiva/sclera: Conjunctivae normal.      Pupils: Pupils are equal, round, and reactive to light.   Cardiovascular:      Rate and Rhythm: Regular rhythm. Tachycardia present.      Pulses: Normal pulses.   Pulmonary:      Effort: Pulmonary effort is normal.      Breath sounds: Normal breath sounds.   Abdominal:      Comments: Abdominal wound VAC in place   Musculoskeletal:      Cervical back: Normal range of motion.      Right lower leg: Edema (3+ pitting edema) present.      Left lower leg: Edema (3+ pitting edema) present.   Skin:     General: Skin is warm and dry.      Findings: No erythema.  "  Neurological:      General: No focal deficit present.      Mental Status: She is alert and oriented to person, place, and time.      Motor: Weakness present.   Psychiatric:         Mood and Affect: Mood normal.         Behavior: Behavior normal.          Last Recorded Vitals  Blood pressure 95/61, pulse 95, temperature 36.3 °C (97.3 °F), resp. rate 18, height 1.626 m (5' 4\"), weight 72.6 kg (160 lb), SpO2 100%.    Relevant Results    Scheduled medications  allopurinol, 100 mg, oral, Daily  deutetrabenazine, 24 mg, oral, Daily  deutetrabenazine, 6 mg, oral, Daily  dexAMETHasone, 0.5 mg, oral, 4x daily  fludrocortisone, 0.1 mg, oral, Daily  insulin lispro, 0-5 Units, subcutaneous, TID  lamoTRIgine, 200 mg, oral, q AM  lotilaner, 1 drop, Both Eyes, Nightly  meropenem, 1 g, intravenous, q12h  nystatin, , Topical, BID  pantoprazole, 40 mg, oral, Daily before breakfast   Or  pantoprazole, 40 mg, intravenous, Daily before breakfast  sodium chloride (PF) 0.9%, 10 mL, intravenous, Daily  sucralfate, 1 g, oral, Nightly  traZODone, 150 mg, oral, Nightly      Continuous medications  heparin, 0-4,500 Units/hr, Last Rate: 1,100 Units/hr (09/16/24 2342)  lactated Ringer's, 100 mL/hr, Last Rate: 100 mL/hr (09/17/24 0600)      PRN medications  PRN medications: acetaminophen, heparin, polyethylene glycol, SUMAtriptan, vancomycin    Results for orders placed or performed during the hospital encounter of 09/16/24 (from the past 24 hour(s))   CBC and Auto Differential   Result Value Ref Range    WBC 10.1 4.4 - 11.3 x10*3/uL    nRBC 0.0 0.0 - 0.0 /100 WBCs    RBC 3.52 (L) 4.00 - 5.20 x10*6/uL    Hemoglobin 10.4 (L) 12.0 - 16.0 g/dL    Hematocrit 32.4 (L) 36.0 - 46.0 %    MCV 92 80 - 100 fL    MCH 29.5 26.0 - 34.0 pg    MCHC 32.1 32.0 - 36.0 g/dL    RDW 14.4 11.5 - 14.5 %    Platelets 118 (L) 150 - 450 x10*3/uL    Neutrophils % 76.8 40.0 - 80.0 %    Immature Granulocytes %, Automated 0.6 0.0 - 0.9 %    Lymphocytes % 11.8 13.0 - 44.0 % "    Monocytes % 6.6 2.0 - 10.0 %    Eosinophils % 3.5 0.0 - 6.0 %    Basophils % 0.7 0.0 - 2.0 %    Neutrophils Absolute 7.74 (H) 1.20 - 7.70 x10*3/uL    Immature Granulocytes Absolute, Automated 0.06 0.00 - 0.70 x10*3/uL    Lymphocytes Absolute 1.19 (L) 1.20 - 4.80 x10*3/uL    Monocytes Absolute 0.66 0.10 - 1.00 x10*3/uL    Eosinophils Absolute 0.35 0.00 - 0.70 x10*3/uL    Basophils Absolute 0.07 0.00 - 0.10 x10*3/uL   Comprehensive Metabolic Panel   Result Value Ref Range    Glucose 131 (H) 74 - 99 mg/dL    Sodium 137 136 - 145 mmol/L    Potassium 4.3 3.5 - 5.3 mmol/L    Chloride 104 98 - 107 mmol/L    Bicarbonate 23 21 - 32 mmol/L    Anion Gap 14 10 - 20 mmol/L    Urea Nitrogen 30 (H) 6 - 23 mg/dL    Creatinine 1.92 (H) 0.50 - 1.05 mg/dL    eGFR 28 (L) >60 mL/min/1.73m*2    Calcium 8.0 (L) 8.6 - 10.3 mg/dL    Albumin 2.3 (L) 3.4 - 5.0 g/dL    Alkaline Phosphatase 167 (H) 33 - 136 U/L    Total Protein 5.1 (L) 6.4 - 8.2 g/dL    AST 26 9 - 39 U/L    Bilirubin, Total 0.4 0.0 - 1.2 mg/dL    ALT 23 7 - 45 U/L   Magnesium   Result Value Ref Range    Magnesium 1.49 (L) 1.60 - 2.40 mg/dL   Troponin I, High Sensitivity   Result Value Ref Range    Troponin I, High Sensitivity 7 0 - 13 ng/L   B-type natriuretic peptide   Result Value Ref Range    BNP 22 0 - 99 pg/mL   Coagulation Screen   Result Value Ref Range    Protime 13.6 (H) 9.8 - 12.8 seconds    INR 1.2 (H) 0.9 - 1.1    aPTT 28 27 - 38 seconds   Heparin Assay, UFH   Result Value Ref Range    Heparin Unfractionated 1.4 (HH) See Comment Below for Therapeutic Ranges IU/mL   Heparin Assay, UFH   Result Value Ref Range    Heparin Unfractionated 0.9 See Comment Below for Therapeutic Ranges IU/mL   POCT GLUCOSE   Result Value Ref Range    POCT Glucose 119 (H) 74 - 99 mg/dL   CBC   Result Value Ref Range    WBC 8.8 4.4 - 11.3 x10*3/uL    nRBC 0.0 0.0 - 0.0 /100 WBCs    RBC 3.41 (L) 4.00 - 5.20 x10*6/uL    Hemoglobin 10.0 (L) 12.0 - 16.0 g/dL    Hematocrit 32.7 (L) 36.0 - 46.0  %    MCV 96 80 - 100 fL    MCH 29.3 26.0 - 34.0 pg    MCHC 30.6 (L) 32.0 - 36.0 g/dL    RDW 14.4 11.5 - 14.5 %    Platelets 100 (L) 150 - 450 x10*3/uL   Basic metabolic panel   Result Value Ref Range    Glucose 130 (H) 74 - 99 mg/dL    Sodium 135 (L) 136 - 145 mmol/L    Potassium 4.7 3.5 - 5.3 mmol/L    Chloride 102 98 - 107 mmol/L    Bicarbonate 22 21 - 32 mmol/L    Anion Gap 16 10 - 20 mmol/L    Urea Nitrogen 32 (H) 6 - 23 mg/dL    Creatinine 2.21 (H) 0.50 - 1.05 mg/dL    eGFR 24 (L) >60 mL/min/1.73m*2    Calcium 8.1 (L) 8.6 - 10.3 mg/dL   Heparin Assay   Result Value Ref Range    Heparin Unfractionated 0.7 See Comment Below for Therapeutic Ranges IU/mL   POCT GLUCOSE   Result Value Ref Range    POCT Glucose 110 (H) 74 - 99 mg/dL   Urinalysis with Reflex Microscopic   Result Value Ref Range    Color, Urine Yellow Light-Yellow, Yellow, Dark-Yellow    Appearance, Urine Clear Clear    Specific Gravity, Urine 1.017 1.005 - 1.035    pH, Urine 5.0 5.0, 5.5, 6.0, 6.5, 7.0, 7.5, 8.0    Protein, Urine NEGATIVE NEGATIVE, 10 (TRACE), 20 (TRACE) mg/dL    Glucose, Urine Normal Normal mg/dL    Blood, Urine 0.06 (1+) (A) NEGATIVE    Ketones, Urine NEGATIVE NEGATIVE mg/dL    Bilirubin, Urine NEGATIVE NEGATIVE    Urobilinogen, Urine Normal Normal mg/dL    Nitrite, Urine NEGATIVE NEGATIVE    Leukocyte Esterase, Urine 25 Melisa/µL (A) NEGATIVE   Microscopic Only, Urine   Result Value Ref Range    WBC, Urine 1-5 1-5, NONE /HPF    RBC, Urine 6-10 (A) NONE, 1-2, 3-5 /HPF    Squamous Epithelial Cells, Urine 1-9 (SPARSE) Reference range not established. /HPF    Mucus, Urine FEW Reference range not established. /LPF    Hyaline Casts, Urine 4+ (A) NONE /LPF     CT abdomen pelvis wo IV contrast    Result Date: 9/16/2024  Interpreted By:  Devin Delgado, STUDY: CT ABDOMEN PELVIS WO IV CONTRAST;  9/16/2024 1:59 pm   INDICATION: Signs/Symptoms:Rule out any significant deep tissue infection mostly clear drainage from previous abdominoplasty.      COMPARISON: 08/08/2024.   ACCESSION NUMBER(S): [id number]   ORDERING CLINICIAN: GABRIELLA ALTMAN   TECHNIQUE: CT of the abdomen and pelvis was performed. No contrast was administered. Coronal and sagittal reformations were made.   FINDINGS: There is some motion artifact as well as streak artifact related to patient's arms not being raised for the scan.   LOWER CHEST: Bibasilar mild irregular predominantly dependent atelectasis/scarring is present. Left breast implant again seen along with surgical clips along the left chest wall.   ABDOMEN:   LIVER: Cirrhotic morphology of the liver again seen with irregularity of the hepatic surface contour. No focal hepatic lesion identified.   BILE DUCTS: Nondilated.   GALLBLADDER: Surgically absent.   PANCREAS: There is some pancreatic parenchymal atrophy similar to prior with insinuating fat throughout the gland. There is a vague round approximate 1.1 cm low-attenuation probable cystic lesion of the pancreatic head   SPLEEN: Spleen is not significantly enlarged.   ADRENAL GLANDS: Within normal limits.   KIDNEYS AND URETERS: No renal or ureteral calculi are seen bilaterally.  No hydroureteronephrosis bilaterally. Left renal lower pole lateral partially exophytic 3.7 cm cyst is again seen. Additional previously visualized smaller renal cysts are less conspicuous on noncontrast exam.   Urinary bladder is partially distended. No bladder calculi. Scattered small pelvic phleboliths are present.   VESSELS: Scattered small atherosclerotic calcifications are seen in the aorta and branch vessels. No aortic aneurysm. Unenhanced IVC is unremarkable.   BOWEL: Postoperative changes of the stomach with suture line along the greater curvature again seen. No bowel obstruction. Appendix not identified.   No appreciable significant diverticular disease.   PERITONEUM/RETROPERITONEUM/LYMPH NODES: Small-moderate volume ascites is present along with mild generalized mesenteric and omental edema.  No free intraperitoneal air.   No retroperitoneal fluid collection or lymphadenopathy.       ABDOMINAL WALL: There is mild diastasis rectus with mild midline protrusion of the abdominal contents. Irregular subcutaneous tissue edema is seen throughout the abdomen and pelvis. Foci of soft tissue gas are also seen throughout the anterior subcutaneous tissues along with overlying bandage artifact.   BONE AND SOFT TISSUE: Thoracolumbar mild levocurvature may be partially exaggerated by positioning. Facet arthrosis is greatest in the lower lumbar spine. There is joint space narrowing and marginal spurring of both hips.   Right gluteal region subcutaneous tissue stimulator device is seen with associated lead extending through a right mid sacral foramen.       Irregular subcutaneous tissue edema of the abdomen and pelvis. Multiple foci of soft tissue gas in the anterior abdominal and pelvic subcutaneous tissues anteriorly. No organized subcutaneous tissue fluid collection.   No renal or ureteral calculi. No hydroureteronephrosis bilaterally.   Cirrhosis.   Small-moderate volume ascites with generalized mesenteric edema.   Vague round 1.1 cm low-attenuation probable cystic lesion of the pancreatic head. Cystic neoplasm can not be excluded. Follow-up pancreatic MRI with contrast may be considered for further characterization.   MACRO: None.   Signed by: Devin Delgado 9/16/2024 2:32 PM Dictation workstation:   [id number]    CT cervical spine wo IV contrast    Result Date: 9/16/2024  Interpreted By:  Devin Delgado, STUDY: CT CERVICAL SPINE WO IV CONTRAST;  9/16/2024 1:59 pm   INDICATION: Signs/Symptoms:fall.     COMPARISON: None.   ACCESSION NUMBER(S): [id number]   ORDERING CLINICIAN: GABRIELLA ALTMAN   TECHNIQUE: Contiguous unenhanced axial images were obtained through the cervical spine with coronal and sagittal reformations of the axial data.   FINDINGS: ALIGNMENT: The craniocervical junction appears intact.  The  dens and atlantoaxial relation appear intact with regional irregular marginal spurring. There is mild reversal of the cervical lordosis which may be exaggerated by positioning and/or spasm.   VERTEBRAE/DISC SPACES: Multilevel disc space narrowing and endplate spurring is most prominent at C4-5, C5-6 and C6-7. Mild facet arthrosis is present bilaterally throughout the cervical spine as well. No acute fracture, subluxation, or compression deformity is seen.   ADDITIONAL FINDINGS: Prevertebral soft tissues are not thickened.   Surgical clips are seen in the left lower neck posterolateral soft tissues.       No acute fracture or subluxation of the cervical spine.   Multilevel degenerative disc and facet changes of the cervical spine.   MACRO: None.   Signed by: Devin Delgado 9/16/2024 2:21 PM Dictation workstation:   [id number]    CT head wo IV contrast    Result Date: 9/16/2024  Interpreted By:  Devin Delgado, STUDY: CT HEAD WO IV CONTRAST;  9/16/2024 1:59 pm   INDICATION: Signs/Symptoms:fall.     COMPARISON: 08/26/2024.   ACCESSION NUMBER(S): [id number]   ORDERING CLINICIAN: GABRIELLA ALTMAN   TECHNIQUE: Contiguous unenhanced axial images were obtained through the brain.   FINDINGS: INTRACRANIAL: No acute intracranial bleed, midline shift, or mass effect is seen. Gray-white differentiation is maintained. No extra-axial fluid collection or hydrocephalus. Left basal ganglia small dystrophic calcification is again seen.   Bones are intact.   EXTRACRANIAL: Visualized paranasal sinuses and mastoids are clear.       No acute intracranial process.       MACRO: None.   Signed by: Devin Delgado 9/16/2024 2:19 PM Dictation workstation:   [id number]    XR chest 1 view    Result Date: 9/16/2024  Interpreted By:  Devin Delgado, STUDY: XR CHEST 1 VIEW;  9/16/2024 11:44 am   INDICATION: Signs/Symptoms:fall.     COMPARISON: None.   ACCESSION NUMBER(S): [id number]   ORDERING CLINICIAN: GABRIELLA ALTMAN    FINDINGS: CARDIOMEDIASTINAL SILHOUETTE: Right jugular central line has tip at the SVC level. Cardiac silhouette is borderline in size and may be partially exaggerated by low inspiratory volume.   LUNGS: Inspiratory volume is low. No focal infiltrate. No definite pleural effusion. No pneumothorax is seen.   ABDOMEN: Upper abdominal surgical clips are present.   BONES: Thoracic mild dextrocurvature may be partially exaggerated by positioning. There is partial visualization of degenerative changes of the shoulders as well as a left humeral head surgical anchor. Mild contour irregularity of the left humeral neck may be related to old trauma. Left axillary region surgical clips are also noted.       1.  Low inspiratory volume. No focal infiltrate. 2. Partially visualized mild contour irregularity of the left humeral neck may be related to old trauma. Correlate with injury history and pain in this region.       MACRO: None.   Signed by: Devin Delgado 9/16/2024 12:40 PM Dictation workstation:   [id number]    Vascular US Lower Extremity Venous Duplex Bilateral    Result Date: 9/16/2024  Preliminary Cardiology Report          [place] [address] Tel [phone] and Fax [phone]          Preliminary Vascular Lab Report  VASC US LOWER EXTREMITY VENOUS DUPLEX BILATERAL  Patient Name:      JOSÉ LUIS DEVINE Reading Physician:  [id number] Andrew Day MD, RPVI Study Date:        9/16/2024      Ordering Physician: [id number] ALTMAN MRN/PID:           [id number]       Technologist:       Verito Logan RVT Accession#:        [id number]   Technologist 2: Date of Birth/Age: 1955     Encounter#:         [id number] Gender:            F Admission Status:  Emergency      Location Performed: [facility]  Diagnosis/ICD: Shortness of breath-R06.02 Indication:    Shortness of Breath Procedure/CPT: 26272 Peripheral venous duplex scan for DVT complete  Patient History: Gastric Bypass                   Abdominoplasty.  **CRITICAL RESULT** Critical Result: DVT BLE Notification called to Joaquín White DO on 9/16/2024 at 12:24:59 PM by VIRGILIO Logan.  PRELIMINARY CONCLUSIONS: Right Lower Venous: There is acute non-occlusive deep vein thrombosis visualized in the distal external iliac, common femoral, profunda and proximal femoral veins. Additional Findings; Soft tissue edema is noted within the calf and Popliteal segments of the RLE. Left Lower Venous: There is acute non-occlusive deep vein thrombosis visualized in the distal external iliac, common femoral and proximal femoral veins. Additional Findings; Soft tissue edema is noted within the left calf and Popliteal segments.  Imaging & Doppler Findings:  Right                 Compressible      Thrombus              Flow Distal External Iliac   Partial    Acute non-occlusive Spontaneous/Phasic CFV                     Partial    Acute non-occlusive     Continuous PFV                     Partial    Acute non-occlusive FV Proximal             Partial    Acute non-occlusive     Continuous FV Mid                    Yes             None FV Distal                 Yes             None Popliteal                 Yes             None         Spontaneous/Phasic  Left                  Compress      Thrombus              Flow Distal External Iliac Partial  Acute non-occlusive     Continuous CFV                   Partial  Acute non-occlusive Spontaneous/Phasic PFV                     Yes           None FV Proximal           Partial  Acute non-occlusive Spontaneous/Phasic FV Mid                  Yes           None FV Distal               Yes           None Popliteal               Yes           None         Spontaneous/Phasic VASCULAR PRELIMINARY REPORT completed by Verito Logan T on 9/16/2024 at 12:29:04 PM  ** Final **         Assessment/Plan   Assessment & Plan  Acute deep vein thrombosis (DVT) of proximal vein of both lower extremities  (Multi)    Disruption of wound, unspecified, initial encounter    Disruption of external surgical wound    Deep vein thrombosis, bilateral lower extremities  BLE edema  -Vascular surgery consult and recommendations appreciated  -V/Q scan ordered due to patient's RUSSELL (pt experiencing shortness of breath and tachycardia)  -Heparin drip initiated in the ED, continue     Abdominal wound  Hx of abdominoplasty and gastric bypass surgery (7/30/2024)  Hypoalbuminemia  -Infectious disease consult and reccomendations appreciated, pt follows with Dr. Ying  -CT imaging results as above  -Pt inititated on Merrem and vancomycin in the ED, will continue. Further abx adjustments per ID  -Per [facility] note, patient is to have wound vac changed every Monday and Thursday. Wound vac setting is 125 mmHg, irrigate with saline prior to placing. Black granufoam dressing  -Wound prevention techniques     Acute kidney injury  -Cr 1.92 today, 1.59 on 9/10  -BMP in the a.m.  -Will hold diuresis for now due to RUSSELL. Also holding on fluids due to pitting edema in BLE     Hypomagnesemia  -Magnesium 1.49 today, replacement ordered in the ED  -Monitor with BMP in the a.m.     Thrombocytopenia  -Platelets 118 today, appears chronic  -Monitor with CBC in the AM     Cystic lesion of pancreatic head on CT imaging  -Cystic neoplasm cannot be excluded, per radiologist  -Follow-up pancreatic MRI with contrast may be considered for further characterization     History of falls  -PT/OT eval and treat  -Fall precautions     T2DM  -Sliding scale insulin  -Diabetic diet, hypoglycemic protocol     DVT Prophylaxis  -Heparin  -SCDs        I spent 60 minutes in the professional and overall care of this patient.  I spent 60 minutes in the professional and overall care of this patient.      Jarocho Venegas,

## 2024-09-17 NOTE — PROGRESS NOTES
09/17/24 1250   Discharge Planning   Living Arrangements Spouse/significant other   Support Systems Spouse/significant other   Type of Residence Private residence   Who is requesting discharge planning? Provider   Expected Discharge Disposition SNF     Met with pt this morning, pt admit form home with DVT, pt has recent abd surgery and has large abd wound with wound vac.   Currently pt is on IV Heparin gtt.  ID and Vasc consulted.  DC planning discussed  hhc vs skilled.  Pt is active with  [facility], pt agreeable to go skilled if needed.  Await therapy, will follow up.  Lorelei Ponce RN [facility]

## 2024-09-17 NOTE — PROGRESS NOTES
Physical Therapy                 Therapy Communication Note    Patient Name: Mariann Drew  MRN: [id number]  Department: [facility] 9  Room: [facility]932-  Today's Date: 9/17/2024     Discipline: Physical Therapy    Missed Visit Reason: Patient placed on medical hold (patient with BLE DVTs, heparin restarted. will reattempt as able/medically appropriate)    Missed Time: Attempt

## 2024-09-17 NOTE — PROGRESS NOTES
Medication Adjustment    The following medication(s) was/were adjusted for Mariann Drew per protocol/policy due to altered renal function.    Medication(s) adjusted:   Merrem 1g q8 changed to q12 for clearance 27 ml/min    Atul Ambrocio [facility]

## 2024-09-17 NOTE — CARE PLAN
The patient's goals for the shift include  feel better     The clinical goals for the shift include  comfort

## 2024-09-17 NOTE — CONSULTS
Consults  Referring physician Dr. Venegas  History of present illness    This is 68-year-old female well-known to my services I had seen her at the wound center last Wednesday.  She had a large abdominal wound secondary to surgery and it was quite clean based.  She was on Invanz through 9-.  The patient was seen in rehab then sent home but she has really not been moving around.  She was receiving her IV Invanz without complications.    She started noticing that her legs were getting more swollen though she did not mention this at the wound center and she was brought in for further evaluation.  She was found to have bilateral extensive DVTs and we are called for further management of her abdominal wound    Past medical history significant for abdominoplasty and gastric bypass surgery on 7-, diabetes mellitus, GERD, history of breast cancer status post left cystectomy, gout, tardive dyskinesia    Past surgical history carpal tunnel release, cataract extraction, cholecystectomy in 2014, colonoscopy, hysterectomy in 2021, liver biopsy, breast surgery reconstruction in 2013, modified radical mastectomy of the left breast in 2013, laparoscopic gastric restrictive procedure by adjustable gastric band, total knee arthroplasty    Social history former smoker quit about 51 years ago no drinking smoking or drug use  Family history noncontributory  Allergies penicillin, cefepime, Avelox, Bactrim, erythromycin    A 10 point review system was obtained with the patient denying any complaints outside of those listed in the HPI above    Physical exam  HEENT PERRLA  Chest entry bilaterally  CVS S1-S2 regular  Abdomen has a VAC in place which is not working and not connected.  I did speak with the nurse manager and this will need to be replaced.  Extremities bilateral edema with right worse than left    Labs and radiology reviewed    Impression:  68-year-old female with a large abdominal wound seen in the wound center last  week.  Her wound looks good and there was some granulation tissue but it was quite extensive.  At this time will recommend the following    Suggestion:  1.  Continue Invanz will discontinue meropenem and Vanco.  Her end of therapy is 9-.  We may extend this out while she is here at the hospital  2.  Patient has been started on heparin and will be anticoagulated  3.  Will order a VAC  Thank for this consult follow with you as needed    I have reviewed and interpreted all lab test imaging studies and documentations from other healthcare providers  I am monitoring antibiotics for side effects and toxicity

## 2024-09-18 ENCOUNTER — ANESTHESIA (OUTPATIENT)
Dept: OPERATING ROOM | Facility: HOSPITAL | Age: 69
End: 2024-09-18
Payer: MEDICARE

## 2024-09-18 PROBLEM — K75.81 NONALCOHOLIC STEATOHEPATITIS (NASH): Status: ACTIVE | Noted: 2024-09-18

## 2024-09-18 LAB
ERYTHROCYTE [DISTWIDTH] IN BLOOD BY AUTOMATED COUNT: 14.2 % (ref 11.5–14.5)
GLUCOSE BLD MANUAL STRIP-MCNC: 101 MG/DL (ref 74–99)
GLUCOSE BLD MANUAL STRIP-MCNC: 104 MG/DL (ref 74–99)
GLUCOSE BLD MANUAL STRIP-MCNC: 110 MG/DL (ref 74–99)
GLUCOSE BLD MANUAL STRIP-MCNC: 123 MG/DL (ref 74–99)
HCT VFR BLD AUTO: 27.2 % (ref 36–46)
HGB BLD-MCNC: 8.9 G/DL (ref 12–16)
MCH RBC QN AUTO: 29.6 PG (ref 26–34)
MCHC RBC AUTO-ENTMCNC: 32.7 G/DL (ref 32–36)
MCV RBC AUTO: 90 FL (ref 80–100)
NRBC BLD-RTO: 0 /100 WBCS (ref 0–0)
PLATELET # BLD AUTO: 102 X10*3/UL (ref 150–450)
RBC # BLD AUTO: 3.01 X10*6/UL (ref 4–5.2)
UFH PPP CHRO-ACNC: 0.4 IU/ML
UFH PPP CHRO-ACNC: 0.4 IU/ML
WBC # BLD AUTO: 10.4 X10*3/UL (ref 4.4–11.3)

## 2024-09-18 PROCEDURE — 2500000004 HC RX 250 GENERAL PHARMACY W/ HCPCS (ALT 636 FOR OP/ED): Performed by: INTERNAL MEDICINE

## 2024-09-18 PROCEDURE — 2500000005 HC RX 250 GENERAL PHARMACY W/O HCPCS

## 2024-09-18 PROCEDURE — 2500000001 HC RX 250 WO HCPCS SELF ADMINISTERED DRUGS (ALT 637 FOR MEDICARE OP)

## 2024-09-18 PROCEDURE — 85520 HEPARIN ASSAY: CPT | Performed by: STUDENT IN AN ORGANIZED HEALTH CARE EDUCATION/TRAINING PROGRAM

## 2024-09-18 PROCEDURE — 82947 ASSAY GLUCOSE BLOOD QUANT: CPT

## 2024-09-18 PROCEDURE — 99232 SBSQ HOSP IP/OBS MODERATE 35: CPT | Performed by: NURSE PRACTITIONER

## 2024-09-18 PROCEDURE — 85027 COMPLETE CBC AUTOMATED: CPT | Performed by: STUDENT IN AN ORGANIZED HEALTH CARE EDUCATION/TRAINING PROGRAM

## 2024-09-18 PROCEDURE — 2500000004 HC RX 250 GENERAL PHARMACY W/ HCPCS (ALT 636 FOR OP/ED)

## 2024-09-18 PROCEDURE — 2500000005 HC RX 250 GENERAL PHARMACY W/O HCPCS: Performed by: INTERNAL MEDICINE

## 2024-09-18 PROCEDURE — 2500000004 HC RX 250 GENERAL PHARMACY W/ HCPCS (ALT 636 FOR OP/ED): Performed by: STUDENT IN AN ORGANIZED HEALTH CARE EDUCATION/TRAINING PROGRAM

## 2024-09-18 PROCEDURE — 1200000002 HC GENERAL ROOM WITH TELEMETRY DAILY

## 2024-09-18 RX ORDER — SODIUM CHLORIDE, SODIUM LACTATE, POTASSIUM CHLORIDE, CALCIUM CHLORIDE 600; 310; 30; 20 MG/100ML; MG/100ML; MG/100ML; MG/100ML
100 INJECTION, SOLUTION INTRAVENOUS CONTINUOUS
OUTPATIENT
Start: 2024-09-18

## 2024-09-18 RX ORDER — ACETAMINOPHEN 325 MG/1
650 TABLET ORAL EVERY 4 HOURS PRN
OUTPATIENT
Start: 2024-09-18

## 2024-09-18 RX ORDER — IPRATROPIUM BROMIDE 0.5 MG/2.5ML
500 SOLUTION RESPIRATORY (INHALATION) ONCE
OUTPATIENT
Start: 2024-09-18 | End: 2024-09-18

## 2024-09-18 RX ORDER — FENTANYL CITRATE 50 UG/ML
25 INJECTION, SOLUTION INTRAMUSCULAR; INTRAVENOUS EVERY 5 MIN PRN
OUTPATIENT
Start: 2024-09-18

## 2024-09-18 RX ORDER — SCOLOPAMINE TRANSDERMAL SYSTEM 1 MG/1
1 PATCH, EXTENDED RELEASE TRANSDERMAL ONCE
OUTPATIENT
Start: 2024-09-18 | End: 2024-09-18

## 2024-09-18 RX ORDER — ONDANSETRON HYDROCHLORIDE 2 MG/ML
4 INJECTION, SOLUTION INTRAVENOUS ONCE AS NEEDED
OUTPATIENT
Start: 2024-09-18

## 2024-09-18 RX ORDER — ALBUTEROL SULFATE 0.83 MG/ML
2.5 SOLUTION RESPIRATORY (INHALATION) ONCE AS NEEDED
OUTPATIENT
Start: 2024-09-18

## 2024-09-18 RX ORDER — FENTANYL CITRATE 50 UG/ML
50 INJECTION, SOLUTION INTRAMUSCULAR; INTRAVENOUS EVERY 5 MIN PRN
OUTPATIENT
Start: 2024-09-18

## 2024-09-18 RX ORDER — LIDOCAINE HYDROCHLORIDE 10 MG/ML
0.1 INJECTION, SOLUTION INFILTRATION; PERINEURAL ONCE
OUTPATIENT
Start: 2024-09-18 | End: 2024-09-18

## 2024-09-18 RX ADMIN — DEXAMETHASONE 0.5 MG: 1 TABLET ORAL at 17:13

## 2024-09-18 RX ADMIN — PANTOPRAZOLE SODIUM 40 MG: 40 TABLET, DELAYED RELEASE ORAL at 06:22

## 2024-09-18 RX ADMIN — SODIUM CHLORIDE, POTASSIUM CHLORIDE, SODIUM LACTATE AND CALCIUM CHLORIDE 100 ML/HR: 600; 310; 30; 20 INJECTION, SOLUTION INTRAVENOUS at 02:24

## 2024-09-18 RX ADMIN — DEXAMETHASONE 0.5 MG: 1 TABLET ORAL at 06:24

## 2024-09-18 RX ADMIN — DEXAMETHASONE 0.5 MG: 1 TABLET ORAL at 13:50

## 2024-09-18 RX ADMIN — ACETAMINOPHEN 650 MG: 325 TABLET ORAL at 20:36

## 2024-09-18 RX ADMIN — ERTAPENEM SODIUM 500 MG: 1 INJECTION, POWDER, LYOPHILIZED, FOR SOLUTION INTRAMUSCULAR; INTRAVENOUS at 09:34

## 2024-09-18 RX ADMIN — DEXAMETHASONE 0.5 MG: 1 TABLET ORAL at 20:36

## 2024-09-18 RX ADMIN — TRAZODONE HYDROCHLORIDE 150 MG: 50 TABLET ORAL at 20:36

## 2024-09-18 RX ADMIN — SODIUM CHLORIDE 10 ML: 9 INJECTION INTRAMUSCULAR; INTRAVENOUS; SUBCUTANEOUS at 06:26

## 2024-09-18 RX ADMIN — FLUDROCORTISONE ACETATE 0.1 MG: 0.1 TABLET ORAL at 09:34

## 2024-09-18 RX ADMIN — SODIUM CHLORIDE, POTASSIUM CHLORIDE, SODIUM LACTATE AND CALCIUM CHLORIDE 100 ML/HR: 600; 310; 30; 20 INJECTION, SOLUTION INTRAVENOUS at 15:14

## 2024-09-18 RX ADMIN — LAMOTRIGINE 200 MG: 100 TABLET ORAL at 09:34

## 2024-09-18 RX ADMIN — HEPARIN SODIUM 900 UNITS/HR: 10000 INJECTION, SOLUTION INTRAVENOUS at 20:29

## 2024-09-18 RX ADMIN — SUCRALFATE 1 G: 1 TABLET ORAL at 22:29

## 2024-09-18 RX ADMIN — ALLOPURINOL 100 MG: 100 TABLET ORAL at 09:34

## 2024-09-18 SDOH — HEALTH STABILITY: MENTAL HEALTH: CURRENT SMOKER: 0

## 2024-09-18 ASSESSMENT — COGNITIVE AND FUNCTIONAL STATUS - GENERAL
MOVING FROM LYING ON BACK TO SITTING ON SIDE OF FLAT BED WITH BEDRAILS: A LITTLE
TURNING FROM BACK TO SIDE WHILE IN FLAT BAD: A LITTLE
HELP NEEDED FOR BATHING: A LOT
DRESSING REGULAR UPPER BODY CLOTHING: A LOT
DAILY ACTIVITIY SCORE: 15
PERSONAL GROOMING: A LITTLE
CLIMB 3 TO 5 STEPS WITH RAILING: A LOT
TURNING FROM BACK TO SIDE WHILE IN FLAT BAD: A LITTLE
CLIMB 3 TO 5 STEPS WITH RAILING: A LOT
MOVING FROM LYING ON BACK TO SITTING ON SIDE OF FLAT BED WITH BEDRAILS: A LITTLE
STANDING UP FROM CHAIR USING ARMS: A LITTLE
MOVING TO AND FROM BED TO CHAIR: A LITTLE
WALKING IN HOSPITAL ROOM: A LOT
WALKING IN HOSPITAL ROOM: A LOT
MOBILITY SCORE: 16
DRESSING REGULAR LOWER BODY CLOTHING: A LOT
TOILETING: A LOT
MOVING TO AND FROM BED TO CHAIR: A LITTLE
MOBILITY SCORE: 16
STANDING UP FROM CHAIR USING ARMS: A LITTLE

## 2024-09-18 ASSESSMENT — PAIN - FUNCTIONAL ASSESSMENT
PAIN_FUNCTIONAL_ASSESSMENT: 0-10
PAIN_FUNCTIONAL_ASSESSMENT: 0-10

## 2024-09-18 ASSESSMENT — PAIN SCALES - GENERAL
PAINLEVEL_OUTOF10: 0 - NO PAIN
PAINLEVEL_OUTOF10: 5 - MODERATE PAIN
PAINLEVEL_OUTOF10: 0 - NO PAIN
PAINLEVEL_OUTOF10: 5 - MODERATE PAIN

## 2024-09-18 NOTE — PROGRESS NOTES
Physical Therapy                 Therapy Communication Note    Patient Name: Mariann Drew  MRN: [id number]  Department: [facility] 9  Room: 932/932-A  Today's Date: 9/18/2024     Discipline: Physical Therapy    PT eval deferred, 9/18/24 scheduled for torso debridement.  Await postop orders

## 2024-09-18 NOTE — PROGRESS NOTES
Infectious disease progress note  Subjective   Large abdominal wound with abdominoplasty (group B strep) he    Antibiotics  Invanz end of therapy 9-    Objective   Range of Vitals (last 24 hours)  Heart Rate:  [90-99]   Temp:  [36.1 °C (97 °F)-37.2 °C (99 °F)]   Resp:  [14-18]   BP: ()/(54-68)   SpO2:  [97 %-100 %]   Daily Weight  09/16/24 : 72.6 kg (160 lb)    Body mass index is 27.46 kg/m².      Physical Exam  Patient has a large abdominal wound but due to her extensive DVT her partial closure and debridement will be deferred to a later time per surgery      Relevant Results  Labs  Lab Results   Component Value Date    WBC 10.4 09/18/2024    HGB 8.9 (L) 09/18/2024    HCT 27.2 (L) 09/18/2024    MCV 90 09/18/2024     (L) 09/18/2024     Lab Results   Component Value Date    GLUCOSE 130 (H) 09/17/2024    CALCIUM 8.1 (L) 09/17/2024     (L) 09/17/2024    K 4.7 09/17/2024    CO2 22 09/17/2024     09/17/2024    BUN 32 (H) 09/17/2024    CREATININE 2.21 (H) 09/17/2024   ESR: --  Lab Results   Component Value Date    SEDRATE 4 09/10/2024     Lab Results   Component Value Date    CRP 2.34 (H) 09/10/2024     Lab Results   Component Value Date    ALT 23 09/16/2024    AST 26 09/16/2024    ALKPHOS 167 (H) 09/16/2024    BILITOT 0.4 09/16/2024       Microbiology  8-8-2024 wound culture group B strep  8-8-2024 blood cultures no growth 4 days    Imaging  9- CT abdomen pelvis shows irregular subcutaneous tissue edema of the abdomen pelvis no organized subcutaneous tissue fluid collection    Assessment/Plan   1.  Continue Invanz through tomorrow.  Should the white count remain within normal limits tomorrow will be the last day and we can stop the antibiotics and continue with the VAC    Other issues  Abdominoplasty/gastric bypass surgery on 7-  Diabetes mellitus puts patient at higher risk for infections  GERD  History of breast cancer status post left mastectomy  Gout  Tardive  dyskinesia      I reviewed and interpreted all lab test imaging studies and documentations from other healthcare providers  I am monitoring for antibiotic side effects and toxicity     Antonia Ying MD

## 2024-09-18 NOTE — PROGRESS NOTES
"Mariann Drew is a 68 y.o. female on day 2 of admission presenting with Acute deep vein thrombosis (DVT) of proximal vein of both lower extremities (Multi).    Subjective   Patient asleep, awoken and indicated no acute issues.  Pain is well-controlled.  VAC changes are going well.       Objective     Physical Exam   GEN: interactive and pleasant  HEAD: NCAT  Eyes: EOMI, PERRLA  Mouth: MMM  Throat: trachea midline  Cor: RRR  Pulm: nonlabored breathing  Neuro: AAOx3  Lymph: non-edematous, no LN-opathy  EXT: extremities perfused  Pscyh: affect, mood appropriate    Focused exam of abdominal VAC remains in place.  Holding suction without issue    Last Recorded Vitals  Blood pressure 121/68, pulse 90, temperature 37.2 °C (99 °F), resp. rate 14, height 1.626 m (5' 4\"), weight 72.6 kg (160 lb), SpO2 98%.  Intake/Output last 3 Shifts:  I/O last 3 completed shifts:  In: 2887 (39.8 mL/kg) [I.V.:2637 (36.3 mL/kg); IV Piggyback:250]  Out: 850 (11.7 mL/kg) [Urine:500 (0.2 mL/kg/hr); Drains:350]  Weight: 72.6 kg                Assessment/Plan   Assessment & Plan  Acute deep vein thrombosis (DVT) of proximal vein of both lower extremities (Multi)    Disruption of wound, unspecified, initial encounter    Disruption of external surgical wound    Patient was scheduled for debridement and partial closure of the abdomen today, however given her recent admission for bilateral DVTs and need for heparin drip, risk moving forward with a large debridement would be unwise today.    Will plan to continue to follow, and plan for another outpatient surgical debridement.  We would need to follow-up with her outpatient to identify who will be managing her hypercoagulation medication.  She will need to be transitioned to heparin before surgery.  No date is identified.    Of note she does not seem to be healing excellently, I am concerned about her nutritional status, would recommend nutrition consult and albumin and prealbumin measurements.   "     I spent 20 minutes in the professional and overall care of this patient.      Anibal Tiwari MD

## 2024-09-18 NOTE — PROGRESS NOTES
Occupational Therapy                 Therapy Communication Note    Patient Name: Mariann Drew  MRN: [id number]  Department: [facility] 9  [facility]  Today's Date: 9/18/2024     Discipline: Occupational Therapy    Missed Visit Reason: Missed Visit Reason:  (patient currently in surgery for torso debridement; await post op orders)    Missed Time: Attempt

## 2024-09-18 NOTE — CARE PLAN
The patient's goals for the shift include      The clinical goals for the shift include Patient's pain will remain tolerable.      Problem: Pain - Adult  Goal: Verbalizes/displays adequate comfort level or baseline comfort level  Outcome: Progressing     Problem: Safety - Adult  Goal: Free from fall injury  Outcome: Progressing     Problem: Discharge Planning  Goal: Discharge to home or other facility with appropriate resources  Outcome: Progressing     Problem: Chronic Conditions and Co-morbidities  Goal: Patient's chronic conditions and co-morbidity symptoms are monitored and maintained or improved  Outcome: Progressing     Problem: Skin  Goal: Decreased wound size/increased tissue granulation at next dressing change  Outcome: Progressing  Flowsheets (Taken 9/18/2024 0017 by Kateryna Merchant RN)  Decreased wound size/increased tissue granulation at next dressing change: Promote sleep for wound healing     Problem: Skin  Goal: Prevent/minimize sheer/friction injuries  Outcome: Progressing  Flowsheets (Taken 9/18/2024 1155)  Prevent/minimize sheer/friction injuries:   Increase activity/out of bed for meals   Turn/reposition every 2 hours/use positioning/transfer devices

## 2024-09-18 NOTE — CARE PLAN
The patient's goals for the shift include      The clinical goals for the shift include Patient will remain free from pain

## 2024-09-18 NOTE — PROGRESS NOTES
"Mariann Drew is a 68 y.o. female on day 2 of admission presenting with Acute deep vein thrombosis (DVT) of proximal vein of both lower extremities (Multi).    Subjective   I evaluated patient at bedside.  Patient's  was present during evaluation.  Patient's p.o. intake is questionable will need increase protein intake to facilitate healing.  Plastic surgery on hold for debridement and partial closure of abdominal wound.    Objective     Vitals:    09/18/24 1157   BP: 107/58   Pulse: 94   Resp: 15   Temp: 36.9 °C (98.4 °F)   SpO2: 100%      Physical Exam  Constitutional: Well developed , awake/alert/oriented x3, in no distress,  Eyes: Clear sclera  ENMT: mucous membranes are moist, no apparent injury, no lesions seen,   Head/neck: Neck supple, trachea  is midline, no apparent injury, no bruits, no mass, no stridor  Respiratory/thorax: Breath sounds clear and equal bilaterally slightly diminished throughout, thorax symmetric  Cardiac/Vascular: Regular, rate and rhythm, no murmurs, 2+ radial pulses,  multiphasic dopplerable DP and PTs  Gastrointestinal: Nondistended , wound VAC dressing in place positive bowel sounds, no bruits.  Musculoskeletal: Moves all extremities, limited range of motion , no joint swelling,   Extremities: No cyanosis, no contusions or wounds, bilateral lower extremity edema +3  Neurological: Alert and oriented x3,   Lymphatic: No significant lymphadenopathy  Skin: Warm and dry, no lesions, no rashes  Psychological: Appropriate mood and behavior  Blood pressure 107/58, pulse 94, temperature 36.9 °C (98.4 °F), temperature source Temporal, resp. rate 15, height 1.626 m (5' 4\"), weight 72.6 kg (160 lb), SpO2 100%.    Intake/Output last 3 Shifts:  I/O last 3 completed shifts:  In: 2887 (39.8 mL/kg) [I.V.:2637 (36.3 mL/kg); IV Piggyback:250]  Out: 850 (11.7 mL/kg) [Urine:500 (0.2 mL/kg/hr); Drains:350]  Weight: 72.6 kg     Patient Active Problem List    Diagnosis Date Noted    Nonalcoholic " steatohepatitis (HAZEL) 09/18/2024    Acute deep vein thrombosis (DVT) of proximal vein of both lower extremities (Multi) 09/16/2024    Debility 08/23/2024    Postoperative wound cellulitis 08/08/2024    Other bipolar disorder (Multi) 04/03/2024    Portal hypertension (Multi) 11/17/2023    Parkinsonism (Multi) 09/12/2023    Abnormal LFTs (liver function tests) 08/17/2023    Acute ulcer of stomach 08/17/2023    Anemia 08/17/2023    Anxiety 08/17/2023    Bilateral cataracts 08/17/2023    Colon polyps 08/17/2023    Depression, major, in remission (CMS-HCC) 08/17/2023    Enterocele 08/17/2023    Fatty liver 08/17/2023    Gallbladder disease 08/17/2023    GI bleed 08/17/2023    Hepatitis C 08/17/2023    History of retinal tear 08/17/2023    Hx of obesity 08/17/2023    Pancreatic cyst ([facility]-H[facility]) 08/17/2023    Recurrent UTI 08/17/2023    RLS (restless legs syndrome) 08/17/2023    Stomatitis, viral 08/17/2023    Tardive dyskinesia 08/17/2023    Atrophic vaginitis 08/17/2023    Female cystocele 08/17/2023    Vaginal wall prolapse 08/17/2023    Vision changes 08/17/2023    Vision loss 08/17/2023    Myofascial pain syndrome, cervical 08/17/2023    Arthritis 08/17/2023    Rectocele 08/17/2023    Leukopenia 07/12/2023    Liver cirrhosis secondary to HAZEL (nonalcoholic steatohepatitis) (Multi) 04/06/2023    Routine adult health maintenance 04/06/2023    BMI 33.0-33.9,adult 04/06/2023    Encounter for screening mammogram for malignant neoplasm of breast 04/06/2023    FH: myocardial infarction 04/06/2023    Screening for multiple conditions 04/06/2023    Rheumatoid arthritis, involving unspecified site, unspecified whether rheumatoid factor present (Multi) 04/06/2023    Bilateral occipital neuralgia 04/05/2023    Breast cancer (Multi) 04/05/2023    Cervical arthritis 04/05/2023    Cervical radiculopathy 04/05/2023    Chronic gout 04/05/2023    Current mild episode of major depressive disorder (CMS-[facility]) 04/05/2023    Type 2 diabetes  mellitus without complication, without long-term current use of insulin (Multi) 04/05/2023    Gastroesophageal reflux disease without esophagitis 04/05/2023    Primary insomnia 04/05/2023    Primary osteoarthritis involving multiple joints 04/05/2023    Memory loss 04/05/2023    Cervical myofascial pain syndrome 04/05/2023    Neuropathy 04/05/2023    Headache 04/05/2023    Arthralgia of right wrist 04/05/2023    Arthritis of carpometacarpal (CMC) joint of right thumb 04/05/2023    Back pain 04/05/2023    Cervicogenic headache 04/05/2023    Decreased estrogen level 04/05/2023    Dermatitis 04/05/2023    Gastric bypass status for obesity 04/05/2023    Knee osteoarthritis 04/05/2023    Mouth sore 04/05/2023    Pain in right hand 04/05/2023    Recurrent falls 04/05/2023    Shortness of breath on exertion 04/05/2023    Oral aphthous ulcer 03/20/2023    Essential hypertension 02/23/2021    Gait abnormality 11/01/2019    Balance problems 09/15/2018    History of total knee replacement 07/20/2018    ANKUSH (obstructive sleep apnea) 06/29/2018    Acute pain of right knee 06/01/2018    Incomplete tear of left rotator cuff 02/08/2018    Intractable migraine without aura and without status migrainosus 02/08/2018    Cervical spondylosis with radiculopathy 02/08/2018    Chronic neck pain 02/08/2018    Rotator cuff syndrome of left shoulder 01/19/2018    Chronic left shoulder pain 01/03/2018    Wound dehiscence 08/21/2012    Skin fibrosis 08/13/2012    Dizziness 07/17/2012    Mixed hearing loss 07/17/2012    Lymph node enlargement 03/26/2012    Sebaceous cyst 03/12/2012    Benign neoplasm of connective and other soft tissue of unspecified upper limb, including shoulder 07/08/2011    Cramp of limb 08/11/2005    Hyperlipidemia 01/13/2005    Diabetes mellitus (Multi) 06/07/2004    Leukoplakia of oral mucosa, including tongue 03/19/2004    Personal history of colonic polyps 03/19/2004    Allergic rhinitis due to allergen 03/19/2004     Disruption of external surgical wound 08/21/2024    Surgical site infection 08/15/2024    Cellulitis, abdominal wall 08/12/2024    Disruption of wound, unspecified, initial encounter 08/12/2024    Draining postoperative wound 08/08/2024    Lipodystrophy 06/17/2024    Excessive and redundant skin and subcutaneous tissue 06/17/2024    Localized adiposity 06/17/2024          Current Facility-Administered Medications:     acetaminophen (Tylenol) tablet 650 mg, 650 mg, oral, q4h PRN, Naty F Xander, PA-C, 650 mg at 09/17/24 0938    allopurinol (Zyloprim) tablet 100 mg, 100 mg, oral, Daily, Naty F Xander, PA-C, 100 mg at 09/18/24 0934    deutetrabenazine tablet extended release 24 hr 24 mg, 24 mg, oral, Daily, Naty F Null, PA-C    deutetrabenazine tablet extended release 24 hr 6 mg, 6 mg, oral, Daily, Naty Terrell, PA-C    dexAMETHasone (Decadron) tablet 0.5 mg, 0.5 mg, oral, 4x daily, Naty F Xander, PA-C, 0.5 mg at 09/18/24 1350    ertapenem (Invanz) in 0.9% sodium chloride 50 mL  mg, 500 mg, intravenous, q24h, Antonia Ying MD, Stopped at 09/18/24 1002    fludrocortisone (Florinef) tablet 0.1 mg, 0.1 mg, oral, Daily, Naty Terrell, PA-C, 0.1 mg at 09/18/24 0934    heparin (porcine) injection 3,000-6,000 Units, 3,000-6,000 Units, intravenous, q4h PRN, Naty F Xander PA-C    heparin 25,000 Units in dextrose 5% 250 mL (100 Units/mL) infusion (premix), 0-4,500 Units/hr, intravenous, Continuous, Naty Terrell PA-C, Last Rate: 9 mL/hr at 09/17/24 1632, 900 Units/hr at 09/17/24 1632    insulin lispro (HumaLOG) injection 0-5 Units, 0-5 Units, subcutaneous, TID, Naty Terrell PA-C    lactated Ringer's infusion, 100 mL/hr, intravenous, Continuous, Jarocho Ice, DO, Last Rate: 100 mL/hr at 09/18/24 1514, 100 mL/hr at 09/18/24 1514    lamoTRIgine (LaMICtal) tablet 200 mg, 200 mg, oral, q AM, Naty Terrell PA-C, 200 mg at 09/18/24 0934    lotilaner 0.25 % drops 1 drop, 1 drop, Both Eyes, Nightly, Naty Terrell PA-C    nystatin  (Mycostatin) 100,000 unit/gram powder, , Topical, BID, Naty COLLINS Null, PA-C, Given at 09/17/24 2131    oxygen (O2) therapy, , inhalation, Continuous PRN - O2/gases, Jarocho Ice, DO    pantoprazole (ProtoNix) EC tablet 40 mg, 40 mg, oral, Daily before breakfast, 40 mg at 09/18/24 0622 **OR** pantoprazole (ProtoNix) injection 40 mg, 40 mg, intravenous, Daily before breakfast, Naty COLLINS Null, PA-C    polyethylene glycol (Glycolax, Miralax) packet 17 g, 17 g, oral, Daily PRN, Naty F Null, PA-C    sodium chloride (PF) 0.9% solution 10 mL, 10 mL, intravenous, Daily, Naty F Null, PA-C, 10 mL at 09/18/24 0626    sucralfate (Carafate) tablet 1 g, 1 g, oral, Nightly, Naty F Null, PA-C, 1 g at 09/17/24 2330    SUMAtriptan (Imitrex) tablet 50 mg, 50 mg, oral, q2h PRN, Naty F Null, PA-C    traZODone (Desyrel) tablet 150 mg, 150 mg, oral, Nightly, Naty F Null, PA-C, 150 mg at 09/17/24 2127     Lab Results   Component Value Date    WBC 10.4 09/18/2024    HGB 8.9 (L) 09/18/2024    HCT 27.2 (L) 09/18/2024     (L) 09/18/2024    ALT 23 09/16/2024    AST 26 09/16/2024     (L) 09/17/2024    K 4.7 09/17/2024     09/17/2024    CREATININE 2.21 (H) 09/17/2024    BUN 32 (H) 09/17/2024    CO2 22 09/17/2024    TSH 1.82 05/16/2023    INR 1.2 (H) 09/16/2024    HGBA1C 4.5 07/11/2024    BLOODCULT No growth at 4 days -  FINAL REPORT 08/08/2024    BLOODCULT No growth at 4 days -  FINAL REPORT 08/08/2024       Vascular US Lower Extremity Venous Duplex Bilateral    Result Date: 9/17/2024           [place] [id number] [place] [id number] Tel [phone] and Fax [phone]  Vascular Lab Report VASC US LOWER EXTREMITY VENOUS DUPLEX BILATERAL  Patient Name:      JOSÉ LUIS REYNOLDS SYBIL        Reading Physician:  [id number] Andrew Day MD, RPVI Study Date:        9/16/2024             Ordering Physician: [id number] GABRIELLA                                                               ANUPAMA MRN/PID:           [id number]              Technologist:       Verito Logan T Accession#:        [id number]          Technologist 2: Date of Birth/Age: 1955 / 68 years Encounter#:         [id number] Gender:            F Admission Status:  Emergency             Location Performed: [facility]  Diagnosis/ICD: Shortness of breath-R06.02 Indication:    Shortness of Breath CPT Codes:     79342 Peripheral venous duplex scan for DVT complete  Patient History Gastric Bypass                 Abdominoplasty.  **CRITICAL RESULT** Critical Result: DVT B/L lower extremities Notification called to Joaquín White DO on 9/16/2024 at 12:24:59 PM by VIRGILIO Logan.  CONCLUSIONS: Right Lower Venous: There is acute non-occlusive deep vein thrombosis visualized in the distal external iliac, common femoral, profunda and proximal femoral veins. Cannot rule out thrombus in non-visualized posterior tibial and Peroneal veins. Additional Findings; Soft tissue edema is noted within the calf and Popliteal segments of the RLE. Left Lower Venous: There is acute non-occlusive deep vein thrombosis visualized in the distal external iliac, common femoral and proximal femoral veins. Cannot rule out thrombus in non-visualized peroneal and posterior tibial veins. Additional Findings; Soft tissue edema is noted within the left calf and Popliteal segments.  Comparison: Compared with study from 8/26/2024, Previous duplex was negative for DVT. Current examination is positive for bilateral DVT.  Imaging & Doppler Findings:  Right                 Compressible      Thrombus              Flow Distal External Iliac   Partial    Acute non-occlusive Spontaneous/Phasic CFV                     Partial    Acute non-occlusive     Continuous PFV                     Partial    Acute non-occlusive FV Proximal             Partial    Acute non-occlusive     Continuous FV Mid                     Yes             None FV Distal                 Yes             None Popliteal                 Yes             None         Spontaneous/Phasic  Left                  Compress      Thrombus              Flow Distal External Iliac Partial  Acute non-occlusive     Continuous CFV                   Partial  Acute non-occlusive Spontaneous/Phasic PFV                     Yes           None FV Proximal           Partial  Acute non-occlusive Spontaneous/Phasic FV Mid                  Yes           None FV Distal               Yes           None Popliteal               Yes           None         Spontaneous/Phasic  [id number] Andrew Day MD, MILO Electronically signed by [id number] MILO Padilla MD on 9/17/2024 at 11:28:32 PM  ** Final **     NM Lung perfusion with spect    Result Date: 9/17/2024  Interpreted By:  Beny Braxton and Kaur Arashdeep STUDY: NM LUNG PERFUSION WITH SPECT;  9/17/2024 9:06 am   INDICATION: Signs/Symptoms:Shortness of breath, bilateral DVTs.  <ICD-10 Codes - EPIC>   COMPARISON: Chest x-ray from 09/16/2024.   ACCESSION NUMBER(S): [id number]   ORDERING CLINICIAN: BRETT ZAMORANO   TECHNIQUE: DIVISION OF NUCLEAR MEDICINE PERFUSION LUNG SCANS   Multiple perfusion images of the lungs were acquired after the intravenous administration of 4.4 mCi of Tc-99m macroaggregated albumin (MAA). In addition, SPECT  of the chest was performed.   FINDINGS: Perfusion images of both lungs demonstrate mild heterogeneity throughout the lung fields bilaterally.   No wedge-shaped subsegmental or segmental perfusion defect throughout bilateral lung fields to suggest acute pulmonary embolism (low probability).       1. No wedge-shaped subsegmental or segmental perfusion defect seen on SPECT to suggest acute pulmonary embolism (low probability).   The interpretation above is based on modified PIOPED II and PISAPED criteria.   I personally reviewed the images/study and I agree with the findings as stated by Qmaar Sinha,  MD.  This study was interpreted at [facility], [place], [place].   MACRO: None   Signed by: Beny Braxton 9/17/2024 11:37 AM Dictation workstation:   [id number]    ECG 12 lead    Result Date: 9/17/2024  Sinus tachycardia Inferior infarct , age undetermined Anterolateral infarct , age undetermined Abnormal ECG    CT abdomen pelvis wo IV contrast    Result Date: 9/16/2024  Interpreted By:  Devin Delgado, STUDY: CT ABDOMEN PELVIS WO IV CONTRAST;  9/16/2024 1:59 pm   INDICATION: Signs/Symptoms:Rule out any significant deep tissue infection mostly clear drainage from previous abdominoplasty.     COMPARISON: 08/08/2024.   ACCESSION NUMBER(S): [id number]   ORDERING CLINICIAN: GABRIELLA ALTMAN   TECHNIQUE: CT of the abdomen and pelvis was performed. No contrast was administered. Coronal and sagittal reformations were made.   FINDINGS: There is some motion artifact as well as streak artifact related to patient's arms not being raised for the scan.   LOWER CHEST: Bibasilar mild irregular predominantly dependent atelectasis/scarring is present. Left breast implant again seen along with surgical clips along the left chest wall.   ABDOMEN:   LIVER: Cirrhotic morphology of the liver again seen with irregularity of the hepatic surface contour. No focal hepatic lesion identified.   BILE DUCTS: Nondilated.   GALLBLADDER: Surgically absent.   PANCREAS: There is some pancreatic parenchymal atrophy similar to prior with insinuating fat throughout the gland. There is a vague round approximate 1.1 cm low-attenuation probable cystic lesion of the pancreatic head   SPLEEN: Spleen is not significantly enlarged.   ADRENAL GLANDS: Within normal limits.   KIDNEYS AND URETERS: No renal or ureteral calculi are seen bilaterally.  No hydroureteronephrosis bilaterally. Left renal lower pole lateral partially exophytic 3.7 cm cyst is again seen. Additional previously visualized smaller renal cysts are less  conspicuous on noncontrast exam.   Urinary bladder is partially distended. No bladder calculi. Scattered small pelvic phleboliths are present.   VESSELS: Scattered small atherosclerotic calcifications are seen in the aorta and branch vessels. No aortic aneurysm. Unenhanced IVC is unremarkable.   BOWEL: Postoperative changes of the stomach with suture line along the greater curvature again seen. No bowel obstruction. Appendix not identified.   No appreciable significant diverticular disease.   PERITONEUM/RETROPERITONEUM/LYMPH NODES: Small-moderate volume ascites is present along with mild generalized mesenteric and omental edema. No free intraperitoneal air.   No retroperitoneal fluid collection or lymphadenopathy.       ABDOMINAL WALL: There is mild diastasis rectus with mild midline protrusion of the abdominal contents. Irregular subcutaneous tissue edema is seen throughout the abdomen and pelvis. Foci of soft tissue gas are also seen throughout the anterior subcutaneous tissues along with overlying bandage artifact.   BONE AND SOFT TISSUE: Thoracolumbar mild levocurvature may be partially exaggerated by positioning. Facet arthrosis is greatest in the lower lumbar spine. There is joint space narrowing and marginal spurring of both hips.   Right gluteal region subcutaneous tissue stimulator device is seen with associated lead extending through a right mid sacral foramen.       Irregular subcutaneous tissue edema of the abdomen and pelvis. Multiple foci of soft tissue gas in the anterior abdominal and pelvic subcutaneous tissues anteriorly. No organized subcutaneous tissue fluid collection.   No renal or ureteral calculi. No hydroureteronephrosis bilaterally.   Cirrhosis.   Small-moderate volume ascites with generalized mesenteric edema.   Vague round 1.1 cm low-attenuation probable cystic lesion of the pancreatic head. Cystic neoplasm can not be excluded. Follow-up pancreatic MRI with contrast may be considered for  further characterization.   MACRO: None.   Signed by: Devin Delgado 9/16/2024 2:32 PM Dictation workstation:   [id number]    CT cervical spine wo IV contrast    Result Date: 9/16/2024  Interpreted By:  Devin Delgado, STUDY: CT CERVICAL SPINE WO IV CONTRAST;  9/16/2024 1:59 pm   INDICATION: Signs/Symptoms:fall.     COMPARISON: None.   ACCESSION NUMBER(S): [id number]   ORDERING CLINICIAN: GABRIELLA ALTMAN   TECHNIQUE: Contiguous unenhanced axial images were obtained through the cervical spine with coronal and sagittal reformations of the axial data.   FINDINGS: ALIGNMENT: The craniocervical junction appears intact.  The dens and atlantoaxial relation appear intact with regional irregular marginal spurring. There is mild reversal of the cervical lordosis which may be exaggerated by positioning and/or spasm.   VERTEBRAE/DISC SPACES: Multilevel disc space narrowing and endplate spurring is most prominent at C4-5, C5-6 and C6-7. Mild facet arthrosis is present bilaterally throughout the cervical spine as well. No acute fracture, subluxation, or compression deformity is seen.   ADDITIONAL FINDINGS: Prevertebral soft tissues are not thickened.   Surgical clips are seen in the left lower neck posterolateral soft tissues.       No acute fracture or subluxation of the cervical spine.   Multilevel degenerative disc and facet changes of the cervical spine.   MACRO: None.   Signed by: Devin Delgado 9/16/2024 2:21 PM Dictation workstation:   [id number]    CT head wo IV contrast    Result Date: 9/16/2024  Interpreted By:  Devin Delgado, STUDY: CT HEAD WO IV CONTRAST;  9/16/2024 1:59 pm   INDICATION: Signs/Symptoms:fall.     COMPARISON: 08/26/2024.   ACCESSION NUMBER(S): [id number]   ORDERING CLINICIAN: GABRIELLA ALTMAN   TECHNIQUE: Contiguous unenhanced axial images were obtained through the brain.   FINDINGS: INTRACRANIAL: No acute intracranial bleed, midline shift, or mass effect is seen. Gray-white  differentiation is maintained. No extra-axial fluid collection or hydrocephalus. Left basal ganglia small dystrophic calcification is again seen.   Bones are intact.   EXTRACRANIAL: Visualized paranasal sinuses and mastoids are clear.       No acute intracranial process.       MACRO: None.   Signed by: Devin Delgado 9/16/2024 2:19 PM Dictation workstation:   [id number]    XR chest 1 view    Result Date: 9/16/2024  Interpreted By:  Devin Delgado, STUDY: XR CHEST 1 VIEW;  9/16/2024 11:44 am   INDICATION: Signs/Symptoms:fall.     COMPARISON: None.   ACCESSION NUMBER(S): [id number]   ORDERING CLINICIAN: GABRIELLA ALTMAN   FINDINGS: CARDIOMEDIASTINAL SILHOUETTE: Right jugular central line has tip at the SVC level. Cardiac silhouette is borderline in size and may be partially exaggerated by low inspiratory volume.   LUNGS: Inspiratory volume is low. No focal infiltrate. No definite pleural effusion. No pneumothorax is seen.   ABDOMEN: Upper abdominal surgical clips are present.   BONES: Thoracic mild dextrocurvature may be partially exaggerated by positioning. There is partial visualization of degenerative changes of the shoulders as well as a left humeral head surgical anchor. Mild contour irregularity of the left humeral neck may be related to old trauma. Left axillary region surgical clips are also noted.       1.  Low inspiratory volume. No focal infiltrate. 2. Partially visualized mild contour irregularity of the left humeral neck may be related to old trauma. Correlate with injury history and pain in this region.       MACRO: None.   Signed by: Devin Delgado 9/16/2024 12:40 PM Dictation workstation:   [id number]       Assessment/Plan   Principal Problem:    Acute deep vein thrombosis (DVT) of proximal vein of both lower extremities (Multi)  Active Problems:    Disruption of wound, unspecified, initial encounter    Disruption of external surgical wound  I evaluated this patient at bedside.  Plastic  surgery on hold due to recent diagnosis of bilateral DVTs.  Continue elevation of bilateral lower extremities.  Continue anticoagulation therapy if no other procedures scheduled patient can be converted to oral anticoagulation therapy.  Continue elevation of lower extremities.  Concern for protein deficiency due to wound VAC in nutritional intake.  This will be important for wound healing.  Patient's hemoglobin trending down continue to monitor for signs of bleeding.  Once converted to oral anticoagulation therapy from a vascular surgery standpoint there are no objections for this patient to be discharged.    Thank you very much for allowing Vascular Surgery to be involved in the care of your patient sincerely Daniel IRVING-CNP .  (This note was generated with voice recognition software and may contain errors including spelling, grammar, syntax and missed recognition of what was dictated, of which may not have been fully corrected)    Daniel Sadler, ARISTIDES-CNP

## 2024-09-18 NOTE — PROGRESS NOTES
Occupational Therapy                 Therapy Communication Note    Patient Name: Mariann Drew  MRN: [id number]  Department: [facility]  Room: 93/932-A  Today's Date: 9/18/2024     Discipline: Occupational Therapy    Missed Visit Reason: Missed Visit Reason:  (patient scheduled for torso debridement this date; await orders after procedure; will reattempt when medically appropriate)    Missed Time: Attempt

## 2024-09-18 NOTE — ANESTHESIA PREPROCEDURE EVALUATION
Patient: Mariann Drew    Procedure Information       Date/Time: 09/18/24 1120    Procedure: TORSO DEBRIDEMENT    Location: [facility] / [facility]    Surgeons: Anibal Tiwari MD            Relevant Problems   Cardiac   (+) Essential hypertension   (+) Hyperlipidemia      Pulmonary   (+) ANKUSH (obstructive sleep apnea)   (+) Shortness of breath on exertion      Neuro   (+) Anxiety   (+) Bilateral occipital neuralgia   (+) Cervical radiculopathy   (+) Current mild episode of major depressive disorder (CMS-HCC)   (+) Depression, major, in remission (CMS-HCC)   (+) Other bipolar disorder (Multi)      GI   (+) Acute ulcer of stomach   (+) GI bleed   (+) Gastroesophageal reflux disease without esophagitis      /Renal   (+) Recurrent UTI      Liver   (+) Fatty liver   (+) Hepatitis C   (+) Liver cirrhosis secondary to HAZEL (nonalcoholic steatohepatitis) (Multi)   (+) Nonalcoholic steatohepatitis (HAZEL)      Endocrine   (+) Localized adiposity   (+) Type 2 diabetes mellitus without complication, without long-term current use of insulin (Multi)      Hematology   (+) Acute deep vein thrombosis (DVT) of proximal vein of both lower extremities (Multi)   (+) Anemia      Musculoskeletal   (+) Cervical myofascial pain syndrome   (+) Cervical spondylosis with radiculopathy   (+) Chronic neck pain   (+) Knee osteoarthritis   (+) Myofascial pain syndrome, cervical   (+) Primary osteoarthritis involving multiple joints   (+) Rheumatoid arthritis, involving unspecified site, unspecified whether rheumatoid factor present (Multi)      HEENT   (+) Mixed hearing loss   (+) Vision loss      ID   (+) Hepatitis C   (+) Recurrent UTI   (+) Surgical site infection      GYN   (+) Breast cancer (Multi)       Clinical information reviewed:    Allergies  Meds  Problems              NPO Detail:  No data recorded     PHYSICAL EXAM    Anesthesia Plan    History of general anesthesia?: yes  History of complications of general anesthesia?:  no    ASA 3     general     The patient is not a current smoker.    intravenous induction   Postoperative administration of opioids is intended.  Trial extubation is planned.  Anesthetic plan and risks discussed with patient.  Use of blood products discussed with patient who consented to blood products.    Plan discussed with [facility].

## 2024-09-18 NOTE — PROGRESS NOTES
"Mariann Drew is a 68 y.o. female on day 2 of admission presenting with Acute deep vein thrombosis (DVT) of proximal vein of both lower extremities (Multi).    Subjective   Unable to get up, weak, feelign ok otherwise, inquring about snf       Objective     Physical Exam  Constitutional:       Appearance: Normal appearance.   HENT:      Head: Normocephalic and atraumatic.      Right Ear: Tympanic membrane and ear canal normal.      Left Ear: Tympanic membrane and ear canal normal.      Mouth/Throat:      Mouth: Mucous membranes are moist.      Pharynx: Oropharynx is clear.   Eyes:      Extraocular Movements: Extraocular movements intact.      Conjunctiva/sclera: Conjunctivae normal.      Pupils: Pupils are equal, round, and reactive to light.   Cardiovascular:      Rate and Rhythm: Normal rate and regular rhythm.      Pulses: Normal pulses.      Heart sounds: Normal heart sounds.   Pulmonary:      Effort: Pulmonary effort is normal.      Breath sounds: Normal breath sounds.   Abdominal:      General: Abdomen is flat. Bowel sounds are normal.      Palpations: Abdomen is soft.   Musculoskeletal:         General: Normal range of motion.      Cervical back: Normal range of motion and neck supple.   Skin:     General: Skin is warm and dry.      Capillary Refill: Capillary refill takes 2 to 3 seconds.   Neurological:      General: No focal deficit present.      Mental Status: She is alert and oriented to person, place, and time. Mental status is at baseline.   Psychiatric:         Mood and Affect: Mood normal.         Behavior: Behavior normal.         Thought Content: Thought content normal.         Judgment: Judgment normal.         Last Recorded Vitals  Blood pressure 121/68, pulse 90, temperature 37.2 °C (99 °F), resp. rate 14, height 1.626 m (5' 4\"), weight 72.6 kg (160 lb), SpO2 98%.  Intake/Output last 3 Shifts:  I/O last 3 completed shifts:  In: 2887 (39.8 mL/kg) [I.V.:2637 (36.3 mL/kg); IV Piggyback:250]  Out: " 850 (11.7 mL/kg) [Urine:500 (0.2 mL/kg/hr); Drains:350]  Weight: 72.6 kg     Relevant Results            This patient currently has cardiac telemetry ordered; if you would like to modify or discontinue the telemetry order, click here to go to the orders activity to modify/discontinue the order.  This patient has a central line   Reason for the central line remaining today? Line unnecessary, will be removed today    This patient has a urinary catheter   Reason for the urinary catheter remaining today? urinary retention/bladder outlet obstruction, acute or chronic               Assessment/Plan   Assessment & Plan  Acute deep vein thrombosis (DVT) of proximal vein of both lower extremities (Multi)    Disruption of wound, unspecified, initial encounter    Disruption of external surgical wound    Deep vein thrombosis, bilateral lower extremities  BLE edema  -Vascular surgery consult and recommendations appreciated  -V/Q scan ordered due to patient's RUSSELL (pt experiencing shortness of breath and tachycardia)  -Heparin drip initiated in the ED, continue     Abdominal wound  Hx of abdominoplasty and gastric bypass surgery (7/30/2024)  Hypoalbuminemia  -Infectious disease consult and reccomendations appreciated, pt follows with Dr. Ying  -CT imaging results as above  -Pt inititated on Merrem and vancomycin in the ED, will continue. Further abx adjustments per ID  -Per [facility] note, patient is to have wound vac changed every Monday and Thursday. Wound vac setting is 125 mmHg, irrigate with saline prior to placing. Black granufoam dressing  -Wound prevention techniques     Acute kidney injury  -Cr 1.92 today, 1.59 on 9/10  -BMP in the a.m.  -Will hold diuresis for now due to RUSSELL. Also holding on fluids due to pitting edema in BLE     Hypomagnesemia  -Magnesium 1.49 today, replacement ordered in the ED  -Monitor with BMP in the a.m.     Thrombocytopenia  -Platelets 118 today, appears chronic  -Monitor with CBC in the AM      Cystic lesion of pancreatic head on CT imaging  -Cystic neoplasm cannot be excluded, per radiologist  -Follow-up pancreatic MRI with contrast may be considered for further characterization     History of falls  -PT/OT eval and treat  -Fall precautions     T2DM  -Sliding scale insulin  -Diabetic diet, hypoglycemic protocol     DVT Prophylaxis  -Heparin  -SCDs     9/18: v/q negaitve on heparin pt/ot plans for snf     I spent 60 minutes in the professional and overall care of this patient.         I spent 35 minutes in the professional and overall care of this patient.      Jarocho Venegas, DO

## 2024-09-19 ENCOUNTER — APPOINTMENT (OUTPATIENT)
Dept: ORTHOPEDIC SURGERY | Facility: CLINIC | Age: 69
End: 2024-09-19
Payer: MEDICARE

## 2024-09-19 LAB
ANION GAP SERPL CALC-SCNC: 9 MMOL/L (ref 10–20)
BUN SERPL-MCNC: 32 MG/DL (ref 6–23)
CALCIUM SERPL-MCNC: 8 MG/DL (ref 8.6–10.3)
CHLORIDE SERPL-SCNC: 103 MMOL/L (ref 98–107)
CO2 SERPL-SCNC: 28 MMOL/L (ref 21–32)
CREAT SERPL-MCNC: 1.95 MG/DL (ref 0.5–1.05)
EGFRCR SERPLBLD CKD-EPI 2021: 27 ML/MIN/1.73M*2
ERYTHROCYTE [DISTWIDTH] IN BLOOD BY AUTOMATED COUNT: 14.2 % (ref 11.5–14.5)
GLUCOSE BLD MANUAL STRIP-MCNC: 114 MG/DL (ref 74–99)
GLUCOSE BLD MANUAL STRIP-MCNC: 151 MG/DL (ref 74–99)
GLUCOSE BLD MANUAL STRIP-MCNC: 175 MG/DL (ref 74–99)
GLUCOSE BLD MANUAL STRIP-MCNC: 86 MG/DL (ref 74–99)
GLUCOSE SERPL-MCNC: 144 MG/DL (ref 74–99)
HCT VFR BLD AUTO: 27.3 % (ref 36–46)
HGB BLD-MCNC: 8.7 G/DL (ref 12–16)
MAGNESIUM SERPL-MCNC: 1.93 MG/DL (ref 1.6–2.4)
MCH RBC QN AUTO: 29.4 PG (ref 26–34)
MCHC RBC AUTO-ENTMCNC: 31.9 G/DL (ref 32–36)
MCV RBC AUTO: 92 FL (ref 80–100)
NRBC BLD-RTO: 0 /100 WBCS (ref 0–0)
PHOSPHATE SERPL-MCNC: 3.4 MG/DL (ref 2.5–4.9)
PLATELET # BLD AUTO: 88 X10*3/UL (ref 150–450)
POTASSIUM SERPL-SCNC: 4.4 MMOL/L (ref 3.5–5.3)
RBC # BLD AUTO: 2.96 X10*6/UL (ref 4–5.2)
SODIUM SERPL-SCNC: 136 MMOL/L (ref 136–145)
UFH PPP CHRO-ACNC: 0.4 IU/ML
WBC # BLD AUTO: 6.9 X10*3/UL (ref 4.4–11.3)

## 2024-09-19 PROCEDURE — 2500000002 HC RX 250 W HCPCS SELF ADMINISTERED DRUGS (ALT 637 FOR MEDICARE OP, ALT 636 FOR OP/ED)

## 2024-09-19 PROCEDURE — 2500000005 HC RX 250 GENERAL PHARMACY W/O HCPCS

## 2024-09-19 PROCEDURE — 2500000001 HC RX 250 WO HCPCS SELF ADMINISTERED DRUGS (ALT 637 FOR MEDICARE OP): Performed by: STUDENT IN AN ORGANIZED HEALTH CARE EDUCATION/TRAINING PROGRAM

## 2024-09-19 PROCEDURE — 82947 ASSAY GLUCOSE BLOOD QUANT: CPT

## 2024-09-19 PROCEDURE — 2500000004 HC RX 250 GENERAL PHARMACY W/ HCPCS (ALT 636 FOR OP/ED): Performed by: INTERNAL MEDICINE

## 2024-09-19 PROCEDURE — 82306 VITAMIN D 25 HYDROXY: CPT | Mod: PARLAB | Performed by: INTERNAL MEDICINE

## 2024-09-19 PROCEDURE — 97161 PT EVAL LOW COMPLEX 20 MIN: CPT | Mod: GP

## 2024-09-19 PROCEDURE — 2500000005 HC RX 250 GENERAL PHARMACY W/O HCPCS: Performed by: INTERNAL MEDICINE

## 2024-09-19 PROCEDURE — 2500000001 HC RX 250 WO HCPCS SELF ADMINISTERED DRUGS (ALT 637 FOR MEDICARE OP)

## 2024-09-19 PROCEDURE — 80048 BASIC METABOLIC PNL TOTAL CA: CPT | Performed by: STUDENT IN AN ORGANIZED HEALTH CARE EDUCATION/TRAINING PROGRAM

## 2024-09-19 PROCEDURE — 85027 COMPLETE CBC AUTOMATED: CPT | Performed by: STUDENT IN AN ORGANIZED HEALTH CARE EDUCATION/TRAINING PROGRAM

## 2024-09-19 PROCEDURE — 2500000004 HC RX 250 GENERAL PHARMACY W/ HCPCS (ALT 636 FOR OP/ED): Performed by: STUDENT IN AN ORGANIZED HEALTH CARE EDUCATION/TRAINING PROGRAM

## 2024-09-19 PROCEDURE — 85520 HEPARIN ASSAY: CPT | Performed by: STUDENT IN AN ORGANIZED HEALTH CARE EDUCATION/TRAINING PROGRAM

## 2024-09-19 PROCEDURE — 99232 SBSQ HOSP IP/OBS MODERATE 35: CPT | Performed by: STUDENT IN AN ORGANIZED HEALTH CARE EDUCATION/TRAINING PROGRAM

## 2024-09-19 PROCEDURE — 2500000004 HC RX 250 GENERAL PHARMACY W/ HCPCS (ALT 636 FOR OP/ED)

## 2024-09-19 PROCEDURE — 1200000002 HC GENERAL ROOM WITH TELEMETRY DAILY

## 2024-09-19 PROCEDURE — 97165 OT EVAL LOW COMPLEX 30 MIN: CPT | Mod: GO

## 2024-09-19 RX ADMIN — PANTOPRAZOLE SODIUM 40 MG: 40 TABLET, DELAYED RELEASE ORAL at 05:55

## 2024-09-19 RX ADMIN — FLUDROCORTISONE ACETATE 0.1 MG: 0.1 TABLET ORAL at 09:06

## 2024-09-19 RX ADMIN — DEXAMETHASONE 0.5 MG: 1 TABLET ORAL at 12:42

## 2024-09-19 RX ADMIN — SODIUM CHLORIDE 10 ML: 9 INJECTION INTRAMUSCULAR; INTRAVENOUS; SUBCUTANEOUS at 05:58

## 2024-09-19 RX ADMIN — APIXABAN 10 MG: 5 TABLET, FILM COATED ORAL at 09:06

## 2024-09-19 RX ADMIN — SUCRALFATE 1 G: 1 TABLET ORAL at 19:53

## 2024-09-19 RX ADMIN — ERTAPENEM SODIUM 500 MG: 1 INJECTION, POWDER, LYOPHILIZED, FOR SOLUTION INTRAMUSCULAR; INTRAVENOUS at 09:06

## 2024-09-19 RX ADMIN — ALLOPURINOL 100 MG: 100 TABLET ORAL at 09:06

## 2024-09-19 RX ADMIN — DEXAMETHASONE 0.5 MG: 1 TABLET ORAL at 19:52

## 2024-09-19 RX ADMIN — SODIUM CHLORIDE, POTASSIUM CHLORIDE, SODIUM LACTATE AND CALCIUM CHLORIDE 100 ML/HR: 600; 310; 30; 20 INJECTION, SOLUTION INTRAVENOUS at 01:48

## 2024-09-19 RX ADMIN — APIXABAN 10 MG: 5 TABLET, FILM COATED ORAL at 19:52

## 2024-09-19 RX ADMIN — INSULIN LISPRO 1 UNITS: 100 INJECTION, SOLUTION INTRAVENOUS; SUBCUTANEOUS at 17:12

## 2024-09-19 RX ADMIN — TRAZODONE HYDROCHLORIDE 150 MG: 50 TABLET ORAL at 19:53

## 2024-09-19 RX ADMIN — DEXAMETHASONE 0.5 MG: 1 TABLET ORAL at 17:11

## 2024-09-19 RX ADMIN — DEXAMETHASONE 0.5 MG: 1 TABLET ORAL at 05:55

## 2024-09-19 RX ADMIN — LAMOTRIGINE 200 MG: 100 TABLET ORAL at 09:06

## 2024-09-19 ASSESSMENT — COGNITIVE AND FUNCTIONAL STATUS - GENERAL
TOILETING: TOTAL
DRESSING REGULAR UPPER BODY CLOTHING: A LITTLE
STANDING UP FROM CHAIR USING ARMS: A LOT
MOVING TO AND FROM BED TO CHAIR: A LITTLE
DRESSING REGULAR LOWER BODY CLOTHING: TOTAL
DRESSING REGULAR LOWER BODY CLOTHING: A LOT
WALKING IN HOSPITAL ROOM: A LOT
TURNING FROM BACK TO SIDE WHILE IN FLAT BAD: A LITTLE
MOVING FROM LYING ON BACK TO SITTING ON SIDE OF FLAT BED WITH BEDRAILS: A LITTLE
STANDING UP FROM CHAIR USING ARMS: A LITTLE
MOBILITY SCORE: 12
WALKING IN HOSPITAL ROOM: A LOT
HELP NEEDED FOR BATHING: A LOT
PERSONAL GROOMING: A LITTLE
MOVING TO AND FROM BED TO CHAIR: A LOT
DAILY ACTIVITIY SCORE: 13
MOBILITY SCORE: 16
DRESSING REGULAR UPPER BODY CLOTHING: A LOT
TURNING FROM BACK TO SIDE WHILE IN FLAT BAD: A LOT
DAILY ACTIVITIY SCORE: 15
CLIMB 3 TO 5 STEPS WITH RAILING: A LOT
HELP NEEDED FOR BATHING: TOTAL
PERSONAL GROOMING: A LITTLE
CLIMB 3 TO 5 STEPS WITH RAILING: A LOT
MOVING FROM LYING ON BACK TO SITTING ON SIDE OF FLAT BED WITH BEDRAILS: A LOT
TOILETING: A LOT

## 2024-09-19 ASSESSMENT — PAIN SCALES - GENERAL
PAINLEVEL_OUTOF10: 0 - NO PAIN
PAINLEVEL_OUTOF10: 0 - NO PAIN

## 2024-09-19 NOTE — PROGRESS NOTES
Met with pt again, pt may need hhc vs skilled care, list was printed from [facility].    Awaiting therapies.  Lorelei Ponce RN [facility]

## 2024-09-19 NOTE — PROGRESS NOTES
Infectious disease progress note  Subjective   Large abdominal wound with abdominoplasty (group B strep)      Antibiotics  Invanz end of therapy today will discontinue    Objective   Range of Vitals (last 24 hours)  Heart Rate:  [87-98]   Temp:  [35.7 °C (96.3 °F)-36.9 °C (98.4 °F)]   Resp:  [15-18]   BP: ()/(58-80)   SpO2:  [90 %-100 %]   Daily Weight  09/16/24 : 72.6 kg (160 lb)    Body mass index is 27.46 kg/m².      Physical Exam  Patient resting in bed awake alert  by bedside.  We went over the fact that she did not move enough when she went home.  She will be having surgery in a couple weeks by Dr. Zabala to do further closure of her abdominal wound      Relevant Results  Labs  Lab Results   Component Value Date    WBC 6.9 09/19/2024    HGB 8.7 (L) 09/19/2024    HCT 27.3 (L) 09/19/2024    MCV 92 09/19/2024    PLT 88 (L) 09/19/2024     Lab Results   Component Value Date    GLUCOSE 144 (H) 09/19/2024    CALCIUM 8.0 (L) 09/19/2024     09/19/2024    K 4.4 09/19/2024    CO2 28 09/19/2024     09/19/2024    BUN 32 (H) 09/19/2024    CREATININE 1.95 (H) 09/19/2024   ESR: --  Lab Results   Component Value Date    SEDRATE 4 09/10/2024     Lab Results   Component Value Date    CRP 2.34 (H) 09/10/2024     Lab Results   Component Value Date    ALT 23 09/16/2024    AST 26 09/16/2024    ALKPHOS 167 (H) 09/16/2024    BILITOT 0.4 09/16/2024       Microbiology  8-8-2024 wound culture group B strep  8-8-2024 blood cultures no growth 4 days     Imaging  9- CT abdomen pelvis shows irregular subcutaneous tissue edema of the abdomen pelvis no organized subcutaneous tissue fluid collection      Assessment/Plan   1.  Will discontinue Invanz today and observe off  2.  Patient continue with the VAC  3.  Awaiting ECF placement and patient will need to follow-up with me in 2 weeks at the [facility].  Will write order for this    Other issues  Abdominoplasty/gastric bypass surgery on  7-  Diabetes mellitus puts patient at higher risk for infections  GERD  History of breast cancer status post left mastectomy  Gout  Tardive dyskinesia     .  I reviewed and interpreted all lab test imaging studies and documentations from other healthcare providers  I am monitoring for antibiotic side effects and toxicity     Antonia Ying MD

## 2024-09-19 NOTE — CONSULTS
Nephrology Consult Note    Reason For Consult  Elevated creatinine    History Of Present Illness  Mariann Drew is a 68 y.o. female with PMH of DM now improved glycemic control after weight loss, recurrent falls, abdominoplasty and gastric bypass surgery (7/30/2024), ANKUSH, recurrent UTIs, breast cancer status post left mastectomy, gout, tardive dyskinesia presenting with leg pain, found to have acute DVT, seen and evaluated by ID, started on iv Abx course c/b RUSSELL    Hodge placed after the admission  Abx changed by ID, now off Invanz  Constipation+  No urinary complains  Tolerating po    Seen and evaluated by the surgical team, repeat debridement postponed for OP   Known to our service from last admission here 9/24, evaluated for RUSSELL and hyponatremia at that time    Past Medical History  She has a past medical history of Anxiety, Arthritis, Cervicalgia (02/13/2017), Depression, Dysphagia, unspecified (02/23/2021), Encounter for immunization (12/02/2022), Epidermal cyst (02/04/2014), Fatty (change of) liver, not elsewhere classified (02/01/2014), GERD (gastroesophageal reflux disease), Glaucoma, Liver disease, unspecified, Localized swelling, mass and lump, unspecified (02/23/2021), Long term (current) use of opiate analgesic (02/20/2018), Long term (current) use of opiate analgesic (02/20/2018), Nonalcoholic steatohepatitis (HAZLE), Other instability, right knee (05/17/2018), Other specified abnormal findings of blood chemistry, Other specified symptoms and signs involving the circulatory and respiratory systems (12/01/2015), Pain in right hand (03/27/2017), Pain in right knee (06/21/2018), Pain in throat (12/01/2015), Pain in unspecified knee (06/28/2017), Pain in unspecified shoulder (03/23/2017), Personal history of diseases of the skin and subcutaneous tissue (06/20/2018), Personal history of diseases of the skin and subcutaneous tissue (06/02/2014), Personal history of malignant neoplasm of breast, Personal  history of other diseases of the digestive system (10/13/2021), Personal history of other diseases of the musculoskeletal system and connective tissue, Personal history of other diseases of the nervous system and sense organs (10/13/2021), Personal history of other diseases of the respiratory system (01/14/2014), Personal history of other endocrine, nutritional and metabolic disease, Personal history of other infectious and parasitic diseases (11/24/2014), Personal history of other specified conditions, Personal history of peptic ulcer disease (01/14/2014), Personal history of pneumonia (recurrent) (01/14/2014), Personal history of traumatic brain injury (02/23/2021), Polyp of stomach and duodenum (01/14/2014), Polyp of stomach and duodenum (01/14/2014), Repeated falls (11/09/2015), Secondary and unspecified malignant neoplasm of lymph node, unspecified (Multi), Sleep apnea, Tardive dyskinesia, Type 2 diabetes mellitus (Multi), Urinary tract infection, site not specified (08/01/2019), and Urinary tract infection, site not specified (04/19/2020).    She has no past medical history of Scoliosis.    Surgical History  She has a past surgical history that includes Other surgical history (12/17/2013); Other surgical history (12/17/2013); Other surgical history (12/17/2013); Total knee arthroplasty (04/22/2014); Other surgical history (01/13/2014); Carpal tunnel release (01/13/2014); Colonoscopy (01/13/2014); Cholecystectomy (01/13/2014); Other surgical history (10/13/2021); Other surgical history; Hysterectomy (10/13/2021); Liver biopsy; CT angio head w and wo IV contrast (07/07/2023); CT angio neck (07/07/2023); and Cataract extraction.     Social History  She reports that she quit smoking about 51 years ago. Her smoking use included cigarettes. She has never used smokeless tobacco. She reports that she does not currently use alcohol. She reports current drug use. Drug: Marijuana.    Family History  Family History    Problem Relation Name Age of Onset    Arthritis Mother      Coronary artery disease Mother      Coronary artery disease Father      Arthritis Father      Heart failure Father      Glaucoma Father      Prostate cancer Brother      Stroke Maternal Grandmother      Dementia Maternal Grandmother      Stroke Maternal Grandfather      Dementia Maternal Grandfather      Stroke Paternal Grandmother      Hypertension Other      Diabetes Other      Asthma Other      Ulcerative colitis Other      Goiter Other      Psoriasis Other          Allergies  Penicillins, Cefepime, Avelox [moxifloxacin], Bactrim [sulfamethoxazole-trimethoprim], Oxycodone, Phenergan [promethazine], Tramadol, Trintellix [vortioxetine], Adhesive tape-silicones, Cephalexin, Codeine, Erythromycin, Hydroxychloroquine, Nsaids (non-steroidal anti-inflammatory drug), and [id number]-[id number] antimigraine agents    Review of Systems  Per HPI     Physical Exam    Vitals 24HR  Heart Rate:  [87-98]   Temp:  [35.7 °C (96.3 °F)-36.6 °C (97.9 °F)]   Resp:  [16-18]   BP: ()/(58-80)   SpO2:  [90 %-100 %]        AAOx3, in pain, on RA  Decreased AEBL  RRR, no murmurs  Abd obese, soft, + BS  Chronic skin changes  Hodge+  Wound vac+           I&O 24HR    Intake/Output Summary (Last 24 hours) at 9/19/2024 1300  Last data filed at 9/19/2024 0645  Gross per 24 hour   Intake --   Output 1075 ml   Net -1075 ml       Scheduled medications  allopurinol, 100 mg, oral, Daily  apixaban, 10 mg, oral, BID   Followed by  [START ON 9/26/2024] apixaban, 5 mg, oral, BID  deutetrabenazine, 24 mg, oral, Daily  deutetrabenazine, 6 mg, oral, Daily  dexAMETHasone, 0.5 mg, oral, 4x daily  fludrocortisone, 0.1 mg, oral, Daily  insulin lispro, 0-5 Units, subcutaneous, TID  lamoTRIgine, 200 mg, oral, q AM  lotilaner, 1 drop, Both Eyes, Nightly  nystatin, , Topical, BID  pantoprazole, 40 mg, oral, Daily before breakfast   Or  pantoprazole, 40 mg, intravenous, Daily before breakfast  sodium chloride  (PF) 0.9%, 10 mL, intravenous, Daily  sucralfate, 1 g, oral, Nightly  traZODone, 150 mg, oral, Nightly      Continuous medications  heparin, 0-4,500 Units/hr, Last Rate: 900 Units/hr (09/18/24 2029)  lactated Ringer's, 100 mL/hr, Last Rate: 100 mL/hr (09/19/24 0148)      PRN medications  PRN medications: acetaminophen, oxygen, polyethylene glycol, SUMAtriptan    Relevant Results      Admission on 09/16/2024   Component Date Value Ref Range Status    WBC 09/16/2024 10.1  4.4 - 11.3 x10*3/uL Final    nRBC 09/16/2024 0.0  0.0 - 0.0 /100 WBCs Final    RBC 09/16/2024 3.52 (L)  4.00 - 5.20 x10*6/uL Final    Hemoglobin 09/16/2024 10.4 (L)  12.0 - 16.0 g/dL Final    Hematocrit 09/16/2024 32.4 (L)  36.0 - 46.0 % Final    MCV 09/16/2024 92  80 - 100 fL Final    MCH 09/16/2024 29.5  26.0 - 34.0 pg Final    MCHC 09/16/2024 32.1  32.0 - 36.0 g/dL Final    RDW 09/16/2024 14.4  11.5 - 14.5 % Final    Platelets 09/16/2024 118 (L)  150 - 450 x10*3/uL Final    Neutrophils % 09/16/2024 76.8  40.0 - 80.0 % Final    Immature Granulocytes %, Automated 09/16/2024 0.6  0.0 - 0.9 % Final    Immature Granulocyte Count (IG) includes promyelocytes, myelocytes and metamyelocytes but does not include bands. Percent differential counts (%) should be interpreted in the context of the absolute cell counts (cells/UL).    Lymphocytes % 09/16/2024 11.8  13.0 - 44.0 % Final    Monocytes % 09/16/2024 6.6  2.0 - 10.0 % Final    Eosinophils % 09/16/2024 3.5  0.0 - 6.0 % Final    Basophils % 09/16/2024 0.7  0.0 - 2.0 % Final    Neutrophils Absolute 09/16/2024 7.74 (H)  1.20 - 7.70 x10*3/uL Final    Percent differential counts (%) should be interpreted in the context of the absolute cell counts (cells/uL).    Immature Granulocytes Absolute, Au* 09/16/2024 0.06  0.00 - 0.70 x10*3/uL Final    Lymphocytes Absolute 09/16/2024 1.19 (L)  1.20 - 4.80 x10*3/uL Final    Monocytes Absolute 09/16/2024 0.66  0.10 - 1.00 x10*3/uL Final    Eosinophils Absolute 09/16/2024  0.35  0.00 - 0.70 x10*3/uL Final    Basophils Absolute 09/16/2024 0.07  0.00 - 0.10 x10*3/uL Final    Glucose 09/16/2024 131 (H)  74 - 99 mg/dL Final    Sodium 09/16/2024 137  136 - 145 mmol/L Final    Potassium 09/16/2024 4.3  3.5 - 5.3 mmol/L Final    Chloride 09/16/2024 104  98 - 107 mmol/L Final    Bicarbonate 09/16/2024 23  21 - 32 mmol/L Final    Anion Gap 09/16/2024 14  10 - 20 mmol/L Final    Urea Nitrogen 09/16/2024 30 (H)  6 - 23 mg/dL Final    Creatinine 09/16/2024 1.92 (H)  0.50 - 1.05 mg/dL Final    eGFR 09/16/2024 28 (L)  >60 mL/min/1.73m*2 Final    Calculations of estimated GFR are performed using the 2021 CKD-EPI Study Refit equation without the race variable for the IDMS-Traceable creatinine methods.  https://jasn.asnjournals.org/content/early/2021/09/22/ASN.0074241595    Calcium 09/16/2024 8.0 (L)  8.6 - 10.3 mg/dL Final    Albumin 09/16/2024 2.3 (L)  3.4 - 5.0 g/dL Final    Alkaline Phosphatase 09/16/2024 167 (H)  33 - 136 U/L Final    Total Protein 09/16/2024 5.1 (L)  6.4 - 8.2 g/dL Final    AST 09/16/2024 26  9 - 39 U/L Final    Bilirubin, Total 09/16/2024 0.4  0.0 - 1.2 mg/dL Final    ALT 09/16/2024 23  7 - 45 U/L Final    Patients treated with Sulfasalazine may generate falsely decreased results for ALT.    Magnesium 09/16/2024 1.49 (L)  1.60 - 2.40 mg/dL Final    Troponin I, High Sensitivity 09/16/2024 7  0 - 13 ng/L Final    BNP 09/16/2024 22  0 - 99 pg/mL Final    Color, Urine 09/17/2024 Yellow  Light-Yellow, Yellow, Dark-Yellow Final    Appearance, Urine 09/17/2024 Clear  Clear Final    Specific Gravity, Urine 09/17/2024 1.017  1.005 - 1.035 Final    pH, Urine 09/17/2024 5.0  5.0, 5.5, 6.0, 6.5, 7.0, 7.5, 8.0 Final    Protein, Urine 09/17/2024 NEGATIVE  NEGATIVE, 10 (TRACE), 20 (TRACE) mg/dL Final    Glucose, Urine 09/17/2024 Normal  Normal mg/dL Final    Blood, Urine 09/17/2024 0.06 (1+) (A)  NEGATIVE Final    Ketones, Urine 09/17/2024 NEGATIVE  NEGATIVE mg/dL Final    Bilirubin, Urine  09/17/2024 NEGATIVE  NEGATIVE Final    Urobilinogen, Urine 09/17/2024 Normal  Normal mg/dL Final    Nitrite, Urine 09/17/2024 NEGATIVE  NEGATIVE Final    Leukocyte Esterase, Urine 09/17/2024 25 Melisa/µL (A)  NEGATIVE Final    Protime 09/16/2024 13.6 (H)  9.8 - 12.8 seconds Final    INR 09/16/2024 1.2 (H)  0.9 - 1.1 Final    aPTT 09/16/2024 28  27 - 38 seconds Final    Ventricular Rate 09/16/2024 102  BPM Preliminary    Atrial Rate 09/16/2024 102  BPM Preliminary    VA Interval 09/16/2024 156  ms Preliminary    QRS Duration 09/16/2024 78  ms Preliminary    QT Interval 09/16/2024 362  ms Preliminary    QTC Calculation(Bazett) 09/16/2024 471  ms Preliminary    P Axis 09/16/2024 51  degrees Preliminary    R Axis 09/16/2024 -12  degrees Preliminary    T Axis 09/16/2024 43  degrees Preliminary    QRS Count 09/16/2024 16  beats Preliminary    Q Onset 09/16/2024 215  ms Preliminary    P Onset 09/16/2024 137  ms Preliminary    P Offset 09/16/2024 189  ms Preliminary    T Offset 09/16/2024 396  ms Preliminary    QTC Fredericia 09/16/2024 431  ms Preliminary    Heparin Unfractionated 09/16/2024 1.4 (HH)  See Comment Below for Therapeutic Ranges IU/mL Final    Heparin Unfractionated 09/16/2024 0.9  See Comment Below for Therapeutic Ranges IU/mL Final    WBC 09/17/2024 8.8  4.4 - 11.3 x10*3/uL Final    nRBC 09/17/2024 0.0  0.0 - 0.0 /100 WBCs Final    RBC 09/17/2024 3.41 (L)  4.00 - 5.20 x10*6/uL Final    Hemoglobin 09/17/2024 10.0 (L)  12.0 - 16.0 g/dL Final    Hematocrit 09/17/2024 32.7 (L)  36.0 - 46.0 % Final    MCV 09/17/2024 96  80 - 100 fL Final    MCH 09/17/2024 29.3  26.0 - 34.0 pg Final    MCHC 09/17/2024 30.6 (L)  32.0 - 36.0 g/dL Final    RDW 09/17/2024 14.4  11.5 - 14.5 % Final    Platelets 09/17/2024 100 (L)  150 - 450 x10*3/uL Final    Glucose 09/17/2024 130 (H)  74 - 99 mg/dL Final    Sodium 09/17/2024 135 (L)  136 - 145 mmol/L Final    Potassium 09/17/2024 4.7  3.5 - 5.3 mmol/L Final    Chloride 09/17/2024 102  98  - 107 mmol/L Final    Bicarbonate 09/17/2024 22  21 - 32 mmol/L Final    Anion Gap 09/17/2024 16  10 - 20 mmol/L Final    Urea Nitrogen 09/17/2024 32 (H)  6 - 23 mg/dL Final    Creatinine 09/17/2024 2.21 (H)  0.50 - 1.05 mg/dL Final    eGFR 09/17/2024 24 (L)  >60 mL/min/1.73m*2 Final    Calculations of estimated GFR are performed using the 2021 CKD-EPI Study Refit equation without the race variable for the IDMS-Traceable creatinine methods.  https://jasn.asnjournals.org/content/early/2021/09/22/ASN.1602576443    Calcium 09/17/2024 8.1 (L)  8.6 - 10.3 mg/dL Final    POCT Glucose 09/17/2024 119 (H)  74 - 99 mg/dL Final    Heparin Unfractionated 09/17/2024 0.7  See Comment Below for Therapeutic Ranges IU/mL Final    Heparin Unfractionated 09/17/2024 0.9  See Comment Below for Therapeutic Ranges IU/mL Final    POCT Glucose 09/17/2024 110 (H)  74 - 99 mg/dL Final    WBC, Urine 09/17/2024 1-5  1-5, NONE /HPF Final    RBC, Urine 09/17/2024 6-10 (A)  NONE, 1-2, 3-5 /HPF Final    Squamous Epithelial Cells, Urine 09/17/2024 1-9 (SPARSE)  Reference range not established. /HPF Final    Mucus, Urine 09/17/2024 FEW  Reference range not established. /LPF Final    Hyaline Casts, Urine 09/17/2024 4+ (A)  NONE /LPF Final    POCT Glucose 09/17/2024 134 (H)  74 - 99 mg/dL Final    Heparin Unfractionated 09/17/2024 0.5  See Comment Below for Therapeutic Ranges IU/mL Final    POCT Glucose 09/17/2024 123 (H)  74 - 99 mg/dL Final    POCT Glucose 09/17/2024 142 (H)  74 - 99 mg/dL Final    WBC 09/18/2024 10.4  4.4 - 11.3 x10*3/uL Final    nRBC 09/18/2024 0.0  0.0 - 0.0 /100 WBCs Final    RBC 09/18/2024 3.01 (L)  4.00 - 5.20 x10*6/uL Final    Hemoglobin 09/18/2024 8.9 (L)  12.0 - 16.0 g/dL Final    Hematocrit 09/18/2024 27.2 (L)  36.0 - 46.0 % Final    MCV 09/18/2024 90  80 - 100 fL Final    MCH 09/18/2024 29.6  26.0 - 34.0 pg Final    MCHC 09/18/2024 32.7  32.0 - 36.0 g/dL Final    RDW 09/18/2024 14.2  11.5 - 14.5 % Final    Platelets  09/18/2024 102 (L)  150 - 450 x10*3/uL Final    Heparin Unfractionated 09/18/2024 0.4  See Comment Below for Therapeutic Ranges IU/mL Final    Heparin Unfractionated 09/18/2024 0.4  See Comment Below for Therapeutic Ranges IU/mL Final    POCT Glucose 09/18/2024 101 (H)  74 - 99 mg/dL Final    POCT Glucose 09/18/2024 104 (H)  74 - 99 mg/dL Final    POCT Glucose 09/18/2024 110 (H)  74 - 99 mg/dL Final    POCT Glucose 09/18/2024 123 (H)  74 - 99 mg/dL Final    Heparin Unfractionated 09/19/2024 0.4  See Comment Below for Therapeutic Ranges IU/mL Final    POCT Glucose 09/19/2024 86  74 - 99 mg/dL Final    Glucose 09/19/2024 144 (H)  74 - 99 mg/dL Final    Sodium 09/19/2024 136  136 - 145 mmol/L Final    Potassium 09/19/2024 4.4  3.5 - 5.3 mmol/L Final    Chloride 09/19/2024 103  98 - 107 mmol/L Final    Bicarbonate 09/19/2024 28  21 - 32 mmol/L Final    Anion Gap 09/19/2024 9 (L)  10 - 20 mmol/L Final    Urea Nitrogen 09/19/2024 32 (H)  6 - 23 mg/dL Final    Creatinine 09/19/2024 1.95 (H)  0.50 - 1.05 mg/dL Final    eGFR 09/19/2024 27 (L)  >60 mL/min/1.73m*2 Final    Calculations of estimated GFR are performed using the 2021 CKD-EPI Study Refit equation without the race variable for the IDMS-Traceable creatinine methods.  https://jasn.asnjournals.org/content/early/2021/09/22/ASN.3635071680    Calcium 09/19/2024 8.0 (L)  8.6 - 10.3 mg/dL Final    WBC 09/19/2024 6.9  4.4 - 11.3 x10*3/uL Final    nRBC 09/19/2024 0.0  0.0 - 0.0 /100 WBCs Final    RBC 09/19/2024 2.96 (L)  4.00 - 5.20 x10*6/uL Final    Hemoglobin 09/19/2024 8.7 (L)  12.0 - 16.0 g/dL Final    Hematocrit 09/19/2024 27.3 (L)  36.0 - 46.0 % Final    MCV 09/19/2024 92  80 - 100 fL Final    MCH 09/19/2024 29.4  26.0 - 34.0 pg Final    MCHC 09/19/2024 31.9 (L)  32.0 - 36.0 g/dL Final    RDW 09/19/2024 14.2  11.5 - 14.5 % Final    Platelets 09/19/2024 88 (L)  150 - 450 x10*3/uL Final    POCT Glucose 09/19/2024 114 (H)  74 - 99 mg/dL Final      Vascular US Lower  Extremity Venous Duplex Bilateral    Result Date: 9/17/2024           [facility] [address], [place] [address] Tel [phone] and Fax [phone]  Vascular Lab Report [facility] US LOWER EXTREMITY VENOUS DUPLEX BILATERAL  Patient Name:      JOSÉ LUIS DEVINE        Reading Physician:  [id number] Andrew Day MD, RPVI Study Date:        9/16/2024             Ordering Physician: [id number] JOAQUÍN WHITE MRN/PID:           [id number]              Technologist:       Verito Logan RVT Accession#:        [id number]          Technologist 2: Date of Birth/Age: 1955 / 68 years Encounter#:         [id number] Gender:            F Admission Status:  Emergency             Location Performed: [facility]  Diagnosis/ICD: Shortness of breath-R06.02 Indication:    Shortness of Breath CPT Codes:     14566 Peripheral venous duplex scan for DVT complete  Patient History Gastric Bypass                 Abdominoplasty.  **CRITICAL RESULT** Critical Result: DVT B/L lower extremities Notification called to Joaquín White DO on 9/16/2024 at 12:24:59 PM by VIRGILIO Logan.  CONCLUSIONS: Right Lower Venous: There is acute non-occlusive deep vein thrombosis visualized in the distal external iliac, common femoral, profunda and proximal femoral veins. Cannot rule out thrombus in non-visualized posterior tibial and Peroneal veins. Additional Findings; Soft tissue edema is noted within the calf and Popliteal segments of the RLE. Left Lower Venous: There is acute non-occlusive deep vein thrombosis visualized in the distal external iliac, common femoral and proximal femoral veins. Cannot rule out thrombus in non-visualized peroneal and posterior tibial veins. Additional Findings; Soft tissue edema is noted within the left calf and Popliteal  segments.  Comparison: Compared with study from 8/26/2024, Previous duplex was negative for DVT. Current examination is positive for bilateral DVT.  Imaging & Doppler Findings:  Right                 Compressible      Thrombus              Flow Distal External Iliac   Partial    Acute non-occlusive Spontaneous/Phasic CFV                     Partial    Acute non-occlusive     Continuous PFV                     Partial    Acute non-occlusive FV Proximal             Partial    Acute non-occlusive     Continuous FV Mid                    Yes             None FV Distal                 Yes             None Popliteal                 Yes             None         Spontaneous/Phasic  Left                  Compress      Thrombus              Flow Distal External Iliac Partial  Acute non-occlusive     Continuous CFV                   Partial  Acute non-occlusive Spontaneous/Phasic PFV                     Yes           None FV Proximal           Partial  Acute non-occlusive Spontaneous/Phasic FV Mid                  Yes           None FV Distal               Yes           None Popliteal               Yes           None         Spontaneous/Phasic  [id number] Andrew Day MD, MILO Electronically signed by [id number] MILO Padilla MD on 9/17/2024 at 11:28:32 PM  ** Final **     NM Lung perfusion with spect    Result Date: 9/17/2024  Interpreted By:  Beny Braxton and Kaur Arashdeep STUDY: NM LUNG PERFUSION WITH SPECT;  9/17/2024 9:06 am   INDICATION: Signs/Symptoms:Shortness of breath, bilateral DVTs.  <ICD-10 Codes - EPIC>   COMPARISON: Chest x-ray from 09/16/2024.   ACCESSION NUMBER(S): [id number]   ORDERING CLINICIAN: BRETT ZAMORANO   TECHNIQUE: DIVISION OF NUCLEAR MEDICINE PERFUSION LUNG SCANS   Multiple perfusion images of the lungs were acquired after the intravenous administration of 4.4 mCi of Tc-99m macroaggregated albumin (MAA). In addition, SPECT  of the chest was performed.   FINDINGS: Perfusion images of both lungs demonstrate  mild heterogeneity throughout the lung fields bilaterally.   No wedge-shaped subsegmental or segmental perfusion defect throughout bilateral lung fields to suggest acute pulmonary embolism (low probability).       1. No wedge-shaped subsegmental or segmental perfusion defect seen on SPECT to suggest acute pulmonary embolism (low probability).   The interpretation above is based on modified PIOPED II and PISAPED criteria.   I personally reviewed the images/study and I agree with the findings as stated by Qamar Sinha MD.  This study was interpreted at [facility], [place], [place].   MACRO: None   Signed by: Beny Braxton 9/17/2024 11:37 AM Dictation workstation:   [id number]    ECG 12 lead    Result Date: 9/17/2024  Sinus tachycardia Inferior infarct , age undetermined Anterolateral infarct , age undetermined Abnormal ECG    CT abdomen pelvis wo IV contrast    Result Date: 9/16/2024  Interpreted By:  Devin Delgado, STUDY: CT ABDOMEN PELVIS WO IV CONTRAST;  9/16/2024 1:59 pm   INDICATION: Signs/Symptoms:Rule out any significant deep tissue infection mostly clear drainage from previous abdominoplasty.     COMPARISON: 08/08/2024.   ACCESSION NUMBER(S): [id number]   ORDERING CLINICIAN: GABRIELLA ALTMAN   TECHNIQUE: CT of the abdomen and pelvis was performed. No contrast was administered. Coronal and sagittal reformations were made.   FINDINGS: There is some motion artifact as well as streak artifact related to patient's arms not being raised for the scan.   LOWER CHEST: Bibasilar mild irregular predominantly dependent atelectasis/scarring is present. Left breast implant again seen along with surgical clips along the left chest wall.   ABDOMEN:   LIVER: Cirrhotic morphology of the liver again seen with irregularity of the hepatic surface contour. No focal hepatic lesion identified.   BILE DUCTS: Nondilated.   GALLBLADDER: Surgically absent.   PANCREAS: There is some pancreatic  parenchymal atrophy similar to prior with insinuating fat throughout the gland. There is a vague round approximate 1.1 cm low-attenuation probable cystic lesion of the pancreatic head   SPLEEN: Spleen is not significantly enlarged.   ADRENAL GLANDS: Within normal limits.   KIDNEYS AND URETERS: No renal or ureteral calculi are seen bilaterally.  No hydroureteronephrosis bilaterally. Left renal lower pole lateral partially exophytic 3.7 cm cyst is again seen. Additional previously visualized smaller renal cysts are less conspicuous on noncontrast exam.   Urinary bladder is partially distended. No bladder calculi. Scattered small pelvic phleboliths are present.   VESSELS: Scattered small atherosclerotic calcifications are seen in the aorta and branch vessels. No aortic aneurysm. Unenhanced IVC is unremarkable.   BOWEL: Postoperative changes of the stomach with suture line along the greater curvature again seen. No bowel obstruction. Appendix not identified.   No appreciable significant diverticular disease.   PERITONEUM/RETROPERITONEUM/LYMPH NODES: Small-moderate volume ascites is present along with mild generalized mesenteric and omental edema. No free intraperitoneal air.   No retroperitoneal fluid collection or lymphadenopathy.       ABDOMINAL WALL: There is mild diastasis rectus with mild midline protrusion of the abdominal contents. Irregular subcutaneous tissue edema is seen throughout the abdomen and pelvis. Foci of soft tissue gas are also seen throughout the anterior subcutaneous tissues along with overlying bandage artifact.   BONE AND SOFT TISSUE: Thoracolumbar mild levocurvature may be partially exaggerated by positioning. Facet arthrosis is greatest in the lower lumbar spine. There is joint space narrowing and marginal spurring of both hips.   Right gluteal region subcutaneous tissue stimulator device is seen with associated lead extending through a right mid sacral foramen.       Irregular subcutaneous  tissue edema of the abdomen and pelvis. Multiple foci of soft tissue gas in the anterior abdominal and pelvic subcutaneous tissues anteriorly. No organized subcutaneous tissue fluid collection.   No renal or ureteral calculi. No hydroureteronephrosis bilaterally.   Cirrhosis.   Small-moderate volume ascites with generalized mesenteric edema.   Vague round 1.1 cm low-attenuation probable cystic lesion of the pancreatic head. Cystic neoplasm can not be excluded. Follow-up pancreatic MRI with contrast may be considered for further characterization.   MACRO: None.   Signed by: Devin Delgado 9/16/2024 2:32 PM Dictation workstation:   [id number]    CT cervical spine wo IV contrast    Result Date: 9/16/2024  Interpreted By:  Devin Delgado, STUDY: CT CERVICAL SPINE WO IV CONTRAST;  9/16/2024 1:59 pm   INDICATION: Signs/Symptoms:fall.     COMPARISON: None.   ACCESSION NUMBER(S): [id number]   ORDERING CLINICIAN: GABRIELLA ALTMAN   TECHNIQUE: Contiguous unenhanced axial images were obtained through the cervical spine with coronal and sagittal reformations of the axial data.   FINDINGS: ALIGNMENT: The craniocervical junction appears intact.  The dens and atlantoaxial relation appear intact with regional irregular marginal spurring. There is mild reversal of the cervical lordosis which may be exaggerated by positioning and/or spasm.   VERTEBRAE/DISC SPACES: Multilevel disc space narrowing and endplate spurring is most prominent at C4-5, C5-6 and C6-7. Mild facet arthrosis is present bilaterally throughout the cervical spine as well. No acute fracture, subluxation, or compression deformity is seen.   ADDITIONAL FINDINGS: Prevertebral soft tissues are not thickened.   Surgical clips are seen in the left lower neck posterolateral soft tissues.       No acute fracture or subluxation of the cervical spine.   Multilevel degenerative disc and facet changes of the cervical spine.   MACRO: None.   Signed by: Devin Delgado  9/16/2024 2:21 PM Dictation workstation:   [id number]    CT head wo IV contrast    Result Date: 9/16/2024  Interpreted By:  Devin Delgado, STUDY: CT HEAD WO IV CONTRAST;  9/16/2024 1:59 pm   INDICATION: Signs/Symptoms:fall.     COMPARISON: 08/26/2024.   ACCESSION NUMBER(S): [id number]   ORDERING CLINICIAN: GABRIELLA ALTMAN   TECHNIQUE: Contiguous unenhanced axial images were obtained through the brain.   FINDINGS: INTRACRANIAL: No acute intracranial bleed, midline shift, or mass effect is seen. Gray-white differentiation is maintained. No extra-axial fluid collection or hydrocephalus. Left basal ganglia small dystrophic calcification is again seen.   Bones are intact.   EXTRACRANIAL: Visualized paranasal sinuses and mastoids are clear.       No acute intracranial process.       MACRO: None.   Signed by: Devin Delgado 9/16/2024 2:19 PM Dictation workstation:   [id number]    XR chest 1 view    Result Date: 9/16/2024  Interpreted By:  Devin Delgado, STUDY: XR CHEST 1 VIEW;  9/16/2024 11:44 am   INDICATION: Signs/Symptoms:fall.     COMPARISON: None.   ACCESSION NUMBER(S): [id number]   ORDERING CLINICIAN: GABRIELLA ALTMAN   FINDINGS: CARDIOMEDIASTINAL SILHOUETTE: Right jugular central line has tip at the SVC level. Cardiac silhouette is borderline in size and may be partially exaggerated by low inspiratory volume.   LUNGS: Inspiratory volume is low. No focal infiltrate. No definite pleural effusion. No pneumothorax is seen.   ABDOMEN: Upper abdominal surgical clips are present.   BONES: Thoracic mild dextrocurvature may be partially exaggerated by positioning. There is partial visualization of degenerative changes of the shoulders as well as a left humeral head surgical anchor. Mild contour irregularity of the left humeral neck may be related to old trauma. Left axillary region surgical clips are also noted.       1.  Low inspiratory volume. No focal infiltrate. 2. Partially visualized mild contour  irregularity of the left humeral neck may be related to old trauma. Correlate with injury history and pain in this region.       MACRO: None.   Signed by: Devin Delgado 9/16/2024 12:40 PM Dictation workstation:   [id number]    Lower extremity venous duplex bilateral    Result Date: 8/26/2024           [facility] [address] [place] [address] Tel [phone] and Fax [phone]  Vascular Lab Report [facility] US LOWER EXTREMITY VENOUS DUPLEX BILATERAL  Patient Name:      JOSÉ LUIS REYNOLDS SYBIL        Reading Physician:  [id number] Tyler Velásquez MD Study Date:        8/26/2024             Ordering Physician: [id number] EDYTA CARBALLO MRN/PID:           [id number]              Technologist:       Princess Mendez RVT Accession#:        [id number]          Technologist 2: Date of Birth/Age: 1955 / 68 years Encounter#:         [id number] Gender:            F Admission Status:  Inpatient             Location Performed: [facility]  Diagnosis/ICD: Other specified soft tissue disorders-M79.89 Indication:    Limb swelling CPT Codes:     62948 Peripheral venous duplex scan for DVT complete  CONCLUSIONS: Right Lower Venous: No evidence of acute deep vein thrombus visualized in the right lower extremity. Cannot rule out thrombus in non-visualized posterior tibial and Peroneal veins due to edema. Left Lower Venous: No evidence of acute deep vein thrombus visualized in the left lower extremity. Cannot rule out thrombus in non-visualized peroneal vein due to edema.  Imaging & Doppler Findings:  Right                 Compressible Thrombus        Flow Distal External Iliac     Yes        None   Spontaneous/Phasic CFV                       Yes        None   Spontaneous/Phasic PFV                       Yes        None FV Proximal                Yes        None   Spontaneous/Phasic FV Mid                    Yes        None FV Distal                 Yes        None Popliteal                 Yes        None   Spontaneous/Phasic  Left                  Compress Thrombus        Flow Distal External Iliac   Yes      None   Spontaneous/Phasic CFV                     Yes      None   Spontaneous/Phasic PFV                     Yes      None FV Proximal             Yes      None   Spontaneous/Phasic FV Mid                  Yes      None FV Distal               Yes      None Popliteal               Yes      None   Spontaneous/Phasic PTV                     Yes      None  [id number] Tyler Velásquez MD Electronically signed by [id number] Tyler Velásquez MD on 8/26/2024 at 5:11:48 PM  ** Final **     CT head wo IV contrast    Result Date: 8/26/2024  Interpreted By:  Nunu Garcia, STUDY: CT HEAD WO IV CONTRAST; ;  8/26/2024 12:38 pm   INDICATION: Signs/Symptoms:confusion.   COMPARISON: 07/07/2023   ACCESSION NUMBER(S): [id number]   ORDERING CLINICIAN: EDYTA CARBALLO   TECHNIQUE: Serial axial images of the head were obtained without intravenous contrast. Sagittal and coronal reconstructions were generated.   FINDINGS: The ventricles are midline and normal in size.   There are no acute parenchymal abnormalities.   There is no hemorrhage or extra-axial fluid.   There is no obvious scalp hematoma or skull fracture.   Paranasal sinuses and mastoids are unremarkable. The patient appears to be status post bilateral cataract surgery.   COMPARISON OF FINDINGS: The brain is similar.       No acute intracranial abnormality     MACRO: none   Signed by: Nunu Garcia 8/26/2024 12:52 PM Dictation workstation:   [id number]      Medications  Medications Prior to Admission   Medication Sig Dispense Refill Last Dose    0.9 % sodium chloride (sodium chloride, PF, 0.9%) injection Infuse 10 mL into a venous catheter early in the morning.. MEDICATION ADMINISTRATION: FLUSH WITH 10 MLS NORMAL  SALINE BEFORE AND AFTER EACH DOSE OF MEDICATION, THEN FLUSH WITH 5 MLS 10 UNITS PER ML HEPARIN FLUSH.   LABS: 10 MLS NORMAL SALINE FLUSH BEFORE LAB DRAW, FOLLOWED BY 20 MLS NORMAL SALINE AFTER LAB DRAW OBTAINED, THEN FLUSH WITH 5 MLS 10 UNITS PER ML HEPARIN FLUSH.   Unknown    acetaminophen (Tylenol) 325 mg tablet Take 2 tablets (650 mg) by mouth every 4 hours if needed for mild pain (1 - 3), moderate pain (4 - 6), headaches or fever (temp greater than 38.0 C). 30 tablet 0 Unknown    allopurinol (Zyloprim) 100 mg tablet Take 1 tablet (100 mg) by mouth once daily. 30 tablet 0 Unknown    cyclobenzaprine (Flexeril) 5 mg tablet Take 1 tablet (5 mg) by mouth 3 times a day as needed for muscle spasms. 30 tablet 0 Unknown    deutetrabenazine (Austedo XR) 24 mg tablet extended release 24 hr Take 1 tablet (24 mg) by mouth once daily. Take 1-6mg tablet and 1-24mg tablet for a total dose of 30mg daily. 90 tablet 3 Unknown    deutetrabenazine (Austedo XR) 6 mg tablet extended release 24 hr Take 1 tablet (6 mg) by mouth once daily. Take 1-6mg tablet and 1-24mg tablet for a total dose of 30mg daily. 90 tablet 3 Unknown    dexAMETHasone 0.5 mg/5 mL oral liquid Take 5 mL (0.5 mg) by mouth 4 times a day. 600 mL 0 Unknown    DULoxetine (Cymbalta) 60 mg DR capsule Take 1 capsule (60 mg) by mouth 2 times a day. Do not crush or chew.   Unknown    ertapenem (INVanz) 1 gram injection Infuse 0.5 g (500 mg) into a venous catheter once daily for 14 days. Obtain weekly labs: BMP and CBC. Fax to Dr. Ying at [phone]. 7 g 0 Unknown    famotidine (Pepcid) 20 mg tablet Take 1 tablet (20 mg) by mouth once daily in the morning.   Unknown    ferrous sulfate (FEOSOL ORAL) Take 1 tablet by mouth once daily in the evening. 200 (65 Fe) MG   Unknown    fludrocortisone (Florinef) 0.1 mg tablet Take 1 tablet (0.1 mg) by mouth once daily. 30 tablet 0 Unknown    furosemide (Lasix) 40 mg tablet Take 1 tablet (40 mg) by mouth once daily. 30 tablet 0  Unknown    heparin sodium,porcine/PF (HEPARIN, PORCINE, LOCK FLUSH IV) Infuse 50 Units into a venous catheter early in the morning. MEDICATION ADMINISTRATION: FLUSH WITH 10 MLS NORMAL SALINE BEFORE AND AFTER EACH DOSE OF MEDICATION, THEN FLUSH WITH 5 MLS 10 UNITS PER ML HEPARIN FLUSH.   LABS: 10 MLS NORMAL SALINE FLUSH BEFORE LAB DRAW, FOLLOWED BY 20 MLS NORMAL SALINE AFTER LAB DRAW OBTAINED, THEN FLUSH WITH 5 MLS 10 UNITS PER ML HEPARIN FLUSH.     Indications: temporary redness of face and neck       lamoTRIgine (LaMICtal) 200 mg tablet Take 1 tablet (200 mg) by mouth once daily in the morning.   Unknown    multivitamin tablet Take 1 tablet by mouth once daily.   Unknown    naratriptan (Amerge) 2.5 mg tablet Take 1 tablet (2.5 mg) by mouth 1 time if needed for migraine. May repeat once in 4hrs if headache recurs 9 tablet 1 Unknown    nystatin (Nyamyc) 100,000 unit/gram powder Apply topically 2 times a day. to affected area 30 g 0 Unknown    pantoprazole (ProtoNix) 20 mg EC tablet Take 1 tablet (20 mg) by mouth once daily. Do not crush, chew, or split.   Unknown    potassium chloride CR (Klor-Con M20) 20 mEq ER tablet Take 1 tablet (20 mEq) by mouth once daily. Do not crush or chew. Do not fill before September 6, 2024. 30 tablet 0 Unknown    sucralfate (Carafate) 1 gram tablet Take 1 tablet (1 g) by mouth once daily at bedtime. 30 tablet 0 Unknown    traZODone (Desyrel) 100 mg tablet Take 1.5 tablets (150 mg) by mouth once daily at bedtime.   Unknown    Xdemvy 0.25 % drops Administer 1 drop into both eyes once daily at bedtime.   Unknown      Scheduled medications  allopurinol, 100 mg, oral, Daily  apixaban, 10 mg, oral, BID   Followed by  [START ON 9/26/2024] apixaban, 5 mg, oral, BID  deutetrabenazine, 24 mg, oral, Daily  deutetrabenazine, 6 mg, oral, Daily  dexAMETHasone, 0.5 mg, oral, 4x daily  fludrocortisone, 0.1 mg, oral, Daily  insulin lispro, 0-5 Units, subcutaneous, TID  lamoTRIgine, 200 mg, oral, q  AM  lotilaner, 1 drop, Both Eyes, Nightly  nystatin, , Topical, BID  pantoprazole, 40 mg, oral, Daily before breakfast   Or  pantoprazole, 40 mg, intravenous, Daily before breakfast  sodium chloride (PF) 0.9%, 10 mL, intravenous, Daily  sucralfate, 1 g, oral, Nightly  traZODone, 150 mg, oral, Nightly      Continuous medications  heparin, 0-4,500 Units/hr, Last Rate: 900 Units/hr (09/18/24 2029)  lactated Ringer's, 100 mL/hr, Last Rate: 100 mL/hr (09/19/24 0148)      PRN medications  PRN medications: acetaminophen, oxygen, polyethylene glycol, SUMAtriptan    Assessment/Plan   68 y.o. female with PMH of DM now improved glycemic control after weight loss, recurrent falls, abdominoplasty and gastric bypass surgery (7/30/2024), ANKUSH, recurrent UTIs, breast cancer status post left mastectomy, gout, tardive dyskinesia presenting with leg pain, found to have acute DVT, seen and evaluated by ID, started on iv Abx course c/b RUSSELL    RUSSELL, nonoliguric, on CKD, baseline creatinine of 0.9-1, was 1.9  on admission and up to 2.2, improving  UA with no cellular casts  Possible ischemic vs septic ATN unlikely AIN    Volume status - up, was on lasix 40 mg every day before admission, third spacing and low albumin    Hypokalemia, probably 2/2 diuretics, controlled    Hypocalcemia, hypomagnesemia    Hyperuricemia on allopurinol      Plan  - repeat UA and check complements  - Abx adjusted, now off  - ivf today  - query cirrhosis and ascitis on imaging studies  - maintain irene  - BP borderline low, can continue florinef  - check mag and replete as needed  - check D level and phos  - decadron per primary team  - will follow    MARS reviewed, dose for GFR<30, austedo does not need renal adjustments      Thank you for the opportunity to assist in the care of this patient, please call with questions  Brooklynn Shepherd MD PhD

## 2024-09-19 NOTE — PROGRESS NOTES
Occupational Therapy    Evaluation    Patient Name: Mariann Drew  MRN: [id number]  Department: [facility]  Room: [address] [place] [address]  Today's Date: 9/19/2024  Time Calculation  Start Time: 1114  Stop Time: 1128  Time Calculation (min): 14 min        Assessment:  End of Session Communication: Bedside nurse  End of Session Patient Position: Bed, 2 rail up, Alarm off, not on at start of session (call light in reach)  OT Assessment Results: Decreased ADL status, Decreased upper extremity strength, Decreased cognition, Decreased safe judgment during ADL, Decreased endurance, Decreased functional mobility  Plan:  Treatment Interventions: ADL retraining, Functional transfer training, UE strengthening/ROM, Endurance training, Equipment evaluation/education, Patient/family training, Compensatory technique education  OT Frequency: 3 times per week  OT Discharge Recommendations: Moderate intensity level of continued care  OT - OK to Discharge: Yes (once medically appropriate to next level of care)  Treatment Interventions: ADL retraining, Functional transfer training, UE strengthening/ROM, Endurance training, Equipment evaluation/education, Patient/family training, Compensatory technique education    Subjective   Current Problem:  1. Acute deep vein thrombosis (DVT) of proximal vein of both lower extremities (Multi)        2. Shortness of breath  Vascular US Lower Extremity Venous Duplex Bilateral      3. SOB (shortness of breath)          General:  General  Reason for Referral: OT eval and tx; ADLs. patient presents with LE edema/weakness. Dx: BLE DVTs, on heparin; abdominal wound with wound vac. was scheduled for wound debridement and partial closure, postponed 2/2 heparin. chest x-ray:  Low inspiratory volume. No focal infiltrate.  2. Partially visualized mild contour irregularity of the left humeral  neck may be related to old trauma. Correlate with injury history and  pain in this region. ct a&p: Irregular subcutaneous tissue edema of  "the abdomen and pelvis.  Multiple foci of soft tissue gas in the anterior abdominal and pelvic  subcutaneous tissues anteriorly. No organized subcutaneous tissue  fluid collection. lung perfusion:  No wedge-shaped subsegmental or segmental perfusion defect seen on  SPECT to suggest acute pulmonary embolism (low probability)  Referred By: Rubi  Past Medical History Relevant to Rehab: 68 y.o. female with a past medical history of abdominoplasty and gastric bypass surgery (7/30/2024), T2DM, GERD, Hx of breast cancer s/p L mastectomy, gout, tardive dyskinesia who presents to the emergency department for lower extremity swelling and generalized weakness.  Of note, patient developed fevers and purulent drainage from BÁRBARA drains 2-3 days postsurgery. She was admitted to [facility] and treated with IV antibiotics. Debridement was performed, but wound was unable to be closed and patient was placed on a wound VAC.  Patient does follow with Dr. Ying for her wound and has home health nurses who assist her with wound care management.  Patient states she has been experiencing bilateral lower extremity swelling for \"about 20 days\" that started in just her legs, but has now spread to her feet.  She states she has never had this type of leg swelling before, and states they are tender to the touch.  She also states she has been having trouble walking and has fallen \"multiple times.\"  She states she is also experiencing generalized weakness.  She states that 4 days ago, she began to feel short of breath as well.  She denies fever/chills, chest pain, nausea/vomiting, diarrhea, URI symptoms, urinary symptoms, numbness/tingling.  Denies history of DVT/PE, tobacco use.  She did have a recent surgery on 7/30/2024.  She states her abdominal wound is \"doing well,\" without any surrounding erythema or pain.  She does states she has noticed pale yellow drainage coming from the wound.  Per record review, patient has been on Invanz.  The patient " "tells me she has not been taking this antibiotic, because she states she has been \"out of it (the medicine).\"  Patient also states she has been taking Lasix every day, though she does not feel like it is helping.  Missed Visit: Yes  Missed Visit Reason:  (patient currently in surgery for torso debridement; await post op orders)  Co-Treatment: PT  Co-Treatment Reason: for safety and to maximize therapeutic performance  Prior to Session Communication: Bedside nurse  Patient Position Received: Bed, 2 rail up, Alarm off, not on at start of session  General Comment: patient pleasant and cooperative to participate  Precautions:  Medical Precautions: Fall precautions (IV, wound vac, tele, irene, ambulate)            Pain:  Pain Assessment  Pain Assessment:  (reports 5/10 pain in B feet)    Objective   Cognition:  Overall Cognitive Status: Impaired (disoriented to time)           Home Living:  Type of Home:  (per patient report, recent rehab stay at [facility]. patient reports since d/c has been at home with spouse. 2 FRANSICO home without HR.)  Prior Function:  Level of [place]:  (patient reports assist for all ADLs. spouse does IADLs and driving. patient sleeps in recliner and spends the day in recliner. reports has not been ambulating as legs buckle. spouse assists with SPT to BSC from recliner without AD. patient sponge bathes.)       ADL:  LE Dressing Assistance:  (dependent assistance to franca non skid socks at bed level)       Bed Mobility/Transfers: Bed Mobility  Bed Mobility:  (completed supine <-->sit with MAX A x 1)    Transfers  Transfer:  (completed STS from EOB with MIN A x 2)      Functional Mobility:  Functional Mobility  Functional Mobility Performed:  (completed side steps next to bed with MIN A x 1 with WW)     Sensation:  Sensation Comment: reports numbness/tingling in feet  Strength:  Strength Comments: BUE ROM/MMT WFL for patient age      Outcome Measures:[facility] Daily Activity  Putting on and taking off " regular lower body clothing: Total  Bathing (including washing, rinsing, drying): Total  Putting on and taking off regular upper body clothing: A little  Toileting, which includes using toilet, bedpan or urinal: Total  Taking care of personal grooming such as brushing teeth: A little  Eating Meals: None  Daily Activity - Total Score: 13        Education Documentation  Body Mechanics, taught by Carina Todd OT at 9/19/2024  3:05 PM.  Learner: Patient  Readiness: Acceptance  Method: Explanation  Response: Verbalizes Understanding, Needs Reinforcement    ADL Training, taught by Carina Todd OT at 9/19/2024  3:05 PM.  Learner: Patient  Readiness: Acceptance  Method: Explanation  Response: Verbalizes Understanding, Needs Reinforcement    Education Comments  No comments found.        OP EDUCATION:       Goals:  Encounter Problems       Encounter Problems (Active)       OT Goals       STG- patient will complete LB dressing with MIN A with use of ae/ad/dme prn (Progressing)       Start:  09/19/24    Expected End:  10/03/24            STG- patient will complete toileting with MIN A with use of ae/ad/dme prn (Progressing)       Start:  09/19/24    Expected End:  10/03/24            STG- patient will complete transfers to/from bed, chair, commode with CGA with use of ae/ad/dme prn (Progressing)       Start:  09/19/24    Expected End:  10/03/24            STG- patient will complete simple mobility with CGA with use of ae/ad/dme prn (Progressing)       Start:  09/19/24    Expected End:  10/03/24            STG- patient will complete grooming with supervision with use of ae/ad/dme prn (Progressing)       Start:  09/19/24    Expected End:  10/03/24

## 2024-09-19 NOTE — PROGRESS NOTES
"Mariann Drew is a 68 y.o. female on day 3 of admission presenting with Acute deep vein thrombosis (DVT) of proximal vein of both lower extremities (Multi).    Subjective   Unable to get up, weak, feelign ok otherwise, inquring about snf       Objective     Physical Exam  Constitutional:       Appearance: Normal appearance.   HENT:      Head: Normocephalic and atraumatic.      Right Ear: Tympanic membrane and ear canal normal.      Left Ear: Tympanic membrane and ear canal normal.      Mouth/Throat:      Mouth: Mucous membranes are moist.      Pharynx: Oropharynx is clear.   Eyes:      Extraocular Movements: Extraocular movements intact.      Conjunctiva/sclera: Conjunctivae normal.      Pupils: Pupils are equal, round, and reactive to light.   Cardiovascular:      Rate and Rhythm: Normal rate and regular rhythm.      Pulses: Normal pulses.      Heart sounds: Normal heart sounds.   Pulmonary:      Effort: Pulmonary effort is normal.      Breath sounds: Normal breath sounds.   Abdominal:      General: Abdomen is flat. Bowel sounds are normal.      Palpations: Abdomen is soft.   Musculoskeletal:         General: Normal range of motion.      Cervical back: Normal range of motion and neck supple.   Skin:     General: Skin is warm and dry.      Capillary Refill: Capillary refill takes 2 to 3 seconds.   Neurological:      General: No focal deficit present.      Mental Status: She is alert and oriented to person, place, and time. Mental status is at baseline.   Psychiatric:         Mood and Affect: Mood normal.         Behavior: Behavior normal.         Thought Content: Thought content normal.         Judgment: Judgment normal.         Last Recorded Vitals  Blood pressure 120/80, pulse 87, temperature 35.7 °C (96.3 °F), temperature source Temporal, resp. rate 16, height 1.626 m (5' 4\"), weight 72.6 kg (160 lb), SpO2 97%.  Intake/Output last 3 Shifts:  I/O last 3 completed shifts:  In: 588.3 (8.1 mL/kg) [I.V.:538.3 (7.4 " mL/kg); IV Piggyback:50]  Out: 1775 (24.5 mL/kg) [Urine:750 (0.3 mL/kg/hr); Drains:1025]  Weight: 72.6 kg     Relevant Results            This patient currently has cardiac telemetry ordered; if you would like to modify or discontinue the telemetry order, click here to go to the orders activity to modify/discontinue the order.  This patient has a central line   Reason for the central line remaining today? Line unnecessary, will be removed today    This patient has a urinary catheter   Reason for the urinary catheter remaining today? urinary retention/bladder outlet obstruction, acute or chronic               Assessment/Plan   Assessment & Plan  Acute deep vein thrombosis (DVT) of proximal vein of both lower extremities (Multi)    Disruption of wound, unspecified, initial encounter    Disruption of external surgical wound    Deep vein thrombosis, bilateral lower extremities  BLE edema  -Vascular surgery consult and recommendations appreciated  -V/Q scan ordered due to patient's RUSSELL (pt experiencing shortness of breath and tachycardia)  -Heparin drip initiated in the ED, continue     Abdominal wound  Hx of abdominoplasty and gastric bypass surgery (7/30/2024)  Hypoalbuminemia  -Infectious disease consult and reccomendations appreciated, pt follows with Dr. Ying  -CT imaging results as above  -Pt inititated on Merrem and vancomycin in the ED, will continue. Further abx adjustments per ID  -Per [facility] note, patient is to have wound vac changed every Monday and Thursday. Wound vac setting is 125 mmHg, irrigate with saline prior to placing. Black granufoam dressing  -Wound prevention techniques     Acute kidney injury  -Cr 1.92 today, 1.59 on 9/10  -BMP in the a.m.  -Will hold diuresis for now due to RUSSELL. Also holding on fluids due to pitting edema in BLE     Hypomagnesemia  -Magnesium 1.49 today, replacement ordered in the ED  -Monitor with BMP in the a.m.     Thrombocytopenia  -Platelets 118 today, appears  chronic  -Monitor with CBC in the AM     Cystic lesion of pancreatic head on CT imaging  -Cystic neoplasm cannot be excluded, per radiologist  -Follow-up pancreatic MRI with contrast may be considered for further characterization     History of falls  -PT/OT eval and treat  -Fall precautions     T2DM  -Sliding scale insulin  -Diabetic diet, hypoglycemic protocol     DVT Prophylaxis  -Heparin  -SCDs     9/18: v/q negaitve on heparin pt/ot plans for snf    9/19: pt/ot pending, sukhjinderc to eliquis     I spent 60 minutes in the professional and overall care of this patient.         I spent 35 minutes in the professional and overall care of this patient.      Jarocho Venegas, DO

## 2024-09-19 NOTE — PROGRESS NOTES
Physical Therapy    Physical Therapy Evaluation    Patient Name: Mariann Drew  MRN: [id number]  Today's Date: 9/19/2024   Time Calculation  Start Time: 1115  Stop Time: 1129  Time Calculation (min): 14 min  932/932-A    Assessment/Plan   PT Assessment  PT Assessment Results: Decreased strength, Decreased endurance, Impaired balance, Decreased mobility  End of Session Communication: Bedside nurse  End of Session Patient Position: Bed, 2 rail up, Alarm off, not on at start of session (All needs in reach and no complaints noted)  IP OR SWING BED PT PLAN  Inpatient or Swing Bed: Inpatient  PT Plan  Treatment/Interventions: Bed mobility, Transfer training, Gait training  PT Plan: Ongoing PT  PT Frequency: 3 times per week  PT Discharge Recommendations: Moderate intensity level of continued care  PT - OK to Discharge: Yes    Subjective     Current Problem:  1. Acute deep vein thrombosis (DVT) of proximal vein of both lower extremities (Multi)        2. Shortness of breath  Vascular US Lower Extremity Venous Duplex Bilateral      3. SOB (shortness of breath)          General Visit Information:  General  Reason for Referral: PT Eval and Treat  Referred By: Naty Terrell PA-C  Past Medical History Relevant to Rehab: 68 y.o. female with a past medical history of abdominoplasty and gastric bypass surgery (7/30/2024), T2DM, GERD, Hx of breast cancer s/p L mastectomy, gout, tardive dyskinesia who presents to the emergency department for lower extremity swelling and generalized weakness.  Of note, patient developed fevers and purulent drainage from BÁRBARA drains 2-3 days postsurgery. She was admitted to [facility] and treated with IV antibiotics. Debridement was performed, but wound was unable to be closed and patient was placed on a wound VAC.  Patient does follow with Dr. Ying for her wound and has home health nurses who assist her with wound care management.  Patient states she has been experiencing bilateral lower extremity  "swelling for \"about 20 days\" that started in just her legs, but has now spread to her feet.  S  Missed Visit Reason: Patient placed on medical hold (patient with BLE DVTs, heparin restarted. will reattempt as able/medically appropriate)  Co-Treatment: OT  Co-Treatment Reason: maximize safety and functional mobility  Prior to Session Communication: Bedside nurse  Patient Position Received: Bed, 2 rail up, Alarm off, not on at start of session (Agreeable to PT)    Home Living:  Home Living  Home Living Comments: per patient report, recent rehab stay at [facility]. patient reports since d/c has been at home with spouse. 2 FRANSICO home without HR.    Prior Level of Function:  Prior Function Per Pt/Caregiver Report  Level of [place]:  (patient reports assist for all ADLs. spouse does IADLs and driving. patient sleeps in recliner and spends the day in recliner. reports has not been ambulating as legs buckle. spouse assists with SPT to BS from recliner without AD. patient sponge bathes.)    Precautions:  Precautions  Precautions Comment: wound vac, irene, fall precautions    Objective     Pain:  Pain Assessment  Pain Assessment:  (5/10 B feet 2/2 immobility, pt reports improvement with standing and amb)    Cognition:  Cognition  Overall Cognitive Status: Within Functional Limits    General Assessments:  Sensation  Light Touch:  (numbness/tingling B feet)  Strength  Strength Comments: B LE ROM WFL, B LE strength 4-/5  Dynamic Standing Balance  Dynamic Standing-Comments: Poor+ with RW and min A x 1    Functional Assessments:  Bed Mobility  Bed Mobility:  (supiine <> sitting: max A x 1)  Transfers  Transfer:  (STS from EOB: min A x 2)  Ambulation/Gait Training  Ambulation/Gait Training Performed:  (Pt was able to side step x 3' using RW with min A x 1)    Outcome Measures:  [facility] Basic Mobility  Turning from your back to your side while in a flat bed without using bedrails: A lot  Moving from lying on your back to sitting on " the side of a flat bed without using bedrails: A lot  Moving to and from bed to chair (including a wheelchair): A lot  Standing up from a chair using your arms (e.g. wheelchair or bedside chair): A lot  To walk in hospital room: A lot  Climbing 3-5 steps with railing: A lot  Basic Mobility - Total Score: 12    Goals:  Encounter Problems       Encounter Problems (Active)       PT Problem       STG - Pt will transition supine <> sitting with min A  (Progressing)       Start:  09/19/24    Expected End:  10/03/24            STG - Pt will transfer STS with min A  (Progressing)       Start:  09/19/24    Expected End:  10/03/24            STG - Pt will amb 25' using RW with CGA  (Progressing)       Start:  09/19/24    Expected End:  10/03/24               Pain - Adult            Education Documentation  Precautions, taught by Gisel Lynch PT at 9/19/2024  3:35 PM.  Learner: Patient  Readiness: Acceptance  Method: Explanation  Response: Verbalizes Understanding    Mobility Training, taught by Gisel Lynch PT at 9/19/2024  3:35 PM.  Learner: Patient  Readiness: Acceptance  Method: Explanation  Response: Verbalizes Understanding    Education Comments  No comments found.

## 2024-09-19 NOTE — CONSULTS
"Nutrition Initial Assessment:   Nutrition Assessment    Reason for Assessment: Admission nursing screening (wound; MST=0)    Patient is a 68 y.o. female presenting with generalized weakness, SOB, BLE DVTs    PmHx; DM, GERD, gastric bypass, abdominoplasty 7/30/24, hx breast cancer s/p L mastectomy, gout     Nutrition History:  Energy Intake: Poor < 50 %  Food and Nutrient History: Pt laying in bed at time of visit today.  at bedside.  Pt well known to this RD from previous hospitalizations (8/8-8/23 and then to acute rehab floor from 8/23-9/6).  Pt was discharged home 9/6 and returns with generalized weakness, SOB, BLE DVTs.  Pt reports she was not eating at home.  Pt continues with wound vac to abdominal wound, poor healing. S/P abdominoplasty 7/30/24. Pt was agreeable to Ensure Clear BID, Roderick BID and Gelatein once daily.  Will monitor acceptance as in previous hospital stays, pt would refuse.  Encouraged pt to try to eat at least 50% of her meals.  Vitamin/Herbal Supplement Use: FeSO4, MVI  Food Allergies/Intolerances:  None  GI Symptoms: None  Oral Problems: None       Anthropometrics:  Height: 162.6 cm (5' 4.02\")   Weight: 72.6 kg (160 lb 0.9 oz)   BMI (Calculated): 27.46  IBW/kg (Dietitian Calculated): 54.5 kg  Percent of IBW: 133 %       Weight History:     Weight Change %:  Weight History / % Weight Change: 9/4 78.4kg-standing scale (7% loss in 12 days), 8/23 72.2kg  7/30 77.2kg , 7/2 77.6kg, 5/16 74.8kg, 4/3 73.5kg, 2/20 78.7kg, 12/26 78.9kg, 11/17 78kg, 8/11 84.8kg  Significant Weight Loss: Yes  Interpretation of Weight Loss: >5% in 1 month    Nutrition Focused Physical Exam Findings:    Subcutaneous Fat Loss:   Orbital Fat Pads: Mild-Moderate (slight dark circles and slight hollowing)  Buccal Fat Pads: Mild-Moderate (flat cheeks, minimal bounce)  Triceps: Mild-Moderate (less than ample fat tissue)  Muscle Wasting:  Temporalis: Mild-Moderate (slight depression)  Pectoralis (Clavicular Region): " Mild-Moderate (some protrusion of clavicle)  Deltoid/Trapezius: Mild-Moderate (slight protrusion of acromion process)  Edema:  Edema: +2 mild, +3 moderate  Edema Location: 3+ RLE, 2+ LLE  Physical Findings:  Skin: Positive (abdomen with wound vac; coccyx shearing)    Nutrition Significant Labs:  CBC Trend:   Results from last 7 days   Lab Units 09/19/24  1042 09/18/24  0229 09/17/24  0357 09/16/24  1143   WBC AUTO x10*3/uL 6.9 10.4 8.8 10.1   RBC AUTO x10*6/uL 2.96* 3.01* 3.41* 3.52*   HEMOGLOBIN g/dL 8.7* 8.9* 10.0* 10.4*   HEMATOCRIT % 27.3* 27.2* 32.7* 32.4*   MCV fL 92 90 96 92   PLATELETS AUTO x10*3/uL 88* 102* 100* 118*    , BMP Trend:   Results from last 7 days   Lab Units 09/19/24  1001 09/17/24  0357 09/16/24  1143   GLUCOSE mg/dL 144* 130* 131*   CALCIUM mg/dL 8.0* 8.1* 8.0*   SODIUM mmol/L 136 135* 137   POTASSIUM mmol/L 4.4 4.7 4.3   CO2 mmol/L 28 22 23   CHLORIDE mmol/L 103 102 104   BUN mg/dL 32* 32* 30*   CREATININE mg/dL 1.95* 2.21* 1.92*    , A1C:  Lab Results   Component Value Date    HGBA1C 4.5 07/11/2024   , BG POCT trend:   Results from last 7 days   Lab Units 09/19/24  1143 09/19/24  0632 09/18/24 2014 09/18/24  1655 09/18/24  1158   POCT GLUCOSE mg/dL 114* 86 123* 110* 104*        Nutrition Specific Medications:  Reviewed     I/O:    ;      Dietary Orders (From admission, onward)       Start     Ordered    09/19/24 1000  Adult diet Regular  Diet effective now        Question:  Diet type  Answer:  Regular    09/19/24 0959    09/19/24 0959  Oral nutritional supplements  Until discontinued        Comments: berry   Question Answer Comment   Deliver with Breakfast    Deliver with Lunch    Select supplement: Ensure Clear        09/19/24 0959    09/19/24 0959  Oral nutritional supplements  Until discontinued        Question Answer Comment   Deliver with Dinner    Deliver with Lunch    Select supplement: Roderick        09/19/24 0959    09/19/24 0959  Oral nutritional supplements  Until discontinued         Question Answer Comment   Deliver with Dinner    Select supplement: Gelatein Sugar Free        09/19/24 0959                     Estimated Needs:      Method for Estimating Needs: 1635-1900kcals (30-35kcals/kg IBW)     Method for Estimating Needs: 65-82g (1.2-1.5g/kg IBW)     Method for Estimating Needs: 1 mL/kcal or as per MD        Nutrition Diagnosis   Malnutrition Diagnosis  Patient has Malnutrition Diagnosis: Yes  Diagnosis Status: New  Malnutrition Diagnosis: Severe malnutrition related to acute disease or injury  As Evidenced by: >5% weight loss x 1 month, PO intakes <50% of EEN for >=5 days; moderate muscle wasting and subcutaneous fat loss.    Nutrition Diagnosis  Patient has Nutrition Diagnosis: Yes  Diagnosis Status (1): Ongoing  Nutrition Diagnosis 1: Increased nutrient needs  Related to (1): physiological causes increasing nutrient needs  As Evidenced by (1): wound healing needs, generalized weakness       Nutrition Interventions/Recommendations         Nutrition Prescription:  Individualized Nutrition Prescription Provided for : Regular diet as ordered with Roderick and Ensure Clear BID, SF Gelatein once daily        Nutrition Interventions:   Interventions: Meals and snacks, Medical food supplement  Meals and Snacks: General healthful diet  Goal: consume >50->75% of meals  Medical Food Supplement: Commercial beverage, Commercial food  Goal: consume >75% of SF Gelatein once daily (80kcals, 20gm protein)  Additional Interventions: consume >75% of Roderick twice daily (for an additional 90 kcals, 2.5 gm protein, supplement glutamine and arginine); consume >75% of Ensure Clear twice daily  (for an additional 240 kcals, 9 gm protein each)    Collaboration and Referral of Nutrition Care:  (spoke with pt and )    Nutrition Education:   Spoke with pt regarding the importance of nutrition for wound healing and overall general well being, improved strength, etc.  Suggested to pt/ that if discharged  home, pt may benefit from outpatient nutrition therapy for ongoing monitoring of nutrition status and or RD through home health.      Nutrition Monitoring and Evaluation   Food/Nutrient Related History Monitoring  Monitoring and Evaluation Plan: Energy intake, Fluid intake, Amount of food  Energy Intake: Estimated energy intake  Criteria: Meal/ONS intake to meet >75% of estimated needs  Fluid Intake: Estimated fluid intake  Criteria: fluid intake to meet >75% of estimated need  Amount of Food: Estimated amout of food, Medical food intake  Criteria: Pt to consume >75% of meals/ONS    Body Composition/Growth/Weight History  Monitoring and Evaluation Plan: Weight  Weight: Measured weight  Criteria: reweigh at least every 5 days    Biochemical Data, Medical Tests and Procedures  Monitoring and Evaluation Plan: Electrolyte/renal panel, Glucose/endocrine profile  Criteria: labs WNL  Criteria: BG within acceptable range    Nutrition Focused Physical Findings  Monitoring and Evaluation Plan: Digestive System, Skin  Criteria: BM at least every 2-3 days  Criteria: promote healing through adequate nutrition         Time Spent (min): 60 minutes

## 2024-09-19 NOTE — CARE PLAN
The patient's goals for the shift include      The clinical goals for the shift include Patient's pain will remain tolerable.

## 2024-09-19 NOTE — CARE PLAN
The patient's goals for the shift include      The clinical goals for the shift include Patient's pain will remain tolerable.      Problem: Pain - Adult  Goal: Verbalizes/displays adequate comfort level or baseline comfort level  Outcome: Progressing     Problem: Safety - Adult  Goal: Free from fall injury  Outcome: Progressing     Problem: Discharge Planning  Goal: Discharge to home or other facility with appropriate resources  Outcome: Progressing     Problem: Chronic Conditions and Co-morbidities  Goal: Patient's chronic conditions and co-morbidity symptoms are monitored and maintained or improved  Outcome: Progressing     Problem: Skin  Goal: Decreased wound size/increased tissue granulation at next dressing change  Outcome: Progressing  Flowsheets (Taken 9/18/2024 2138 by Kateryna Merchant RN)  Decreased wound size/increased tissue granulation at next dressing change: Promote sleep for wound healing     Problem: Skin  Goal: Prevent/manage excess moisture  Outcome: Progressing  Flowsheets (Taken 9/18/2024 2138 by Kateryna Merchant RN)  Prevent/manage excess moisture: Cleanse incontinence/protect with barrier cream     Problem: Skin  Goal: Promote/optimize nutrition  Outcome: Progressing  Flowsheets (Taken 9/19/2024 1249)  Promote/optimize nutrition:   Discuss with provider if NPO > 2 days   Monitor/record intake including meals

## 2024-09-20 ENCOUNTER — HOME INFUSION (OUTPATIENT)
Dept: INFUSION THERAPY | Age: 69
End: 2024-09-20
Payer: MEDICARE

## 2024-09-20 LAB
25(OH)D3 SERPL-MCNC: 26 NG/ML (ref 30–100)
ANION GAP SERPL CALC-SCNC: 9 MMOL/L (ref 10–20)
APPEARANCE UR: ABNORMAL
ATRIAL RATE: 102 BPM
BACTERIA #/AREA URNS AUTO: ABNORMAL /HPF
BILIRUB UR STRIP.AUTO-MCNC: NEGATIVE MG/DL
BUN SERPL-MCNC: 33 MG/DL (ref 6–23)
C3 SERPL-MCNC: 78 MG/DL (ref 87–200)
C4 SERPL-MCNC: 19 MG/DL (ref 10–50)
CALCIUM SERPL-MCNC: 8 MG/DL (ref 8.6–10.3)
CHLORIDE SERPL-SCNC: 104 MMOL/L (ref 98–107)
CO2 SERPL-SCNC: 29 MMOL/L (ref 21–32)
COLOR UR: YELLOW
CREAT SERPL-MCNC: 1.71 MG/DL (ref 0.5–1.05)
EGFRCR SERPLBLD CKD-EPI 2021: 32 ML/MIN/1.73M*2
ERYTHROCYTE [DISTWIDTH] IN BLOOD BY AUTOMATED COUNT: 14 % (ref 11.5–14.5)
GLUCOSE BLD MANUAL STRIP-MCNC: 105 MG/DL (ref 74–99)
GLUCOSE BLD MANUAL STRIP-MCNC: 111 MG/DL (ref 74–99)
GLUCOSE BLD MANUAL STRIP-MCNC: 149 MG/DL (ref 74–99)
GLUCOSE BLD MANUAL STRIP-MCNC: 162 MG/DL (ref 74–99)
GLUCOSE SERPL-MCNC: 118 MG/DL (ref 74–99)
GLUCOSE UR STRIP.AUTO-MCNC: NORMAL MG/DL
HCT VFR BLD AUTO: 29 % (ref 36–46)
HGB BLD-MCNC: 9.2 G/DL (ref 12–16)
HOLD SPECIMEN: NORMAL
HOLD SPECIMEN: NORMAL
HYALINE CASTS #/AREA URNS AUTO: ABNORMAL /LPF
KETONES UR STRIP.AUTO-MCNC: NEGATIVE MG/DL
LEUKOCYTE ESTERASE UR QL STRIP.AUTO: ABNORMAL
MCH RBC QN AUTO: 29.3 PG (ref 26–34)
MCHC RBC AUTO-ENTMCNC: 31.7 G/DL (ref 32–36)
MCV RBC AUTO: 92 FL (ref 80–100)
MUCOUS THREADS #/AREA URNS AUTO: ABNORMAL /LPF
NITRITE UR QL STRIP.AUTO: NEGATIVE
NRBC BLD-RTO: 0 /100 WBCS (ref 0–0)
P AXIS: 51 DEGREES
P OFFSET: 189 MS
P ONSET: 137 MS
PH UR STRIP.AUTO: 5.5 [PH]
PLATELET # BLD AUTO: 93 X10*3/UL (ref 150–450)
POTASSIUM SERPL-SCNC: 4.5 MMOL/L (ref 3.5–5.3)
PR INTERVAL: 156 MS
PROT UR STRIP.AUTO-MCNC: ABNORMAL MG/DL
Q ONSET: 215 MS
QRS COUNT: 16 BEATS
QRS DURATION: 78 MS
QT INTERVAL: 362 MS
QTC CALCULATION(BAZETT): 471 MS
QTC FREDERICIA: 431 MS
R AXIS: -12 DEGREES
RBC # BLD AUTO: 3.14 X10*6/UL (ref 4–5.2)
RBC # UR STRIP.AUTO: ABNORMAL /UL
RBC #/AREA URNS AUTO: >20 /HPF
SODIUM SERPL-SCNC: 137 MMOL/L (ref 136–145)
SP GR UR STRIP.AUTO: 1.02
T AXIS: 43 DEGREES
T OFFSET: 396 MS
UROBILINOGEN UR STRIP.AUTO-MCNC: ABNORMAL MG/DL
VENTRICULAR RATE: 102 BPM
WBC # BLD AUTO: 6.4 X10*3/UL (ref 4.4–11.3)
WBC #/AREA URNS AUTO: >50 /HPF
WBC CLUMPS #/AREA URNS AUTO: ABNORMAL /HPF
YEAST BUDDING #/AREA UR COMP ASSIST: PRESENT /HPF

## 2024-09-20 PROCEDURE — 86160 COMPLEMENT ANTIGEN: CPT | Mod: PARLAB | Performed by: INTERNAL MEDICINE

## 2024-09-20 PROCEDURE — 2500000001 HC RX 250 WO HCPCS SELF ADMINISTERED DRUGS (ALT 637 FOR MEDICARE OP)

## 2024-09-20 PROCEDURE — 2500000001 HC RX 250 WO HCPCS SELF ADMINISTERED DRUGS (ALT 637 FOR MEDICARE OP): Performed by: STUDENT IN AN ORGANIZED HEALTH CARE EDUCATION/TRAINING PROGRAM

## 2024-09-20 PROCEDURE — 2500000004 HC RX 250 GENERAL PHARMACY W/ HCPCS (ALT 636 FOR OP/ED)

## 2024-09-20 PROCEDURE — 82947 ASSAY GLUCOSE BLOOD QUANT: CPT

## 2024-09-20 PROCEDURE — 81001 URINALYSIS AUTO W/SCOPE: CPT | Performed by: INTERNAL MEDICINE

## 2024-09-20 PROCEDURE — 85027 COMPLETE CBC AUTOMATED: CPT | Performed by: STUDENT IN AN ORGANIZED HEALTH CARE EDUCATION/TRAINING PROGRAM

## 2024-09-20 PROCEDURE — 1200000002 HC GENERAL ROOM WITH TELEMETRY DAILY

## 2024-09-20 PROCEDURE — 2500000002 HC RX 250 W HCPCS SELF ADMINISTERED DRUGS (ALT 637 FOR MEDICARE OP, ALT 636 FOR OP/ED)

## 2024-09-20 PROCEDURE — 82374 ASSAY BLOOD CARBON DIOXIDE: CPT | Performed by: INTERNAL MEDICINE

## 2024-09-20 PROCEDURE — 2500000005 HC RX 250 GENERAL PHARMACY W/O HCPCS

## 2024-09-20 RX ADMIN — DEXAMETHASONE 0.5 MG: 1 TABLET ORAL at 14:36

## 2024-09-20 RX ADMIN — LAMOTRIGINE 200 MG: 100 TABLET ORAL at 09:33

## 2024-09-20 RX ADMIN — SUCRALFATE 1 G: 1 TABLET ORAL at 20:51

## 2024-09-20 RX ADMIN — NYSTATIN: 100000 POWDER TOPICAL at 20:57

## 2024-09-20 RX ADMIN — DEUTETRABENAZINE 24 MG: 24 TABLET, FILM COATED, EXTENDED RELEASE ORAL at 14:36

## 2024-09-20 RX ADMIN — SODIUM CHLORIDE 10 ML: 9 INJECTION INTRAMUSCULAR; INTRAVENOUS; SUBCUTANEOUS at 08:02

## 2024-09-20 RX ADMIN — APIXABAN 10 MG: 5 TABLET, FILM COATED ORAL at 20:52

## 2024-09-20 RX ADMIN — DEUTETRABENAZINE 6 MG: 6 TABLET, FILM COATED, EXTENDED RELEASE ORAL at 14:36

## 2024-09-20 RX ADMIN — APIXABAN 10 MG: 5 TABLET, FILM COATED ORAL at 09:33

## 2024-09-20 RX ADMIN — ALLOPURINOL 100 MG: 100 TABLET ORAL at 09:34

## 2024-09-20 RX ADMIN — DEXAMETHASONE 0.5 MG: 1 TABLET ORAL at 20:51

## 2024-09-20 RX ADMIN — DEXAMETHASONE 0.5 MG: 1 TABLET ORAL at 18:33

## 2024-09-20 RX ADMIN — FLUDROCORTISONE ACETATE 0.1 MG: 0.1 TABLET ORAL at 09:33

## 2024-09-20 RX ADMIN — NYSTATIN: 100000 POWDER TOPICAL at 09:34

## 2024-09-20 RX ADMIN — INSULIN LISPRO 1 UNITS: 100 INJECTION, SOLUTION INTRAVENOUS; SUBCUTANEOUS at 12:13

## 2024-09-20 RX ADMIN — PANTOPRAZOLE SODIUM 40 MG: 40 INJECTION, POWDER, FOR SOLUTION INTRAVENOUS at 06:04

## 2024-09-20 RX ADMIN — DEXAMETHASONE 0.5 MG: 1 TABLET ORAL at 06:04

## 2024-09-20 RX ADMIN — TRAZODONE HYDROCHLORIDE 150 MG: 50 TABLET ORAL at 20:51

## 2024-09-20 ASSESSMENT — COGNITIVE AND FUNCTIONAL STATUS - GENERAL
DAILY ACTIVITIY SCORE: 17
STANDING UP FROM CHAIR USING ARMS: TOTAL
DRESSING REGULAR LOWER BODY CLOTHING: A LITTLE
MOBILITY SCORE: 8
MOVING FROM LYING ON BACK TO SITTING ON SIDE OF FLAT BED WITH BEDRAILS: A LOT
CLIMB 3 TO 5 STEPS WITH RAILING: TOTAL
TOILETING: A LOT
DRESSING REGULAR UPPER BODY CLOTHING: A LOT
TURNING FROM BACK TO SIDE WHILE IN FLAT BAD: A LOT
MOVING TO AND FROM BED TO CHAIR: TOTAL
HELP NEEDED FOR BATHING: A LOT
WALKING IN HOSPITAL ROOM: TOTAL

## 2024-09-20 ASSESSMENT — PAIN SCALES - GENERAL
PAINLEVEL_OUTOF10: 0 - NO PAIN

## 2024-09-20 NOTE — PROGRESS NOTES
MET AGAIN WITH PT DURING ROUNDS WITH DR DYE, PT WILL GO SKILLED, PREFERENCES FROM LIST I PROVIDED HER YESTERDAY ARE 1. [facility] 2. [facility] AND 3. [facility],  TEAM AWARE AND WILL MAKE REFERRALS, NO PRECERT NEEDED, IMM EXPLAINED, COPY GIVEN AND SCANNED IN CHART.   Lorelei Ponce RN TCC    1450  Pt will go to [facility] tomorrow as they will have wound vac and wound nurse available.   Team to set up transport.  Pt and family aware.   Lorelei Ponce RN TCC

## 2024-09-20 NOTE — DOCUMENTATION CLARIFICATION NOTE
"    PATIENT:               JOSÉ LUIS DEVINE  ACCT #:                  [id number]  MRN:                       [id number]  :                       1955  ADMIT DATE:       2024 11:18 AM  DISCH DATE:  RESPONDING PROVIDER #:        [id number]          PROVIDER RESPONSE TEXT:    Acute kidney injury with acute tubular necrosis    CDI QUERY TEXT:    Clarification        Instruction:    Based on your assessment of the patient and the clinical information, please provide the requested documentation by clicking on the appropriate radio button and enter any additional information if prompted.    Question: Please further clarify the diagnosis of acute kidney injury as    When answering this query, please exercise your independent professional judgment. The fact that a question is being asked, does not imply that any particular answer is desired or expected.    The patient's clinical indicators include:  Clinical Information: 67 y/o f presents with swelling and dx with LE DVT's creatinine on admission is 1.9 treated with IVF    Clinical Indicators:  Labs:  24:  Creatinine: 1.92  24:  Creatinine: 2.21  24:  Creatinine: 1.95    24:  H and P:  \"Will hold diuresis for now due to RUSSELL.\"    24  Nephrology consult:  \"RUSSELL, nonoliguric, on CKD, baseline creatinine of 0.9-1, was 1.9  on admission and up to 2.2, improving  UA with no cellular casts.  Possible ischemic vs septic ATN unlikely AIN\"    \"RUSSELL, nonoliguric, on CKD, baseline creatinine of 0.9-1, was 1.9  on admission and up to 2.2, improving\"        Treatment:  IV fluids, Nephrology consult    Risk Factors: RUSSELL, DM  Options provided:  -- Acute kidney injury with acute tubular necrosis  -- Acute kidney injury without acute tubular necrosis  -- Other - I will add my own diagnosis  -- Refer to Clinical Documentation Reviewer    Query created by: Ann Marie Burns on 2024 11:00 AM      Electronically signed by:  EDMOND DYE DO 2024 11:31 AM          "

## 2024-09-20 NOTE — PROGRESS NOTES
Pt to discharge to SNF. No further needs from [facility] pharmacy.    Pt care rep to discharge pt from [facility] and discharge chart

## 2024-09-20 NOTE — PROGRESS NOTES
"Nephrology Consult Progress Note    Mariann Drew is a 68 y.o. female on day 4 of admission presenting with Acute deep vein thrombosis (DVT) of proximal vein of both lower extremities (Multi).      Subjective   No acute events overnight, feels much better, remains nonoliguric       Objective          Physical Exam    Vitals 24HR  Heart Rate:  []   Temp:  [35.3 °C (95.5 °F)-37 °C (98.6 °F)]   Resp:  [16-18]   BP: ()/(50-75)   Height:  [162.6 cm (5' 4.02\")]   Weight:  [72.6 kg (160 lb 0.9 oz)]   SpO2:  [95 %-97 %]       AAOx3, min pain, on RA  Decreased AEBL  RRR, no murmurs  Abd obese, soft, + BS, large scar, distended  Chronic skin changes  Hodge+  Wound vac+              I&O 24HR    Intake/Output Summary (Last 24 hours) at 9/20/2024 0963  Last data filed at 9/20/2024 0557  Gross per 24 hour   Intake 2505 ml   Output 1695 ml   Net 810 ml         Medications  Medications Prior to Admission   Medication Sig Dispense Refill Last Dose    0.9 % sodium chloride (sodium chloride, PF, 0.9%) injection Infuse 10 mL into a venous catheter early in the morning.. MEDICATION ADMINISTRATION: FLUSH WITH 10 MLS NORMAL SALINE BEFORE AND AFTER EACH DOSE OF MEDICATION, THEN FLUSH WITH 5 MLS 10 UNITS PER ML HEPARIN FLUSH.   LABS: 10 MLS NORMAL SALINE FLUSH BEFORE LAB DRAW, FOLLOWED BY 20 MLS NORMAL SALINE AFTER LAB DRAW OBTAINED, THEN FLUSH WITH 5 MLS 10 UNITS PER ML HEPARIN FLUSH.   Unknown    acetaminophen (Tylenol) 325 mg tablet Take 2 tablets (650 mg) by mouth every 4 hours if needed for mild pain (1 - 3), moderate pain (4 - 6), headaches or fever (temp greater than 38.0 C). 30 tablet 0 Unknown    allopurinol (Zyloprim) 100 mg tablet Take 1 tablet (100 mg) by mouth once daily. 30 tablet 0 Unknown    cyclobenzaprine (Flexeril) 5 mg tablet Take 1 tablet (5 mg) by mouth 3 times a day as needed for muscle spasms. 30 tablet 0 Unknown    deutetrabenazine (Austedo XR) 24 mg tablet extended release 24 hr Take 1 tablet (24 mg) " by mouth once daily. Take 1-6mg tablet and 1-24mg tablet for a total dose of 30mg daily. 90 tablet 3 Unknown    deutetrabenazine (Austedo XR) 6 mg tablet extended release 24 hr Take 1 tablet (6 mg) by mouth once daily. Take 1-6mg tablet and 1-24mg tablet for a total dose of 30mg daily. 90 tablet 3 Unknown    dexAMETHasone 0.5 mg/5 mL oral liquid Take 5 mL (0.5 mg) by mouth 4 times a day. 600 mL 0 Unknown    DULoxetine (Cymbalta) 60 mg DR capsule Take 1 capsule (60 mg) by mouth 2 times a day. Do not crush or chew.   Unknown    ertapenem (INVanz) 1 gram injection Infuse 0.5 g (500 mg) into a venous catheter once daily for 14 days. Obtain weekly labs: BMP and CBC. Fax to Dr. Ying at [phone]. 7 g 0 Unknown    famotidine (Pepcid) 20 mg tablet Take 1 tablet (20 mg) by mouth once daily in the morning.   Unknown    ferrous sulfate (FEOSOL ORAL) Take 1 tablet by mouth once daily in the evening. 200 (65 Fe) MG   Unknown    fludrocortisone (Florinef) 0.1 mg tablet Take 1 tablet (0.1 mg) by mouth once daily. 30 tablet 0 Unknown    furosemide (Lasix) 40 mg tablet Take 1 tablet (40 mg) by mouth once daily. 30 tablet 0 Unknown    heparin sodium,porcine/PF (HEPARIN, PORCINE, LOCK FLUSH IV) Infuse 50 Units into a venous catheter early in the morning. MEDICATION ADMINISTRATION: FLUSH WITH 10 MLS NORMAL SALINE BEFORE AND AFTER EACH DOSE OF MEDICATION, THEN FLUSH WITH 5 MLS 10 UNITS PER ML HEPARIN FLUSH.   LABS: 10 MLS NORMAL SALINE FLUSH BEFORE LAB DRAW, FOLLOWED BY 20 MLS NORMAL SALINE AFTER LAB DRAW OBTAINED, THEN FLUSH WITH 5 MLS 10 UNITS PER ML HEPARIN FLUSH.     Indications: temporary redness of face and neck       lamoTRIgine (LaMICtal) 200 mg tablet Take 1 tablet (200 mg) by mouth once daily in the morning.   Unknown    multivitamin tablet Take 1 tablet by mouth once daily.   Unknown    naratriptan (Amerge) 2.5 mg tablet Take 1 tablet (2.5 mg) by mouth 1 time if needed for migraine. May repeat once in 4hrs if headache  recurs 9 tablet 1 Unknown    nystatin (Nyamyc) 100,000 unit/gram powder Apply topically 2 times a day. to affected area 30 g 0 Unknown    pantoprazole (ProtoNix) 20 mg EC tablet Take 1 tablet (20 mg) by mouth once daily. Do not crush, chew, or split.   Unknown    potassium chloride CR (Klor-Con M20) 20 mEq ER tablet Take 1 tablet (20 mEq) by mouth once daily. Do not crush or chew. Do not fill before September 6, 2024. 30 tablet 0 Unknown    sucralfate (Carafate) 1 gram tablet Take 1 tablet (1 g) by mouth once daily at bedtime. 30 tablet 0 Unknown    traZODone (Desyrel) 100 mg tablet Take 1.5 tablets (150 mg) by mouth once daily at bedtime.   Unknown    Xdemvy 0.25 % drops Administer 1 drop into both eyes once daily at bedtime.   Unknown      Scheduled medications  allopurinol, 100 mg, oral, Daily  apixaban, 10 mg, oral, BID   Followed by  [START ON 9/26/2024] apixaban, 5 mg, oral, BID  deutetrabenazine, 24 mg, oral, Daily  deutetrabenazine, 6 mg, oral, Daily  dexAMETHasone, 0.5 mg, oral, 4x daily  fludrocortisone, 0.1 mg, oral, Daily  insulin lispro, 0-5 Units, subcutaneous, TID  lamoTRIgine, 200 mg, oral, q AM  lotilaner, 1 drop, Both Eyes, Nightly  nystatin, , Topical, BID  pantoprazole, 40 mg, oral, Daily before breakfast   Or  pantoprazole, 40 mg, intravenous, Daily before breakfast  sodium chloride (PF) 0.9%, 10 mL, intravenous, Daily  sucralfate, 1 g, oral, Nightly  traZODone, 150 mg, oral, Nightly      Continuous medications  lactated Ringer's, 75 mL/hr, Last Rate: 75 mL/hr (09/20/24 0557)      PRN medications  PRN medications: acetaminophen, oxygen, polyethylene glycol, SUMAtriptan    Relevant Results      Admission on 09/16/2024   Component Date Value Ref Range Status    WBC 09/16/2024 10.1  4.4 - 11.3 x10*3/uL Final    nRBC 09/16/2024 0.0  0.0 - 0.0 /100 WBCs Final    RBC 09/16/2024 3.52 (L)  4.00 - 5.20 x10*6/uL Final    Hemoglobin 09/16/2024 10.4 (L)  12.0 - 16.0 g/dL Final    Hematocrit 09/16/2024 32.4  (L)  36.0 - 46.0 % Final    MCV 09/16/2024 92  80 - 100 fL Final    MCH 09/16/2024 29.5  26.0 - 34.0 pg Final    MCHC 09/16/2024 32.1  32.0 - 36.0 g/dL Final    RDW 09/16/2024 14.4  11.5 - 14.5 % Final    Platelets 09/16/2024 118 (L)  150 - 450 x10*3/uL Final    Neutrophils % 09/16/2024 76.8  40.0 - 80.0 % Final    Immature Granulocytes %, Automated 09/16/2024 0.6  0.0 - 0.9 % Final    Immature Granulocyte Count (IG) includes promyelocytes, myelocytes and metamyelocytes but does not include bands. Percent differential counts (%) should be interpreted in the context of the absolute cell counts (cells/UL).    Lymphocytes % 09/16/2024 11.8  13.0 - 44.0 % Final    Monocytes % 09/16/2024 6.6  2.0 - 10.0 % Final    Eosinophils % 09/16/2024 3.5  0.0 - 6.0 % Final    Basophils % 09/16/2024 0.7  0.0 - 2.0 % Final    Neutrophils Absolute 09/16/2024 7.74 (H)  1.20 - 7.70 x10*3/uL Final    Percent differential counts (%) should be interpreted in the context of the absolute cell counts (cells/uL).    Immature Granulocytes Absolute, Au* 09/16/2024 0.06  0.00 - 0.70 x10*3/uL Final    Lymphocytes Absolute 09/16/2024 1.19 (L)  1.20 - 4.80 x10*3/uL Final    Monocytes Absolute 09/16/2024 0.66  0.10 - 1.00 x10*3/uL Final    Eosinophils Absolute 09/16/2024 0.35  0.00 - 0.70 x10*3/uL Final    Basophils Absolute 09/16/2024 0.07  0.00 - 0.10 x10*3/uL Final    Glucose 09/16/2024 131 (H)  74 - 99 mg/dL Final    Sodium 09/16/2024 137  136 - 145 mmol/L Final    Potassium 09/16/2024 4.3  3.5 - 5.3 mmol/L Final    Chloride 09/16/2024 104  98 - 107 mmol/L Final    Bicarbonate 09/16/2024 23  21 - 32 mmol/L Final    Anion Gap 09/16/2024 14  10 - 20 mmol/L Final    Urea Nitrogen 09/16/2024 30 (H)  6 - 23 mg/dL Final    Creatinine 09/16/2024 1.92 (H)  0.50 - 1.05 mg/dL Final    eGFR 09/16/2024 28 (L)  >60 mL/min/1.73m*2 Final    Calculations of estimated GFR are performed using the 2021 CKD-EPI Study Refit equation without the race variable for the  IDMS-Traceable creatinine methods.  https://jasn.asnjournals.org/content/early/2021/09/22/ASN.0282318367    Calcium 09/16/2024 8.0 (L)  8.6 - 10.3 mg/dL Final    Albumin 09/16/2024 2.3 (L)  3.4 - 5.0 g/dL Final    Alkaline Phosphatase 09/16/2024 167 (H)  33 - 136 U/L Final    Total Protein 09/16/2024 5.1 (L)  6.4 - 8.2 g/dL Final    AST 09/16/2024 26  9 - 39 U/L Final    Bilirubin, Total 09/16/2024 0.4  0.0 - 1.2 mg/dL Final    ALT 09/16/2024 23  7 - 45 U/L Final    Patients treated with Sulfasalazine may generate falsely decreased results for ALT.    Magnesium 09/16/2024 1.49 (L)  1.60 - 2.40 mg/dL Final    Troponin I, High Sensitivity 09/16/2024 7  0 - 13 ng/L Final    BNP 09/16/2024 22  0 - 99 pg/mL Final    Color, Urine 09/17/2024 Yellow  Light-Yellow, Yellow, Dark-Yellow Final    Appearance, Urine 09/17/2024 Clear  Clear Final    Specific Gravity, Urine 09/17/2024 1.017  1.005 - 1.035 Final    pH, Urine 09/17/2024 5.0  5.0, 5.5, 6.0, 6.5, 7.0, 7.5, 8.0 Final    Protein, Urine 09/17/2024 NEGATIVE  NEGATIVE, 10 (TRACE), 20 (TRACE) mg/dL Final    Glucose, Urine 09/17/2024 Normal  Normal mg/dL Final    Blood, Urine 09/17/2024 0.06 (1+) (A)  NEGATIVE Final    Ketones, Urine 09/17/2024 NEGATIVE  NEGATIVE mg/dL Final    Bilirubin, Urine 09/17/2024 NEGATIVE  NEGATIVE Final    Urobilinogen, Urine 09/17/2024 Normal  Normal mg/dL Final    Nitrite, Urine 09/17/2024 NEGATIVE  NEGATIVE Final    Leukocyte Esterase, Urine 09/17/2024 25 Melisa/µL (A)  NEGATIVE Final    Protime 09/16/2024 13.6 (H)  9.8 - 12.8 seconds Final    INR 09/16/2024 1.2 (H)  0.9 - 1.1 Final    aPTT 09/16/2024 28  27 - 38 seconds Final    Ventricular Rate 09/16/2024 102  BPM Preliminary    Atrial Rate 09/16/2024 102  BPM Preliminary    AL Interval 09/16/2024 156  ms Preliminary    QRS Duration 09/16/2024 78  ms Preliminary    QT Interval 09/16/2024 362  ms Preliminary    QTC Calculation(Bazett) 09/16/2024 471  ms Preliminary    P Axis 09/16/2024 51  degrees  Preliminary    R Axis 09/16/2024 -12  degrees Preliminary    T Axis 09/16/2024 43  degrees Preliminary    QRS Count 09/16/2024 16  beats Preliminary    Q Onset 09/16/2024 215  ms Preliminary    P Onset 09/16/2024 137  ms Preliminary    P Offset 09/16/2024 189  ms Preliminary    T Offset 09/16/2024 396  ms Preliminary    QTC Fredericia 09/16/2024 431  ms Preliminary    Heparin Unfractionated 09/16/2024 1.4 (HH)  See Comment Below for Therapeutic Ranges IU/mL Final    Heparin Unfractionated 09/16/2024 0.9  See Comment Below for Therapeutic Ranges IU/mL Final    WBC 09/17/2024 8.8  4.4 - 11.3 x10*3/uL Final    nRBC 09/17/2024 0.0  0.0 - 0.0 /100 WBCs Final    RBC 09/17/2024 3.41 (L)  4.00 - 5.20 x10*6/uL Final    Hemoglobin 09/17/2024 10.0 (L)  12.0 - 16.0 g/dL Final    Hematocrit 09/17/2024 32.7 (L)  36.0 - 46.0 % Final    MCV 09/17/2024 96  80 - 100 fL Final    MCH 09/17/2024 29.3  26.0 - 34.0 pg Final    MCHC 09/17/2024 30.6 (L)  32.0 - 36.0 g/dL Final    RDW 09/17/2024 14.4  11.5 - 14.5 % Final    Platelets 09/17/2024 100 (L)  150 - 450 x10*3/uL Final    Glucose 09/17/2024 130 (H)  74 - 99 mg/dL Final    Sodium 09/17/2024 135 (L)  136 - 145 mmol/L Final    Potassium 09/17/2024 4.7  3.5 - 5.3 mmol/L Final    Chloride 09/17/2024 102  98 - 107 mmol/L Final    Bicarbonate 09/17/2024 22  21 - 32 mmol/L Final    Anion Gap 09/17/2024 16  10 - 20 mmol/L Final    Urea Nitrogen 09/17/2024 32 (H)  6 - 23 mg/dL Final    Creatinine 09/17/2024 2.21 (H)  0.50 - 1.05 mg/dL Final    eGFR 09/17/2024 24 (L)  >60 mL/min/1.73m*2 Final    Calculations of estimated GFR are performed using the 2021 CKD-EPI Study Refit equation without the race variable for the IDMS-Traceable creatinine methods.  https://jasn.asnjournals.org/content/early/2021/09/22/ASN.3779593420    Calcium 09/17/2024 8.1 (L)  8.6 - 10.3 mg/dL Final    POCT Glucose 09/17/2024 119 (H)  74 - 99 mg/dL Final    Heparin Unfractionated 09/17/2024 0.7  See Comment Below for  Therapeutic Ranges IU/mL Final    Heparin Unfractionated 09/17/2024 0.9  See Comment Below for Therapeutic Ranges IU/mL Final    POCT Glucose 09/17/2024 110 (H)  74 - 99 mg/dL Final    WBC, Urine 09/17/2024 1-5  1-5, NONE /HPF Final    RBC, Urine 09/17/2024 6-10 (A)  NONE, 1-2, 3-5 /HPF Final    Squamous Epithelial Cells, Urine 09/17/2024 1-9 (SPARSE)  Reference range not established. /HPF Final    Mucus, Urine 09/17/2024 FEW  Reference range not established. /LPF Final    Hyaline Casts, Urine 09/17/2024 4+ (A)  NONE /LPF Final    POCT Glucose 09/17/2024 134 (H)  74 - 99 mg/dL Final    Heparin Unfractionated 09/17/2024 0.5  See Comment Below for Therapeutic Ranges IU/mL Final    POCT Glucose 09/17/2024 123 (H)  74 - 99 mg/dL Final    POCT Glucose 09/17/2024 142 (H)  74 - 99 mg/dL Final    WBC 09/18/2024 10.4  4.4 - 11.3 x10*3/uL Final    nRBC 09/18/2024 0.0  0.0 - 0.0 /100 WBCs Final    RBC 09/18/2024 3.01 (L)  4.00 - 5.20 x10*6/uL Final    Hemoglobin 09/18/2024 8.9 (L)  12.0 - 16.0 g/dL Final    Hematocrit 09/18/2024 27.2 (L)  36.0 - 46.0 % Final    MCV 09/18/2024 90  80 - 100 fL Final    MCH 09/18/2024 29.6  26.0 - 34.0 pg Final    MCHC 09/18/2024 32.7  32.0 - 36.0 g/dL Final    RDW 09/18/2024 14.2  11.5 - 14.5 % Final    Platelets 09/18/2024 102 (L)  150 - 450 x10*3/uL Final    Heparin Unfractionated 09/18/2024 0.4  See Comment Below for Therapeutic Ranges IU/mL Final    Heparin Unfractionated 09/18/2024 0.4  See Comment Below for Therapeutic Ranges IU/mL Final    POCT Glucose 09/18/2024 101 (H)  74 - 99 mg/dL Final    POCT Glucose 09/18/2024 104 (H)  74 - 99 mg/dL Final    POCT Glucose 09/18/2024 110 (H)  74 - 99 mg/dL Final    POCT Glucose 09/18/2024 123 (H)  74 - 99 mg/dL Final    Heparin Unfractionated 09/19/2024 0.4  See Comment Below for Therapeutic Ranges IU/mL Final    POCT Glucose 09/19/2024 86  74 - 99 mg/dL Final    Glucose 09/19/2024 144 (H)  74 - 99 mg/dL Final    Sodium 09/19/2024 136  136 - 145  mmol/L Final    Potassium 09/19/2024 4.4  3.5 - 5.3 mmol/L Final    Chloride 09/19/2024 103  98 - 107 mmol/L Final    Bicarbonate 09/19/2024 28  21 - 32 mmol/L Final    Anion Gap 09/19/2024 9 (L)  10 - 20 mmol/L Final    Urea Nitrogen 09/19/2024 32 (H)  6 - 23 mg/dL Final    Creatinine 09/19/2024 1.95 (H)  0.50 - 1.05 mg/dL Final    eGFR 09/19/2024 27 (L)  >60 mL/min/1.73m*2 Final    Calculations of estimated GFR are performed using the 2021 CKD-EPI Study Refit equation without the race variable for the IDMS-Traceable creatinine methods.  https://jasn.asnjournals.org/content/early/2021/09/22/ASN.6464739801    Calcium 09/19/2024 8.0 (L)  8.6 - 10.3 mg/dL Final    WBC 09/19/2024 6.9  4.4 - 11.3 x10*3/uL Final    nRBC 09/19/2024 0.0  0.0 - 0.0 /100 WBCs Final    RBC 09/19/2024 2.96 (L)  4.00 - 5.20 x10*6/uL Final    Hemoglobin 09/19/2024 8.7 (L)  12.0 - 16.0 g/dL Final    Hematocrit 09/19/2024 27.3 (L)  36.0 - 46.0 % Final    MCV 09/19/2024 92  80 - 100 fL Final    MCH 09/19/2024 29.4  26.0 - 34.0 pg Final    MCHC 09/19/2024 31.9 (L)  32.0 - 36.0 g/dL Final    RDW 09/19/2024 14.2  11.5 - 14.5 % Final    Platelets 09/19/2024 88 (L)  150 - 450 x10*3/uL Final    POCT Glucose 09/19/2024 114 (H)  74 - 99 mg/dL Final    Vitamin D, 25-Hydroxy, Total 09/19/2024 26 (L)  30 - 100 ng/mL Final    POCT Glucose 09/19/2024 175 (H)  74 - 99 mg/dL Final    Magnesium 09/17/2024 1.93  1.60 - 2.40 mg/dL Final    Phosphorus 09/17/2024 3.4  2.5 - 4.9 mg/dL Final    The performance characteristics of phosphorus testing in heparinized plasma have been validated by the individual  laboratory site where testing is performed. Testing on heparinized plasma is not approved by the FDA; however, such approval is not necessary.    POCT Glucose 09/19/2024 151 (H)  74 - 99 mg/dL Final    WBC 09/20/2024 6.4  4.4 - 11.3 x10*3/uL Final    nRBC 09/20/2024 0.0  0.0 - 0.0 /100 WBCs Final    RBC 09/20/2024 3.14 (L)  4.00 - 5.20 x10*6/uL Final    Hemoglobin  09/20/2024 9.2 (L)  12.0 - 16.0 g/dL Final    Hematocrit 09/20/2024 29.0 (L)  36.0 - 46.0 % Final    MCV 09/20/2024 92  80 - 100 fL Final    MCH 09/20/2024 29.3  26.0 - 34.0 pg Final    MCHC 09/20/2024 31.7 (L)  32.0 - 36.0 g/dL Final    RDW 09/20/2024 14.0  11.5 - 14.5 % Final    Platelets 09/20/2024 93 (L)  150 - 450 x10*3/uL Final    Extra Tube 09/20/2024 Hold for add-ons.   Final    Auto resulted.    POCT Glucose 09/20/2024 105 (H)  74 - 99 mg/dL Final      Vascular US Lower Extremity Venous Duplex Bilateral    Result Date: 9/17/2024           [place] [address] Tel [phone] and Fax [phone]  Vascular Lab Report VASC US LOWER EXTREMITY VENOUS DUPLEX BILATERAL  Patient Name:      JOSÉ LUIS REYNOLDS SYBIL        Reading Physician:  [id number] Andrew Day MD, RPVI Study Date:        9/16/2024             Ordering Physician: [id number] JOAQUÍN WHITE MRN/PID:           [id number]              Technologist:       Verito Logan [facility] Accession#:        [id number]          Technologist 2: Date of Birth/Age: 1955 / 68 years Encounter#:         [id number] Gender:            F Admission Status:  Emergency             Location Performed: [facility]  Diagnosis/ICD: Shortness of breath-R06.02 Indication:    Shortness of Breath CPT Codes:     05714 Peripheral venous duplex scan for DVT complete  Patient History Gastric Bypass                 Abdominoplasty.  **CRITICAL RESULT** Critical Result: DVT B/L lower extremities Notification called to Joaquín White DO on 9/16/2024 at 12:24:59 PM by VIRGILIO Logan.  CONCLUSIONS: Right Lower Venous: There is acute non-occlusive deep vein thrombosis visualized in the distal external iliac, common femoral, profunda and proximal femoral veins. Cannot rule out  thrombus in non-visualized posterior tibial and Peroneal veins. Additional Findings; Soft tissue edema is noted within the calf and Popliteal segments of the RLE. Left Lower Venous: There is acute non-occlusive deep vein thrombosis visualized in the distal external iliac, common femoral and proximal femoral veins. Cannot rule out thrombus in non-visualized peroneal and posterior tibial veins. Additional Findings; Soft tissue edema is noted within the left calf and Popliteal segments.  Comparison: Compared with study from 8/26/2024, Previous duplex was negative for DVT. Current examination is positive for bilateral DVT.  Imaging & Doppler Findings:  Right                 Compressible      Thrombus              Flow Distal External Iliac   Partial    Acute non-occlusive Spontaneous/Phasic CFV                     Partial    Acute non-occlusive     Continuous PFV                     Partial    Acute non-occlusive FV Proximal             Partial    Acute non-occlusive     Continuous FV Mid                    Yes             None FV Distal                 Yes             None Popliteal                 Yes             None         Spontaneous/Phasic  Left                  Compress      Thrombus              Flow Distal External Iliac Partial  Acute non-occlusive     Continuous CFV                   Partial  Acute non-occlusive Spontaneous/Phasic PFV                     Yes           None FV Proximal           Partial  Acute non-occlusive Spontaneous/Phasic FV Mid                  Yes           None FV Distal               Yes           None Popliteal               Yes           None         Spontaneous/Phasic  [id number] Andrew Day MD, MILO Electronically signed by [id number] MILO Padilla MD on 9/17/2024 at 11:28:32 PM  ** Final **     NM Lung perfusion with spect    Result Date: 9/17/2024  Interpreted By:  Beny Braxton and Kaur Arashdeep STUDY: NM LUNG PERFUSION WITH SPECT;  9/17/2024 9:06 am   INDICATION:  Signs/Symptoms:Shortness of breath, bilateral DVTs.  <ICD-10 Codes - EPIC>   COMPARISON: Chest x-ray from 09/16/2024.   ACCESSION NUMBER(S): [id number]   ORDERING CLINICIAN: BRETT ZAMORANO   TECHNIQUE: DIVISION OF NUCLEAR MEDICINE PERFUSION LUNG SCANS   Multiple perfusion images of the lungs were acquired after the intravenous administration of 4.4 mCi of Tc-99m macroaggregated albumin (MAA). In addition, SPECT  of the chest was performed.   FINDINGS: Perfusion images of both lungs demonstrate mild heterogeneity throughout the lung fields bilaterally.   No wedge-shaped subsegmental or segmental perfusion defect throughout bilateral lung fields to suggest acute pulmonary embolism (low probability).       1. No wedge-shaped subsegmental or segmental perfusion defect seen on SPECT to suggest acute pulmonary embolism (low probability).   The interpretation above is based on modified PIOPED II and PISAPED criteria.   I personally reviewed the images/study and I agree with the findings as stated by Qamar Sinha MD.  This study was interpreted at [facility], [place], [place].   MACRO: None   Signed by: Beny Braxton 9/17/2024 11:37 AM Dictation workstation:   [id number]    ECG 12 lead    Result Date: 9/17/2024  Sinus tachycardia Inferior infarct , age undetermined Anterolateral infarct , age undetermined Abnormal ECG    CT abdomen pelvis wo IV contrast    Result Date: 9/16/2024  Interpreted By:  Devin Delgado, STUDY: CT ABDOMEN PELVIS WO IV CONTRAST;  9/16/2024 1:59 pm   INDICATION: Signs/Symptoms:Rule out any significant deep tissue infection mostly clear drainage from previous abdominoplasty.     COMPARISON: 08/08/2024.   ACCESSION NUMBER(S): [id number]   ORDERING CLINICIAN: GABRIELLA ALTMAN   TECHNIQUE: CT of the abdomen and pelvis was performed. No contrast was administered. Coronal and sagittal reformations were made.   FINDINGS: There is some motion artifact as well as streak  artifact related to patient's arms not being raised for the scan.   LOWER CHEST: Bibasilar mild irregular predominantly dependent atelectasis/scarring is present. Left breast implant again seen along with surgical clips along the left chest wall.   ABDOMEN:   LIVER: Cirrhotic morphology of the liver again seen with irregularity of the hepatic surface contour. No focal hepatic lesion identified.   BILE DUCTS: Nondilated.   GALLBLADDER: Surgically absent.   PANCREAS: There is some pancreatic parenchymal atrophy similar to prior with insinuating fat throughout the gland. There is a vague round approximate 1.1 cm low-attenuation probable cystic lesion of the pancreatic head   SPLEEN: Spleen is not significantly enlarged.   ADRENAL GLANDS: Within normal limits.   KIDNEYS AND URETERS: No renal or ureteral calculi are seen bilaterally.  No hydroureteronephrosis bilaterally. Left renal lower pole lateral partially exophytic 3.7 cm cyst is again seen. Additional previously visualized smaller renal cysts are less conspicuous on noncontrast exam.   Urinary bladder is partially distended. No bladder calculi. Scattered small pelvic phleboliths are present.   VESSELS: Scattered small atherosclerotic calcifications are seen in the aorta and branch vessels. No aortic aneurysm. Unenhanced IVC is unremarkable.   BOWEL: Postoperative changes of the stomach with suture line along the greater curvature again seen. No bowel obstruction. Appendix not identified.   No appreciable significant diverticular disease.   PERITONEUM/RETROPERITONEUM/LYMPH NODES: Small-moderate volume ascites is present along with mild generalized mesenteric and omental edema. No free intraperitoneal air.   No retroperitoneal fluid collection or lymphadenopathy.       ABDOMINAL WALL: There is mild diastasis rectus with mild midline protrusion of the abdominal contents. Irregular subcutaneous tissue edema is seen throughout the abdomen and pelvis. Foci of soft  tissue gas are also seen throughout the anterior subcutaneous tissues along with overlying bandage artifact.   BONE AND SOFT TISSUE: Thoracolumbar mild levocurvature may be partially exaggerated by positioning. Facet arthrosis is greatest in the lower lumbar spine. There is joint space narrowing and marginal spurring of both hips.   Right gluteal region subcutaneous tissue stimulator device is seen with associated lead extending through a right mid sacral foramen.       Irregular subcutaneous tissue edema of the abdomen and pelvis. Multiple foci of soft tissue gas in the anterior abdominal and pelvic subcutaneous tissues anteriorly. No organized subcutaneous tissue fluid collection.   No renal or ureteral calculi. No hydroureteronephrosis bilaterally.   Cirrhosis.   Small-moderate volume ascites with generalized mesenteric edema.   Vague round 1.1 cm low-attenuation probable cystic lesion of the pancreatic head. Cystic neoplasm can not be excluded. Follow-up pancreatic MRI with contrast may be considered for further characterization.   MACRO: None.   Signed by: Devin Delgado 9/16/2024 2:32 PM Dictation workstation:   [id number]    CT cervical spine wo IV contrast    Result Date: 9/16/2024  Interpreted By:  Devin Delgado, STUDY: CT CERVICAL SPINE WO IV CONTRAST;  9/16/2024 1:59 pm   INDICATION: Signs/Symptoms:fall.     COMPARISON: None.   ACCESSION NUMBER(S): [id number]   ORDERING CLINICIAN: GABRIELLA ALTMAN   TECHNIQUE: Contiguous unenhanced axial images were obtained through the cervical spine with coronal and sagittal reformations of the axial data.   FINDINGS: ALIGNMENT: The craniocervical junction appears intact.  The dens and atlantoaxial relation appear intact with regional irregular marginal spurring. There is mild reversal of the cervical lordosis which may be exaggerated by positioning and/or spasm.   VERTEBRAE/DISC SPACES: Multilevel disc space narrowing and endplate spurring is most prominent  at C4-5, C5-6 and C6-7. Mild facet arthrosis is present bilaterally throughout the cervical spine as well. No acute fracture, subluxation, or compression deformity is seen.   ADDITIONAL FINDINGS: Prevertebral soft tissues are not thickened.   Surgical clips are seen in the left lower neck posterolateral soft tissues.       No acute fracture or subluxation of the cervical spine.   Multilevel degenerative disc and facet changes of the cervical spine.   MACRO: None.   Signed by: Devin Delgado 9/16/2024 2:21 PM Dictation workstation:   [id number]    CT head wo IV contrast    Result Date: 9/16/2024  Interpreted By:  Devin Delgado, STUDY: CT HEAD WO IV CONTRAST;  9/16/2024 1:59 pm   INDICATION: Signs/Symptoms:fall.     COMPARISON: 08/26/2024.   ACCESSION NUMBER(S): [id number]   ORDERING CLINICIAN: GABRIELLA ALTMAN   TECHNIQUE: Contiguous unenhanced axial images were obtained through the brain.   FINDINGS: INTRACRANIAL: No acute intracranial bleed, midline shift, or mass effect is seen. Gray-white differentiation is maintained. No extra-axial fluid collection or hydrocephalus. Left basal ganglia small dystrophic calcification is again seen.   Bones are intact.   EXTRACRANIAL: Visualized paranasal sinuses and mastoids are clear.       No acute intracranial process.       MACRO: None.   Signed by: Devin Delgado 9/16/2024 2:19 PM Dictation workstation:   [id number]    XR chest 1 view    Result Date: 9/16/2024  Interpreted By:  Devin Delgado, STUDY: XR CHEST 1 VIEW;  9/16/2024 11:44 am   INDICATION: Signs/Symptoms:fall.     COMPARISON: None.   ACCESSION NUMBER(S): [id number]   ORDERING CLINICIAN: GABRIELLA ALTMAN   FINDINGS: CARDIOMEDIASTINAL SILHOUETTE: Right jugular central line has tip at the SVC level. Cardiac silhouette is borderline in size and may be partially exaggerated by low inspiratory volume.   LUNGS: Inspiratory volume is low. No focal infiltrate. No definite pleural effusion. No  pneumothorax is seen.   ABDOMEN: Upper abdominal surgical clips are present.   BONES: Thoracic mild dextrocurvature may be partially exaggerated by positioning. There is partial visualization of degenerative changes of the shoulders as well as a left humeral head surgical anchor. Mild contour irregularity of the left humeral neck may be related to old trauma. Left axillary region surgical clips are also noted.       1.  Low inspiratory volume. No focal infiltrate. 2. Partially visualized mild contour irregularity of the left humeral neck may be related to old trauma. Correlate with injury history and pain in this region.       MACRO: None.   Signed by: Devin Delgado 9/16/2024 12:40 PM Dictation workstation:   [id number]    Lower extremity venous duplex bilateral    Result Date: 8/26/2024           [place] [address] Tel [phone] and Fax [phone]  Vascular Lab Report VASC US LOWER EXTREMITY VENOUS DUPLEX BILATERAL  Patient Name:      JOSÉ LUIS DEVINE        Reading Physician:  [id number] Tyler Velásquez MD Study Date:        8/26/2024             Ordering Physician: [id number] EDYTA CARBALLO MRN/PID:           [id number]              Technologist:       Princess Mendez RVT Accession#:        [id number]          Technologist 2: Date of Birth/Age: 1955 / 68 years Encounter#:         [id number] Gender:            F Admission Status:  Inpatient             Location Performed: [facility]  Diagnosis/ICD: Other specified soft tissue disorders-M79.89 Indication:    Limb swelling CPT Codes:     74912 Peripheral venous duplex scan for DVT complete  CONCLUSIONS: Right Lower Venous: No evidence of acute deep vein thrombus visualized in the right lower extremity. Cannot rule out thrombus in  non-visualized posterior tibial and Peroneal veins due to edema. Left Lower Venous: No evidence of acute deep vein thrombus visualized in the left lower extremity. Cannot rule out thrombus in non-visualized peroneal vein due to edema.  Imaging & Doppler Findings:  Right                 Compressible Thrombus        Flow Distal External Iliac     Yes        None   Spontaneous/Phasic CFV                       Yes        None   Spontaneous/Phasic PFV                       Yes        None FV Proximal               Yes        None   Spontaneous/Phasic FV Mid                    Yes        None FV Distal                 Yes        None Popliteal                 Yes        None   Spontaneous/Phasic  Left                  Compress Thrombus        Flow Distal External Iliac   Yes      None   Spontaneous/Phasic CFV                     Yes      None   Spontaneous/Phasic PFV                     Yes      None FV Proximal             Yes      None   Spontaneous/Phasic FV Mid                  Yes      None FV Distal               Yes      None Popliteal               Yes      None   Spontaneous/Phasic PTV                     Yes      None  [id number] Tyler Velásquez MD Electronically signed by [id number] Tyler Velásquez MD on 8/26/2024 at 5:11:48 PM  ** Final **     CT head wo IV contrast    Result Date: 8/26/2024  Interpreted By:  Nunu Garcia, STUDY: CT HEAD WO IV CONTRAST; ;  8/26/2024 12:38 pm   INDICATION: Signs/Symptoms:confusion.   COMPARISON: 07/07/2023   ACCESSION NUMBER(S): [id number]   ORDERING CLINICIAN: EDYTA CARBALLO   TECHNIQUE: Serial axial images of the head were obtained without intravenous contrast. Sagittal and coronal reconstructions were generated.   FINDINGS: The ventricles are midline and normal in size.   There are no acute parenchymal abnormalities.   There is no hemorrhage or extra-axial fluid.   There is no obvious scalp hematoma or skull fracture.   Paranasal sinuses and mastoids are unremarkable. The patient  appears to be status post bilateral cataract surgery.   COMPARISON OF FINDINGS: The brain is similar.       No acute intracranial abnormality     MACRO: none   Signed by: Nunu Garcia 8/26/2024 12:52 PM Dictation workstation:   [id number]        ASSESSMENT AND PLAN    68 y.o. female with PMH of DM now improved glycemic control after weight loss, recurrent falls, abdominoplasty and gastric bypass surgery (7/30/2024), ANKUSH, recurrent UTIs, breast cancer status post left mastectomy, gout, tardive dyskinesia,HAZEL cirrhosis presenting with leg pain, found to have acute DVT, seen and evaluated by ID, started on iv Abx course c/b RUSSELL     RUSSELL, nonoliguric, on CKD, baseline creatinine of 0.9-1, was 1.9  on admission and up to 2.2, improving  UA with no cellular casts  Possible ischemic vs septic ATN unlikely AIN     Volume status - up, was on lasix 40 mg every day before admission, third spacing and low albumin     Hypokalemia, probably 2/2 diuretics, controlled     Hypocalcemia, hypomagnesemia     Hyperuricemia on allopurinol        Plan  - labs pending for today, improved clinical, hope for further improvement in creatinine  - Abx adjusted, now off  - dc ivf  - concerning underlying long standing HAZEL cirrhosis and now w/ ascitis on imaging studies, abd not very distended, no need for paracentesis for now  - maintain irene  - BP borderline low, can continue florinef  - check mag and replete as needed  - check D level and phos  - decadron per primary team  - will follow     MARS reviewed, dose for GFR<30, austedo does not need renal adjustments        Thank you for the opportunity to assist in the care of this patient, please call with questions  Brooklynn Shepherd MD PhD

## 2024-09-20 NOTE — DOCUMENTATION CLARIFICATION NOTE
"    PATIENT:               JOSÉ LUIS DEVINE  ACCT #:                  [id number]  MRN:                       [id number]  :                       1955  ADMIT DATE:       2024 11:18 AM  DISCH DATE:  RESPONDING PROVIDER #:        [id number]          PROVIDER RESPONSE TEXT:    I agree with dietician diagnosis of  Severe malnutrition on  24    CDI QUERY TEXT:    Clarification        Instruction:    Based on your assessment of the patient and the clinical information, please provide the requested documentation by clicking on the appropriate radio button and enter any additional information if prompted.    Question: Please further clarify this patient nutritional status as    When answering this query, please exercise your independent professional judgment. The fact that a question is being asked, does not imply that any particular answer is desired or expected.    The patient's clinical indicators include:  Clinical Information: 69 y/o f presents with swelling and dx with LE DVT's creatinine on admission is 1.9 treated with IVF    Clinical Indicators:  24 RD consult:  ?Malnutrition Diagnosis: Severe malnutrition related to acute disease or injury  As Evidenced by: greater than 5 percent weight loss over 1 month, PO intakes less than 50 percent of EEN for greater than/equal to 5 days; moderate muscle wasting and subcutaneous fat loss?  \"BMI 27.46\"    Treatment:  \"consume  greater than 50-75 percent  of meals\"  \"SF Gelatein once daily, Roderick twice daily, Ensure Clear twice daily\"    Risk Factors: generalized weakness, SOB, BLE DVTs, GERD, gastric bypass, abdominoplasty 24, hx breast cancer  Options provided:  -- I agree with dietician diagnosis of  Severe malnutrition on  24  -- Other - I will add my own diagnosis  -- Refer to Clinical Documentation Reviewer    Query created by: Ann Marie Burns on 2024 11:27 AM      Electronically signed by:  EDMOND DYE DO 2024 11:31 AM          "

## 2024-09-20 NOTE — CARE PLAN
The patient's goals for the shift include      The clinical goals for the shift include have stable vital signs    Over the shift, vital signs will be monitored and reported to MD if abnormal for pt,

## 2024-09-20 NOTE — CARE PLAN
The patient's goals for the shift include  to maintain safety and comfort    The clinical goals for the shift include to remain HDS through shift      Problem: Safety - Adult  Goal: Free from fall injury  Outcome: Progressing     Problem: Discharge Planning  Goal: Discharge to home or other facility with appropriate resources  Outcome: Progressing

## 2024-09-20 NOTE — PROGRESS NOTES
Infectious disease progress note  Subjective   She has no complaints today.  She is tolerating the wound VAC    Objective   Range of Vitals (last 24 hours)  Heart Rate:  []   Temp:  [35.8 °C (96.4 °F)-37.4 °C (99.3 °F)]   Resp:  [16-18]   BP: ()/(50-75)   SpO2:  [95 %-97 %]   Daily Weight  09/19/24 : 72.6 kg (160 lb 0.9 oz)    Body mass index is 27.46 kg/m².      Physical Exam  Patient lying in bed, interactive   Wound VAC in place    Relevant Results  Labs  Lab Results   Component Value Date    WBC 6.4 09/20/2024    HGB 9.2 (L) 09/20/2024    HCT 29.0 (L) 09/20/2024    MCV 92 09/20/2024    PLT 93 (L) 09/20/2024     Lab Results   Component Value Date    GLUCOSE 118 (H) 09/20/2024    CALCIUM 8.0 (L) 09/20/2024     09/20/2024    K 4.5 09/20/2024    CO2 29 09/20/2024     09/20/2024    BUN 33 (H) 09/20/2024    CREATININE 1.71 (H) 09/20/2024   ESR: --  Lab Results   Component Value Date    SEDRATE 4 09/10/2024     Lab Results   Component Value Date    CRP 2.34 (H) 09/10/2024     Lab Results   Component Value Date    ALT 23 09/16/2024    AST 26 09/16/2024    ALKPHOS 167 (H) 09/16/2024    BILITOT 0.4 09/16/2024       Microbiology  8-8-2024 wound culture group B strep  8-8-2024 blood cultures no growth 4 days     Imaging  9- CT abdomen pelvis shows irregular subcutaneous tissue edema of the abdomen pelvis no organized subcutaneous tissue fluid collection      Assessment/Plan   Large abdominal wall group B strep infected ulcer with abdominoplasty status post antibiotic treatment     1.  Patient to continue with the VAC which will be changed either 2 times or 3 times per week.  I will contact Dr. Ying to clarify how frequent the dressing change needs to be, but we will plan for changes 2 times per week unless we hear otherwise  3.  Follow-up in [place] [facility]    Other issues  #Abdominoplasty/gastric bypass surgery on 7-  #Diabetes mellitus puts patient at higher risk for  infections  #GERD  #History of breast cancer status post left mastectomy  #Gout  #Tardive dyskinesia     I reviewed and interpreted all lab test imaging studies and documentations from other healthcare providers  I am monitoring for antibiotic side effects and toxicity     Shubham Alvarez MD

## 2024-09-21 VITALS
OXYGEN SATURATION: 98 % | HEART RATE: 91 BPM | HEIGHT: 64 IN | TEMPERATURE: 97.2 F | SYSTOLIC BLOOD PRESSURE: 100 MMHG | DIASTOLIC BLOOD PRESSURE: 62 MMHG | RESPIRATION RATE: 18 BRPM | BODY MASS INDEX: 27.33 KG/M2 | WEIGHT: 160.05 LBS

## 2024-09-21 LAB
GLUCOSE BLD MANUAL STRIP-MCNC: 106 MG/DL (ref 74–99)
GLUCOSE BLD MANUAL STRIP-MCNC: 123 MG/DL (ref 74–99)

## 2024-09-21 PROCEDURE — 82947 ASSAY GLUCOSE BLOOD QUANT: CPT

## 2024-09-21 PROCEDURE — 2500000001 HC RX 250 WO HCPCS SELF ADMINISTERED DRUGS (ALT 637 FOR MEDICARE OP)

## 2024-09-21 PROCEDURE — 2500000004 HC RX 250 GENERAL PHARMACY W/ HCPCS (ALT 636 FOR OP/ED)

## 2024-09-21 PROCEDURE — 2500000001 HC RX 250 WO HCPCS SELF ADMINISTERED DRUGS (ALT 637 FOR MEDICARE OP): Performed by: STUDENT IN AN ORGANIZED HEALTH CARE EDUCATION/TRAINING PROGRAM

## 2024-09-21 PROCEDURE — 2500000005 HC RX 250 GENERAL PHARMACY W/O HCPCS

## 2024-09-21 PROCEDURE — 99233 SBSQ HOSP IP/OBS HIGH 50: CPT | Performed by: INTERNAL MEDICINE

## 2024-09-21 RX ADMIN — FLUDROCORTISONE ACETATE 0.1 MG: 0.1 TABLET ORAL at 09:51

## 2024-09-21 RX ADMIN — APIXABAN 10 MG: 5 TABLET, FILM COATED ORAL at 09:49

## 2024-09-21 RX ADMIN — DEUTETRABENAZINE 6 MG: 6 TABLET, FILM COATED, EXTENDED RELEASE ORAL at 09:54

## 2024-09-21 RX ADMIN — DEUTETRABENAZINE 24 MG: 24 TABLET, FILM COATED, EXTENDED RELEASE ORAL at 09:52

## 2024-09-21 RX ADMIN — SODIUM CHLORIDE 10 ML: 9 INJECTION INTRAMUSCULAR; INTRAVENOUS; SUBCUTANEOUS at 10:57

## 2024-09-21 RX ADMIN — PANTOPRAZOLE SODIUM 40 MG: 40 TABLET, DELAYED RELEASE ORAL at 07:04

## 2024-09-21 RX ADMIN — DEXAMETHASONE 0.5 MG: 1 TABLET ORAL at 07:04

## 2024-09-21 RX ADMIN — NYSTATIN: 100000 POWDER TOPICAL at 09:56

## 2024-09-21 RX ADMIN — LAMOTRIGINE 200 MG: 100 TABLET ORAL at 09:50

## 2024-09-21 RX ADMIN — ALLOPURINOL 100 MG: 100 TABLET ORAL at 09:51

## 2024-09-21 RX ADMIN — DEXAMETHASONE 0.5 MG: 1 TABLET ORAL at 12:15

## 2024-09-21 ASSESSMENT — PAIN SCALES - GENERAL: PAINLEVEL_OUTOF10: 0 - NO PAIN

## 2024-09-21 ASSESSMENT — COGNITIVE AND FUNCTIONAL STATUS - GENERAL
MOBILITY SCORE: 8
WALKING IN HOSPITAL ROOM: TOTAL
DRESSING REGULAR UPPER BODY CLOTHING: A LOT
DAILY ACTIVITIY SCORE: 17
TOILETING: A LOT
MOVING TO AND FROM BED TO CHAIR: TOTAL
STANDING UP FROM CHAIR USING ARMS: TOTAL
DRESSING REGULAR LOWER BODY CLOTHING: A LITTLE
MOVING FROM LYING ON BACK TO SITTING ON SIDE OF FLAT BED WITH BEDRAILS: A LOT
TURNING FROM BACK TO SIDE WHILE IN FLAT BAD: A LOT
CLIMB 3 TO 5 STEPS WITH RAILING: TOTAL
HELP NEEDED FOR BATHING: A LOT

## 2024-09-21 NOTE — DISCHARGE SUMMARY
Discharge Diagnosis  Acute deep vein thrombosis (DVT) of proximal vein of both lower extremities (Multi)    Issues Requiring Follow-Up  Patient fully evaluated 9/21/24, significant clinical improvement noted, patient medically cleared for discharge today to [facility] today.  To continue wound vac therapy with wound nurse and further management. Patient alert, awake, and smiling in bed, denies acute difficulties at this time, medications and labs reviewed, continue current and repeat labs in the AM if patient still hospitalized. Patient aware and agreeable to current plan, continue plan as above. I spent 30 minutes in the professional and overall care of this patient.    Discharge Meds     Medication List      START taking these medications     apixaban 5 mg tablet; Commonly known as: Eliquis; Take 2 tablets (10 mg)   by mouth 2 times a day for 6 days, THEN 1 tablet (5 mg) 2 times a day.;   Start taking on: September 20, 2024     CONTINUE taking these medications     acetaminophen 325 mg tablet; Commonly known as: Tylenol; Take 2 tablets   (650 mg) by mouth every 4 hours if needed for mild pain (1 - 3), moderate   pain (4 - 6), headaches or fever (temp greater than 38.0 C).   allopurinol 100 mg tablet; Commonly known as: Zyloprim; Take 1 tablet   (100 mg) by mouth once daily.   * Austedo XR 24 mg tablet extended release 24 hr; Generic drug:   deutetrabenazine; Take 1 tablet (24 mg) by mouth once daily. Take 1-6mg   tablet and 1-24mg tablet for a total dose of 30mg daily.   * Austedo XR 6 mg tablet extended release 24 hr; Generic drug:   deutetrabenazine; Take 1 tablet (6 mg) by mouth once daily. Take 1-6mg   tablet and 1-24mg tablet for a total dose of 30mg daily.   cyclobenzaprine 5 mg tablet; Commonly known as: Flexeril; Take 1 tablet   (5 mg) by mouth 3 times a day as needed for muscle spasms.   dexAMETHasone 0.5 mg/5 mL oral liquid; Take 5 mL (0.5 mg) by mouth 4   times a day.   DULoxetine 60 mg DR capsule;  Commonly known as: Cymbalta; Notes to   patient: Resume at skilled facility   famotidine 20 mg tablet; Commonly known as: Pepcid; Notes to patient:   Resume at [facility] facility   FEOSOL ORAL; Notes to patient: Resume at [facility] facility   fludrocortisone 0.1 mg tablet; Commonly known as: Florinef; Take 1   tablet (0.1 mg) by mouth once daily.   furosemide 40 mg tablet; Commonly known as: Lasix; Take 1 tablet (40 mg)   by mouth once daily.; Notes to patient: Resume at skilled facility   HEPARIN (PORCINE) LOCK FLUSH IV   lamoTRIgine 200 mg tablet; Commonly known as: LaMICtal   multivitamin tablet; Notes to patient: Resume at skilled facility   naratriptan 2.5 mg tablet; Commonly known as: Amerge; Take 1 tablet (2.5   mg) by mouth 1 time if needed for migraine. May repeat once in 4hrs if   headache recurs; Notes to patient: Resume at skilled facility Imitrex was   ordered prn in hospital, none received    nystatin 100,000 unit/gram powder; Commonly known as: Nyamyc; Apply   topically 2 times a day. to affected area   pantoprazole 20 mg EC tablet; Commonly known as: ProtoNix   potassium chloride CR 20 mEq ER tablet; Commonly known as: Klor-Con M20;   Take 1 tablet (20 mEq) by mouth once daily. Do not crush or chew. Do not   fill before September 6, 2024.; Notes to patient: Resume at skilled   facility   sodium chloride (PF) 0.9% injection; Notes to patient: With each   medication administration   sucralfate 1 gram tablet; Commonly known as: Carafate; Take 1 tablet (1   g) by mouth once daily at bedtime.   traZODone 100 mg tablet; Commonly known as: Desyrel   Xdemvy 0.25 % drops; Generic drug: lotilaner  * This list has 2 medication(s) that are the same as other medications   prescribed for you. Read the directions carefully, and ask your doctor or   other care provider to review them with you.     STOP taking these medications     ertapenem 1 gram injection; Commonly known as: INVanz       Test Results Pending At  "Discharge  Pending Labs       No current pending labs.            Hospital Course         Jarocho DO Rubi   Physician  Primary Care     H&P     Addendum     Date of Service: 9/17/2024  8:24 AM     Addendum       Expand All Collapse All    History Of Present Illness  Mariann Drew is a 68 y.o. female with a past medical history of abdominoplasty and gastric bypass surgery (7/30/2024), T2DM, GERD, Hx of breast cancer s/p L mastectomy, gout, tardive dyskinesia who presents to the emergency department for lower extremity swelling and generalized weakness.  Of note, patient developed fevers and purulent drainage from BÁRBARA drains 2-3 days postsurgery. She was admitted to [facility] and treated with IV antibiotics. Debridement was performed, but wound was unable to be closed and patient was placed on a wound VAC.  Patient does follow with Dr. Ying for her wound and has home health nurses who assist her with wound care management.  Patient states she has been experiencing bilateral lower extremity swelling for \"about 20 days\" that started in just her legs, but has now spread to her feet.  She states she has never had this type of leg swelling before, and states they are tender to the touch.  She also states she has been having trouble walking and has fallen \"multiple times.\"  She states she is also experiencing generalized weakness.  She states that 4 days ago, she began to feel short of breath as well.  She denies fever/chills, chest pain, nausea/vomiting, diarrhea, URI symptoms, urinary symptoms, numbness/tingling.  Denies history of DVT/PE, tobacco use.  She did have a recent surgery on 7/30/2024.  She states her abdominal wound is \"doing well,\" without any surrounding erythema or pain.  She does states she has noticed pale yellow drainage coming from the wound.  Per record review, patient has been on Invanz.  The patient tells me she has not been taking this antibiotic, because she states she has been \"out of it (the " "medicine).\"  Patient also states she has been taking Lasix every day, though she does not feel like it is helping.     ED course: On arrival, patient's /65, heart rate 100, respirations 20, pulse ox 97%.  Labs and imaging performed, revealing creatinine 1.92 and BUN 30 (1.59 and 29 on 9/10), magnesium 1.49.  BNP 22.  Initial troponin 7.  No leukocytosis.  Hemoglobin 10.4.  Platelets 118.  Imaging shows mild diastasis rectus with mild midline protrusion of the abdominal contents, foci of soft tissue gas also seen throughout the anterior subcutaneous tissues along with overlying bandage artifact.  Background 1.1 cm low-attenuation probable cystic lesion of the pancreatic head-cystic neoplasm cannot be excluded.  Vascular ultrasound lower extremities shows bilateral DVTs.  Patient given IV heparin, magnesium,  Merrem, vancomycin in the ED. patient to be admitted inpatient under Dr. Venegas. Patient to be admitted inpatient under Dr. Venegas.      Past Medical History  Medical History        Past Medical History:   Diagnosis Date    Anxiety      Arthritis      Cervicalgia 02/13/2017     Neck pain, chronic    Depression      Dysphagia, unspecified 02/23/2021     Dysphagia    Encounter for immunization 12/02/2022     Encounter for immunization    Epidermal cyst 02/04/2014     Epidermal inclusion cyst    Fatty (change of) liver, not elsewhere classified 02/01/2014     Fatty liver    GERD (gastroesophageal reflux disease)      Glaucoma      Liver disease, unspecified       Liver disease, chronic    Localized swelling, mass and lump, unspecified 02/23/2021     Subcutaneous nodules    Long term (current) use of opiate analgesic 02/20/2018     Opiate analgesic use agreement exists    Long term (current) use of opiate analgesic 02/20/2018     Long term current use of opiate analgesic    Nonalcoholic steatohepatitis (HAZEL)       Steatohepatitis, nonalcoholic    Other instability, right knee 05/17/2018     Instability of right knee " joint    Other specified abnormal findings of blood chemistry       Elevated LFTs    Other specified symptoms and signs involving the circulatory and respiratory systems 12/01/2015     Globus pharyngeus    Pain in right hand 03/27/2017     Pain of right hand    Pain in right knee 06/21/2018     Acute pain of right knee    Pain in throat 12/01/2015     Throat pain in adult    Pain in unspecified knee 06/28/2017     Knee pain    Pain in unspecified shoulder 03/23/2017     Shoulder pain    Personal history of diseases of the skin and subcutaneous tissue 06/20/2018     History of acne    Personal history of diseases of the skin and subcutaneous tissue 06/02/2014     History of dermatitis    Personal history of malignant neoplasm of breast       Personal history of malignant neoplasm of breast    Personal history of other diseases of the digestive system 10/13/2021     History of gastroesophageal reflux (GERD)    Personal history of other diseases of the musculoskeletal system and connective tissue       Personal history of juvenile rheumatoid arthritis    Personal history of other diseases of the nervous system and sense organs 10/13/2021     History of sleep apnea    Personal history of other diseases of the respiratory system 01/14/2014     Personal history of asthma    Personal history of other endocrine, nutritional and metabolic disease       History of hyperlipidemia    Personal history of other infectious and parasitic diseases 11/24/2014     History of herpes zoster    Personal history of other specified conditions       History of dysuria    Personal history of peptic ulcer disease 01/14/2014     History of peptic ulcer    Personal history of pneumonia (recurrent) 01/14/2014     History of pneumonia    Personal history of traumatic brain injury 02/23/2021     History of concussion    Polyp of stomach and duodenum 01/14/2014     Gastric polyps    Polyp of stomach and duodenum 01/14/2014     Polyp of duodenum     Repeated falls 11/09/2015     Frequent falls    Secondary and unspecified malignant neoplasm of lymph node, unspecified (Multi)       Metastasis to lymph nodes    Sleep apnea      Tardive dyskinesia      Type 2 diabetes mellitus (Multi)      Urinary tract infection, site not specified 08/01/2019     Acute UTI    Urinary tract infection, site not specified 04/19/2020     Acute UTI            Surgical History  Surgical History         Past Surgical History:   Procedure Laterality Date    CARPAL TUNNEL RELEASE   01/13/2014     Neuroplasty Decompression Median Nerve At Carpal Tunnel    CATARACT EXTRACTION        CHOLECYSTECTOMY   01/13/2014     Cholecystectomy    COLONOSCOPY   01/13/2014     Colonoscopy (Fiberoptic)    CT ANGIO NECK   07/07/2023     CT NECK ANGIO W AND WO IV CONTRAST 7/7/2023 PAR CT    CT HEAD ANGIO W AND WO IV CONTRAST   07/07/2023     CT HEAD ANGIO W AND WO IV CONTRAST 7/7/2023 PAR CT    HYSTERECTOMY   10/13/2021     Hysterectomy    LIVER BIOPSY        OTHER SURGICAL HISTORY   12/17/2013     Breast Surgery Reconstruction    OTHER SURGICAL HISTORY   12/17/2013     Modified Radical Mastectomy Left Breast    OTHER SURGICAL HISTORY   12/17/2013     Laparosc Gastric Restrictive Proc By Adjustable Gastric Band    OTHER SURGICAL HISTORY   01/13/2014     Breast Surgery Modified Radical Mastectomy    OTHER SURGICAL HISTORY   10/13/2021     Esophageal Dilation    OTHER SURGICAL HISTORY         Excision Of Lesion Face Benign 2.1 To 3cm    TOTAL KNEE ARTHROPLASTY   04/22/2014     Knee Replacement            Social History  She reports that she quit smoking about 51 years ago. Her smoking use included cigarettes. She has never used smokeless tobacco. She reports that she does not currently use alcohol. She reports current drug use. Drug: Marijuana.     Family History  Family History          Family History   Problem Relation Name Age of Onset    Arthritis Mother        Coronary artery disease Mother         Coronary artery disease Father        Arthritis Father        Heart failure Father        Glaucoma Father        Prostate cancer Brother        Stroke Maternal Grandmother        Dementia Maternal Grandmother        Stroke Maternal Grandfather        Dementia Maternal Grandfather        Stroke Paternal Grandmother        Hypertension Other        Diabetes Other        Asthma Other        Ulcerative colitis Other        Goiter Other        Psoriasis Other                Allergies  Penicillins, Cefepime, Avelox [moxifloxacin], Bactrim [sulfamethoxazole-trimethoprim], Oxycodone, Phenergan [promethazine], Tramadol, Trintellix [vortioxetine], Adhesive tape-silicones, Cephalexin, Codeine, Erythromycin, Hydroxychloroquine, Nsaids (non-steroidal anti-inflammatory drug), and [id number] antimigraine agents     Review of Systems   Constitutional:  Positive for activity change and fever. Negative for chills.   HENT:  Negative for congestion, rhinorrhea, sinus pressure and sore throat.    Respiratory:  Positive for shortness of breath. Negative for cough and wheezing.    Cardiovascular:  Positive for leg swelling. Negative for chest pain and palpitations.   Gastrointestinal:  Negative for abdominal distention, abdominal pain, diarrhea, nausea and vomiting.   Genitourinary:  Negative for difficulty urinating, dysuria and urgency.   Neurological:  Positive for weakness. Negative for dizziness and numbness.         Physical Exam  Constitutional:       General: She is not in acute distress.     Appearance: She is not ill-appearing.   HENT:      Head: Normocephalic and atraumatic.      Nose: Nose normal.      Mouth/Throat:      Mouth: Mucous membranes are moist.      Pharynx: Oropharynx is clear.   Eyes:      Extraocular Movements: Extraocular movements intact.      Conjunctiva/sclera: Conjunctivae normal.      Pupils: Pupils are equal, round, and reactive to light.   Cardiovascular:      Rate and Rhythm: Regular rhythm.  "Tachycardia present.      Pulses: Normal pulses.   Pulmonary:      Effort: Pulmonary effort is normal.      Breath sounds: Normal breath sounds.   Abdominal:      Comments: Abdominal wound VAC in place   Musculoskeletal:      Cervical back: Normal range of motion.      Right lower leg: Edema (3+ pitting edema) present.      Left lower leg: Edema (3+ pitting edema) present.   Skin:     General: Skin is warm and dry.      Findings: No erythema.   Neurological:      General: No focal deficit present.      Mental Status: She is alert and oriented to person, place, and time.      Motor: Weakness present.   Psychiatric:         Mood and Affect: Mood normal.         Behavior: Behavior normal.            Last Recorded Vitals  Blood pressure 95/61, pulse 95, temperature 36.3 °C (97.3 °F), resp. rate 18, height 1.626 m (5' 4\"), weight 72.6 kg (160 lb), SpO2 100%.     Relevant Results     Scheduled medications    Scheduled Medications   allopurinol, 100 mg, oral, Daily  deutetrabenazine, 24 mg, oral, Daily  deutetrabenazine, 6 mg, oral, Daily  dexAMETHasone, 0.5 mg, oral, 4x daily  fludrocortisone, 0.1 mg, oral, Daily  insulin lispro, 0-5 Units, subcutaneous, TID  lamoTRIgine, 200 mg, oral, q AM  lotilaner, 1 drop, Both Eyes, Nightly  meropenem, 1 g, intravenous, q12h  nystatin, , Topical, BID  pantoprazole, 40 mg, oral, Daily before breakfast   Or  pantoprazole, 40 mg, intravenous, Daily before breakfast  sodium chloride (PF) 0.9%, 10 mL, intravenous, Daily  sucralfate, 1 g, oral, Nightly  traZODone, 150 mg, oral, Nightly         Continuous medications    Continuous Medications   heparin, 0-4,500 Units/hr, Last Rate: 1,100 Units/hr (09/16/24 2342)  lactated Ringer's, 100 mL/hr, Last Rate: 100 mL/hr (09/17/24 0600)         PRN medications  PRN Medications   PRN medications: acetaminophen, heparin, polyethylene glycol, SUMAtriptan, vancomycin              Results for orders placed or performed during the hospital encounter of " 09/16/24 (from the past 24 hour(s))   CBC and Auto Differential   Result Value Ref Range     WBC 10.1 4.4 - 11.3 x10*3/uL     nRBC 0.0 0.0 - 0.0 /100 WBCs     RBC 3.52 (L) 4.00 - 5.20 x10*6/uL     Hemoglobin 10.4 (L) 12.0 - 16.0 g/dL     Hematocrit 32.4 (L) 36.0 - 46.0 %     MCV 92 80 - 100 fL     MCH 29.5 26.0 - 34.0 pg     MCHC 32.1 32.0 - 36.0 g/dL     RDW 14.4 11.5 - 14.5 %     Platelets 118 (L) 150 - 450 x10*3/uL     Neutrophils % 76.8 40.0 - 80.0 %     Immature Granulocytes %, Automated 0.6 0.0 - 0.9 %     Lymphocytes % 11.8 13.0 - 44.0 %     Monocytes % 6.6 2.0 - 10.0 %     Eosinophils % 3.5 0.0 - 6.0 %     Basophils % 0.7 0.0 - 2.0 %     Neutrophils Absolute 7.74 (H) 1.20 - 7.70 x10*3/uL     Immature Granulocytes Absolute, Automated 0.06 0.00 - 0.70 x10*3/uL     Lymphocytes Absolute 1.19 (L) 1.20 - 4.80 x10*3/uL     Monocytes Absolute 0.66 0.10 - 1.00 x10*3/uL     Eosinophils Absolute 0.35 0.00 - 0.70 x10*3/uL     Basophils Absolute 0.07 0.00 - 0.10 x10*3/uL   Comprehensive Metabolic Panel   Result Value Ref Range     Glucose 131 (H) 74 - 99 mg/dL     Sodium 137 136 - 145 mmol/L     Potassium 4.3 3.5 - 5.3 mmol/L     Chloride 104 98 - 107 mmol/L     Bicarbonate 23 21 - 32 mmol/L     Anion Gap 14 10 - 20 mmol/L     Urea Nitrogen 30 (H) 6 - 23 mg/dL     Creatinine 1.92 (H) 0.50 - 1.05 mg/dL     eGFR 28 (L) >60 mL/min/1.73m*2     Calcium 8.0 (L) 8.6 - 10.3 mg/dL     Albumin 2.3 (L) 3.4 - 5.0 g/dL     Alkaline Phosphatase 167 (H) 33 - 136 U/L     Total Protein 5.1 (L) 6.4 - 8.2 g/dL     AST 26 9 - 39 U/L     Bilirubin, Total 0.4 0.0 - 1.2 mg/dL     ALT 23 7 - 45 U/L   Magnesium   Result Value Ref Range     Magnesium 1.49 (L) 1.60 - 2.40 mg/dL   Troponin I, High Sensitivity   Result Value Ref Range     Troponin I, High Sensitivity 7 0 - 13 ng/L   B-type natriuretic peptide   Result Value Ref Range     BNP 22 0 - 99 pg/mL   Coagulation Screen   Result Value Ref Range     Protime 13.6 (H) 9.8 - 12.8 seconds     INR  1.2 (H) 0.9 - 1.1     aPTT 28 27 - 38 seconds   Heparin Assay, UFH   Result Value Ref Range     Heparin Unfractionated 1.4 (HH) See Comment Below for Therapeutic Ranges IU/mL   Heparin Assay, UFH   Result Value Ref Range     Heparin Unfractionated 0.9 See Comment Below for Therapeutic Ranges IU/mL   POCT GLUCOSE   Result Value Ref Range     POCT Glucose 119 (H) 74 - 99 mg/dL   CBC   Result Value Ref Range     WBC 8.8 4.4 - 11.3 x10*3/uL     nRBC 0.0 0.0 - 0.0 /100 WBCs     RBC 3.41 (L) 4.00 - 5.20 x10*6/uL     Hemoglobin 10.0 (L) 12.0 - 16.0 g/dL     Hematocrit 32.7 (L) 36.0 - 46.0 %     MCV 96 80 - 100 fL     MCH 29.3 26.0 - 34.0 pg     MCHC 30.6 (L) 32.0 - 36.0 g/dL     RDW 14.4 11.5 - 14.5 %     Platelets 100 (L) 150 - 450 x10*3/uL   Basic metabolic panel   Result Value Ref Range     Glucose 130 (H) 74 - 99 mg/dL     Sodium 135 (L) 136 - 145 mmol/L     Potassium 4.7 3.5 - 5.3 mmol/L     Chloride 102 98 - 107 mmol/L     Bicarbonate 22 21 - 32 mmol/L     Anion Gap 16 10 - 20 mmol/L     Urea Nitrogen 32 (H) 6 - 23 mg/dL     Creatinine 2.21 (H) 0.50 - 1.05 mg/dL     eGFR 24 (L) >60 mL/min/1.73m*2     Calcium 8.1 (L) 8.6 - 10.3 mg/dL   Heparin Assay   Result Value Ref Range     Heparin Unfractionated 0.7 See Comment Below for Therapeutic Ranges IU/mL   POCT GLUCOSE   Result Value Ref Range     POCT Glucose 110 (H) 74 - 99 mg/dL   Urinalysis with Reflex Microscopic   Result Value Ref Range     Color, Urine Yellow Light-Yellow, Yellow, Dark-Yellow     Appearance, Urine Clear Clear     Specific Gravity, Urine 1.017 1.005 - 1.035     pH, Urine 5.0 5.0, 5.5, 6.0, 6.5, 7.0, 7.5, 8.0     Protein, Urine NEGATIVE NEGATIVE, 10 (TRACE), 20 (TRACE) mg/dL     Glucose, Urine Normal Normal mg/dL     Blood, Urine 0.06 (1+) (A) NEGATIVE     Ketones, Urine NEGATIVE NEGATIVE mg/dL     Bilirubin, Urine NEGATIVE NEGATIVE     Urobilinogen, Urine Normal Normal mg/dL     Nitrite, Urine NEGATIVE NEGATIVE     Leukocyte Esterase, Urine 25  Melisa/µL (A) NEGATIVE   Microscopic Only, Urine   Result Value Ref Range     WBC, Urine 1-5 1-5, NONE /HPF     RBC, Urine 6-10 (A) NONE, 1-2, 3-5 /HPF     Squamous Epithelial Cells, Urine 1-9 (SPARSE) Reference range not established. /HPF     Mucus, Urine FEW Reference range not established. /LPF     Hyaline Casts, Urine 4+ (A) NONE /LPF      CT abdomen pelvis wo IV contrast     Result Date: 9/16/2024  Interpreted By:  Devin Delgado, STUDY: CT ABDOMEN PELVIS WO IV CONTRAST;  9/16/2024 1:59 pm   INDICATION: Signs/Symptoms:Rule out any significant deep tissue infection mostly clear drainage from previous abdominoplasty.     COMPARISON: 08/08/2024.   ACCESSION NUMBER(S): [id number]   ORDERING CLINICIAN: GABRIELLA ALTMAN   TECHNIQUE: CT of the abdomen and pelvis was performed. No contrast was administered. Coronal and sagittal reformations were made.   FINDINGS: There is some motion artifact as well as streak artifact related to patient's arms not being raised for the scan.   LOWER CHEST: Bibasilar mild irregular predominantly dependent atelectasis/scarring is present. Left breast implant again seen along with surgical clips along the left chest wall.   ABDOMEN:   LIVER: Cirrhotic morphology of the liver again seen with irregularity of the hepatic surface contour. No focal hepatic lesion identified.   BILE DUCTS: Nondilated.   GALLBLADDER: Surgically absent.   PANCREAS: There is some pancreatic parenchymal atrophy similar to prior with insinuating fat throughout the gland. There is a vague round approximate 1.1 cm low-attenuation probable cystic lesion of the pancreatic head   SPLEEN: Spleen is not significantly enlarged.   ADRENAL GLANDS: Within normal limits.   KIDNEYS AND URETERS: No renal or ureteral calculi are seen bilaterally.  No hydroureteronephrosis bilaterally. Left renal lower pole lateral partially exophytic 3.7 cm cyst is again seen. Additional previously visualized smaller renal cysts are less  conspicuous on noncontrast exam.   Urinary bladder is partially distended. No bladder calculi. Scattered small pelvic phleboliths are present.   VESSELS: Scattered small atherosclerotic calcifications are seen in the aorta and branch vessels. No aortic aneurysm. Unenhanced IVC is unremarkable.   BOWEL: Postoperative changes of the stomach with suture line along the greater curvature again seen. No bowel obstruction. Appendix not identified.   No appreciable significant diverticular disease.   PERITONEUM/RETROPERITONEUM/LYMPH NODES: Small-moderate volume ascites is present along with mild generalized mesenteric and omental edema. No free intraperitoneal air.   No retroperitoneal fluid collection or lymphadenopathy.       ABDOMINAL WALL: There is mild diastasis rectus with mild midline protrusion of the abdominal contents. Irregular subcutaneous tissue edema is seen throughout the abdomen and pelvis. Foci of soft tissue gas are also seen throughout the anterior subcutaneous tissues along with overlying bandage artifact.   BONE AND SOFT TISSUE: Thoracolumbar mild levocurvature may be partially exaggerated by positioning. Facet arthrosis is greatest in the lower lumbar spine. There is joint space narrowing and marginal spurring of both hips.   Right gluteal region subcutaneous tissue stimulator device is seen with associated lead extending through a right mid sacral foramen.        Irregular subcutaneous tissue edema of the abdomen and pelvis. Multiple foci of soft tissue gas in the anterior abdominal and pelvic subcutaneous tissues anteriorly. No organized subcutaneous tissue fluid collection.   No renal or ureteral calculi. No hydroureteronephrosis bilaterally.   Cirrhosis.   Small-moderate volume ascites with generalized mesenteric edema.   Vague round 1.1 cm low-attenuation probable cystic lesion of the pancreatic head. Cystic neoplasm can not be excluded. Follow-up pancreatic MRI with contrast may be considered for  further characterization.   MACRO: None.   Signed by: Devin Delgado 9/16/2024 2:32 PM Dictation workstation:   [id number]     CT cervical spine wo IV contrast     Result Date: 9/16/2024  Interpreted By:  Devin Delgado, STUDY: CT CERVICAL SPINE WO IV CONTRAST;  9/16/2024 1:59 pm   INDICATION: Signs/Symptoms:fall.     COMPARISON: None.   ACCESSION NUMBER(S): [id number]   ORDERING CLINICIAN: GABRIELLA ALTMAN   TECHNIQUE: Contiguous unenhanced axial images were obtained through the cervical spine with coronal and sagittal reformations of the axial data.   FINDINGS: ALIGNMENT: The craniocervical junction appears intact.  The dens and atlantoaxial relation appear intact with regional irregular marginal spurring. There is mild reversal of the cervical lordosis which may be exaggerated by positioning and/or spasm.   VERTEBRAE/DISC SPACES: Multilevel disc space narrowing and endplate spurring is most prominent at C4-5, C5-6 and C6-7. Mild facet arthrosis is present bilaterally throughout the cervical spine as well. No acute fracture, subluxation, or compression deformity is seen.   ADDITIONAL FINDINGS: Prevertebral soft tissues are not thickened.   Surgical clips are seen in the left lower neck posterolateral soft tissues.        No acute fracture or subluxation of the cervical spine.   Multilevel degenerative disc and facet changes of the cervical spine.   MACRO: None.   Signed by: Devin Delgado 9/16/2024 2:21 PM Dictation workstation:   [id number]     CT head wo IV contrast     Result Date: 9/16/2024  Interpreted By:  Devin Delgado, STUDY: CT HEAD WO IV CONTRAST;  9/16/2024 1:59 pm   INDICATION: Signs/Symptoms:fall.     COMPARISON: 08/26/2024.   ACCESSION NUMBER(S): [id number]   ORDERING CLINICIAN: GABRIELLA ALTMAN   TECHNIQUE: Contiguous unenhanced axial images were obtained through the brain.   FINDINGS: INTRACRANIAL: No acute intracranial bleed, midline shift, or mass effect is seen. Gray-white  differentiation is maintained. No extra-axial fluid collection or hydrocephalus. Left basal ganglia small dystrophic calcification is again seen.   Bones are intact.   EXTRACRANIAL: Visualized paranasal sinuses and mastoids are clear.        No acute intracranial process.       MACRO: None.   Signed by: Devin Delgado 9/16/2024 2:19 PM Dictation workstation:   [id number]     XR chest 1 view     Result Date: 9/16/2024  Interpreted By:  Devin Delgado, STUDY: XR CHEST 1 VIEW;  9/16/2024 11:44 am   INDICATION: Signs/Symptoms:fall.     COMPARISON: None.   ACCESSION NUMBER(S): [id number]   ORDERING CLINICIAN: GABRIELLA ALTMAN   FINDINGS: CARDIOMEDIASTINAL SILHOUETTE: Right jugular central line has tip at the SVC level. Cardiac silhouette is borderline in size and may be partially exaggerated by low inspiratory volume.   LUNGS: Inspiratory volume is low. No focal infiltrate. No definite pleural effusion. No pneumothorax is seen.   ABDOMEN: Upper abdominal surgical clips are present.   BONES: Thoracic mild dextrocurvature may be partially exaggerated by positioning. There is partial visualization of degenerative changes of the shoulders as well as a left humeral head surgical anchor. Mild contour irregularity of the left humeral neck may be related to old trauma. Left axillary region surgical clips are also noted.        1.  Low inspiratory volume. No focal infiltrate. 2. Partially visualized mild contour irregularity of the left humeral neck may be related to old trauma. Correlate with injury history and pain in this region.       MACRO: None.   Signed by: Devin Delgado 9/16/2024 12:40 PM Dictation workstation:   [id number]     Vascular US Lower Extremity Venous Duplex Bilateral     Result Date: 9/16/2024  Preliminary Cardiology Report          [place] [address] [place], [place] [address] Tel [phone] and Fax [phone]          Preliminary Vascular Lab Report  VASC US LOWER EXTREMITY  VENOUS DUPLEX BILATERAL  Patient Name:      JOSÉ LUIS DEVINE Reading Physician:  [id number] Andrew Day MD, RPVI Study Date:        9/16/2024      Ordering Physician: [id number] JOAQUÍN WHITE MRN/PID:           [id number]       Technologist:       Verito Logan T Accession#:        [id number]   Technologist 2: Date of Birth/Age: 1955     Encounter#:         [id number] Gender:            F Admission Status:  Emergency      Location Performed: [facility]  Diagnosis/ICD: Shortness of breath-R06.02 Indication:    Shortness of Breath Procedure/CPT: 80262 Peripheral venous duplex scan for DVT complete  Patient History: Gastric Bypass                  Abdominoplasty.  **CRITICAL RESULT** Critical Result: DVT BLE Notification called to Joaquín White DO on 9/16/2024 at 12:24:59 PM by VIRGILIO Logan.  PRELIMINARY CONCLUSIONS: Right Lower Venous: There is acute non-occlusive deep vein thrombosis visualized in the distal external iliac, common femoral, profunda and proximal femoral veins. Additional Findings; Soft tissue edema is noted within the calf and Popliteal segments of the RLE. Left Lower Venous: There is acute non-occlusive deep vein thrombosis visualized in the distal external iliac, common femoral and proximal femoral veins. Additional Findings; Soft tissue edema is noted within the left calf and Popliteal segments.  Imaging & Doppler Findings:  Right                 Compressible      Thrombus              Flow Distal External Iliac   Partial    Acute non-occlusive Spontaneous/Phasic CFV                     Partial    Acute non-occlusive     Continuous PFV                     Partial    Acute non-occlusive FV Proximal             Partial    Acute non-occlusive     Continuous FV Mid                    Yes             None FV Distal                 Yes             None Popliteal                 Yes             None         Spontaneous/Phasic  Left                  Compress      Thrombus              Flow  Distal External Iliac Partial  Acute non-occlusive     Continuous CFV                   Partial  Acute non-occlusive Spontaneous/Phasic PFV                     Yes           None FV Proximal           Partial  Acute non-occlusive Spontaneous/Phasic FV Mid                  Yes           None FV Distal               Yes           None Popliteal               Yes           None         Spontaneous/Phasic VASCULAR PRELIMINARY REPORT completed by Verito Logan RVNETTIE on 9/16/2024 at 12:29:04 PM  ** Final **             Assessment/Plan        Assessment & Plan  Acute deep vein thrombosis (DVT) of proximal vein of both lower extremities (Multi)     Disruption of wound, unspecified, initial encounter     Disruption of external surgical wound     Deep vein thrombosis, bilateral lower extremities  BLE edema  -Vascular surgery consult and recommendations appreciated  -V/Q scan ordered due to patient's RUSSELL (pt experiencing shortness of breath and tachycardia)  -Heparin drip initiated in the ED, continue     Abdominal wound  Hx of abdominoplasty and gastric bypass surgery (7/30/2024)  Hypoalbuminemia  -Infectious disease consult and reccomendations appreciated, pt follows with Dr. Ying  -CT imaging results as above  -Pt inititated on Merrem and vancomycin in the ED, will continue. Further abx adjustments per ID  -Per [facility] note, patient is to have wound vac changed every Monday and Thursday. Wound vac setting is 125 mmHg, irrigate with saline prior to placing. Black granufoam dressing  -Wound prevention techniques     Acute kidney injury  -Cr 1.92 today, 1.59 on 9/10  -BMP in the a.m.  -Will hold diuresis for now due to RUSSELL. Also holding on fluids due to pitting edema in BLE     Hypomagnesemia  -Magnesium 1.49 today, replacement ordered in the ED  -Monitor with BMP in the a.m.     Thrombocytopenia  -Platelets 118 today, appears chronic  -Monitor with CBC in the AM     Cystic lesion of pancreatic head on CT imaging  -Cystic  neoplasm cannot be excluded, per radiologist  -Follow-up pancreatic MRI with contrast may be considered for further characterization     History of falls  -PT/OT eval and treat  -Fall precautions     T2DM  -Sliding scale insulin  -Diabetic diet, hypoglycemic protocol     DVT Prophylaxis  -Heparin  -SCDs        I spent 60 minutes in the professional and overall care of this patient.                Revision History         Pertinent Physical Exam At Time of Discharge  Physical Exam  Patient fully evaluated 9/21/24, significant clinical improvement noted, patient medically cleared for discharge today to [facility] today.  To continue wound vac therapy with wound nurse and further management. Patient alert, awake, and smiling in bed, denies acute difficulties at this time, medications and labs reviewed, continue current and repeat labs in the AM if patient still hospitalized. Patient aware and agreeable to current plan, continue plan as above. I spent 30 minutes in the professional and overall care of this patient.  Outpatient Follow-Up  Future Appointments   Date Time Provider Department [place]   9/25/2024 10:30 AM Woundcare Provider [id number] [place]   9/26/2024  9:00 AM Olivia Herrera MD [id number] [place]   1/7/2025  9:40 AM Dione Guthrie MD [id number] [place]         Peggy Dubose

## 2024-09-21 NOTE — PROGRESS NOTES
Subjective   Subjective Data and Overnight Events:  Covering for vascular surgery services today.  Patient is planned for DC to nursing facility today.    Objective   Vital Signs:  Patient Vitals for the past 24 hrs:   BP Temp Temp src Pulse Resp SpO2   09/21/24 1155 100/62 36.2 °C (97.2 °F) -- 91 -- 98 %   09/21/24 0847 94/52 36 °C (96.8 °F) Temporal 92 18 100 %   09/21/24 0432 114/67 35.9 °C (96.6 °F) Temporal 97 18 98 %   09/21/24 0011 113/67 36 °C (96.8 °F) Temporal 99 18 96 %   09/20/24 2025 98/54 36.6 °C (97.9 °F) Temporal 107 18 97 %   09/20/24 1531 96/62 36.9 °C (98.4 °F) Temporal (!) 112 18 95 %       Physical Exam:  General: no acute distress  Lungs: Clear to auscultation bilaterally without wheezing, rales, or rhonchi.  Abdomen: Wound VAC in place in the lower abdomen, tissue underneath appeared healthy looking.  Extremities: No significant edema.  Neurologic: Alert and oriented x3.    Current Medications:  Scheduled medications   Medication Dose Route Frequency    allopurinol  100 mg oral Daily    apixaban  10 mg oral BID    Followed by    [START ON 9/26/2024] apixaban  5 mg oral BID    deutetrabenazine  24 mg oral Daily    deutetrabenazine  6 mg oral Daily    dexAMETHasone  0.5 mg oral 4x daily    fludrocortisone  0.1 mg oral Daily    insulin lispro  0-5 Units subcutaneous TID    lamoTRIgine  200 mg oral q AM    lotilaner  1 drop Both Eyes Nightly    nystatin   Topical BID    pantoprazole  40 mg oral Daily before breakfast    Or    pantoprazole  40 mg intravenous Daily before breakfast    sodium chloride (PF) 0.9%  10 mL intravenous Daily    sucralfate  1 g oral Nightly    traZODone  150 mg oral Nightly     Continuous Medications   Medication Dose Last Rate     PRN medications   Medication    acetaminophen    oxygen    polyethylene glycol    SUMAtriptan       Pertinent Recent Cardiovascular Studies (personally reviewed):  Vascular studies:  Venous duplex 9/16/24: Personally reviewed DVT scan.  There is  thrombi bilateral lower extremities external iliac/CF be/proximal femoral vein.    Laboratory values:  CMP:  Recent Labs     09/20/24  0434 09/19/24  1001 09/17/24  0357 09/16/24  1143 09/10/24  1010 09/06/24  0616 09/05/24  0628 09/02/24  0600    136 135* 137 139 135* 134* 136   K 4.5 4.4 4.7 4.3 2.6* 4.2 4.6 4.5    103 102 104 101 105 106 108*   CO2 29 28 22 23 20* 23 22 24   ANIONGAP 9* 9* 16 14 21* 11 11 9*   BUN 33* 32* 32* 30* 29* 42* 41* 44*   CREATININE 1.71* 1.95* 2.21* 1.92* 1.59* 1.70* 1.71* 1.65*   EGFR 32* 27* 24* 28* 35* 32* 32* 34*   MG  --   --  1.93 1.49*  --   --   --   --      Recent Labs     09/16/24  1143 08/30/24  0634 08/28/24  0544 08/27/24  0612 08/09/24  0637 08/08/24  1509 08/08/24  1509 07/07/23  1622 10/13/21  1348   ALBUMIN 2.3* 2.5* 2.4* 2.3* 2.5*   < > 2.7* 3.5 3.8   ALKPHOS 167*  --   --   --  136  --  147* 138* 103   ALT 23  --   --   --  18  --  22 20 18   AST 26  --   --   --  25  --  32 37 32   BILITOT 0.4  --   --   --  0.7  --  0.6 0.7 0.8    < > = values in this interval not displayed.     CBC:  Recent Labs     09/20/24  0434 09/19/24  1042 09/18/24  0229 09/17/24  0357 09/16/24  1143 09/10/24  1010 09/05/24  0628 09/02/24  0600   WBC 6.4 6.9 10.4 8.8 10.1 10.3 12.1* 10.1   HGB 9.2* 8.7* 8.9* 10.0* 10.4* 9.2* 9.3* 8.8*   HCT 29.0* 27.3* 27.2* 32.7* 32.4* 29.9* 28.8* 27.4*   PLT 93* 88* 102* 100* 118* 97* 138* 125*   MCV 92 92 90 96 92 97 93 93     COAG:   Recent Labs     09/19/24  0601 09/18/24  0630 09/18/24  0229 09/17/24  2114 09/17/24  0956 09/17/24  0357 09/16/24  2235 09/16/24  1959 09/16/24  1257 07/07/23  1622   INR  --   --   --   --   --   --   --   --  1.2* 1.3*   HAUF 0.4 0.4 0.4 0.5 0.9 0.7 0.9 1.4*  --   --      ABO:   Recent Labs     08/21/24  0720   ABO O  O     HEME/ENDO:  Recent Labs     07/11/24  0906 05/13/24  1517 11/17/23  1301 08/11/23  0820 05/16/23  0854   TSH  --   --   --   --  1.82   HGBA1C 4.5 4.4 4.6 4.6  --       CARDIAC:   Recent  "Labs     09/16/24  1143   TROPHS 7   BNP 22   No results for input(s): \"CHOL\", \"LDLF\", \"HDL\", \"TRIG\" in the last 73256 hours.  MICRO:   Recent Labs     09/10/24  1010 08/24/24  0652 08/08/24  1509 10/13/21  1348 07/15/21  1451   CRP 2.34* 1.59* 20.46* 1.01* 1.57*     Susceptibility data from last 90 days.  Collected Specimen Info Organism   08/08/24 Tissue/Biopsy from Wound/Tissue Streptococcus agalactiae (Group B Streptococcus)     Mixed Gram-Positive Bacteria           I have personally reviewed most recent PCP, cardiology, vascular, and/or podiatry documentation.      Assessment/Plan   68 y.o. female with  DVT BLE  in the background of  abdominoplasty and gastric bypass surgery 7/30/2024 .    Was initially planned for debridement and partial closure of abdomen 9/18/2024 but deferred due to DVT.    Plan:  It is reasonable to have the patient see Dr. Vania Aiken in follow-up as an outpatient to determine timing of interruption for surgery.  Referral was made for Dr. Aiken.  I suspect patient would benefit from Lovenox transition for surgery below minimal time off of anticoagulation.      Ho Braxton MD, FACC, FSCAI, RPVI  Co-Director, Vascular Center, and  Co-Director, Pulmonary Embolism Response Team,   [facility]                                 of Medicine,                                                                 [facility] [employer]              "

## 2024-09-21 NOTE — CARE PLAN
Goal Outcome Evaluation:      No pain or sob reported. Received pt from bill pan from ems. O2 90-93 4lnc. Vss. Up adlib to toilet, lasix effective 800ml out. Call light in reach, can make own needs known.                                         The patient's goals for the shift include      The clinical goals for the shift include maintaining hemodynamic stability    Maintaining hemodynamic stability

## 2024-09-21 NOTE — CARE PLAN
The patient's goals for the shift include      The clinical goals for the shift include stable vital signs    Over the shift, vital signs will be assessed as well as LOC.  Frequent  checks

## 2024-09-25 ENCOUNTER — APPOINTMENT (OUTPATIENT)
Dept: WOUND CARE | Facility: CLINIC | Age: 69
End: 2024-09-25
Payer: MEDICARE

## 2024-09-26 ENCOUNTER — APPOINTMENT (OUTPATIENT)
Dept: NEUROLOGY | Facility: CLINIC | Age: 69
End: 2024-09-26
Payer: MEDICARE

## 2024-10-02 ENCOUNTER — APPOINTMENT (OUTPATIENT)
Dept: WOUND CARE | Facility: CLINIC | Age: 69
End: 2024-10-02
Payer: MEDICARE

## 2025-01-07 ENCOUNTER — APPOINTMENT (OUTPATIENT)
Dept: RHEUMATOLOGY | Facility: CLINIC | Age: 70
End: 2025-01-07
Payer: MEDICARE

## 2025-01-28 NOTE — CONSULTS
48 years old female with h/o HTN, HLD, CKD, DM, HF ( systolic and diastolic) present to ED with complain of worsening SOB. Admit for Covid19, AE HFrEF     #Acute respiratory failure with hypoxia  #Acute on chronic combined systolic and diastolic congestive heart failure.   Worsening SOB.  On exam, bilateral crackles. Clinically volume overloaded. Echo from 09/16/24 reviewed: Left ventricular cavity is mildly dilated. Left ventricular systolic function is mildly decreased with an ejection fraction visually estimated at 45 to 50 %. Severe left ventricular hypertrophy.There is severe (grade 3) left ventricular diastolic dysfunction.with reduced EF. BNP 12k+. Troponin slightly elevated. Attempted to get CTPE but patient refused. TTE suggestive of non-obstructive HCM vs. infiltrative cardiomyopathy. She was supposed to follow up with cardiologist for amyloid evaluation -> has appointment on the 14th. CXR some increase in the infiltrative patterns in the lungs. Now off Bipap. Repeat echo with EF 43%.   - C/w IV lasix 80mg daily; Strict I/Os. Monitor electrolytes   - Refusing CTA chest or V/Q scan to r/o PE. Less likely PE as she is improving and no evidence of heart strain on echo.   - GDMT: Entresto, Coreg. Restart SGLT2 when off IV Lasix    - C/w Tele     #COVID19+   - First positive on the 01/09/25. Suspect patient is shedding virus and thus still positive. Will hold off on remdesivir and Dexamethasone for now. Retest negative.     # Benign essential HTN.   ·  C/w Coreg, Entresto     #Hyperlipidemia, unspecified.   ·  Not on statin? Adamant that she does not have high cholesterol. Educated and Encouraged her to f/u with her PCP.     # Type 2 diabetes mellitus with unspecified complications.   - A1c 8.1; Refusing insulin. C/w  home Tradjenta 5/d     #DAVID on Chronic kidney disease (CKD).   ·  Cr slightly higher than baseline. Pending labs today as she refused in AM.   - C/w Diuresis  - Avoid nephrotoxic agents     #Obesity   BMI 34.5. Encourage diet and exercise.     Diet: DASH  DVT prophylaxis: SQHeparin(she is refusing)   Dispo: Tele   Code Status: FC     I have spent a total of 45 minutes to prepare to see the patient, obtaining and reviewing history, physical examination, explaining the diagnosis, prognosis and treatment plan with the patient/family/caregiver. I also have spent the time ordering studies and testing, interpreting results, medicine reconciliation, IDRs, subspecialty consultation and documentation as above.       Vancomycin Dosing by Pharmacy- INITIAL    Mariann Drew is a 68 y.o. year old female who Pharmacy has been consulted for vancomycin dosing for cellulitis, skin and soft tissue. Based on the patient's indication and renal status this patient will be dosed based on a goal trough/random level of 10-15.     Renal function is currently declining.    Visit Vitals  BP 94/60   Pulse (!) 103   Resp 18        Lab Results   Component Value Date    CREATININE 1.92 (H) 2024    CREATININE 1.59 (H) 09/10/2024    CREATININE 1.70 (H) 2024    CREATININE 1.71 (H) 2024        Patient weight is as follows:   Vitals:    24 1231   Weight: 72.6 kg (160 lb)       Cultures:  No results found for the encounter in last 14 days.        I/O last 3 completed shifts:  In: 150 (2.1 mL/kg) [I.V.:50 (0.7 mL/kg); IV Piggyback:100]  Out: - (0 mL/kg)   Weight: 72.6 kg   I/O during current shift:  No intake/output data recorded.    Temp (24hrs), Av.5 °C (97.7 °F), Min:36.5 °C (97.7 °F), Max:36.5 °C (97.7 °F)         Assessment/Plan     Patient given Vanco 1000 mg in ER.  Follow-up level will be ordered on  at 1500 unless clinically indicated sooner.  Will continue to monitor renal function daily while on vancomycin and order serum creatinine at least every 48 hours if not already ordered.  Follow for continued vancomycin needs, clinical response, and signs/symptoms of toxicity.       Atul Ambrocio [facility]

## (undated) DEVICE — SYRINGE, TOOMEY BLADDER, 50ML

## (undated) DEVICE — SPONGE, LAP, XRAY DECT, 18IN X 18IN, W/MASTER DMT, STERILE

## (undated) DEVICE — DRESSING, VERAFLO CLEANSE CHOICE, MEDIUM

## (undated) DEVICE — ELECTRODE, ELECTROSURGICAL, BLADE, INSULATED, ENT/IMA, STERILE

## (undated) DEVICE — GLOVE, SURGICAL, PROTEXIS NEOPRENE, 8.0, PF, LF

## (undated) DEVICE — DRESSING, NON-ADHERENT, OIL EMULSION, CURITY, 3 X 8 IN, STERILE

## (undated) DEVICE — Device

## (undated) DEVICE — APPLIER, CLIP, PREMIUM II, SURGICLIP, M- 11.5

## (undated) DEVICE — DRAPE, INCISE, ANTIMICROBIAL, IOBAN 2, STERI DRAPE, 23 X 33 IN, DISPOSABLE, STERILE

## (undated) DEVICE — STAPLER, SKIN, PLUS, REGULAR, 35

## (undated) DEVICE — SYRINGE, 50 CC, LUER LOCK

## (undated) DEVICE — STAPLER, SKIN, MULTIFIRE, PREMIUM, WIDE, 35 W

## (undated) DEVICE — COLLECTION/DELIVERY SYSTEM, COPAN ESWAB, REG SIZE SWAB

## (undated) DEVICE — CORD, CAUTERY, BIOPOLAR FORCEP, 12FT

## (undated) DEVICE — APPLICATOR, CHLORAPREP, W/ORANGE TINT, 26ML

## (undated) DEVICE — CAUTERY, PENCIL, PUSH BUTTON, SMOKE EVAC, 70MM

## (undated) DEVICE — DRESSING, ADHESIVE, ISLAND, TELFA, 4 X 4 IN

## (undated) DEVICE — TIP, SUCTION, YANKAUER, BULB, ADULT

## (undated) DEVICE — NEEDLE, SPINAL, 22 G X 3.5 IN, BLACK HUB

## (undated) DEVICE — DRESSING, MEPILEX BORDER, POST-OP AG, 4 X 10 IN

## (undated) DEVICE — NEEDLE, HYPODERMIC, REGULAR WALL, REGULAR BEVEL, 18 G X 1.5 IN

## (undated) DEVICE — DEPRESSOR, TONGUE, 6 IN, WOOD, STERILE, LF

## (undated) DEVICE — SUTURE, SILK, 0, 30 IN, CT-1, BLACK

## (undated) DEVICE — DRESSING, WOUND, MEPITEL, 4X8

## (undated) DEVICE — TUBING, IRRIGATION, HIGH FLOW, HAND PIECE SET

## (undated) DEVICE — COVER, PLASTIC, MAYO STAND, 29.5IN X 55.5IN

## (undated) DEVICE — TUBING, SUCTION, CONNECTING, STERILE 0.25 X 120 IN., LF

## (undated) DEVICE — PACK, UNIVERSAL

## (undated) DEVICE — BLADE, PLASMA, PEAK, 3.0S LIGHT

## (undated) DEVICE — DRESSING, TEGADERM, CHG, 2-3/4 X 3-3/8 IN

## (undated) DEVICE — BINDER, ABDOMINAL, 4 PANEL, 12 X 30-45 IN

## (undated) DEVICE — BINDER, ABDOMINAL, 4 PANEL, 12 X 46-62 IN

## (undated) DEVICE — DRESSING KIT, VACUUM ASSISTED CLOSURE, W/DRAPE/TUBING, LARGE, FOAM, BLACK

## (undated) DEVICE — MARKER, SKIN, XFINE TIP, W/RULER AND LABELS

## (undated) DEVICE — DRESSING, ISLAND, ADHESIVE, TELFA, 4 X 8 IN

## (undated) DEVICE — BASIN, EMESIS, STANDARD, STERILE

## (undated) DEVICE — DRESSING, ABDOMINAL, TENDERSORB, 8 X 7-1/2 IN, STERILE

## (undated) DEVICE — RESERVOIR, DRAINAGE, WOUND, JACKSON-PRATT, 100 CC, SILICONE

## (undated) DEVICE — BANDAGE, ELASTIC, MATRIX, SELF-CLOSURE, 4 IN X 5 YD, LF

## (undated) DEVICE — CASSETTE, VAC VERALINK, DISP

## (undated) DEVICE — TUBESET, SONICVAC

## (undated) DEVICE — TUBING, ASPIRATION, HIGH FLOW TAPERED, STERILE

## (undated) DEVICE — WOUND SYSTEM, DEBRIDEMENT & CLEANING, O.R DUOPAK

## (undated) DEVICE — SUTURE, MONOCRYL, 3-0, 18 IN, PS2, UNDYED

## (undated) DEVICE — NEEDLE, ECLIPSE, 25GA X 1-1/2 IN

## (undated) DEVICE — PENCIL, ELECTROSURGICAL, HAND CONTROL, W/SMOKE EVACATUATION ATTACHMENT

## (undated) DEVICE — BINDER, ABDOMINAL, 3 PANEL, 9 X 46-62 IN, MED/LG

## (undated) DEVICE — GLOVE, SURGICAL, PROTEXIS PI , 7.5, PF, LF

## (undated) DEVICE — WOUND VAC KIT, W/CANNISTER, 120 CC

## (undated) DEVICE — DRESSING, GAUZE, PETROLATUM, PATCH, XEROFORM, 1 X 8 IN, STERILE

## (undated) DEVICE — EVACUATOR, WOUND, SUCTION, CLOSED, JACKSON-PRATT, 100 CC, SILICONE

## (undated) DEVICE — DRESSING KIT, VAC XLG FOAM BLACK

## (undated) DEVICE — NEEDLE, FILTER 19 G X 1 IN

## (undated) DEVICE — SYRINGE, 20 CC, LUER LOCK

## (undated) DEVICE — SUTURE, PDS II, 0, 27 IN, CT1, VIOLET

## (undated) DEVICE — PACK, BASIC

## (undated) DEVICE — DRESSING, ABDOMINAL, TENDERSORB, 8 X 10 IN, STERILE

## (undated) DEVICE — STAPLER, SKIN PROXIMATE, 35 WIDE

## (undated) DEVICE — SYRINGE, 10 CC, LUER LOCK

## (undated) DEVICE — DRESSING, ISLAND, TELFA, 4 X 5 IN

## (undated) DEVICE — SKIN CLOSURE SYS, PREMIERPRO EXOFIN, 2-4CM X 30CM, 1.75G TUBE

## (undated) DEVICE — SUTURE, PLAIN, 5-0, 18 IN, PC1, YELLOW

## (undated) DEVICE — TOWEL, SURGICAL, NEURO, O/R, 16 X 26, BLUE, STERILE

## (undated) DEVICE — TUBING SET, SOFTOUCH PUMP, SINGLE SPIKE

## (undated) DEVICE — COUNTER, NEEDLE, FOAM BLOCK, W/MAGNET, W/BLADE GUARD, 10 COUNT, RED, LF